# Patient Record
Sex: FEMALE | Race: WHITE | Employment: PART TIME | URBAN - NONMETROPOLITAN AREA
[De-identification: names, ages, dates, MRNs, and addresses within clinical notes are randomized per-mention and may not be internally consistent; named-entity substitution may affect disease eponyms.]

---

## 2017-11-23 ENCOUNTER — HOSPITAL ENCOUNTER (INPATIENT)
Age: 52
LOS: 3 days | Discharge: HOME | DRG: 885 | End: 2017-11-26
Attending: PSYCHIATRY & NEUROLOGY | Admitting: PSYCHIATRY & NEUROLOGY

## 2017-11-23 PROCEDURE — 6370000000 HC RX 637 (ALT 250 FOR IP): Performed by: PSYCHIATRY & NEUROLOGY

## 2017-11-23 PROCEDURE — 1240000000 HC EMOTIONAL WELLNESS R&B

## 2017-11-23 RX ORDER — FLUTICASONE PROPIONATE 50 MCG
1 SPRAY, SUSPENSION (ML) NASAL DAILY
Status: DISCONTINUED | OUTPATIENT
Start: 2017-11-24 | End: 2017-11-26 | Stop reason: HOSPADM

## 2017-11-23 RX ORDER — LEVOTHYROXINE SODIUM 0.1 MG/1
100 TABLET ORAL DAILY
Status: DISCONTINUED | OUTPATIENT
Start: 2017-11-24 | End: 2017-11-26 | Stop reason: HOSPADM

## 2017-11-23 RX ORDER — VENLAFAXINE HYDROCHLORIDE 150 MG/1
225 CAPSULE, EXTENDED RELEASE ORAL DAILY
Status: ON HOLD | COMMUNITY
End: 2017-11-26 | Stop reason: HOSPADM

## 2017-11-23 RX ORDER — HYDROXYZINE PAMOATE 25 MG/1
25 CAPSULE ORAL 3 TIMES DAILY PRN
Status: DISCONTINUED | OUTPATIENT
Start: 2017-11-23 | End: 2017-11-26 | Stop reason: HOSPADM

## 2017-11-23 RX ORDER — M-VIT,TX,IRON,MINS/CALC/FOLIC 27MG-0.4MG
1 TABLET ORAL DAILY
COMMUNITY
End: 2019-06-07

## 2017-11-23 RX ORDER — TIZANIDINE 4 MG/1
4 TABLET ORAL NIGHTLY PRN
Status: DISCONTINUED | OUTPATIENT
Start: 2017-11-24 | End: 2017-11-26 | Stop reason: HOSPADM

## 2017-11-23 RX ORDER — LISINOPRIL 40 MG/1
40 TABLET ORAL DAILY
Status: DISCONTINUED | OUTPATIENT
Start: 2017-11-24 | End: 2017-11-26 | Stop reason: HOSPADM

## 2017-11-23 RX ORDER — ACETAMINOPHEN 325 MG/1
650 TABLET ORAL EVERY 4 HOURS PRN
Status: DISCONTINUED | OUTPATIENT
Start: 2017-11-23 | End: 2017-11-26 | Stop reason: HOSPADM

## 2017-11-23 RX ORDER — M-VIT,TX,IRON,MINS/CALC/FOLIC 27MG-0.4MG
1 TABLET ORAL DAILY
Status: DISCONTINUED | OUTPATIENT
Start: 2017-11-24 | End: 2017-11-26 | Stop reason: HOSPADM

## 2017-11-23 RX ORDER — AMLODIPINE BESYLATE 10 MG/1
10 TABLET ORAL DAILY
Status: DISCONTINUED | OUTPATIENT
Start: 2017-11-24 | End: 2017-11-26 | Stop reason: HOSPADM

## 2017-11-23 RX ORDER — TRAZODONE HYDROCHLORIDE 50 MG/1
50 TABLET ORAL NIGHTLY PRN
Status: DISCONTINUED | OUTPATIENT
Start: 2017-11-23 | End: 2017-11-26 | Stop reason: HOSPADM

## 2017-11-23 RX ORDER — MAGNESIUM HYDROXIDE/ALUMINUM HYDROXICE/SIMETHICONE 120; 1200; 1200 MG/30ML; MG/30ML; MG/30ML
30 SUSPENSION ORAL PRN
Status: DISCONTINUED | OUTPATIENT
Start: 2017-11-23 | End: 2017-11-26 | Stop reason: HOSPADM

## 2017-11-23 RX ORDER — ALBUTEROL SULFATE 90 UG/1
2 AEROSOL, METERED RESPIRATORY (INHALATION) EVERY 6 HOURS PRN
Status: DISCONTINUED | OUTPATIENT
Start: 2017-11-23 | End: 2017-11-26 | Stop reason: HOSPADM

## 2017-11-23 RX ORDER — FLUTICASONE PROPIONATE 50 MCG
1 SPRAY, SUSPENSION (ML) NASAL DAILY
COMMUNITY
End: 2019-11-11 | Stop reason: SDUPTHER

## 2017-11-23 RX ORDER — LEVOTHYROXINE SODIUM 0.1 MG/1
100 TABLET ORAL DAILY
COMMUNITY
End: 2020-12-17 | Stop reason: SDUPTHER

## 2017-11-23 RX ADMIN — TRAZODONE HYDROCHLORIDE 50 MG: 50 TABLET ORAL at 22:45

## 2017-11-23 ASSESSMENT — LIFESTYLE VARIABLES: HISTORY_ALCOHOL_USE: YES

## 2017-11-23 ASSESSMENT — SLEEP AND FATIGUE QUESTIONNAIRES
RESTFUL SLEEP: NO
AVERAGE NUMBER OF SLEEP HOURS: 4
DIFFICULTY STAYING ASLEEP: YES
SLEEP PATTERN: DIFFICULTY FALLING ASLEEP;DISTURBED/INTERRUPTED SLEEP
DIFFICULTY ARISING: NO
DIFFICULTY FALLING ASLEEP: YES
DO YOU USE A SLEEP AID: NO
DO YOU HAVE DIFFICULTY SLEEPING: YES

## 2017-11-23 ASSESSMENT — PAIN SCALES - GENERAL: PAINLEVEL_OUTOF10: 0

## 2017-11-23 ASSESSMENT — PATIENT HEALTH QUESTIONNAIRE - PHQ9: SUM OF ALL RESPONSES TO PHQ QUESTIONS 1-9: 23

## 2017-11-24 PROCEDURE — 6370000000 HC RX 637 (ALT 250 FOR IP): Performed by: PHYSICIAN ASSISTANT

## 2017-11-24 PROCEDURE — 90792 PSYCH DIAG EVAL W/MED SRVCS: CPT | Performed by: PHYSICIAN ASSISTANT

## 2017-11-24 PROCEDURE — 1240000000 HC EMOTIONAL WELLNESS R&B

## 2017-11-24 PROCEDURE — 6370000000 HC RX 637 (ALT 250 FOR IP): Performed by: PSYCHIATRY & NEUROLOGY

## 2017-11-24 RX ORDER — ARIPIPRAZOLE 5 MG/1
5 TABLET ORAL DAILY
Status: DISCONTINUED | OUTPATIENT
Start: 2017-11-24 | End: 2017-11-26 | Stop reason: HOSPADM

## 2017-11-24 RX ADMIN — LEVOTHYROXINE SODIUM 100 MCG: 100 TABLET ORAL at 06:08

## 2017-11-24 RX ADMIN — LISINOPRIL 40 MG: 40 TABLET ORAL at 09:55

## 2017-11-24 RX ADMIN — ACETAMINOPHEN 650 MG: 325 TABLET ORAL at 17:49

## 2017-11-24 RX ADMIN — TRAZODONE HYDROCHLORIDE 50 MG: 50 TABLET ORAL at 20:48

## 2017-11-24 RX ADMIN — FLUTICASONE PROPIONATE 1 SPRAY: 50 SPRAY, METERED NASAL at 09:56

## 2017-11-24 RX ADMIN — AMLODIPINE BESYLATE 10 MG: 10 TABLET ORAL at 09:56

## 2017-11-24 RX ADMIN — MULTIPLE VITAMINS W/ MINERALS TAB 1 TABLET: TAB at 09:55

## 2017-11-24 RX ADMIN — TIZANIDINE 4 MG: 4 TABLET ORAL at 20:48

## 2017-11-24 RX ADMIN — ARIPIPRAZOLE 5 MG: 5 TABLET ORAL at 17:47

## 2017-11-24 RX ADMIN — HYDROXYZINE PAMOATE 25 MG: 25 CAPSULE ORAL at 20:48

## 2017-11-24 RX ADMIN — VENLAFAXINE HYDROCHLORIDE 225 MG: 150 CAPSULE, EXTENDED RELEASE ORAL at 09:55

## 2017-11-24 ASSESSMENT — PAIN DESCRIPTION - PAIN TYPE: TYPE: ACUTE PAIN

## 2017-11-24 ASSESSMENT — PAIN DESCRIPTION - LOCATION: LOCATION: HEAD

## 2017-11-24 ASSESSMENT — PAIN SCALES - GENERAL
PAINLEVEL_OUTOF10: 1
PAINLEVEL_OUTOF10: 3
PAINLEVEL_OUTOF10: 0
PAINLEVEL_OUTOF10: 4

## 2017-11-24 ASSESSMENT — PAIN DESCRIPTION - PROGRESSION: CLINICAL_PROGRESSION: NOT CHANGED

## 2017-11-24 ASSESSMENT — PAIN DESCRIPTION - DESCRIPTORS: DESCRIPTORS: HEADACHE

## 2017-11-24 ASSESSMENT — PAIN DESCRIPTION - ONSET: ONSET: GRADUAL

## 2017-11-24 ASSESSMENT — PAIN DESCRIPTION - FREQUENCY: FREQUENCY: INTERMITTENT

## 2017-11-24 NOTE — PROGRESS NOTES
5 [facility]  Initial Interdisciplinary Treatment Plan NOTE    Review Date & Time: 11/24/17 2:35    Patient was in treatment team    Admission Type:   Admission Type:  Involuntary    Reason for admission:  Reason for Admission: Overdosed on Amaryl      Estimated Length of Stay Update:  3 days  Estimated Discharge Date Update: 11/28/17    PATIENT STRENGTHS:  Patient Strengths Strengths: Communication  Patient Strengths and Limitations:Limitations: Difficulty problem solving/relies on others to help solve problems  Addictive Behavior:Addictive Behavior  In the past 3 months, have you felt or has someone told you that you have a problem with:  : Other(Comments)  Do you have a history of Chemical Use?: No  Do you have a history of Alcohol Use?: Yes  Do you have a history of Street Drug Abuse?: Yes  Histroy of Prescripton Drug Abuse?: No  Medical Problems:  Past Medical History:   Diagnosis Date    Anxiety     COPD (chronic obstructive pulmonary disease) ([address]     Depression     Headache(784.0)     History of hematuria     Hypertension     Kidney stone        EDUCATION:   Learner Progress Toward Treatment Goals: Reviewed results and recommendations of this team, Reviewed group plan and strategies, Reviewed signs, symptoms and risk of self harm and violent behavior and Reviewed goals and plan of care    Method: Small group    Outcome: Verbalized understanding    PATIENT GOALS: decrease anxiety, counseling    PLAN/TREATMENT RECOMMENDATIONS UPDATE: 3 days    SHORT-TERM GOALS UPDATE:   Time frame for Short-Term Goals: daily/ongoing    LONG-TERM GOALS UPDATE:   Time frame for Long-Term Goals: 11/28/17  Members Present in Team Meeting: See Victor [address]lee [address]

## 2017-11-24 NOTE — H&P
Department of Psychiatry  Psychiatric Assessment     CHIEF COMPLAINT:  Suicide attempt    HISTORY OF PRESENT ILLNESS:      Amari Cespedes is a 47 y.o. female with a history of bipolar II disorder, alcohol/cocaine abuse, who presented to the emergency department following a suicide attempt via overdose on twenty Amaryl tablets and ten Xanax tablets. She is seen this morning along with Boni Hidalgo MD.  She endorses worsening depression last few weeks with anhedonia, feeling down and sad, hopeless, poor motivation, isolative, sleeping during the day and staying awake at night. She recently quit her job at [employer] (due to [employer] her house, then relapsed on cocaine and alcohol this weekend, resulting in her suicide attempt.      PSYCHIATRIC HISTORY:      · Outpatient psychiatric provider:  Currently follows with Dr. Olegario Burnette  · Suicide attempts: yes  · Inpatient psychiatric admissions: yes    Past psychiatric medications includes:     Xanax  Trazodone  Zyprexa, Risperdal, Seroquel  Effexor     Adverse reactions from psychotropic medications:    Risperdal, Serouqel, possibly Trazodone = sleep walking  Zyprexa = significant weight gain      Lifetime Psychiatric Review of Systems      ·    Obsessions and Compulsions: denies      ·    Geeta or Hypomania: hypomania  ·    Hallucinations: denies  ·    Panic Attacks:  Yes - rarely   ·    Delusions:  denies  ·    Phobias:  denies      Past Medical History:        Diagnosis Date    Anxiety     COPD (chronic obstructive pulmonary disease) ([address]     Depression     Headache(784.0)     History of hematuria     Hypertension     Kidney stone        Past Surgical History:        Procedure Laterality Date    APPENDECTOMY      CHOLECYSTECTOMY      DILATION AND CURETTAGE OF UTERUS      EYE SURGERY      LAPAROSCOPY      LUMBAR LAMINECTOMY      TUBAL LIGATION      TUMOR REMOVAL      FACE       Medications Prior to Admission:   Prescriptions Prior to Admission: understand and utilize treatments. Goals:    Resume home meds, add in Abilify, titrate for efficacy  Encouraged patient to engage in groups, milieu, and individual therapies offered as part of programing. Behavioral Services  Medicare Certification Upon Admission    I certify that this patient's inpatient psychiatric hospital admission is medically necessary for:   X (1) Treatment which could reasonably be expected to improve this patient's condition,      X (2) Or for diagnostic study;     AND     X (2) The inpatient psychiatric services are provided while the individual is under the care of a physician and are included in the individualized plan of care. Estimated length of stay/service 3-10 days    Plan for post-hospital care: Follow up with [facility].   Pt also to be offered [id number] [facility],2[facility],2Nd Floor Intensive Outpatient Program.     Electronically signed by Ar Bernabe PA-C on 11/24/2017 at 12:35 PM       Findings and recommendations discussed with and approved by Chen Harrison MD

## 2017-11-24 NOTE — PROGRESS NOTES
Nutrition Assessment    Type and Reason for Visit: Initial, Positive Nutrition Screen (unplanned weight loss prior to admit)    Nutrition Recommendations: Consider folic acid and thiamin given alcohol abuse history. Continue current diet, ONS, and multivitamin. Malnutrition Assessment:  · Malnutrition Status: At risk for malnutrition    Nutrition Diagnosis:   · Problem: Inadequate oral intake  · Etiology: related to Psychological cause/life stress, Insufficient energy/nutrient consumption     Signs and symptoms:  as evidenced by Diet history of poor intake, Weight loss    Nutrition Assessment:  · Subjective Assessment: Pt. seen, lying in bed, hasn't ate breakfast yet. Reports decline in appetite in the last few weeks, occasional nausea. Note polysubstance abuse, alcohol, cocaine. Dislikes liquid ONS, agreed to magic cup BID. States her usual weight~ 150#. Lost ~ 7# but she was unsure of time frame this was lost.    · Wound Type: None  · Current Nutrition Therapies:  · Oral Diet Orders: General   · Oral Diet intake: Unable to assess (just admitted)  · Oral Nutrition Supplement (ONS) Orders: Frozen Oral Supplement (magic cup BID)  · ONS intake:  (just initiated)  · Anthropometric Measures:  · Ht: 5' 1\" (154.9 cm)   · Current Body Wt: 143 lb (64.9 kg) (11/23/17 stated)  · Admission Body Wt: 143 lb (64.9 kg) (11/23/17 stated)  · Usual Body Wt:  (150# per pt, she was unsure when she last weighed this. No previous hospital wts.)  · % Weight Change: 5.4% loss ,  pt. uncertain of time frame  · Ideal Body Wt: 105 lb (47.6 kg),  · Adjusted Body Wt:115 lb (52.2 kg),   · BMI Classification: BMI 25.0 - 29.9 Overweight  · Comparative Standards (Estimated Nutrition Needs):  · Estimated Daily Total Kcal: 4030-8406 kcals  · Estimated Daily Protein (g): 42-52 grams    Estimated Intake vs Estimated Needs: Intake Less Than Needs    Nutrition Risk Level:  Moderate    Nutrition Interventions:   Continue current diet, Start ONS  Continued Inpatient Monitoring, Education Initiated (Encouraged oral intake.)    Nutrition Evaluation:   · Evaluation: Goals set   · Goals: Pt. will consume 75% or more of meals during LOS. · Monitoring: Meal Intake, Supplement Intake, Diet Tolerance, Weight, Pertinent Labs    See Adult Nutrition Doc Flowsheet for more detail.      Electronically signed by Ronna Liu RD, PAUL on 11/24/17 at 8:38 AM    Contact Number: [phone]

## 2017-11-25 PROCEDURE — 6370000000 HC RX 637 (ALT 250 FOR IP): Performed by: PHYSICIAN ASSISTANT

## 2017-11-25 PROCEDURE — 99231 SBSQ HOSP IP/OBS SF/LOW 25: CPT | Performed by: PSYCHIATRY & NEUROLOGY

## 2017-11-25 PROCEDURE — 6370000000 HC RX 637 (ALT 250 FOR IP): Performed by: PSYCHIATRY & NEUROLOGY

## 2017-11-25 PROCEDURE — 1240000000 HC EMOTIONAL WELLNESS R&B

## 2017-11-25 RX ORDER — POLYETHYLENE GLYCOL 3350 17 G
2 POWDER IN PACKET (EA) ORAL PRN
Status: DISCONTINUED | OUTPATIENT
Start: 2017-11-25 | End: 2017-11-26 | Stop reason: HOSPADM

## 2017-11-25 RX ADMIN — LEVOTHYROXINE SODIUM 100 MCG: 100 TABLET ORAL at 06:54

## 2017-11-25 RX ADMIN — MULTIPLE VITAMINS W/ MINERALS TAB 1 TABLET: TAB at 08:18

## 2017-11-25 RX ADMIN — AMLODIPINE BESYLATE 10 MG: 10 TABLET ORAL at 08:18

## 2017-11-25 RX ADMIN — FLUTICASONE PROPIONATE 1 SPRAY: 50 SPRAY, METERED NASAL at 08:19

## 2017-11-25 RX ADMIN — ARIPIPRAZOLE 5 MG: 5 TABLET ORAL at 08:18

## 2017-11-25 RX ADMIN — TRAZODONE HYDROCHLORIDE 50 MG: 50 TABLET ORAL at 20:34

## 2017-11-25 RX ADMIN — ACETAMINOPHEN 650 MG: 325 TABLET ORAL at 14:56

## 2017-11-25 RX ADMIN — HYDROXYZINE PAMOATE 25 MG: 25 CAPSULE ORAL at 12:38

## 2017-11-25 RX ADMIN — HYDROXYZINE PAMOATE 25 MG: 25 CAPSULE ORAL at 20:35

## 2017-11-25 RX ADMIN — VENLAFAXINE HYDROCHLORIDE 225 MG: 150 CAPSULE, EXTENDED RELEASE ORAL at 08:18

## 2017-11-25 RX ADMIN — LISINOPRIL 40 MG: 40 TABLET ORAL at 08:18

## 2017-11-25 RX ADMIN — TIZANIDINE 4 MG: 4 TABLET ORAL at 21:44

## 2017-11-25 ASSESSMENT — PAIN DESCRIPTION - DESCRIPTORS: DESCRIPTORS: ACHING

## 2017-11-25 ASSESSMENT — PAIN SCALES - GENERAL
PAINLEVEL_OUTOF10: 6
PAINLEVEL_OUTOF10: 0
PAINLEVEL_OUTOF10: 3

## 2017-11-25 ASSESSMENT — SLEEP AND FATIGUE QUESTIONNAIRES
DIFFICULTY STAYING ASLEEP: YES
DIFFICULTY ARISING: NO
RESTFUL SLEEP: YES
DIFFICULTY FALLING ASLEEP: YES
DO YOU USE A SLEEP AID: NO
DO YOU HAVE DIFFICULTY SLEEPING: NO
AVERAGE NUMBER OF SLEEP HOURS: 4

## 2017-11-25 ASSESSMENT — PAIN DESCRIPTION - ORIENTATION: ORIENTATION: LOWER

## 2017-11-25 ASSESSMENT — PAIN DESCRIPTION - PAIN TYPE: TYPE: CHRONIC PAIN

## 2017-11-25 ASSESSMENT — PAIN DESCRIPTION - PROGRESSION: CLINICAL_PROGRESSION: NOT CHANGED

## 2017-11-25 ASSESSMENT — PAIN DESCRIPTION - ONSET: ONSET: ON-GOING

## 2017-11-25 ASSESSMENT — PAIN DESCRIPTION - FREQUENCY: FREQUENCY: INTERMITTENT

## 2017-11-25 ASSESSMENT — PAIN DESCRIPTION - LOCATION: LOCATION: BACK

## 2017-11-25 NOTE — PLAN OF CARE
Problem: Falls - Risk of  Goal: Absence of falls  Outcome: Ongoing  No falls noted. Call light within reach. Fall precautions in place. Problem: Depressive Behavior with or without Suicide precautions  Goal: LTG-Able to verbalize acceptance of life and situations over which he or she has no control  Outcome: Ongoing  Patient does not verbalize acceptance. Goal: LTG-Able to verbalize and/or display a decrease in depressive symptoms  Outcome: Ongoing  Patient has a flat and blunted affect, denies depressive symptoms. Goal: STG-Able to verbalize suicidal ideations  Outcome: Ongoing  Patient currently denies suicidal thoughts. Goal: STG-Able to verbalize support system  Outcome: Completed Date Met: 11/24/17  Patient verbalizes support. Goal: STG-Absence of Self Harm  Outcome: Ongoing  Patient remains safe and free from harm. Safety, fall and suicide precautions in place. Goal: STG-Knowledge of positive coping patterns  Outcome: Completed Date Met: 11/24/17  Patient verbalizes positive coping skills. Goal: STG-Participation in care planning  Outcome: Ongoing  Patient participates. Goal: Patient Specific Goal  Outcome: Ongoing  Patient did not voice a goal.     Problem: Substance Abuse  Goal: STG-Knowledge of community resources  Outcome: Ongoing  Patient did not discuss. Problem: Activity:  Goal: Sleeping patterns will improve  Sleeping patterns will improve   Outcome: Ongoing  Patient is currently sleeping. Problem: Discharge Planning:  Goal: Discharged to appropriate level of care  Discharged to appropriate level of care   Outcome: Ongoing  Discharge planning is in progress. Problem: KNOWLEDGE DEFICIT,EDUCATION,DISCHARGE PLAN  Goal: Knowledge - personal safety  Outcome: Ongoing  Safety plan not completed this shift. Problem: Altered Mood, Depressive Behavior  Goal: LTG-Absence of self-harm  Outcome: Ongoing  Patient remains safe and free from harm.  Safety, fall and suicide precautions in place. Goal: Participation in care planning  Outcome: Ongoing  Patient participates. Goal: LTG-Able to verbalize acceptance of life and situations over which he or she has no control  Outcome: Ongoing  Patient does not verbalize acceptance. Goal: LTG-Able to verbalize and/or display a decrease in depressive symptoms  Outcome: Ongoing  Patient has a flat and blunted affect, denies depressive symptoms. Goal: STG-Able to verbalize suicidal ideations  Outcome: Ongoing  Patient currently denies suicidal thoughts. Goal: STG-Able to verbalize support system  Outcome: Completed Date Met: 11/24/17  Patient verbalizes support. Goal: STG-Knowledge of positive coping patterns  Outcome: Completed Date Met: 11/24/17  Patient verbalizes positive coping skills. Goal: Patient Specific Goal  Outcome: Ongoing  Patient did not voice a goal.     Problem: Suicide risk  Goal: Provide patient with safe environment  Provide patient with safe environment   Outcome: Ongoing  Patient remains safe and free from harm. Safety, fall and suicide precautions in place. Problem: Nutrition  Goal: Optimal nutrition therapy  Outcome: Ongoing  Patient ate a snack this evening. Problem: Pain:  Goal: Pain level will decrease  Pain level will decrease   Outcome: Ongoing  Patient reports relief of headache at this time. Goal: Control of acute pain  Control of acute pain   Outcome: Completed Date Met: 11/24/17    Goal: Control of chronic pain  Control of chronic pain   Outcome: Completed Date Met: 11/24/17      Comments: Care plan reviewed with patient. Patient does verbalize understanding of the plan of care and does contribute to goal setting.

## 2017-11-25 NOTE — PROGRESS NOTES
Group Therapy Note     Date: 11/24/2017  Start Time: 1630  End Time:  1700     Type of Group: Healthy Living/Wellness        Notes:  Did not attend group         Discipline Responsible: Licensed Practical Nurse        Signature:  Amos Fernandez.  Zia Hernandez LPN

## 2017-11-25 NOTE — PROGRESS NOTES
[facility] Biopsychosocial Assessment    Current Level of Psychosocial Functioning     Independent XXX  Dependent    Minimal Assist     Comments:  Patient had worsening depression which resulted in her quitting her job and becoming homeless    Psychosocial High Risk Factors (check all that apply)    Unable to obtain meds   Chronic illness/pain    Substance abuse XXX  Lack of Family Support   Financial stress  XXX  Isolation   Inadequate Community Resources  Suicide attempt(s) XXX  Not taking medications   Victim of crime   Developmental Delay  Unable to manage personal needs    Age 72 or older   Homeless  No transportation   Readmission within 30 days  Unemployment  Traumatic Event    Comments:   Sexual Orientation:  heterosexual    Patient Strengths: communication    Patient Barriers: financial    Plan of Care     medication management, group/individual therapies, family meetings, psycho -education, treatment team meetings to assist with stabilization    Initial Discharge Plan:  Medication, therapy, support, discharge      Clinical Summary:  Pt is a 47year old female who presented to ED for suicide attempt via overdose. Pt reported worsening depression which caused her to quit her job and then lack of income resulted in the loss of her house. Pt relapsed and experienced suicidal ideation. Pt has support in friend who she will live with when discharged.     [employer]

## 2017-11-25 NOTE — PLAN OF CARE
Problem: Falls - Risk of  Goal: Absence of falls  Outcome: Ongoing  Patient's gait is steady and free of falls this shift and will continue to monitor. Problem: Depressive Behavior with or without Suicide precautions  Goal: LTG-Able to verbalize acceptance of life and situations over which he or she has no control  Outcome: Ongoing  She shares stress over: no job, money, housing. She lost job at [employer] due to missing too much and having too many points. She says that when she took the overdose she wanted to \"just go to sleep\" not die. She reports walking, praying, and bubble baths are her coping skills. Goal: LTG-Able to verbalize and/or display a decrease in depressive symptoms  Outcome: Ongoing  She rates her mood today #5 on scale of 1-10 with 10 the happiest.  She is worried about her future, needing to find a job and get own housing. She shares she has hope for her future. She says her friend Dari Geller whom she lives with and sister Tacho Candelario and another friend Debbie Escalera is all her support system. Goal: STG-Able to verbalize suicidal ideations  Patient denies suicidal ideation, no plan and or intent to harm self. Patient is radha for safety to seek this RN for thoughts of self harm. Praise given and accepted for contract for safety. Patient remains on 15 minute suicidal precautions for safety and unit protocols. Goal: STG-Absence of Self Harm  Outcome: Ongoing  Patient has no self mutilation thoughts and or  behaviors and gives verbal contract to seek staff and inform if they develop any. She remains on 15 minute suicidal precautions for safety and unit protocols. Goal: STG-Participation in care planning  Outcome: Ongoing  Patient is compliant with medications, unit protocols, and setting daily goals for improved mental and emotional health.    Goal: Patient Specific Goal  Outcome: Ongoing  She has not made a goal yet for today; but she was encouraged to attend all groups today for support and

## 2017-11-26 VITALS
DIASTOLIC BLOOD PRESSURE: 57 MMHG | HEART RATE: 84 BPM | OXYGEN SATURATION: 96 % | HEIGHT: 61 IN | TEMPERATURE: 97.9 F | SYSTOLIC BLOOD PRESSURE: 112 MMHG | WEIGHT: 143 LBS | RESPIRATION RATE: 16 BRPM | BODY MASS INDEX: 27 KG/M2

## 2017-11-26 PROBLEM — F31.81 BIPOLAR 2 DISORDER (HCC): Status: ACTIVE | Noted: 2017-11-26

## 2017-11-26 PROCEDURE — 5130000000 HC BRIDGE APPOINTMENT

## 2017-11-26 PROCEDURE — 6370000000 HC RX 637 (ALT 250 FOR IP): Performed by: PSYCHIATRY & NEUROLOGY

## 2017-11-26 PROCEDURE — 99238 HOSP IP/OBS DSCHRG MGMT 30/<: CPT | Performed by: PSYCHIATRY & NEUROLOGY

## 2017-11-26 PROCEDURE — 99238 HOSP IP/OBS DSCHRG MGMT 30/<: CPT | Performed by: NURSE PRACTITIONER

## 2017-11-26 PROCEDURE — 6370000000 HC RX 637 (ALT 250 FOR IP): Performed by: PHYSICIAN ASSISTANT

## 2017-11-26 RX ORDER — AMLODIPINE BESYLATE 10 MG/1
10 TABLET ORAL DAILY
Qty: 30 TABLET | Refills: 0 | Status: SHIPPED | OUTPATIENT
Start: 2017-11-26 | End: 2020-05-18 | Stop reason: SDUPTHER

## 2017-11-26 RX ORDER — HYDROXYZINE PAMOATE 25 MG/1
25 CAPSULE ORAL 3 TIMES DAILY PRN
Qty: 90 CAPSULE | Refills: 0 | Status: SHIPPED | OUTPATIENT
Start: 2017-11-26 | End: 2017-12-10

## 2017-11-26 RX ORDER — ARIPIPRAZOLE 5 MG/1
5 TABLET ORAL DAILY
Qty: 30 TABLET | Refills: 0 | Status: SHIPPED | OUTPATIENT
Start: 2017-11-26 | End: 2019-06-07

## 2017-11-26 RX ORDER — TRAZODONE HYDROCHLORIDE 50 MG/1
50 TABLET ORAL NIGHTLY PRN
Qty: 30 TABLET | Refills: 0 | Status: SHIPPED | OUTPATIENT
Start: 2017-11-26 | End: 2020-04-20

## 2017-11-26 RX ORDER — VENLAFAXINE HYDROCHLORIDE 75 MG/1
225 CAPSULE, EXTENDED RELEASE ORAL
Qty: 90 CAPSULE | Refills: 0 | Status: SHIPPED | OUTPATIENT
Start: 2017-11-26 | End: 2019-06-07

## 2017-11-26 RX ADMIN — HYDROXYZINE PAMOATE 25 MG: 25 CAPSULE ORAL at 08:32

## 2017-11-26 RX ADMIN — MULTIPLE VITAMINS W/ MINERALS TAB 1 TABLET: TAB at 08:32

## 2017-11-26 RX ADMIN — FLUTICASONE PROPIONATE 1 SPRAY: 50 SPRAY, METERED NASAL at 08:32

## 2017-11-26 RX ADMIN — LISINOPRIL 40 MG: 40 TABLET ORAL at 08:32

## 2017-11-26 RX ADMIN — AMLODIPINE BESYLATE 10 MG: 10 TABLET ORAL at 08:32

## 2017-11-26 RX ADMIN — NICOTINE POLACRILEX 2 MG: 2 LOZENGE ORAL at 08:32

## 2017-11-26 RX ADMIN — VENLAFAXINE HYDROCHLORIDE 225 MG: 150 CAPSULE, EXTENDED RELEASE ORAL at 08:32

## 2017-11-26 RX ADMIN — LEVOTHYROXINE SODIUM 100 MCG: 100 TABLET ORAL at 07:06

## 2017-11-26 RX ADMIN — ARIPIPRAZOLE 5 MG: 5 TABLET ORAL at 08:32

## 2017-11-26 NOTE — DISCHARGE SUMMARY
Psychiatry Discharge Summary        11-    HPI:  Jean Gallardo is a 47 y.o. female with a history of bipolar II disorder, alcohol/cocaine abuse, who presented to the emergency department following a suicide attempt via overdose on twenty Amaryl tablets and ten Xanax tablets. She is seen this morning along with Sonja Fall MD.  She endorses worsening depression last few weeks with anhedonia, feeling down and sad, hopeless, poor motivation, isolative, sleeping during the day and staying awake at night. She recently quit her job at [employer] to [employer] her house, then relapsed on cocaine and alcohol this weekend, resulting in her suicide attempt. Hospital Course:  Upon admission to E4 psychiatric unit. Petrona  was provided a safe secure environment. Introduced to unit milieu, groups and individual therapies. Medication management was provided. Petrona participated in all treatment modalities and reports meds are working well without side effects. Reports depression is gone. Petrona denies feelings of harm towards self or others. Petrona ate and slept well while in hospital. States she is ready for discharge and wants to get on with her life. States her goal is obtain disability. On morning rounds it was decided that Petrona has achieved maximum benefit from hospital care and can be discharged home with agreement to F/U with [facility] in [place], [place]  for continued medication . Management and counseling.   Also agrees to continue her discharge meds.      Discharge Medications:  Effexor XR 225mg po q am  Abilify 5mg po q am  Vistaril 25mg po TID prn anxiety  Trazodone 50mg po q hs prn sleep    MSE:  Level of consciousness: Alert  Appearance: hospital attire, in chair and fair grooming   Behavior/Motor: no abnormalities noted   Attitude toward examiner: cooperative   Speech: Normal volume, goal directed, NRR  Mood: Euthymic  Affect: Reactive  Thought processes: Linear and goal directed   Suicidal Ideation: Denies suicidal ideations  Homicidal ideation: Denies homicidal ideations  Delusions: No evidence of delusions is observed  Perceptual Disturbance: Denies AH/VH;  No evidence of psychosis is observed. Cognition: Oriented to person, place, time and situation   Concentration fair   Memory intact   Insight: Fair  Judgment: Fair    Impression:  Bipolar II D/O MRE Depressed without psychosis  Aclohol and cocaine abuse    Disposition:  Fidel Cho can be discharged home with agreement to F/U with [facility] in [place], [place]  for continued medication . Management and counseling. Also agrees to continue her discharge meds.          Mor Ponce CNP   Time Spent: 25 minutes    I assessed this patient and reviewed the case and plan of care with  Mor Ponce CNP.   I have reviewed the above documentation and I agree with the findings and treatment & discharge plan as written  Electronically signed by Maria Del Rosario Morgan. on 11/26/2017 at 7:44 AM

## 2017-11-26 NOTE — BH NOTE
Group Therapy Note    Date: 11/25/2017  Start Time: 1130  End Time:  1230  Number of Participants: 8    Type of Group: Recreational    Patient's Goal:  Pt to participate in informal recreational activity on the unit during 60 minute time (but may continue after time) to increase socialization on the unit. Notes:  Pt came out on to the unit and is seen interacting with peers and engaging in the game. Pt is pleasant and cooperative. Status After Intervention:  Improved    Participation Level:  Active Listener and Interactive    Participation Quality: Appropriate, Attentive and Sharing      Speech:  normal      Thought Process/Content: Logical      Affective Functioning: Flat      Mood: euthymic      Level of consciousness:  Alert, Oriented x4 and Attentive      Response to Learning: Able to verbalize current knowledge/experience, Able to retain information and Progressing to goal      Endings: None Reported    Modes of Intervention: Support, Socialization, Exploration and Activity      Discipline Responsible: Psychoeducational Specialist      Signature:  Amelia Christianson
PLAN OF CARE:     Start Time: 0845  End Time:  0915    Group Topic:  Daily Goals    Group Type:   Goal Group    Intervention/Goal:  To increase support and identify daily goals    Attendance: Pt in attendance for entirety of group therapy session out side of being asked to step out of group to meet with practitioners. Affect: Congruent     Behavior: Appropriate- engaging and pleasant throughout session. Response: Pt responded by offering daily goal to therapist and engaging in group therapy discussion.     Daily Goal: \"To go home and start applying for jobs\"    Progress: Pt is progressing toward daily goal.
Pt declined to attend [place] and goal group. Pt did not offer a daily goal.  Pt did not attend 0915 Relapse Prevention group therapy session offered today. Pt resting in her room during time of invitation. Pt was invited to group 2x.
(Comment), Other (Comment) ()      Valuables placed in safe in security envelope, number:  [id number]. Patient oriented to surroundings and program expectations and copy of patient rights given. Received admission packet:  yes. Consents reviewed, signed yes. Patient verbalize understanding:  yes. Patient education on precautions: yes           Provided pt with [employer] handout entitled \"Quitting Smoking. \"  Reviewed handout with pt addressing dangers of smoking, developing coping skills, and providing basic information about quitting. Pt response to counselin [address]    Patient admitted from [facility].  Had overdosed on a friends amaryl. States that she was suicidal and wanted to die at the time of the overdose, but no longer feels that way. States that she relapsed on cocaine and alcohol last Saturday. Feels that this increased her depression. States that she is anxious and depressed still tonight. Oriented to unit and room. Consents signed. [address] [facility] admission status.            Priti Oneill RN

## 2017-11-26 NOTE — PLAN OF CARE
Problem: KNOWLEDGE DEFICIT,EDUCATION,DISCHARGE PLAN  Goal: Knowledge - personal safety  Outcome: Completed Date Met: 11/26/17  1. What are the warning signs when you begin thinking suicide or when you feel very distressed? Can't get out of bed, crying, thinking I can't deal.   2. What can you do by yourself to take your mind off of the problem? Start positive self talk, take a walk call hotline. 3. If you are unable to deal with your distressed mood alone, contact trusted family members or friends. List several people in case your first choices are not available. Kana Rojas  4. Contact local professionals or emergency services if you continue to have suicidal thoughts or serious distress.   ASSURED INFORMATION SECURITY PHONE NUMBERS:        [phone]        [phone]        [phone] (for deaf or hearing impaired)

## 2017-11-26 NOTE — PROGRESS NOTES
Behavioral Health   Discharge Note    Pt discharged with followings belongings:   Dentures: None  Vision - Corrective Lenses: Contacts  Hearing Aid: None  Jewelry: None  Body Piercings Removed: N/A  Clothing: Footwear, Jacket / coat, Pants, Undergarments (Comment)  Were All Patient Medications Collected?: Not Applicable  Other Valuables: Cell phone, Money (Comment)   Valuables sent home with patient. Valuables retrieved from safe, Security envelope number:  Picked up by staff and returned to patient. Patient left department with family in private vehicle via Mobility at Departure: Ambulatory, discharged to Discharged to: Private Residence. Patient education on aftercare instructions: yes  Bridge Appointment completed: Reviewed Discharge Instructions with patient. Patient verbalizes understanding and agreement with the discharge plan using the teachback method. Information faxed to [facility] by  Patient verbalize understanding of AVS:  yes. Status EXAM upon discharge:  Status and Exam  Normal: Yes  Facial Expression: Brightened  Affect: Appropriate  Level of Consciousness: Alert  Mood:Normal: No  Mood: Anxious  Motor Activity:Normal: Yes  Motor Activity: Increased  Interview Behavior: Cooperative  Preception: [place] to Person, [place] to Time, [place] to Place, [place] to Situation  Attention:Normal: Yes  Thought Processes: Circumstantial  Thought Content:Normal: Yes  Hallucinations: None  Delusions: No  Memory:Normal: Yes  Insight and Judgment: Yes  Insight and Judgment: Poor Judgment  Present Suicidal Ideation: No  Present Homicidal Ideation: No    Kim Bowens LPN

## 2017-11-26 NOTE — PLAN OF CARE
Goal: LTG-Able to verbalize acceptance of life and situations over which he or she has no control  Outcome: Ongoing  Patient verbalizes acceptance. Goal: LTG-Able to verbalize and/or display a decrease in depressive symptoms  Outcome: Ongoing  Patient has a flat affect, however brightens with interaction. Patient denies depressive thoughts. Goal: STG-Able to verbalize suicidal ideations  Outcome: Ongoing  Patient denies suicidal thoughts. Goal: Patient Specific Goal  Outcome: Ongoing  No goal voiced. Problem: Nutrition  Goal: Optimal nutrition therapy  Outcome: Ongoing  Patient is eating adequate amounts. Problem: Pain:  Goal: Pain level will decrease  Pain level will decrease   Outcome: Ongoing  Patient is resting in bed with eyes closed after zanaflex given per orders. Comments: Care plan reviewed with patient. Patient does verbalize understanding of the plan of care and does contribute to goal setting.

## 2019-01-23 ENCOUNTER — HOSPITAL ENCOUNTER (OUTPATIENT)
Age: 54
Setting detail: SPECIMEN
Discharge: HOME | End: 2019-01-23
Payer: MEDICARE

## 2019-01-23 LAB
ABSOLUTE EOS #: 0.1 K/UL (ref 0–0.44)
ABSOLUTE IMMATURE GRANULOCYTE: 0.03 K/UL (ref 0–0.3)
ABSOLUTE LYMPH #: 1.15 K/UL (ref 1.1–3.7)
ABSOLUTE MONO #: 0.47 K/UL (ref 0.1–1.2)
ALBUMIN SERPL-MCNC: 4.5 G/DL (ref 3.5–5.2)
ALBUMIN/GLOBULIN RATIO: 2.1 (ref 1–2.5)
ALP BLD-CCNC: 72 U/L (ref 35–104)
ALT SERPL-CCNC: 21 U/L (ref 5–33)
ANION GAP SERPL CALCULATED.3IONS-SCNC: 16 MMOL/L (ref 9–17)
AST SERPL-CCNC: 20 U/L
BASOPHILS # BLD: 1 % (ref 0–2)
BASOPHILS ABSOLUTE: 0.03 K/UL (ref 0–0.2)
BILIRUB SERPL-MCNC: 0.18 MG/DL (ref 0.3–1.2)
BUN BLDV-MCNC: 15 MG/DL (ref 6–20)
BUN/CREAT BLD: ABNORMAL (ref 9–20)
CALCIUM SERPL-MCNC: 9.4 MG/DL (ref 8.6–10.4)
CHLORIDE BLD-SCNC: 102 MMOL/L (ref 98–107)
CHOLESTEROL/HDL RATIO: 3.1
CHOLESTEROL: 210 MG/DL
CO2: 27 MMOL/L (ref 20–31)
CREAT SERPL-MCNC: 0.82 MG/DL (ref 0.5–0.9)
DIFFERENTIAL TYPE: ABNORMAL
EOSINOPHILS RELATIVE PERCENT: 2 % (ref 1–4)
GFR AFRICAN AMERICAN: >60 ML/MIN
GFR NON-AFRICAN AMERICAN: >60 ML/MIN
GFR SERPL CREATININE-BSD FRML MDRD: ABNORMAL ML/MIN/{1.73_M2}
GFR SERPL CREATININE-BSD FRML MDRD: ABNORMAL ML/MIN/{1.73_M2}
GLUCOSE BLD-MCNC: 92 MG/DL (ref 70–99)
HCT VFR BLD CALC: 44.6 % (ref 36.3–47.1)
HDLC SERPL-MCNC: 68 MG/DL
HEMOGLOBIN: 13.7 G/DL (ref 11.9–15.1)
IMMATURE GRANULOCYTES: 1 %
LDL CHOLESTEROL: 112 MG/DL (ref 0–130)
LYMPHOCYTES # BLD: 22 % (ref 24–43)
MAGNESIUM: 2.1 MG/DL (ref 1.6–2.6)
MCH RBC QN AUTO: 30 PG (ref 25.2–33.5)
MCHC RBC AUTO-ENTMCNC: 30.7 G/DL (ref 28.4–34.8)
MCV RBC AUTO: 97.6 FL (ref 82.6–102.9)
MONOCYTES # BLD: 9 % (ref 3–12)
NRBC AUTOMATED: 0 PER 100 WBC
PDW BLD-RTO: 13.2 % (ref 11.8–14.4)
PLATELET # BLD: 240 K/UL (ref 138–453)
PLATELET ESTIMATE: ABNORMAL
PMV BLD AUTO: 9.6 FL (ref 8.1–13.5)
POTASSIUM SERPL-SCNC: 4.6 MMOL/L (ref 3.7–5.3)
RBC # BLD: 4.57 M/UL (ref 3.95–5.11)
RBC # BLD: ABNORMAL 10*6/UL
SEG NEUTROPHILS: 65 % (ref 36–65)
SEGMENTED NEUTROPHILS ABSOLUTE COUNT: 3.53 K/UL (ref 1.5–8.1)
SODIUM BLD-SCNC: 145 MMOL/L (ref 135–144)
TOTAL PROTEIN: 6.6 G/DL (ref 6.4–8.3)
TRIGL SERPL-MCNC: 151 MG/DL
TSH SERPL DL<=0.05 MIU/L-ACNC: 2.08 MIU/L (ref 0.3–5)
VLDLC SERPL CALC-MCNC: ABNORMAL MG/DL (ref 1–30)
WBC # BLD: 5.3 K/UL (ref 3.5–11.3)
WBC # BLD: ABNORMAL 10*3/UL

## 2019-04-10 ENCOUNTER — HOSPITAL ENCOUNTER (OUTPATIENT)
Age: 54
Setting detail: SPECIMEN
Discharge: HOME | End: 2019-04-10
Payer: MEDICARE

## 2019-04-10 LAB
ALBUMIN SERPL-MCNC: 4.8 G/DL (ref 3.5–5.2)
ALBUMIN/GLOBULIN RATIO: 2.8 (ref 1–2.5)
ALP BLD-CCNC: 67 U/L (ref 35–104)
ALT SERPL-CCNC: 19 U/L (ref 5–33)
ANION GAP SERPL CALCULATED.3IONS-SCNC: 9 MMOL/L (ref 9–17)
AST SERPL-CCNC: 20 U/L
BILIRUB SERPL-MCNC: 0.24 MG/DL (ref 0.3–1.2)
BUN BLDV-MCNC: 11 MG/DL (ref 6–20)
BUN/CREAT BLD: ABNORMAL (ref 9–20)
CALCIUM SERPL-MCNC: 9.8 MG/DL (ref 8.6–10.4)
CHLORIDE BLD-SCNC: 102 MMOL/L (ref 98–107)
CO2: 30 MMOL/L (ref 20–31)
CREAT SERPL-MCNC: 0.76 MG/DL (ref 0.5–0.9)
GFR AFRICAN AMERICAN: >60 ML/MIN
GFR NON-AFRICAN AMERICAN: >60 ML/MIN
GFR SERPL CREATININE-BSD FRML MDRD: ABNORMAL ML/MIN/{1.73_M2}
GFR SERPL CREATININE-BSD FRML MDRD: ABNORMAL ML/MIN/{1.73_M2}
GLUCOSE BLD-MCNC: 75 MG/DL (ref 70–99)
HEPATITIS C ANTIBODY: NONREACTIVE
HIV AG/AB: NONREACTIVE
MAGNESIUM: 2 MG/DL (ref 1.6–2.6)
POTASSIUM SERPL-SCNC: 4.6 MMOL/L (ref 3.7–5.3)
SODIUM BLD-SCNC: 141 MMOL/L (ref 135–144)
TOTAL PROTEIN: 6.5 G/DL (ref 6.4–8.3)

## 2019-06-07 ENCOUNTER — HOSPITAL ENCOUNTER (EMERGENCY)
Age: 54
Discharge: HOME | End: 2019-06-07
Attending: EMERGENCY MEDICINE
Payer: MEDICARE

## 2019-06-07 ENCOUNTER — APPOINTMENT (OUTPATIENT)
Dept: CT IMAGING | Age: 54
End: 2019-06-07
Payer: MEDICARE

## 2019-06-07 ENCOUNTER — OFFICE VISIT (OUTPATIENT)
Dept: PRIMARY CARE CLINIC | Age: 54
End: 2019-06-07
Payer: MEDICARE

## 2019-06-07 ENCOUNTER — HOSPITAL ENCOUNTER (OUTPATIENT)
Age: 54
Discharge: HOME | End: 2019-06-09
Payer: MEDICARE

## 2019-06-07 ENCOUNTER — HOSPITAL ENCOUNTER (OUTPATIENT)
Dept: GENERAL RADIOLOGY | Age: 54
Discharge: HOME | End: 2019-06-09
Payer: MEDICARE

## 2019-06-07 VITALS
TEMPERATURE: 98.2 F | DIASTOLIC BLOOD PRESSURE: 69 MMHG | SYSTOLIC BLOOD PRESSURE: 141 MMHG | HEIGHT: 61 IN | BODY MASS INDEX: 27.94 KG/M2 | RESPIRATION RATE: 14 BRPM | WEIGHT: 148 LBS | OXYGEN SATURATION: 96 % | HEART RATE: 90 BPM

## 2019-06-07 VITALS
HEIGHT: 61 IN | HEART RATE: 94 BPM | BODY MASS INDEX: 27.94 KG/M2 | DIASTOLIC BLOOD PRESSURE: 82 MMHG | OXYGEN SATURATION: 98 % | SYSTOLIC BLOOD PRESSURE: 142 MMHG | TEMPERATURE: 97.1 F | WEIGHT: 148 LBS

## 2019-06-07 DIAGNOSIS — R10.33 PERIUMBILICAL ABDOMINAL PAIN: ICD-10-CM

## 2019-06-07 DIAGNOSIS — R10.84 GENERALIZED ABDOMINAL PAIN: Primary | ICD-10-CM

## 2019-06-07 DIAGNOSIS — R10.33 PERIUMBILICAL ABDOMINAL PAIN: Primary | ICD-10-CM

## 2019-06-07 LAB
-: ABNORMAL
ABSOLUTE EOS #: 0.4 K/UL (ref 0–0.4)
ABSOLUTE IMMATURE GRANULOCYTE: NORMAL K/UL (ref 0–0.3)
ABSOLUTE LYMPH #: 2.2 K/UL (ref 1–4.8)
ABSOLUTE MONO #: 0.6 K/UL (ref 0.1–1.2)
ALBUMIN SERPL-MCNC: 4.6 G/DL (ref 3.5–5.2)
ALBUMIN/GLOBULIN RATIO: 2.2 (ref 1–2.5)
ALP BLD-CCNC: 63 U/L (ref 35–104)
ALT SERPL-CCNC: 22 U/L (ref 5–33)
AMORPHOUS: ABNORMAL
AMYLASE: 58 U/L (ref 28–100)
ANION GAP SERPL CALCULATED.3IONS-SCNC: 10 MMOL/L (ref 9–17)
AST SERPL-CCNC: 22 U/L
BACTERIA: ABNORMAL
BASOPHILS # BLD: 1 % (ref 0–1)
BASOPHILS ABSOLUTE: 0.1 K/UL (ref 0–0.2)
BILIRUB SERPL-MCNC: 0.24 MG/DL (ref 0.3–1.2)
BILIRUBIN URINE: NEGATIVE
BNP INTERPRETATION: NORMAL
BUN BLDV-MCNC: 15 MG/DL (ref 6–20)
BUN/CREAT BLD: 16 (ref 9–20)
CALCIUM SERPL-MCNC: 10 MG/DL (ref 8.6–10.4)
CASTS UA: ABNORMAL /LPF (ref 0–2)
CHLORIDE BLD-SCNC: 102 MMOL/L (ref 98–107)
CO2: 26 MMOL/L (ref 20–31)
COLOR: ABNORMAL
COMMENT UA: ABNORMAL
CREAT SERPL-MCNC: 0.91 MG/DL (ref 0.5–0.9)
CRYSTALS, UA: ABNORMAL /HPF
DIFFERENTIAL TYPE: NORMAL
EOSINOPHILS RELATIVE PERCENT: 5 % (ref 1–7)
EPITHELIAL CELLS UA: ABNORMAL /HPF (ref 0–5)
GFR AFRICAN AMERICAN: >60 ML/MIN
GFR NON-AFRICAN AMERICAN: >60 ML/MIN
GFR SERPL CREATININE-BSD FRML MDRD: ABNORMAL ML/MIN/{1.73_M2}
GFR SERPL CREATININE-BSD FRML MDRD: ABNORMAL ML/MIN/{1.73_M2}
GLUCOSE BLD-MCNC: 96 MG/DL (ref 70–99)
GLUCOSE URINE: NEGATIVE
HCT VFR BLD CALC: 37.3 % (ref 36–46)
HEMOGLOBIN: 12.5 G/DL (ref 12–16)
IMMATURE GRANULOCYTES: NORMAL %
KETONES, URINE: NEGATIVE
LEUKOCYTE ESTERASE, URINE: ABNORMAL
LIPASE: 60 U/L (ref 13–60)
LYMPHOCYTES # BLD: 29 % (ref 16–46)
MCH RBC QN AUTO: 31.2 PG (ref 26–34)
MCHC RBC AUTO-ENTMCNC: 33.7 G/DL (ref 31–37)
MCV RBC AUTO: 92.8 FL (ref 80–100)
MONOCYTES # BLD: 8 % (ref 4–11)
MUCUS: ABNORMAL
NITRITE, URINE: NEGATIVE
NRBC AUTOMATED: NORMAL PER 100 WBC
OTHER OBSERVATIONS UA: ABNORMAL
PDW BLD-RTO: 13.2 % (ref 11–14.5)
PH UA: 6.5 (ref 5–6)
PLATELET # BLD: 227 K/UL (ref 140–450)
PLATELET ESTIMATE: NORMAL
PMV BLD AUTO: 6.4 FL (ref 6–12)
POTASSIUM SERPL-SCNC: 3.7 MMOL/L (ref 3.7–5.3)
PRO-BNP: <20 PG/ML
PROTEIN UA: NEGATIVE
RBC # BLD: 4.02 M/UL (ref 4–5.2)
RBC # BLD: NORMAL 10*6/UL
RBC UA: ABNORMAL /HPF (ref 0–4)
RENAL EPITHELIAL, UA: ABNORMAL /HPF
SEG NEUTROPHILS: 57 % (ref 43–77)
SEGMENTED NEUTROPHILS ABSOLUTE COUNT: 4.3 K/UL (ref 1.8–7.7)
SODIUM BLD-SCNC: 138 MMOL/L (ref 135–144)
SPECIFIC GRAVITY UA: 1.01 (ref 1.01–1.02)
TOTAL PROTEIN: 6.7 G/DL (ref 6.4–8.3)
TRICHOMONAS: ABNORMAL
TROPONIN INTERP: NORMAL
TROPONIN T: NORMAL NG/ML
TROPONIN, HIGH SENSITIVITY: <6 NG/L (ref 0–14)
TURBIDITY: ABNORMAL
URINE HGB: NEGATIVE
UROBILINOGEN, URINE: NORMAL
WBC # BLD: 7.6 K/UL (ref 3.5–11)
WBC # BLD: NORMAL 10*3/UL
WBC UA: ABNORMAL /HPF (ref 0–4)
YEAST: ABNORMAL

## 2019-06-07 PROCEDURE — 99284 EMERGENCY DEPT VISIT MOD MDM: CPT

## 2019-06-07 PROCEDURE — 96375 TX/PRO/DX INJ NEW DRUG ADDON: CPT

## 2019-06-07 PROCEDURE — 83880 ASSAY OF NATRIURETIC PEPTIDE: CPT

## 2019-06-07 PROCEDURE — 99213 OFFICE O/P EST LOW 20 MIN: CPT | Performed by: NURSE PRACTITIONER

## 2019-06-07 PROCEDURE — 82150 ASSAY OF AMYLASE: CPT

## 2019-06-07 PROCEDURE — G8427 DOCREV CUR MEDS BY ELIG CLIN: HCPCS | Performed by: NURSE PRACTITIONER

## 2019-06-07 PROCEDURE — 6360000004 HC RX CONTRAST MEDICATION: Performed by: EMERGENCY MEDICINE

## 2019-06-07 PROCEDURE — 83690 ASSAY OF LIPASE: CPT

## 2019-06-07 PROCEDURE — 80053 COMPREHEN METABOLIC PANEL: CPT

## 2019-06-07 PROCEDURE — 81001 URINALYSIS AUTO W/SCOPE: CPT

## 2019-06-07 PROCEDURE — 2709999900 CT ABDOMEN PELVIS W IV CONTRAST

## 2019-06-07 PROCEDURE — 6360000002 HC RX W HCPCS: Performed by: EMERGENCY MEDICINE

## 2019-06-07 PROCEDURE — 4004F PT TOBACCO SCREEN RCVD TLK: CPT | Performed by: NURSE PRACTITIONER

## 2019-06-07 PROCEDURE — 85025 COMPLETE CBC W/AUTO DIFF WBC: CPT

## 2019-06-07 PROCEDURE — 93005 ELECTROCARDIOGRAM TRACING: CPT | Performed by: EMERGENCY MEDICINE

## 2019-06-07 PROCEDURE — 3017F COLORECTAL CA SCREEN DOC REV: CPT | Performed by: NURSE PRACTITIONER

## 2019-06-07 PROCEDURE — 96374 THER/PROPH/DIAG INJ IV PUSH: CPT

## 2019-06-07 PROCEDURE — G8419 CALC BMI OUT NRM PARAM NOF/U: HCPCS | Performed by: NURSE PRACTITIONER

## 2019-06-07 PROCEDURE — 84484 ASSAY OF TROPONIN QUANT: CPT

## 2019-06-07 PROCEDURE — 74018 RADEX ABDOMEN 1 VIEW: CPT

## 2019-06-07 RX ORDER — ONDANSETRON 2 MG/ML
4 INJECTION INTRAMUSCULAR; INTRAVENOUS ONCE
Status: COMPLETED | OUTPATIENT
Start: 2019-06-07 | End: 2019-06-07

## 2019-06-07 RX ORDER — LAMOTRIGINE 200 MG/1
150 TABLET ORAL DAILY
COMMUNITY

## 2019-06-07 RX ORDER — DULOXETIN HYDROCHLORIDE 30 MG/1
30 CAPSULE, DELAYED RELEASE ORAL DAILY
COMMUNITY

## 2019-06-07 RX ORDER — ACETAMINOPHEN 500 MG
1000 TABLET ORAL ONCE
Status: DISCONTINUED | OUTPATIENT
Start: 2019-06-07 | End: 2019-06-07

## 2019-06-07 RX ORDER — DULOXETIN HYDROCHLORIDE 60 MG/1
60 CAPSULE, DELAYED RELEASE ORAL DAILY
COMMUNITY
End: 2022-10-10

## 2019-06-07 RX ORDER — KETOROLAC TROMETHAMINE 30 MG/ML
30 INJECTION, SOLUTION INTRAMUSCULAR; INTRAVENOUS ONCE
Status: COMPLETED | OUTPATIENT
Start: 2019-06-07 | End: 2019-06-07

## 2019-06-07 RX ADMIN — IOPAMIDOL 100 ML: 755 INJECTION, SOLUTION INTRAVENOUS at 22:35

## 2019-06-07 RX ADMIN — KETOROLAC TROMETHAMINE 30 MG: 30 INJECTION, SOLUTION INTRAMUSCULAR at 22:02

## 2019-06-07 RX ADMIN — ONDANSETRON 4 MG: 2 INJECTION INTRAMUSCULAR; INTRAVENOUS at 22:02

## 2019-06-07 ASSESSMENT — ENCOUNTER SYMPTOMS
NAUSEA: 1
SHORTNESS OF BREATH: 0
DIARRHEA: 1
BLOOD IN STOOL: 0
VOMITING: 0
WHEEZING: 0
ABDOMINAL PAIN: 1
COUGH: 0

## 2019-06-07 ASSESSMENT — PAIN SCALES - GENERAL
PAINLEVEL_OUTOF10: 4
PAINLEVEL_OUTOF10: 0

## 2019-06-07 ASSESSMENT — PAIN DESCRIPTION - LOCATION: LOCATION: ABDOMEN

## 2019-06-07 ASSESSMENT — PAIN DESCRIPTION - ORIENTATION: ORIENTATION: MID

## 2019-06-08 NOTE — PROGRESS NOTES
or organizations: None     Relationship status: None    Intimate partner violence:     Fear of current or ex partner: None     Emotionally abused: None     Physically abused: None     Forced sexual activity: None   Other Topics Concern    None   Social History Narrative    None       Family History   Problem Relation Age of Onset    High Blood Pressure Mother     Heart Disease Mother     High Blood Pressure Father        Allergies   Allergen Reactions    Bactrim [Sulfamethoxazole-Trimethoprim] Diarrhea and Nausea Only    Flomax [Tamsulosin Hcl] Swelling    Morphine Itching    Prednisone Other (See Comments)    Zyprexa [Olanzapine]        Current Outpatient Medications   Medication Sig Dispense Refill    DULoxetine (CYMBALTA) 30 MG extended release capsule Take 30 mg by mouth daily      DULoxetine (CYMBALTA) 60 MG extended release capsule Take 60 mg by mouth daily      lamoTRIgine (LAMICTAL) 150 MG tablet Take 150 mg by mouth daily      Naproxen Sodium (ALEVE PO) Take 1 tablet by mouth as needed      traZODone (DESYREL) 50 MG tablet Take 1 tablet by mouth nightly as needed for Sleep 30 tablet 0    amLODIPine (NORVASC) 10 MG tablet Take 1 tablet by mouth daily 30 tablet 0    levothyroxine (SYNTHROID) 100 MCG tablet Take 100 mcg by mouth Daily      fluticasone (FLONASE) 50 MCG/ACT nasal spray 1 spray by Nasal route daily      lisinopril (PRINIVIL;ZESTRIL) 40 MG tablet Take 40 mg by mouth daily.  albuterol (PROVENTIL HFA;VENTOLIN HFA) 108 (90 BASE) MCG/ACT inhaler Inhale 2 puffs into the lungs every 6 hours as needed. No current facility-administered medications for this visit. Review of Systems   Constitutional: Positive for activity change and appetite change. Negative for fever. Respiratory: Negative for cough, shortness of breath and wheezing. Cardiovascular: Negative for chest pain and palpitations. Gastrointestinal: Positive for abdominal pain, diarrhea and nausea. Negative for blood in stool and vomiting. Objective:   Physical Exam   Constitutional: She is oriented to person, place, and time. She appears well-developed and well-nourished. No distress. HENT:   Head: Normocephalic and atraumatic. Cardiovascular: Normal rate, regular rhythm and normal heart sounds. Pulmonary/Chest: Effort normal and breath sounds normal.   Abdominal: Bowel sounds are decreased. There is tenderness in the periumbilical area. There is guarding. There is no rebound. No hernia. Moderate tenderness to the periumbilical area    Neurological: She is alert and oriented to person, place, and time. Nursing note and vitals reviewed. Assessment:       Diagnosis Orders   1. Periumbilical abdominal pain  XR ABDOMEN (KUB) (SINGLE AP VIEW)           Plan:      Discussed with patient that I feel a KUB would show a potential bowel obstruction. I do not palpate a hernia. Pt states that she feels that there is something seriously wrong with her stomach. She states that her grandmother had stomach cancer and she wants to be checked out. I advised her due to the amount of pain she had on palpation she may be better suited being examed in the ER. Pt verbalizes agreement. Verbal order for patient to be transferred to the ER given to [facility]. Nurse misunderstood order and took patient for KUB. Pt stated that she did still feel that she needed to be evaluated at the ER. Pt was transported via wheelchair.       Simran Salazar, APRN - CNP

## 2019-06-08 NOTE — ED PROVIDER NOTES
ATTENDING  ADDENDUM     Care of this patient was assumed from preceding physician. The patient was seen for Abdominal Pain (and swelling, x2 weeks) and Leg Swelling (x 2 days)  . The patient's initial evaluation and plan have been discussed with the prior provider who initially evaluated the patient. Nursing Notes, Past Medical Hx, Past Surgical Hx, Social Hx, Allergies, and Family Hx were all reviewed. When the patient was signed out to me, we were waiting for the results of the patient's laboratories and her CAT scan. Her laboratories are unremarkable her CT scans come back showing no gross abnormalities and no sign of any metastatic or primary cancer. To my independent reevaluation - the patient's abdomen is distended, bowel sounds are hypoactive, but she is not tender upon palpation. DIFFERENTIAL DIAGNOSIS/ MDM:           Follow Exit Care instructions closely. I have reviewed the disposition diagnosis with the patient and or their family/guardian. I have answered their questions and given discharge instructions. They voiced understanding of these instructions and did not have any further questions or complaints. DIAGNOSTIC RESULTS     RADIOLOGY:   Non-plain film images such as CT, Ultrasound and MRI are read by the radiologist. Plain radiographic images are visualized and preliminarily interpreted by the emergency physician with the below findings:  CT ABDOMEN PELVIS W IV CONTRAST Additional Contrast? None   Final Result   Tiny low-density foci in the liver are likely cysts. Nonobstructing punctate renal calculi. No acute abnormality is otherwise noted in the abdomen or the pelvis.              LABS:  Results for orders placed or performed during the hospital encounter of 06/07/19   CBC Auto Differential   Result Value Ref Range    WBC 7.6 3.5 - 11.0 k/uL    RBC 4.02 4.0 - 5.2 m/uL    Hemoglobin 12.5 12.0 - 16.0 g/dL    Hematocrit 37.3 36 - 46 %    MCV 92.8 80 - 100 fL    MCH 31.2 26 - 34 pg    MCHC 33.7 31 - 37 g/dL    RDW 13.2 11.0 - 14.5 %    Platelets 645 166 - 763 k/uL    MPV 6.4 6.0 - 12.0 fL    NRBC Automated NOT REPORTED per 100 WBC    Differential Type NOT REPORTED     Immature Granulocytes NOT REPORTED 0 %    Absolute Immature Granulocyte NOT REPORTED 0.00 - 0.30 k/uL    WBC Morphology NOT REPORTED     RBC Morphology NOT REPORTED     Platelet Estimate NOT REPORTED     Seg Neutrophils 57 43 - 77 %    Lymphocytes 29 16 - 46 %    Monocytes 8 4 - 11 %    Eosinophils % 5 1 - 7 %    Basophils 1 0 - 1 %    Segs Absolute 4.30 1.8 - 7.7 k/uL    Absolute Lymph # 2.20 1.0 - 4.8 k/uL    Absolute Mono # 0.60 0.1 - 1.2 k/uL    Absolute Eos # 0.40 0.0 - 0.4 k/uL    Basophils # 0.10 0.0 - 0.2 k/uL   Comprehensive Metabolic Panel w/ Reflex to MG   Result Value Ref Range    Glucose 96 70 - 99 mg/dL    BUN 15 6 - 20 mg/dL    CREATININE 0.91 (H) 0.50 - 0.90 mg/dL    Bun/Cre Ratio 16 9 - 20    Calcium 10.0 8.6 - 10.4 mg/dL    Sodium 138 135 - 144 mmol/L    Potassium 3.7 3.7 - 5.3 mmol/L    Chloride 102 98 - 107 mmol/L    CO2 26 20 - 31 mmol/L    Anion Gap 10 9 - 17 mmol/L    Alkaline Phosphatase 63 35 - 104 U/L    ALT 22 5 - 33 U/L    AST 22 <32 U/L    Total Bilirubin 0.24 (L) 0.3 - 1.2 mg/dL    Total Protein 6.7 6.4 - 8.3 g/dL    Alb 4.6 3.5 - 5.2 g/dL    Albumin/Globulin Ratio 2.2 1.0 - 2.5    GFR Non-African American >60 >60 mL/min    GFR African American >60 >60 mL/min    GFR Comment          GFR Staging NOT REPORTED    Lipase   Result Value Ref Range    Lipase 60 13 - 60 U/L   Amylase   Result Value Ref Range    Amylase 58 28 - 100 U/L   Urinalysis Reflex to Culture   Result Value Ref Range    Color, UA NOT REPORTED YELLOW    Turbidity UA NOT REPORTED CLEAR    Glucose, Ur NEGATIVE NEGATIVE    Bilirubin Urine NEGATIVE NEGATIVE    Ketones, Urine NEGATIVE NEGATIVE    Specific [place], UA 1.010 1.010 - 1.025    Urine Hgb NEGATIVE NEGATIVE    pH, UA 6.5 (H) 5.0 - 6.0    Protein, UA NEGATIVE NEGATIVE Urobilinogen, Urine Normal Normal    Nitrite, Urine NEGATIVE NEGATIVE    Leukocyte Esterase, Urine TRACE (A) NEGATIVE    Urinalysis Comments NOT REPORTED    Brain Natriuretic Peptide   Result Value Ref Range    Pro-BNP <20 <300 pg/mL    BNP Interpretation Pro-BNP Reference Range:    Troponin   Result Value Ref Range    Troponin, High Sensitivity <6 0 - 14 ng/L    Troponin T NOT REPORTED <0.03 ng/mL    Troponin Interp NOT REPORTED    Microscopic Urinalysis   Result Value Ref Range    -          WBC, UA 0 TO 4 0 - 4 /HPF    RBC, UA None 0 - 4 /HPF    Casts UA NOT REPORTED 0 - 2 /LPF    Crystals UA NOT REPORTED None /HPF    Epithelial Cells UA 0 TO 4 0 - 5 /HPF    Renal Epithelial, Urine NOT REPORTED 0 /HPF    Bacteria, UA TRACE (A) None    Mucus, UA NOT REPORTED None    Trichomonas, UA NOT REPORTED None    Amorphous, UA NOT REPORTED None    Other Observations UA NOT REPORTED NOT REQ.     Yeast, UA NOT REPORTED None   EKG 12 Lead   Result Value Ref Range    Ventricular Rate 87 BPM    Atrial Rate 87 BPM    P-R Interval 158 ms    QRS Duration 92 ms    Q-T Interval 424 ms    QTc Calculation (Bazett) 510 ms    P Axis 78 degrees    R Axis 73 degrees    T Axis 70 degrees       ABNORMAL LABS:  Labs Reviewed   COMPREHENSIVE METABOLIC PANEL W/ REFLEX TO MG FOR LOW K - Abnormal; Notable for the following components:       Result Value    CREATININE 0.91 (*)     Total Bilirubin 0.24 (*)     All other components within normal limits   URINE RT REFLEX TO CULTURE - Abnormal; Notable for the following components:    pH, UA 6.5 (*)     Leukocyte Esterase, Urine TRACE (*)     All other components within normal limits   MICROSCOPIC URINALYSIS - Abnormal; Notable for the following components:    Bacteria, UA TRACE (*)     All other components within normal limits   CBC WITH AUTO DIFFERENTIAL   LIPASE   AMYLASE   BRAIN NATRIURETIC PEPTIDE   TROPONIN        EKG:      EMERGENCY DEPARTMENT COURSE:   Vitals:    Vitals:    06/07/19 2041 06/07/19 2047 06/07/19 2302   BP: (!) 150/73  (!) 141/69   Pulse: 98  90   Resp:  16 14   Temp: 98.2 °F (36.8 °C)     TempSrc: Tympanic     SpO2: 100%  96%   Weight: 148 lb (67.1 kg)     Height: 5' 1\" (1.549 m)       -------------------------  BP: (!) 141/69, Temp: 98.2 °F (36.8 °C), Pulse: 90, Resp: 14          FINAL IMPRESSION      1. Generalized abdominal pain          DISPOSITION/PLAN   DISPOSITION Decision To Discharge 06/07/2019 11:15:35 PM    I have reviewed the disposition diagnosis with the patient and or their family/guardian. I have answered their questions and given discharge instructions. They voiced understanding of these instructions and did not have any further questions or complaints. Reevaluation: The patient to my independent examination has a bit of a distended belly with quiet bowel sounds however she is nontender to palpation. Her x-rays have come back unremarkable laboratories are negative. We do believe this point in time patient can be discharged to home to follow up with her own doctor and she will need an endoscopy done hopefully within the next week will give her the general surgeon on-call.     Condition on Disposition    Improved    PATIENT REFERRED TO:  Leo Villanueva APRN - CNP  [address] [place] [id number]  [address] [facility],   [address] [facility]  [phone]            DISCHARGE MEDICATIONS:  New Prescriptions    No medications on file       (Please note that portions of this note were completed with a voice recognition program.  Efforts were made to edit the dictations but occasionally words are mis-transcribed.)    Johnson MD  Attending Emergency Physician       Mirian Clark MD  06/07/19 1954

## 2019-06-08 NOTE — ED PROVIDER NOTES
[address] [facility] ED  [address] [place]  Phone: [phone]      Pt Name: Renetta Parnell  UVZ:[id number]  Arash 1965  Date of evaluation: 6/7/2019      CHIEF COMPLAINT       Chief Complaint   Patient presents with    Abdominal Pain     and swelling, x2 weeks    Leg Swelling     x 2 days       HISTORY OF PRESENT ILLNESS    69-year-old female presents to the emergency department today complaining of a two-week history of upper abdominal discomfort. She reports it was acutely worse today and yesterday. She had diarrhea yesterday. She's not had a bowel movement today. No fevers or chills. Standing up makes the pain worse. Laying down makes the pain better. She went to urgent care and urgent care referred her here. Pain on a scale of 0-10 is a 4. She also feels like her legs are swollen area and no chest pain. She does report nausea without vomiting. No shortness of breath or diaphoresis. No recent long car rides or plane trips anywhere. She tells me that she is nearly 5 months clean from her drug of choice which was alcohol and cocaine. REVIEW OF SYSTEMS     Review of Systems   All other systems reviewed and are negative. PAST MEDICAL HISTORY    has a past medical history of Anxiety, COPD (chronic obstructive pulmonary disease) ([address] Depression, Headache(784.0), History of hematuria, Hypertension, and Kidney stone. SURGICAL HISTORY      has a past surgical history that includes Eye surgery; tumor removal; Appendectomy; Cholecystectomy; Tubal ligation; lumbar laminectomy; Dilation and curettage of uterus; and laparoscopy. CURRENT MEDICATIONS       Previous Medications    ALBUTEROL (PROVENTIL HFA;VENTOLIN HFA) 108 (90 BASE) MCG/ACT INHALER    Inhale 2 puffs into the lungs every 6 hours as needed.     AMLODIPINE (NORVASC) 10 MG TABLET    Take 1 tablet by mouth daily    DULOXETINE (CYMBALTA) 30 MG EXTENDED RELEASE CAPSULE    Take 30 mg by mouth daily DULOXETINE (CYMBALTA) 60 MG EXTENDED RELEASE CAPSULE    Take 60 mg by mouth daily    FLUTICASONE (FLONASE) 50 MCG/ACT NASAL SPRAY    1 spray by Nasal route daily    LAMOTRIGINE (LAMICTAL) 150 MG TABLET    Take 150 mg by mouth daily    LEVOTHYROXINE (SYNTHROID) 100 MCG TABLET    Take 100 mcg by mouth Daily    LISINOPRIL (PRINIVIL;ZESTRIL) 40 MG TABLET    Take 40 mg by mouth daily. NAPROXEN SODIUM (ALEVE PO)    Take 1 tablet by mouth as needed    TRAZODONE (DESYREL) 50 MG TABLET    Take 1 tablet by mouth nightly as needed for Sleep       ALLERGIES     is allergic to bactrim [sulfamethoxazole-trimethoprim]; flomax [tamsulosin hcl]; morphine; prednisone; and zyprexa [olanzapine]. FAMILY HISTORY     indicated that the status of her mother is unknown. She indicated that the status of her father is unknown.     family history includes Heart Disease in her mother; High Blood Pressure in her father and mother. SOCIAL HISTORY      reports that she has been smoking cigarettes. She has a 37.00 pack-year smoking history. She has never used smokeless tobacco. She reports that she has current or past drug history. She reports that she does not drink alcohol. PHYSICAL EXAM     INITIAL VITALS:  height is 5' 1\" (1.549 m) and weight is 148 lb (67.1 kg). Her tympanic temperature is 98.2 °F (36.8 °C). Her blood pressure is 150/73 (abnormal) and her pulse is 98. Her respiration is 16 and oxygen saturation is 100%. Physical Exam   Constitutional: She is oriented to person, place, and time. She appears well-developed and well-nourished. No distress. HENT:   Head: Normocephalic and atraumatic. Mouth/Throat: Oropharynx is clear and moist.   Eyes: Pupils are equal, round, and reactive to light. Conjunctivae and EOM are normal.   Neck: Normal range of motion. Neck supple. No tracheal deviation present. Cardiovascular: Normal rate, regular rhythm and intact distal pulses.    Pulmonary/Chest: Effort normal and breath sounds normal. No respiratory distress. Abdominal: Soft. Bowel sounds are normal. She exhibits no distension. There is tenderness. There is no rebound and no guarding. No palpable hernias. Patient is tender in the epigastric and left upper quadrant without any surgical findings. Musculoskeletal: Normal range of motion. She exhibits no edema or tenderness. Neurological: She is alert and oriented to person, place, and time. Skin: Skin is warm and dry. Psychiatric: She has a normal mood and affect. Her behavior is normal. Judgment and thought content normal.   Vitals reviewed. DIFFERENTIAL DIAGNOSIS/ MDM:   Patient has a concern of cancer she tells me that her mother  of cancer at an early age. She has never had an upper or lower endoscopy. I will treated with medications outlined below and I will do serial evaluations. Because her pain is upper abdominal and quite baggage complaint swelling of problem the workup to include cardiac. DIAGNOSTIC RESULTS     EKG:  EKG as interpreted by myself as showing flipped T waves laterally as well as some abnormal T waves in the inferior leads as well. Rate axis and intervals otherwise normal with exception QT interval is prolonged. I do not have any old EKGs to compare this to. RADIOLOGY:   No results found. LABS:  No results found for this visit on 19. EMERGENCY DEPARTMENT COURSE:     Thepatient was given the following medications:  Orders Placed This Encounter   Medications    ondansetron (ZOFRAN) injection 4 mg    ketorolac (TORADOL) injection 30 mg        Vitals:    Vitals:    19 2041 19   BP: (!) 150/73    Pulse: 98    Resp:  16   Temp: 98.2 °F (36.8 °C)    TempSrc: Tympanic    SpO2: 100%    Weight: 148 lb (67.1 kg)    Height: 5' 1\" (1.549 m)      -------------------------  BP: (!) 150/73, Temp: 98.2 °F (36.8 °C), Pulse: 98, Resp: 16      Re-evaluation Notes  At 2230 hrs.  and I have endorsed this patient to my relieving physician. Please see their addendum.     CONSULTS:  None    CRITICAL CARE:   None    PROCEDURES:  None    (Please note that portions of this note were completed with a voice recognitionprogram.  Efforts were made to edit the dictations but occasionally words are mis-transcribed.)    Missy Mancera MD, F.A.C.E.P, F.A.A.E.M  Emergency Physician Attending          Missy Mancera MD  06/07/19 [id number]

## 2019-06-09 LAB
EKG ATRIAL RATE: 87 BPM
EKG P AXIS: 78 DEGREES
EKG P-R INTERVAL: 158 MS
EKG Q-T INTERVAL: 424 MS
EKG QRS DURATION: 92 MS
EKG QTC CALCULATION (BAZETT): 510 MS
EKG R AXIS: 73 DEGREES
EKG T AXIS: 70 DEGREES
EKG VENTRICULAR RATE: 87 BPM

## 2019-11-11 ENCOUNTER — OFFICE VISIT (OUTPATIENT)
Dept: PRIMARY CARE CLINIC | Age: 54
End: 2019-11-11
Payer: MEDICARE

## 2019-11-11 VITALS
OXYGEN SATURATION: 94 % | DIASTOLIC BLOOD PRESSURE: 82 MMHG | WEIGHT: 156.4 LBS | HEART RATE: 120 BPM | BODY MASS INDEX: 29.53 KG/M2 | TEMPERATURE: 98.1 F | RESPIRATION RATE: 20 BRPM | HEIGHT: 61 IN | SYSTOLIC BLOOD PRESSURE: 134 MMHG

## 2019-11-11 DIAGNOSIS — J40 BRONCHITIS: Primary | ICD-10-CM

## 2019-11-11 DIAGNOSIS — J30.9 ALLERGIC RHINITIS, UNSPECIFIED SEASONALITY, UNSPECIFIED TRIGGER: ICD-10-CM

## 2019-11-11 PROCEDURE — G8427 DOCREV CUR MEDS BY ELIG CLIN: HCPCS | Performed by: NURSE PRACTITIONER

## 2019-11-11 PROCEDURE — 4004F PT TOBACCO SCREEN RCVD TLK: CPT | Performed by: NURSE PRACTITIONER

## 2019-11-11 PROCEDURE — G8419 CALC BMI OUT NRM PARAM NOF/U: HCPCS | Performed by: NURSE PRACTITIONER

## 2019-11-11 PROCEDURE — 99213 OFFICE O/P EST LOW 20 MIN: CPT | Performed by: NURSE PRACTITIONER

## 2019-11-11 PROCEDURE — 3017F COLORECTAL CA SCREEN DOC REV: CPT | Performed by: NURSE PRACTITIONER

## 2019-11-11 PROCEDURE — G8484 FLU IMMUNIZE NO ADMIN: HCPCS | Performed by: NURSE PRACTITIONER

## 2019-11-11 RX ORDER — FLUTICASONE PROPIONATE 50 MCG
1 SPRAY, SUSPENSION (ML) NASAL DAILY
Qty: 1 BOTTLE | Refills: 0 | Status: SHIPPED | OUTPATIENT
Start: 2019-11-11 | End: 2020-04-20

## 2019-11-11 RX ORDER — AZITHROMYCIN 250 MG/1
TABLET, FILM COATED ORAL
Qty: 1 PACKET | Refills: 0 | Status: SHIPPED | OUTPATIENT
Start: 2019-11-11 | End: 2020-04-20

## 2019-11-11 ASSESSMENT — ENCOUNTER SYMPTOMS
SINUS PRESSURE: 1
COUGH: 1
SORE THROAT: 1
ALLERGIC/IMMUNOLOGIC NEGATIVE: 1
SINUS PAIN: 1
SINUS COMPLAINT: 1
SHORTNESS OF BREATH: 0
RHINORRHEA: 1

## 2019-11-11 ASSESSMENT — PATIENT HEALTH QUESTIONNAIRE - PHQ9
SUM OF ALL RESPONSES TO PHQ QUESTIONS 1-9: 1
2. FEELING DOWN, DEPRESSED OR HOPELESS: 0
SUM OF ALL RESPONSES TO PHQ9 QUESTIONS 1 & 2: 1
SUM OF ALL RESPONSES TO PHQ QUESTIONS 1-9: 1
1. LITTLE INTEREST OR PLEASURE IN DOING THINGS: 1

## 2019-11-13 ENCOUNTER — APPOINTMENT (OUTPATIENT)
Dept: GENERAL RADIOLOGY | Age: 54
End: 2019-11-13
Payer: MEDICARE

## 2019-11-13 ENCOUNTER — HOSPITAL ENCOUNTER (EMERGENCY)
Age: 54
Discharge: HOME | End: 2019-11-13
Attending: EMERGENCY MEDICINE
Payer: MEDICARE

## 2019-11-13 VITALS
DIASTOLIC BLOOD PRESSURE: 76 MMHG | OXYGEN SATURATION: 93 % | SYSTOLIC BLOOD PRESSURE: 115 MMHG | HEIGHT: 61 IN | RESPIRATION RATE: 18 BRPM | HEART RATE: 74 BPM | TEMPERATURE: 98.5 F | BODY MASS INDEX: 29.27 KG/M2 | WEIGHT: 155 LBS

## 2019-11-13 DIAGNOSIS — J44.1 COPD EXACERBATION (HCC): Primary | ICD-10-CM

## 2019-11-13 LAB
EKG ATRIAL RATE: 93 BPM
EKG P AXIS: 81 DEGREES
EKG P-R INTERVAL: 146 MS
EKG Q-T INTERVAL: 380 MS
EKG QRS DURATION: 80 MS
EKG QTC CALCULATION (BAZETT): 472 MS
EKG R AXIS: 72 DEGREES
EKG T AXIS: 148 DEGREES
EKG VENTRICULAR RATE: 93 BPM

## 2019-11-13 PROCEDURE — 94640 AIRWAY INHALATION TREATMENT: CPT

## 2019-11-13 PROCEDURE — 99283 EMERGENCY DEPT VISIT LOW MDM: CPT

## 2019-11-13 PROCEDURE — 6370000000 HC RX 637 (ALT 250 FOR IP): Performed by: EMERGENCY MEDICINE

## 2019-11-13 PROCEDURE — 94664 DEMO&/EVAL PT USE INHALER: CPT

## 2019-11-13 PROCEDURE — 71046 X-RAY EXAM CHEST 2 VIEWS: CPT

## 2019-11-13 PROCEDURE — 93005 ELECTROCARDIOGRAM TRACING: CPT | Performed by: EMERGENCY MEDICINE

## 2019-11-13 RX ORDER — ALBUTEROL SULFATE 90 UG/1
2 AEROSOL, METERED RESPIRATORY (INHALATION) EVERY 6 HOURS PRN
Status: DISCONTINUED | OUTPATIENT
Start: 2019-11-13 | End: 2019-11-13 | Stop reason: HOSPADM

## 2019-11-13 RX ORDER — IPRATROPIUM BROMIDE AND ALBUTEROL SULFATE 2.5; .5 MG/3ML; MG/3ML
1 SOLUTION RESPIRATORY (INHALATION) ONCE
Status: COMPLETED | OUTPATIENT
Start: 2019-11-13 | End: 2019-11-13

## 2019-11-13 RX ORDER — PREDNISONE 10 MG/1
TABLET ORAL
Qty: 20 TABLET | Refills: 0 | Status: SHIPPED | OUTPATIENT
Start: 2019-11-13 | End: 2019-11-23

## 2019-11-13 RX ADMIN — ALBUTEROL SULFATE 2 PUFF: 90 AEROSOL, METERED RESPIRATORY (INHALATION) at 11:56

## 2019-11-13 RX ADMIN — IPRATROPIUM BROMIDE AND ALBUTEROL SULFATE 1 AMPULE: .5; 3 SOLUTION RESPIRATORY (INHALATION) at 10:44

## 2019-11-13 ASSESSMENT — ENCOUNTER SYMPTOMS
CHEST TIGHTNESS: 1
DIARRHEA: 0
VOMITING: 0
SHORTNESS OF BREATH: 1
EYE PAIN: 0
ABDOMINAL PAIN: 0
NAUSEA: 0
COUGH: 1
BACK PAIN: 0
WHEEZING: 1

## 2019-11-13 ASSESSMENT — PULMONARY FUNCTION TESTS: PEFR_L/MIN: 16

## 2019-11-29 ENCOUNTER — HOSPITAL ENCOUNTER (EMERGENCY)
Age: 54
Discharge: HOME | End: 2019-11-29
Attending: SPECIALIST
Payer: MEDICARE

## 2019-11-29 VITALS
TEMPERATURE: 98.7 F | SYSTOLIC BLOOD PRESSURE: 136 MMHG | OXYGEN SATURATION: 95 % | WEIGHT: 155 LBS | HEART RATE: 101 BPM | BODY MASS INDEX: 29.29 KG/M2 | DIASTOLIC BLOOD PRESSURE: 89 MMHG | RESPIRATION RATE: 12 BRPM

## 2019-11-29 DIAGNOSIS — B37.0 ORAL THRUSH: Primary | ICD-10-CM

## 2019-11-29 PROCEDURE — 87075 CULTR BACTERIA EXCEPT BLOOD: CPT

## 2019-11-29 PROCEDURE — 87205 SMEAR GRAM STAIN: CPT

## 2019-11-29 PROCEDURE — 87070 CULTURE OTHR SPECIMN AEROBIC: CPT

## 2019-11-29 PROCEDURE — 86403 PARTICLE AGGLUT ANTBDY SCRN: CPT

## 2019-11-29 PROCEDURE — 87186 SC STD MICRODIL/AGAR DIL: CPT

## 2019-11-29 PROCEDURE — 87077 CULTURE AEROBIC IDENTIFY: CPT

## 2019-11-29 PROCEDURE — 99283 EMERGENCY DEPT VISIT LOW MDM: CPT

## 2019-11-29 ASSESSMENT — PAIN - FUNCTIONAL ASSESSMENT
PAIN_FUNCTIONAL_ASSESSMENT: 0-10
PAIN_FUNCTIONAL_ASSESSMENT: PREVENTS OR INTERFERES SOME ACTIVE ACTIVITIES AND ADLS

## 2019-11-29 ASSESSMENT — PAIN SCALES - GENERAL
PAINLEVEL_OUTOF10: 5
PAINLEVEL_OUTOF10: 5

## 2019-11-29 ASSESSMENT — PAIN DESCRIPTION - ORIENTATION: ORIENTATION: ANTERIOR

## 2019-11-29 ASSESSMENT — PAIN DESCRIPTION - DESCRIPTORS: DESCRIPTORS: ACHING

## 2019-11-29 ASSESSMENT — PAIN DESCRIPTION - PAIN TYPE: TYPE: ACUTE PAIN

## 2019-11-29 ASSESSMENT — PAIN DESCRIPTION - FREQUENCY: FREQUENCY: INTERMITTENT

## 2019-11-29 ASSESSMENT — PAIN DESCRIPTION - ONSET: ONSET: ON-GOING

## 2019-11-29 ASSESSMENT — PAIN DESCRIPTION - LOCATION: LOCATION: HEAD

## 2019-11-29 ASSESSMENT — PAIN DESCRIPTION - PROGRESSION: CLINICAL_PROGRESSION: NOT CHANGED

## 2019-11-30 ASSESSMENT — ENCOUNTER SYMPTOMS
SINUS PAIN: 1
COUGH: 0
SHORTNESS OF BREATH: 0
SORE THROAT: 0
SINUS PRESSURE: 1
NAUSEA: 0
ABDOMINAL PAIN: 0

## 2019-12-05 LAB
CULTURE: ABNORMAL
DIRECT EXAM: ABNORMAL
DIRECT EXAM: ABNORMAL
Lab: ABNORMAL
SPECIMEN DESCRIPTION: ABNORMAL

## 2020-04-20 ENCOUNTER — OFFICE VISIT (OUTPATIENT)
Dept: PRIMARY CARE CLINIC | Age: 55
End: 2020-04-20
Payer: MEDICARE

## 2020-04-20 VITALS
SYSTOLIC BLOOD PRESSURE: 102 MMHG | BODY MASS INDEX: 29.81 KG/M2 | HEIGHT: 62 IN | RESPIRATION RATE: 14 BRPM | HEART RATE: 95 BPM | DIASTOLIC BLOOD PRESSURE: 60 MMHG | WEIGHT: 162 LBS | OXYGEN SATURATION: 95 % | TEMPERATURE: 98.2 F

## 2020-04-20 PROCEDURE — 3017F COLORECTAL CA SCREEN DOC REV: CPT | Performed by: FAMILY MEDICINE

## 2020-04-20 PROCEDURE — 99214 OFFICE O/P EST MOD 30 MIN: CPT | Performed by: FAMILY MEDICINE

## 2020-04-20 PROCEDURE — 4004F PT TOBACCO SCREEN RCVD TLK: CPT | Performed by: FAMILY MEDICINE

## 2020-04-20 PROCEDURE — G8427 DOCREV CUR MEDS BY ELIG CLIN: HCPCS | Performed by: FAMILY MEDICINE

## 2020-04-20 PROCEDURE — G8417 CALC BMI ABV UP PARAM F/U: HCPCS | Performed by: FAMILY MEDICINE

## 2020-04-20 RX ORDER — AMOXICILLIN AND CLAVULANATE POTASSIUM 500; 125 MG/1; MG/1
1 TABLET, FILM COATED ORAL 2 TIMES DAILY
Qty: 20 TABLET | Refills: 0 | Status: SHIPPED | OUTPATIENT
Start: 2020-04-20 | End: 2020-04-30

## 2020-04-20 RX ORDER — FLUTICASONE PROPIONATE 50 MCG
2 SPRAY, SUSPENSION (ML) NASAL DAILY
Qty: 1 BOTTLE | Refills: 5 | Status: SHIPPED | OUTPATIENT
Start: 2020-04-20 | End: 2021-07-21

## 2020-04-20 RX ORDER — ACETAMINOPHEN 500 MG
500 TABLET ORAL EVERY 8 HOURS PRN
COMMUNITY
End: 2020-05-18 | Stop reason: ALTCHOICE

## 2020-04-20 ASSESSMENT — ENCOUNTER SYMPTOMS
NAUSEA: 0
EYE REDNESS: 0
SORE THROAT: 1
COUGH: 1
WHEEZING: 0
RHINORRHEA: 1
SHORTNESS OF BREATH: 0
CONSTIPATION: 0
EYE DISCHARGE: 0
SINUS PRESSURE: 1
SINUS COMPLAINT: 1
TROUBLE SWALLOWING: 0
SINUS PAIN: 1
ABDOMINAL PAIN: 0
VOMITING: 0
DIARRHEA: 0

## 2020-04-20 NOTE — PROGRESS NOTES
capsule Take 30 mg by mouth daily Yes Historical Provider, MD   DULoxetine (CYMBALTA) 60 MG extended release capsule Take 60 mg by mouth daily Yes Historical Provider, MD   lamoTRIgine (LAMICTAL) 150 MG tablet Take 150 mg by mouth daily Yes Historical Provider, MD   amLODIPine (NORVASC) 10 MG tablet Take 1 tablet by mouth daily Yes Bonny Clement, APRN - CNP   levothyroxine (SYNTHROID) 100 MCG tablet Take 100 mcg by mouth Daily Yes Historical Provider, MD   lisinopril (PRINIVIL;ZESTRIL) 40 MG tablet Take 40 mg by mouth daily. Yes Historical Provider, MD   albuterol (PROVENTIL HFA;VENTOLIN HFA) 108 (90 BASE) MCG/ACT inhaler Inhale 2 puffs into the lungs every 6 hours as needed. Yes Historical Provider, MD        Social History     Tobacco Use    Smoking status: Current Every Day Smoker     Packs/day: 1.00     Years: 37.00     Pack years: 37.00     Types: Cigarettes    Smokeless tobacco: Never Used   Substance Use Topics    Alcohol use: No        Vitals:    04/20/20 1032   BP: 102/60   Site: Left Upper Arm   Position: Sitting   Cuff Size: Large Adult   Pulse: 95   Resp: 14   Temp: 98.2 °F (36.8 °C)   TempSrc: Tympanic   SpO2: 95%  Comment: RA   Weight: 162 lb (73.5 kg)   Height: 5' 1.5\" (1.562 m)     Estimated body mass index is 30.11 kg/m² as calculated from the following:    Height as of this encounter: 5' 1.5\" (1.562 m). Weight as of this encounter: 162 lb (73.5 kg). Physical Exam  Vitals signs and nursing note reviewed. Constitutional:       General: She is not in acute distress. Appearance: Normal appearance. She is well-developed. She is not diaphoretic. HENT:      Head: Normocephalic and atraumatic. Right Ear: External ear normal.      Left Ear: External ear normal.      Ears:      Comments: TMs dull with fluid behind the TM     Nose: Congestion and rhinorrhea present. Mouth/Throat:      Pharynx: No posterior oropharyngeal erythema.       Comments: Post nasal drainage noted  Eyes: General: No scleral icterus. Right eye: No discharge. Left eye: No discharge. Conjunctiva/sclera: Conjunctivae normal.      Pupils: Pupils are equal, round, and reactive to light. Neck:      Musculoskeletal: Normal range of motion and neck supple. Thyroid: No thyromegaly. Cardiovascular:      Rate and Rhythm: Normal rate and regular rhythm. Heart sounds: Normal heart sounds. Pulmonary:      Effort: Pulmonary effort is normal. No respiratory distress. Breath sounds: Normal breath sounds. No wheezing. Lymphadenopathy:      Cervical: Cervical adenopathy present. Skin:     General: Skin is warm and dry. Findings: No rash. Neurological:      Mental Status: She is alert and oriented to person, place, and time. Psychiatric:         Behavior: Behavior normal.         Thought Content: Thought content normal.         Judgment: Judgment normal.         ASSESSMENT/PLAN:  Encounter Diagnosis   Name Primary?  Acute non-recurrent pansinusitis Yes     Orders Placed This Encounter   Medications    amoxicillin-clavulanate (AUGMENTIN) 500-125 MG per tablet     Sig: Take 1 tablet by mouth 2 times daily for 10 days     Dispense:  20 tablet     Refill:  0    fluticasone (FLONASE) 50 MCG/ACT nasal spray     Si sprays by Nasal route daily     Dispense:  1 Bottle     Refill:  5     Tylenol  prn    Symptoms not definitively consistent with COVID. Not having significant chest symptoms and seems more sinus in nature. Will treat with RX as above and did advise patient to monitor symptoms. Should drink plenty of fluids and get plenty of rest.      Return  if no improvement in symptoms or if any further symptoms arise. An electronic signature was used to authenticate this note.     --Lauretta Holter,  on 2020 at 10:36 AM

## 2020-04-26 ENCOUNTER — OFFICE VISIT (OUTPATIENT)
Dept: PRIMARY CARE CLINIC | Age: 55
End: 2020-04-26
Payer: MEDICARE

## 2020-04-26 ENCOUNTER — HOSPITAL ENCOUNTER (OUTPATIENT)
Age: 55
Setting detail: SPECIMEN
Discharge: HOME | End: 2020-04-26
Payer: MEDICARE

## 2020-04-26 VITALS
BODY MASS INDEX: 31.08 KG/M2 | DIASTOLIC BLOOD PRESSURE: 78 MMHG | RESPIRATION RATE: 18 BRPM | HEIGHT: 61 IN | WEIGHT: 164.6 LBS | SYSTOLIC BLOOD PRESSURE: 128 MMHG | TEMPERATURE: 99 F | HEART RATE: 98 BPM | OXYGEN SATURATION: 98 %

## 2020-04-26 PROCEDURE — G8417 CALC BMI ABV UP PARAM F/U: HCPCS | Performed by: FAMILY MEDICINE

## 2020-04-26 PROCEDURE — G8427 DOCREV CUR MEDS BY ELIG CLIN: HCPCS | Performed by: FAMILY MEDICINE

## 2020-04-26 PROCEDURE — U0002 COVID-19 LAB TEST NON-CDC: HCPCS

## 2020-04-26 PROCEDURE — 4004F PT TOBACCO SCREEN RCVD TLK: CPT | Performed by: FAMILY MEDICINE

## 2020-04-26 PROCEDURE — 99213 OFFICE O/P EST LOW 20 MIN: CPT | Performed by: FAMILY MEDICINE

## 2020-04-26 PROCEDURE — 99212 OFFICE O/P EST SF 10 MIN: CPT

## 2020-04-26 PROCEDURE — 3017F COLORECTAL CA SCREEN DOC REV: CPT | Performed by: FAMILY MEDICINE

## 2020-04-26 ASSESSMENT — ENCOUNTER SYMPTOMS
COUGH: 1
GASTROINTESTINAL NEGATIVE: 1
CHEST TIGHTNESS: 0
EYES NEGATIVE: 1
ABDOMINAL DISTENTION: 0
SORE THROAT: 0
ALLERGIC/IMMUNOLOGIC NEGATIVE: 1
RHINORRHEA: 0
SHORTNESS OF BREATH: 1
WHEEZING: 0

## 2020-04-26 NOTE — PROGRESS NOTES
2020     Kyara Damon (:  1965) is a 48 y.o. female, here for evaluation of the following medical concerns:    HPI   Acute urgent care visit for sob. Seen here last Monday and diagnosed with sinusitis . Treated off work over the last week. Went back to work last night and felt ok. Overnight she reports some cough overnight with body aches and some mild sob/chest congestion centrally. Feels more sob laying on the right side. Inhalation seemed more painful. No fever to report. She called into work and they are wanting covid clearance prior to returning to work. Active smoker. Working at [employer] and home. No ill contacts. She lives in a group home scenario for drug and alcohol dependence. She has her own apartment but has other people living at the same facility she occasionally meets. Past Medical History:   Diagnosis Date    Anxiety     COPD (chronic obstructive pulmonary disease) ([address]     Depression     Headache(784.0)     History of hematuria     Hypertension     Kidney stone      Past Surgical History:   Procedure Laterality Date    APPENDECTOMY      CHOLECYSTECTOMY      DILATION AND CURETTAGE OF UTERUS      EYE SURGERY      LAPAROSCOPY      LUMBAR LAMINECTOMY      TUBAL LIGATION      TUMOR REMOVAL      FACE         Review of Systems   Constitutional: Positive for chills and diaphoresis. Negative for fever. HENT: Negative. Negative for congestion, rhinorrhea and sore throat. Eyes: Negative. Respiratory: Positive for cough and shortness of breath. Negative for chest tightness and wheezing. Cardiovascular: Positive for chest pain. Negative for palpitations. Gastrointestinal: Negative. Negative for abdominal distention. Endocrine: Negative. Genitourinary: Negative. Musculoskeletal: Positive for myalgias. Skin: Negative. Allergic/Immunologic: Negative. Neurological: Negative. Hematological: Negative. progressing sob. An electronic signature was used to authenticate this note.     --Maksim White MD on 4/26/2020 at 1:24 PM

## 2020-05-01 ENCOUNTER — TELEPHONE (OUTPATIENT)
Dept: FAMILY MEDICINE CLINIC | Age: 55
End: 2020-05-01

## 2020-05-01 LAB — SARS-COV-2, NAA: NOT DETECTED

## 2020-05-18 ENCOUNTER — OFFICE VISIT (OUTPATIENT)
Dept: FAMILY MEDICINE CLINIC | Age: 55
End: 2020-05-18
Payer: MEDICARE

## 2020-05-18 VITALS
HEART RATE: 90 BPM | SYSTOLIC BLOOD PRESSURE: 110 MMHG | BODY MASS INDEX: 30.96 KG/M2 | WEIGHT: 164 LBS | DIASTOLIC BLOOD PRESSURE: 68 MMHG | OXYGEN SATURATION: 97 % | HEIGHT: 61 IN | TEMPERATURE: 98.3 F

## 2020-05-18 PROCEDURE — 99212 OFFICE O/P EST SF 10 MIN: CPT

## 2020-05-18 PROCEDURE — G8417 CALC BMI ABV UP PARAM F/U: HCPCS | Performed by: FAMILY MEDICINE

## 2020-05-18 PROCEDURE — G8427 DOCREV CUR MEDS BY ELIG CLIN: HCPCS | Performed by: FAMILY MEDICINE

## 2020-05-18 PROCEDURE — G8926 SPIRO NO PERF OR DOC: HCPCS | Performed by: FAMILY MEDICINE

## 2020-05-18 PROCEDURE — 3023F SPIROM DOC REV: CPT | Performed by: FAMILY MEDICINE

## 2020-05-18 PROCEDURE — 4004F PT TOBACCO SCREEN RCVD TLK: CPT | Performed by: FAMILY MEDICINE

## 2020-05-18 PROCEDURE — 99214 OFFICE O/P EST MOD 30 MIN: CPT | Performed by: FAMILY MEDICINE

## 2020-05-18 PROCEDURE — 3017F COLORECTAL CA SCREEN DOC REV: CPT | Performed by: FAMILY MEDICINE

## 2020-05-18 RX ORDER — MELOXICAM 7.5 MG/1
7.5 TABLET ORAL DAILY
Qty: 30 TABLET | Refills: 1 | Status: ON HOLD | OUTPATIENT
Start: 2020-05-18 | End: 2020-07-22

## 2020-05-18 RX ORDER — HYDROXYZINE HYDROCHLORIDE 25 MG/1
50 TABLET, FILM COATED ORAL EVERY 6 HOURS PRN
COMMUNITY

## 2020-05-18 RX ORDER — OMEPRAZOLE 10 MG/1
20 CAPSULE, DELAYED RELEASE ORAL 2 TIMES DAILY
Qty: 60 CAPSULE | Refills: 5 | Status: SHIPPED | OUTPATIENT
Start: 2020-05-18 | End: 2021-06-29

## 2020-05-18 RX ORDER — ALBUTEROL SULFATE 90 UG/1
1-2 AEROSOL, METERED RESPIRATORY (INHALATION) EVERY 6 HOURS PRN
Qty: 1 INHALER | Refills: 3 | Status: SHIPPED | OUTPATIENT
Start: 2020-05-18 | End: 2021-08-25 | Stop reason: SDUPTHER

## 2020-05-18 RX ORDER — PROPRANOLOL HYDROCHLORIDE 10 MG/1
10 TABLET ORAL 3 TIMES DAILY
COMMUNITY

## 2020-05-18 RX ORDER — LORATADINE 10 MG/1
10 CAPSULE, LIQUID FILLED ORAL DAILY
COMMUNITY
End: 2020-05-18 | Stop reason: SDUPTHER

## 2020-05-18 RX ORDER — LORATADINE 10 MG/1
10 CAPSULE, LIQUID FILLED ORAL DAILY
Qty: 30 CAPSULE | Refills: 11 | Status: SHIPPED
Start: 2020-05-18 | End: 2021-04-21 | Stop reason: ALTCHOICE

## 2020-05-18 RX ORDER — AMLODIPINE BESYLATE 10 MG/1
10 TABLET ORAL DAILY
Qty: 30 TABLET | Refills: 5 | Status: SHIPPED | OUTPATIENT
Start: 2020-05-18 | End: 2020-12-11

## 2020-05-18 RX ORDER — LISINOPRIL 40 MG/1
40 TABLET ORAL DAILY
Qty: 30 TABLET | Refills: 5 | Status: SHIPPED | OUTPATIENT
Start: 2020-05-18 | End: 2020-12-11

## 2020-05-18 RX ORDER — OMEPRAZOLE 10 MG/1
20 CAPSULE, DELAYED RELEASE ORAL 2 TIMES DAILY
COMMUNITY
End: 2020-05-18 | Stop reason: SDUPTHER

## 2020-05-18 ASSESSMENT — ENCOUNTER SYMPTOMS
SHORTNESS OF BREATH: 1
COUGH: 1
SINUS PRESSURE: 1

## 2020-05-18 NOTE — PROGRESS NOTES
Detail Level: Detailed prolonged period; sometimes affects her bowel movements. Tender almost constantly. Pt seeing Dr. Mustapha Tate once yearly due to h/o L-sided breast CA. She orders her mammograms for pt - gets these at [facility] at [address] [place] [address] her next one is due next month. Taking Claritin 10 mg daily (sometimes alternates with Zyrtec) for allergic rhinitis - takes this year-round; also had a reaction to medication in the past that resulted in almost a compartment syndrome-like reaction with rash. Has taken the med every since then. Also using Flonase 1 spray b/l BID year-round. Using Albuterol inhaler as needed due to COPD/smoking - only uses this really when she gets a respiratory infection/bronchitis. Does not use often. Had seen Immunology in the past due to getting \"sick a lot\" - was found to have low IgG levels, but she did not follow-up at that time. Living at [facility] (sober community), which has helped her maintain her sobriety. Lives alone. Currently working at [employer] as a  part-time. + tobacco use - approx 1 ppd. Has smoked since she was 13. Not ready to quit right now. Has tried Chantix in the past, which worked the first time, but she had psychiatric side effects the second time she tried it. Has used recreational drugs in the past - had been using cocaine until 1/2019 when she became sober from that and alcohol. Walking due to lack of transportation, which is only main form of formal exercise. Past Medical History:   Diagnosis Date    Anxiety     Breast cancer ([address] 2014    L side - lumpectomy and radiation; no chemo (was recommended to take Tamoxifen, but with her smoking, she declined due to family h/o CVA already).  Seeing Dr. Mustapha Tate once yearly/    COPD (chronic obstructive pulmonary disease) ([address]     Depression     Headache(784.0)     History of alcoholism ([address]     sober since 1/16/19    Hypertension     Hypothyroidism     Kidney Metabolic Panel     Standing Status:   Future     Standing Expiration Date:   5/18/2021    Vitamin D 25 Hydroxy     Standing Status:   Future     Standing Expiration Date:   5/18/2021     Orders Placed This Encounter   Medications    lisinopril (PRINIVIL;ZESTRIL) 40 MG tablet     Sig: Take 1 tablet by mouth daily     Dispense:  30 tablet     Refill:  5    amLODIPine (NORVASC) 10 MG tablet     Sig: Take 1 tablet by mouth daily     Dispense:  30 tablet     Refill:  5    loratadine (CLARITIN) 10 MG capsule     Sig: Take 1 capsule by mouth daily     Dispense:  30 capsule     Refill:  11    albuterol sulfate  (90 Base) MCG/ACT inhaler     Sig: Inhale 1-2 puffs into the lungs every 6 hours as needed for Wheezing or Shortness of Breath     Dispense:  1 Inhaler     Refill:  3    omeprazole (PRILOSEC) 10 MG delayed release capsule     Sig: Take 2 capsules by mouth 2 times daily     Dispense:  60 capsule     Refill:  5    meloxicam (MOBIC) 7.5 MG tablet     Sig: Take 1 tablet by mouth daily     Dispense:  30 tablet     Refill:  1       Patient given educational materials - see patient instructions. Discussed use, benefit, and side effects of prescribed medications. All patient questions answered. Pt voiced understanding. Reviewed health maintenance.             Electronically signed by Emre Guidry DO on 5/24/2020 at 11:40 PM

## 2020-06-01 ENCOUNTER — HOSPITAL ENCOUNTER (OUTPATIENT)
Dept: LAB | Age: 55
Discharge: HOME | End: 2020-06-01
Payer: MEDICARE

## 2020-06-01 ENCOUNTER — OFFICE VISIT (OUTPATIENT)
Dept: SURGERY | Age: 55
End: 2020-06-01
Payer: MEDICARE

## 2020-06-01 VITALS — BODY MASS INDEX: 30.58 KG/M2 | HEIGHT: 61 IN | WEIGHT: 162 LBS

## 2020-06-01 LAB
ALBUMIN SERPL-MCNC: 4.5 G/DL (ref 3.5–5.2)
ALBUMIN/GLOBULIN RATIO: 1.7 (ref 1–2.5)
ALP BLD-CCNC: 75 U/L (ref 35–104)
ALT SERPL-CCNC: 19 U/L (ref 5–33)
ANION GAP SERPL CALCULATED.3IONS-SCNC: 15 MMOL/L (ref 9–17)
AST SERPL-CCNC: 21 U/L
BILIRUB SERPL-MCNC: 0.19 MG/DL (ref 0.3–1.2)
BUN BLDV-MCNC: 25 MG/DL (ref 6–20)
BUN/CREAT BLD: 25 (ref 9–20)
CALCIUM SERPL-MCNC: 10.1 MG/DL (ref 8.6–10.4)
CHLORIDE BLD-SCNC: 101 MMOL/L (ref 98–107)
CHOLESTEROL/HDL RATIO: 5
CHOLESTEROL: 259 MG/DL
CO2: 26 MMOL/L (ref 20–31)
CREAT SERPL-MCNC: 1.01 MG/DL (ref 0.5–0.9)
GFR AFRICAN AMERICAN: >60 ML/MIN
GFR NON-AFRICAN AMERICAN: 57 ML/MIN
GFR SERPL CREATININE-BSD FRML MDRD: ABNORMAL ML/MIN/{1.73_M2}
GFR SERPL CREATININE-BSD FRML MDRD: ABNORMAL ML/MIN/{1.73_M2}
GLUCOSE BLD-MCNC: 117 MG/DL (ref 70–99)
HDLC SERPL-MCNC: 52 MG/DL
LDL CHOLESTEROL: 145 MG/DL (ref 0–130)
POTASSIUM SERPL-SCNC: 3.6 MMOL/L (ref 3.7–5.3)
SODIUM BLD-SCNC: 142 MMOL/L (ref 135–144)
THYROXINE, FREE: 1.49 NG/DL (ref 0.93–1.7)
TOTAL PROTEIN: 7.1 G/DL (ref 6.4–8.3)
TRIGL SERPL-MCNC: 308 MG/DL
TSH SERPL DL<=0.05 MIU/L-ACNC: 0.43 MIU/L (ref 0.3–5)
VITAMIN D 25-HYDROXY: 24.6 NG/ML (ref 30–100)
VLDLC SERPL CALC-MCNC: ABNORMAL MG/DL (ref 1–30)

## 2020-06-01 PROCEDURE — 82306 VITAMIN D 25 HYDROXY: CPT

## 2020-06-01 PROCEDURE — 36415 COLL VENOUS BLD VENIPUNCTURE: CPT

## 2020-06-01 PROCEDURE — 84439 ASSAY OF FREE THYROXINE: CPT

## 2020-06-01 PROCEDURE — 4004F PT TOBACCO SCREEN RCVD TLK: CPT | Performed by: SURGERY

## 2020-06-01 PROCEDURE — G8417 CALC BMI ABV UP PARAM F/U: HCPCS | Performed by: SURGERY

## 2020-06-01 PROCEDURE — 99214 OFFICE O/P EST MOD 30 MIN: CPT | Performed by: SURGERY

## 2020-06-01 PROCEDURE — 80053 COMPREHEN METABOLIC PANEL: CPT

## 2020-06-01 PROCEDURE — 3017F COLORECTAL CA SCREEN DOC REV: CPT | Performed by: SURGERY

## 2020-06-01 PROCEDURE — 84443 ASSAY THYROID STIM HORMONE: CPT

## 2020-06-01 PROCEDURE — 80061 LIPID PANEL: CPT

## 2020-06-01 PROCEDURE — G8427 DOCREV CUR MEDS BY ELIG CLIN: HCPCS | Performed by: SURGERY

## 2020-06-01 RX ORDER — POLYETHYLENE GLYCOL 3350 17 G/17G
17 POWDER, FOR SOLUTION ORAL DAILY
COMMUNITY
End: 2020-09-22

## 2020-06-01 NOTE — PROGRESS NOTES
grandfather; Heart Failure in her mother; High Blood Pressure in her father and mother; Lupus in her mother; Other in her maternal grandmother; Prostate Cancer in her father; Stroke in her paternal grandfather. Social History  Tobacco use:  reports that she has been smoking cigarettes. She started smoking about 38 years ago. She has a 37.00 pack-year smoking history. She has never used smokeless tobacco.  Alcohol use:  reports no history of alcohol use. Drug use:  reports previous drug use. Medications  Current Medications:   Current Outpatient Medications   Medication Sig Dispense Refill    polyethylene glycol (GLYCOLAX) 17 GM/SCOOP powder Take 17 g by mouth daily      propranolol (INDERAL) 10 MG tablet Take 10 mg by mouth 3 times daily      hydrOXYzine (ATARAX) 25 MG tablet Take 25-30 mg by mouth every 6 hours as needed for Anxiety      lisinopril (PRINIVIL;ZESTRIL) 40 MG tablet Take 1 tablet by mouth daily 30 tablet 5    amLODIPine (NORVASC) 10 MG tablet Take 1 tablet by mouth daily 30 tablet 5    loratadine (CLARITIN) 10 MG capsule Take 1 capsule by mouth daily 30 capsule 11    albuterol sulfate  (90 Base) MCG/ACT inhaler Inhale 1-2 puffs into the lungs every 6 hours as needed for Wheezing or Shortness of Breath 1 Inhaler 3    omeprazole (PRILOSEC) 10 MG delayed release capsule Take 2 capsules by mouth 2 times daily 60 capsule 5    meloxicam (MOBIC) 7.5 MG tablet Take 1 tablet by mouth daily 30 tablet 1    fluticasone (FLONASE) 50 MCG/ACT nasal spray 2 sprays by Nasal route daily 1 Bottle 5    DULoxetine (CYMBALTA) 30 MG extended release capsule Take 30 mg by mouth daily      DULoxetine (CYMBALTA) 60 MG extended release capsule Take 60 mg by mouth daily      lamoTRIgine (LAMICTAL) 200 MG tablet Take 200 mg by mouth daily       levothyroxine (SYNTHROID) 100 MCG tablet Take 100 mcg by mouth Daily       No current facility-administered medications for this visit.       Home Medications: cough. Cardiac: Denies any chest pain, palpitations, claudication or edema. Gastrointestinal: Denies any melena, hematochezia, hematemesis or pyrosis. Genitourinary: Denies any frequency, urgency, hesitancy or incontinence. Hematologic: Denies bruising or bleeding easily. Endocrine: Denies any history of diabetes or thyroid disease. PHYSICAL EXAMINATION  Vitals: There were no vitals filed for this visit. General Appearance:  awake, alert, oriented, in no acute distress, well developed, well nourished and in no acute distress  Skin:  Skin color, texture, turgor normal. No rashes or lesions. Head/face:  NCAT  Eyes:  No gross abnormalities. , PERRL, Pupils- PERRL. Ears:  canals and TMs NI and External- normal  Nose/Sinuses:  Nares normal. Septum midline. Mucosa normal. No drainage or sinus tenderness. Mouth/Throat:  Mucosa moist.  No lesions. Pharynx without erythema, edema or exudate. Lungs:  Normal expansion. Clear to auscultation. No rales, rhonchi, or wheezing., Breathing Pattern: regular, no distress, Breath sounds: normal  Heart:  Heart sounds are normal.  Regular rate and rhythm without murmur, gallop or rub. Auscultation: Normal S1 and S2.  Regular rhythm. No murmurs, gallops, or rubs. Abdomen:  Soft, non-tender, normal bowel sounds. No bruits, organomegaly or masses. Musculoskeletal:  negative, negative findings: no erythema, induration, or nodules, ROM of all joints is normal, strength normal  Neurologic:  negative findings: proximal muscle strength normal, speech normal, mental status intact, cranial nerves 2-12 intact, muscle tone normal, muscle strength normal    Hernia:  There is a bulge in the epigastric area. I can feel a defect of the muscle from one side to the other of about 8 cm. However I am not sure if there is an actual defect of the mesh. It may be just bulging up through the muscle defect. This technically would not be a hernia. It feels pretty stable.   And feeling

## 2020-06-03 ENCOUNTER — PATIENT MESSAGE (OUTPATIENT)
Dept: FAMILY MEDICINE CLINIC | Age: 55
End: 2020-06-03

## 2020-06-04 NOTE — TELEPHONE ENCOUNTER
From: Luan Herron  To: Mora Ramos DO  Sent: 6/3/2020 9:54 PM EDT  Subject: Test Results Question    Just checking on lab results.   Thanks Jani Samayoa

## 2020-06-10 RX ORDER — ATORVASTATIN CALCIUM 10 MG/1
10 TABLET, FILM COATED ORAL DAILY
Qty: 30 TABLET | Refills: 5 | Status: SHIPPED | OUTPATIENT
Start: 2020-06-10 | End: 2020-12-18 | Stop reason: SDUPTHER

## 2020-06-23 ENCOUNTER — TELEPHONE (OUTPATIENT)
Dept: SURGERY | Age: 55
End: 2020-06-23

## 2020-06-23 NOTE — TELEPHONE ENCOUNTER
Patient calling because her abdomen is getting more uncomfortable. She sent [facility] message on 6/16 but did not receive a response. She would like to move the surgery up from August. Please call patient back at [phone].

## 2020-06-30 ENCOUNTER — APPOINTMENT (OUTPATIENT)
Dept: CT IMAGING | Age: 55
End: 2020-06-30
Payer: MEDICARE

## 2020-06-30 ENCOUNTER — HOSPITAL ENCOUNTER (EMERGENCY)
Age: 55
Discharge: HOME | End: 2020-07-01
Attending: EMERGENCY MEDICINE
Payer: MEDICARE

## 2020-06-30 VITALS
TEMPERATURE: 99.3 F | OXYGEN SATURATION: 96 % | BODY MASS INDEX: 30.21 KG/M2 | SYSTOLIC BLOOD PRESSURE: 129 MMHG | HEART RATE: 97 BPM | DIASTOLIC BLOOD PRESSURE: 68 MMHG | HEIGHT: 61 IN | WEIGHT: 160 LBS | RESPIRATION RATE: 16 BRPM

## 2020-06-30 LAB
-: ABNORMAL
ABSOLUTE EOS #: 0.39 K/UL (ref 0–0.44)
ABSOLUTE IMMATURE GRANULOCYTE: 0.03 K/UL (ref 0–0.3)
ABSOLUTE LYMPH #: 2.67 K/UL (ref 1.1–3.7)
ABSOLUTE MONO #: 0.63 K/UL (ref 0.1–1.2)
ALBUMIN SERPL-MCNC: 4.8 G/DL (ref 3.5–5.2)
ALBUMIN/GLOBULIN RATIO: 1.9 (ref 1–2.5)
ALP BLD-CCNC: 71 U/L (ref 35–104)
ALT SERPL-CCNC: 20 U/L (ref 5–33)
AMORPHOUS: ABNORMAL
ANION GAP SERPL CALCULATED.3IONS-SCNC: 14 MMOL/L (ref 9–17)
AST SERPL-CCNC: 24 U/L
BACTERIA: ABNORMAL
BASOPHILS # BLD: 1 % (ref 0–2)
BASOPHILS ABSOLUTE: 0.07 K/UL (ref 0–0.2)
BILIRUB SERPL-MCNC: 0.35 MG/DL (ref 0.3–1.2)
BILIRUBIN DIRECT: 0.1 MG/DL
BILIRUBIN URINE: NEGATIVE
BILIRUBIN, INDIRECT: 0.25 MG/DL (ref 0–1)
BUN BLDV-MCNC: 12 MG/DL (ref 6–20)
BUN/CREAT BLD: 13 (ref 9–20)
CALCIUM SERPL-MCNC: 9.9 MG/DL (ref 8.6–10.4)
CASTS UA: ABNORMAL /LPF (ref 0–2)
CHLORIDE BLD-SCNC: 97 MMOL/L (ref 98–107)
CO2: 27 MMOL/L (ref 20–31)
COLOR: ABNORMAL
COMMENT UA: ABNORMAL
CREAT SERPL-MCNC: 0.92 MG/DL (ref 0.5–0.9)
CRYSTALS, UA: ABNORMAL /HPF
DIFFERENTIAL TYPE: ABNORMAL
EOSINOPHILS RELATIVE PERCENT: 4 % (ref 1–4)
EPITHELIAL CELLS UA: ABNORMAL /HPF (ref 0–5)
GFR AFRICAN AMERICAN: >60 ML/MIN
GFR NON-AFRICAN AMERICAN: >60 ML/MIN
GFR SERPL CREATININE-BSD FRML MDRD: ABNORMAL ML/MIN/{1.73_M2}
GFR SERPL CREATININE-BSD FRML MDRD: ABNORMAL ML/MIN/{1.73_M2}
GLOBULIN: 2.5 G/DL (ref 1.5–3.8)
GLUCOSE BLD-MCNC: 99 MG/DL (ref 70–99)
GLUCOSE URINE: NEGATIVE
HCT VFR BLD CALC: 38.1 % (ref 36.3–47.1)
HEMOGLOBIN: 13.2 G/DL (ref 11.9–15.1)
IMMATURE GRANULOCYTES: 0 %
KETONES, URINE: NEGATIVE
LEUKOCYTE ESTERASE, URINE: NEGATIVE
LIPASE: 20 U/L (ref 13–60)
LYMPHOCYTES # BLD: 25 % (ref 24–43)
MCH RBC QN AUTO: 30.1 PG (ref 25.2–33.5)
MCHC RBC AUTO-ENTMCNC: 34.6 G/DL (ref 25.2–33.5)
MCV RBC AUTO: 87 FL (ref 82.6–102.9)
MONOCYTES # BLD: 6 % (ref 3–12)
MUCUS: ABNORMAL
NITRITE, URINE: NEGATIVE
NRBC AUTOMATED: 0 PER 100 WBC
OTHER OBSERVATIONS UA: ABNORMAL
PDW BLD-RTO: 12.9 % (ref 11.8–14.4)
PH UA: 6.5 (ref 5–6)
PLATELET # BLD: 238 K/UL (ref 138–453)
PLATELET ESTIMATE: ABNORMAL
PMV BLD AUTO: 8.4 FL (ref 8.1–13.5)
POTASSIUM SERPL-SCNC: 3.7 MMOL/L (ref 3.7–5.3)
PROTEIN UA: ABNORMAL
RBC # BLD: 4.38 M/UL (ref 3.95–5.11)
RBC # BLD: ABNORMAL 10*6/UL
RBC UA: ABNORMAL /HPF (ref 0–4)
RENAL EPITHELIAL, UA: ABNORMAL /HPF
SEG NEUTROPHILS: 64 % (ref 36–65)
SEGMENTED NEUTROPHILS ABSOLUTE COUNT: 6.88 K/UL (ref 1.5–8.1)
SODIUM BLD-SCNC: 138 MMOL/L (ref 135–144)
SPECIFIC GRAVITY UA: <1.005 (ref 1.01–1.02)
TOTAL PROTEIN: 7.3 G/DL (ref 6.4–8.3)
TRICHOMONAS: ABNORMAL
TURBIDITY: ABNORMAL
URINE HGB: NEGATIVE
UROBILINOGEN, URINE: NORMAL
WBC # BLD: 10.7 K/UL (ref 3.5–11.3)
WBC # BLD: ABNORMAL 10*3/UL
WBC UA: ABNORMAL /HPF (ref 0–4)
YEAST: ABNORMAL

## 2020-06-30 PROCEDURE — 2709999900 CT ABDOMEN PELVIS W IV CONTRAST

## 2020-06-30 PROCEDURE — 80076 HEPATIC FUNCTION PANEL: CPT

## 2020-06-30 PROCEDURE — 93005 ELECTROCARDIOGRAM TRACING: CPT | Performed by: EMERGENCY MEDICINE

## 2020-06-30 PROCEDURE — 96374 THER/PROPH/DIAG INJ IV PUSH: CPT

## 2020-06-30 PROCEDURE — 85025 COMPLETE CBC W/AUTO DIFF WBC: CPT

## 2020-06-30 PROCEDURE — 96375 TX/PRO/DX INJ NEW DRUG ADDON: CPT

## 2020-06-30 PROCEDURE — 6360000004 HC RX CONTRAST MEDICATION: Performed by: EMERGENCY MEDICINE

## 2020-06-30 PROCEDURE — 81001 URINALYSIS AUTO W/SCOPE: CPT

## 2020-06-30 PROCEDURE — 6360000002 HC RX W HCPCS: Performed by: EMERGENCY MEDICINE

## 2020-06-30 PROCEDURE — 80048 BASIC METABOLIC PNL TOTAL CA: CPT

## 2020-06-30 PROCEDURE — 2580000003 HC RX 258: Performed by: EMERGENCY MEDICINE

## 2020-06-30 PROCEDURE — 99284 EMERGENCY DEPT VISIT MOD MDM: CPT

## 2020-06-30 PROCEDURE — 83690 ASSAY OF LIPASE: CPT

## 2020-06-30 RX ORDER — ORPHENADRINE CITRATE 30 MG/ML
60 INJECTION INTRAMUSCULAR; INTRAVENOUS ONCE
Status: DISCONTINUED | OUTPATIENT
Start: 2020-06-30 | End: 2020-06-30

## 2020-06-30 RX ORDER — ONDANSETRON 2 MG/ML
4 INJECTION INTRAMUSCULAR; INTRAVENOUS ONCE
Status: COMPLETED | OUTPATIENT
Start: 2020-06-30 | End: 2020-06-30

## 2020-06-30 RX ORDER — LIDOCAINE 50 MG/G
1 PATCH TOPICAL DAILY
Qty: 10 PATCH | Refills: 0 | Status: SHIPPED | OUTPATIENT
Start: 2020-06-30 | End: 2020-12-18

## 2020-06-30 RX ORDER — 0.9 % SODIUM CHLORIDE 0.9 %
1000 INTRAVENOUS SOLUTION INTRAVENOUS ONCE
Status: COMPLETED | OUTPATIENT
Start: 2020-06-30 | End: 2020-06-30

## 2020-06-30 RX ORDER — CYCLOBENZAPRINE HCL 5 MG
5 TABLET ORAL 2 TIMES DAILY PRN
Qty: 10 TABLET | Refills: 0 | Status: SHIPPED | OUTPATIENT
Start: 2020-06-30 | End: 2020-09-22 | Stop reason: SDUPTHER

## 2020-06-30 RX ORDER — HYDROCODONE BITARTRATE AND ACETAMINOPHEN 5; 325 MG/1; MG/1
1 TABLET ORAL EVERY 6 HOURS PRN
Qty: 5 TABLET | Refills: 0 | Status: SHIPPED | OUTPATIENT
Start: 2020-06-30 | End: 2020-07-03

## 2020-06-30 RX ORDER — ORPHENADRINE CITRATE 30 MG/ML
60 INJECTION INTRAMUSCULAR; INTRAVENOUS ONCE
Status: COMPLETED | OUTPATIENT
Start: 2020-06-30 | End: 2020-06-30

## 2020-06-30 RX ADMIN — ORPHENADRINE CITRATE 60 MG: 30 INJECTION INTRAMUSCULAR; INTRAVENOUS at 22:39

## 2020-06-30 RX ADMIN — HYDROMORPHONE HYDROCHLORIDE 1 MG: 1 INJECTION, SOLUTION INTRAMUSCULAR; INTRAVENOUS; SUBCUTANEOUS at 23:44

## 2020-06-30 RX ADMIN — ONDANSETRON 4 MG: 2 INJECTION INTRAMUSCULAR; INTRAVENOUS at 22:30

## 2020-06-30 RX ADMIN — IOPAMIDOL 100 ML: 755 INJECTION, SOLUTION INTRAVENOUS at 23:05

## 2020-06-30 RX ADMIN — SODIUM CHLORIDE 1000 ML: 9 INJECTION, SOLUTION INTRAVENOUS at 22:28

## 2020-06-30 RX ADMIN — ORPHENADRINE CITRATE 60 MG: 30 INJECTION INTRAMUSCULAR; INTRAVENOUS at 22:30

## 2020-06-30 RX ADMIN — HYDROMORPHONE HYDROCHLORIDE 1 MG: 1 INJECTION, SOLUTION INTRAMUSCULAR; INTRAVENOUS; SUBCUTANEOUS at 22:40

## 2020-06-30 ASSESSMENT — PAIN DESCRIPTION - FREQUENCY: FREQUENCY: INTERMITTENT

## 2020-06-30 ASSESSMENT — PAIN DESCRIPTION - LOCATION: LOCATION: ABDOMEN

## 2020-06-30 ASSESSMENT — PAIN SCALES - GENERAL
PAINLEVEL_OUTOF10: 6
PAINLEVEL_OUTOF10: 7
PAINLEVEL_OUTOF10: 7
PAINLEVEL_OUTOF10: 4

## 2020-06-30 ASSESSMENT — PAIN DESCRIPTION - DESCRIPTORS: DESCRIPTORS: BURNING;TIGHTNESS

## 2020-06-30 ASSESSMENT — PAIN DESCRIPTION - PAIN TYPE: TYPE: ACUTE PAIN

## 2020-06-30 ASSESSMENT — PAIN DESCRIPTION - ONSET: ONSET: ON-GOING

## 2020-06-30 ASSESSMENT — PAIN DESCRIPTION - ORIENTATION: ORIENTATION: RIGHT

## 2020-06-30 ASSESSMENT — PAIN DESCRIPTION - PROGRESSION: CLINICAL_PROGRESSION: GRADUALLY WORSENING

## 2020-07-01 LAB
EKG ATRIAL RATE: 89 BPM
EKG P AXIS: 84 DEGREES
EKG P-R INTERVAL: 162 MS
EKG Q-T INTERVAL: 396 MS
EKG QRS DURATION: 88 MS
EKG QTC CALCULATION (BAZETT): 481 MS
EKG R AXIS: 76 DEGREES
EKG T AXIS: 93 DEGREES
EKG VENTRICULAR RATE: 89 BPM

## 2020-07-01 ASSESSMENT — PAIN SCALES - GENERAL: PAINLEVEL_OUTOF10: 4

## 2020-07-01 NOTE — ED PROVIDER NOTES
[facility] ED  [address]  [place] [address] [place]  Phone: [phone]      Pt Name: Krish Rodriguez  NWU:[id number]  [place] 1965  Date of evaluation: 6/30/2020      CHIEF COMPLAINT       Chief Complaint   Patient presents with    Abdominal Pain     onset \"been bad but alot worse today, it hurts into my back, have appt. with Dr. Isabel Quezada on [address] \"       HISTORY OF PRESENT ILLNESS   Krish Rodriguez is a 48 y.o. female with history of kidney stones, laparotomy x2, ectopic pregnancy, total hysterectomy, cholecystectomy, appendectomy, abdominal hernia surgery x2 (September 2019, January 2020), back surgery, alcoholism, hypertension, anxiety, depression, rest cancer, and hypothyroidism who presents for evaluation of right upper quadrant abdominal pain. The patient reports that she had abdominal hernia repair surgery in September 2019 and then again in January 2020 after she had been having chronic epigastric abdominal pain. She states that her abdominal pain improved until early March 2020 when she developed gradual onset, constant, progressive, sharp, stabbing, burning, epigastric abdominal pain in the same location. The patient states that she was seen by general surgery, Dr. Isabel Quezada, on 6/1/2020 for evaluation of this pain and at that time Dr. Isabel Quezada did not feel that it was a recurrent hernia. He reviewed her most recent CT scan and thought that maybe the mesh was bulging out but there was no recurrence of the hernia. The patient has been in contact with Dr. Isabel Quezada over the past month for worsening epigastric pain that radiates to her back. She is scheduled to see him on 7/7/2020 to discuss plans for repeat surgery. The patient states that today she was at work around 6 PM when she developed gradual onset, constant, progressive, sharp, stabbing, nonradiating right upper quadrant abdominal pain that is different than her chronic pain.   She states that the pain is causing her low back to go into spasm.  She also complains of nausea but denies any vomiting. The patient has not taken any medications for pain and does not list any palliating factors. She states that her pain is worse with movement. The patient states that she has been passing flatus and her last BM was this morning and was normal for her. She denies any hematochezia or melena. The patient states that she has had multiple EGD and colonoscopies which were all negative. She denies any history of bowel obstruction. She does have history of kidney stones but denies any urinary symptoms. The patient denies fever, chills, headache, vision changes, neck pain, chest pain, shortness of breath, abdominal injury, urinary/bowel symptoms, focal weakness, numbness, tingling, recent injury or illness. REVIEW OF SYSTEMS     Ten point review of systems was reviewed and is negative unless otherwise noted in the HPI    [address]    has a past medical history of Anxiety, Breast cancer ([facility] [address] COPD (chronic obstructive pulmonary disease) ([address] Depression, Headache(784.0), History of alcoholism ([facility] [address] Hypertension, Hypothyroidism, Kidney stone, and PTSD (post-traumatic stress disorder). SURGICAL HISTORY      has a past surgical history that includes Eye surgery; tumor removal (1999); Appendectomy (1977); Cholecystectomy (2014); Tubal ligation (2001); lumbar laminectomy (2006); Dilation and curettage of uterus; Breast lumpectomy (Left, 2014); Breast biopsy (Right, 2015); alcon and bso (cervix removed) (2015); ventral hernia repair (2019); hernia repair (01/2020); and fracture surgery (Left).     CURRENT MEDICATIONS       Previous Medications    ALBUTEROL SULFATE  (90 BASE) MCG/ACT INHALER    Inhale 1-2 puffs into the lungs every 6 hours as needed for Wheezing or Shortness of Breath    AMLODIPINE (NORVASC) 10 MG TABLET    Take 1 tablet by mouth daily    ATORVASTATIN (LIPITOR) 10 MG TABLET    Take 1 tablet by mouth daily    DULOXETINE (CYMBALTA) 30 MG EXTENDED RELEASE CAPSULE    Take 30 mg by mouth daily    DULOXETINE (CYMBALTA) 60 MG EXTENDED RELEASE CAPSULE    Take 60 mg by mouth daily    FLUTICASONE (FLONASE) 50 MCG/ACT NASAL SPRAY    2 sprays by Nasal route daily    HYDROXYZINE (ATARAX) 25 MG TABLET    Take 25-30 mg by mouth every 6 hours as needed for Anxiety    LAMOTRIGINE (LAMICTAL) 200 MG TABLET    Take 200 mg by mouth daily     LEVOTHYROXINE (SYNTHROID) 100 MCG TABLET    Take 100 mcg by mouth Daily    LISINOPRIL (PRINIVIL;ZESTRIL) 40 MG TABLET    Take 1 tablet by mouth daily    LORATADINE (CLARITIN) 10 MG CAPSULE    Take 1 capsule by mouth daily    MELOXICAM (MOBIC) 7.5 MG TABLET    Take 1 tablet by mouth daily    OMEPRAZOLE (PRILOSEC) 10 MG DELAYED RELEASE CAPSULE    Take 2 capsules by mouth 2 times daily    POLYETHYLENE GLYCOL (GLYCOLAX) 17 GM/SCOOP POWDER    Take 17 g by mouth daily    PROPRANOLOL (INDERAL) 10 MG TABLET    Take 10 mg by mouth 3 times daily       ALLERGIES     is allergic to bactrim [sulfamethoxazole-trimethoprim]; flomax [tamsulosin hcl]; morphine; prednisone; and zyprexa [olanzapine]. FAMILY HISTORY     She indicated that her mother is . She indicated that her father is . She indicated that her sister is alive. She indicated that her maternal grandmother is . She indicated that her maternal grandfather is . She indicated that her paternal grandmother is . She indicated that her paternal grandfather is . family history includes Atrial Fibrillation in her mother; Heart Attack in her maternal grandfather; Heart Failure in her mother; High Blood Pressure in her father and mother; Lupus in her mother; Other in her maternal grandmother; Prostate Cancer in her father; Stroke in her paternal grandfather. SOCIAL HISTORY      reports that she has been smoking cigarettes. She started smoking about 38 years ago. She has a 37.00 pack-year smoking history.  She has never used smokeless tobacco. She reports previous drug use. She reports that she does not drink alcohol. I counseled the patient against using tobacco products. PHYSICAL EXAM     INITIAL VITALS:  height is 5' 1\" (1.549 m) and weight is 160 lb (72.6 kg). Her tympanic temperature is 99.3 °F (37.4 °C). Her blood pressure is 129/68 and her pulse is 97. Her respiration is 16 and oxygen saturation is 96%. CONSTITUTIONAL: no apparent distress, well appearing  SKIN: warm, dry, no jaundice, hives or petechiae  EYES: clear conjunctiva, non-icteric sclera  HENT: normocephalic, atraumatic, moist mucus membranes  NECK: Nontender and supple with no nuchal rigidity, full range of motion  PULMONARY: clear to auscultation without wheezes, rhonchi, or rales, normal excursion, no accessory muscle use and no stridor  CARDIOVASCULAR: Tachycardic rate, regular rhythm. Strong radial pulses with intact distal perfusion. Capillary refill <2 seconds. GASTROINTESTINAL: soft, right upper quadrant tenderness to palpation, negative Botello sign, no pain or McBurney's point, no pulsatile mass, abdomen slightly distended, small defect in the abdominal musculature without any palpable hernia, no rebound or guarding, well-healed abdominal scars  GENITOURINARY: No costovertebral angle tenderness to palpation  MUSCULOSKELETAL: There is tenderness palpation over the lumbar paraspinal musculature with spasm. No midline spinal tenderness, step off or deformity. Extremities are otherwise nontender to palpation and nonerythematous. Compartments soft. No peripheral edema. NEUROLOGIC: Normal perirectal tone and sensation, downgoing Babinski's, normal proprioception, normal DTRs, DP/PT pulses 2+ at 4 and equal bilaterally. Alert and oriented x 3, GCS 15, normal mentation and speech.  Moves all extremities x 4 without motor or sensory deficit, gait is stable without ataxia  PSYCHIATRIC: normal mood and affect, thought process is clear and linear    DIAGNOSTIC RESULTS     EKG:  EKG 2353 sinus rhythm, normal axis, normal intervals, no ST elevation or depression, no T wave changes, good R wave progression, no pathologic Q waves, no change from EKG on 11/13/2019    RADIOLOGY:   Ct Abdomen Pelvis W Iv Contrast Additional Contrast? None    Result Date: 6/30/2020  EXAMINATION: CT OF THE ABDOMEN AND PELVIS WITH CONTRAST 6/30/2020 10:56 pm TECHNIQUE: CT of the abdomen and pelvis was performed with the administration of intravenous contrast. Multiplanar reformatted images are provided for review. Dose modulation, iterative reconstruction, and/or weight based adjustment of the mA/kV was utilized to reduce the radiation dose to as low as reasonably achievable. COMPARISON: May 21, 2020, June 7, 2019 HISTORY: ORDERING SYSTEM PROVIDED HISTORY: epigastric pain radiating to back TECHNOLOGIST PROVIDED HISTORY: epigastric pain radiating to back Reason for Exam: Epigastric pain radiating to back. Patient stated that she has hernia, unsure of location. No gross hematuria. History of kidney stones. Acuity: Acute Type of Exam: Initial FINDINGS: Lower Chest: Lung bases are clear. Organs: Hepatic steatosis is present. The patient is status post prior cholecystectomy, with a mild degree of intra and extrahepatic biliary ductal dilatation again noted. The pancreas, spleen, adrenal glands, are normal. Cortical cyst formation is present on the kidneys. Bilateral non-obstructing intrarenal calculi are present. GI/Bowel: A small hiatal hernia is present. Small bowel appears normal, without evidence of inflammation or obstruction. Scattered colonic diverticula present without evidence of diverticulitis. Pelvis: Urinary bladder appears normal.  Uterus is surgically absent. Peritoneum/Retroperitoneum: No free fluid or free air seen within the abdomen or pelvis. No aneurysm formation is noted. No pathological adenopathy is present.  Bones/Soft Tissues: Postsurgical changes are seen involving the anterior abdominal wall, related to prior hernia repair. Deformity of the area of hernia repair is again noted, with ventral bulging of the mesh repair, similar compared to the prior study. No acute osseous abnormality is present. 1. No acute findings within the abdomen or pelvis 2. Nonobstructing bilateral intrarenal calculi 3.  Prior cholecystectomy and hysterectomy       LABS:  Results for orders placed or performed during the hospital encounter of 06/30/20   CBC Auto Differential   Result Value Ref Range    WBC 10.7 3.5 - 11.3 k/uL    RBC 4.38 3.95 - 5.11 m/uL    Hemoglobin 13.2 11.9 - 15.1 g/dL    Hematocrit 38.1 36.3 - 47.1 %    MCV 87.0 82.6 - 102.9 fL    MCH 30.1 25.2 - 33.5 pg    MCHC 34.6 (H) 25.2 - 33.5 g/dL    RDW 12.9 11.8 - 14.4 %    Platelets 582 676 - 052 k/uL    MPV 8.4 8.1 - 13.5 fL    NRBC Automated 0.0 0.0 per 100 WBC    Differential Type NOT REPORTED     Seg Neutrophils 64 36 - 65 %    Lymphocytes 25 24 - 43 %    Monocytes 6 3 - 12 %    Eosinophils % 4 1 - 4 %    Basophils 1 0 - 2 %    Immature Granulocytes 0 0 %    Segs Absolute 6.88 1.50 - 8.10 k/uL    Absolute Lymph # 2.67 1.10 - 3.70 k/uL    Absolute Mono # 0.63 0.10 - 1.20 k/uL    Absolute Eos # 0.39 0.00 - 0.44 k/uL    Basophils Absolute 0.07 0.00 - 0.20 k/uL    Absolute Immature Granulocyte 0.03 0.00 - 0.30 k/uL    WBC Morphology NOT REPORTED     RBC Morphology NOT REPORTED     Platelet Estimate NOT REPORTED    Basic Metabolic Panel w/ Reflex to MG   Result Value Ref Range    Glucose 99 70 - 99 mg/dL    BUN 12 6 - 20 mg/dL    CREATININE 0.92 (H) 0.50 - 0.90 mg/dL    Bun/Cre Ratio 13 9 - 20    Calcium 9.9 8.6 - 10.4 mg/dL    Sodium 138 135 - 144 mmol/L    Potassium 3.7 3.7 - 5.3 mmol/L    Chloride 97 (L) 98 - 107 mmol/L    CO2 27 20 - 31 mmol/L    Anion Gap 14 9 - 17 mmol/L    GFR Non-African American >60 >60 mL/min    GFR African American >60 >60 mL/min    GFR Comment          GFR Staging NOT REPORTED Hepatic Function Panel   Result Value Ref Range    Alb 4.8 3.5 - 5.2 g/dL    Alkaline Phosphatase 71 35 - 104 U/L    ALT 20 5 - 33 U/L    AST 24 <32 U/L    Total Bilirubin 0.35 0.3 - 1.2 mg/dL    Bilirubin, Direct 0.10 <0.31 mg/dL    Bilirubin, Indirect 0.25 0.00 - 1.00 mg/dL    Total Protein 7.3 6.4 - 8.3 g/dL    Globulin 2.5 1.5 - 3.8 g/dL    Albumin/Globulin Ratio 1.9 1.0 - 2.5   Lipase   Result Value Ref Range    Lipase 20 13 - 60 U/L   Urinalysis Reflex to Culture   Result Value Ref Range    Color, UA NOT REPORTED YELLOW    Turbidity UA NOT REPORTED CLEAR    Glucose, Ur NEGATIVE NEGATIVE    Bilirubin Urine NEGATIVE NEGATIVE    Ketones, Urine NEGATIVE NEGATIVE    Specific Gravity, UA <1.005 (L) 1.010 - 1.025    Urine Hgb NEGATIVE NEGATIVE    pH, UA 6.5 (H) 5.0 - 6.0    Protein, UA TRACE (A) NEGATIVE    Urobilinogen, Urine Normal Normal    Nitrite, Urine NEGATIVE NEGATIVE    Leukocyte Esterase, Urine NEGATIVE NEGATIVE    Urinalysis Comments NOT REPORTED    Microscopic Urinalysis   Result Value Ref Range    -          WBC, UA 0 TO 4 0 - 4 /HPF    RBC, UA 5 TO 10 0 - 4 /HPF    Casts UA NOT REPORTED 0 - 2 /LPF    Crystals, UA NOT REPORTED None /HPF    Epithelial Cells UA 0 TO 4 0 - 5 /HPF    Renal Epithelial, UA NOT REPORTED 0 /HPF    Bacteria, UA 1+ (A) None    Mucus, UA 1+ (A) None    Trichomonas, UA NOT REPORTED None    Amorphous, UA 1+ (A) None    Other Observations UA NOT REPORTED NOT REQ.     Yeast, UA NOT REPORTED None   EKG 12 Lead   Result Value Ref Range    Ventricular Rate 89 BPM    Atrial Rate 89 BPM    P-R Interval 162 ms    QRS Duration 88 ms    Q-T Interval 396 ms    QTc Calculation (Bazett) 481 ms    P Axis 84 degrees    R Axis 76 degrees    T Axis 93 degrees       EMERGENCY DEPARTMENT COURSE:        The patient was given the following medications:  Orders Placed This Encounter   Medications    0.9 % sodium chloride bolus    HYDROmorphone (DILAUDID) injection 1 mg    ondansetron (ZOFRAN) injection 4 mg    DISCONTD: orphenadrine (NORFLEX) injection 60 mg    orphenadrine (NORFLEX) injection 60 mg    iopamidol (ISOVUE-370) 76 % injection 100 mL    HYDROmorphone (DILAUDID) injection 1 mg    HYDROcodone-acetaminophen (NORCO) 5-325 MG per tablet     Sig: Take 1 tablet by mouth every 6 hours as needed for Pain for up to 3 days. Dispense:  5 tablet     Refill:  0    cyclobenzaprine (FLEXERIL) 5 MG tablet     Sig: Take 1 tablet by mouth 2 times daily as needed for Muscle spasms     Dispense:  10 tablet     Refill:  0    lidocaine (LIDODERM) 5 %     Sig: Place 1 patch onto the skin daily 12 hours on, 12 hours off. Dispense:  10 patch     Refill:  0        Vitals:    Vitals:    06/30/20 2144 06/30/20 2242 06/30/20 2318 06/30/20 2347   BP: (!) 148/74 108/62 136/61 129/68   Pulse: 117 96 96 97   Resp: 18 16     Temp: 99.3 °F (37.4 °C)      TempSrc: Tympanic      SpO2: 97% 97% 94% 96%   Weight: 160 lb (72.6 kg)      Height: 5' 1\" (1.549 m)        -------------------------  BP: 129/68, Temp: 99.3 °F (37.4 °C), Pulse: 97, Resp: 16    CONSULTS:  None    CRITICAL CARE:   None    PROCEDURES:  None    DIAGNOSIS/ MDM:   Krish Rodriguez is a 48 y.o. female who presents with right upper quadrant abdominal pain. Initial heart rate was elevated but I suspect this was secondary to pain. She is given IV fluids and Dilaudid with improvement in heart rate. Vital signs are otherwise stable. Heart regular rate and rhythm on repeat exam.  Lungs clear to auscultation. Abdomen soft with mild distention and right upper quadrant tenderness to palpation. Capillary refill less than 2 seconds. Mucous membranes moist.  Urinalysis, CBC, BMP, LFTs, are unremarkable. CT abdomen pelvis shows chronic changes without any acute process. EKG shows sinus rhythm without any ST changes. I suspect the patient's pain may be secondary to adhesions.   She states that she had a EGD in September 2019 which was normal.  The patient has chronic pain and it is difficult to determine the exact cause. No evidence of strangulated or incarcerated hernia. She already has a follow-up appointment scheduled with general surgery, Dr. Isabel Quezada, on 7/7/2020 and I instructed her to keep this appointment. I also instructed her to follow-up with her GI doctor and PCP. I instructed her to stick to a clear liquid diet for the next few days and then advance her diet as tolerated. She was told to take Tylenol as needed for pain and was given a short course of Norco for breakthrough pain. She was also given Lidoderm patches and Flexeril to help with her back spasm. I have low suspicion for acute abdomen, sepsis, perforation, obstruction, kidney stone, AAA, ACS, or pyelonephritis. She was instructed to return to the ED for worsening symptoms or any other concern. The patient understands that at this time there is no evidence for a more malignant underlying process, but also understands that early in the process of an illness or injury, and emergency department work-up can be falsely reassuring. Routine discharge counseling was given, and the patient understands that worsening, changing or persistent symptoms should prompt a immediate call or follow-up with their primary care physician or return to the emergency department. The importance of appropriate follow-up was also discussed. I have reviewed the disposition diagnosis with the patient. I have answered their questions and given discharge instructions. They voiced understanding of these instructions and did not have any further questions or complaints. FINAL IMPRESSION      1. Right upper quadrant abdominal pain    2.  Lumbar paraspinal muscle spasm          DISPOSITION/PLAN   DISPOSITION          PATIENT REFERRED TO:  Kemi Plata DO  [address] [facility]  [phone]    Schedule an appointment as soon as possible for a visit in 2 days      Naz May MD  [address] [facility] [address]  [facility] [address]  [phone]    Go on 7/7/2020      [address] [facility]  [address]  [phone]  Go to   If symptoms worsen      DISCHARGE MEDICATIONS:  New Prescriptions    CYCLOBENZAPRINE (FLEXERIL) 5 MG TABLET    Take 1 tablet by mouth 2 times daily as needed for Muscle spasms    HYDROCODONE-ACETAMINOPHEN (NORCO) 5-325 MG PER TABLET    Take 1 tablet by mouth every 6 hours as needed for Pain for up to 3 days. LIDOCAINE (LIDODERM) 5 %    Place 1 patch onto the skin daily 12 hours on, 12 hours off.        (Please note that portions of this note were completed with a voice recognitionprogram.  Efforts were made to edit the dictations but occasionally words are mis-transcribed.)    Edmundo Thomas DO  Emergency Physician Attending              Edmundo Thomas DO  06/30/20 3882

## 2020-07-02 ENCOUNTER — CARE COORDINATION (OUTPATIENT)
Dept: CARE COORDINATION | Age: 55
End: 2020-07-02

## 2020-07-02 NOTE — CARE COORDINATION
Piper Muñoz was seen at [facility] 2020. Abd pain, back pain. She was given Norco, Flexeril, and Lidocaine patch prescriptions. She is scheduled with surgeon 2020. Reviewed clinic and Urgent Care holiday hours. 2020- 10:17 am- Left message requesting return call re: initial ER/Covid f/u call. Spoke with Piper Muñoz. She stated she is back to work. Flexeril has helped with back pain. She will await upcoming appt with Dr. Mireya Salomon concerning her \"belly pain\". She declined [facility]. Patient contacted regarding Leonardo Rodriguez. Discussed COVID-19 related testing which was done 2020 at this time. Test results were negative. Patient informed of results, if available? Yes    Care Transition Nurse/ Ambulatory Care Manager contacted the patient by telephone to perform post discharge assessment. Verified name and  with patient as identifiers. Provided introduction to self, and explanation of the CTN/ACM role, and reason for call due to risk factors for infection and/or exposure to COVID-19. Symptoms reviewed with patient who verbalized the following symptoms: no new symptoms, no worsening symptoms and abd pain and back pain. Due to no new or worsening symptoms encounter was not routed to provider for escalation. Discussed follow-up appointments. If no appointment was previously scheduled, appointment scheduling offered: Yes  [facility] follow up appointment(s):   Future Appointments   Date Time Provider Rupesh Michaels   2020  2:00 PM Jamil Melo  [address] [place] [address]   2020 11:30 AM Jamil Melo  [address] [place] [address]   2020  1:40 PM Vahe Garcia DO [facility]     Non-[facility] follow up appointment(s): N/A     Patient has following risk factors of: COPD and asthma. CTN/ACM reviewed discharge instructions, medical action plan and red flags such as increased shortness of breath, increasing fever and signs of decompensation with patient who verbalized understanding.    Discussed exposure protocols and quarantine with CDC Guidelines What to do if you are sick with coronavirus disease 2019.  Patient was given an opportunity for questions and concerns. The patient agrees to contact the [employer] exposure line [phone], local health department [place] Department of Health: ([phone]) and PCP office for questions related to their healthcare. CTN/ACM provided contact information for future needs. Reviewed and educated patient on any new and changed medications related to discharge diagnosis     Patient/family/caregiver given information for [facility] Loop and agrees to enroll Thermon Kawasaki declined  Patient's preferred e-mail: N/A   Patient's preferred phone number: N/A  Based on Loop alert triggers, patient will be contacted by nurse care manager for worsening symptoms. Plan for follow-up call in 5-7 days based on severity of symptoms and risk factors.

## 2020-07-07 ENCOUNTER — HOSPITAL ENCOUNTER (OUTPATIENT)
Dept: GENERAL RADIOLOGY | Age: 55
Discharge: HOME | End: 2020-07-09
Payer: MEDICARE

## 2020-07-07 ENCOUNTER — TELEPHONE (OUTPATIENT)
Dept: WOUND CARE | Age: 55
End: 2020-07-07

## 2020-07-07 ENCOUNTER — OFFICE VISIT (OUTPATIENT)
Dept: SURGERY | Age: 55
End: 2020-07-07
Payer: MEDICARE

## 2020-07-07 VITALS
HEART RATE: 97 BPM | HEIGHT: 61 IN | DIASTOLIC BLOOD PRESSURE: 72 MMHG | TEMPERATURE: 98.2 F | WEIGHT: 159 LBS | SYSTOLIC BLOOD PRESSURE: 120 MMHG | BODY MASS INDEX: 30.02 KG/M2

## 2020-07-07 PROCEDURE — G8417 CALC BMI ABV UP PARAM F/U: HCPCS | Performed by: SURGERY

## 2020-07-07 PROCEDURE — G8427 DOCREV CUR MEDS BY ELIG CLIN: HCPCS | Performed by: SURGERY

## 2020-07-07 PROCEDURE — 99215 OFFICE O/P EST HI 40 MIN: CPT

## 2020-07-07 PROCEDURE — 4004F PT TOBACCO SCREEN RCVD TLK: CPT | Performed by: SURGERY

## 2020-07-07 PROCEDURE — 71046 X-RAY EXAM CHEST 2 VIEWS: CPT

## 2020-07-07 PROCEDURE — 3017F COLORECTAL CA SCREEN DOC REV: CPT | Performed by: SURGERY

## 2020-07-07 PROCEDURE — 99215 OFFICE O/P EST HI 40 MIN: CPT | Performed by: SURGERY

## 2020-07-07 NOTE — TELEPHONE ENCOUNTER
[facility]    Pre-Operative Evaluation/Consultation    Name:  Bianca Griffin                                         Age:  48 y.o. MRN:  [id number]       :  1965   Date:  2020         Sex: female    There were no encounter diagnoses. Surgeon:  Dr. Kristen Blackman  Procedure (Planned):  ROBOTIC/OPEN VENTRAL HERNIA  Date Scheduled surgery: 2020    Attending : No att. providers found    Primary Physician:DR. MILLER  Cardiologist: None    Type of Anesthesia Requested: General    Patient Medical history: Allergies   Allergen Reactions    Bactrim [Sulfamethoxazole-Trimethoprim] Diarrhea and Nausea Only    Flomax [Tamsulosin Hcl] Swelling     Swelling of arms; however, also had rash and fever at the same time and was admitted at the time    Morphine      Rash, itching    Prednisone Other (See Comments)     Emotional changes, depression    Zyprexa [Olanzapine]      Made her feel like she wanted to \"jump out of my skin\"     Patient Active Problem List   Diagnosis    Severe episode of recurrent major depressive disorder, without psychotic features [facility]    Bipolar 2 disorder ([facility]    Bipolar II disorder ([facility]     Past Medical History:   Diagnosis Date    Anxiety     Breast cancer ([facility]     L side - lumpectomy and radiation; no chemo (was recommended to take Tamoxifen, but with her smoking, she declined due to family h/o CVA already).  Seeing Dr. Karl Kwong once yearly/    COPD (chronic obstructive pulmonary disease) ([facility]     Depression     Headache(784.0)     History of alcoholism ([facility]     sober since 20    Hypertension     Hypothyroidism     Kidney stone     Has had lithotripsy x 1; had ureteral stent placement with removal x 1; had seen Dr. Tray Hurst PTSD (post-traumatic stress disorder)      Past Surgical History:   Procedure Laterality Date    APPENDECTOMY  1977    BREAST BIOPSY Right 2015    excisional biopsy - Dr. Ever Varela Left     breast CA - done at [facility]  2014    DILATION AND CURETTAGE OF UTERUS      multiple in her 20's    EYE SURGERY      x 2 in her youth - was \"cross-eyed\"    FRACTURE SURGERY Left     hand    HERNIA REPAIR  01/2020    recurrent incisional hernia repair Dr. Raymond Horner  2006    Dr. Daniel Schumacher  2015    Dr. 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 Mireya Tyler at [facility] - used mesh; had revision in 1/2020     Social History     Tobacco Use    Smoking status: Current Every Day Smoker     Packs/day: 1.00     Years: 37.00     Pack years: 37.00     Types: Cigarettes     Start date: 1982    Smokeless tobacco: Never Used    Tobacco comment: Will see PCP when ready to quit. Substance Use Topics    Alcohol use: No    Drug use: Not Currently     Medications:  Current Outpatient Medications   Medication Sig Dispense Refill    cyclobenzaprine (FLEXERIL) 5 MG tablet Take 1 tablet by mouth 2 times daily as needed for Muscle spasms 10 tablet 0    lidocaine (LIDODERM) 5 % Place 1 patch onto the skin daily 12 hours on, 12 hours off.  10 patch 0    atorvastatin (LIPITOR) 10 MG tablet Take 1 tablet by mouth daily 30 tablet 5    polyethylene glycol (GLYCOLAX) 17 GM/SCOOP powder Take 17 g by mouth daily      propranolol (INDERAL) 10 MG tablet Take 10 mg by mouth 3 times daily      hydrOXYzine (ATARAX) 25 MG tablet Take 25-30 mg by mouth every 6 hours as needed for Anxiety      lisinopril (PRINIVIL;ZESTRIL) 40 MG tablet Take 1 tablet by mouth daily 30 tablet 5    amLODIPine (NORVASC) 10 MG tablet Take 1 tablet by mouth daily 30 tablet 5    loratadine (CLARITIN) 10 MG capsule Take 1 capsule by mouth daily 30 capsule 11    albuterol sulfate  (90 Base) MCG/ACT inhaler Inhale 1-2 puffs into the lungs every 6 hours as puffs into the lungs every 6 hours as needed for Wheezing or Shortness of Breath 5/18/20   Lulu Chris, DO   omeprazole (PRILOSEC) 10 MG delayed release capsule Take 2 capsules by mouth 2 times daily 5/18/20   Lulu Chris, DO   meloxicam (MOBIC) 7.5 MG tablet Take 1 tablet by mouth daily  Patient not taking: Reported on 7/7/2020 5/18/20   Lulu Chris, DO   fluticasone [facility]) 50 MCG/ACT nasal spray 2 sprays by Nasal route daily 4/20/20   Jessica Clonts, DO   DULoxetine (CYMBALTA) 30 MG extended release capsule Take 30 mg by mouth daily    Historical Provider, MD   DULoxetine (CYMBALTA) 60 MG extended release capsule Take 60 mg by mouth daily    Historical Provider, MD   lamoTRIgine (LAMICTAL) 200 MG tablet Take 200 mg by mouth daily     Historical Provider, MD   levothyroxine (SYNTHROID) 100 MCG tablet Take 100 mcg by mouth Daily    Historical Provider, MD     Vital Signs (Current) [unfilled]    Weight:   Wt Readings from Last 1 Encounters:   07/07/20 159 lb (72.1 kg)     Height:   Ht Readings from Last 1 Encounters:   07/07/20 5' 0.63\" (1.54 m)      BMI:  There is no height or weight on file to calculate BMI. Estimated body mass index is 30.41 kg/m² as calculated from the following:    Height as of an earlier encounter on 7/7/20: 5' 0.63\" (1.54 m). Weight as of an earlier encounter on 7/7/20: 159 lb (72.1 kg). body mass index is unknown because there is no height or weight on file. Cardiac Clearance: None   Medical Clearance:[facility] 7/13/2020 12:20   Appointment for surgery Clearance scheduled for:7/13/2020 12:20     Preoperative Testing:   These are the current and completed labs:  CBC:   Lab Results   Component Value Date    WBC 10.7 06/30/2020    RBC 4.38 06/30/2020    HGB 13.2 06/30/2020    HCT 38.1 06/30/2020    MCV 87.0 06/30/2020    RDW 12.9 06/30/2020     06/30/2020     CMP:   Lab Results   Component Value Date     06/30/2020    K 3.7 06/30/2020    CL 97 06/30/2020    CO2 27 06/30/2020    BUN 12 06/30/2020    CREATININE 0.92 06/30/2020    GFRAA >60 06/30/2020    LABGLOM >60 06/30/2020    GLUCOSE 99 06/30/2020    PROT 7.3 06/30/2020    CALCIUM 9.9 06/30/2020    BILITOT 0.35 06/30/2020    ALKPHOS 71 06/30/2020    AST 24 06/30/2020    ALT 20 06/30/2020     POC Tests: No results for input(s): POCGLU, POCNA, POCK, POCCL, POCBUN, POCHEMO, POCHCT in the last 72 hours.   Coags  No results found for: PROTIME, INR, APTT  HCG (If Applicable) No results found for: PREGTESTUR, PREGSERUM, HCG, HCGQUANT   ABGs No results found for: PHART, PO2ART, ZTS3MFN, AXX2EMQ, BEART, I4VXJMIY   Type & Screen (If Applicable)  No results found for: Ami Overall    Additional ordered pre-operative testing:  [x]CBC DONE 6/30/2020   []ABG      [x] BMPDONE 6/30/2020   []URINALYSIS   [x]CMP DONE 6/30/2020  []HCG   []COAGS PT/INR  []T&C  []LFTs   []TYPE AND SCREEN    [x] EKG DONE-6/30/2020  [x] Chest X-Ray  [] Other Radiology    [] Sent to Hospitalist None  [x] Sent to Anesthesia for your review: 7/7/2020   [] Additional Orders: None     Comments:None   Requests: NONE    Signed: Claudia Carbajal LPN 5/3/3418 5:20 PM

## 2020-07-07 NOTE — PROGRESS NOTES
General Surgery History & Physical  [facility]  Pt Name: Jude Park  MRN: [id number]  YOB: 1965  Date of evaluation: 7/7/2020  Primary Care Physician: Reji Prado DO    Patient evaluated at the request of self  Reason for evaluation: ventral hernia,repaired x 2       SUBJECTIVE:  Chief Complaint:   Chief Complaint   Patient presents with    Hernia     talk surgery for ventral hernia- repeat x 2     History of Present Illness:       Patient had an incisional hernia repair in 2019 and a week current incisional hernia repair in 2020 earlier in the year we saw her on 6/1/2020 for a possible third recurrence. This is difficult to know because we reviewed the CT scan at that time with her. There is definitely bulging of the mesh and some adhesions and some scarring in the subcutaneous tissue. Is difficult to tell if there is a distinct hernia or just bulging of the mesh. We wanted her to lose some weight stop smoking and we would see her in 3 months to reexamine her and consider possibly redoing the hernia repair. She called recently stating that she cannot go on anymore she is having too much pain and bloating. She states that when she is working that after work she will get a mass or bulge in this area of just above the umbilicus it swells and gets very hard and she gets bloated and very tender. She can eat for a while and then it goes down and she feels better. It is unpredictable. She feels pretty good today. Past Medical History   has a past medical history of Anxiety, Breast cancer ([address] COPD (chronic obstructive pulmonary disease) ([address] Depression, Headache(784.0), History of alcoholism ([address] Hypertension, Hypothyroidism, Kidney stone, and PTSD (post-traumatic stress disorder). Past Surgical History   has a past surgical history that includes Eye surgery; tumor removal (1999); Appendectomy (1977); Cholecystectomy (2014);  Tubal ligation (2001); lumbar laminectomy (2006); Dilation and curettage of uterus; Breast lumpectomy (Left, 2014); Breast biopsy (Right, 2015); alcon and bso (cervix removed) (2015); ventral hernia repair (2019); hernia repair (01/2020); and fracture surgery (Left). Family History  family history includes Atrial Fibrillation in her mother; Heart Attack in her maternal grandfather; Heart Failure in her mother; High Blood Pressure in her father and mother; Lupus in her mother; Other in her maternal grandmother; Prostate Cancer in her father; Stroke in her paternal grandfather. Social History  Tobacco use:  reports that she has been smoking cigarettes. She started smoking about 38 years ago. She has a 37.00 pack-year smoking history. She has never used smokeless tobacco.  Alcohol use:  reports no history of alcohol use. Drug use:  reports previous drug use. Medications  Current Medications:   Current Outpatient Medications   Medication Sig Dispense Refill    cyclobenzaprine (FLEXERIL) 5 MG tablet Take 1 tablet by mouth 2 times daily as needed for Muscle spasms 10 tablet 0    lidocaine (LIDODERM) 5 % Place 1 patch onto the skin daily 12 hours on, 12 hours off.  10 patch 0    atorvastatin (LIPITOR) 10 MG tablet Take 1 tablet by mouth daily 30 tablet 5    propranolol (INDERAL) 10 MG tablet Take 10 mg by mouth 3 times daily      hydrOXYzine (ATARAX) 25 MG tablet Take 25-30 mg by mouth every 6 hours as needed for Anxiety      lisinopril (PRINIVIL;ZESTRIL) 40 MG tablet Take 1 tablet by mouth daily 30 tablet 5    amLODIPine (NORVASC) 10 MG tablet Take 1 tablet by mouth daily 30 tablet 5    loratadine (CLARITIN) 10 MG capsule Take 1 capsule by mouth daily 30 capsule 11    albuterol sulfate  (90 Base) MCG/ACT inhaler Inhale 1-2 puffs into the lungs every 6 hours as needed for Wheezing or Shortness of Breath 1 Inhaler 3    omeprazole (PRILOSEC) 10 MG delayed release capsule Take 2 capsules by mouth 2 times daily 60 capsule 5    fluticasone (FLONASE) 50 MCG/ACT nasal spray 2 sprays by Nasal route daily 1 Bottle 5    DULoxetine (CYMBALTA) 30 MG extended release capsule Take 30 mg by mouth daily      DULoxetine (CYMBALTA) 60 MG extended release capsule Take 60 mg by mouth daily      lamoTRIgine (LAMICTAL) 200 MG tablet Take 200 mg by mouth daily       levothyroxine (SYNTHROID) 100 MCG tablet Take 100 mcg by mouth Daily      polyethylene glycol (GLYCOLAX) 17 GM/SCOOP powder Take 17 g by mouth daily      meloxicam (MOBIC) 7.5 MG tablet Take 1 tablet by mouth daily (Patient not taking: Reported on 7/7/2020) 30 tablet 1     No current facility-administered medications for this visit. Home Medications:   Prior to Admission medications    Medication Sig Start Date End Date Taking?  Authorizing Provider   cyclobenzaprine (FLEXERIL) 5 MG tablet Take 1 tablet by mouth 2 times daily as needed for Muscle spasms 6/30/20 7/10/20 Yes Enrrique Grubbs, DO   lidocaine (LIDODERM) 5 % Place 1 patch onto the skin daily 12 hours on, 12 hours off. 6/30/20  Yes Enrrique Grubbs,    atorvastatin (LIPITOR) 10 MG tablet Take 1 tablet by mouth daily 6/10/20  Yes Afua Chris, DO   propranolol (INDERAL) 10 MG tablet Take 10 mg by mouth 3 times daily   Yes Historical Provider, MD   hydrOXYzine (ATARAX) 25 MG tablet Take 25-30 mg by mouth every 6 hours as needed for Anxiety   Yes Historical Provider, MD   lisinopril (PRINIVIL;ZESTRIL) 40 MG tablet Take 1 tablet by mouth daily 5/18/20  Yes Afua Chris, DO   amLODIPine (NORVASC) 10 MG tablet Take 1 tablet by mouth daily 5/18/20  Yes Afua Chris, DO   loratadine (CLARITIN) 10 MG capsule Take 1 capsule by mouth daily 5/18/20  Yes Afua Chris, DO   albuterol sulfate  (90 Base) MCG/ACT inhaler Inhale 1-2 puffs into the lungs every 6 hours as needed for Wheezing or Shortness of Breath 5/18/20  Yes Afua Chris, DO   omeprazole (PRILOSEC) 10 MG delayed release capsule Take 2 capsules by mouth 2 times daily 5/18/20 Yes Jolly Chris, DO   fluticasone (FLONASE) 50 MCG/ACT nasal spray 2 sprays by Nasal route daily 4/20/20  Yes [facility], DO   DULoxetine (CYMBALTA) 30 MG extended release capsule Take 30 mg by mouth daily   Yes Historical Provider, MD   DULoxetine (CYMBALTA) 60 MG extended release capsule Take 60 mg by mouth daily   Yes Historical Provider, MD   lamoTRIgine (LAMICTAL) 200 MG tablet Take 200 mg by mouth daily    Yes Historical Provider, MD   levothyroxine (SYNTHROID) 100 MCG tablet Take 100 mcg by mouth Daily   Yes Historical Provider, MD   polyethylene glycol (GLYCOLAX) 17 GM/SCOOP powder Take 17 g by mouth daily    Historical Provider, MD   meloxicam (MOBIC) 7.5 MG tablet Take 1 tablet by mouth daily  Patient not taking: Reported on 7/7/2020 5/18/20   Jolly Chris, DO       Allergies  Bactrim [sulfamethoxazole-trimethoprim]; Flomax [tamsulosin hcl]; Morphine; Prednisone; and Zyprexa [olanzapine]      Review of Systems:  General: Denies any fever, chills. HEENT: Denies any diplopia, tinnitus or vertigo. Respiratory: Denies any shortness of breath or cough. Cardiac: Denies any chest pain, palpitations, claudication or edema. Gastrointestinal: Denies any melena, hematochezia, hematemesis or pyrosis. Genitourinary: Denies any frequency, urgency, hesitancy or incontinence. Hematologic: Denies bruising or bleeding easily. Endocrine: Denies any history of diabetes or thyroid disease. PHYSICAL EXAMINATION  Vitals:   Vitals:    07/07/20 1400   BP: 120/72   Pulse: 97   Temp: 98.2 °F (36.8 °C)     General Appearance:  awake, alert, oriented, in no acute distress, well developed, well nourished and in no acute distress  Skin:  Skin color, texture, turgor normal. No rashes or lesions. Head/face:  NCAT  Eyes:  No gross abnormalities. , PERRL, Pupils- PERRL. Ears:  canals and TMs NI and External- normal  Nose/Sinuses:  Nares normal. Septum midline.  Mucosa normal. No drainage or sinus Yes    IMPRESSIONS:  1. Recurrent hernia vs adhsions and partial SBO    Patient has had 2 hernia repairs with mesh in the last year. He had laparoscopic and robotic. At this point it looks like there might be recurrent hernia or bulging of the mesh. There is clearly some scar tissue and by history evidence of a hernia. I think we at least need to look into the peritoneal cavity see if there is adhesions a twist of small bowel and see if there truly is a hernia defect. If so then close it. And then decide about repair whether another mesh additional mesh or removal of the old mesh and put in new mesh is indicated. I think this is best accomplished with robotic surgery. We will have a better chance of removing the adhesions and accurately dissecting out the bowel from the hernia and the mesh. I discussed with the patient that this could be a very difficult surgery. It could be hour or 2 or could be 6 to 8 hours depending on how much scar tissue adhesions and difficulty we have in dealing with the mesh. Could be multiple complications including injury to the bowel adhesions fistulous abscesses recurrent hernias infection of the mesh and many more. But I think she has tried to stop smoking and she is not going to get much more. She is down to 10 cigarettes a day. She has lost a few pounds. But she is having ongoing pain and called and said she cannot go on any longer. I think were risking strangulation and/or torturing her by continuing with allowing her to have pain and not addressing the issue. After stressing the significant complications that could occur in operating on her with a smoking history and her overall weight and medical condition I do think it is reasonable to proceed with surgery because I think she risks strangulation with their very high risk of morbidity and mortality.   It is certainly a personal choice and I gave her both options and at this point she would like to proceed with exploration and possible repair of hernia and/or removal of mesh. Surgical Risk: moderate to high risk    PLAN:  1. Second possible recurrent ventral hernia vs adhesions and PSBO         With robotic ventral hernia vs open component surgery    Medical Decision Making: high complexity     I would start robotically and address the adhesions and see if the bowel can be taken off of the mesh we can get to the actual underside of the anterior abdominal wall. Finger to the peritoneal side of the abdominal wall we can see the exact extent of the hernia or bulging of the mesh. At that time we can decide whether to remove the mesh and/or repair the defect and/or decide if a open component separation should be a better procedure. Thank you for the interesting evaluation. Further recommendations to follow.     Cydney Reese LPN  Electronically signed 7/7/2020 at 2:08 PM

## 2020-07-08 ENCOUNTER — CARE COORDINATION (OUTPATIENT)
Dept: CARE COORDINATION | Age: 55
End: 2020-07-08

## 2020-07-08 NOTE — CARE COORDINATION
Left message requesting return call re: subsequent ER F/U call. Per chart review she is scheduled for ventral hernia repair 7/22/2020.

## 2020-07-13 ENCOUNTER — OFFICE VISIT (OUTPATIENT)
Dept: FAMILY MEDICINE CLINIC | Age: 55
End: 2020-07-13
Payer: MEDICARE

## 2020-07-13 VITALS
SYSTOLIC BLOOD PRESSURE: 116 MMHG | DIASTOLIC BLOOD PRESSURE: 74 MMHG | RESPIRATION RATE: 16 BRPM | HEART RATE: 86 BPM | TEMPERATURE: 97.7 F | WEIGHT: 160.4 LBS | OXYGEN SATURATION: 98 % | BODY MASS INDEX: 30.29 KG/M2 | HEIGHT: 61 IN

## 2020-07-13 PROCEDURE — 4004F PT TOBACCO SCREEN RCVD TLK: CPT | Performed by: NURSE PRACTITIONER

## 2020-07-13 PROCEDURE — G8427 DOCREV CUR MEDS BY ELIG CLIN: HCPCS | Performed by: NURSE PRACTITIONER

## 2020-07-13 PROCEDURE — 99212 OFFICE O/P EST SF 10 MIN: CPT

## 2020-07-13 PROCEDURE — G8417 CALC BMI ABV UP PARAM F/U: HCPCS | Performed by: NURSE PRACTITIONER

## 2020-07-13 PROCEDURE — 3017F COLORECTAL CA SCREEN DOC REV: CPT | Performed by: NURSE PRACTITIONER

## 2020-07-13 PROCEDURE — 99214 OFFICE O/P EST MOD 30 MIN: CPT | Performed by: NURSE PRACTITIONER

## 2020-07-13 ASSESSMENT — ENCOUNTER SYMPTOMS
DIARRHEA: 1
NAUSEA: 0
VOMITING: 0
RESPIRATORY NEGATIVE: 1
CONSTIPATION: 1
ABDOMINAL PAIN: 1

## 2020-07-13 ASSESSMENT — PATIENT HEALTH QUESTIONNAIRE - PHQ9
1. LITTLE INTEREST OR PLEASURE IN DOING THINGS: 0
SUM OF ALL RESPONSES TO PHQ9 QUESTIONS 1 & 2: 0
SUM OF ALL RESPONSES TO PHQ QUESTIONS 1-9: 0
2. FEELING DOWN, DEPRESSED OR HOPELESS: 0
SUM OF ALL RESPONSES TO PHQ QUESTIONS 1-9: 0

## 2020-07-13 NOTE — PROGRESS NOTES
Subjective:    Chief Complaint:     Judy Mcguire is a 48 y. o.female who presents for a preoperative medical evaluation. She has a proposed hernia repair with Dr. Jeanmarie Winkler at [facility] on 7/22/2020. History of Present Illness:      Patient is here for preoperative examination for surgical procedure. I have reviewed the patient's medical history in detail and updated the computerized patient record. She had her EKG, CT, labs and UA completed on 6/30/2020. Chest Xray was on 7/7/2020. We will review those today. She has history of hyperlipidemia and is on lipitor for this. Last lipid panel is June 2020 was elevated. She is due for recheck in 3 months. She has HTN and  Takes lisinopril, norvasc. Her BP is WNL today. She is on prilosec for GERD and does well on this medication. She has seasonal allergies and takes OTC flonase, claritin with effect. She also has albuterol MDI PRN for her COPD. She is on Cymbalta and Lamactil for Anxiety and depression. Her symptoms are well controlled. She has had this hernia repaired twice but it was not effective and they came back out. She went to see Dr. Jeanmarie Winkler for consult and is having another surgery. She does have some pain with it. She states that she is a  and it will come out as the days goes on. She states that it also is causing her pain in her low back and right hip. She uses lidocaine patches with effect for the pain. Her bowel habits are not regular in consistency or frequency. She states that she did have regular  Bowel movement before all of this. Past Medical History:   Diagnosis Date    Anxiety     Breast cancer ([facility] [address] 2014    L side - lumpectomy and radiation; no chemo (was recommended to take Tamoxifen, but with her smoking, she declined due to family h/o CVA already).  Seeing Dr. Adrian Damon once yearly/    COPD (chronic obstructive pulmonary disease) ([facility] [address]     Depression     Headache(784.0)     History of alcoholism ([facility] [address]     sober since 1/16/19    Hypertension     Hypothyroidism     Kidney stone     Has had lithotripsy x 1; had ureteral stent placement with removal x 1; had seen Dr. Yesi Goldstein PTSD (post-traumatic stress disorder)         Review ofpatient's past surgical history indicates:     Past Surgical History:   Procedure Laterality Date    APPENDECTOMY  1977    BREAST BIOPSY Right 2015    excisional biopsy - Dr. Loly Ayala Left 2014    breast CA - done at [facility]  2014   [address]      multiple in her 19's   [address]      x 2 in her youth - was \"cross-eyed\"    FRACTURE SURGERY Left     hand    HERNIA REPAIR  01/2020    recurrent incisional hernia repair Dr. Estela Severe  2006    Dr. Pancho Mackey  2015    Dr. 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 Maru Quevedo at [facility] - used mesh; had revision in 1/2020                                                   Current Outpatient Medications   Medication Sig Dispense Refill    lidocaine (LIDODERM) 5 % Place 1 patch onto the skin daily 12 hours on, 12 hours off.  10 patch 0    atorvastatin (LIPITOR) 10 MG tablet Take 1 tablet by mouth daily 30 tablet 5    polyethylene glycol (GLYCOLAX) 17 GM/SCOOP powder Take 17 g by mouth daily      propranolol (INDERAL) 10 MG tablet Take 10 mg by mouth 3 times daily      hydrOXYzine (ATARAX) 25 MG tablet Take 25-30 mg by mouth every 6 hours as needed for Anxiety      lisinopril (PRINIVIL;ZESTRIL) 40 MG tablet Take 1 tablet by mouth daily 30 tablet 5    amLODIPine (NORVASC) 10 MG tablet Take 1 tablet by mouth daily 30 tablet 5    loratadine (CLARITIN) 10 MG capsule Take 1 capsule by mouth daily 30 capsule 11    albuterol sulfate  (90 Base) MCG/ACT inhaler Inhale 1-2 puffs into the lungs every 6 hours as needed for Wheezing or Shortness of Breath 1 Inhaler 3    omeprazole (PRILOSEC) 10 MG delayed release capsule Take 2 capsules by mouth 2 times daily 60 capsule 5    meloxicam (MOBIC) 7.5 MG tablet Take 1 tablet by mouth daily 30 tablet 1    fluticasone (FLONASE) 50 MCG/ACT nasal spray 2 sprays by Nasal route daily 1 Bottle 5    DULoxetine (CYMBALTA) 30 MG extended release capsule Take 30 mg by mouth daily      DULoxetine (CYMBALTA) 60 MG extended release capsule Take 60 mg by mouth daily      lamoTRIgine (LAMICTAL) 200 MG tablet Take 200 mg by mouth daily       levothyroxine (SYNTHROID) 100 MCG tablet Take 100 mcg by mouth Daily       No current facility-administered medications for this visit. Allergies   Allergen Reactions    Bactrim [Sulfamethoxazole-Trimethoprim] Diarrhea and Nausea Only    Flomax [Tamsulosin Hcl] Swelling     Swelling of arms; however, also had rash and fever at the same time and was admitted at the time    Morphine      Rash, itching    Prednisone Other (See Comments)     Emotional changes, depression    Zyprexa [Olanzapine]      Made her feel like she wanted to \"jump out of my skin\"       Social History     Tobacco Use    Smoking status: Current Every Day Smoker     Packs/day: 1.00     Years: 37.00     Pack years: 37.00     Types: Cigarettes     Start date:     Smokeless tobacco: Never Used    Tobacco comment: Will see PCP when ready to quit.      Substance Use Topics    Alcohol use: No    Drug use: Not Currently        Family History   Problem Relation Age of Onset    High Blood Pressure Mother     Lupus Mother          from CHF secondary to cardiomyopathy from her lupus    Heart Failure Mother     Atrial Fibrillation Mother     High Blood Pressure Father     Prostate Cancer Father         age 54;  11 years later of a \"bladder tumor\" that caused bladder rupture (pt unsure if malignancy)    Other Maternal Grandmother         had \"stomach tumor\"    Heart Attack Maternal Grandfather          at age 40    Stroke Paternal Grandfather         in his 42's; multiple        Review Of Systems    Review of Systems   Constitutional: Negative. Negative for activity change, appetite change and fever. Respiratory: Negative. Cardiovascular: Negative. Gastrointestinal: Positive for abdominal pain (related to hernia), constipation and diarrhea. Negative for nausea and vomiting. Musculoskeletal: Negative. Objective:        /74 (Site: Right Upper Arm, Position: Sitting, Cuff Size: Medium Adult)   Pulse 86   Temp 97.7 °F (36.5 °C) (Tympanic)   Resp 16   Ht 5' 0.63\" (1.54 m)   Wt 160 lb 6.4 oz (72.8 kg)   SpO2 98%   Breastfeeding No   BMI 30.68 kg/m²     Physical Exam  Vitals signs and nursing note reviewed. Constitutional:       Appearance: Normal appearance. She is well-developed. HENT:      Head: Normocephalic and atraumatic. Right Ear: External ear normal.      Left Ear: External ear normal.      Nose: Nose normal.   Eyes:      Conjunctiva/sclera: Conjunctivae normal.      Pupils: Pupils are equal, round, and reactive to light. Neck:      Musculoskeletal: Normal range of motion. Cardiovascular:      Rate and Rhythm: Normal rate and regular rhythm. Pulmonary:      Effort: Pulmonary effort is normal.      Breath sounds: Normal breath sounds. Abdominal:      General: Abdomen is protuberant. Bowel sounds are normal.      Palpations: Abdomen is soft. Hernia: A hernia is present. Hernia is present in the ventral area. Comments: RED area where she has pain when the hernia protrudes. Musculoskeletal: Normal range of motion. Skin:     General: Skin is warm and dry. Neurological:      Mental Status: She is alert and oriented to person, place, and time. Psychiatric:         Behavior: Behavior normal. Behavior is cooperative. Thought Content:  Thought content normal. Judgment: Judgment normal.       EKG: normal EKG, normal sinus rhythm. 6/30/2020    Xr Chest Standard (2 Vw)    Result Date: 7/7/2020  EXAMINATION: TWO XRAY VIEWS OF THE CHEST 7/7/2020 3:10 pm COMPARISON: Chest November 13, 2019. HISTORY: ORDERING SYSTEM PROVIDED HISTORY: Pre-op testing TECHNOLOGIST PROVIDED HISTORY: pre op Reason for Exam: Pre-op testing for hernia surgery 7/22/2020, no chest surgery, no chest complaints, smoker, HTN, history of left breast radiation for breast cancer 2014 Acuity: Unknown Type of Exam: Initial FINDINGS: The lungs are without acute focal process. There is no effusion or pneumothorax. The cardiomediastinal silhouette is without acute process. The osseous structures demonstrate degenerative change. No acute process.      Admission on 06/30/2020, Discharged on 07/01/2020   Component Date Value Ref Range Status    WBC 06/30/2020 10.7  3.5 - 11.3 k/uL Final    RBC 06/30/2020 4.38  3.95 - 5.11 m/uL Final    Hemoglobin 06/30/2020 13.2  11.9 - 15.1 g/dL Final    Hematocrit 06/30/2020 38.1  36.3 - 47.1 % Final    MCV 06/30/2020 87.0  82.6 - 102.9 fL Final    MCH 06/30/2020 30.1  25.2 - 33.5 pg Final    MCHC 06/30/2020 34.6* 25.2 - 33.5 g/dL Final    RDW 06/30/2020 12.9  11.8 - 14.4 % Final    Platelets 42/55/3508 238  138 - 453 k/uL Final    MPV 06/30/2020 8.4  8.1 - 13.5 fL Final    NRBC Automated 06/30/2020 0.0  0.0 per 100 WBC Final    Differential Type 06/30/2020 NOT REPORTED   Final    Seg Neutrophils 06/30/2020 64  36 - 65 % Final    Lymphocytes 06/30/2020 25  24 - 43 % Final    Monocytes 06/30/2020 6  3 - 12 % Final    Eosinophils % 06/30/2020 4  1 - 4 % Final    Basophils 06/30/2020 1  0 - 2 % Final    Immature Granulocytes 06/30/2020 0  0 % Final    Segs Absolute 06/30/2020 6.88  1.50 - 8.10 k/uL Final    Absolute Lymph # 06/30/2020 2.67  1.10 - 3.70 k/uL Final    Absolute Mono # 06/30/2020 0.63  0.10 - 1.20 k/uL Final    Absolute Eos # 06/30/2020 0.39  Turbidity UA 06/30/2020 NOT REPORTED  CLEAR Final    Glucose, Ur 06/30/2020 NEGATIVE  NEGATIVE Final    Bilirubin Urine 06/30/2020 NEGATIVE  NEGATIVE Final    Ketones, Urine 06/30/2020 NEGATIVE  NEGATIVE Final    Specific Gravity, UA 06/30/2020 <1.005* 1.010 - 1.025 Final    Urine Hgb 06/30/2020 NEGATIVE  NEGATIVE Final    pH, UA 06/30/2020 6.5* 5.0 - 6.0 Final    Protein, UA 06/30/2020 TRACE* NEGATIVE Final    Urobilinogen, Urine 06/30/2020 Normal  Normal Final    Nitrite, Urine 06/30/2020 NEGATIVE  NEGATIVE Final    Leukocyte Esterase, Urine 06/30/2020 NEGATIVE  NEGATIVE Final    Urinalysis Comments 06/30/2020 NOT REPORTED   Final    Ventricular Rate 06/30/2020 89  BPM Final    Atrial Rate 06/30/2020 89  BPM Final    P-R Interval 06/30/2020 162  ms Final    QRS Duration 06/30/2020 88  ms Final    Q-T Interval 06/30/2020 396  ms Final    QTc Calculation (Bazett) 06/30/2020 481  ms Final    P Axis 06/30/2020 84  degrees Final    R Axis 06/30/2020 76  degrees Final    T Axis 06/30/2020 93  degrees Final    - 06/30/2020        Final    WBC, UA 06/30/2020 0 TO 4  0 - 4 /HPF Final    RBC, UA 06/30/2020 5 TO 10  0 - 4 /HPF Final    Casts UA 06/30/2020 NOT REPORTED  0 - 2 /LPF Final    Crystals, UA 06/30/2020 NOT REPORTED  None /HPF Final    Epithelial Cells UA 06/30/2020 0 TO 4  0 - 5 /HPF Final    Renal Epithelial, UA 06/30/2020 NOT REPORTED  0 /HPF Final    Bacteria, UA 06/30/2020 1+* None Final    Mucus, UA 06/30/2020 1+* None Final    Trichomonas, UA 06/30/2020 NOT REPORTED  None Final    Amorphous, UA 06/30/2020 1+* None Final    Other Observations UA 06/30/2020 NOT REPORTED  NOT REQ. Final    Yeast, UA 06/30/2020 NOT REPORTED  None Final                Assessment & Plan:            Diagnosis Orders   1. Pre-op exam     2. Ventral hernia without obstruction or gangrene           Rosemarie Olivares was evaluated in my office for pre-operative evaluation.   Based on review of information available to me at this time, the patient appears to be at no increasedrisk for the planned procedure. Will clear for proposed procedure. The Patient  is at low cardiac risk. Patient is advised to avoid aspirin, antiplatelet, and NSAID therapy one week prior to surgical intervention.    Patient is to continue the recommendations given totomar from their surgeon regarding their medical regimen. No additional changes are made at this time. Patient can follow-up with PCP in office postoperatively for continued medicalmanagement or sooner if any further problems or symptoms arise. A letter is sent to the requesting provider.          Cali Dodge, APRN - CNP

## 2020-07-14 ENCOUNTER — CARE COORDINATION (OUTPATIENT)
Dept: CARE COORDINATION | Age: 55
End: 2020-07-14

## 2020-07-14 ENCOUNTER — PRE-PROCEDURE TELEPHONE (OUTPATIENT)
Dept: PREADMISSION TESTING | Age: 55
End: 2020-07-14

## 2020-07-14 NOTE — CARE COORDINATION
Final attempt for ER/Covid F/U call- left message requesting return call. Episode closed.      Future Appointments   Date Time Provider Rupesh Halli   7/17/2020  9:00 AM SCHEDULE, [address] [place] [address]   8/6/2020 11:30 AM Rufino Salgado MD [facility]   9/22/2020  1:40 PM [facility] Wise [facility]

## 2020-07-17 ENCOUNTER — HOSPITAL ENCOUNTER (OUTPATIENT)
Dept: PREADMISSION TESTING | Age: 55
Setting detail: SPECIMEN
Discharge: HOME | End: 2020-07-21
Payer: MEDICARE

## 2020-07-17 PROCEDURE — U0004 COV-19 TEST NON-CDC HGH THRU: HCPCS

## 2020-07-18 LAB
SARS-COV-2, PCR: NOT DETECTED
SARS-COV-2, RAPID: NORMAL
SARS-COV-2: NORMAL
SOURCE: NORMAL

## 2020-07-22 ENCOUNTER — HOSPITAL ENCOUNTER (OUTPATIENT)
Age: 55
Setting detail: OUTPATIENT SURGERY
Discharge: HOME | End: 2020-07-22
Attending: SURGERY | Admitting: SURGERY
Payer: MEDICARE

## 2020-07-22 ENCOUNTER — ANESTHESIA (OUTPATIENT)
Dept: OPERATING ROOM | Age: 55
End: 2020-07-22
Payer: MEDICARE

## 2020-07-22 ENCOUNTER — ANESTHESIA EVENT (OUTPATIENT)
Dept: OPERATING ROOM | Age: 55
End: 2020-07-22
Payer: MEDICARE

## 2020-07-22 VITALS
OXYGEN SATURATION: 99 % | SYSTOLIC BLOOD PRESSURE: 121 MMHG | RESPIRATION RATE: 8 BRPM | DIASTOLIC BLOOD PRESSURE: 69 MMHG | TEMPERATURE: 97.3 F

## 2020-07-22 VITALS
HEART RATE: 84 BPM | TEMPERATURE: 97.3 F | RESPIRATION RATE: 16 BRPM | BODY MASS INDEX: 30.62 KG/M2 | OXYGEN SATURATION: 89 % | DIASTOLIC BLOOD PRESSURE: 50 MMHG | HEIGHT: 61 IN | WEIGHT: 162.2 LBS | SYSTOLIC BLOOD PRESSURE: 98 MMHG

## 2020-07-22 PROCEDURE — S2900 ROBOTIC SURGICAL SYSTEM: HCPCS | Performed by: SURGERY

## 2020-07-22 PROCEDURE — 2500000003 HC RX 250 WO HCPCS: Performed by: NURSE ANESTHETIST, CERTIFIED REGISTERED

## 2020-07-22 PROCEDURE — 2580000003 HC RX 258: Performed by: SURGERY

## 2020-07-22 PROCEDURE — 6360000002 HC RX W HCPCS: Performed by: SURGERY

## 2020-07-22 PROCEDURE — 2580000003 HC RX 258: Performed by: NURSE ANESTHETIST, CERTIFIED REGISTERED

## 2020-07-22 PROCEDURE — 7100000010 HC PHASE II RECOVERY - FIRST 15 MIN: Performed by: SURGERY

## 2020-07-22 PROCEDURE — 7100000011 HC PHASE II RECOVERY - ADDTL 15 MIN: Performed by: SURGERY

## 2020-07-22 PROCEDURE — 3700000001 HC ADD 15 MINUTES (ANESTHESIA): Performed by: SURGERY

## 2020-07-22 PROCEDURE — 7100000000 HC PACU RECOVERY - FIRST 15 MIN: Performed by: SURGERY

## 2020-07-22 PROCEDURE — 2500000003 HC RX 250 WO HCPCS: Performed by: SURGERY

## 2020-07-22 PROCEDURE — 3600000019 HC SURGERY ROBOT ADDTL 15MIN: Performed by: SURGERY

## 2020-07-22 PROCEDURE — 6370000000 HC RX 637 (ALT 250 FOR IP): Performed by: NURSE ANESTHETIST, CERTIFIED REGISTERED

## 2020-07-22 PROCEDURE — 7100000001 HC PACU RECOVERY - ADDTL 15 MIN: Performed by: SURGERY

## 2020-07-22 PROCEDURE — 49657 PR LAP, RECURRENT INCISIONAL HERNIA REPAIR,INCARCERATED: CPT | Performed by: SURGERY

## 2020-07-22 PROCEDURE — 2709999900 HC NON-CHARGEABLE SUPPLY: Performed by: SURGERY

## 2020-07-22 PROCEDURE — 3700000000 HC ANESTHESIA ATTENDED CARE: Performed by: SURGERY

## 2020-07-22 PROCEDURE — 6360000002 HC RX W HCPCS: Performed by: NURSE ANESTHETIST, CERTIFIED REGISTERED

## 2020-07-22 PROCEDURE — 3600000009 HC SURGERY ROBOT BASE: Performed by: SURGERY

## 2020-07-22 RX ORDER — ONDANSETRON 2 MG/ML
INJECTION INTRAMUSCULAR; INTRAVENOUS PRN
Status: DISCONTINUED | OUTPATIENT
Start: 2020-07-22 | End: 2020-07-22 | Stop reason: SDUPTHER

## 2020-07-22 RX ORDER — MORPHINE SULFATE 2 MG/ML
2 INJECTION, SOLUTION INTRAMUSCULAR; INTRAVENOUS EVERY 5 MIN PRN
Status: DISCONTINUED | OUTPATIENT
Start: 2020-07-22 | End: 2020-07-22 | Stop reason: HOSPADM

## 2020-07-22 RX ORDER — DIPHENHYDRAMINE HYDROCHLORIDE 50 MG/ML
12.5 INJECTION INTRAMUSCULAR; INTRAVENOUS
Status: DISCONTINUED | OUTPATIENT
Start: 2020-07-22 | End: 2020-07-22 | Stop reason: HOSPADM

## 2020-07-22 RX ORDER — BUPIVACAINE HYDROCHLORIDE 2.5 MG/ML
INJECTION, SOLUTION EPIDURAL; INFILTRATION; INTRACAUDAL PRN
Status: DISCONTINUED | OUTPATIENT
Start: 2020-07-22 | End: 2020-07-22 | Stop reason: ALTCHOICE

## 2020-07-22 RX ORDER — FENTANYL CITRATE 50 UG/ML
INJECTION, SOLUTION INTRAMUSCULAR; INTRAVENOUS PRN
Status: DISCONTINUED | OUTPATIENT
Start: 2020-07-22 | End: 2020-07-22 | Stop reason: SDUPTHER

## 2020-07-22 RX ORDER — MEPERIDINE HYDROCHLORIDE 50 MG/ML
12.5 INJECTION INTRAMUSCULAR; INTRAVENOUS; SUBCUTANEOUS EVERY 5 MIN PRN
Status: DISCONTINUED | OUTPATIENT
Start: 2020-07-22 | End: 2020-07-22 | Stop reason: HOSPADM

## 2020-07-22 RX ORDER — VECURONIUM BROMIDE 1 MG/ML
INJECTION, POWDER, LYOPHILIZED, FOR SOLUTION INTRAVENOUS PRN
Status: DISCONTINUED | OUTPATIENT
Start: 2020-07-22 | End: 2020-07-22 | Stop reason: SDUPTHER

## 2020-07-22 RX ORDER — NEOSTIGMINE METHYLSULFATE 1 MG/ML
INJECTION, SOLUTION INTRAVENOUS PRN
Status: DISCONTINUED | OUTPATIENT
Start: 2020-07-22 | End: 2020-07-22 | Stop reason: SDUPTHER

## 2020-07-22 RX ORDER — PROPOFOL 10 MG/ML
INJECTION, EMULSION INTRAVENOUS PRN
Status: DISCONTINUED | OUTPATIENT
Start: 2020-07-22 | End: 2020-07-22 | Stop reason: SDUPTHER

## 2020-07-22 RX ORDER — HYDROCODONE BITARTRATE AND ACETAMINOPHEN 5; 325 MG/1; MG/1
TABLET ORAL PRN
Status: DISCONTINUED | OUTPATIENT
Start: 2020-07-22 | End: 2020-07-22 | Stop reason: SDUPTHER

## 2020-07-22 RX ORDER — DEXAMETHASONE SODIUM PHOSPHATE 10 MG/ML
INJECTION INTRAMUSCULAR; INTRAVENOUS PRN
Status: DISCONTINUED | OUTPATIENT
Start: 2020-07-22 | End: 2020-07-22 | Stop reason: SDUPTHER

## 2020-07-22 RX ORDER — LIDOCAINE HYDROCHLORIDE 20 MG/ML
INJECTION, SOLUTION EPIDURAL; INFILTRATION; INTRACAUDAL; PERINEURAL PRN
Status: DISCONTINUED | OUTPATIENT
Start: 2020-07-22 | End: 2020-07-22 | Stop reason: SDUPTHER

## 2020-07-22 RX ORDER — HYDROCODONE BITARTRATE AND ACETAMINOPHEN 5; 325 MG/1; MG/1
1 TABLET ORAL EVERY 6 HOURS PRN
Qty: 28 TABLET | Refills: 0 | Status: SHIPPED | OUTPATIENT
Start: 2020-07-22 | End: 2020-10-14 | Stop reason: SDUPTHER

## 2020-07-22 RX ORDER — SODIUM CHLORIDE, SODIUM LACTATE, POTASSIUM CHLORIDE, CALCIUM CHLORIDE 600; 310; 30; 20 MG/100ML; MG/100ML; MG/100ML; MG/100ML
INJECTION, SOLUTION INTRAVENOUS CONTINUOUS PRN
Status: DISCONTINUED | OUTPATIENT
Start: 2020-07-22 | End: 2020-07-22 | Stop reason: SDUPTHER

## 2020-07-22 RX ORDER — SODIUM CHLORIDE 0.9 % (FLUSH) 0.9 %
10 SYRINGE (ML) INJECTION PRN
Status: DISCONTINUED | OUTPATIENT
Start: 2020-07-22 | End: 2020-07-22 | Stop reason: HOSPADM

## 2020-07-22 RX ORDER — SODIUM CHLORIDE, SODIUM LACTATE, POTASSIUM CHLORIDE, CALCIUM CHLORIDE 600; 310; 30; 20 MG/100ML; MG/100ML; MG/100ML; MG/100ML
INJECTION, SOLUTION INTRAVENOUS CONTINUOUS
Status: DISCONTINUED | OUTPATIENT
Start: 2020-07-22 | End: 2020-07-22 | Stop reason: HOSPADM

## 2020-07-22 RX ORDER — MORPHINE SULFATE 2 MG/ML
1 INJECTION, SOLUTION INTRAMUSCULAR; INTRAVENOUS EVERY 5 MIN PRN
Status: DISCONTINUED | OUTPATIENT
Start: 2020-07-22 | End: 2020-07-22 | Stop reason: HOSPADM

## 2020-07-22 RX ORDER — SODIUM CHLORIDE 0.9 % (FLUSH) 0.9 %
10 SYRINGE (ML) INJECTION EVERY 12 HOURS SCHEDULED
Status: DISCONTINUED | OUTPATIENT
Start: 2020-07-22 | End: 2020-07-22 | Stop reason: HOSPADM

## 2020-07-22 RX ORDER — ONDANSETRON 2 MG/ML
4 INJECTION INTRAMUSCULAR; INTRAVENOUS
Status: DISCONTINUED | OUTPATIENT
Start: 2020-07-22 | End: 2020-07-22 | Stop reason: HOSPADM

## 2020-07-22 RX ORDER — FENTANYL CITRATE 50 UG/ML
50 INJECTION, SOLUTION INTRAMUSCULAR; INTRAVENOUS EVERY 5 MIN PRN
Status: DISCONTINUED | OUTPATIENT
Start: 2020-07-22 | End: 2020-07-22 | Stop reason: HOSPADM

## 2020-07-22 RX ORDER — FENTANYL CITRATE 50 UG/ML
25 INJECTION, SOLUTION INTRAMUSCULAR; INTRAVENOUS EVERY 5 MIN PRN
Status: DISCONTINUED | OUTPATIENT
Start: 2020-07-22 | End: 2020-07-22 | Stop reason: HOSPADM

## 2020-07-22 RX ORDER — KETOROLAC TROMETHAMINE 30 MG/ML
INJECTION, SOLUTION INTRAMUSCULAR; INTRAVENOUS PRN
Status: DISCONTINUED | OUTPATIENT
Start: 2020-07-22 | End: 2020-07-22 | Stop reason: SDUPTHER

## 2020-07-22 RX ORDER — DIPHENHYDRAMINE HYDROCHLORIDE 50 MG/ML
INJECTION INTRAMUSCULAR; INTRAVENOUS PRN
Status: DISCONTINUED | OUTPATIENT
Start: 2020-07-22 | End: 2020-07-22 | Stop reason: SDUPTHER

## 2020-07-22 RX ORDER — HYDROCODONE BITARTRATE AND ACETAMINOPHEN 5; 325 MG/1; MG/1
2 TABLET ORAL PRN
Status: DISCONTINUED | OUTPATIENT
Start: 2020-07-22 | End: 2020-07-22 | Stop reason: HOSPADM

## 2020-07-22 RX ORDER — MIDAZOLAM HYDROCHLORIDE 1 MG/ML
INJECTION INTRAMUSCULAR; INTRAVENOUS PRN
Status: DISCONTINUED | OUTPATIENT
Start: 2020-07-22 | End: 2020-07-22 | Stop reason: SDUPTHER

## 2020-07-22 RX ORDER — ROCURONIUM BROMIDE 10 MG/ML
INJECTION, SOLUTION INTRAVENOUS PRN
Status: DISCONTINUED | OUTPATIENT
Start: 2020-07-22 | End: 2020-07-22 | Stop reason: SDUPTHER

## 2020-07-22 RX ORDER — GLYCOPYRROLATE 1 MG/5 ML
SYRINGE (ML) INTRAVENOUS PRN
Status: DISCONTINUED | OUTPATIENT
Start: 2020-07-22 | End: 2020-07-22 | Stop reason: SDUPTHER

## 2020-07-22 RX ORDER — HYDROCODONE BITARTRATE AND ACETAMINOPHEN 5; 325 MG/1; MG/1
1 TABLET ORAL PRN
Status: DISCONTINUED | OUTPATIENT
Start: 2020-07-22 | End: 2020-07-22 | Stop reason: HOSPADM

## 2020-07-22 RX ORDER — PHENYLEPHRINE HYDROCHLORIDE 10 MG/ML
INJECTION INTRAVENOUS PRN
Status: DISCONTINUED | OUTPATIENT
Start: 2020-07-22 | End: 2020-07-22 | Stop reason: SDUPTHER

## 2020-07-22 RX ADMIN — PROPOFOL 140 MG: 10 INJECTION, EMULSION INTRAVENOUS at 07:42

## 2020-07-22 RX ADMIN — PHENYLEPHRINE HYDROCHLORIDE 100 MCG: 10 INJECTION INTRAVENOUS at 08:04

## 2020-07-22 RX ADMIN — PHENYLEPHRINE HYDROCHLORIDE 100 MCG: 10 INJECTION INTRAVENOUS at 07:54

## 2020-07-22 RX ADMIN — Medication 0.6 MG: at 09:31

## 2020-07-22 RX ADMIN — LIDOCAINE HYDROCHLORIDE 100 MG: 20 INJECTION, SOLUTION EPIDURAL; INFILTRATION; INTRACAUDAL; PERINEURAL at 07:42

## 2020-07-22 RX ADMIN — FENTANYL CITRATE 50 MCG: 50 INJECTION, SOLUTION INTRAMUSCULAR; INTRAVENOUS at 07:35

## 2020-07-22 RX ADMIN — MIDAZOLAM HYDROCHLORIDE 1 MG: 1 INJECTION, SOLUTION INTRAMUSCULAR; INTRAVENOUS at 07:42

## 2020-07-22 RX ADMIN — DEXAMETHASONE SODIUM PHOSPHATE 10 MG: 10 INJECTION INTRAMUSCULAR; INTRAVENOUS at 07:55

## 2020-07-22 RX ADMIN — VECURONIUM BROMIDE FOR INJECTION 2 MG: 1 INJECTION, POWDER, LYOPHILIZED, FOR SOLUTION INTRAVENOUS at 08:24

## 2020-07-22 RX ADMIN — SODIUM CHLORIDE, POTASSIUM CHLORIDE, SODIUM LACTATE AND CALCIUM CHLORIDE: 600; 310; 30; 20 INJECTION, SOLUTION INTRAVENOUS at 07:09

## 2020-07-22 RX ADMIN — Medication 2 G: at 07:57

## 2020-07-22 RX ADMIN — PHENYLEPHRINE HYDROCHLORIDE 100 MCG: 10 INJECTION INTRAVENOUS at 08:01

## 2020-07-22 RX ADMIN — PHENYLEPHRINE HYDROCHLORIDE 100 MCG: 10 INJECTION INTRAVENOUS at 07:58

## 2020-07-22 RX ADMIN — MIDAZOLAM HYDROCHLORIDE 1 MG: 1 INJECTION, SOLUTION INTRAMUSCULAR; INTRAVENOUS at 07:34

## 2020-07-22 RX ADMIN — SODIUM CHLORIDE, POTASSIUM CHLORIDE, SODIUM LACTATE AND CALCIUM CHLORIDE: 600; 310; 30; 20 INJECTION, SOLUTION INTRAVENOUS at 08:15

## 2020-07-22 RX ADMIN — KETOROLAC TROMETHAMINE 30 MG: 30 INJECTION, SOLUTION INTRAMUSCULAR; INTRAVENOUS at 08:30

## 2020-07-22 RX ADMIN — FENTANYL CITRATE 50 MCG: 50 INJECTION, SOLUTION INTRAMUSCULAR; INTRAVENOUS at 07:42

## 2020-07-22 RX ADMIN — SODIUM CHLORIDE, POTASSIUM CHLORIDE, SODIUM LACTATE AND CALCIUM CHLORIDE: 600; 310; 30; 20 INJECTION, SOLUTION INTRAVENOUS at 08:43

## 2020-07-22 RX ADMIN — ONDANSETRON 4 MG: 2 INJECTION INTRAMUSCULAR; INTRAVENOUS at 07:55

## 2020-07-22 RX ADMIN — DIPHENHYDRAMINE HYDROCHLORIDE 25 MG: 50 INJECTION, SOLUTION INTRAMUSCULAR; INTRAVENOUS at 07:55

## 2020-07-22 RX ADMIN — Medication 3 MG: at 09:31

## 2020-07-22 RX ADMIN — SODIUM CHLORIDE, POTASSIUM CHLORIDE, SODIUM LACTATE AND CALCIUM CHLORIDE: 600; 310; 30; 20 INJECTION, SOLUTION INTRAVENOUS at 07:35

## 2020-07-22 RX ADMIN — HYDROCODONE BITARTRATE AND ACETAMINOPHEN 3 TABLET: 5; 325 TABLET ORAL at 07:04

## 2020-07-22 RX ADMIN — ROCURONIUM BROMIDE 50 MG: 10 INJECTION, SOLUTION INTRAVENOUS at 07:42

## 2020-07-22 ASSESSMENT — PAIN DESCRIPTION - ORIENTATION: ORIENTATION: LEFT

## 2020-07-22 ASSESSMENT — PULMONARY FUNCTION TESTS
PIF_VALUE: 13
PIF_VALUE: 45
PIF_VALUE: 41
PIF_VALUE: 35
PIF_VALUE: 46
PIF_VALUE: 45
PIF_VALUE: 41
PIF_VALUE: 46
PIF_VALUE: 46
PIF_VALUE: 42
PIF_VALUE: 44
PIF_VALUE: 23
PIF_VALUE: 49
PIF_VALUE: 34
PIF_VALUE: 34
PIF_VALUE: 36
PIF_VALUE: 32
PIF_VALUE: 42
PIF_VALUE: 3
PIF_VALUE: 19
PIF_VALUE: 44
PIF_VALUE: 36
PIF_VALUE: 47
PIF_VALUE: 44
PIF_VALUE: 41
PIF_VALUE: 36
PIF_VALUE: 36
PIF_VALUE: 41
PIF_VALUE: 32
PIF_VALUE: 44
PIF_VALUE: 45
PIF_VALUE: 45
PIF_VALUE: 36
PIF_VALUE: 42
PIF_VALUE: 41
PIF_VALUE: 43
PIF_VALUE: 36
PIF_VALUE: 44
PIF_VALUE: 45
PIF_VALUE: 45
PIF_VALUE: 43
PIF_VALUE: 42
PIF_VALUE: 34
PIF_VALUE: 33
PIF_VALUE: 2
PIF_VALUE: 3
PIF_VALUE: 43
PIF_VALUE: 45
PIF_VALUE: 39
PIF_VALUE: 44
PIF_VALUE: 47
PIF_VALUE: 47
PIF_VALUE: 45
PIF_VALUE: 36
PIF_VALUE: 33
PIF_VALUE: 33
PIF_VALUE: 31
PIF_VALUE: 40
PIF_VALUE: 39
PIF_VALUE: 35
PIF_VALUE: 32
PIF_VALUE: 46
PIF_VALUE: 45
PIF_VALUE: 48
PIF_VALUE: 3
PIF_VALUE: 44
PIF_VALUE: 45
PIF_VALUE: 41
PIF_VALUE: 36
PIF_VALUE: 35
PIF_VALUE: 46
PIF_VALUE: 42
PIF_VALUE: 46
PIF_VALUE: 35
PIF_VALUE: 44
PIF_VALUE: 2
PIF_VALUE: 44
PIF_VALUE: 42
PIF_VALUE: 42
PIF_VALUE: 46
PIF_VALUE: 2
PIF_VALUE: 3
PIF_VALUE: 48
PIF_VALUE: 1
PIF_VALUE: 35
PIF_VALUE: 48
PIF_VALUE: 30
PIF_VALUE: 45
PIF_VALUE: 46
PIF_VALUE: 38
PIF_VALUE: 45
PIF_VALUE: 4
PIF_VALUE: 45
PIF_VALUE: 2
PIF_VALUE: 35
PIF_VALUE: 44
PIF_VALUE: 2
PIF_VALUE: 42
PIF_VALUE: 40
PIF_VALUE: 45
PIF_VALUE: 44
PIF_VALUE: 42
PIF_VALUE: 46
PIF_VALUE: 40
PIF_VALUE: 49
PIF_VALUE: 42
PIF_VALUE: 32
PIF_VALUE: 4
PIF_VALUE: 39
PIF_VALUE: 54
PIF_VALUE: 2
PIF_VALUE: 41
PIF_VALUE: 46
PIF_VALUE: 46
PIF_VALUE: 41
PIF_VALUE: 47
PIF_VALUE: 39
PIF_VALUE: 2
PIF_VALUE: 49
PIF_VALUE: 47
PIF_VALUE: 49
PIF_VALUE: 1
PIF_VALUE: 44
PIF_VALUE: 47
PIF_VALUE: 36
PIF_VALUE: 44
PIF_VALUE: 21
PIF_VALUE: 36
PIF_VALUE: 29

## 2020-07-22 ASSESSMENT — PAIN - FUNCTIONAL ASSESSMENT: PAIN_FUNCTIONAL_ASSESSMENT: 0-10

## 2020-07-22 ASSESSMENT — PAIN SCALES - GENERAL
PAINLEVEL_OUTOF10: 0
PAINLEVEL_OUTOF10: 2
PAINLEVEL_OUTOF10: 0

## 2020-07-22 ASSESSMENT — PAIN DESCRIPTION - DESCRIPTORS: DESCRIPTORS: SORE

## 2020-07-22 ASSESSMENT — PAIN DESCRIPTION - PAIN TYPE: TYPE: SURGICAL PAIN

## 2020-07-22 ASSESSMENT — COPD QUESTIONNAIRES: CAT_SEVERITY: MODERATE

## 2020-07-22 ASSESSMENT — LIFESTYLE VARIABLES: SMOKING_STATUS: 1

## 2020-07-22 ASSESSMENT — PAIN DESCRIPTION - LOCATION: LOCATION: ABDOMEN

## 2020-07-22 NOTE — OP NOTE
[address], [address] [place] [address]                                OPERATIVE REPORT    PATIENT NAME: Meghan Gonzalez                    :        1965  MED REC NO:   [id number]                             ROOM:  ACCOUNT NO:   [de-identified]                           ADMIT DATE: 2020  PROVIDER:     Zane Reza    DATE OF PROCEDURE:  2020    PREOPERATIVE DIAGNOSIS:  Second recurrent ventral hernia. POSTOPERATIVE DIAGNOSES:  1. Second recurrent ventral hernia. 2.  Severe adhesions. OPERATION:  1. Laparoscopic robotic-assisted repair of recurrent ventral hernia. 2.  Lysis of adhesions (1.25 hours of the case). SURGEON:  Dr. Zane Reza. ASSISTANT:  Ness Cota. ANESTHESIA:  General anesthesia with endotracheal tube and 1% lidocaine  with epinephrine and 0.25% Marcaine for local.    ESTIMATED BLOOD LOSS:  Less than 50 mL. COMPLICATIONS:  None. SPECIMENS:  None. IMPLANTS:  None. DRAINS:  There was a urinary catheter during the procedure that was  removed. FINDINGS:  There was a mesh that bridged a 6 to 8 cm defect that  appeared intact and it was held in with spiral metal tacks. There were  a lot of adhesions of the omentum and small bowel to the mesh. There  was a small break or hernia in the middle of the mesh inferior to the  overall big bulge or defect. This actually was the hernia since the  mesh was compromised and it measured 1 x 2 cm. This was repaired  primarily with an 0 V-Loc suture. PROCEDURE:  The patient was taken to the OR and placed in a supine  position. The abdomen was prepped and draped in the usual sterile  fashion. The robot trocars were placed under direct vision into the  left abdomen very similar to the previous robotic scars. The abdomen  was then insufflated to 13 mmHg of CO2. The robot was docked. We used  grasper and scissors.   We definitely saw a lot of adhesions when go  forward and fix that bulge after knowing all of this information. Again, it would be a quite complicated procedure compared to where we  are now. Therefore, we decided to stop the operation. We injected  0.25% Marcaine into the abdominal cavity and then we removed the  trocars, undocked the robot, and closed the skin with 4-0 Vicryl  subcuticular suture and Steri-Strips. The patient was taken back to the  recovery room in stable condition.         Jonatan Vela    D: 07/22/2020 10:18:46       T: 07/22/2020 10:26:02     [email or web address]  Job#: [id number]     Doc#: [id number]    CC:  Stanley Jin

## 2020-07-22 NOTE — BRIEF OP NOTE
Brief Postoperative Note      Patient: Adela Ferreira  YOB: 1965  MRN: [id number]    Date of Procedure: 7/22/2020    Pre-Op Diagnosis: 2 nd recurrent ventral hernia    Post-Op Diagnosis: 1.  2nd recurrent  ventral hernia             2.  Severe adhesions  Procedure(s):  Laparoscopic Robotic Assisted repair of recurrent Ventral Hernia   FATEMEH (1.25 hours of the case)    Surgeon(s):  Rufino Salgado MD    Assistant:  * No surgical staff found *    Anesthesia: General    Estimated Blood Loss (mL): less than 50     Complications: None    Specimens:   * No specimens in log *    Implants:  * No implants in log *      Drains:   [REMOVED] Urethral Catheter Double-lumen 16 fr (Removed)       Findings: there was a mesh that bridged a 6 to 8 cm defect. There were severe adhesions of the omentum and SB to the mesh. The mesh appeared intact with metal spiral tacks. There was  A break in the middle of the mesh with a 1 by 2 cm small ventral hernia. This was inferior to the epigastric bulge. It was repaired primarily with 0 v lock suture.     Electronically signed by Gerg Reynolds MD on 7/22/2020 at 9:55 AM

## 2020-07-22 NOTE — ANESTHESIA POSTPROCEDURE EVALUATION
Department of Anesthesiology  Postprocedure Note    Patient: Jude Park  MRN: [id number]  YOB: 1965  Date of evaluation: 7/22/2020  Time:  9:53 AM     Procedure Summary     Date:  07/22/20 Room / Location:  [address] [place] [address]    Anesthesia Start:  1253 Anesthesia Stop:      Procedure:  Laparoscopic Robotic Assisted Ventral Hernia Repair (N/A ) Diagnosis:  (ventral hernia)    Surgeon:  Korey Tafoya MD Responsible Provider:  STAR Quinones CRNA    Anesthesia Type:  general, regional ASA Status:  3          Anesthesia Type: No value filed. Joanna Phase I: Joanna Score: 8    Joanna Phase II:      Last vitals: Reviewed and per EMR flowsheets.        Anesthesia Post Evaluation    Patient location during evaluation: PACU  Patient participation: complete - patient participated  Level of consciousness: awake and alert  Pain score: 0  Airway patency: patent  Nausea & Vomiting: no nausea and no vomiting  Complications: no  Cardiovascular status: blood pressure returned to baseline and hemodynamically stable  Respiratory status: acceptable  Hydration status: euvolemic

## 2020-07-22 NOTE — ANESTHESIA PRE PROCEDURE
Department of Anesthesiology  Preprocedure Note       Name:  eZna Briseno   Age:  48 y.o.  :  1965                                          MRN:  [id number]         Date:  2020      Surgeon: Shashank Amezquita):  Ministerio Cramer MD    Procedure: Procedure(s):  Laparoscopic Robotic Assisted Ventral Hernia Repair (poss open), poss open componenet separation    Medications prior to admission:   Prior to Admission medications    Medication Sig Start Date End Date Taking?  Authorizing Provider   Multiple Vitamins-Minerals (MULTIVITAMIN ADULT PO) Take 1 tablet by mouth daily   Yes Historical Provider, MD   atorvastatin (LIPITOR) 10 MG tablet Take 1 tablet by mouth daily 6/10/20  Yes Nathalia Chris, DO   propranolol (INDERAL) 10 MG tablet Take 10 mg by mouth 3 times daily   Yes Historical Provider, MD   hydrOXYzine (ATARAX) 25 MG tablet Take 25-30 mg by mouth every 6 hours as needed for Anxiety   Yes Historical Provider, MD   lisinopril (PRINIVIL;ZESTRIL) 40 MG tablet Take 1 tablet by mouth daily 20  Yes Nathalia Chris DO   amLODIPine (NORVASC) 10 MG tablet Take 1 tablet by mouth daily 20  Yes Nathalia Chris DO   loratadine (CLARITIN) 10 MG capsule Take 1 capsule by mouth daily 20  Yes Nathalia Chris DO   albuterol sulfate  (90 Base) MCG/ACT inhaler Inhale 1-2 puffs into the lungs every 6 hours as needed for Wheezing or Shortness of Breath 20  Yes Nathalia Chris DO   omeprazole (PRILOSEC) 10 MG delayed release capsule Take 2 capsules by mouth 2 times daily 20  Yes Nathalia Chris DO   fluticasone (FLONASE) 50 MCG/ACT nasal spray 2 sprays by Nasal route daily 20  Yes Dheeraj Baugh, DO   DULoxetine (CYMBALTA) 30 MG extended release capsule Take 30 mg by mouth daily   Yes Historical Provider, MD   DULoxetine (CYMBALTA) 60 MG extended release capsule Take 60 mg by mouth daily   Yes Historical Provider, MD   lamoTRIgine (LAMICTAL) 200 MG tablet Take 200 mg by mouth daily    Yes Historical Provider, MD   levothyroxine (SYNTHROID) 100 MCG tablet Take 100 mcg by mouth Daily   Yes Historical Provider, MD   lidocaine (LIDODERM) 5 % Place 1 patch onto the skin daily 12 hours on, 12 hours off. 6/30/20   Nelsy Villalobos DO   polyethylene glycol (GLYCOLAX) 17 GM/SCOOP powder Take 17 g by mouth daily    Historical Provider, MD       Current medications:    Current Facility-Administered Medications   Medication Dose Route Frequency Provider Last Rate Last Dose    lactated ringers infusion   Intravenous Continuous Jamil Melo  mL/hr at 07/22/20 0709      sodium chloride flush 0.9 % injection 10 mL  10 mL Intravenous 2 times per day Jamil Melo MD        sodium chloride flush 0.9 % injection 10 mL  10 mL Intravenous PRN Jamil Melo MD         Facility-Administered Medications Ordered in Other Encounters   Medication Dose Route Frequency Provider Last Rate Last Dose    HYDROcodone-acetaminophen (NORCO) 5-325 MG per tablet    PRN Rosezena Loosen, APRN - CRNA   3 tablet at 07/22/20 0704    fentaNYL (SUBLIMAZE) injection    PRN Rosezena Loosen, APRN - CRNA   50 mcg at 07/22/20 8503    midazolam (VERSED) injection    PRN Rosezena Loosen, APRN - CRNA   1 mg at 07/22/20 0742    lidocaine PF 2 % injection    PRN Rosezena Loosen, APRN - CRNA   100 mg at 07/22/20 8604    propofol injection    PRN Rosezena Loosen, APRN - CRNA   140 mg at 07/22/20 0742    rocuronium (ZEMURON) injection    PRN Rosezena Loosen, APRN - CRNA   50 mg at 07/22/20 8861    lactated ringers infusion    Continuous PRN Rosezena Loosen, APRN - CRNA        [facility]ron [facility]) injection    PRN Rosezena Loosen, APRN - CRNA   4 mg at 07/22/20 0755    dexamethasone (DECADRON) injection    PRN Rosezena Loosen, APRN - CRNA   10 mg at 07/22/20 0755    diphenhydrAMINE (BENADRYL) injection    PRN Rosezena Loosen, APRN - CRNA   25 mg at 07/22/20 0755    phenylephrine (VAZCULEP) injection    PRN Rosezena Loosen, APRN - CRNA   100 mcg at 07/22/20 6611 Allergies: Allergies   Allergen Reactions    Flomax [Tamsulosin Hcl] Swelling     Swelling of arms; however, also had rash and fever at the same time and was admitted at the time    Morphine Itching and Rash     Rash, itching    Bactrim [Sulfamethoxazole-Trimethoprim] Diarrhea and Nausea Only    Prednisone Other (See Comments)     Emotional changes, depression    Zyprexa [Olanzapine] Other (See Comments)     Made her feel like she wanted to \"jump out of my skin\"       Problem List:    Patient Active Problem List   Diagnosis Code    Severe episode of recurrent major depressive disorder, without psychotic features ([facility] F33.2    Bipolar 2 disorder ([facility] F31.81    [facility] F31.81       Past Medical History:        Diagnosis Date    Anxiety     Breast cancer ([facility] 2014    L side - lumpectomy and radiation; no chemo (was recommended to take Tamoxifen, but with her smoking, she declined due to family h/o CVA already).  Seeing Dr. Marycruz Huber once yearly/    COPD (chronic obstructive pulmonary disease) ([facility]     Depression     Headache(784.0)     History of [facility]ism ([facility]     sober since 1/16/19    Hypertension     Hypothyroidism     Kidney stone     Has had lithotripsy x 1; had ureteral stent placement with removal x 1; had seen Dr. Apolonia Coreas PTSD (post-traumatic stress disorder)        Past Surgical History:        Procedure Laterality Date    APPENDECTOMY  1977    BREAST BIOPSY Right 2015    excisional biopsy - Dr. Yasmine Dejesus Left 2014    breast CA - done at [address]      multiple in her 19's    EYE SURGERY      x 2 in her youth - was \"cross-eyed\"    FRACTURE SURGERY Left     hand    HERNIA REPAIR  01/2020    recurrent incisional hernia repair Dr. Cory Hollis  2006    Dr. Cherelle Dejesus  2015    Dr. 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 Mary Zavala at [facility] - used mesh; had revision in 1/2020       Social History:    Social History     Tobacco Use    Smoking status: Current Every Day Smoker     Packs/day: 1.00     Years: 37.00     Pack years: 37.00     Types: Cigarettes     Start date: 1982    Smokeless tobacco: Never Used    Tobacco comment: Will see PCP when ready to quit. Substance Use Topics    Alcohol use: No                                Ready to quit: Not Answered  Counseling given: Not Answered  Comment: Will see PCP when ready to quit. Vital Signs (Current):   Vitals:    07/22/20 0655   BP: 137/67   Pulse: 110   Resp: 16   Temp: 36.8 °C (98.2 °F)   TempSrc: Temporal   SpO2: 97%   Weight: 162 lb 3.2 oz (73.6 kg)   Height: 5' 1\" (1.549 m)                                              BP Readings from Last 3 Encounters:   07/22/20 137/67   07/22/20 (!) 110/59   07/13/20 116/74       NPO Status: Time of last liquid consumption: 2200                        Time of last solid consumption: 2200                        Date of last liquid consumption: 07/21/20                        Date of last solid food consumption: 07/21/20    BMI:   Wt Readings from Last 3 Encounters:   07/22/20 162 lb 3.2 oz (73.6 kg)   07/13/20 160 lb 6.4 oz (72.8 kg)   07/07/20 159 lb (72.1 kg)     Body mass index is 30.65 kg/m².     CBC:   Lab Results   Component Value Date    WBC 10.7 06/30/2020    RBC 4.38 06/30/2020    HGB 13.2 06/30/2020    HCT 38.1 06/30/2020    MCV 87.0 06/30/2020    RDW 12.9 06/30/2020     06/30/2020       CMP:   Lab Results   Component Value Date     06/30/2020    K 3.7 06/30/2020    CL 97 06/30/2020    CO2 27 06/30/2020    BUN 12 06/30/2020    CREATININE 0.92 06/30/2020    GFRAA >60 06/30/2020    LABGLOM >60 06/30/2020    GLUCOSE 99 06/30/2020    PROT 7.3 06/30/2020    CALCIUM 9.9 06/30/2020    BILITOT 0.35 06/30/2020    ALKPHOS 71 06/30/2020    AST 24 06/30/2020    ALT 20 06/30/2020       POC Tests: No results for input(s): POCGLU, POCNA, POCK, POCCL, POCBUN, POCHEMO, POCHCT in the last 72 hours. Coags: No results found for: PROTIME, INR, APTT    HCG (If Applicable): No results found for: PREGTESTUR, PREGSERUM, HCG, HCGQUANT     ABGs: No results found for: PHART, PO2ART, BRE5ECS, SNX6FCQ, BEART, K9BGOYDB     Type & Screen (If Applicable):  No results found for: LABABO, LABRH    Drug/Infectious Status (If Applicable):  Lab Results   Component Value Date    HEPCAB NONREACTIVE 04/10/2019       COVID-19 Screening (If Applicable):   Lab Results   Component Value Date    COVID19 Not Detected 07/17/2020         Anesthesia Evaluation  Patient summary reviewed and Nursing notes reviewed no history of anesthetic complications:   Airway: Mallampati: II  TM distance: >3 FB   Neck ROM: full  Mouth opening: > = 3 FB Dental: normal exam         Pulmonary:normal exam    (+) COPD: moderate,  current smoker          Patient did not smoke on day of surgery. Cardiovascular:    (+) hypertension:,       ECG reviewed                        Neuro/Psych:   (+) headaches:, psychiatric history: stable with treatmentdepression/anxiety             GI/Hepatic/Renal:             Endo/Other:    (+) hyperthyroidism::., malignancy/cancer. Abdominal:           Vascular:                                        Anesthesia Plan      general and regional     ASA 3     (Discussed possibility of Prolonged ventilation, and TAP blocks. Pt agrees to Proceed.)  Induction: intravenous. MIPS: Postoperative opioids intended and Prophylactic antiemetics administered. Anesthetic plan and risks discussed with patient. Use of blood products discussed with patient whom.    Plan discussed with surgical team.                  STAR Weaver - CRNA   7/22/2020

## 2020-07-22 NOTE — H&P
General Surgery History & Physical  [facility], LPN  Pt Name: Manuel Burgos  MRN: [id number]  YOB: 1965  Date of evaluation: 7/7/2020  Primary Care Physician: Jamee Gongora DO     Patient evaluated at the request of self  Reason for evaluation: ventral hernia,repaired x 2                   SUBJECTIVE:  Chief Complaint:        Chief Complaint   Patient presents with    Hernia       talk surgery for ventral hernia- repeat x 2     History of Present Illness:       Patient had an incisional hernia repair in 2019 and a week current incisional hernia repair in 2020 earlier in the year we saw her on 6/1/2020 for a possible third recurrence. This is difficult to know because we reviewed the CT scan at that time with her. There is definitely bulging of the mesh and some adhesions and some scarring in the subcutaneous tissue. Is difficult to tell if there is a distinct hernia or just bulging of the mesh. We wanted her to lose some weight stop smoking and we would see her in 3 months to reexamine her and consider possibly redoing the hernia repair. She called recently stating that she cannot go on anymore she is having too much pain and bloating. She states that when she is working that after work she will get a mass or bulge in this area of just above the umbilicus it swells and gets very hard and she gets bloated and very tender. She can eat for a while and then it goes down and she feels better. It is unpredictable. She feels pretty good today. Past Medical History   has a past medical history of Anxiety, Breast cancer ([address] COPD (chronic obstructive pulmonary disease) ([address] Depression, Headache(784.0), History of alcoholism ([address] Hypertension, Hypothyroidism, Kidney stone, and PTSD (post-traumatic stress disorder). Past Surgical History   has a past surgical history that includes Eye surgery; tumor removal (1999); Appendectomy (1977); Cholecystectomy (2014);  Tubal ligation (2001); lumbar laminectomy (2006); Dilation and curettage of uterus; Breast lumpectomy (Left, 2014); Breast biopsy (Right, 2015); alcon and bso (cervix removed) (2015); ventral hernia repair (2019); hernia repair (01/2020); and fracture surgery (Left). Family History  family history includes Atrial Fibrillation in her mother; Heart Attack in her maternal grandfather; Heart Failure in her mother; High Blood Pressure in her father and mother; Lupus in her mother; Other in her maternal grandmother; Prostate Cancer in her father; Stroke in her paternal grandfather. Social History  Tobacco use:  reports that she has been smoking cigarettes. She started smoking about 38 years ago. She has a 37.00 pack-year smoking history. She has never used smokeless tobacco.  Alcohol use:  reports no history of alcohol use. Drug use:  reports previous drug use. Medications  Current Medications:   Current Facility-Administered Medications          Current Outpatient Medications   Medication Sig Dispense Refill    cyclobenzaprine (FLEXERIL) 5 MG tablet Take 1 tablet by mouth 2 times daily as needed for Muscle spasms 10 tablet 0    lidocaine (LIDODERM) 5 % Place 1 patch onto the skin daily 12 hours on, 12 hours off.  10 patch 0    atorvastatin (LIPITOR) 10 MG tablet Take 1 tablet by mouth daily 30 tablet 5    propranolol (INDERAL) 10 MG tablet Take 10 mg by mouth 3 times daily        hydrOXYzine (ATARAX) 25 MG tablet Take 25-30 mg by mouth every 6 hours as needed for Anxiety        lisinopril (PRINIVIL;ZESTRIL) 40 MG tablet Take 1 tablet by mouth daily 30 tablet 5    amLODIPine (NORVASC) 10 MG tablet Take 1 tablet by mouth daily 30 tablet 5    loratadine (CLARITIN) 10 MG capsule Take 1 capsule by mouth daily 30 capsule 11    albuterol sulfate  (90 Base) MCG/ACT inhaler Inhale 1-2 puffs into the lungs every 6 hours as needed for Wheezing or Shortness of Breath 1 Inhaler 3    omeprazole (PRILOSEC) 10 MG delayed release capsule Take 2 capsules by mouth 2 times daily 60 capsule 5    fluticasone (FLONASE) 50 MCG/ACT nasal spray 2 sprays by Nasal route daily 1 Bottle 5    DULoxetine (CYMBALTA) 30 MG extended release capsule Take 30 mg by mouth daily        DULoxetine (CYMBALTA) 60 MG extended release capsule Take 60 mg by mouth daily        lamoTRIgine (LAMICTAL) 200 MG tablet Take 200 mg by mouth daily         levothyroxine (SYNTHROID) 100 MCG tablet Take 100 mcg by mouth Daily        polyethylene glycol (GLYCOLAX) 17 GM/SCOOP powder Take 17 g by mouth daily        meloxicam (MOBIC) 7.5 MG tablet Take 1 tablet by mouth daily (Patient not taking: Reported on 7/7/2020) 30 tablet 1      No current facility-administered medications for this visit. Home Medications:   Home Medications           Prior to Admission medications    Medication Sig Start Date End Date Taking?  Authorizing Provider   cyclobenzaprine (FLEXERIL) 5 MG tablet Take 1 tablet by mouth 2 times daily as needed for Muscle spasms 6/30/20 7/10/20 Yes Bentley Valle DO   lidocaine (LIDODERM) 5 % Place 1 patch onto the skin daily 12 hours on, 12 hours off. 6/30/20   Yes Bentley Valle DO   atorvastatin (LIPITOR) 10 MG tablet Take 1 tablet by mouth daily 6/10/20   Yes Luis Angel Chris DO   propranolol (INDERAL) 10 MG tablet Take 10 mg by mouth 3 times daily     Yes Historical Provider, MD   hydrOXYzine (ATARAX) 25 MG tablet Take 25-30 mg by mouth every 6 hours as needed for Anxiety     Yes Historical Provider, MD   lisinopril (PRINIVIL;ZESTRIL) 40 MG tablet Take 1 tablet by mouth daily 5/18/20   Yes Luis Angel Chris DO   amLODIPine (NORVASC) 10 MG tablet Take 1 tablet by mouth daily 5/18/20   Yes Luis Angel Chris DO   loratadine (CLARITIN) 10 MG capsule Take 1 capsule by mouth daily 5/18/20   Yes Luis Angel Chris DO   albuterol sulfate  (90 Base) MCG/ACT inhaler Inhale 1-2 puffs into the lungs every 6 hours as needed for Wheezing or Shortness of Breath 5/18/20   Yes Irma Chris DO   omeprazole (PRILOSEC) 10 MG delayed release capsule Take 2 capsules by mouth 2 times daily 5/18/20   Yes Irma Chris DO   fluticasone [facility]) 50 MCG/ACT nasal spray 2 sprays by Nasal route daily 4/20/20   Yes Jaspreet Watt DO   DULoxetine (CYMBALTA) 30 MG extended release capsule Take 30 mg by mouth daily     Yes Historical Provider, MD   DULoxetine (CYMBALTA) 60 MG extended release capsule Take 60 mg by mouth daily     Yes Historical Provider, MD   lamoTRIgine (LAMICTAL) 200 MG tablet Take 200 mg by mouth daily      Yes Historical Provider, MD   levothyroxine (SYNTHROID) 100 MCG tablet Take 100 mcg by mouth Daily     Yes Historical Provider, MD   polyethylene glycol (GLYCOLAX) 17 GM/SCOOP powder Take 17 g by mouth daily       Historical Provider, MD   meloxicam (MOBIC) 7.5 MG tablet Take 1 tablet by mouth daily  Patient not taking: Reported on 7/7/2020 5/18/20     Irma Chris DO           Allergies  Bactrim [sulfamethoxazole-trimethoprim]; Flomax [tamsulosin hcl]; Morphine; Prednisone; and Zyprexa [olanzapine]        Review of Systems:  General: Denies any fever, chills. HEENT: Denies any diplopia, tinnitus or vertigo. Respiratory: Denies any shortness of breath or cough. Cardiac: Denies any chest pain, palpitations, claudication or edema. Gastrointestinal: Denies any melena, hematochezia, hematemesis or pyrosis. Genitourinary: Denies any frequency, urgency, hesitancy or incontinence. Hematologic: Denies bruising or bleeding easily. Endocrine: Denies any history of diabetes or thyroid disease. PHYSICAL EXAMINATION  Vitals:       Vitals:     07/07/20 1400   BP: 120/72   Pulse: 97   Temp: 98.2 °F (36.8 °C)     General Appearance:  awake, alert, oriented, in no acute distress, well developed, well nourished and in no acute distress  Skin:  Skin color, texture, turgor normal. No rashes or lesions. Head/face:  NCAT  Eyes:  No gross abnormalities. , PERRL, Pupils- PERRL. Ears:  canals and TMs NI and External- normal  Nose/Sinuses:  Nares normal. Septum midline. Mucosa normal. No drainage or sinus tenderness. Mouth/Throat:  Mucosa moist.  No lesions. Pharynx without erythema, edema or exudate. Lungs:  Normal expansion. Clear to auscultation. No rales, rhonchi, or wheezing., Breathing Pattern: regular, no distress, Breath sounds: normal  Heart:  Heart sounds are normal.  Regular rate and rhythm without murmur, gallop or rub. Auscultation: Normal S1 and S2.  Regular rhythm. No murmurs, gallops, or rubs. Abdomen:  Soft, non-tender, normal bowel sounds. No bruits, organomegaly or masses. Musculoskeletal:  negative, negative findings: no erythema, induration, or nodules, ROM of all joints is normal, strength normal  Neurologic:  negative findings: proximal muscle strength normal, speech normal, mental status intact, cranial nerves 2-12 intact, muscle tone normal, muscle strength normal     Hernia:  On examination there is a bulge above the umbilicus. It is about 8 cm x 8 cm. The true defect is difficult to discern. It may be more bulging of the mesh or could be a defect or swelling an old scar tissue or seroma.        LABS:  CBC         Lab Results   Component Value Date     WBC 10.7 06/30/2020     HGB 13.2 06/30/2020     HCT 38.1 06/30/2020      06/30/2020     BMP         Lab Results   Component Value Date      06/30/2020     K 3.7 06/30/2020     CL 97 06/30/2020     CO2 27 06/30/2020     BUN 12 06/30/2020     CREATININE 0.92 06/30/2020     MG 2.0 04/10/2019     LFT's:         Lab Results   Component Value Date     AST 24 06/30/2020     ALT 20 06/30/2020     ALKPHOS 71 06/30/2020     BILITOT 0.35 06/30/2020     BILIDIR 0.10 06/30/2020     AMYLASE 58 06/07/2019     LIPASE 20 06/30/2020     COAGS: No results found for: INR, PTT  Lipids:         Lab Results   Component Value Date     CHOL 259 06/01/2020     HDL 52 06/01/2020     LDLCHOLESTEROL 145 06/01/2020     CHOLHDLRATIO 5.0 06/01/2020     TRIG 308 06/01/2020     VLDL NOT REPORTED 06/01/2020        RADIOLOGY:  All images reviewed and within normal limits unless otherwise specified: Yes     IMPRESSIONS:  1. Recurrent hernia vs adhsions and partial SBO     Patient has had 2 hernia repairs with mesh in the last year. He had laparoscopic and robotic. At this point it looks like there might be recurrent hernia or bulging of the mesh. There is clearly some scar tissue and by history evidence of a hernia. I think we at least need to look into the peritoneal cavity see if there is adhesions a twist of small bowel and see if there truly is a hernia defect. If so then close it. And then decide about repair whether another mesh additional mesh or removal of the old mesh and put in new mesh is indicated. I think this is best accomplished with robotic surgery. We will have a better chance of removing the adhesions and accurately dissecting out the bowel from the hernia and the mesh. I discussed with the patient that this could be a very difficult surgery. It could be hour or 2 or could be 6 to 8 hours depending on how much scar tissue adhesions and difficulty we have in dealing with the mesh. Could be multiple complications including injury to the bowel adhesions fistulous abscesses recurrent hernias infection of the mesh and many more. But I think she has tried to stop smoking and she is not going to get much more. She is down to 10 cigarettes a day. She has lost a few pounds. But she is having ongoing pain and called and said she cannot go on any longer. I think were risking strangulation and/or torturing her by continuing with allowing her to have pain and not addressing the issue.   After stressing the significant complications that could occur in operating on her with a smoking history and her overall weight and medical condition I do think it is reasonable to proceed with surgery because I think she risks strangulation with their very high risk of morbidity and mortality. It is certainly a personal choice and I gave her both options and at this point she would like to proceed with exploration and possible repair of hernia and/or removal of mesh. Surgical Risk: moderate to high risk     PLAN:  1. Second possible recurrent ventral hernia vs adhesions and PSBO         With robotic ventral hernia vs open component surgery                   Medical Decision Making: high complexity      I would start robotically and address the adhesions and see if the bowel can be taken off of the mesh we can get to the actual underside of the anterior abdominal wall. Finger to the peritoneal side of the abdominal wall we can see the exact extent of the hernia or bulging of the mesh. At that time we can decide whether to remove the mesh and/or repair the defect and/or decide if a open component separation should be a better procedure. Thank you for the interesting evaluation. Further recommendations to follow.

## 2020-07-22 NOTE — LETTER
[facility]  OR  [address]  [place] [address] [place]  Phone: [phone]    Dr Pebbles Gordon      July 22, 2020     Patient: Jude Park   YOB: 1965   Date of Visit: 7/22/2020       To Whom It May Concern:    Jude Park had surgery on 7/22/2020 at [facility].  She is to be off work for one week. She may return to work on 7/30/2020. If you have any questions or concerns, please don't hesitate to call.     Sincerely,        Dr New Genera

## 2020-08-06 ENCOUNTER — OFFICE VISIT (OUTPATIENT)
Dept: SURGERY | Age: 55
End: 2020-08-06
Payer: MEDICARE

## 2020-08-06 VITALS
SYSTOLIC BLOOD PRESSURE: 120 MMHG | HEIGHT: 61 IN | DIASTOLIC BLOOD PRESSURE: 80 MMHG | HEART RATE: 84 BPM | TEMPERATURE: 96.4 F | WEIGHT: 161.8 LBS | BODY MASS INDEX: 30.55 KG/M2

## 2020-08-06 PROCEDURE — 99214 OFFICE O/P EST MOD 30 MIN: CPT

## 2020-08-06 PROCEDURE — 99024 POSTOP FOLLOW-UP VISIT: CPT | Performed by: SURGERY

## 2020-08-06 NOTE — PROGRESS NOTES
Patient is here for postop of her robotic hernia repair. She had 2 repairs done at [facility] and she was concerned she had a recurrence. She has a lot of abdominal bloating and pressure and pain in the epigastric area. She was seen by her surgeon at [facility] who did not think she had a recurrent hernia. A CT scan showed a questionable small hernia. We did an exploration and noted that the mesh was in place there was a large defect in the fascia but the mesh was covering it. It was probably 5 x 5 cm. Just inferior to the defect was a hole in the mesh about 2 cm by half a centimeter. Almost like a tear. We close that and then decided not to remove the mesh that there was not a true hernia. We knew we could not close the fascial defect and the mesh was in position just bulging through the defect. I did not want to remove the mesh at this point. We had a long discussion with her she would like to have this repaired and the abdominal wall fixed. However she has trouble to stop smoking and to lose weight. We had advised this on her last visit. At this point we will continue to recommend weight loss and stop smoking I will see her in 4 to 6 weeks.

## 2020-09-17 ENCOUNTER — TELEPHONE (OUTPATIENT)
Dept: SURGERY | Age: 55
End: 2020-09-17

## 2020-09-17 ENCOUNTER — OFFICE VISIT (OUTPATIENT)
Dept: SURGERY | Age: 55
End: 2020-09-17
Payer: MEDICARE

## 2020-09-17 VITALS
WEIGHT: 162 LBS | OXYGEN SATURATION: 97 % | HEART RATE: 94 BPM | SYSTOLIC BLOOD PRESSURE: 139 MMHG | BODY MASS INDEX: 30.58 KG/M2 | DIASTOLIC BLOOD PRESSURE: 76 MMHG | HEIGHT: 61 IN

## 2020-09-17 PROCEDURE — 99215 OFFICE O/P EST HI 40 MIN: CPT | Performed by: SURGERY

## 2020-09-17 PROCEDURE — 99024 POSTOP FOLLOW-UP VISIT: CPT | Performed by: SURGERY

## 2020-09-17 RX ORDER — MULTIVIT-MIN/IRON/FOLIC ACID/K 18-600-40
2000 CAPSULE ORAL DAILY
COMMUNITY
End: 2022-08-30

## 2020-09-17 NOTE — TELEPHONE ENCOUNTER
[facility]         Patient:Marcia L Caroleen Kehr           :1965           Surgical/Procedure Planned: Open Ventral Hernia Repair with Component Separation Technique with implantation with Mesh    Date & Location: 20 @ [facility]       Inpatient  Planned Length of OR: 3-4 hours    Sedation: general        Estimated Cardiac Risk for Non-Cardiac Surgery/Procedure     Low______ Moderate______ High______    Medication Instructions - Clarification needed by this date:       Provider: Dr. Floyd Jenkins of Provider Giving Orders for Medication holds:    _____________________________________________

## 2020-09-17 NOTE — TELEPHONE ENCOUNTER
[facility]    Pre-Operative Evaluation/Consultation    Name:  Osiris Cueva                                         Age:  48 y.o. MRN:  [id number]       :  1965   Date:  2020         Sex: female    There were no encounter diagnoses. Surgeon:  Dr. Bonnie Schumacher  Procedure (Planned):  Open Ventral Hernia Repair with Component Separation Technique with implantation of mesh  Date Scheduled surgery: 10-21-20    Attending : No att. providers found    Primary Physician: Naomi Quiñonez  Cardiologist: None    Type of Anesthesia Requested: General    Patient Medical history: Allergies   Allergen Reactions    Flomax [Tamsulosin Hcl] Swelling     Swelling of arms; however, also had rash and fever at the same time and was admitted at the time    Morphine Itching and Rash     Rash, itching    Bactrim [Sulfamethoxazole-Trimethoprim] Diarrhea and Nausea Only    Prednisone Other (See Comments)     Emotional changes, depression    Zyprexa [Olanzapine] Other (See Comments)     Made her feel like she wanted to \"jump out of my skin\"     Patient Active Problem List   Diagnosis    Severe episode of recurrent major depressive disorder, without psychotic features ([address]    [facility]    Bipolar II disorder ([facility] [facility]    Postoperative pain     Past Medical History:   Diagnosis Date    Anxiety     Breast cancer ([facility] [address]     L side - lumpectomy and radiation; no chemo (was recommended to take Tamoxifen, but with her smoking, she declined due to family h/o CVA already).  Seeing Dr. Anya Fountain once yearly/    COPD (chronic obstructive pulmonary disease) ([facility] [address] [facility]     Depression     Headache(784.0)     History of alcoholism ([address]     sober since 20    Hypertension     Hypothyroidism     Kidney stone     Has had lithotripsy x 1; had ureteral stent placement with removal x 1; had seen Dr. Karine Vo PTSD (post-traumatic stress disorder)      Past Surgical History:   Procedure Laterality Date    APPENDECTOMY  1977    BREAST BIOPSY Right 2015    excisional biopsy - Dr. Diane Hodgkins Left 2014    breast CA - done at [facility]  2014    [address] [facility]      multiple in her 20's    EYE SURGERY      x 2 in her youth - was \"cross-eyed\"    FRACTURE SURGERY Left     hand    HERNIA REPAIR  01/2020    recurrent incisional hernia repair Dr. Lazara Brown N/A 7/22/2020    Laparoscopic Robotic Assisted Ventral Hernia Repair performed by Deidre Whitfield MD at [address]  2006    Dr. Patrina Runner  2015    Dr. Juarez Quesada - done for excessive bleeding after failed ablation    TUBAL LIGATION  2001   [address] 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  2019    Dr. Anibal Urbina at [facility] - used mesh; had revision in 1/2020     Social History     Tobacco Use    Smoking status: Current Every Day Smoker     Packs/day: 0.50     Years: 37.00     Pack years: 18.50     Types: Cigarettes     Start date: 1982    Smokeless tobacco: Never Used    Tobacco comment: Will see PCP when ready to quit. Substance Use Topics    Alcohol use: No    Drug use: Not Currently     Medications:  Current Outpatient Medications   Medication Sig Dispense Refill    Cholecalciferol (VITAMIN D) 50 MCG (2000 UT) CAPS capsule Take 2,000 Units by mouth daily      Multiple Vitamins-Minerals (MULTIVITAMIN ADULT PO) Take 1 tablet by mouth daily      lidocaine (LIDODERM) 5 % Place 1 patch onto the skin daily 12 hours on, 12 hours off.  10 patch 0    atorvastatin (LIPITOR) 10 MG tablet Take 1 tablet by mouth daily 30 tablet 5    polyethylene glycol (GLYCOLAX) 17 GM/SCOOP powder Take 17 g by mouth daily      propranolol (INDERAL) 10 MG tablet Take 10 mg by mouth 3 times daily      hydrOXYzine (ATARAX) 25 MG tablet Take 25-30 mg by mouth every 6 hours as needed for Anxiety      lisinopril (PRINIVIL;ZESTRIL) 40 MG tablet Take 1 tablet by mouth daily 30 tablet 5    amLODIPine (NORVASC) 10 MG tablet Take 1 tablet by mouth daily 30 tablet 5    loratadine (CLARITIN) 10 MG capsule Take 1 capsule by mouth daily 30 capsule 11    albuterol sulfate  (90 Base) MCG/ACT inhaler Inhale 1-2 puffs into the lungs every 6 hours as needed for Wheezing or Shortness of Breath (Patient not taking: Reported on 9/17/2020) 1 Inhaler 3    omeprazole (PRILOSEC) 10 MG delayed release capsule Take 2 capsules by mouth 2 times daily 60 capsule 5    fluticasone (FLONASE) 50 MCG/ACT nasal spray 2 sprays by Nasal route daily 1 Bottle 5    DULoxetine (CYMBALTA) 30 MG extended release capsule Take 30 mg by mouth daily      DULoxetine (CYMBALTA) 60 MG extended release capsule Take 60 mg by mouth daily      lamoTRIgine (LAMICTAL) 200 MG tablet Take 200 mg by mouth daily       levothyroxine (SYNTHROID) 100 MCG tablet Take 100 mcg by mouth Daily       No current facility-administered medications for this visit. Scheduled Meds:  Continuous Infusions:  PRN Meds:. Prior to Admission medications    Medication Sig Start Date End Date Taking?  Authorizing Provider   Cholecalciferol (VITAMIN D) 50 MCG (2000 UT) CAPS capsule Take 2,000 Units by mouth daily    Historical Provider, MD   Multiple Vitamins-Minerals (MULTIVITAMIN ADULT PO) Take 1 tablet by mouth daily    Historical Provider, MD   lidocaine (LIDODERM) 5 % Place 1 patch onto the skin daily 12 hours on, 12 hours off. 6/30/20   Nafisa Cottrell DO   atorvastatin (LIPITOR) 10 MG tablet Take 1 tablet by mouth daily 6/10/20   Dane Chris DO   polyethylene glycol (GLYCOLAX) 17 GM/SCOOP powder Take 17 g by mouth daily    Historical Provider, MD   propranolol (INDERAL) 10 MG tablet Take 10 mg by mouth 3 times daily    Historical Provider, MD   hydrOXYzine (ATARAX) 25 MG tablet Take 25-30 mg by mouth every 6 hours as needed for Anxiety    Historical Provider, MD   lisinopril (PRINIVIL;ZESTRIL) 40 MG tablet Take 1 tablet by mouth daily 5/18/20   Irma Chris, DO   amLODIPine (NORVASC) 10 MG tablet Take 1 tablet by mouth daily 5/18/20   Irma Chris, DO   loratadine (CLARITIN) 10 MG capsule Take 1 capsule by mouth daily 5/18/20   Irma Chris, DO   albuterol sulfate  (90 Base) MCG/ACT inhaler Inhale 1-2 puffs into the lungs every 6 hours as needed for Wheezing or Shortness of Breath  Patient not taking: Reported on 9/17/2020 5/18/20   Irma Chris, DO   omeprazole (PRILOSEC) 10 MG delayed release capsule Take 2 capsules by mouth 2 times daily 5/18/20   Irma Chris, DO   fluticasone [place]) 50 MCG/ACT nasal spray 2 sprays by Nasal route daily 4/20/20   Jaspreet Watt, DO   DULoxetine (CYMBALTA) 30 MG extended release capsule Take 30 mg by mouth daily    Historical Provider, MD   DULoxetine (CYMBALTA) 60 MG extended release capsule Take 60 mg by mouth daily    Historical Provider, MD   lamoTRIgine (LAMICTAL) 200 MG tablet Take 200 mg by mouth daily     Historical Provider, MD   levothyroxine (SYNTHROID) 100 MCG tablet Take 100 mcg by mouth Daily    Historical Provider, MD     Vital Signs (Current) [unfilled]    Weight:   Wt Readings from Last 1 Encounters:   09/17/20 162 lb (73.5 kg)     Height:   Ht Readings from Last 1 Encounters:   09/17/20 5' 1\" (1.549 m)      BMI:  There is no height or weight on file to calculate BMI. Estimated body mass index is 30.61 kg/m² as calculated from the following:    Height as of an earlier encounter on 9/17/20: 5' 1\" (1.549 m). Weight as of an earlier encounter on 9/17/20: 162 lb (73.5 kg). body mass index is unknown because there is no height or weight on file. Cardiac Clearance: None   Medical Clearance:has appt with Dr. Jaki Downs on 9-22-20  Appointment for surgery Clearance scheduled for:9-22-20     Preoperative Testing:   These are the current and completed labs:  CBC:   Lab Results   Component Value Date    WBC 10.7 06/30/2020    RBC 4.38 06/30/2020    HGB 13.2 06/30/2020    HCT 38.1 06/30/2020    MCV 87.0 06/30/2020    RDW 12.9 06/30/2020     06/30/2020     CMP:   Lab Results   Component Value Date     06/30/2020    K 3.7 06/30/2020    CL 97 06/30/2020    CO2 27 06/30/2020    BUN 12 06/30/2020    CREATININE 0.92 06/30/2020    GFRAA >60 06/30/2020    LABGLOM >60 06/30/2020    GLUCOSE 99 06/30/2020    PROT 7.3 06/30/2020    CALCIUM 9.9 06/30/2020    BILITOT 0.35 06/30/2020    ALKPHOS 71 06/30/2020    AST 24 06/30/2020    ALT 20 06/30/2020     POC Tests: No results for input(s): POCGLU, POCNA, POCK, POCCL, POCBUN, POCHEMO, POCHCT in the last 72 hours.   Coags  No results found for: PROTIME, INR, APTT  HCG (If Applicable) No results found for: PREGTESTUR, PREGSERUM, HCG, HCGQUANT   ABGs No results found for: PHART, PO2ART, DDH3CYK, AYT6QGE, BEART, R1GFUDHU   Type & Screen (If Applicable)  No results found for: Arpita Mckeon    Additional ordered pre-operative testing:  []CBC    []ABG      [] BMP   []URINALYSIS   []CMP    []HCG   []COAGS PT/INR  []T&C  []LFTs   []TYPE AND SCREEN    [] EKG  [] Chest X-Ray  [] Other Radiology    [x] Sent to Hospitalist   [x] Sent to Anesthesia for your review: yes   [x] Additional Orders: ordered CMP, CBC, Lipase, and Amylase     Comments:None   Requests: None    Signed: Victorino Holliday /[id number] 2:83 PM

## 2020-09-20 NOTE — PROGRESS NOTES
Negative    Allergy/Immunology:  Hives: Negative  Latex allergy: Negative    Hematology/Lymphatic:  Bleeding Problems: Negative  Blood Clots: Negative  Swollen Lymph Nodes: Negative    Lungs:  Cough: Negative  SOB: Negative  Wheezing:Negative    Cardiovascular:  Chest Pain: Negative  Palpitations:Negative    GI:   Decreased Appetite: Negative  Heartburn: Negative  Dysphagia: Negative  Nausea/Vomiting: Negative  Abdominal Pain: Negative  Change in Bowels:Negative  Constipation: Negative  Diarrhea: Negative  Rectal Bleeding: Negative    :   Dysuria: Negative  Increase Urinary Frequency/Urgency: Negative    Neuro:  Seizures: Negative  Confusion: Negative        PAST MEDICAL HISTORY:        Family History   Problem Relation Age of Onset    High Blood Pressure Mother     Lupus Mother          from CHF secondary to cardiomyopathy from her lupus    Heart Failure Mother     Atrial Fibrillation Mother     High Blood Pressure Father     Prostate Cancer Father         age 54;  11 years later of a \"bladder tumor\" that caused bladder rupture (pt unsure if malignancy)    Other Maternal Grandmother         had \"stomach tumor\"    Heart Attack Maternal Grandfather          at age 40    Stroke Paternal Grandfather         in his 42's; multiple     Social History     Socioeconomic History    Marital status:      Spouse name: Not on file    Number of children: Not on file    Years of education: Not on file    Highest education level: Not on file   Occupational History    Not on file   Social Needs    Financial resource strain: Not on file    Food insecurity     Worry: Not on file     Inability: Not on file    Transportation needs     Medical: Not on file     Non-medical: Not on file   Tobacco Use    Smoking status: Current Every Day Smoker     Packs/day: 0.50     Years: 37.00     Pack years: 18.50     Types: Cigarettes     Start date:     Smokeless tobacco: Never Used    Tobacco comment: Will see PCP when ready to quit.      Substance and Sexual Activity    Alcohol use: No    Drug use: Not Currently    Sexual activity: Not on file   Lifestyle    Physical activity     Days per week: Not on file     Minutes per session: Not on file    Stress: Not on file   Relationships    Social connections     Talks on phone: Not on file     Gets together: Not on file     Attends Bahai service: Not on file     Active member of club or organization: Not on file     Attends meetings of clubs or organizations: Not on file     Relationship status: Not on file    Intimate partner violence     Fear of current or ex partner: Not on file     Emotionally abused: Not on file     Physically abused: Not on file     Forced sexual activity: Not on file   Other Topics Concern    Not on file   Social History Narrative    Not on file     Past Surgical History:   Procedure Laterality Date    [facility] Right 2015    excisional biopsy - Dr. Gwen Arreola Left 2014    breast CA - done at [facility]  2014   [address] [facility]      multiple in her 19's   [address]      x 2 in her youth - was \"cross-eyed\"    FRACTURE SURGERY Left     hand   [address] [facility]  01/2020    recurrent incisional hernia repair Dr. Griselda Velez N/A 7/22/2020    Laparoscopic Robotic Assisted Ventral Hernia Repair performed by Rachel Bach MD at [address]  2006    Dr. Juliana Nguyen  2015    Dr. 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; had revision in 1/2020     Past Medical History:   Diagnosis Date    Anxiety     [facility] 2014    L side - lumpectomy and radiation; no chemo (was recommended to take Tamoxifen, but with her smoking, she declined due to family h/o CVA already). Seeing Dr. Karl Kwong once yearly/    COPD (chronic obstructive [facility] disease) [facility]     Depression     Headache(784.0)     History of alcoholism ([facility] [address] [facility]     sober since 1/16/19    Hypertension     Hypothyroidism     Kidney stone     Has had lithotripsy x 1; had ureteral stent placement with removal x 1; had seen Dr. Tray Hurst PTSD (post-traumatic stress disorder)      Current Outpatient Medications on File Prior to Visit   Medication Sig Dispense Refill    Cholecalciferol (VITAMIN D) 50 MCG (2000 UT) CAPS capsule Take 2,000 Units by mouth daily      Multiple Vitamins-Minerals (MULTIVITAMIN ADULT PO) Take 1 tablet by mouth daily      lidocaine (LIDODERM) 5 % Place 1 patch onto the skin daily 12 hours on, 12 hours off.  10 patch 0    atorvastatin (LIPITOR) 10 MG tablet Take 1 tablet by mouth daily 30 tablet 5    propranolol (INDERAL) 10 MG tablet Take 10 mg by mouth 3 times daily      hydrOXYzine (ATARAX) 25 MG tablet Take 25-30 mg by mouth every 6 hours as needed for Anxiety      lisinopril (PRINIVIL;ZESTRIL) 40 MG tablet Take 1 tablet by mouth daily 30 tablet 5    amLODIPine (NORVASC) 10 MG tablet Take 1 tablet by mouth daily 30 tablet 5    loratadine (CLARITIN) 10 MG capsule Take 1 capsule by mouth daily 30 capsule 11    omeprazole (PRILOSEC) 10 MG delayed release capsule Take 2 capsules by mouth 2 times daily 60 capsule 5    fluticasone (FLONASE) 50 MCG/ACT nasal spray 2 sprays by Nasal route daily 1 Bottle 5    DULoxetine (CYMBALTA) 30 MG extended release capsule Take 30 mg by mouth daily      DULoxetine (CYMBALTA) 60 MG extended release capsule Take 60 mg by mouth daily      lamoTRIgine (LAMICTAL) 200 MG tablet Take 200 mg by mouth daily       levothyroxine (SYNTHROID) 100 MCG tablet Take 100 mcg by mouth Daily      polyethylene glycol (GLYCOLAX) 17 GM/SCOOP powder Take 17 g by mouth daily      albuterol sulfate  (90 Base) MCG/ACT inhaler Inhale 1-2 puffs into the lungs every 6 hours as needed for Wheezing or Shortness of Breath (Patient not taking: Reported on 9/17/2020) 1 Inhaler 3     No current facility-administered medications on file prior to visit. Allergies as of 09/17/2020 - Review Complete 09/17/2020   Allergen Reaction Noted    Flomax [tamsulosin hcl] Swelling 11/23/2017    Morphine Itching and Rash 08/26/2013    Bactrim [sulfamethoxazole-trimethoprim] Diarrhea and Nausea Only 08/26/2013    Prednisone Other (See Comments) 11/23/2017    Zyprexa [olanzapine] Other (See Comments) 11/23/2017           PHYSICAL EXAM:    Blood pressure 139/76, pulse 94, height 5' 1\" (1.549 m), weight 162 lb (73.5 kg), SpO2 97 %, not currently breastfeeding. Gen:  A and O x 3, NAD, well nourished  Eyes:  Sclera non icterus, PERRL  Head:  Normocephalic, non-tender  Neck:  Supple, no adenopathy, thyroid non tender and no masses,no carotid bruits  Lungs:  CTA, symmetrical  Chest:  RRR, no murmurs  Abd:  Soft, NT, ND,   Ext:  No edema, no cyanosis  Psych: reveals appropriate mood, memory and judgment,  Neuro:  Reveals no gross motor or sensory deficits,   Msk:  5/5 strength all 4 extremities, no joint tenderness    Hernia:  There is a large bulge in supra periumbilical / epigastric area. I can feel the edge of the fascia, corresponding to the edge on CT scan. She is tender in this area.;    ASSESS MENT:    1.  I think she is having pain from the large fascial defect and minimally bridging mesh. I think there is a lot of migration and stress on mesh. I think she is or will be at increased risk of complications as the mesh migrates and she has a true hernia defect. I think it is reasonable to go now and try and fix the defect open with possible posterior component separation technique and intramuscular implantation of mesh. Clearly the smoking is a risk factor, but she has cut back significantly and has been very motivated.   I was clear that this could increase dramatically her chance of infections or mesh complication. She understands and would like to proceed. PLAN:    1. Medical clearance. 2.  Then set up for open repair of hernia defect in mid epigastric abd wall with possible posterior component separation technique. 3.  We discussed she would be in the hospital up to a week with several drains, and then would take several months for recovery.   4.  Will try and decrease smoking even more

## 2020-09-22 ENCOUNTER — OFFICE VISIT (OUTPATIENT)
Dept: FAMILY MEDICINE CLINIC | Age: 55
End: 2020-09-22
Payer: MEDICARE

## 2020-09-22 ENCOUNTER — HOSPITAL ENCOUNTER (OUTPATIENT)
Dept: LAB | Age: 55
Discharge: HOME | End: 2020-09-22
Payer: MEDICARE

## 2020-09-22 VITALS
BODY MASS INDEX: 31.15 KG/M2 | SYSTOLIC BLOOD PRESSURE: 102 MMHG | HEIGHT: 61 IN | HEART RATE: 88 BPM | TEMPERATURE: 98.1 F | DIASTOLIC BLOOD PRESSURE: 70 MMHG | WEIGHT: 165 LBS | OXYGEN SATURATION: 97 %

## 2020-09-22 LAB
ABSOLUTE EOS #: 0.58 K/UL (ref 0–0.44)
ABSOLUTE IMMATURE GRANULOCYTE: 0.03 K/UL (ref 0–0.3)
ABSOLUTE LYMPH #: 2.17 K/UL (ref 1.1–3.7)
ABSOLUTE MONO #: 0.54 K/UL (ref 0.1–1.2)
ALBUMIN SERPL-MCNC: 4.7 G/DL (ref 3.5–5.2)
ALBUMIN/GLOBULIN RATIO: 1.8 (ref 1–2.5)
ALP BLD-CCNC: 72 U/L (ref 35–104)
ALT SERPL-CCNC: 17 U/L (ref 5–33)
AMYLASE: 38 U/L (ref 28–100)
ANION GAP SERPL CALCULATED.3IONS-SCNC: 9 MMOL/L (ref 9–17)
AST SERPL-CCNC: 21 U/L
BASOPHILS # BLD: 1 % (ref 0–2)
BASOPHILS ABSOLUTE: 0.09 K/UL (ref 0–0.2)
BILIRUB SERPL-MCNC: 0.28 MG/DL (ref 0.3–1.2)
BUN BLDV-MCNC: 11 MG/DL (ref 6–20)
BUN/CREAT BLD: 12 (ref 9–20)
CALCIUM SERPL-MCNC: 10.1 MG/DL (ref 8.6–10.4)
CHLORIDE BLD-SCNC: 101 MMOL/L (ref 98–107)
CO2: 29 MMOL/L (ref 20–31)
CREAT SERPL-MCNC: 0.89 MG/DL (ref 0.5–0.9)
DIFFERENTIAL TYPE: ABNORMAL
EOSINOPHILS RELATIVE PERCENT: 7 % (ref 1–4)
GFR AFRICAN AMERICAN: >60 ML/MIN
GFR NON-AFRICAN AMERICAN: >60 ML/MIN
GFR SERPL CREATININE-BSD FRML MDRD: ABNORMAL ML/MIN/{1.73_M2}
GFR SERPL CREATININE-BSD FRML MDRD: ABNORMAL ML/MIN/{1.73_M2}
GLUCOSE BLD-MCNC: 104 MG/DL (ref 70–99)
HCT VFR BLD CALC: 39.2 % (ref 36.3–47.1)
HEMOGLOBIN: 13.3 G/DL (ref 11.9–15.1)
IMMATURE GRANULOCYTES: 0 %
LIPASE: 21 U/L (ref 13–60)
LYMPHOCYTES # BLD: 24 % (ref 24–43)
MCH RBC QN AUTO: 30 PG (ref 25.2–33.5)
MCHC RBC AUTO-ENTMCNC: 33.9 G/DL (ref 25.2–33.5)
MCV RBC AUTO: 88.3 FL (ref 82.6–102.9)
MONOCYTES # BLD: 6 % (ref 3–12)
NRBC AUTOMATED: 0 PER 100 WBC
PDW BLD-RTO: 12.9 % (ref 11.8–14.4)
PLATELET # BLD: 248 K/UL (ref 138–453)
PLATELET ESTIMATE: ABNORMAL
PMV BLD AUTO: 8.3 FL (ref 8.1–13.5)
POTASSIUM SERPL-SCNC: 3.8 MMOL/L (ref 3.7–5.3)
RBC # BLD: 4.44 M/UL (ref 3.95–5.11)
RBC # BLD: ABNORMAL 10*6/UL
SEG NEUTROPHILS: 62 % (ref 36–65)
SEGMENTED NEUTROPHILS ABSOLUTE COUNT: 5.5 K/UL (ref 1.5–8.1)
SODIUM BLD-SCNC: 139 MMOL/L (ref 135–144)
TOTAL PROTEIN: 7.3 G/DL (ref 6.4–8.3)
WBC # BLD: 8.9 K/UL (ref 3.5–11.3)
WBC # BLD: ABNORMAL 10*3/UL

## 2020-09-22 PROCEDURE — G8427 DOCREV CUR MEDS BY ELIG CLIN: HCPCS | Performed by: FAMILY MEDICINE

## 2020-09-22 PROCEDURE — 99242 OFF/OP CONSLTJ NEW/EST SF 20: CPT | Performed by: FAMILY MEDICINE

## 2020-09-22 PROCEDURE — 85025 COMPLETE CBC W/AUTO DIFF WBC: CPT

## 2020-09-22 PROCEDURE — 82150 ASSAY OF AMYLASE: CPT

## 2020-09-22 PROCEDURE — 36415 COLL VENOUS BLD VENIPUNCTURE: CPT

## 2020-09-22 PROCEDURE — 83690 ASSAY OF LIPASE: CPT

## 2020-09-22 PROCEDURE — G8417 CALC BMI ABV UP PARAM F/U: HCPCS | Performed by: FAMILY MEDICINE

## 2020-09-22 PROCEDURE — 80053 COMPREHEN METABOLIC PANEL: CPT

## 2020-09-22 PROCEDURE — 99212 OFFICE O/P EST SF 10 MIN: CPT

## 2020-09-22 RX ORDER — CYCLOBENZAPRINE HCL 5 MG
5-10 TABLET ORAL 3 TIMES DAILY PRN
Qty: 20 TABLET | Refills: 0 | Status: SHIPPED | OUTPATIENT
Start: 2020-09-22 | End: 2020-11-13

## 2020-09-22 ASSESSMENT — ENCOUNTER SYMPTOMS
SHORTNESS OF BREATH: 0
BLOOD IN STOOL: 0
ABDOMINAL PAIN: 1
NAUSEA: 1
VOMITING: 1
COUGH: 0

## 2020-09-22 NOTE — PROGRESS NOTES
[facility] [place] [address]  [address], [address] [place]  [phone]      Beto Cochran is a 48 y.o. female who presents today for her medical conditions/complaints as noted below. Beto Cochran is c/o of Pre-op Exam (last ekg 6-20-20-  Surgery is 10-21-20)      HPI:     Pt here today for pre-op clearance. Pt will be having open ventral hernia repair with component separation technique with implantation with mesh with Dr. Miller Pedraza on 10/21/20. Pt aware that she will likely be admitted afterward for 5-7 days. No h/o anesthesia reaction. No family h/o malignant hyperthermia. No h/o blood transfusion. No h/o CVA, MI, CHF, cardiac arrhythmia, renal failure, DVT/PE, or DM. Pt was already instructed to stop all vitamins and any ASA/NSAID's 7 days prior to surgery. Has already had normal EKG from ER visit on 6/30/20; had normal CXR on 7/7/20. Has lab orders already from Dr. Miller Pedraza that she plans to do later this afternoon. Pt is having more low back pain into both hips - sometimes gets a \"spasm\" that makes it hard to even sit. Uses Ibuprofen, heat, and Lidocaine patches. Knows that it is from bending/stooping and standing so much at work; also knows that it is from her increased abdominal distention. Has used Flexeril and Zanaflex in the past for neck pain - wondering if she can get a refill of one of these to try for her back. Taking Synthroid 100 mcg daily, first thing in the morning, on an empty stomach. Last thyroid levels were normal on 6/1/20.     Taking Lisinopril 40 mg nightly and Norvasc 10 mg daily for HTN - stable. BP well-controlled today - 102/70. Past Medical History:   Diagnosis Date    Anxiety     Breast cancer ([address] 2014    L side - lumpectomy and radiation; no chemo (was recommended to take Tamoxifen, but with her smoking, she declined due to family h/o CVA already).  Seeing Dr. Justice Castro once yearly/    COPD (chronic obstructive Types: Cigarettes     Start date: 19    Smokeless tobacco: Never Used    Tobacco comment: Will see PCP when ready to quit. Substance Use Topics    Alcohol use: No      Current Outpatient Medications   Medication Sig Dispense Refill    cyclobenzaprine (FLEXERIL) 5 MG tablet Take 1-2 tablets by mouth 3 times daily as needed for Muscle spasms 20 tablet 0    Cholecalciferol (VITAMIN D) 50 MCG (2000 UT) CAPS capsule Take 2,000 Units by mouth daily      Multiple Vitamins-Minerals (MULTIVITAMIN ADULT PO) Take 1 tablet by mouth daily      lidocaine (LIDODERM) 5 % Place 1 patch onto the skin daily 12 hours on, 12 hours off.  10 patch 0    atorvastatin (LIPITOR) 10 MG tablet Take 1 tablet by mouth daily 30 tablet 5    propranolol (INDERAL) 10 MG tablet Take 10 mg by mouth 3 times daily      hydrOXYzine (ATARAX) 25 MG tablet Take 25-30 mg by mouth every 6 hours as needed for Anxiety      lisinopril (PRINIVIL;ZESTRIL) 40 MG tablet Take 1 tablet by mouth daily 30 tablet 5    amLODIPine (NORVASC) 10 MG tablet Take 1 tablet by mouth daily 30 tablet 5    loratadine (CLARITIN) 10 MG capsule Take 1 capsule by mouth daily 30 capsule 11    albuterol sulfate  (90 Base) MCG/ACT inhaler Inhale 1-2 puffs into the lungs every 6 hours as needed for Wheezing or Shortness of Breath 1 Inhaler 3    omeprazole (PRILOSEC) 10 MG delayed release capsule Take 2 capsules by mouth 2 times daily 60 capsule 5    fluticasone (FLONASE) 50 MCG/ACT nasal spray 2 sprays by Nasal route daily 1 Bottle 5    DULoxetine (CYMBALTA) 30 MG extended release capsule Take 30 mg by mouth daily      DULoxetine (CYMBALTA) 60 MG extended release capsule Take 60 mg by mouth daily      lamoTRIgine (LAMICTAL) 200 MG tablet Take 200 mg by mouth daily       levothyroxine (SYNTHROID) 100 MCG tablet Take 100 mcg by mouth Daily      HYDROcodone-acetaminophen (NORCO) 5-325 MG per tablet Take 1 tablet by mouth every 8 hours as needed for Pain for up to 7 days. Take lowest dose possible to manage pain 15 tablet 0    ondansetron (ZOFRAN) 4 MG tablet Take 1 tablet by mouth every 8 hours as needed for Nausea or Vomiting 20 tablet 0     No current facility-administered medications for this visit. Allergies   Allergen Reactions    Flomax [Tamsulosin Hcl] Swelling     Swelling of arms; however, also had rash and fever at the same time and was admitted at the time    Morphine Itching and Rash     Rash, itching    Bactrim [Sulfamethoxazole-Trimethoprim] Diarrhea and Nausea Only    Prednisone Other (See Comments)     Emotional changes, depression    Zyprexa [Olanzapine] Other (See Comments)     Made her feel like she wanted to \"jump out of my skin\"       Health Maintenance   Topic Date Due    Breast cancer screen  03/26/2006    Diabetes screen  03/26/2006    Colon cancer screen colonoscopy  03/26/2016    DTaP/Tdap/Td vaccine (1 - Tdap) 05/18/2021 (Originally 3/26/1985)    Shingles Vaccine (1 of 2) 05/18/2021 (Originally 3/26/2016)    Pneumococcal 0-64 years Vaccine (1 of 1 - PPSV23) 09/13/2021 (Originally 3/26/1972)    Flu vaccine (1) 09/22/2021 (Originally 9/1/2020)    Lipid screen  06/01/2021    Potassium monitoring  10/16/2021    Creatinine monitoring  10/16/2021    HIV screen  Completed    Hepatitis A vaccine  Aged Out    Hepatitis B vaccine  Aged Out    Hib vaccine  Aged Out    Meningococcal (ACWY) vaccine  Aged Out       Subjective:      Review of Systems   Constitutional: Positive for fatigue (improving since starting on Vit D daily). Negative for fever. Respiratory: Negative for cough and shortness of breath. Cardiovascular: Negative for chest pain, palpitations and leg swelling. Gastrointestinal: Positive for abdominal pain (due to hernia/separation), nausea and vomiting (a couple episodes overnight). Negative for blood in stool. Genitourinary: Negative for dysuria and hematuria. Skin: Negative for rash and wound. Neurological: Negative for dizziness and light-headedness. Psychiatric/Behavioral: Positive for dysphoric mood (stable). Negative for suicidal ideas. The patient is nervous/anxious (stable). Objective:     Vitals:    09/22/20 1404   BP: 102/70   Pulse: 88   Temp: 98.1 °F (36.7 °C)   TempSrc: Tympanic   SpO2: 97%   Weight: 165 lb (74.8 kg)   Height: 5' 1\" (1.549 m)     Physical Exam  Vitals signs and nursing note reviewed. Constitutional:       General: She is not in acute distress. Appearance: She is well-developed. HENT:      Head: Normocephalic and atraumatic. Right Ear: Tympanic membrane, ear canal and external ear normal.      Left Ear: Tympanic membrane, ear canal and external ear normal.      Nose: Nose normal.      Mouth/Throat:      Mouth: Mucous membranes are moist.      Pharynx: Oropharynx is clear. No posterior oropharyngeal erythema. Eyes:      Conjunctiva/sclera: Conjunctivae normal.   Neck:      Musculoskeletal: Neck supple. Cardiovascular:      Rate and Rhythm: Normal rate and regular rhythm. Heart sounds: Normal heart sounds. Pulmonary:      Effort: Pulmonary effort is normal. No respiratory distress. Breath sounds: Normal breath sounds. Abdominal:      General: Bowel sounds are normal. There is no distension. Palpations: Abdomen is soft. Tenderness: There is abdominal tenderness (mild). Hernia: A hernia is present. Hernia is present in the ventral area. Skin:     General: Skin is warm and dry. Neurological:      Mental Status: She is alert and oriented to person, place, and time. Assessment:       Diagnosis Orders   1. Ventral hernia without obstruction or gangrene     2. Pre-op exam     3. Bilateral low back pain without sciatica, unspecified chronicity  cyclobenzaprine (FLEXERIL) 5 MG tablet   4. Hypothyroidism, unspecified type     5. Essential hypertension           Plan:      No concerns on exam today.   Pt is considered low-risk for planned procedure. Will complete the rest of her pre-operative testing with labwork later today. Pt will take Norvasc and Synthroid the morning of surgery with a tiny sip of water. Declined flu vaccine today; also declined Tdap booster, Pneumovax, and Shingrix vaccines. Return in about 6 months (around 3/22/2021) for follow-up HTN, thyroid. Orders Placed This Encounter   Medications    cyclobenzaprine (FLEXERIL) 5 MG tablet     Sig: Take 1-2 tablets by mouth 3 times daily as needed for Muscle spasms     Dispense:  20 tablet     Refill:  0       Patient given educational materials - see patient instructions. Discussed use, benefit, and side effects of prescribed medications. All patient questions answered. Pt voiced understanding. Reviewed health maintenance.             Electronically signed by Jerie Osgood, DO on 10/18/2020 at 11:01 PM

## 2020-09-29 NOTE — TELEPHONE ENCOUNTER
Debi Gonzalez saw you on 9/22/2020 for a visit. Has the patient been medically cleared for her procedure with Dr. Alexa Ortiz on 10/21/2020?

## 2020-10-05 NOTE — TELEPHONE ENCOUNTER
Received call from patient to go over lab results. Informed her Dr. Clyde Pop said her labs were okay for surgery. Marleen Bell states Dr. Maria Esther Bruno cleared her for surgery as well. She denies any questions or concerns at this time.

## 2020-10-07 ENCOUNTER — TELEPHONE (OUTPATIENT)
Dept: SURGERY | Age: 55
End: 2020-10-07

## 2020-10-07 ENCOUNTER — TELEPHONE (OUTPATIENT)
Dept: PREADMISSION TESTING | Age: 55
End: 2020-10-07

## 2020-10-07 NOTE — LETTER
[address]  Gen Surg A department of [facility]  Phone: [phone]  Fax: [phone]    Marianna Amador MD        October 13, 2020     Patient: Vannesa Perea   YOB: 1965   Date of Visit: 10/7/2020       To Whom It May Concern: It is my medical opinion that Vannesa Perea should be excused from work starting 10/13/20 due to painful hernia. She is scheduled for hernia repair on 10/21/20. She will remain off work for 4-8 weeks post op depending on her recovery. If you have any questions or concerns, please don't hesitate to call.     Sincerely,        Marianna Amador MD

## 2020-10-07 NOTE — TELEPHONE ENCOUNTER
Patient is scheduled for ventral hernia repair on 10/21/20. She states that she is miserable on the days she works. She is asking for an off work slip starting now. She is a  at [employer]. She works 20 hours a week.

## 2020-10-08 ENCOUNTER — TELEPHONE (OUTPATIENT)
Dept: PREADMISSION TESTING | Age: 55
End: 2020-10-08

## 2020-10-09 ENCOUNTER — PRE-PROCEDURE TELEPHONE (OUTPATIENT)
Dept: PREADMISSION TESTING | Age: 55
End: 2020-10-09

## 2020-10-12 ENCOUNTER — HOSPITAL ENCOUNTER (EMERGENCY)
Age: 55
Discharge: HOME | End: 2020-10-12
Attending: EMERGENCY MEDICINE
Payer: MEDICARE

## 2020-10-12 ENCOUNTER — TELEPHONE (OUTPATIENT)
Dept: SURGERY | Age: 55
End: 2020-10-12

## 2020-10-12 ENCOUNTER — APPOINTMENT (OUTPATIENT)
Dept: CT IMAGING | Age: 55
End: 2020-10-12
Payer: MEDICARE

## 2020-10-12 VITALS
OXYGEN SATURATION: 95 % | BODY MASS INDEX: 30.21 KG/M2 | DIASTOLIC BLOOD PRESSURE: 71 MMHG | HEIGHT: 61 IN | TEMPERATURE: 97.9 F | WEIGHT: 160 LBS | HEART RATE: 95 BPM | RESPIRATION RATE: 20 BRPM | SYSTOLIC BLOOD PRESSURE: 139 MMHG

## 2020-10-12 LAB
-: NORMAL
ABSOLUTE EOS #: 0.38 K/UL (ref 0–0.44)
ABSOLUTE IMMATURE GRANULOCYTE: 0.03 K/UL (ref 0–0.3)
ABSOLUTE LYMPH #: 2.82 K/UL (ref 1.1–3.7)
ABSOLUTE MONO #: 0.57 K/UL (ref 0.1–1.2)
ALBUMIN SERPL-MCNC: 4.5 G/DL (ref 3.5–5.2)
ALBUMIN/GLOBULIN RATIO: 2 (ref 1–2.5)
ALP BLD-CCNC: 76 U/L (ref 35–104)
ALT SERPL-CCNC: 23 U/L (ref 5–33)
AMORPHOUS: NORMAL
ANION GAP SERPL CALCULATED.3IONS-SCNC: 10 MMOL/L (ref 9–17)
AST SERPL-CCNC: 20 U/L
BACTERIA: NORMAL
BASOPHILS # BLD: 1 % (ref 0–2)
BASOPHILS ABSOLUTE: 0.07 K/UL (ref 0–0.2)
BILIRUB SERPL-MCNC: 0.27 MG/DL (ref 0.3–1.2)
BILIRUBIN URINE: NEGATIVE
BUN BLDV-MCNC: 13 MG/DL (ref 6–20)
BUN/CREAT BLD: 10 (ref 9–20)
CALCIUM SERPL-MCNC: 10 MG/DL (ref 8.6–10.4)
CASTS UA: NORMAL /LPF (ref 0–2)
CHLORIDE BLD-SCNC: 104 MMOL/L (ref 98–107)
CO2: 25 MMOL/L (ref 20–31)
COLOR: NORMAL
COMMENT UA: NORMAL
CREAT SERPL-MCNC: 1.24 MG/DL (ref 0.5–0.9)
CRYSTALS, UA: NORMAL /HPF
DIFFERENTIAL TYPE: ABNORMAL
EOSINOPHILS RELATIVE PERCENT: 5 % (ref 1–4)
EPITHELIAL CELLS UA: NORMAL /HPF (ref 0–5)
GFR AFRICAN AMERICAN: 55 ML/MIN
GFR NON-AFRICAN AMERICAN: 45 ML/MIN
GFR SERPL CREATININE-BSD FRML MDRD: ABNORMAL ML/MIN/{1.73_M2}
GFR SERPL CREATININE-BSD FRML MDRD: ABNORMAL ML/MIN/{1.73_M2}
GLUCOSE BLD-MCNC: 101 MG/DL (ref 70–99)
GLUCOSE URINE: NEGATIVE
HCT VFR BLD CALC: 36.3 % (ref 36.3–47.1)
HEMOGLOBIN: 12.4 G/DL (ref 11.9–15.1)
IMMATURE GRANULOCYTES: 0 %
KETONES, URINE: NEGATIVE
LEUKOCYTE ESTERASE, URINE: NEGATIVE
LIPASE: 23 U/L (ref 13–60)
LYMPHOCYTES # BLD: 33 % (ref 24–43)
MCH RBC QN AUTO: 30.1 PG (ref 25.2–33.5)
MCHC RBC AUTO-ENTMCNC: 34.2 G/DL (ref 25.2–33.5)
MCV RBC AUTO: 88.1 FL (ref 82.6–102.9)
MONOCYTES # BLD: 7 % (ref 3–12)
MUCUS: NORMAL
NITRITE, URINE: NEGATIVE
NRBC AUTOMATED: 0 PER 100 WBC
OTHER OBSERVATIONS UA: NORMAL
PDW BLD-RTO: 12.7 % (ref 11.8–14.4)
PH UA: 6 (ref 5–6)
PLATELET # BLD: 238 K/UL (ref 138–453)
PLATELET ESTIMATE: ABNORMAL
PMV BLD AUTO: 8.3 FL (ref 8.1–13.5)
POTASSIUM SERPL-SCNC: 3.6 MMOL/L (ref 3.7–5.3)
PROTEIN UA: NEGATIVE
RBC # BLD: 4.12 M/UL (ref 3.95–5.11)
RBC # BLD: ABNORMAL 10*6/UL
RBC UA: NORMAL /HPF (ref 0–4)
RENAL EPITHELIAL, UA: NORMAL /HPF
SEG NEUTROPHILS: 54 % (ref 36–65)
SEGMENTED NEUTROPHILS ABSOLUTE COUNT: 4.65 K/UL (ref 1.5–8.1)
SODIUM BLD-SCNC: 139 MMOL/L (ref 135–144)
SPECIFIC GRAVITY UA: 1.02 (ref 1.01–1.02)
TOTAL PROTEIN: 6.7 G/DL (ref 6.4–8.3)
TRICHOMONAS: NORMAL
TURBIDITY: NORMAL
URINE HGB: NEGATIVE
UROBILINOGEN, URINE: NORMAL
WBC # BLD: 8.5 K/UL (ref 3.5–11.3)
WBC # BLD: ABNORMAL 10*3/UL
WBC UA: NORMAL /HPF (ref 0–4)
YEAST: NORMAL

## 2020-10-12 PROCEDURE — 96374 THER/PROPH/DIAG INJ IV PUSH: CPT

## 2020-10-12 PROCEDURE — 74176 CT ABD & PELVIS W/O CONTRAST: CPT

## 2020-10-12 PROCEDURE — 80053 COMPREHEN METABOLIC PANEL: CPT

## 2020-10-12 PROCEDURE — 99285 EMERGENCY DEPT VISIT HI MDM: CPT

## 2020-10-12 PROCEDURE — 81001 URINALYSIS AUTO W/SCOPE: CPT

## 2020-10-12 PROCEDURE — 85025 COMPLETE CBC W/AUTO DIFF WBC: CPT

## 2020-10-12 PROCEDURE — 6360000002 HC RX W HCPCS: Performed by: EMERGENCY MEDICINE

## 2020-10-12 PROCEDURE — 99284 EMERGENCY DEPT VISIT MOD MDM: CPT

## 2020-10-12 PROCEDURE — 83690 ASSAY OF LIPASE: CPT

## 2020-10-12 PROCEDURE — 36415 COLL VENOUS BLD VENIPUNCTURE: CPT

## 2020-10-12 PROCEDURE — 96375 TX/PRO/DX INJ NEW DRUG ADDON: CPT

## 2020-10-12 RX ORDER — KETOROLAC TROMETHAMINE 30 MG/ML
30 INJECTION, SOLUTION INTRAMUSCULAR; INTRAVENOUS ONCE
Status: COMPLETED | OUTPATIENT
Start: 2020-10-12 | End: 2020-10-12

## 2020-10-12 RX ORDER — ONDANSETRON 2 MG/ML
4 INJECTION INTRAMUSCULAR; INTRAVENOUS ONCE
Status: COMPLETED | OUTPATIENT
Start: 2020-10-12 | End: 2020-10-12

## 2020-10-12 RX ORDER — FENTANYL CITRATE 50 UG/ML
25 INJECTION, SOLUTION INTRAMUSCULAR; INTRAVENOUS ONCE
Status: COMPLETED | OUTPATIENT
Start: 2020-10-12 | End: 2020-10-12

## 2020-10-12 RX ADMIN — KETOROLAC TROMETHAMINE 30 MG: 30 INJECTION, SOLUTION INTRAMUSCULAR at 22:20

## 2020-10-12 RX ADMIN — FENTANYL CITRATE 25 MCG: 50 INJECTION, SOLUTION INTRAMUSCULAR; INTRAVENOUS at 21:38

## 2020-10-12 RX ADMIN — ONDANSETRON 4 MG: 2 INJECTION INTRAMUSCULAR; INTRAVENOUS at 21:39

## 2020-10-12 ASSESSMENT — PAIN DESCRIPTION - PAIN TYPE: TYPE: ACUTE PAIN

## 2020-10-12 ASSESSMENT — PAIN SCALES - GENERAL
PAINLEVEL_OUTOF10: 3
PAINLEVEL_OUTOF10: 7

## 2020-10-12 ASSESSMENT — PAIN - FUNCTIONAL ASSESSMENT: PAIN_FUNCTIONAL_ASSESSMENT: 0-10

## 2020-10-12 ASSESSMENT — PAIN DESCRIPTION - LOCATION: LOCATION: ABDOMEN;BACK

## 2020-10-12 ASSESSMENT — PAIN DESCRIPTION - DESCRIPTORS: DESCRIPTORS: BURNING;THROBBING

## 2020-10-12 ASSESSMENT — PAIN DESCRIPTION - FREQUENCY: FREQUENCY: CONTINUOUS

## 2020-10-12 ASSESSMENT — PAIN DESCRIPTION - ORIENTATION: ORIENTATION: MID

## 2020-10-12 NOTE — TELEPHONE ENCOUNTER
10/12/2020 Patient called today inquiring if Dr Miller Pedraza will let her be off work prior to her procedure on 10/21/20. Writer did see where Dr Miller Pedraza did address this telephone encounter and sent it to his nurse but closed the encounter. Patient states she would like to be off starting 10/13/2020. Please call her [phone] Tuesday so she knows when she can  this OFF WORK slip.

## 2020-10-13 ENCOUNTER — CARE COORDINATION (OUTPATIENT)
Dept: CARE COORDINATION | Age: 55
End: 2020-10-13

## 2020-10-13 ENCOUNTER — TELEPHONE (OUTPATIENT)
Dept: FAMILY MEDICINE CLINIC | Age: 55
End: 2020-10-13

## 2020-10-13 NOTE — ED PROVIDER NOTES
eMERGENCY dEPARTMENT eNCOUnter      Pt Name: Darryl Corrigan  MRN: [id number]  [place] 1965  Date of evaluation: 10/12/2020      CHIEF COMPLAINT       Chief Complaint   Patient presents with    Abdominal Pain         HISTORY OF PRESENT ILLNESS    Darryl Corrigan is a 48 y.o. female who presents with abdominal pain. Patient states she has a known problem with either possible hernia. There for something that she is following up with Dr. Misti Santamaria for. States today was a lot worse. She complains of sharp pain. Umbilical no radiation of pain. No nausea no vomiting. No fevers no chills. No exacerbating or relieving factors        REVIEW OF SYSTEMS       Review of systems are all reviewed and negative except stated above in HPI     [address]    has a past medical history of Anxiety, Breast cancer ([facility] [address] COPD (chronic obstructive pulmonary disease) ([facility] [address] Depression, Headache(784.0), History of alcoholism ([facility] [address] Hypertension, Hypothyroidism, Kidney stone, and PTSD (post-traumatic stress disorder). SURGICAL HISTORY      has a past surgical history that includes Eye surgery; tumor removal (1999); Appendectomy (1977); Cholecystectomy (2014); Tubal ligation (2001); lumbar laminectomy (2006); Dilation and curettage of uterus; Breast lumpectomy (Left, 2014); Breast biopsy (Right, 2015); alcon and bso (cervix removed) (2015); ventral hernia repair (2019); hernia repair (01/2020); fracture surgery (Left); and hernia repair (N/A, 7/22/2020).     CURRENT MEDICATIONS       Discharge Medication List as of 10/12/2020 10:42 PM      CONTINUE these medications which have NOT CHANGED    Details   cyclobenzaprine (FLEXERIL) 5 MG tablet Take 1-2 tablets by mouth 3 times daily as needed for Muscle spasms, Disp-20 tablet,R-0Normal      Cholecalciferol (VITAMIN D) 50 MCG (2000 UT) CAPS capsule Take 2,000 Units by mouth dailyHistorical Med      Multiple Vitamins-Minerals (MULTIVITAMIN ADULT PO) Take 1 tablet by mouth dailyHistorical Med      lidocaine (LIDODERM) 5 % Place 1 patch onto the skin daily 12 hours on, 12 hours off., Disp-10 patch, R-0Print      atorvastatin (LIPITOR) 10 MG tablet Take 1 tablet by mouth daily, Disp-30 tablet, R-5Normal      propranolol (INDERAL) 10 MG tablet Take 10 mg by mouth 3 times dailyHistorical Med      hydrOXYzine (ATARAX) 25 MG tablet Take 25-30 mg by mouth every 6 hours as needed for AnxietyHistorical Med      lisinopril (PRINIVIL;ZESTRIL) 40 MG tablet Take 1 tablet by mouth daily, Disp-30 tablet, R-5Normal      amLODIPine (NORVASC) 10 MG tablet Take 1 tablet by mouth daily, Disp-30 tablet, R-5Normal      loratadine (CLARITIN) 10 MG capsule Take 1 capsule by mouth daily, Disp-30 capsule, R-11Normal      albuterol sulfate  (90 Base) MCG/ACT inhaler Inhale 1-2 puffs into the lungs every 6 hours as needed for Wheezing or Shortness of Breath, Disp-1 Inhaler, R-3Normal      omeprazole (PRILOSEC) 10 MG delayed release capsule Take 2 capsules by mouth 2 times daily, Disp-60 capsule, R-5Normal      fluticasone (FLONASE) 50 MCG/ACT nasal spray 2 sprays by Nasal route daily, Disp-1 Bottle, R-5Normal      !! DULoxetine (CYMBALTA) 30 MG extended release capsule Take 30 mg by mouth dailyHistorical Med      !! DULoxetine (CYMBALTA) 60 MG extended release capsule Take 60 mg by mouth dailyHistorical Med      lamoTRIgine (LAMICTAL) 200 MG tablet Take 200 mg by mouth daily Historical Med      levothyroxine (SYNTHROID) 100 MCG tablet Take 100 mcg by mouth DailyHistorical Med       !! - Potential duplicate medications found. Please discuss with provider. ALLERGIES     is allergic to flomax [tamsulosin hcl]; morphine; bactrim [sulfamethoxazole-trimethoprim]; prednisone; and zyprexa [olanzapine]. FAMILY HISTORY     She indicated that her mother is . She indicated that her father is . She indicated that her sister is alive. She indicated that her maternal grandmother is . She indicated that her maternal grandfather is . She indicated that her paternal grandmother is . She indicated that her paternal grandfather is . family history includes Atrial Fibrillation in her mother; Heart Attack in her maternal grandfather; Heart Failure in her mother; High Blood Pressure in her father and mother; Lupus in her mother; Other in her maternal grandmother; Prostate Cancer in her father; Stroke in her paternal grandfather. SOCIAL HISTORY      reports that she has been smoking cigarettes. She started smoking about 38 years ago. She has a 18.50 pack-year smoking history. She has never used smokeless tobacco. She reports previous drug use. She reports that she does not drink alcohol. PHYSICAL EXAM     INITIAL VITALS:  height is 5' 1\" (1.549 m) and weight is 160 lb (72.6 kg). Her temporal temperature is 97.9 °F (36.6 °C). Her blood pressure is 139/71 and her pulse is 95. Her respiration is 20 and oxygen saturation is 95%. Gen.: Patient is well-hydrated, nontoxic-appearing female in no apparent distress. HEENT: Head is atraumatic. Mouth shows moist mucous membranes. Neck: Supple. Respiratory: Lung sounds are clear bilateral.  Cardiac: Heart is regular rate and rhythm. GI: Abdomen is soft, tender. Central portion of the abdomen with no rebound or guarding. No pulsatile mass or bruits. Bowel sounds are normal.  Nonsurgical exam.  Neuro: Patient is no gross focal neurological deficits at bedside exam    DIFFERENTIAL DIAGNOSIS/ MDM:     Incarcerated hernia, bowel obstruction    DIAGNOSTIC RESULTS       RADIOLOGY:   I directly visualized the following  images and reviewed the radiologist interpretations:  [address]   Final Result   1. No acute findings within the abdomen or pelvis   2. Deformity again seen involving the anterior abdominal wall.    3. Nonobstructing bilateral intrarenal calculi               LABS:  Labs Reviewed   CBC WITH AUTO DIFFERENTIAL - Abnormal; Notable for the following components:       Result Value    MCHC 34.2 (*)     Eosinophils % 5 (*)     All other components within normal limits   COMPREHENSIVE METABOLIC PANEL - Abnormal; Notable for the following components:    Glucose 101 (*)     CREATININE 1.24 (*)     Potassium 3.6 (*)     Total Bilirubin 0.27 (*)     GFR Non- 45 (*)     GFR  55 (*)     All other components within normal limits   LIPASE   URINALYSIS   MICROSCOPIC URINALYSIS         EMERGENCY DEPARTMENT COURSE:   Vitals:    Vitals:    10/12/20 2046 10/12/20 2058 10/12/20 2211   BP: (!) 166/83  139/71   Pulse: 104  95   Resp: 20     Temp:  97.9 °F (36.6 °C)    TempSrc:  Temporal    SpO2: 97%  95%   Weight: 160 lb (72.6 kg)     Height: 5' 1\" (1.549 m)       -------------------------  BP: 139/71, Temp: 97.9 °F (36.6 °C), Pulse: 95, Resp: 20    Orders Placed This Encounter   Medications    ondansetron (ZOFRAN) injection 4 mg    fentaNYL (SUBLIMAZE) injection 25 mcg    ketorolac (TORADOL) injection 30 mg           Re-evaluation Notes    Labs are unremarkable. CT shows nothing acute. No indication of obstruction, incarceration patient will be discharged with instructions follow-up with surgery in the morning. Return if worse        FINAL IMPRESSION      1. Periumbilical abdominal pain          DISPOSITION/PLAN   DISPOSITION Decision To Discharge 10/12/2020 10:42:00 PM      Condition on Disposition    Stable    PATIENT REFERRED TO:  Macy Lopez DO  [address] [address]  [phone]    In 1 day        DISCHARGE MEDICATIONS:  Discharge Medication List as of 10/12/2020 10:42 PM          (Please note that portions of this note were completed with a voice recognition program.  Efforts were made to edit the dictations but occasionally words are mis-transcribed.)    Jansen MD, F.A.C.E.P.   Attending Emergency Physician        Nico Jeffrey MD  10/13/20 [id number]

## 2020-10-13 NOTE — TELEPHONE ENCOUNTER
Surgery is scheduled for 10-21-20- open ventral hernia repair. At this time there is no blockage, no incarceration. Pain in ER was 7-8/10, now 5/10. Requesting [facility]. [place] has already advised Ariela Ayers to start taking MOM, Miralax & Senokot. Would also like a Rx for Zofran.  norco last filled July 22 for #28 per [facility]. Would like a call back when Rx's are done.

## 2020-10-13 NOTE — TELEPHONE ENCOUNTER
Patient was in the ER last night. She is requesting pain med and nausea med. Dr. Mitchell Chou is out of the office this week. I told her to contact her PCP.

## 2020-10-13 NOTE — CARE COORDINATION
Daniel Reed was seen at [facility] 35/72/1165- Periumbilical abd pain. She is scheduled for Ventral Hernia repai 10/21/2020. She is eligible for ACC. 10/13/2020- 11:14 am Left message requesting return call re: Initial ER/Covid F/U call. 10/15/2020 Left message requesting return call.

## 2020-10-13 NOTE — TELEPHONE ENCOUNTER
Pt calling stating she is scheduled for surgery next week with Dr Harini Huynh but ended up in ER last night due to pain, contact Dr Carlos Valladares office and he is on vacation and told to contact PCP to get something for the pain until her surgery, please advise at above number, pt uses loaded pharmacy.

## 2020-10-14 ENCOUNTER — PATIENT MESSAGE (OUTPATIENT)
Dept: FAMILY MEDICINE CLINIC | Age: 55
End: 2020-10-14

## 2020-10-14 RX ORDER — HYDROCODONE BITARTRATE AND ACETAMINOPHEN 5; 325 MG/1; MG/1
1 TABLET ORAL EVERY 8 HOURS PRN
Qty: 15 TABLET | Refills: 0 | Status: ON HOLD | OUTPATIENT
Start: 2020-10-14 | End: 2020-10-27

## 2020-10-14 RX ORDER — ONDANSETRON 4 MG/1
4 TABLET, FILM COATED ORAL EVERY 8 HOURS PRN
Qty: 20 TABLET | Refills: 0 | Status: ON HOLD | OUTPATIENT
Start: 2020-10-14 | End: 2021-05-11

## 2020-10-15 NOTE — TELEPHONE ENCOUNTER
From: Tahira Muñoz  To: Laquita Sawyer DO  Sent: 10/14/2020 6:12 PM EDT  Subject: Prescription Question    Dr. Melia Chacko  I spoke with your nurse about something for pain and nausea. Dr. Evita Abraham is out of office till tuesday. My surgery is the 21st. I am miserable.   Thank you  Kirsten Delong

## 2020-10-16 ENCOUNTER — HOSPITAL ENCOUNTER (OUTPATIENT)
Dept: PREADMISSION TESTING | Age: 55
Setting detail: SPECIMEN
Discharge: HOME | End: 2020-10-20
Payer: MEDICARE

## 2020-10-16 ENCOUNTER — APPOINTMENT (OUTPATIENT)
Dept: GENERAL RADIOLOGY | Age: 55
End: 2020-10-16
Payer: MEDICARE

## 2020-10-16 ENCOUNTER — HOSPITAL ENCOUNTER (EMERGENCY)
Age: 55
Discharge: HOME | End: 2020-10-16
Attending: SPECIALIST
Payer: MEDICARE

## 2020-10-16 VITALS
HEART RATE: 87 BPM | WEIGHT: 160 LBS | TEMPERATURE: 98.2 F | SYSTOLIC BLOOD PRESSURE: 124 MMHG | DIASTOLIC BLOOD PRESSURE: 73 MMHG | BODY MASS INDEX: 30.23 KG/M2 | OXYGEN SATURATION: 96 % | RESPIRATION RATE: 16 BRPM

## 2020-10-16 LAB
ABSOLUTE EOS #: 0.33 K/UL (ref 0–0.44)
ABSOLUTE IMMATURE GRANULOCYTE: 0.03 K/UL (ref 0–0.3)
ABSOLUTE LYMPH #: 2.08 K/UL (ref 1.1–3.7)
ABSOLUTE MONO #: 0.59 K/UL (ref 0.1–1.2)
ALBUMIN SERPL-MCNC: 4.2 G/DL (ref 3.5–5.2)
ALBUMIN/GLOBULIN RATIO: 1.9 (ref 1–2.5)
ALP BLD-CCNC: 143 U/L (ref 35–104)
ALT SERPL-CCNC: 58 U/L (ref 5–33)
ANION GAP SERPL CALCULATED.3IONS-SCNC: 9 MMOL/L (ref 9–17)
AST SERPL-CCNC: 77 U/L
BASOPHILS # BLD: 1 % (ref 0–2)
BASOPHILS ABSOLUTE: 0.05 K/UL (ref 0–0.2)
BILIRUB SERPL-MCNC: 0.17 MG/DL (ref 0.3–1.2)
BUN BLDV-MCNC: 16 MG/DL (ref 6–20)
BUN/CREAT BLD: 15 (ref 9–20)
CALCIUM SERPL-MCNC: 9.4 MG/DL (ref 8.6–10.4)
CHLORIDE BLD-SCNC: 100 MMOL/L (ref 98–107)
CO2: 28 MMOL/L (ref 20–31)
CREAT SERPL-MCNC: 1.09 MG/DL (ref 0.5–0.9)
DIFFERENTIAL TYPE: ABNORMAL
EOSINOPHILS RELATIVE PERCENT: 4 % (ref 1–4)
GFR AFRICAN AMERICAN: >60 ML/MIN
GFR NON-AFRICAN AMERICAN: 52 ML/MIN
GFR SERPL CREATININE-BSD FRML MDRD: ABNORMAL ML/MIN/{1.73_M2}
GFR SERPL CREATININE-BSD FRML MDRD: ABNORMAL ML/MIN/{1.73_M2}
GLUCOSE BLD-MCNC: 140 MG/DL (ref 70–99)
HCT VFR BLD CALC: 35.2 % (ref 36.3–47.1)
HEMOGLOBIN: 11.6 G/DL (ref 11.9–15.1)
IMMATURE GRANULOCYTES: 0 %
LIPASE: 31 U/L (ref 13–60)
LYMPHOCYTES # BLD: 26 % (ref 24–43)
MCH RBC QN AUTO: 29.7 PG (ref 25.2–33.5)
MCHC RBC AUTO-ENTMCNC: 33 G/DL (ref 25.2–33.5)
MCV RBC AUTO: 90.3 FL (ref 82.6–102.9)
MONOCYTES # BLD: 8 % (ref 3–12)
NRBC AUTOMATED: 0 PER 100 WBC
PDW BLD-RTO: 12.8 % (ref 11.8–14.4)
PLATELET # BLD: 190 K/UL (ref 138–453)
PLATELET ESTIMATE: ABNORMAL
PMV BLD AUTO: 8.2 FL (ref 8.1–13.5)
POTASSIUM SERPL-SCNC: 3.7 MMOL/L (ref 3.7–5.3)
RBC # BLD: 3.9 M/UL (ref 3.95–5.11)
RBC # BLD: ABNORMAL 10*6/UL
SEG NEUTROPHILS: 61 % (ref 36–65)
SEGMENTED NEUTROPHILS ABSOLUTE COUNT: 4.8 K/UL (ref 1.5–8.1)
SODIUM BLD-SCNC: 137 MMOL/L (ref 135–144)
TOTAL PROTEIN: 6.4 G/DL (ref 6.4–8.3)
WBC # BLD: 7.9 K/UL (ref 3.5–11.3)
WBC # BLD: ABNORMAL 10*3/UL

## 2020-10-16 PROCEDURE — 74022 RADEX COMPL AQT ABD SERIES: CPT

## 2020-10-16 PROCEDURE — 99285 EMERGENCY DEPT VISIT HI MDM: CPT

## 2020-10-16 PROCEDURE — 83690 ASSAY OF LIPASE: CPT

## 2020-10-16 PROCEDURE — 2580000003 HC RX 258: Performed by: SPECIALIST

## 2020-10-16 PROCEDURE — 6360000002 HC RX W HCPCS: Performed by: SPECIALIST

## 2020-10-16 PROCEDURE — U0003 INFECTIOUS AGENT DETECTION BY NUCLEIC ACID (DNA OR RNA); SEVERE ACUTE RESPIRATORY SYNDROME CORONAVIRUS 2 (SARS-COV-2) (CORONAVIRUS DISEASE [COVID-19]), AMPLIFIED PROBE TECHNIQUE, MAKING USE OF HIGH THROUGHPUT TECHNOLOGIES AS DESCRIBED BY CMS-2020-01-R: HCPCS

## 2020-10-16 PROCEDURE — 80053 COMPREHEN METABOLIC PANEL: CPT

## 2020-10-16 PROCEDURE — 96374 THER/PROPH/DIAG INJ IV PUSH: CPT

## 2020-10-16 PROCEDURE — 96375 TX/PRO/DX INJ NEW DRUG ADDON: CPT

## 2020-10-16 PROCEDURE — 6370000000 HC RX 637 (ALT 250 FOR IP): Performed by: SPECIALIST

## 2020-10-16 PROCEDURE — 85025 COMPLETE CBC W/AUTO DIFF WBC: CPT

## 2020-10-16 RX ORDER — 0.9 % SODIUM CHLORIDE 0.9 %
500 INTRAVENOUS SOLUTION INTRAVENOUS ONCE
Status: COMPLETED | OUTPATIENT
Start: 2020-10-16 | End: 2020-10-16

## 2020-10-16 RX ORDER — ONDANSETRON 2 MG/ML
4 INJECTION INTRAMUSCULAR; INTRAVENOUS ONCE
Status: COMPLETED | OUTPATIENT
Start: 2020-10-16 | End: 2020-10-16

## 2020-10-16 RX ADMIN — SODIUM CHLORIDE 500 ML: 9 INJECTION, SOLUTION INTRAVENOUS at 21:35

## 2020-10-16 RX ADMIN — HYDROMORPHONE HYDROCHLORIDE 1 MG: 1 INJECTION, SOLUTION INTRAMUSCULAR; INTRAVENOUS; SUBCUTANEOUS at 21:37

## 2020-10-16 RX ADMIN — ONDANSETRON 4 MG: 2 INJECTION INTRAMUSCULAR; INTRAVENOUS at 21:36

## 2020-10-16 RX ADMIN — MAGNESIUM CITRATE 296 ML: 1.75 LIQUID ORAL at 22:30

## 2020-10-16 ASSESSMENT — PAIN DESCRIPTION - LOCATION
LOCATION: ABDOMEN
LOCATION: ABDOMEN

## 2020-10-16 ASSESSMENT — PAIN DESCRIPTION - DESCRIPTORS
DESCRIPTORS: SHARP
DESCRIPTORS: SHARP

## 2020-10-16 ASSESSMENT — PAIN DESCRIPTION - ORIENTATION
ORIENTATION: MID
ORIENTATION: MID

## 2020-10-16 ASSESSMENT — ENCOUNTER SYMPTOMS
COUGH: 0
ABDOMINAL PAIN: 1
NAUSEA: 1
SORE THROAT: 0
CONSTIPATION: 1
BACK PAIN: 0
ABDOMINAL DISTENTION: 1
BLOOD IN STOOL: 0
SHORTNESS OF BREATH: 0

## 2020-10-16 ASSESSMENT — PAIN DESCRIPTION - FREQUENCY
FREQUENCY: CONTINUOUS
FREQUENCY: CONTINUOUS

## 2020-10-16 ASSESSMENT — PAIN SCALES - GENERAL
PAINLEVEL_OUTOF10: 4
PAINLEVEL_OUTOF10: 7

## 2020-10-16 ASSESSMENT — PAIN DESCRIPTION - PAIN TYPE
TYPE: ACUTE PAIN
TYPE: ACUTE PAIN

## 2020-10-17 ENCOUNTER — CARE COORDINATION (OUTPATIENT)
Dept: CARE COORDINATION | Age: 55
End: 2020-10-17

## 2020-10-17 LAB — SARS-COV-2, NAA: NOT DETECTED

## 2020-10-17 NOTE — CARE COORDINATION
Attempt to contact patient today regarding ED follow up, COVID -19 Monitoring. Unable to reach patient. Discussed with Camila Shone will continue to reach out on patient        Recent encounters and notes reviewed.     Future Appointments   Date Time Provider Rupesh Michaels   3/26/2021  1:20 PM [facility]         [address] RN Care Manager  [phone]

## 2020-10-17 NOTE — ED TRIAGE NOTES
Patient presents to the ED with complaints of mid abdominal pain. Patient has a hernia in which is scheduled to be repaired this Wednesday per Dr. Cecilio Terrell. Patient was seen in the ER on Monday for similar pain in which she had a full workup with blood work and CT. No new finding were found at that time. No other questions or concerns at this time. Call light within reach.

## 2020-10-17 NOTE — CARE COORDINATION
Sabrina Guadalupe returned to [facility] ER 10/17/2020. [facility] ER 10/12/2020. She is scheduled for hernia repair 10/21/2020.    10/13/2020- 11:14 am Left message requesting return call re: Initial ER/Covid F/U call.     10/15/2020 Left message requesting return call    10/17/2020 Left message requesting return call re: Initial ER/Covid F/U call and to discuss enrollment in [facility].     10/19/2020- 9:59 am Left second message requesting return call.

## 2020-10-17 NOTE — ED PROVIDER NOTES
[address]      Pt Name: Porfirio Bain  MRN: [id number]  [place] 1965  Date of evaluation: 10/16/2020      CHIEF COMPLAINT       Chief Complaint   Patient presents with    Abdominal Pain     Hernia, scheduled for repair on Wed. per Dr. Rebecca Maya is a 48 y.o. female who presents to the emergency department complaining of upper abdominal pain associate with nausea and constipation. Patient has ventral hernia and is awaiting surgery in 5 days. Patient describes pain is constant sharp and burning in nature 8 out of 10 in intensity associated with nausea and constipation with her last bowel movement small amount earlier today. She has been using Senokot S2 days ago and yesterday and used fleets enema today without any relief of the constipation. She denies any fever, chills, urinary frequency, urgency, dysuria or hematuria. She has taken Norco and Zofran without any relief. Patient was seen in our emergency department 4 days ago when she underwent extensive work-up including CBC, CMP, urinalysis and CAT scan of the abdomen and pelvis which were unremarkable. She denies any radiation of pain to the chest, cough, shortness of breath, lightheadedness, dizziness, palpitations or diaphoresis. REVIEW OF SYSTEMS       Review of Systems   Constitutional: Negative for chills and fever. HENT: Negative for ear pain and sore throat. Respiratory: Negative for cough and shortness of breath. Cardiovascular: Negative for chest pain and palpitations. Gastrointestinal: Positive for abdominal distention, abdominal pain, constipation and nausea. Negative for blood in stool. Genitourinary: Negative for dysuria and frequency. Musculoskeletal: Negative for back pain and neck pain. Neurological: Negative for dizziness and light-headedness. All other systems reviewed and are negative.        PAST MEDICAL HISTORY    has a past medical history of Anxiety, Breast cancer ([facility] COPD (chronic obstructive pulmonary disease) ([facility] [address] Depression, Headache(784.0), History of alcoholism ([facility] [address] Hypertension, Hypothyroidism, Kidney stone, and PTSD (post-traumatic stress disorder). SURGICAL HISTORY      has a past surgical history that includes Eye surgery; tumor removal (1999); Appendectomy (1977); Cholecystectomy (2014); Tubal ligation (2001); lumbar laminectomy (2006); Dilation and curettage of uterus; Breast lumpectomy (Left, 2014); Breast biopsy (Right, 2015); alcon and bso (cervix removed) (2015); ventral hernia repair (2019); hernia repair (01/2020); fracture surgery (Left); and hernia repair (N/A, 7/22/2020). CURRENT MEDICATIONS       Previous Medications    ALBUTEROL SULFATE  (90 BASE) MCG/ACT INHALER    Inhale 1-2 puffs into the lungs every 6 hours as needed for Wheezing or Shortness of Breath    AMLODIPINE (NORVASC) 10 MG TABLET    Take 1 tablet by mouth daily    ATORVASTATIN (LIPITOR) 10 MG TABLET    Take 1 tablet by mouth daily    CHOLECALCIFEROL (VITAMIN D) 50 MCG (2000 UT) CAPS CAPSULE    Take 2,000 Units by mouth daily    CYCLOBENZAPRINE (FLEXERIL) 5 MG TABLET    Take 1-2 tablets by mouth 3 times daily as needed for Muscle spasms    DULOXETINE (CYMBALTA) 30 MG EXTENDED RELEASE CAPSULE    Take 30 mg by mouth daily    DULOXETINE (CYMBALTA) 60 MG EXTENDED RELEASE CAPSULE    Take 60 mg by mouth daily    FLUTICASONE (FLONASE) 50 MCG/ACT NASAL SPRAY    2 sprays by Nasal route daily    HYDROCODONE-ACETAMINOPHEN (NORCO) 5-325 MG PER TABLET    Take 1 tablet by mouth every 8 hours as needed for Pain for up to 7 days.  Take lowest dose possible to manage pain    HYDROXYZINE (ATARAX) 25 MG TABLET    Take 25-30 mg by mouth every 6 hours as needed for Anxiety    LAMOTRIGINE (LAMICTAL) 200 MG TABLET    Take 200 mg by mouth daily     LEVOTHYROXINE (SYNTHROID) 100 MCG TABLET    Take 100 mcg by mouth Daily    LIDOCAINE (LIDODERM) 5 %    Place 1 patch onto the skin daily 12 hours on, 12 hours off. LISINOPRIL (PRINIVIL;ZESTRIL) 40 MG TABLET    Take 1 tablet by mouth daily    LORATADINE (CLARITIN) 10 MG CAPSULE    Take 1 capsule by mouth daily    MULTIPLE VITAMINS-MINERALS (MULTIVITAMIN ADULT PO)    Take 1 tablet by mouth daily    OMEPRAZOLE (PRILOSEC) 10 MG DELAYED RELEASE CAPSULE    Take 2 capsules by mouth 2 times daily    ONDANSETRON (ZOFRAN) 4 MG TABLET    Take 1 tablet by mouth every 8 hours as needed for Nausea or Vomiting    PROPRANOLOL (INDERAL) 10 MG TABLET    Take 10 mg by mouth 3 times daily       ALLERGIES     is allergic to flomax [tamsulosin hcl]; morphine; bactrim [sulfamethoxazole-trimethoprim]; prednisone; and zyprexa [olanzapine]. FAMILY HISTORY     She indicated that her mother is . She indicated that her father is . She indicated that her sister is alive. She indicated that her maternal grandmother is . She indicated that her maternal grandfather is . She indicated that her paternal grandmother is . She indicated that her paternal grandfather is . family history includes Atrial Fibrillation in her mother; Heart Attack in her maternal grandfather; Heart Failure in her mother; High Blood Pressure in her father and mother; Lupus in her mother; Other in her maternal grandmother; Prostate Cancer in her father; Stroke in her paternal grandfather. SOCIAL HISTORY      reports that she has been smoking cigarettes. She started smoking about 38 years ago. She has a 18.50 pack-year smoking history. She has never used smokeless tobacco. She reports previous drug use. She reports that she does not drink alcohol. PHYSICAL EXAM     INITIAL VITALS:  weight is 160 lb (72.6 kg). Her tympanic temperature is 98.2 °F (36.8 °C). Her blood pressure is 124/73 and her pulse is 87. Her respiration is 16 and oxygen saturation is 96%.       Physical Exam  Vitals signs and nursing note reviewed. Constitutional:       Appearance: She is well-developed. HENT:      Head: Normocephalic and atraumatic. Nose: Nose normal.   Eyes:      Extraocular Movements: Extraocular movements intact. Pupils: Pupils are equal, round, and reactive to light. Neck:      Musculoskeletal: Normal range of motion and neck supple. Cardiovascular:      Rate and Rhythm: Normal rate and regular rhythm. Heart sounds: Normal heart sounds. No murmur. Pulmonary:      Effort: Pulmonary effort is normal. No respiratory distress. Breath sounds: Normal breath sounds. Abdominal:      General: Bowel sounds are normal. There is distension. There is no abdominal bruit. Palpations: Abdomen is soft. There is no pulsatile mass. Tenderness: There is abdominal tenderness in the epigastric area and periumbilical area. There is no right CVA tenderness, left CVA tenderness or rebound. Negative signs include Botello's sign and McBurney's sign. Hernia: A hernia is present. Hernia is present in the ventral area. Skin:     General: Skin is warm and dry. Neurological:      General: No focal deficit present. Mental Status: She is alert and oriented to person, place, and time. DIFFERENTIAL DIAGNOSIS/ MDM:      pancreatitis,  urinary tract infection, renal stone, small bowel obstruction,diverticulitis, gastritis, enteritis, colitis. DIAGNOSTIC RESULTS     EKG: All EKG's are interpreted by the Emergency Department Physician who either signs or Co-signs this chart in the absence of a cardiologist.    None obtained      RADIOLOGY:   I directly visualized the following  images and reviewed the radiologist interpretations:    No results found.            ED BEDSIDE ULTRASOUND:       LABS:  Labs Reviewed   CBC WITH AUTO DIFFERENTIAL - Abnormal; Notable for the following components:       Result Value    RBC 3.90 (*)     Hemoglobin 11.6 (*)     Hematocrit 35.2 (*)     All other components the diagnosis and risks, and we agree with discharging home to follow-up with their primary doctor. We also discussed returning to the Emergency Department immediately if new or worsening symptoms occur. We have discussed the symptoms which are most concerning (e.g., bloody stool, fever, changing or worsening pain, vomiting) that necessitate immediate return. I have reviewed the disposition diagnosis with the patient and or their family/guardian. I have answered their questions and given discharge instructions. They voiced understanding of these instructions and did not have any further questions or complaints. Re-evaluation Notes    Patient is feeling much better and resting comfortably. Her abdominal pain and nausea are resolved    CRITICAL CARE:   None        CONSULTS:      PROCEDURES:  None    FINAL IMPRESSION      1. Periumbilical abdominal pain    2. Ventral hernia without obstruction or gangrene          DISPOSITION/PLAN   DISPOSITION Decision To Discharge    Condition on Disposition    Stable    PATIENT REFERRED TO:  Jerie Osgood, DO  [address] [facility]  [phone]    Call in 2 days  For reevaluation of current symptoms    Wilda Barnard MD  [address] [place] [address]  [phone]    Call in 2 days  For reevaluation of current symptoms    [facility]  [phone]    If symptoms worsen      DISCHARGE MEDICATIONS:  New Prescriptions    No medications on file       (Please note that portions of this note were completed with a voice recognition program.  Efforts were made to edit the dictations but occasionally words are mis-transcribed.)    Mendoza MD, F.A.C.E.P.   Attending Emergency Physician                         Kristy Jacques MD  10/16/20 [id number]

## 2020-10-20 NOTE — H&P
Sue Vinson is a 48 y.o. female              CC:     .No chief complaint on file. HISTORY OF PRESENT ILLNESS:     Pt is a 47 yo female who had a robotic ventral hernia repair 2019 and then a laparoscopic repair of recurrent ventral hernia jan 2020, at an outside facility. We saw her on 6-1-20 for complaints of abd pain and recurrent hernia again. During our work up a CT did not show a definite heria, but she had tenting of the mesh and there was a muscular fascia defect of about 5 cm by 5 cm. She was convinced there was a bulge coming and going . We did a laparoscopic exploration and found adhesions and a small tear in the mesh inferior part of mesh below the fascia defect. Also , you could see the gap in the fascia about 5 by 5 cm that was brideged by the mesh with an overlap at this time of about 1 cm all the wa around . At that point we sutured the hole or tear in the mesh and left the mesh alone. She has continue to have pain, especially when she is on her feet and at work. The more it bulges out the more it hurts. She would like to have it fixed or redone. I have had a long discussion with her that this is not truly a hernia defect , but I do think the bridging mesh and seperated fascia does have some danger of possible increasing size of defect and possible incarceration and strangulation of bowel as the mesh migrates over the edge of the defect. However, she has a big risk of smoking. We have said no to removing the mesh and considering a component separation repair to bridge the large defect as long as she is smoking. She use to smoke about 1.5 to 2 packs per day, and is now down to 7 cigs per day. She is not diabetic and her BMI is reasonable at 30.6.               Review of Systems:     General:  Fever: Negative  Weight Change:Negative  Night Sweats: Negative     Eye:  Blurry Vision:Negative  Double Vision: Negative     Ent:  Headaches: Negative  Sore throat: Negative Allergy/Immunology:  Hives: Negative  Latex allergy: Negative     Hematology/Lymphatic:  Bleeding Problems: Negative  Blood Clots: Negative  Swollen Lymph Nodes: Negative     Lungs:  Cough: Negative  SOB: Negative  Wheezing:Negative     Cardiovascular:  Chest Pain: Negative  Palpitations:Negative     GI:   Decreased Appetite: Negative  Heartburn: Negative  Dysphagia: Negative  Nausea/Vomiting: Negative  Abdominal Pain: Negative  Change in Bowels:Negative  Constipation: Negative  Diarrhea: Negative  Rectal Bleeding: Negative     :   Dysuria: Negative  Increase Urinary Frequency/Urgency: Negative     Neuro:  Seizures: Negative  Confusion: Negative           PAST MEDICAL HISTORY:           Family History         Family History   Problem Relation Age of Onset    High Blood Pressure Mother      Lupus Mother            from CHF secondary to cardiomyopathy from her lupus    Heart Failure Mother      Atrial Fibrillation Mother      High Blood Pressure Father      Prostate Cancer Father           age 54;  11 years later of a \"bladder tumor\" that caused bladder rupture (pt unsure if malignancy)    Other Maternal Grandmother           had \"stomach tumor\"    Heart Attack Maternal Grandfather            at age 40    Stroke Paternal Grandfather           in his 42's; multiple        Social History               Socioeconomic History    Marital status:        Spouse name: Not on file    Number of children: Not on file    Years of education: Not on file    Highest education level: Not on file   Occupational History    Not on file   Social Needs    Financial resource strain: Not on file    Food insecurity       Worry: Not on file       Inability: Not on file    Transportation needs       Medical: Not on file       Non-medical: Not on file   Tobacco Use    Smoking status: Current Every Day Smoker       Packs/day: 0.50       Years: 37.00       Pack years: 18.50       Types: Cigarettes Start date: 19    Smokeless tobacco: Never Used    Tobacco comment: Will see PCP when ready to quit.      Substance and Sexual Activity    Alcohol use: No    Drug use: Not Currently    Sexual activity: Not on file   Lifestyle    Physical activity       Days per week: Not on file       Minutes per session: Not on file    Stress: Not on file   Relationships    Social connections       Talks on phone: Not on file       Gets together: Not on file       Attends Taoist service: Not on file       Active member of club or organization: Not on file       Attends meetings of clubs or organizations: Not on file       Relationship status: Not on file    Intimate partner violence       Fear of current or ex partner: Not on file       Emotionally abused: Not on file       Physically abused: Not on file       Forced sexual activity: Not on file   Other Topics Concern    Not on file   Social History Narrative    Not on file        Past Surgical History         Past Surgical History:   Procedure Laterality Date    [address] Right 2015     excisional biopsy - Dr. Hallie Stubbs Left 2014     breast CA - done at [facility]   2014   [address]         multiple in her 19's   [address]         x 2 in her youth - was \"cross-eyed\"    FRACTURE SURGERY Left       hand   [address] [facility]   01/2020     recurrent incisional hernia repair Dr. Lauren Roper N/A 7/22/2020     Laparoscopic Robotic Assisted Ventral Hernia Repair performed by Jess Preston MD at [address]   2006     Dr. Christian Dozier   2015     Dr. 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 Kaya Balderrama at [facility] - used mesh; had revision in 1/2020        Past Medical History Past Medical History:   Diagnosis Date    Anxiety      Breast cancer ([facility] 2014     L side - lumpectomy and radiation; no chemo (was recommended to take Tamoxifen, but with her smoking, she declined due to family h/o CVA already). Seeing Dr. Daryle Red once yearly/    [facility]      Depression      Headache(784.0)      History of alcoholism ([facility]       sober since 1/16/19    Hypertension      Hypothyroidism      Kidney stone       Has had lithotripsy x 1; had ureteral stent placement with removal x 1; had seen Dr. Navjot Santo PTSD (post-traumatic stress disorder)                 Current Outpatient Medications on File Prior to Visit   Medication Sig Dispense Refill    Cholecalciferol (VITAMIN D) 50 MCG (2000 UT) CAPS capsule Take 2,000 Units by mouth daily        Multiple Vitamins-Minerals (MULTIVITAMIN ADULT PO) Take 1 tablet by mouth daily        lidocaine (LIDODERM) 5 % Place 1 patch onto the skin daily 12 hours on, 12 hours off.  10 patch 0    atorvastatin (LIPITOR) 10 MG tablet Take 1 tablet by mouth daily 30 tablet 5    propranolol (INDERAL) 10 MG tablet Take 10 mg by mouth 3 times daily        hydrOXYzine (ATARAX) 25 MG tablet Take 25-30 mg by mouth every 6 hours as needed for Anxiety        lisinopril (PRINIVIL;ZESTRIL) 40 MG tablet Take 1 tablet by mouth daily 30 tablet 5    amLODIPine (NORVASC) 10 MG tablet Take 1 tablet by mouth daily 30 tablet 5    loratadine (CLARITIN) 10 MG capsule Take 1 capsule by mouth daily 30 capsule 11    omeprazole (PRILOSEC) 10 MG delayed release capsule Take 2 capsules by mouth 2 times daily 60 capsule 5    fluticasone (FLONASE) 50 MCG/ACT nasal spray 2 sprays by Nasal route daily 1 Bottle 5    DULoxetine (CYMBALTA) 30 MG extended release capsule Take 30 mg by mouth daily        DULoxetine (CYMBALTA) 60 MG extended release capsule Take 60 mg by mouth daily        lamoTRIgine (LAMICTAL) 200 MG tablet Take 200 mg by mouth daily         levothyroxine (SYNTHROID) 100 MCG tablet Take 100 mcg by mouth Daily        polyethylene glycol (GLYCOLAX) 17 GM/SCOOP powder Take 17 g by mouth daily        albuterol sulfate  (90 Base) MCG/ACT inhaler Inhale 1-2 puffs into the lungs every 6 hours as needed for Wheezing or Shortness of Breath (Patient not taking: Reported on 9/17/2020) 1 Inhaler 3      No current facility-administered medications on file prior to visit. Allergies as of 09/17/2020 - Review Complete 09/17/2020   Allergen Reaction Noted    Flomax [tamsulosin hcl] Swelling 11/23/2017    Morphine Itching and Rash 08/26/2013    Bactrim [sulfamethoxazole-trimethoprim] Diarrhea and Nausea Only 08/26/2013    Prednisone Other (See Comments) 11/23/2017    Zyprexa [olanzapine] Other (See Comments) 11/23/2017               PHYSICAL EXAM:    Blood pressure 127/61, pulse 90, temperature 98.4 °F (36.9 °C), temperature source Tympanic, resp. rate 16, height 5' 1\" (1.549 m), weight 164 lb 12.8 oz (74.8 kg), SpO2 96 %, not currently breastfeeding. Gen:  A and O x 3, NAD, well nourished  Eyes:  Sclera non icterus, PERRL  Lungs:  CTA, symmetrical  Chest:  RRR, no murmurs  Abd:  Soft, NT, ND, no HSM, no bruits     Hernia:  There is a large bulge in supra periumbilical / epigastric area. I can feel the edge of the fascia, corresponding to the edge on CT scan. She is tender in this area.;     ASSESS MENT:     1.  I think she is having pain from the large fascial defect and minimally bridging mesh. I think there is a lot of migration and stress on mesh. I think she is or will be at increased risk of complications as the mesh migrates and she has a true hernia defect. I think it is reasonable to go now and try and fix the defect open with possible posterior component separation technique and intramuscular implantation of mesh.   Clearly the smoking is a risk factor, but she has cut back significantly and has been very

## 2020-10-21 ENCOUNTER — ANESTHESIA (OUTPATIENT)
Dept: OPERATING ROOM | Age: 55
DRG: 227 | End: 2020-10-21
Payer: MEDICARE

## 2020-10-21 ENCOUNTER — ANESTHESIA EVENT (OUTPATIENT)
Dept: OPERATING ROOM | Age: 55
DRG: 227 | End: 2020-10-21
Payer: MEDICARE

## 2020-10-21 ENCOUNTER — HOSPITAL ENCOUNTER (INPATIENT)
Age: 55
LOS: 6 days | Discharge: HOME | DRG: 227 | End: 2020-10-27
Attending: SURGERY | Admitting: SURGERY
Payer: MEDICARE

## 2020-10-21 VITALS — TEMPERATURE: 97.7 F | OXYGEN SATURATION: 94 % | DIASTOLIC BLOOD PRESSURE: 57 MMHG | SYSTOLIC BLOOD PRESSURE: 117 MMHG

## 2020-10-21 PROBLEM — Z98.890 STATUS POST REPAIR OF RECURRENT VENTRAL HERNIA: Status: ACTIVE | Noted: 2020-10-21

## 2020-10-21 PROBLEM — Z87.19 STATUS POST REPAIR OF RECURRENT VENTRAL HERNIA: Status: ACTIVE | Noted: 2020-10-21

## 2020-10-21 LAB
ABSOLUTE EOS #: <0.03 K/UL (ref 0–0.44)
ABSOLUTE IMMATURE GRANULOCYTE: 0.09 K/UL (ref 0–0.3)
ABSOLUTE LYMPH #: 1.36 K/UL (ref 1.1–3.7)
ABSOLUTE MONO #: 0.89 K/UL (ref 0.1–1.2)
ALBUMIN SERPL-MCNC: 4.2 G/DL (ref 3.5–5.2)
ALBUMIN/GLOBULIN RATIO: 2.3 (ref 1–2.5)
ALP BLD-CCNC: 102 U/L (ref 35–104)
ALT SERPL-CCNC: 156 U/L (ref 5–33)
ANION GAP SERPL CALCULATED.3IONS-SCNC: 9 MMOL/L (ref 9–17)
AST SERPL-CCNC: 244 U/L
BASOPHILS # BLD: 0 % (ref 0–2)
BASOPHILS ABSOLUTE: 0.05 K/UL (ref 0–0.2)
BILIRUB SERPL-MCNC: 0.41 MG/DL (ref 0.3–1.2)
BILIRUBIN DIRECT: 0.23 MG/DL
BILIRUBIN, INDIRECT: 0.18 MG/DL (ref 0–1)
BUN BLDV-MCNC: 13 MG/DL (ref 6–20)
CALCIUM SERPL-MCNC: 8.8 MG/DL (ref 8.6–10.4)
CHLORIDE BLD-SCNC: 103 MMOL/L (ref 98–107)
CO2: 26 MMOL/L (ref 20–31)
CREAT SERPL-MCNC: 1.55 MG/DL (ref 0.5–0.9)
DIFFERENTIAL TYPE: ABNORMAL
EOSINOPHILS RELATIVE PERCENT: 0 % (ref 1–4)
GFR AFRICAN AMERICAN: 42 ML/MIN
GFR NON-AFRICAN AMERICAN: 35 ML/MIN
GFR SERPL CREATININE-BSD FRML MDRD: ABNORMAL ML/MIN/{1.73_M2}
GFR SERPL CREATININE-BSD FRML MDRD: ABNORMAL ML/MIN/{1.73_M2}
GLUCOSE BLD-MCNC: 157 MG/DL (ref 65–105)
GLUCOSE BLD-MCNC: 190 MG/DL (ref 65–105)
GLUCOSE BLD-MCNC: 195 MG/DL (ref 70–99)
HCT VFR BLD CALC: 33.1 % (ref 36.3–47.1)
HEMOGLOBIN: 11 G/DL (ref 11.9–15.1)
IMMATURE GRANULOCYTES: 1 %
LYMPHOCYTES # BLD: 7 % (ref 24–43)
MAGNESIUM: 1.4 MG/DL (ref 1.6–2.6)
MCH RBC QN AUTO: 30.7 PG (ref 25.2–33.5)
MCHC RBC AUTO-ENTMCNC: 33.2 G/DL (ref 25.2–33.5)
MCV RBC AUTO: 92.5 FL (ref 82.6–102.9)
MONOCYTES # BLD: 5 % (ref 3–12)
NRBC AUTOMATED: 0 PER 100 WBC
PDW BLD-RTO: 13.2 % (ref 11.8–14.4)
PLATELET # BLD: 247 K/UL (ref 138–453)
PLATELET ESTIMATE: ABNORMAL
PMV BLD AUTO: 8.3 FL (ref 8.1–13.5)
POTASSIUM SERPL-SCNC: 4.7 MMOL/L (ref 3.7–5.3)
RBC # BLD: 3.58 M/UL (ref 3.95–5.11)
RBC # BLD: ABNORMAL 10*6/UL
SEG NEUTROPHILS: 88 % (ref 36–65)
SEGMENTED NEUTROPHILS ABSOLUTE COUNT: 17.1 K/UL (ref 1.5–8.1)
SODIUM BLD-SCNC: 138 MMOL/L (ref 135–144)
TOTAL PROTEIN: 6 G/DL (ref 6.4–8.3)
WBC # BLD: 19.5 K/UL (ref 3.5–11.3)
WBC # BLD: ABNORMAL 10*3/UL

## 2020-10-21 PROCEDURE — 94664 DEMO&/EVAL PT USE INHALER: CPT

## 2020-10-21 PROCEDURE — 2580000003 HC RX 258: Performed by: SURGERY

## 2020-10-21 PROCEDURE — 85025 COMPLETE CBC W/AUTO DIFF WBC: CPT

## 2020-10-21 PROCEDURE — 49568 PR IMPLANT MESH HERNIA REPAIR/DEBRIDEMENT CLOSURE: CPT | Performed by: SURGERY

## 2020-10-21 PROCEDURE — 2500000003 HC RX 250 WO HCPCS: Performed by: NURSE ANESTHETIST, CERTIFIED REGISTERED

## 2020-10-21 PROCEDURE — 0KXK0Z6 TRANSFER RIGHT ABDOMEN MUSCLE, TRANSVERSE RECTUS ABDOMINIS MYOCUTANEOUS FLAP, OPEN APPROACH: ICD-10-PCS | Performed by: SURGERY

## 2020-10-21 PROCEDURE — 6370000000 HC RX 637 (ALT 250 FOR IP): Performed by: SURGERY

## 2020-10-21 PROCEDURE — 15734 MUSCLE-SKIN GRAFT TRUNK: CPT | Performed by: SURGERY

## 2020-10-21 PROCEDURE — G0378 HOSPITAL OBSERVATION PER HR: HCPCS

## 2020-10-21 PROCEDURE — 82947 ASSAY GLUCOSE BLOOD QUANT: CPT

## 2020-10-21 PROCEDURE — 0WUF0JZ SUPPLEMENT ABDOMINAL WALL WITH SYNTHETIC SUBSTITUTE, OPEN APPROACH: ICD-10-PCS | Performed by: SURGERY

## 2020-10-21 PROCEDURE — 1200000000 HC SEMI PRIVATE

## 2020-10-21 PROCEDURE — 64488 TAP BLOCK BI INJECTION: CPT | Performed by: NURSE ANESTHETIST, CERTIFIED REGISTERED

## 2020-10-21 PROCEDURE — 36415 COLL VENOUS BLD VENIPUNCTURE: CPT

## 2020-10-21 PROCEDURE — 6370000000 HC RX 637 (ALT 250 FOR IP): Performed by: INTERNAL MEDICINE

## 2020-10-21 PROCEDURE — 6360000002 HC RX W HCPCS: Performed by: SURGERY

## 2020-10-21 PROCEDURE — 2709999900 HC NON-CHARGEABLE SUPPLY: Performed by: SURGERY

## 2020-10-21 PROCEDURE — 3700000001 HC ADD 15 MINUTES (ANESTHESIA): Performed by: SURGERY

## 2020-10-21 PROCEDURE — 7100000001 HC PACU RECOVERY - ADDTL 15 MIN: Performed by: SURGERY

## 2020-10-21 PROCEDURE — 3700000000 HC ANESTHESIA ATTENDED CARE: Performed by: SURGERY

## 2020-10-21 PROCEDURE — 0WPF0JZ REMOVAL OF SYNTHETIC SUBSTITUTE FROM ABDOMINAL WALL, OPEN APPROACH: ICD-10-PCS | Performed by: SURGERY

## 2020-10-21 PROCEDURE — 94150 VITAL CAPACITY TEST: CPT

## 2020-10-21 PROCEDURE — 80053 COMPREHEN METABOLIC PANEL: CPT

## 2020-10-21 PROCEDURE — 99224 PR SBSQ OBSERVATION CARE/DAY 15 MINUTES: CPT | Performed by: INTERNAL MEDICINE

## 2020-10-21 PROCEDURE — 2500000003 HC RX 250 WO HCPCS: Performed by: SURGERY

## 2020-10-21 PROCEDURE — 3600000014 HC SURGERY LEVEL 4 ADDTL 15MIN: Performed by: SURGERY

## 2020-10-21 PROCEDURE — 7100000000 HC PACU RECOVERY - FIRST 15 MIN: Performed by: SURGERY

## 2020-10-21 PROCEDURE — 82248 BILIRUBIN DIRECT: CPT

## 2020-10-21 PROCEDURE — 2580000003 HC RX 258: Performed by: INTERNAL MEDICINE

## 2020-10-21 PROCEDURE — 49565 PR REPAIR RECURR INCIS HERNIA,REDUC: CPT | Performed by: SURGERY

## 2020-10-21 PROCEDURE — 2580000003 HC RX 258: Performed by: NURSE ANESTHETIST, CERTIFIED REGISTERED

## 2020-10-21 PROCEDURE — C1781 MESH (IMPLANTABLE): HCPCS | Performed by: SURGERY

## 2020-10-21 PROCEDURE — 0KXL0Z6 TRANSFER LEFT ABDOMEN MUSCLE, TRANSVERSE RECTUS ABDOMINIS MYOCUTANEOUS FLAP, OPEN APPROACH: ICD-10-PCS | Performed by: SURGERY

## 2020-10-21 PROCEDURE — 83735 ASSAY OF MAGNESIUM: CPT

## 2020-10-21 PROCEDURE — 3600000004 HC SURGERY LEVEL 4 BASE: Performed by: SURGERY

## 2020-10-21 PROCEDURE — 6360000002 HC RX W HCPCS: Performed by: INTERNAL MEDICINE

## 2020-10-21 PROCEDURE — 88302 TISSUE EXAM BY PATHOLOGIST: CPT

## 2020-10-21 PROCEDURE — 6360000002 HC RX W HCPCS: Performed by: NURSE ANESTHETIST, CERTIFIED REGISTERED

## 2020-10-21 DEVICE — MESH SURG W8XL10IN UNCOATED MFIL POLYPR ABSRB HYDRGEL LO: Type: IMPLANTABLE DEVICE | Site: ABDOMEN | Status: FUNCTIONAL

## 2020-10-21 DEVICE — MESH HERN W12XL12IN INGUINAL POLYPR SQ L PORE MFIL SF: Type: IMPLANTABLE DEVICE | Site: ABDOMEN | Status: FUNCTIONAL

## 2020-10-21 RX ORDER — ROPIVACAINE HYDROCHLORIDE 10 MG/ML
INJECTION EPIDURAL; INFILTRATION; PERINEURAL PRN
Status: DISCONTINUED | OUTPATIENT
Start: 2020-10-21 | End: 2020-10-21 | Stop reason: SDUPTHER

## 2020-10-21 RX ORDER — FENTANYL CITRATE 50 UG/ML
25 INJECTION, SOLUTION INTRAMUSCULAR; INTRAVENOUS EVERY 5 MIN PRN
Status: DISCONTINUED | OUTPATIENT
Start: 2020-10-21 | End: 2020-10-21 | Stop reason: HOSPADM

## 2020-10-21 RX ORDER — LEVOTHYROXINE SODIUM 0.1 MG/1
100 TABLET ORAL DAILY
Status: DISCONTINUED | OUTPATIENT
Start: 2020-10-21 | End: 2020-10-27 | Stop reason: HOSPADM

## 2020-10-21 RX ORDER — SODIUM CHLORIDE, SODIUM LACTATE, POTASSIUM CHLORIDE, CALCIUM CHLORIDE 600; 310; 30; 20 MG/100ML; MG/100ML; MG/100ML; MG/100ML
INJECTION, SOLUTION INTRAVENOUS CONTINUOUS PRN
Status: DISCONTINUED | OUTPATIENT
Start: 2020-10-21 | End: 2020-10-21 | Stop reason: SDUPTHER

## 2020-10-21 RX ORDER — NICOTINE POLACRILEX 4 MG
15 LOZENGE BUCCAL PRN
Status: DISCONTINUED | OUTPATIENT
Start: 2020-10-21 | End: 2020-10-27 | Stop reason: HOSPADM

## 2020-10-21 RX ORDER — PROPRANOLOL HYDROCHLORIDE 10 MG/1
10 TABLET ORAL 3 TIMES DAILY
Status: DISCONTINUED | OUTPATIENT
Start: 2020-10-21 | End: 2020-10-27 | Stop reason: HOSPADM

## 2020-10-21 RX ORDER — SODIUM CHLORIDE 9 MG/ML
INJECTION, SOLUTION INTRAVENOUS CONTINUOUS
Status: DISCONTINUED | OUTPATIENT
Start: 2020-10-21 | End: 2020-10-23

## 2020-10-21 RX ORDER — NEOSTIGMINE METHYLSULFATE 1 MG/ML
INJECTION, SOLUTION INTRAVENOUS PRN
Status: DISCONTINUED | OUTPATIENT
Start: 2020-10-21 | End: 2020-10-21 | Stop reason: SDUPTHER

## 2020-10-21 RX ORDER — DEXTROSE MONOHYDRATE 50 MG/ML
100 INJECTION, SOLUTION INTRAVENOUS PRN
Status: DISCONTINUED | OUTPATIENT
Start: 2020-10-21 | End: 2020-10-27 | Stop reason: HOSPADM

## 2020-10-21 RX ORDER — ALBUTEROL SULFATE 2.5 MG/3ML
2.5 SOLUTION RESPIRATORY (INHALATION)
Status: DISCONTINUED | OUTPATIENT
Start: 2020-10-21 | End: 2020-10-21

## 2020-10-21 RX ORDER — ROCURONIUM BROMIDE 10 MG/ML
INJECTION, SOLUTION INTRAVENOUS PRN
Status: DISCONTINUED | OUTPATIENT
Start: 2020-10-21 | End: 2020-10-21 | Stop reason: SDUPTHER

## 2020-10-21 RX ORDER — FLUTICASONE PROPIONATE 50 MCG
2 SPRAY, SUSPENSION (ML) NASAL DAILY
Status: DISCONTINUED | OUTPATIENT
Start: 2020-10-21 | End: 2020-10-27 | Stop reason: HOSPADM

## 2020-10-21 RX ORDER — MIDAZOLAM HYDROCHLORIDE 1 MG/ML
INJECTION INTRAMUSCULAR; INTRAVENOUS PRN
Status: DISCONTINUED | OUTPATIENT
Start: 2020-10-21 | End: 2020-10-21 | Stop reason: SDUPTHER

## 2020-10-21 RX ORDER — PROMETHAZINE HYDROCHLORIDE 25 MG/1
12.5 TABLET ORAL EVERY 6 HOURS PRN
Status: DISCONTINUED | OUTPATIENT
Start: 2020-10-21 | End: 2020-10-22

## 2020-10-21 RX ORDER — PANTOPRAZOLE SODIUM 40 MG/1
40 TABLET, DELAYED RELEASE ORAL
Status: DISCONTINUED | OUTPATIENT
Start: 2020-10-21 | End: 2020-10-26

## 2020-10-21 RX ORDER — HYDROCODONE BITARTRATE AND ACETAMINOPHEN 5; 325 MG/1; MG/1
1 TABLET ORAL PRN
Status: DISCONTINUED | OUTPATIENT
Start: 2020-10-21 | End: 2020-10-21 | Stop reason: HOSPADM

## 2020-10-21 RX ORDER — HYDROCODONE BITARTRATE AND ACETAMINOPHEN 5; 325 MG/1; MG/1
2 TABLET ORAL PRN
Status: DISCONTINUED | OUTPATIENT
Start: 2020-10-21 | End: 2020-10-21 | Stop reason: HOSPADM

## 2020-10-21 RX ORDER — DEXTROSE, SODIUM CHLORIDE, AND POTASSIUM CHLORIDE 5; .45; .15 G/100ML; G/100ML; G/100ML
INJECTION INTRAVENOUS CONTINUOUS
Status: DISCONTINUED | OUTPATIENT
Start: 2020-10-21 | End: 2020-10-21

## 2020-10-21 RX ORDER — LAMOTRIGINE 100 MG/1
200 TABLET ORAL DAILY
Status: DISCONTINUED | OUTPATIENT
Start: 2020-10-21 | End: 2020-10-27 | Stop reason: HOSPADM

## 2020-10-21 RX ORDER — SODIUM CHLORIDE 0.9 % (FLUSH) 0.9 %
10 SYRINGE (ML) INJECTION EVERY 12 HOURS SCHEDULED
Status: DISCONTINUED | OUTPATIENT
Start: 2020-10-21 | End: 2020-10-21 | Stop reason: HOSPADM

## 2020-10-21 RX ORDER — DULOXETIN HYDROCHLORIDE 30 MG/1
60 CAPSULE, DELAYED RELEASE ORAL DAILY
Status: DISCONTINUED | OUTPATIENT
Start: 2020-10-21 | End: 2020-10-27 | Stop reason: HOSPADM

## 2020-10-21 RX ORDER — LISINOPRIL 20 MG/1
40 TABLET ORAL DAILY
Status: DISCONTINUED | OUTPATIENT
Start: 2020-10-21 | End: 2020-10-27 | Stop reason: HOSPADM

## 2020-10-21 RX ORDER — KETOROLAC TROMETHAMINE 30 MG/ML
INJECTION, SOLUTION INTRAMUSCULAR; INTRAVENOUS PRN
Status: DISCONTINUED | OUTPATIENT
Start: 2020-10-21 | End: 2020-10-21 | Stop reason: SDUPTHER

## 2020-10-21 RX ORDER — SODIUM CHLORIDE 0.9 % (FLUSH) 0.9 %
10 SYRINGE (ML) INJECTION EVERY 12 HOURS SCHEDULED
Status: DISCONTINUED | OUTPATIENT
Start: 2020-10-21 | End: 2020-10-27 | Stop reason: HOSPADM

## 2020-10-21 RX ORDER — GLYCOPYRROLATE 1 MG/5 ML
SYRINGE (ML) INTRAVENOUS PRN
Status: DISCONTINUED | OUTPATIENT
Start: 2020-10-21 | End: 2020-10-21 | Stop reason: SDUPTHER

## 2020-10-21 RX ORDER — INSULIN GLARGINE 100 [IU]/ML
5 INJECTION, SOLUTION SUBCUTANEOUS
Status: DISCONTINUED | OUTPATIENT
Start: 2020-10-21 | End: 2020-10-23

## 2020-10-21 RX ORDER — MAGNESIUM SULFATE 1 G/100ML
1 INJECTION INTRAVENOUS
Status: COMPLETED | OUTPATIENT
Start: 2020-10-21 | End: 2020-10-21

## 2020-10-21 RX ORDER — DEXAMETHASONE SODIUM PHOSPHATE 10 MG/ML
INJECTION INTRAMUSCULAR; INTRAVENOUS PRN
Status: DISCONTINUED | OUTPATIENT
Start: 2020-10-21 | End: 2020-10-21 | Stop reason: SDUPTHER

## 2020-10-21 RX ORDER — LIDOCAINE HYDROCHLORIDE 20 MG/ML
INJECTION, SOLUTION EPIDURAL; INFILTRATION; INTRACAUDAL; PERINEURAL PRN
Status: DISCONTINUED | OUTPATIENT
Start: 2020-10-21 | End: 2020-10-21 | Stop reason: SDUPTHER

## 2020-10-21 RX ORDER — DIPHENHYDRAMINE HYDROCHLORIDE 50 MG/ML
INJECTION INTRAMUSCULAR; INTRAVENOUS PRN
Status: DISCONTINUED | OUTPATIENT
Start: 2020-10-21 | End: 2020-10-21 | Stop reason: SDUPTHER

## 2020-10-21 RX ORDER — SODIUM CHLORIDE, SODIUM LACTATE, POTASSIUM CHLORIDE, CALCIUM CHLORIDE 600; 310; 30; 20 MG/100ML; MG/100ML; MG/100ML; MG/100ML
INJECTION, SOLUTION INTRAVENOUS CONTINUOUS
Status: DISCONTINUED | OUTPATIENT
Start: 2020-10-21 | End: 2020-10-21

## 2020-10-21 RX ORDER — PROPOFOL 10 MG/ML
INJECTION, EMULSION INTRAVENOUS PRN
Status: DISCONTINUED | OUTPATIENT
Start: 2020-10-21 | End: 2020-10-21 | Stop reason: SDUPTHER

## 2020-10-21 RX ORDER — ONDANSETRON 2 MG/ML
4 INJECTION INTRAMUSCULAR; INTRAVENOUS EVERY 6 HOURS PRN
Status: DISCONTINUED | OUTPATIENT
Start: 2020-10-21 | End: 2020-10-27 | Stop reason: HOSPADM

## 2020-10-21 RX ORDER — DULOXETIN HYDROCHLORIDE 30 MG/1
30 CAPSULE, DELAYED RELEASE ORAL DAILY
Status: DISCONTINUED | OUTPATIENT
Start: 2020-10-21 | End: 2020-10-27 | Stop reason: HOSPADM

## 2020-10-21 RX ORDER — ALBUTEROL SULFATE 2.5 MG/3ML
2.5 SOLUTION RESPIRATORY (INHALATION) EVERY 6 HOURS PRN
Status: DISCONTINUED | OUTPATIENT
Start: 2020-10-21 | End: 2020-10-26

## 2020-10-21 RX ORDER — FENTANYL CITRATE 50 UG/ML
INJECTION, SOLUTION INTRAMUSCULAR; INTRAVENOUS PRN
Status: DISCONTINUED | OUTPATIENT
Start: 2020-10-21 | End: 2020-10-21 | Stop reason: SDUPTHER

## 2020-10-21 RX ORDER — FUROSEMIDE 10 MG/ML
INJECTION INTRAMUSCULAR; INTRAVENOUS PRN
Status: DISCONTINUED | OUTPATIENT
Start: 2020-10-21 | End: 2020-10-21 | Stop reason: SDUPTHER

## 2020-10-21 RX ORDER — ONDANSETRON 2 MG/ML
INJECTION INTRAMUSCULAR; INTRAVENOUS PRN
Status: DISCONTINUED | OUTPATIENT
Start: 2020-10-21 | End: 2020-10-21 | Stop reason: SDUPTHER

## 2020-10-21 RX ORDER — PHENYLEPHRINE HYDROCHLORIDE 10 MG/ML
INJECTION INTRAVENOUS PRN
Status: DISCONTINUED | OUTPATIENT
Start: 2020-10-21 | End: 2020-10-21 | Stop reason: SDUPTHER

## 2020-10-21 RX ORDER — MORPHINE SULFATE 2 MG/ML
2 INJECTION, SOLUTION INTRAMUSCULAR; INTRAVENOUS EVERY 5 MIN PRN
Status: DISCONTINUED | OUTPATIENT
Start: 2020-10-21 | End: 2020-10-21 | Stop reason: HOSPADM

## 2020-10-21 RX ORDER — HYDROXYZINE HYDROCHLORIDE 25 MG/1
25 TABLET, FILM COATED ORAL EVERY 6 HOURS PRN
Status: DISCONTINUED | OUTPATIENT
Start: 2020-10-21 | End: 2020-10-27 | Stop reason: HOSPADM

## 2020-10-21 RX ORDER — KETAMINE HCL IN NACL, ISO-OSM 100MG/10ML
SYRINGE (ML) INJECTION PRN
Status: DISCONTINUED | OUTPATIENT
Start: 2020-10-21 | End: 2020-10-21 | Stop reason: SDUPTHER

## 2020-10-21 RX ORDER — ATORVASTATIN CALCIUM 10 MG/1
10 TABLET, FILM COATED ORAL DAILY
Status: DISCONTINUED | OUTPATIENT
Start: 2020-10-21 | End: 2020-10-27 | Stop reason: HOSPADM

## 2020-10-21 RX ORDER — ONDANSETRON 2 MG/ML
4 INJECTION INTRAMUSCULAR; INTRAVENOUS
Status: DISCONTINUED | OUTPATIENT
Start: 2020-10-21 | End: 2020-10-21 | Stop reason: HOSPADM

## 2020-10-21 RX ORDER — DEXTROSE MONOHYDRATE 25 G/50ML
12.5 INJECTION, SOLUTION INTRAVENOUS PRN
Status: DISCONTINUED | OUTPATIENT
Start: 2020-10-21 | End: 2020-10-27 | Stop reason: HOSPADM

## 2020-10-21 RX ORDER — MORPHINE SULFATE 2 MG/ML
1 INJECTION, SOLUTION INTRAMUSCULAR; INTRAVENOUS EVERY 5 MIN PRN
Status: DISCONTINUED | OUTPATIENT
Start: 2020-10-21 | End: 2020-10-21 | Stop reason: HOSPADM

## 2020-10-21 RX ORDER — NICOTINE 21 MG/24HR
1 PATCH, TRANSDERMAL 24 HOURS TRANSDERMAL DAILY
Status: DISCONTINUED | OUTPATIENT
Start: 2020-10-21 | End: 2020-10-27 | Stop reason: HOSPADM

## 2020-10-21 RX ORDER — SODIUM CHLORIDE FOR INHALATION 0.9 %
3 VIAL, NEBULIZER (ML) INHALATION
Status: DISCONTINUED | OUTPATIENT
Start: 2020-10-21 | End: 2020-10-21 | Stop reason: ALTCHOICE

## 2020-10-21 RX ORDER — SCOLOPAMINE TRANSDERMAL SYSTEM 1 MG/1
1 PATCH, EXTENDED RELEASE TRANSDERMAL ONCE
Status: COMPLETED | OUTPATIENT
Start: 2020-10-21 | End: 2020-10-24

## 2020-10-21 RX ORDER — ALBUTEROL SULFATE 2.5 MG/3ML
2.5 SOLUTION RESPIRATORY (INHALATION)
Status: DISCONTINUED | OUTPATIENT
Start: 2020-10-21 | End: 2020-10-21 | Stop reason: SDUPTHER

## 2020-10-21 RX ORDER — DIPHENHYDRAMINE HYDROCHLORIDE 50 MG/ML
12.5 INJECTION INTRAMUSCULAR; INTRAVENOUS
Status: DISCONTINUED | OUTPATIENT
Start: 2020-10-21 | End: 2020-10-21 | Stop reason: HOSPADM

## 2020-10-21 RX ORDER — SODIUM CHLORIDE 0.9 % (FLUSH) 0.9 %
10 SYRINGE (ML) INJECTION PRN
Status: DISCONTINUED | OUTPATIENT
Start: 2020-10-21 | End: 2020-10-21 | Stop reason: HOSPADM

## 2020-10-21 RX ORDER — SODIUM CHLORIDE 0.9 % (FLUSH) 0.9 %
10 SYRINGE (ML) INJECTION PRN
Status: DISCONTINUED | OUTPATIENT
Start: 2020-10-21 | End: 2020-10-27 | Stop reason: HOSPADM

## 2020-10-21 RX ORDER — CETIRIZINE HYDROCHLORIDE 5 MG/1
10 TABLET ORAL DAILY
Status: DISCONTINUED | OUTPATIENT
Start: 2020-10-21 | End: 2020-10-27 | Stop reason: HOSPADM

## 2020-10-21 RX ORDER — AMLODIPINE BESYLATE 5 MG/1
10 TABLET ORAL DAILY
Status: DISCONTINUED | OUTPATIENT
Start: 2020-10-22 | End: 2020-10-27 | Stop reason: HOSPADM

## 2020-10-21 RX ORDER — MEPERIDINE HYDROCHLORIDE 50 MG/ML
12.5 INJECTION INTRAMUSCULAR; INTRAVENOUS; SUBCUTANEOUS EVERY 5 MIN PRN
Status: DISCONTINUED | OUTPATIENT
Start: 2020-10-21 | End: 2020-10-21 | Stop reason: HOSPADM

## 2020-10-21 RX ORDER — FENTANYL CITRATE 50 UG/ML
50 INJECTION, SOLUTION INTRAMUSCULAR; INTRAVENOUS EVERY 5 MIN PRN
Status: DISCONTINUED | OUTPATIENT
Start: 2020-10-21 | End: 2020-10-21 | Stop reason: HOSPADM

## 2020-10-21 RX ADMIN — Medication 10 MG: at 09:05

## 2020-10-21 RX ADMIN — PROPRANOLOL HYDROCHLORIDE 10 MG: 10 TABLET ORAL at 16:17

## 2020-10-21 RX ADMIN — PANTOPRAZOLE SODIUM 40 MG: 40 TABLET, DELAYED RELEASE ORAL at 21:33

## 2020-10-21 RX ADMIN — LEVOTHYROXINE SODIUM 100 MCG: 0.1 TABLET ORAL at 16:17

## 2020-10-21 RX ADMIN — ROPIVACAINE HYDROCHLORIDE 5 ML: 10 INJECTION, SOLUTION EPIDURAL at 07:57

## 2020-10-21 RX ADMIN — SODIUM CHLORIDE, SODIUM LACTATE, POTASSIUM CHLORIDE, AND CALCIUM CHLORIDE: .6; .31; .03; .02 INJECTION, SOLUTION INTRAVENOUS at 07:25

## 2020-10-21 RX ADMIN — SODIUM CHLORIDE, POTASSIUM CHLORIDE, SODIUM LACTATE AND CALCIUM CHLORIDE: 600; 310; 30; 20 INJECTION, SOLUTION INTRAVENOUS at 12:21

## 2020-10-21 RX ADMIN — INSULIN LISPRO 1 UNITS: 100 INJECTION, SOLUTION INTRAVENOUS; SUBCUTANEOUS at 21:21

## 2020-10-21 RX ADMIN — KETOROLAC TROMETHAMINE 30 MG: 30 INJECTION, SOLUTION INTRAMUSCULAR; INTRAVENOUS at 12:35

## 2020-10-21 RX ADMIN — POTASSIUM CHLORIDE, DEXTROSE MONOHYDRATE AND SODIUM CHLORIDE: 150; 5; 450 INJECTION, SOLUTION INTRAVENOUS at 15:45

## 2020-10-21 RX ADMIN — Medication 0.6 MG: at 13:21

## 2020-10-21 RX ADMIN — CEFAZOLIN SODIUM 2 G: 1 INJECTION, POWDER, FOR SOLUTION INTRAMUSCULAR; INTRAVENOUS at 21:34

## 2020-10-21 RX ADMIN — Medication 10 MG: at 11:52

## 2020-10-21 RX ADMIN — LAMOTRIGINE 200 MG: 100 TABLET ORAL at 21:33

## 2020-10-21 RX ADMIN — ATORVASTATIN CALCIUM 10 MG: 10 TABLET, FILM COATED ORAL at 21:33

## 2020-10-21 RX ADMIN — LIDOCAINE HYDROCHLORIDE 20 MG: 20 INJECTION, SOLUTION EPIDURAL; INFILTRATION; INTRACAUDAL; PERINEURAL at 09:05

## 2020-10-21 RX ADMIN — PROPOFOL 200 MG: 10 INJECTION, EMULSION INTRAVENOUS at 07:47

## 2020-10-21 RX ADMIN — SODIUM CHLORIDE, POTASSIUM CHLORIDE, SODIUM LACTATE AND CALCIUM CHLORIDE: 600; 310; 30; 20 INJECTION, SOLUTION INTRAVENOUS at 13:21

## 2020-10-21 RX ADMIN — SODIUM CHLORIDE: 9 INJECTION, SOLUTION INTRAVENOUS at 16:51

## 2020-10-21 RX ADMIN — FUROSEMIDE 10 MG: 10 INJECTION, SOLUTION INTRAMUSCULAR; INTRAVENOUS at 13:29

## 2020-10-21 RX ADMIN — LIDOCAINE HYDROCHLORIDE 20 MG: 20 INJECTION, SOLUTION EPIDURAL; INFILTRATION; INTRACAUDAL; PERINEURAL at 12:36

## 2020-10-21 RX ADMIN — Medication 0.5 MG: at 10:07

## 2020-10-21 RX ADMIN — SODIUM CHLORIDE, POTASSIUM CHLORIDE, SODIUM LACTATE AND CALCIUM CHLORIDE: 600; 310; 30; 20 INJECTION, SOLUTION INTRAVENOUS at 09:40

## 2020-10-21 RX ADMIN — ROCURONIUM BROMIDE 40 MG: 10 INJECTION, SOLUTION INTRAVENOUS at 07:47

## 2020-10-21 RX ADMIN — ENOXAPARIN SODIUM 40 MG: 40 INJECTION SUBCUTANEOUS at 21:49

## 2020-10-21 RX ADMIN — Medication 1 MG: at 11:31

## 2020-10-21 RX ADMIN — MAGNESIUM SULFATE HEPTAHYDRATE 1 G: 1 INJECTION, SOLUTION INTRAVENOUS at 16:51

## 2020-10-21 RX ADMIN — LIDOCAINE HYDROCHLORIDE 20 MG: 20 INJECTION, SOLUTION EPIDURAL; INFILTRATION; INTRACAUDAL; PERINEURAL at 11:52

## 2020-10-21 RX ADMIN — ROCURONIUM BROMIDE 20 MG: 10 INJECTION, SOLUTION INTRAVENOUS at 08:29

## 2020-10-21 RX ADMIN — ONDANSETRON 4 MG: 2 INJECTION INTRAMUSCULAR; INTRAVENOUS at 08:09

## 2020-10-21 RX ADMIN — PHENYLEPHRINE HYDROCHLORIDE 100 MCG: 10 INJECTION INTRAVENOUS at 13:30

## 2020-10-21 RX ADMIN — HYDROMORPHONE HYDROCHLORIDE 0.5 MG: 1 INJECTION, SOLUTION INTRAMUSCULAR; INTRAVENOUS; SUBCUTANEOUS at 19:18

## 2020-10-21 RX ADMIN — INSULIN LISPRO 1 UNITS: 100 INJECTION, SOLUTION INTRAVENOUS; SUBCUTANEOUS at 17:10

## 2020-10-21 RX ADMIN — ROPIVACAINE HYDROCHLORIDE 5 ML: 10 INJECTION, SOLUTION EPIDURAL at 07:53

## 2020-10-21 RX ADMIN — MIDAZOLAM HYDROCHLORIDE 1 MG: 1 INJECTION, SOLUTION INTRAMUSCULAR; INTRAVENOUS at 07:39

## 2020-10-21 RX ADMIN — FENTANYL CITRATE 50 MCG: 50 INJECTION, SOLUTION INTRAMUSCULAR; INTRAVENOUS at 07:39

## 2020-10-21 RX ADMIN — LIDOCAINE HYDROCHLORIDE 20 MG: 20 INJECTION, SOLUTION EPIDURAL; INFILTRATION; INTRACAUDAL; PERINEURAL at 10:07

## 2020-10-21 RX ADMIN — LIDOCAINE HYDROCHLORIDE 100 MG: 20 INJECTION, SOLUTION EPIDURAL; INFILTRATION; INTRACAUDAL; PERINEURAL at 07:47

## 2020-10-21 RX ADMIN — INSULIN GLARGINE 5 UNITS: 100 INJECTION, SOLUTION SUBCUTANEOUS at 17:10

## 2020-10-21 RX ADMIN — PHENYLEPHRINE HYDROCHLORIDE 100 MCG: 10 INJECTION INTRAVENOUS at 13:16

## 2020-10-21 RX ADMIN — ROCURONIUM BROMIDE 20 MG: 10 INJECTION, SOLUTION INTRAVENOUS at 12:23

## 2020-10-21 RX ADMIN — FENTANYL CITRATE 50 MCG: 50 INJECTION, SOLUTION INTRAMUSCULAR; INTRAVENOUS at 07:47

## 2020-10-21 RX ADMIN — FLUTICASONE PROPIONATE 2 SPRAY: 50 SPRAY, METERED NASAL at 21:34

## 2020-10-21 RX ADMIN — ROPIVACAINE HYDROCHLORIDE 5 ML: 10 INJECTION, SOLUTION EPIDURAL at 07:55

## 2020-10-21 RX ADMIN — Medication 0.5 MG: at 13:34

## 2020-10-21 RX ADMIN — LIDOCAINE HYDROCHLORIDE 20 MG: 20 INJECTION, SOLUTION EPIDURAL; INFILTRATION; INTRACAUDAL; PERINEURAL at 11:01

## 2020-10-21 RX ADMIN — ROCURONIUM BROMIDE 20 MG: 10 INJECTION, SOLUTION INTRAVENOUS at 11:22

## 2020-10-21 RX ADMIN — Medication 10 MG: at 10:07

## 2020-10-21 RX ADMIN — LISINOPRIL 40 MG: 20 TABLET ORAL at 21:33

## 2020-10-21 RX ADMIN — DULOXETINE HYDROCHLORIDE 60 MG: 30 CAPSULE, DELAYED RELEASE ORAL at 21:35

## 2020-10-21 RX ADMIN — SODIUM CHLORIDE, POTASSIUM CHLORIDE, SODIUM LACTATE AND CALCIUM CHLORIDE: 600; 310; 30; 20 INJECTION, SOLUTION INTRAVENOUS at 07:41

## 2020-10-21 RX ADMIN — ROPIVACAINE HYDROCHLORIDE 5 ML: 10 INJECTION, SOLUTION EPIDURAL at 08:00

## 2020-10-21 RX ADMIN — DULOXETINE HYDROCHLORIDE 30 MG: 30 CAPSULE, DELAYED RELEASE ORAL at 21:34

## 2020-10-21 RX ADMIN — FUROSEMIDE 10 MG: 10 INJECTION, SOLUTION INTRAMUSCULAR; INTRAVENOUS at 12:34

## 2020-10-21 RX ADMIN — DEXAMETHASONE SODIUM PHOSPHATE 10 MG: 10 INJECTION INTRAMUSCULAR; INTRAVENOUS at 08:09

## 2020-10-21 RX ADMIN — Medication 3 MG: at 13:21

## 2020-10-21 RX ADMIN — PROPRANOLOL HYDROCHLORIDE 10 MG: 10 TABLET ORAL at 21:33

## 2020-10-21 RX ADMIN — HYDROMORPHONE HYDROCHLORIDE 0.5 MG: 1 INJECTION, SOLUTION INTRAMUSCULAR; INTRAVENOUS; SUBCUTANEOUS at 22:23

## 2020-10-21 RX ADMIN — Medication 10 ML: at 19:38

## 2020-10-21 RX ADMIN — Medication 0.5 MG: at 12:43

## 2020-10-21 RX ADMIN — SODIUM CHLORIDE, POTASSIUM CHLORIDE, SODIUM LACTATE AND CALCIUM CHLORIDE: 600; 310; 30; 20 INJECTION, SOLUTION INTRAVENOUS at 11:36

## 2020-10-21 RX ADMIN — ROCURONIUM BROMIDE 20 MG: 10 INJECTION, SOLUTION INTRAVENOUS at 09:38

## 2020-10-21 RX ADMIN — Medication 2 G: at 12:12

## 2020-10-21 RX ADMIN — Medication 0.5 MG: at 09:06

## 2020-10-21 RX ADMIN — HYDROMORPHONE HYDROCHLORIDE 0.5 MG: 1 INJECTION, SOLUTION INTRAMUSCULAR; INTRAVENOUS; SUBCUTANEOUS at 15:52

## 2020-10-21 RX ADMIN — Medication 20 MG: at 08:09

## 2020-10-21 RX ADMIN — DIPHENHYDRAMINE HYDROCHLORIDE 25 MG: 50 INJECTION, SOLUTION INTRAMUSCULAR; INTRAVENOUS at 08:09

## 2020-10-21 RX ADMIN — MIDAZOLAM HYDROCHLORIDE 1 MG: 1 INJECTION, SOLUTION INTRAMUSCULAR; INTRAVENOUS at 07:47

## 2020-10-21 RX ADMIN — CETIRIZINE HYDROCHLORIDE 10 MG: 5 TABLET, FILM COATED ORAL at 21:33

## 2020-10-21 RX ADMIN — ONDANSETRON 4 MG: 2 INJECTION INTRAMUSCULAR; INTRAVENOUS at 19:35

## 2020-10-21 RX ADMIN — SODIUM CHLORIDE, POTASSIUM CHLORIDE, SODIUM LACTATE AND CALCIUM CHLORIDE: 600; 310; 30; 20 INJECTION, SOLUTION INTRAVENOUS at 08:04

## 2020-10-21 RX ADMIN — MAGNESIUM SULFATE HEPTAHYDRATE 1 G: 1 INJECTION, SOLUTION INTRAVENOUS at 18:43

## 2020-10-21 RX ADMIN — Medication 2 G: at 08:12

## 2020-10-21 ASSESSMENT — PULMONARY FUNCTION TESTS
PIF_VALUE: 31
PIF_VALUE: 26
PIF_VALUE: 25
PIF_VALUE: 24
PIF_VALUE: 30
PIF_VALUE: 26
PIF_VALUE: 26
PIF_VALUE: 25
PIF_VALUE: 2
PIF_VALUE: 25
PIF_VALUE: 35
PIF_VALUE: 26
PIF_VALUE: 24
PIF_VALUE: 27
PIF_VALUE: 31
PIF_VALUE: 26
PIF_VALUE: 26
PIF_VALUE: 29
PIF_VALUE: 26
PIF_VALUE: 25
PIF_VALUE: 35
PIF_VALUE: 25
PIF_VALUE: 29
PIF_VALUE: 28
PIF_VALUE: 26
PIF_VALUE: 25
PIF_VALUE: 23
PIF_VALUE: 25
PIF_VALUE: 25
PIF_VALUE: 26
PIF_VALUE: 25
PIF_VALUE: 27
PIF_VALUE: 24
PIF_VALUE: 25
PIF_VALUE: 26
PIF_VALUE: 26
PIF_VALUE: 29
PIF_VALUE: 29
PIF_VALUE: 25
PIF_VALUE: 27
PIF_VALUE: 26
PIF_VALUE: 25
PIF_VALUE: 32
PIF_VALUE: 26
PIF_VALUE: 27
PIF_VALUE: 32
PIF_VALUE: 24
PIF_VALUE: 26
PIF_VALUE: 28
PIF_VALUE: 28
PIF_VALUE: 24
PIF_VALUE: 2
PIF_VALUE: 25
PIF_VALUE: 26
PIF_VALUE: 25
PIF_VALUE: 27
PIF_VALUE: 25
PIF_VALUE: 26
PIF_VALUE: 24
PIF_VALUE: 26
PIF_VALUE: 29
PIF_VALUE: 24
PIF_VALUE: 25
PIF_VALUE: 25
PIF_VALUE: 31
PIF_VALUE: 24
PIF_VALUE: 29
PIF_VALUE: 38
PIF_VALUE: 29
PIF_VALUE: 26
PIF_VALUE: 35
PIF_VALUE: 26
PIF_VALUE: 24
PIF_VALUE: 27
PIF_VALUE: 26
PIF_VALUE: 26
PIF_VALUE: 25
PIF_VALUE: 26
PIF_VALUE: 30
PIF_VALUE: 26
PIF_VALUE: 28
PIF_VALUE: 25
PIF_VALUE: 29
PIF_VALUE: 25
PIF_VALUE: 24
PIF_VALUE: 27
PIF_VALUE: 25
PIF_VALUE: 24
PIF_VALUE: 29
PIF_VALUE: 27
PIF_VALUE: 36
PIF_VALUE: 36
PIF_VALUE: 25
PIF_VALUE: 34
PIF_VALUE: 25
PIF_VALUE: 24
PIF_VALUE: 24
PIF_VALUE: 28
PIF_VALUE: 26
PIF_VALUE: 25
PIF_VALUE: 25
PIF_VALUE: 26
PIF_VALUE: 27
PIF_VALUE: 25
PIF_VALUE: 27
PIF_VALUE: 25
PIF_VALUE: 18
PIF_VALUE: 26
PIF_VALUE: 25
PIF_VALUE: 24
PIF_VALUE: 28
PIF_VALUE: 30
PIF_VALUE: 26
PIF_VALUE: 25
PIF_VALUE: 26
PIF_VALUE: 28
PIF_VALUE: 24
PIF_VALUE: 26
PIF_VALUE: 25
PIF_VALUE: 25
PIF_VALUE: 2
PIF_VALUE: 26
PIF_VALUE: 25
PIF_VALUE: 33
PIF_VALUE: 25
PIF_VALUE: 25
PIF_VALUE: 26
PIF_VALUE: 25
PIF_VALUE: 29
PIF_VALUE: 26
PIF_VALUE: 26
PIF_VALUE: 25
PIF_VALUE: 26
PIF_VALUE: 25
PIF_VALUE: 25
PIF_VALUE: 26
PIF_VALUE: 26
PIF_VALUE: 25
PIF_VALUE: 26
PIF_VALUE: 24
PIF_VALUE: 26
PIF_VALUE: 35
PIF_VALUE: 27
PIF_VALUE: 24
PIF_VALUE: 26
PIF_VALUE: 25
PIF_VALUE: 26
PIF_VALUE: 26
PIF_VALUE: 27
PIF_VALUE: 34
PIF_VALUE: 26
PIF_VALUE: 28
PIF_VALUE: 25
PIF_VALUE: 26
PIF_VALUE: 24
PIF_VALUE: 25
PIF_VALUE: 32
PIF_VALUE: 27
PIF_VALUE: 30
PIF_VALUE: 24
PIF_VALUE: 27
PIF_VALUE: 25
PIF_VALUE: 26
PIF_VALUE: 25
PIF_VALUE: 24
PIF_VALUE: 25
PIF_VALUE: 27
PIF_VALUE: 18
PIF_VALUE: 26
PIF_VALUE: 23
PIF_VALUE: 28
PIF_VALUE: 26
PIF_VALUE: 25
PIF_VALUE: 35
PIF_VALUE: 27
PIF_VALUE: 29
PIF_VALUE: 0
PIF_VALUE: 28
PIF_VALUE: 24
PIF_VALUE: 25
PIF_VALUE: 27
PIF_VALUE: 29
PIF_VALUE: 26
PIF_VALUE: 23
PIF_VALUE: 31
PIF_VALUE: 27
PIF_VALUE: 31
PIF_VALUE: 26
PIF_VALUE: 24
PIF_VALUE: 28
PIF_VALUE: 26
PIF_VALUE: 3
PIF_VALUE: 25
PIF_VALUE: 27
PIF_VALUE: 25
PIF_VALUE: 26
PIF_VALUE: 26
PIF_VALUE: 25
PIF_VALUE: 25
PIF_VALUE: 23
PIF_VALUE: 25
PIF_VALUE: 26
PIF_VALUE: 28
PIF_VALUE: 29
PIF_VALUE: 26
PIF_VALUE: 25
PIF_VALUE: 25
PIF_VALUE: 26
PIF_VALUE: 25
PIF_VALUE: 3
PIF_VALUE: 25
PIF_VALUE: 26
PIF_VALUE: 25
PIF_VALUE: 27
PIF_VALUE: 25
PIF_VALUE: 28
PIF_VALUE: 28
PIF_VALUE: 27
PIF_VALUE: 25
PIF_VALUE: 27
PIF_VALUE: 24
PIF_VALUE: 29
PIF_VALUE: 28
PIF_VALUE: 26
PIF_VALUE: 25
PIF_VALUE: 26
PIF_VALUE: 26
PIF_VALUE: 24
PIF_VALUE: 2
PIF_VALUE: 27
PIF_VALUE: 28
PIF_VALUE: 25
PIF_VALUE: 27
PIF_VALUE: 26
PIF_VALUE: 25
PIF_VALUE: 25
PIF_VALUE: 0
PIF_VALUE: 24
PIF_VALUE: 25
PIF_VALUE: 30
PIF_VALUE: 25
PIF_VALUE: 28
PIF_VALUE: 27
PIF_VALUE: 26
PIF_VALUE: 25
PIF_VALUE: 25
PIF_VALUE: 26
PIF_VALUE: 27
PIF_VALUE: 25
PIF_VALUE: 26
PIF_VALUE: 19
PIF_VALUE: 24
PIF_VALUE: 26
PIF_VALUE: 27
PIF_VALUE: 31
PIF_VALUE: 27
PIF_VALUE: 27
PIF_VALUE: 25
PIF_VALUE: 25
PIF_VALUE: 32
PIF_VALUE: 28
PIF_VALUE: 25
PIF_VALUE: 29
PIF_VALUE: 26
PIF_VALUE: 26
PIF_VALUE: 25
PIF_VALUE: 25
PIF_VALUE: 26
PIF_VALUE: 34
PIF_VALUE: 23
PIF_VALUE: 28
PIF_VALUE: 27
PIF_VALUE: 26
PIF_VALUE: 25
PIF_VALUE: 26
PIF_VALUE: 25
PIF_VALUE: 25
PIF_VALUE: 26
PIF_VALUE: 33
PIF_VALUE: 24
PIF_VALUE: 27
PIF_VALUE: 29
PIF_VALUE: 25
PIF_VALUE: 27
PIF_VALUE: 28
PIF_VALUE: 26
PIF_VALUE: 28
PIF_VALUE: 25
PIF_VALUE: 31
PIF_VALUE: 26
PIF_VALUE: 23
PIF_VALUE: 29
PIF_VALUE: 28
PIF_VALUE: 26
PIF_VALUE: 25
PIF_VALUE: 25
PIF_VALUE: 26
PIF_VALUE: 26
PIF_VALUE: 25
PIF_VALUE: 27
PIF_VALUE: 26
PIF_VALUE: 29
PIF_VALUE: 27
PIF_VALUE: 25
PIF_VALUE: 26
PIF_VALUE: 25
PIF_VALUE: 28
PIF_VALUE: 26
PIF_VALUE: 26
PIF_VALUE: 25
PIF_VALUE: 27
PIF_VALUE: 26
PIF_VALUE: 25
PIF_VALUE: 30
PIF_VALUE: 27
PIF_VALUE: 26
PIF_VALUE: 25
PIF_VALUE: 26
PIF_VALUE: 26
PIF_VALUE: 25
PIF_VALUE: 25
PIF_VALUE: 27
PIF_VALUE: 26
PIF_VALUE: 25
PIF_VALUE: 24
PIF_VALUE: 25
PIF_VALUE: 24
PIF_VALUE: 28
PIF_VALUE: 26
PIF_VALUE: 26
PIF_VALUE: 36
PIF_VALUE: 4
PIF_VALUE: 31
PIF_VALUE: 34
PIF_VALUE: 25
PIF_VALUE: 24
PIF_VALUE: 25
PIF_VALUE: 26
PIF_VALUE: 32
PIF_VALUE: 30
PIF_VALUE: 26
PIF_VALUE: 28
PIF_VALUE: 34
PIF_VALUE: 26
PIF_VALUE: 4
PIF_VALUE: 26
PIF_VALUE: 26

## 2020-10-21 ASSESSMENT — PAIN DESCRIPTION - ORIENTATION
ORIENTATION: MID
ORIENTATION: MID

## 2020-10-21 ASSESSMENT — PAIN DESCRIPTION - FREQUENCY
FREQUENCY: INTERMITTENT

## 2020-10-21 ASSESSMENT — PAIN DESCRIPTION - LOCATION
LOCATION: ABDOMEN

## 2020-10-21 ASSESSMENT — PAIN DESCRIPTION - PAIN TYPE
TYPE: SURGICAL PAIN

## 2020-10-21 ASSESSMENT — PAIN - FUNCTIONAL ASSESSMENT: PAIN_FUNCTIONAL_ASSESSMENT: 0-10

## 2020-10-21 ASSESSMENT — COPD QUESTIONNAIRES: CAT_SEVERITY: MODERATE

## 2020-10-21 ASSESSMENT — PAIN SCALES - GENERAL
PAINLEVEL_OUTOF10: 6
PAINLEVEL_OUTOF10: 8
PAINLEVEL_OUTOF10: 4
PAINLEVEL_OUTOF10: 7
PAINLEVEL_OUTOF10: 7
PAINLEVEL_OUTOF10: 0
PAINLEVEL_OUTOF10: 7
PAINLEVEL_OUTOF10: 6
PAINLEVEL_OUTOF10: 0
PAINLEVEL_OUTOF10: 6

## 2020-10-21 ASSESSMENT — PAIN DESCRIPTION - DESCRIPTORS
DESCRIPTORS: SHARP
DESCRIPTORS: ACHING
DESCRIPTORS: SHARP

## 2020-10-21 ASSESSMENT — LIFESTYLE VARIABLES: SMOKING_STATUS: 1

## 2020-10-21 NOTE — PROGRESS NOTES
[facility]abezas, [facility] Status post repair of recurrent ventral hernia [Z98.890, Z87.19] . Vitals:    10/21/20 1530   BP: 138/81   Pulse: 99   Resp: 16   Temp: 98.4 °F (36.9 °C)   SpO2: 91%   . Patients home meds are   Prior to Admission medications    Medication Sig Start Date End Date Taking?  Authorizing Provider   ondansetron (ZOFRAN) 4 MG tablet Take 1 tablet by mouth every 8 hours as needed for Nausea or Vomiting 10/14/20  Yes Rick Barriga Black, DO   Cholecalciferol (VITAMIN D) 50 MCG (2000 UT) CAPS capsule Take 2,000 Units by mouth daily   Yes Historical Provider, MD   atorvastatin (LIPITOR) 10 MG tablet Take 1 tablet by mouth daily 6/10/20  Yes Rick Barriga Black, DO   propranolol (INDERAL) 10 MG tablet Take 10 mg by mouth 3 times daily   Yes Historical Provider, MD   hydrOXYzine (ATARAX) 25 MG tablet Take 25-30 mg by mouth every 6 hours as needed for Anxiety   Yes Historical Provider, MD   lisinopril (PRINIVIL;ZESTRIL) 40 MG tablet Take 1 tablet by mouth daily 5/18/20  Yes Lafferty Nithya Black, DO   amLODIPine (NORVASC) 10 MG tablet Take 1 tablet by mouth daily 5/18/20  Yes Rick Quitman Black, DO   loratadine (CLARITIN) 10 MG capsule Take 1 capsule by mouth daily 5/18/20  Yes Rickchinmay Barriga Black, DO   albuterol sulfate  (90 Base) MCG/ACT inhaler Inhale 1-2 puffs into the lungs every 6 hours as needed for Wheezing or Shortness of Breath 5/18/20  Yes Rick Nithya Black, DO   omeprazole (PRILOSEC) 10 MG delayed release capsule Take 2 capsules by mouth 2 times daily 5/18/20  Yes Rick Barriga Black, DO   fluticasone (FLONASE) 50 MCG/ACT nasal spray 2 sprays by Nasal route daily 4/20/20  Yes Dev Flores, DO   DULoxetine (CYMBALTA) 30 MG extended release capsule Take 30 mg by mouth daily   Yes Historical Provider, MD   DULoxetine (CYMBALTA) 60 MG extended release capsule Take 60 mg by mouth daily   Yes Historical Provider, MD   lamoTRIgine (LAMICTAL) 200 MG tablet Take 200 mg by mouth daily    Yes Historical Provider, MD   levothyroxine (SYNTHROID) 100 MCG tablet Take 100 mcg by mouth Daily   Yes Historical Provider, MD   HYDROcodone-acetaminophen (NORCO) 5-325 MG per tablet Take 1 tablet by mouth every 8 hours as needed for Pain for up to 7 days. Take lowest dose possible to manage pain 10/14/20 10/21/20  Dung Araujo Black, DO   cyclobenzaprine (FLEXERIL) 5 MG tablet Take 1-2 tablets by mouth 3 times daily as needed for Muscle spasms 9/22/20   Dung Araujo Black, DO   Multiple Vitamins-Minerals (MULTIVITAMIN ADULT PO) Take 1 tablet by mouth daily    Historical Provider, MD   lidocaine (LIDODERM) 5 % Place 1 patch onto the skin daily 12 hours on, 12 hours off. 6/30/20   Bandar Verde DO   .   Recent Surgical History: Lower Abdominal = 2     Assessment     Peak Flow (asthma only)    Predicted: 395  Personal Best: 81  PEF 81  % Predicted 20  Peak Flow : Less than 50% = 4    FEV1/FVC    FEV1 Predicted 2.67      FEV1 0.8    FEV1 % Predicted 20  FVC 1.4  IS volume 1000  IBW na    RR 14  Breath Sounds: clear      · Bronchodilator assessment at level  1  · Hyperinflation assessment at level 1  · Secretion Management assessment at level  1  ·   · [x]    Bronchodilator Assessment  BRONCHODILATOR ASSESSMENT SCORE  Score 0 1 2 3 4 5   Breath Sounds   []  Patient Baseline [x]  No Wheeze good aeration []  Faint, scattered wheezing, good aeration []  Expiratory Wheezing and or moderately diminished []  Insp/Exp wheeze and/or very diminished []  Insp/Exp and/ or marked distress   Respiratory Rate   []  Patient Baseline [x]  Less than 20 []  Less than 20 []  20-25 []  Greater than 25 []  Greater than 25   Peak flow % of Pred or PB []  NA   []  Greater than 90%  []  81-90% []  71-80% [x]  Less than or equal to 70%  or unable to perform []  Unable due to Respiratory Distress   Dyspnea re []  Patient Baseline [x]  No SOB []  No SOB []  SOB on exertion []  SOB min activity []  At rest/acute   e FEV% Predicted       []  NA []  Above 69%  []  Unable []  Above 60-69%  []  Unable []  Above 50-59%  []  Unable []  Above 35-49%  []  Unable []  Less than 35%  []  Unable                 [x]  Hyperinflation Assessment  Score 1 2 3   CXR and Breath Sounds   [x]  Clear []  No atelectasis  Basilar aeration []  Atelectasis or absent basilar breath sounds   Incentive Spirometry Volume  (Per IBW)   [x]  Greater than or equal to 15ml/Kg []  less than 15ml/Kg []  less than 15ml/Kg   Surgery within last 2 weeks [x]  None or general   []  Abdominal or thoracic surgery  []  Abdominal or thoracic   Chronic Pulmonary Historyre []  No []  Yes []  Yes     [x]  Secretion Management Assessment  Score 1 2 3   Bilateral Breath Sounds   [x]  Occasional Rhonchi []  Scattered Rhonchi []  Course Rhonchi and/or poor aeration   Sputum    [x]  Small amount of thin secretions []  Moderate amount of viscous secretions []  Copius, Viscious Yellow/ Secretions   CXR as reported by physician [x]  clear  []  Unavailable []  Infiltrates and/or consolidation  []  Unavailable []  Mucus Plugging and or lobar consolidation  []  Unavailable   Cough [x]  Strong, productive cough []  Weak productive cough []  No cough or weak non-productive cough   Yancy Cabezas  5:22 PM                            FEMALE                                  MALE                            FEV1 Predicted Normal Values                        FEV1 Predicted Normal Values          Age                                     Height in Feet and Inches       Age                                     Height in Feet and Inches       4' 11\" 5' 1\" 5' 3\" 5' 5\" 5' 7\" 5' 9\" 5' 11\" 6' 1\"  4' 11\" 5' 1\" 5' 3\" 5' 5\" 5' 7\" 5' 9\" 5' 11\" 6' 1\"   42 - 45 2.49 2.66 2.84 3.03 3.22 3.42 3.62 3.83 42 - 45 2.82 3.03 3.26 3.49 3.72 3.96 4.22 4.47   46 - 49 2.40 2.57 2.76 2.94 3.14 3.33 3.54 3.75 46 - 49 2.70 2.92 3.14 3.37 3.61 3.85 4.10 4.36   50 - 53 2.31 2.48 2.66 2.85 3.04 3.24 3.45 3.66 50 - 53 2.58 2.80 3.02 3.25 3.49 3.73 3.98 4.24 54 - 57 2.21 2.38 2.57 2.75 2.95 3.14 3.35 3.56 54 - 57 2.46 2.67 2.89 3.12 3.36 3.60 3.85 4.11   58 - 61 2.10 2.28 2.46 2.65 2.84 3.04 3.24 3.45 58 - 61 2.32 2.54 2.76 2.99 3.23 3.47 3.72 3.98   62 - 65 1.99 2.17 2.35 2.54 2.73 2.93 3.13 3.34 62 - 65 2.19 2.40 2.62 2.85 3.09 3.33 3.58 3.84   66 - 69 1.88 2.05 2.23 2.42 2.61 2.81 3.02 3.23 66 - 69 2.04 2.26 2.48 2.71 2.95 3.19 3.44 3.70   70+ 1.82 1.99 2.17 2.36 2.55 2.75 2.95 3.16 70+ 1.97 2.19 2.41 2.64 2.87 3.12 3.37 3.62             Predicted Peak Expiratory Flow Rate                                       Height (in)  Female       Height (in) Male           Age 64 63 56 61 58 73 78 74 Age            21 344 357 372 387 402 417 432 446  60 62 64 66 68 70 72 74 76   25 337 352 366 381 396 411 426 441 25 447 476 [phone] [phone]   30 329 344 359 374 389 404 419 434 30 437 466 494 523 552 [phone] 667   35 322 337 351 366 381 396 411 426 35 426 455 484 512 541 [phone]   40 314 329 344 359 374 389 404 419 40 416 445 473 [phone] 647   45 307 322 336 351 366 381 396 411 45 405 434 463 491 520 549 577 606 636   50 299 314 329 344 359 374 389 404 50 395 424 452 481 510 538 567 596 625   55 292 307 321 336 351 366 381 396 55 384 413 442 470 499 528 556 585 615   60 284 299 314 329 344 359 374 389 60 374 403 431 460 489 517 546 575 605   65 277 292 306 321 336 351 366 381 65 363 392 421 449 478 507 535 564 594   70 269 284 299 314 329 344 359 374 70 353 382 410 439 468 [phone] 583   75 261 274 289 305 319 334 348 364 75 344 372 400 429 458 487 515 544 573   80 253 266 282 296 312 327 342 356 80 335 362 390 419 448 476 [phone]

## 2020-10-21 NOTE — BRIEF OP NOTE
Brief Postoperative Note      Patient: Samy Alvarez  YOB: 1965  MRN: [id number]    Date of Procedure: 10/21/2020    Pre-Op Diagnosis: 2nd recurrent ventral hernia with pain, with defect epigastric area, defect about 5 cm    Post-Op Diagnosis: same with removal of 2 meshes       Operation:  1. Repair of recurrent ventral hernia  2. Implantation of mesh  3. B/L rectus abdominis advancement flap  4. Removal of mesh from old hernia repairs. Surgeon(s):  Ty Castillo MD    Assistant:  Yady Vazquez    Anesthesia: General    Estimated Blood Loss (mL): 060     Complications: None    Specimens:   ID Type Source Tests Collected by Time Destination   A : OLD MESH Tissue Abdomen SURGICAL PATHOLOGY Ty Castillo MD 10/21/2020 0902        Implants:  Implant Name Type Inv. Item Serial No.  Lot No. LRB No. Used Action   MESH SURG PATCH JOSE VENTRALIGHT ST ELLIPSE [id number] Mesh MESH SURG PATCH JOSE VENTRALIGHT ST ELLIPSE 8X10IN  [employer] [id number] N/A 1 Implanted   GRAFT 2020 [facility] SURG JOSE SFT [id number] Mesh GRAFT 2020 [facility] SURG JOSE SFT [id number]  [employer] [id number] N/A 1 Implanted         Drains:   Closed/Suction Drain Left;Medial LLQ Bulb (Active)   Dressing Status Clean;Dry; Intact 10/21/20 1336   Drainage Appearance Bloody 10/21/20 1336   Status To bulb suction 10/21/20 1336       Closed/Suction Drain Medial;Right RLQ Bulb (Active)   Dressing Status Clean;Dry; Intact 10/21/20 1336   Drainage Appearance Bloody 10/21/20 1336   Status To bulb suction 10/21/20 1336       Urethral Catheter Double-lumen 16 fr (Active)       Findings: defect about 5 cm by 7 cm, epigastric area    Electronically signed by Levy Camacho MD on 10/21/2020 at 2:08 PM

## 2020-10-21 NOTE — PLAN OF CARE
Problem: Infection - Surgical Site:  Goal: Will show no infection signs and symptoms  Description: Will show no infection signs and symptoms  Outcome: Ongoing     Problem: Activity:  Goal: Risk for activity intolerance will decrease  Description: Risk for activity intolerance will decrease  Outcome: Ongoing     Problem:  Bowel/Gastric:  Goal: Bowel function will improve  Description: Bowel function will improve  Outcome: Ongoing  Goal: Diagnostic test results will improve  Description: Diagnostic test results will improve  Outcome: Ongoing  Goal: Occurrences of nausea will decrease  Description: Occurrences of nausea will decrease  Outcome: Ongoing  Goal: Occurrences of vomiting will decrease  Description: Occurrences of vomiting will decrease  Outcome: Ongoing     Problem: Fluid Volume:  Goal: Maintenance of adequate hydration will improve  Description: Maintenance of adequate hydration will improve  Outcome: Ongoing     Problem: Health Behavior:  Goal: Ability to state signs and symptoms to report to health care provider will improve  Description: Ability to state signs and symptoms to report to health care provider will improve  Outcome: Ongoing     Problem: Physical Regulation:  Goal: Complications related to the disease process, condition or treatment will be avoided or minimized  Description: Complications related to the disease process, condition or treatment will be avoided or minimized  Outcome: Ongoing  Goal: Ability to maintain clinical measurements within normal limits will improve  Description: Ability to maintain clinical measurements within normal limits will improve  Outcome: Ongoing     Problem: Sensory:  Goal: Ability to identify factors that increase the pain will improve  Description: Ability to identify factors that increase the pain will improve  Outcome: Ongoing  Goal: Ability to notify healthcare provider of pain before it becomes unmanageable or unbearable will improve  Description: Ability to notify healthcare provider of pain before it becomes unmanageable or unbearable will improve  Outcome: Ongoing  Goal: Pain level will decrease  Description: Pain level will decrease  Outcome: Ongoing

## 2020-10-22 ENCOUNTER — APPOINTMENT (OUTPATIENT)
Dept: GENERAL RADIOLOGY | Age: 55
DRG: 227 | End: 2020-10-22
Attending: SURGERY
Payer: MEDICARE

## 2020-10-22 LAB
ABSOLUTE EOS #: <0.03 K/UL (ref 0–0.44)
ABSOLUTE IMMATURE GRANULOCYTE: 0.04 K/UL (ref 0–0.3)
ABSOLUTE LYMPH #: 1.21 K/UL (ref 1.1–3.7)
ABSOLUTE MONO #: 0.71 K/UL (ref 0.1–1.2)
ANION GAP SERPL CALCULATED.3IONS-SCNC: 11 MMOL/L (ref 9–17)
BASOPHILS # BLD: 0 % (ref 0–2)
BASOPHILS ABSOLUTE: <0.03 K/UL (ref 0–0.2)
BUN BLDV-MCNC: 17 MG/DL (ref 6–20)
BUN/CREAT BLD: 12 (ref 9–20)
CALCIUM SERPL-MCNC: 8.4 MG/DL (ref 8.6–10.4)
CHLORIDE BLD-SCNC: 103 MMOL/L (ref 98–107)
CO2: 23 MMOL/L (ref 20–31)
CREAT SERPL-MCNC: 1.46 MG/DL (ref 0.5–0.9)
DIFFERENTIAL TYPE: ABNORMAL
EOSINOPHILS RELATIVE PERCENT: 0 % (ref 1–4)
GFR AFRICAN AMERICAN: 45 ML/MIN
GFR NON-AFRICAN AMERICAN: 37 ML/MIN
GFR SERPL CREATININE-BSD FRML MDRD: ABNORMAL ML/MIN/{1.73_M2}
GFR SERPL CREATININE-BSD FRML MDRD: ABNORMAL ML/MIN/{1.73_M2}
GLUCOSE BLD-MCNC: 108 MG/DL (ref 65–105)
GLUCOSE BLD-MCNC: 113 MG/DL (ref 65–105)
GLUCOSE BLD-MCNC: 116 MG/DL (ref 65–105)
GLUCOSE BLD-MCNC: 139 MG/DL (ref 70–99)
HCT VFR BLD CALC: 36.5 % (ref 36.3–47.1)
HEMOGLOBIN: 12.1 G/DL (ref 11.9–15.1)
IMMATURE GRANULOCYTES: 0 %
LYMPHOCYTES # BLD: 9 % (ref 24–43)
MAGNESIUM: 1.9 MG/DL (ref 1.6–2.6)
MCH RBC QN AUTO: 30 PG (ref 25.2–33.5)
MCHC RBC AUTO-ENTMCNC: 33.2 G/DL (ref 25.2–33.5)
MCV RBC AUTO: 90.6 FL (ref 82.6–102.9)
MONOCYTES # BLD: 6 % (ref 3–12)
NRBC AUTOMATED: 0 PER 100 WBC
PDW BLD-RTO: 13.2 % (ref 11.8–14.4)
PLATELET # BLD: 169 K/UL (ref 138–453)
PLATELET ESTIMATE: ABNORMAL
PMV BLD AUTO: 8.9 FL (ref 8.1–13.5)
POTASSIUM SERPL-SCNC: 4.5 MMOL/L (ref 3.7–5.3)
RBC # BLD: 4.03 M/UL (ref 3.95–5.11)
RBC # BLD: ABNORMAL 10*6/UL
SEG NEUTROPHILS: 85 % (ref 36–65)
SEGMENTED NEUTROPHILS ABSOLUTE COUNT: 10.96 K/UL (ref 1.5–8.1)
SODIUM BLD-SCNC: 137 MMOL/L (ref 135–144)
WBC # BLD: 12.9 K/UL (ref 3.5–11.3)
WBC # BLD: ABNORMAL 10*3/UL

## 2020-10-22 PROCEDURE — 94669 MECHANICAL CHEST WALL OSCILL: CPT

## 2020-10-22 PROCEDURE — 80048 BASIC METABOLIC PNL TOTAL CA: CPT

## 2020-10-22 PROCEDURE — G0378 HOSPITAL OBSERVATION PER HR: HCPCS

## 2020-10-22 PROCEDURE — 94761 N-INVAS EAR/PLS OXIMETRY MLT: CPT

## 2020-10-22 PROCEDURE — 6370000000 HC RX 637 (ALT 250 FOR IP): Performed by: SURGERY

## 2020-10-22 PROCEDURE — 2580000003 HC RX 258: Performed by: INTERNAL MEDICINE

## 2020-10-22 PROCEDURE — 6370000000 HC RX 637 (ALT 250 FOR IP): Performed by: INTERNAL MEDICINE

## 2020-10-22 PROCEDURE — 94640 AIRWAY INHALATION TREATMENT: CPT

## 2020-10-22 PROCEDURE — 97165 OT EVAL LOW COMPLEX 30 MIN: CPT

## 2020-10-22 PROCEDURE — 83735 ASSAY OF MAGNESIUM: CPT

## 2020-10-22 PROCEDURE — 6360000002 HC RX W HCPCS: Performed by: NURSE PRACTITIONER

## 2020-10-22 PROCEDURE — 97161 PT EVAL LOW COMPLEX 20 MIN: CPT | Performed by: PHYSICAL THERAPIST

## 2020-10-22 PROCEDURE — 71045 X-RAY EXAM CHEST 1 VIEW: CPT

## 2020-10-22 PROCEDURE — 82947 ASSAY GLUCOSE BLOOD QUANT: CPT

## 2020-10-22 PROCEDURE — 1200000000 HC SEMI PRIVATE

## 2020-10-22 PROCEDURE — 6360000002 HC RX W HCPCS: Performed by: INTERNAL MEDICINE

## 2020-10-22 PROCEDURE — 6360000002 HC RX W HCPCS: Performed by: SURGERY

## 2020-10-22 PROCEDURE — 2580000003 HC RX 258: Performed by: SURGERY

## 2020-10-22 PROCEDURE — 2700000000 HC OXYGEN THERAPY PER DAY

## 2020-10-22 PROCEDURE — 99024 POSTOP FOLLOW-UP VISIT: CPT | Performed by: SURGERY

## 2020-10-22 PROCEDURE — 36415 COLL VENOUS BLD VENIPUNCTURE: CPT

## 2020-10-22 PROCEDURE — 85025 COMPLETE CBC W/AUTO DIFF WBC: CPT

## 2020-10-22 PROCEDURE — 99224 PR SBSQ OBSERVATION CARE/DAY 15 MINUTES: CPT | Performed by: INTERNAL MEDICINE

## 2020-10-22 RX ORDER — OXYCODONE HYDROCHLORIDE AND ACETAMINOPHEN 5; 325 MG/1; MG/1
2 TABLET ORAL EVERY 4 HOURS PRN
Status: DISCONTINUED | OUTPATIENT
Start: 2020-10-22 | End: 2020-10-27 | Stop reason: HOSPADM

## 2020-10-22 RX ORDER — FENTANYL CITRATE 50 UG/ML
25 INJECTION, SOLUTION INTRAMUSCULAR; INTRAVENOUS
Status: DISCONTINUED | OUTPATIENT
Start: 2020-10-22 | End: 2020-10-22

## 2020-10-22 RX ORDER — OXYCODONE HYDROCHLORIDE AND ACETAMINOPHEN 5; 325 MG/1; MG/1
1 TABLET ORAL EVERY 4 HOURS PRN
Status: DISCONTINUED | OUTPATIENT
Start: 2020-10-22 | End: 2020-10-27 | Stop reason: HOSPADM

## 2020-10-22 RX ORDER — ALBUTEROL SULFATE 2.5 MG/3ML
2.5 SOLUTION RESPIRATORY (INHALATION) EVERY 4 HOURS
Status: DISCONTINUED | OUTPATIENT
Start: 2020-10-22 | End: 2020-10-27 | Stop reason: HOSPADM

## 2020-10-22 RX ADMIN — ALBUTEROL SULFATE 2.5 MG: 2.5 SOLUTION RESPIRATORY (INHALATION) at 19:43

## 2020-10-22 RX ADMIN — PROPRANOLOL HYDROCHLORIDE 10 MG: 10 TABLET ORAL at 20:28

## 2020-10-22 RX ADMIN — SODIUM CHLORIDE: 9 INJECTION, SOLUTION INTRAVENOUS at 19:19

## 2020-10-22 RX ADMIN — ENOXAPARIN SODIUM 40 MG: 40 INJECTION SUBCUTANEOUS at 08:08

## 2020-10-22 RX ADMIN — LEVOTHYROXINE SODIUM 100 MCG: 0.1 TABLET ORAL at 08:01

## 2020-10-22 RX ADMIN — LISINOPRIL 40 MG: 20 TABLET ORAL at 20:27

## 2020-10-22 RX ADMIN — FENTANYL CITRATE 25 MCG: 50 INJECTION, SOLUTION INTRAMUSCULAR; INTRAVENOUS at 04:11

## 2020-10-22 RX ADMIN — OXYCODONE HYDROCHLORIDE AND ACETAMINOPHEN 1 TABLET: 5; 325 TABLET ORAL at 20:27

## 2020-10-22 RX ADMIN — ALBUTEROL SULFATE 2.5 MG: 2.5 SOLUTION RESPIRATORY (INHALATION) at 12:10

## 2020-10-22 RX ADMIN — ONDANSETRON 4 MG: 2 INJECTION INTRAMUSCULAR; INTRAVENOUS at 04:29

## 2020-10-22 RX ADMIN — Medication 10 ML: at 08:07

## 2020-10-22 RX ADMIN — AMLODIPINE BESYLATE 10 MG: 5 TABLET ORAL at 08:01

## 2020-10-22 RX ADMIN — HYDROXYZINE HYDROCHLORIDE 25 MG: 25 TABLET, FILM COATED ORAL at 04:19

## 2020-10-22 RX ADMIN — ONDANSETRON 4 MG: 2 INJECTION INTRAMUSCULAR; INTRAVENOUS at 09:37

## 2020-10-22 RX ADMIN — HYDROMORPHONE HYDROCHLORIDE 1 MG: 1 INJECTION, SOLUTION INTRAMUSCULAR; INTRAVENOUS; SUBCUTANEOUS at 13:52

## 2020-10-22 RX ADMIN — HYDROXYZINE HYDROCHLORIDE 25 MG: 25 TABLET, FILM COATED ORAL at 20:27

## 2020-10-22 RX ADMIN — PROPRANOLOL HYDROCHLORIDE 10 MG: 10 TABLET ORAL at 13:52

## 2020-10-22 RX ADMIN — CEFAZOLIN SODIUM 2 G: 1 INJECTION, POWDER, FOR SOLUTION INTRAMUSCULAR; INTRAVENOUS at 04:19

## 2020-10-22 RX ADMIN — FENTANYL CITRATE 25 MCG: 50 INJECTION, SOLUTION INTRAMUSCULAR; INTRAVENOUS at 06:51

## 2020-10-22 RX ADMIN — HYDROMORPHONE HYDROCHLORIDE 0.5 MG: 1 INJECTION, SOLUTION INTRAMUSCULAR; INTRAVENOUS; SUBCUTANEOUS at 01:23

## 2020-10-22 RX ADMIN — SODIUM CHLORIDE: 9 INJECTION, SOLUTION INTRAVENOUS at 09:31

## 2020-10-22 RX ADMIN — MAGNESIUM HYDROXIDE 30 ML: 400 SUSPENSION ORAL at 13:52

## 2020-10-22 RX ADMIN — ALBUTEROL SULFATE 2.5 MG: 2.5 SOLUTION RESPIRATORY (INHALATION) at 23:50

## 2020-10-22 RX ADMIN — ALBUTEROL SULFATE 2.5 MG: 2.5 SOLUTION RESPIRATORY (INHALATION) at 16:08

## 2020-10-22 RX ADMIN — DULOXETINE HYDROCHLORIDE 60 MG: 30 CAPSULE, DELAYED RELEASE ORAL at 20:28

## 2020-10-22 RX ADMIN — MAGNESIUM HYDROXIDE 30 ML: 400 SUSPENSION ORAL at 20:28

## 2020-10-22 RX ADMIN — ALBUTEROL SULFATE 2.5 MG: 2.5 SOLUTION RESPIRATORY (INHALATION) at 08:21

## 2020-10-22 RX ADMIN — FLUTICASONE PROPIONATE 2 SPRAY: 50 SPRAY, METERED NASAL at 20:29

## 2020-10-22 RX ADMIN — LAMOTRIGINE 200 MG: 100 TABLET ORAL at 20:28

## 2020-10-22 RX ADMIN — ATORVASTATIN CALCIUM 10 MG: 10 TABLET, FILM COATED ORAL at 20:27

## 2020-10-22 RX ADMIN — OXYCODONE HYDROCHLORIDE AND ACETAMINOPHEN 2 TABLET: 5; 325 TABLET ORAL at 16:04

## 2020-10-22 RX ADMIN — DULOXETINE HYDROCHLORIDE 30 MG: 30 CAPSULE, DELAYED RELEASE ORAL at 20:28

## 2020-10-22 RX ADMIN — SODIUM CHLORIDE: 9 INJECTION, SOLUTION INTRAVENOUS at 02:30

## 2020-10-22 RX ADMIN — PROPRANOLOL HYDROCHLORIDE 10 MG: 10 TABLET ORAL at 08:01

## 2020-10-22 RX ADMIN — HYDROMORPHONE HYDROCHLORIDE 1 MG: 1 INJECTION, SOLUTION INTRAMUSCULAR; INTRAVENOUS; SUBCUTANEOUS at 09:31

## 2020-10-22 RX ADMIN — OXYCODONE HYDROCHLORIDE AND ACETAMINOPHEN 1 TABLET: 5; 325 TABLET ORAL at 12:15

## 2020-10-22 ASSESSMENT — PAIN SCALES - GENERAL
PAINLEVEL_OUTOF10: 6
PAINLEVEL_OUTOF10: 9
PAINLEVEL_OUTOF10: 7
PAINLEVEL_OUTOF10: 9
PAINLEVEL_OUTOF10: 4
PAINLEVEL_OUTOF10: 6
PAINLEVEL_OUTOF10: 4
PAINLEVEL_OUTOF10: 8
PAINLEVEL_OUTOF10: 7
PAINLEVEL_OUTOF10: 5
PAINLEVEL_OUTOF10: 8
PAINLEVEL_OUTOF10: 7
PAINLEVEL_OUTOF10: 4
PAINLEVEL_OUTOF10: 8
PAINLEVEL_OUTOF10: 4

## 2020-10-22 ASSESSMENT — PAIN DESCRIPTION - ORIENTATION: ORIENTATION: UPPER

## 2020-10-22 ASSESSMENT — PAIN DESCRIPTION - PAIN TYPE: TYPE: SURGICAL PAIN

## 2020-10-22 ASSESSMENT — PAIN DESCRIPTION - LOCATION
LOCATION: ABDOMEN
LOCATION: ABDOMEN

## 2020-10-22 NOTE — PROGRESS NOTES
Hospitalist Progress Note    Patient:  Corby Cedeño     YOB: 1965    MRN: [id number]   Admit date: 10/21/2020     Acct: [de-identified]     PCP: DO JANNA Jacinto--Interval History:   POD 1 ventral hernia repair---component separation---10.21.2020---Troncoso---JUSTINO x 2 ---> 70 out each    Patient with extremely low pain threshold     COPD--respiratory regimen intensified    HTN---BP = 125/70    WBC  19.5 ---> 12.9    BG variable----goal postoperatively 140-180    See note below         All other ROS negative except noted in HPI    Diet:  Diet NPO Effective Now Exceptions are: Sips with Meds, Ice Chips    Medications:  Scheduled Meds:   magnesium hydroxide  30 mL Oral BID    albuterol  2.5 mg Nebulization Q4H    scopolamine  1 patch Transdermal Once    sodium chloride flush  10 mL Intravenous 2 times per day    enoxaparin  40 mg Subcutaneous Daily    amLODIPine  10 mg Oral Daily    atorvastatin  10 mg Oral Daily    DULoxetine  30 mg Oral Daily    DULoxetine  60 mg Oral Daily    fluticasone  2 spray Nasal Daily    lamoTRIgine  200 mg Oral Daily    levothyroxine  100 mcg Oral Daily    lisinopril  40 mg Oral Daily    cetirizine  10 mg Oral Daily    pantoprazole  40 mg Oral QAM AC    propranolol  10 mg Oral TID    nicotine  1 patch Transdermal Daily    insulin glargine  5 Units Subcutaneous Dinner    insulin lispro  0-6 Units Subcutaneous TID WC    insulin lispro  0-3 Units Subcutaneous Nightly     Continuous Infusions:   sodium chloride 125 mL/hr at 10/22/20 0931    dextrose       PRN Meds:HYDROmorphone, oxyCODONE-acetaminophen, oxyCODONE-acetaminophen, sodium chloride flush, [DISCONTINUED] promethazine **OR** ondansetron, hydrOXYzine, glucose, dextrose, glucagon (rDNA), dextrose, albuterol    Objective:  Labs:  CBC with Differential:    Lab Results   Component Value Date    WBC 12.9 10/22/2020    RBC 4.03 10/22/2020    HGB 12.1 10/22/2020    HCT 36.5 10/22/2020     10/22/2020 MCV 90.6 10/22/2020    MCH 30.0 10/22/2020    MCHC 33.2 10/22/2020    RDW 13.2 10/22/2020    LYMPHOPCT 9 10/22/2020    MONOPCT 6 10/22/2020    BASOPCT 0 10/22/2020    MONOSABS 0.71 10/22/2020    LYMPHSABS 1.21 10/22/2020    EOSABS <0.03 10/22/2020    BASOSABS <0.03 10/22/2020    DIFFTYPE NOT REPORTED 10/22/2020     BMP:    Lab Results   Component Value Date     10/22/2020    K 4.5 10/22/2020     10/22/2020    CO2 23 10/22/2020    BUN 17 10/22/2020    LABALBU 4.2 10/21/2020    CREATININE 1.46 10/22/2020    CALCIUM 8.4 10/22/2020    GFRAA 45 10/22/2020    LABGLOM 37 10/22/2020    GLUCOSE 139 10/22/2020           Physical Exam:  Vitals: /62   Pulse 100   Temp 99.8 °F (37.7 °C) (Oral)   Resp 19   Ht 5' 1\" (1.549 m)   Wt 164 lb 12.8 oz (74.8 kg)   SpO2 93%   BMI 31.14 kg/m²   24 hour intake/output:    Intake/Output Summary (Last 24 hours) at 10/22/2020 1427  Last data filed at 10/22/2020 1020  Gross per 24 hour   Intake 2602.46 ml   Output 1082 ml   Net 1520.46 ml     Last 3 weights: Wt Readings from Last 3 Encounters:   10/21/20 164 lb 12.8 oz (74.8 kg)   10/16/20 160 lb (72.6 kg)   10/12/20 160 lb (72.6 kg)     HEENT: Normocephalic and Atraumatic  Neck: Supple, No Masses, Tenderness, Nodularity and No Lymphadenopathy  Chest/Lungs: Clear to Auscultation without Rales, Rhonchi, or Wheezes  Cardiac: Regular Rate and Rhythm  GI/Abdomen: Bowel Sounds Present diffuse tenderness, without Guarding or Rebound Tenderness---dressing with some serosanguinous drainage but generally CDI  : Not examined  EXT/Skin: No Edema, No Cyanosis and No Clubbing  Neuro: Alert and Oriented and No Localizing Signs/Symptoms      Assessment:    Active Problems:    Status post repair of recurrent ventral hernia  Resolved Problems:    * No resolved hospital problems. *    EZEQUIEL GYA  55  WF   ALEX Webster;  BARBI Barnard]  FULL CODE    JUNE SALAZAR--10.21.2020       Anti-infectives:   Ancef IV x 3 doses    POD _____ component separation ventral hernia repair---JUSTINO drains--10.21.2020--Troncoso  Recurrent ventral hernias  Postoperative pain---history    Hypertension   COPD  CKD---3  GERD  Hypothyroidism  Bipolar II--hypomania   Depression---anxiety----PTSD  Alcoholism   Tobacco abuse--1/2-1 PPD x 30 years---quit---10.21.2020  Hypomagenesemia   PMH   breast carcinoma--2014--left lumpectomy--XRT, kidney, headaches  PSH:    LRA hernia repair---7.22.2020, hernia repair---1.2020, ventral hernia repair---              2019, right breast biopsy--2015, PERICO-BSO--cervix removed---2015, left breast              biopsy---2014, lumbar laminectomy--2006, BTL---tubal  ligation---2001,               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 Pain control---restarted Dilaudid IV---may have Percocet PO  2. Respiratory therapy--intensified regimen  3.   See orders    Electronically signed by Madeleine Araujo on 10/22/2020 at 2:27 PM    Hospitalist

## 2020-10-22 NOTE — PROGRESS NOTES
recurrent ventral hernia. Patient Currently in Pain: Yes  Pain Assessment  Pain Assessment: 0-10  Pain Level: 8  Pain Type: Surgical pain  Pain Location: Abdomen  Pain Orientation: Upper  Vital Signs  Patient Currently in Pain: Yes  Patient Observation  Observations: ezpap  Social/Functional History  Social/Functional History  Lives With: Alone  Type of Home: Apartment  Home Layout: One level  Home Access: Level entry  Bathroom Shower/Tub: Tub/Shower unit  Bathroom Toilet: Handicap height  Receives Help From: Friend(s)  ADL Assistance: Independent  Homemaking Assistance: Independent  Homemaking Responsibilities: Yes  Meal Prep Responsibility: Primary  Laundry Responsibility: Primary  Cleaning Responsibility: Primary  Ambulation Assistance: Independent  Transfer Assistance: Independent  IADL Comments: Pt reports that she lives in [facility], which is a place for recovery. She lives alone, in a 10 bedroom apartment building. Pt reports that she has \"sobriety sisters\" that will be willing to assist her as needed upon return home. Pt is hoping to go home with their help and home health for the next couple of weeks and then possibly go to her sisters house which is ~3 hours away for the rest of her recovery. Objective   Vision: Within Functional Limits  Hearing: Within functional limits    Orientation  Overall Orientation Status: Within Functional Limits  Observation/Palpation  Observation: Pt rec'd in bed on O2, agreeable to therapy and getting up to chair.   ADL  LE Dressing: Maximum assistance(Pt reports that she will have assistance for LB dressing upon return home and was not interested in learning about a/e.)  Bed mobility  Rolling to Right: Moderate assistance  Supine to Sit: Moderate assistance  Scooting: Minimal assistance  Transfers  Stand Step Transfers: Contact guard assistance  Sit to stand: Contact guard assistance  Stand to sit: Contact guard assistance  Cognition  Overall Cognitive Status: WFL  LUE AROM (degrees)  LUE AROM : WFL  Left Hand AROM (degrees)  Left Hand AROM: WFL  RUE AROM (degrees)  RUE AROM : WFL  Right Hand AROM (degrees)  Right Hand AROM: WFL  LUE Strength  Gross LUE Strength: WFL  RUE Strength  Gross RUE Strength: WFL     Plan   Plan  Times per week: 1-2    Goals  Short term goals  Time Frame for Short term goals: Duration of hospital stay  Short term goal 1: Pt will complete toilet transfer and toileting tasks with Mod I for increased independence for return home  Short term goal 2: Pt will complete grooming task while stand > 5 minutes to increase standing tolerance for return home       Therapy Time   Individual Concurrent Group Co-treatment   Time In 1320         Time Out 1335         Minutes 15         Timed Code Treatment Minutes: 0 Minutes       Cheryl Cain, OT

## 2020-10-22 NOTE — PROGRESS NOTES
SW completed a Psychosocial Assessment for evaluation of patient's mental health, social status, and functional capacity within the community. Patient is a 48year old female admitted due to status post repair of recurrent ventral hernia. Patient was alert, oriented, and engaging during assessment. Patient lives alone in [facility]. Patient discussed positive support from neighbors. [facility]. Patient has a psychiatrist Dr. Gregg Brown. Patient denies using DMEs. SW provided supportive listening while patient discussed past medical history. Patient has PCP Nestor Cagle DO and reports no issues affording her medication. SW assessed ADLs and means of transportation. Patient states she is independent in her ADLs and able to drive. Patient reports she does not have a vehicle however, she is able to walk to work, has friends to assist with transportation, and has utilized her insurance for transportation services to appointments. SW assessed if patient had food insecurity or needed financial assistance. Patient states she applied for food stamps  is Maciel Jackson through [employer]. Patient denies food insecurity or any financial concerns at this time. Patient is a full code status at this time. Patient denies discharge needs or further services at this time. SW provided business card. SW will continue to monitor needs and assist as appropriate.          Electronically signed by Stephens Dandy, LSW on 10/22/2020 at 10:25 AM

## 2020-10-22 NOTE — PROGRESS NOTES
Pod #1    Pt doing better. She had some trouble last night with increased pain and anxiety. I think this is probably when some of the pain medicine wore off or the local anesthetic wore off. She also feels little bloated today no nausea. /62   Pulse 100   Temp 99.8 °F (37.7 °C) (Oral)   Resp 19   Ht 5' 1\" (1.549 m)   Wt 164 lb 12.8 oz (74.8 kg)   SpO2 93%   BMI 31.14 kg/m²   Her lungs have a few rhonchi. Her abdomen is distended some incisional tenderness and minimal bowel sounds. JUSTINO drains are putting out serosanguineous fluid. Overall I think she is doing pretty well today. We will work on her respiratory status and do aggressive respiratory care as well as continue to increase her ambulation. We will just go to ice chips since she is bloated and hold off until she bowels are working.

## 2020-10-22 NOTE — PROGRESS NOTES
Patient resting in bed. Harsh non prodoctive cough noted. Encouraged patient to deep breath and cough. IS handed to patient and encouraged to use. Both JUSTINO in suction mode.

## 2020-10-22 NOTE — FLOWSHEET NOTE
Pt called out and was requesting pain medication. Pt crying, hyperventilating, coughing, gagging stating that she was having pain in abdomen. Complaining of being nauseated. SHELBIE Colunga notified for pain medication. She changed the dilaudid to fentanyl. Respiratory gave pt a breathing treatment, pt had her 02 on, coughing with a dry cough. Zofran given for nausea, atarax given for anxiety. Pt encouraged to splint abdomen while coughing. Pt encouraged to take deep slow breaths through nose. Pt was in high fowlers position to aid in breathing.

## 2020-10-22 NOTE — FLOWSHEET NOTE
visited patient while rounding on PCU. Assessment: Patient was sitting up in bed and welcomed  visit. Patient is recovering from her fourth hernia repair surgery within a year. Patient is hopeful that no more surgery will be required and she can return to having a normal life and \"feeling like herself again. \" Patient has family members that are a support. Patient does not attend [employer] but finds comfort in prayer. Intervention:   engaged in active listening while providing a caring presence and exploring patient's areas of support and comfort.  prayed with patient. Outcome: Patient thanked  for visit and prayer. Chaplains will remain available to offer spiritual and emotional support as needed.        10/22/20 5684   Encounter Summary   Services provided to: Patient   Referral/Consult From: [address] Family members   Place of Anabaptism   (none)   Continue Visiting   (10/22/20)   Complexity of Encounter Low   Length of Encounter 15 minutes   Spiritual Assessment Completed Yes   Spiritual/Sikh   Type Spiritual support   Assessment Approachable   Intervention Active listening;Prayer   Outcome Expressed gratitude;Engaged in conversation   Electronically signed by Humaira Sahu on 10/22/2020 at 6:55 PM

## 2020-10-22 NOTE — OP NOTE
Rebecca [address]                 [address] [place] [id number]                                OPERATIVE REPORT    PATIENT NAME: Reji Dia                    :        1965  MED REC NO:   [id number]                             ROOM:       0202  ACCOUNT NO:   [de-identified]                           ADMIT DATE: 10/21/2020  PROVIDER:     Vilma Duffy    DATE OF PROCEDURE:  10/21/2020    PREOPERATIVE DIAGNOSIS:  Second recurrent ventral hernia in the  epigastric area with persistent pain. Defect is about 5 to 7 cm in  diameter. POSTOPERATIVE DIAGNOSES:  1. Recurrent ventral hernia with a defect of about 5 to 7 cm in  diameter. 2.  Old mesh x2 intraperitoneally. OPERATION PERFORMED:  1. Repair of recurrent ventral hernia. 2.  Implantation of mesh using Ventralight ST and the Bard soft mesh. 3.  Bilateral rectus abdominis advancement flap. 4.  Removal of mesh from old hernia repairs. SURGEON:  Dr. Vilma Duffy. ASSISTANT:  Dary Lomeli. ANESTHESIA:  General anesthesia with endotracheal tube, local  anesthetic, and a preoperative regional block - TAP block. ESTIMATED BLOOD LOSS:  200 mL. COMPLICATIONS:  None. SPECIMENS:  Old mesh. DRAINS:  One JUSTINO drain and a Salamanca catheter. OPERATIVE PROCEDURE:  The patient was taken to the OR, placed in the  supine position. The abdomen was prepped and draped in the usual  sterile fashion. There was approximately a 5-7 cm diameter defect in  the epigastrium. It bulged up. We then made a midline incision from  the xiphoid process down to the symphysis pubis. We took around the  right side of the umbilicus. We took it down through the subcutaneous  tissue and down to the fascia. We entered the hernia sac in the  epigastric area and went through a mesh and entered into the hernia sac. There were a few adhesions of omentum and colon, of transverse colon,  and these were easily taken down.   We then opened the whole midline up  carefully from the linea alba from the xiphoid process down to the  symphysis pubis. We could see 2 meshes, kind of, on top of each other  in the epigastric area. They appeared to be placed retroperitoneally or  intraperitoneally and were sublays. We removed these with  electrocautery. They were anchored with steel or titanium spiral clips. We must have removed 20 of them. Most of the mesh came off fairly  easily, but it was adhesed and we had to take some of the posterior  rectus sheath. When these were removed, we sent them to pathology. The  posterior rectus sheath was somewhat traumatized. We had several holes  in it and the left side was difficult to remove the mesh, and we had a  hard time pulling this posterior rectus sheath together and getting to  the lateral edge to do the transversus abdominis release. However,  after the meshes were removed, we were able to get in the posterior  rectus sheath and go laterally to just where we saw the neurovascular  bundles. We started on the left side because it looked like the more  difficult side to make sure we could at least do it before we released  the right side. We went to the superior aspect of the posterior rectus  sheath where we could see muscle fibers of the transversus abdominis  muscle. We excised over this area in the posterior rectus sheath, got  into the fibers of the transversus abdominis and took them down with the  right-angled hemostats and cautery. We did this down inferiorly. In  the middle, the abdomen became very thin and we had to repair it several  times with 2-0 Vicryl sutures, but we were able to get it released all  the way down to the symphysis pubis. We did the same thing on the right  side, which was much easier. We had more intact posterior fascia. We  got excellent release on both sides anywhere from 4 to 6 cm per side.    However, we were not able to pull the posterior rectus sheath together  because of the loss of tissue on the left side. I did not want to  proceed and do an external release at this point. We went ahead and  closed the transversalis fascia from superiorly and inferiorly, and we  had a small area of about 4 cm x 7 cm that we could not close. We used  a piece of Ventralight ST mesh to patch the posterior sheath. We did  this and irrigated and had a good correct sponge count, and we were able  to get out of the peritoneal cavity safely. At this point, there were  no defects in the posterior rectus sheath or transversalis fascia. We  were able to get the midline of the rectus muscles together. There was  not a lot of tension. I felt with the release we would be able to pull  that 5 cm defect together. Therefore, we then placed our permanent mesh  using Bard soft mesh in the posterior rectus sheath and out into and  behind the transversalis muscle. We went above and stayed right below  the subcostal margin as well as the symphysis pubis. We used two  anchors _____ transabdominal full-thickness sutures with the bird beak  needle and #1 PDS in the lateral superior and lateral inferior aspect on  the right and left side to anchor the Bard soft mesh in the posterior  space. We then anchored it to the fascia just at the symphysis pubis  and at the fascia just above the xiphoid process. The mesh extended up  past the xiphoid process and subcostal margin about 2 cm and down below  the symphysis pubis about 1 to 2 cm. At this point, we then irrigated  with copious amounts of normal saline. We put two JUSTINO drains. We put a  10 JUSTINO suction drain in the right gutter and the left gutter and brought  out through separate stab wounds inferiorly. We sutured them with 3-0  nylon sutures. We then proceeded to irrigate the abdomen, the rectus  muscles, and the subcu space with normal saline.   We then closed the  fascia, which looked very viable, and we trimmed up some of the fascia  in the epigastric area but did not have to take much of it back and we  were able to get it together with interrupted figure-of-eight Vicryl  sutures from above and below. There was not a lot of tension on it. We  then irrigated the subcutaneous tissue of the wound and then closed the  dermis with 3-0 Vicryl subcuticular suture and the skin with staples. Sterile dressing was applied and the patient was taken back to recovery  room in stable condition.         Daylin Cooper    D: 10/21/2020 16:37:14       T: 10/21/2020 16:45:10     [email or web address]  Job#: [id number]     Doc#: [id number]    CC:  Patric Waddell

## 2020-10-22 NOTE — PROGRESS NOTES
Physical Therapy    Facility/Department: [facility]  PROGRESSIVE CARE  Initial Assessment    NAME: Lyle Rey  : 1965  MRN: [id number]    Date of Service: 10/22/2020    Discharge Recommendations:  Home with assist PRN        Assessment   Body structures, Functions, Activity limitations: Decreased balance;Decreased endurance  Prognosis: Good  Decision Making: Low Complexity  REQUIRES PT FOLLOW UP: Yes  Activity Tolerance  Activity Tolerance: Patient Tolerated treatment well       Patient Diagnosis(es): There were no encounter diagnoses. has a past medical history of Anxiety, Breast cancer ([facility] [address] [address] COPD (chronic obstructive pulmonary disease) ([facility] [address] Depression, Headache(784.0), [facility] Hypertension, Hypothyroidism, Kidney stone, and PTSD (post-traumatic stress disorder). has a past surgical history that includes Eye surgery; tumor removal (); Appendectomy (); Cholecystectomy (); Tubal ligation (); lumbar laminectomy (); Dilation and curettage of uterus; Breast lumpectomy (Left, ); Breast biopsy (Right, ); alcon and bso (cervix removed) (); ventral hernia repair (); hernia repair (2020); fracture surgery (Left); hernia repair (N/A, 2020); and ventral hernia repair (N/A, 10/21/2020).     Restrictions     Vision/Hearing        Subjective  General  Chart Reviewed: Yes  Patient assessed for rehabilitation services?: Yes  Response To Previous Treatment: Not applicable  Family / Caregiver Present: No  Follows Commands: Within Functional Limits  Pain Screening  Patient Currently in Pain: Yes  Pain Assessment  Pain Assessment: 0-10  Pain Level: 5  Pain Location: Abdomen  Vital Signs  Patient Currently in Pain: Yes       Orientation  Orientation  Overall Orientation Status: Within Normal Limits  Social/Functional History  Social/Functional History  Lives With: Alone  Type of Home: Apartment  Home Layout: One level  Home Access: [facility] Shower/Tub: Tub/Shower unit  Bathroom Toilet: Handicap height  Receives Help From: Friend(s)  ADL Assistance: Independent  Homemaking Assistance: Independent  Homemaking Responsibilities: Yes  Meal Prep Responsibility: Primary  Laundry Responsibility: Primary  Cleaning Responsibility: Primary  Ambulation Assistance: Independent  Transfer Assistance: Independent  IADL Comments: Pt reports that she lives in [facility], which is a place for recovery. She lives alone, in a 10 bedroom apartment building. Pt reports that she has \"sobriety sisters\" that will be willing to assist her as needed upon return home. Pt is hoping to go home with their help and home health for the next couple of weeks and then possibly go to her sisters house which is ~3 hours away for the rest of her recovery. Cognition        Objective     Observation/Palpation  Posture: Good  Observation: Up in chair on O2    AROM RLE (degrees)  RLE AROM: WFL  AROM LLE (degrees)  LLE AROM : WFL  Strength RLE  Strength RLE: WFL  Strength LLE  Strength LLE: WFL        Bed mobility  Rolling to Left: Stand by assistance  Rolling to Right: Stand by assistance  Supine to Sit: Moderate assistance  Sit to Supine:  Moderate assistance  Transfers  Sit to Stand: Stand by assistance  Stand to sit: Stand by assistance  Ambulation  Ambulation?: Yes  Ambulation 1  Surface: level tile  Device: Rolling Walker  Assistance: Contact guard assistance  Distance: 40 ft x2     Balance  Sitting - Static: Good  Sitting - Dynamic: Good  Standing - Static: Good  Standing - Dynamic: [address]  Times per day: Daily  Current Treatment Recommendations: Gait Training, Transfer Training  Safety Devices  Type of devices: Left in chair, Call light within reach    G-Code       OutComes Score                                                  AM-PAC Score             Goals  Short term goals  Time Frame for Short term goals: 1 day  Short term goal 1: Assess functional status  Long term goals  Time Frame for Long term goals : 3 days  Long term goal 1: Transfer with supervision  Long term goal 2: Gait with RW 50 ft x2  Long term goal 3: Progress to no device as done at home       Therapy Time   Individual Concurrent Group Co-treatment   Time In 1500         Time Out 1517         Minutes 17                 [facility], PT

## 2020-10-23 ENCOUNTER — CARE COORDINATION (OUTPATIENT)
Dept: CARE COORDINATION | Age: 55
End: 2020-10-23

## 2020-10-23 ENCOUNTER — APPOINTMENT (OUTPATIENT)
Dept: GENERAL RADIOLOGY | Age: 55
DRG: 227 | End: 2020-10-23
Attending: SURGERY
Payer: MEDICARE

## 2020-10-23 LAB
-: ABNORMAL
ABO/RH: NORMAL
ABSOLUTE EOS #: 0.45 K/UL (ref 0–0.44)
ABSOLUTE IMMATURE GRANULOCYTE: 0.08 K/UL (ref 0–0.3)
ABSOLUTE LYMPH #: 1.37 K/UL (ref 1.1–3.7)
ABSOLUTE MONO #: 1.04 K/UL (ref 0.1–1.2)
AMORPHOUS: ABNORMAL
ANION GAP SERPL CALCULATED.3IONS-SCNC: 10 MMOL/L (ref 9–17)
ANTIBODY SCREEN: NEGATIVE
ARM BAND NUMBER: NORMAL
BACTERIA: ABNORMAL
BASOPHILS # BLD: 1 % (ref 0–2)
BASOPHILS ABSOLUTE: 0.06 K/UL (ref 0–0.2)
BILIRUBIN URINE: ABNORMAL
BUN BLDV-MCNC: 21 MG/DL (ref 6–20)
BUN/CREAT BLD: 14 (ref 9–20)
CALCIUM SERPL-MCNC: 8.7 MG/DL (ref 8.6–10.4)
CASTS UA: ABNORMAL /LPF (ref 0–2)
CHLORIDE BLD-SCNC: 107 MMOL/L (ref 98–107)
CO2: 25 MMOL/L (ref 20–31)
COLOR: ABNORMAL
COMMENT UA: ABNORMAL
CREAT SERPL-MCNC: 1.46 MG/DL (ref 0.5–0.9)
CRYSTALS, UA: ABNORMAL /HPF
DIFFERENTIAL TYPE: ABNORMAL
EOSINOPHILS RELATIVE PERCENT: 3 % (ref 1–4)
EPITHELIAL CELLS UA: ABNORMAL /HPF (ref 0–5)
EXPIRATION DATE: NORMAL
GFR AFRICAN AMERICAN: 45 ML/MIN
GFR NON-AFRICAN AMERICAN: 37 ML/MIN
GFR SERPL CREATININE-BSD FRML MDRD: ABNORMAL ML/MIN/{1.73_M2}
GFR SERPL CREATININE-BSD FRML MDRD: ABNORMAL ML/MIN/{1.73_M2}
GLUCOSE BLD-MCNC: 94 MG/DL (ref 70–99)
GLUCOSE URINE: NEGATIVE
HCT VFR BLD CALC: 23.7 % (ref 36.3–47.1)
HCT VFR BLD CALC: 24.6 % (ref 36.3–47.1)
HCT VFR BLD CALC: 24.7 % (ref 36.3–47.1)
HCT VFR BLD CALC: 24.8 % (ref 36.3–47.1)
HEMOGLOBIN: 7.7 G/DL (ref 11.9–15.1)
HEMOGLOBIN: 7.8 G/DL (ref 11.9–15.1)
HEMOGLOBIN: 7.9 G/DL (ref 11.9–15.1)
HEMOGLOBIN: 8.3 G/DL (ref 11.9–15.1)
IMMATURE GRANULOCYTES: 1 %
KETONES, URINE: NEGATIVE
LEUKOCYTE ESTERASE, URINE: NEGATIVE
LYMPHOCYTES # BLD: 10 % (ref 24–43)
MCH RBC QN AUTO: 29.9 PG (ref 25.2–33.5)
MCHC RBC AUTO-ENTMCNC: 31.6 G/DL (ref 25.2–33.5)
MCV RBC AUTO: 94.6 FL (ref 82.6–102.9)
MONOCYTES # BLD: 8 % (ref 3–12)
MUCUS: ABNORMAL
NITRITE, URINE: NEGATIVE
NRBC AUTOMATED: 0 PER 100 WBC
OTHER OBSERVATIONS UA: ABNORMAL
PDW BLD-RTO: 13.6 % (ref 11.8–14.4)
PH UA: 5.5 (ref 5–6)
PLATELET # BLD: 179 K/UL (ref 138–453)
PLATELET ESTIMATE: ABNORMAL
PMV BLD AUTO: 8.8 FL (ref 8.1–13.5)
POTASSIUM SERPL-SCNC: 4.6 MMOL/L (ref 3.7–5.3)
PROTEIN UA: ABNORMAL
RBC # BLD: 2.61 M/UL (ref 3.95–5.11)
RBC # BLD: ABNORMAL 10*6/UL
RBC UA: ABNORMAL /HPF (ref 0–4)
RENAL EPITHELIAL, UA: ABNORMAL /HPF
SEG NEUTROPHILS: 77 % (ref 36–65)
SEGMENTED NEUTROPHILS ABSOLUTE COUNT: 10.17 K/UL (ref 1.5–8.1)
SODIUM BLD-SCNC: 142 MMOL/L (ref 135–144)
SPECIFIC GRAVITY UA: 1.03 (ref 1.01–1.02)
SURGICAL PATHOLOGY REPORT: NORMAL
TRICHOMONAS: ABNORMAL
TURBIDITY: ABNORMAL
URINE HGB: ABNORMAL
UROBILINOGEN, URINE: NORMAL
WBC # BLD: 13.2 K/UL (ref 3.5–11.3)
WBC # BLD: ABNORMAL 10*3/UL
WBC UA: ABNORMAL /HPF (ref 0–4)
YEAST: ABNORMAL

## 2020-10-23 PROCEDURE — 97535 SELF CARE MNGMENT TRAINING: CPT

## 2020-10-23 PROCEDURE — 2700000000 HC OXYGEN THERAPY PER DAY

## 2020-10-23 PROCEDURE — 6360000002 HC RX W HCPCS: Performed by: INTERNAL MEDICINE

## 2020-10-23 PROCEDURE — 2580000003 HC RX 258: Performed by: SURGERY

## 2020-10-23 PROCEDURE — 85025 COMPLETE CBC W/AUTO DIFF WBC: CPT

## 2020-10-23 PROCEDURE — 97110 THERAPEUTIC EXERCISES: CPT

## 2020-10-23 PROCEDURE — 99231 SBSQ HOSP IP/OBS SF/LOW 25: CPT | Performed by: FAMILY MEDICINE

## 2020-10-23 PROCEDURE — 80048 BASIC METABOLIC PNL TOTAL CA: CPT

## 2020-10-23 PROCEDURE — 6360000002 HC RX W HCPCS: Performed by: SURGERY

## 2020-10-23 PROCEDURE — 82947 ASSAY GLUCOSE BLOOD QUANT: CPT

## 2020-10-23 PROCEDURE — 85014 HEMATOCRIT: CPT

## 2020-10-23 PROCEDURE — 6370000000 HC RX 637 (ALT 250 FOR IP): Performed by: INTERNAL MEDICINE

## 2020-10-23 PROCEDURE — 86850 RBC ANTIBODY SCREEN: CPT

## 2020-10-23 PROCEDURE — 94761 N-INVAS EAR/PLS OXIMETRY MLT: CPT

## 2020-10-23 PROCEDURE — 97116 GAIT TRAINING THERAPY: CPT

## 2020-10-23 PROCEDURE — 1200000000 HC SEMI PRIVATE

## 2020-10-23 PROCEDURE — 2580000003 HC RX 258: Performed by: INTERNAL MEDICINE

## 2020-10-23 PROCEDURE — G0378 HOSPITAL OBSERVATION PER HR: HCPCS

## 2020-10-23 PROCEDURE — 86900 BLOOD TYPING SEROLOGIC ABO: CPT

## 2020-10-23 PROCEDURE — 85018 HEMOGLOBIN: CPT

## 2020-10-23 PROCEDURE — 81001 URINALYSIS AUTO W/SCOPE: CPT

## 2020-10-23 PROCEDURE — 2580000003 HC RX 258: Performed by: FAMILY MEDICINE

## 2020-10-23 PROCEDURE — 94669 MECHANICAL CHEST WALL OSCILL: CPT

## 2020-10-23 PROCEDURE — 6370000000 HC RX 637 (ALT 250 FOR IP): Performed by: SURGERY

## 2020-10-23 PROCEDURE — 36415 COLL VENOUS BLD VENIPUNCTURE: CPT

## 2020-10-23 PROCEDURE — 99024 POSTOP FOLLOW-UP VISIT: CPT | Performed by: SURGERY

## 2020-10-23 PROCEDURE — 94640 AIRWAY INHALATION TREATMENT: CPT

## 2020-10-23 PROCEDURE — 71045 X-RAY EXAM CHEST 1 VIEW: CPT

## 2020-10-23 PROCEDURE — 86901 BLOOD TYPING SEROLOGIC RH(D): CPT

## 2020-10-23 RX ORDER — DEXTROSE AND SODIUM CHLORIDE 5; .45 G/100ML; G/100ML
INJECTION, SOLUTION INTRAVENOUS CONTINUOUS
Status: DISCONTINUED | OUTPATIENT
Start: 2020-10-23 | End: 2020-10-25

## 2020-10-23 RX ADMIN — CETIRIZINE HYDROCHLORIDE 10 MG: 5 TABLET, FILM COATED ORAL at 13:05

## 2020-10-23 RX ADMIN — LEVOTHYROXINE SODIUM 100 MCG: 0.1 TABLET ORAL at 06:48

## 2020-10-23 RX ADMIN — MAGNESIUM HYDROXIDE 30 ML: 400 SUSPENSION ORAL at 09:14

## 2020-10-23 RX ADMIN — DULOXETINE HYDROCHLORIDE 60 MG: 30 CAPSULE, DELAYED RELEASE ORAL at 22:07

## 2020-10-23 RX ADMIN — PROPRANOLOL HYDROCHLORIDE 10 MG: 10 TABLET ORAL at 09:15

## 2020-10-23 RX ADMIN — OXYCODONE HYDROCHLORIDE AND ACETAMINOPHEN 2 TABLET: 5; 325 TABLET ORAL at 13:28

## 2020-10-23 RX ADMIN — OXYCODONE HYDROCHLORIDE AND ACETAMINOPHEN 2 TABLET: 5; 325 TABLET ORAL at 09:14

## 2020-10-23 RX ADMIN — SODIUM CHLORIDE: 9 INJECTION, SOLUTION INTRAVENOUS at 03:48

## 2020-10-23 RX ADMIN — ALBUTEROL SULFATE 2.5 MG: 2.5 SOLUTION RESPIRATORY (INHALATION) at 23:01

## 2020-10-23 RX ADMIN — HYDROXYZINE HYDROCHLORIDE 25 MG: 25 TABLET, FILM COATED ORAL at 23:24

## 2020-10-23 RX ADMIN — SODIUM CHLORIDE: 9 INJECTION, SOLUTION INTRAVENOUS at 12:15

## 2020-10-23 RX ADMIN — FLUTICASONE PROPIONATE 2 SPRAY: 50 SPRAY, METERED NASAL at 22:07

## 2020-10-23 RX ADMIN — ONDANSETRON 4 MG: 2 INJECTION INTRAMUSCULAR; INTRAVENOUS at 15:50

## 2020-10-23 RX ADMIN — MAGNESIUM HYDROXIDE 30 ML: 400 SUSPENSION ORAL at 22:02

## 2020-10-23 RX ADMIN — ALBUTEROL SULFATE 2.5 MG: 2.5 SOLUTION RESPIRATORY (INHALATION) at 03:37

## 2020-10-23 RX ADMIN — OXYCODONE HYDROCHLORIDE AND ACETAMINOPHEN 2 TABLET: 5; 325 TABLET ORAL at 21:30

## 2020-10-23 RX ADMIN — DEXTROSE AND SODIUM CHLORIDE: 5; 450 INJECTION, SOLUTION INTRAVENOUS at 17:38

## 2020-10-23 RX ADMIN — OXYCODONE HYDROCHLORIDE AND ACETAMINOPHEN 1 TABLET: 5; 325 TABLET ORAL at 02:05

## 2020-10-23 RX ADMIN — PROPRANOLOL HYDROCHLORIDE 10 MG: 10 TABLET ORAL at 22:06

## 2020-10-23 RX ADMIN — ATORVASTATIN CALCIUM 10 MG: 10 TABLET, FILM COATED ORAL at 22:06

## 2020-10-23 RX ADMIN — ALBUTEROL SULFATE 2.5 MG: 2.5 SOLUTION RESPIRATORY (INHALATION) at 19:43

## 2020-10-23 RX ADMIN — ALBUTEROL SULFATE 2.5 MG: 2.5 SOLUTION RESPIRATORY (INHALATION) at 07:47

## 2020-10-23 RX ADMIN — HYDROMORPHONE HYDROCHLORIDE 1 MG: 1 INJECTION, SOLUTION INTRAMUSCULAR; INTRAVENOUS; SUBCUTANEOUS at 04:15

## 2020-10-23 RX ADMIN — LISINOPRIL 40 MG: 20 TABLET ORAL at 22:02

## 2020-10-23 RX ADMIN — ENOXAPARIN SODIUM 40 MG: 40 INJECTION SUBCUTANEOUS at 09:14

## 2020-10-23 RX ADMIN — DULOXETINE HYDROCHLORIDE 30 MG: 30 CAPSULE, DELAYED RELEASE ORAL at 22:06

## 2020-10-23 RX ADMIN — ALBUTEROL SULFATE 2.5 MG: 2.5 SOLUTION RESPIRATORY (INHALATION) at 16:54

## 2020-10-23 RX ADMIN — ALBUTEROL SULFATE 2.5 MG: 2.5 SOLUTION RESPIRATORY (INHALATION) at 11:41

## 2020-10-23 RX ADMIN — Medication 10 ML: at 22:18

## 2020-10-23 RX ADMIN — AMLODIPINE BESYLATE 10 MG: 5 TABLET ORAL at 09:15

## 2020-10-23 RX ADMIN — LAMOTRIGINE 200 MG: 100 TABLET ORAL at 22:02

## 2020-10-23 RX ADMIN — PANTOPRAZOLE SODIUM 40 MG: 40 TABLET, DELAYED RELEASE ORAL at 06:48

## 2020-10-23 RX ADMIN — PROPRANOLOL HYDROCHLORIDE 10 MG: 10 TABLET ORAL at 13:05

## 2020-10-23 ASSESSMENT — PAIN DESCRIPTION - FREQUENCY
FREQUENCY: CONTINUOUS

## 2020-10-23 ASSESSMENT — PAIN DESCRIPTION - ORIENTATION
ORIENTATION: MID
ORIENTATION: UPPER
ORIENTATION: UPPER

## 2020-10-23 ASSESSMENT — PAIN DESCRIPTION - PAIN TYPE
TYPE: SURGICAL PAIN

## 2020-10-23 ASSESSMENT — PAIN DESCRIPTION - DESCRIPTORS
DESCRIPTORS: ACHING

## 2020-10-23 ASSESSMENT — PAIN SCALES - GENERAL
PAINLEVEL_OUTOF10: 6
PAINLEVEL_OUTOF10: 7
PAINLEVEL_OUTOF10: 7
PAINLEVEL_OUTOF10: 4
PAINLEVEL_OUTOF10: 7
PAINLEVEL_OUTOF10: 8
PAINLEVEL_OUTOF10: 9
PAINLEVEL_OUTOF10: 8

## 2020-10-23 ASSESSMENT — PAIN DESCRIPTION - ONSET
ONSET: ON-GOING

## 2020-10-23 ASSESSMENT — PAIN DESCRIPTION - LOCATION
LOCATION: ABDOMEN

## 2020-10-23 ASSESSMENT — PAIN DESCRIPTION - PROGRESSION
CLINICAL_PROGRESSION: RAPIDLY WORSENING
CLINICAL_PROGRESSION: RAPIDLY WORSENING

## 2020-10-23 ASSESSMENT — PAIN - FUNCTIONAL ASSESSMENT: PAIN_FUNCTIONAL_ASSESSMENT: PREVENTS OR INTERFERES SOME ACTIVE ACTIVITIES AND ADLS

## 2020-10-23 NOTE — PLAN OF CARE
Problem: Infection - Surgical Site:  Goal: Will show no infection signs and symptoms  Description: Will show no infection signs and symptoms  Outcome: Ongoing     Problem: Activity:  Goal: Risk for activity intolerance will decrease  Description: Risk for activity intolerance will decrease  Outcome: Ongoing     Problem:  Bowel/Gastric:  Goal: Bowel function will improve  Description: Bowel function will improve  Outcome: Ongoing  Goal: Diagnostic test results will improve  Description: Diagnostic test results will improve  Outcome: Ongoing  Goal: Occurrences of nausea will decrease  Description: Occurrences of nausea will decrease  Outcome: Ongoing  Goal: Occurrences of vomiting will decrease  Description: Occurrences of vomiting will decrease  Outcome: Ongoing     Problem: Fluid Volume:  Goal: Maintenance of adequate hydration will improve  Description: Maintenance of adequate hydration will improve  Outcome: Ongoing     Problem: Health Behavior:  Goal: Ability to state signs and symptoms to report to health care provider will improve  Description: Ability to state signs and symptoms to report to health care provider will improve  Outcome: Ongoing     Problem: Physical Regulation:  Goal: Complications related to the disease process, condition or treatment will be avoided or minimized  Description: Complications related to the disease process, condition or treatment will be avoided or minimized  Outcome: Ongoing  Goal: Ability to maintain clinical measurements within normal limits will improve  Description: Ability to maintain clinical measurements within normal limits will improve  Outcome: Ongoing     Problem: Sensory:  Goal: Ability to identify factors that increase the pain will improve  Description: Ability to identify factors that increase the pain will improve  Outcome: Ongoing  Goal: Ability to notify healthcare provider of pain before it becomes unmanageable or unbearable will improve  Description: Ability to notify healthcare provider of pain before it becomes unmanageable or unbearable will improve  Outcome: Ongoing  Goal: Pain level will decrease  Description: Pain level will decrease  Outcome: Ongoing     Problem: Pain:  Goal: Pain level will decrease  Description: Pain level will decrease  Outcome: Ongoing  Goal: Control of acute pain  Description: Control of acute pain  Outcome: Ongoing  Goal: Control of chronic pain  Description: Control of chronic pain  Outcome: Ongoing

## 2020-10-23 NOTE — PROGRESS NOTES
Physical Therapy  Facility/Department: [facility]  PROGRESSIVE CARE  Daily Treatment Note  NAME: Eddie Mendoza  : 1965  MRN: [id number]    Date of Service: 10/23/2020    Discharge Recommendations:  Home with assist PRN        Assessment   Body structures, Functions, Activity limitations: Decreased balance;Decreased endurance  Prognosis: Good  Decision Making: Low Complexity  REQUIRES PT FOLLOW UP: Yes  Activity Tolerance  Activity Tolerance: Patient limited by fatigue;Patient limited by pain; Patient limited by endurance     Patient Diagnosis(es): There were no encounter diagnoses. has a past medical history of Anxiety, Breast cancer ([facility] [address] COPD (chronic obstructive pulmonary disease) ([facility] [address] Depression, Headache(784.0), History of alcoholism ([facility] [address] Hypertension, Hypothyroidism, Kidney stone, and PTSD (post-traumatic stress disorder). has a past surgical history that includes Eye surgery; tumor removal (); Appendectomy (); Cholecystectomy (); Tubal ligation (); lumbar laminectomy (); Dilation and curettage of uterus; Breast lumpectomy (Left, ); Breast biopsy (Right, ); alcon and bso (cervix removed) (); ventral hernia repair (); hernia repair (2020); fracture surgery (Left); hernia repair (N/A, 2020); and ventral hernia repair (N/A, 10/21/2020). Restrictions     Subjective   General  Chart Reviewed: Yes  Response To Previous Treatment: Patient with no complaints from previous session. Family / Caregiver Present: Yes  Subjective  Subjective: Patient supine in bed upon arrival and willing to complete therapy.   Pain Screening  Patient Currently in Pain: Yes  Pain Assessment  Pain Assessment: 0-10  Pain Level: 7  Pain Type: Surgical pain  Pain Location: Abdomen  Pain Orientation: Upper  Pain Descriptors: Aching  Vital Signs  Patient Currently in Pain: Yes  Oxygen Therapy  SpO2: 90 %  Pulse Oximeter Device Mode: Continuous  Pulse Oximeter Device Location: Left;Finger  O2 Device: Nasal cannula  O2 Flow Rate (L/min): 3 L/min       Orientation  Orientation  Overall Orientation Status: Within Normal Limits  Cognition      Objective   Bed mobility  Bridging: Stand by assistance  Rolling to Left: Stand by assistance  Rolling to Right: Stand by assistance  Supine to Sit: Stand by assistance  Sit to Supine: Stand by assistance  Scooting: Stand by assistance  Comment: Patient's SPO2 dropped to 75% with supine to sit transfer. Patient encouraged to take nice deep breaths in through nose to improve SPO2 levels and returned to supine. Transfers  Sit to Stand: Stand by assistance  Stand to sit: Stand by assistance  Bed to Chair: Stand by assistance  Stand Pivot Transfers: Unable to assess  Squat Pivot Transfers: Unable to assess  Lateral Transfers: Unable to assess  Car Transfer: Unable to assess  Ambulation  Ambulation?: Yes  WB Status: FWB  More Ambulation?: No  Ambulation 1  Surface: level tile  Device: Rolling Walker  Other Apparatus: O2  Assistance: Contact guard assistance;Stand by assistance  Quality of Gait: Patient becca's slow jens with fair gait mechanics. Patient given verbal cues to stay within rolling walker.   Gait Deviations: Slow Jens  Distance: 40'x2  Stairs/Curb  Stairs?: No  Neuromuscular Education  NDT Treatment: Gait ;Sitting;Standing  Balance  Posture: Fair  Sitting - Static: Good  Sitting - Dynamic: Good  Standing - Static: Good  Standing - Dynamic: Fair  Exercises  Quad Sets: 20x  Heelslides: 20x  Gluteal Sets: 20x  Ankle Pumps: 20x  Other exercises  Other exercises?: No                        G-Code     OutComes Score                                                     AM-PAC Score             Goals  Short term goals  Time Frame for Short term goals: 1 day  Short term goal 1: Assess functional status  Long term goals  Time Frame for Long term goals : 3 days  Long term goal 1: Transfer with supervision  Long term goal 2: Gait with RW 50 ft x2  Long term goal 3:

## 2020-10-23 NOTE — FLOWSHEET NOTE
Pt called out and wanted the pain medication for \"break through pain\" . When nurse went into room to give pt PRN pain meds, pt was in bed and had eyes closed. Pt stated she had a 7/10 pain across of top of abdomen. Nurse noted as pain medication was being given IV that pt was staring off into space and eyes appeared glassy and pupils were dilated. Also it was noted that while pt was staring off into space her left hand fingers were moving like she was counting something, when pt was questioned what she was counting, pt replied that, \" that was just happens with my hand. \"

## 2020-10-23 NOTE — PLAN OF CARE
Problem: Infection - Surgical Site:  Goal: Will show no infection signs and symptoms  Description: Will show no infection signs and symptoms  10/23/2020 1545 by Milton Sung RN  Outcome: Ongoing  10/23/2020 0201 by Malinda Montez RN  Outcome: Ongoing     Problem: Activity:  Goal: Risk for activity intolerance will decrease  Description: Risk for activity intolerance will decrease  10/23/2020 1545 by Milton Sung RN  Outcome: Ongoing  10/23/2020 0201 by Malinda Montez RN  Outcome: Ongoing     Problem:  Bowel/Gastric:  Goal: Bowel function will improve  Description: Bowel function will improve  10/23/2020 1545 by Milton Sung RN  Outcome: Ongoing  10/23/2020 0201 by Malinda Montez RN  Outcome: Ongoing  Goal: Diagnostic test results will improve  Description: Diagnostic test results will improve  10/23/2020 1545 by Milton Sung RN  Outcome: Ongoing  10/23/2020 0201 by Malinda Montez RN  Outcome: Ongoing  Goal: Occurrences of nausea will decrease  Description: Occurrences of nausea will decrease  10/23/2020 1545 by Milton Sung RN  Outcome: Ongoing  10/23/2020 0201 by Malinda Montez RN  Outcome: Ongoing  Goal: Occurrences of vomiting will decrease  Description: Occurrences of vomiting will decrease  10/23/2020 1545 by Milton Sung RN  Outcome: Ongoing  10/23/2020 0201 by Malinda Montez RN  Outcome: Ongoing     Problem: Fluid Volume:  Goal: Maintenance of adequate hydration will improve  Description: Maintenance of adequate hydration will improve  10/23/2020 1545 by Milton Sung RN  Outcome: Ongoing  10/23/2020 0201 by Malinda Montez RN  Outcome: Ongoing     Problem: Fluid Volume:  Goal: Maintenance of adequate hydration will improve  Description: Maintenance of adequate hydration will improve  10/23/2020 1545 by Milton Sung RN  Outcome: Ongoing  10/23/2020 0201 by Malinda Montez RN  Outcome: Ongoing     Problem: Health Behavior:  Goal: Ability to state signs and symptoms to report to health care provider will improve  Description: Ability to state signs and symptoms to report to health care provider will improve  10/23/2020 1545 by Graceann Dubin, RN  Outcome: Ongoing  10/23/2020 0201 by Aleida Zavala RN  Outcome: Ongoing     Problem: Physical Regulation:  Goal: Complications related to the disease process, condition or treatment will be avoided or minimized  Description: Complications related to the disease process, condition or treatment will be avoided or minimized  10/23/2020 1545 by Graceann Dubin, RN  Outcome: Ongoing  10/23/2020 0201 by Aleida Zavala RN  Outcome: Ongoing  Goal: Ability to maintain clinical measurements within normal limits will improve  Description: Ability to maintain clinical measurements within normal limits will improve  10/23/2020 1545 by Graceann Dubin, RN  Outcome: Ongoing  10/23/2020 0201 by Aleida Zavala RN  Outcome: Ongoing     Problem: Sensory:  Goal: Ability to identify factors that increase the pain will improve  Description: Ability to identify factors that increase the pain will improve  10/23/2020 1545 by Graceann Dubin, RN  Outcome: Ongoing  10/23/2020 0201 by Aleida Zavlaa RN  Outcome: Ongoing  Goal: Ability to notify healthcare provider of pain before it becomes unmanageable or unbearable will improve  Description: Ability to notify healthcare provider of pain before it becomes unmanageable or unbearable will improve  10/23/2020 1545 by Graceann Dubin, RN  Outcome: Ongoing  10/23/2020 0201 by Aleida Zavala RN  Outcome: Ongoing  Goal: Pain level will decrease  Description: Pain level will decrease  10/23/2020 1545 by Graceann Dubin, RN  Outcome: Ongoing  10/23/2020 0201 by Aleida Zavala RN  Outcome: Ongoing     Problem: Pain:  Goal: Pain level will decrease  Description: Pain level will decrease  10/23/2020 1545 by Graceann Dubin, RN  Outcome: Ongoing  10/23/2020 0201 by Aleida Zavala RN  Outcome:

## 2020-10-23 NOTE — PROGRESS NOTES
Hospitalist Progress Note  10/23/2020 6:57 PM  Subjective:   Admit Date: 10/21/2020  PCP: Ryan Gutierrez DO     Full Code      C/c:No chief complaint on file.         Interval History: doing better, pod#2    Diet: Diet NPO Effective Now Exceptions are: Sips with Meds, Ice Chips                                ip days:0  Medications:   Scheduled Meds:   magnesium hydroxide  30 mL Oral BID    albuterol  2.5 mg Nebulization Q4H    scopolamine  1 patch Transdermal Once    sodium chloride flush  10 mL Intravenous 2 times per day    enoxaparin  40 mg Subcutaneous Daily    amLODIPine  10 mg Oral Daily    atorvastatin  10 mg Oral Daily    DULoxetine  30 mg Oral Daily    DULoxetine  60 mg Oral Daily    fluticasone  2 spray Nasal Daily    lamoTRIgine  200 mg Oral Daily    levothyroxine  100 mcg Oral Daily    lisinopril  40 mg Oral Daily    cetirizine  10 mg Oral Daily    pantoprazole  40 mg Oral QAM AC    propranolol  10 mg Oral TID    nicotine  1 patch Transdermal Daily    insulin lispro  0-6 Units Subcutaneous TID WC    insulin lispro  0-3 Units Subcutaneous Nightly     Continuous Infusions:   dextrose 5 % and 0.45 % NaCl 125 mL/hr at 10/23/20 1854    dextrose       PRN Meds:.oxyCODONE-acetaminophen, oxyCODONE-acetaminophen, sodium chloride flush, [DISCONTINUED] promethazine **OR** ondansetron, hydrOXYzine, glucose, dextrose, glucagon (rDNA), dextrose, albuterol     CBC:   Recent Labs     10/21/20  1511 10/22/20  0504 10/23/20  0502 10/23/20  0711 10/23/20  1134 10/23/20  1816   WBC 19.5* 12.9* 13.2*  --   --   --    HGB 11.0* 12.1 7.8* 7.9* 7.7* 8.3*    169 179  --   --   --      BMP:    Recent Labs     10/21/20  1511 10/22/20  0504 10/23/20  0502    137 142   K 4.7 4.5 4.6    103 107   CO2 26 23 25   BUN 13 17 21*   CREATININE 1.55* 1.46* 1.46*   GLUCOSE 195* 139* 94     Hepatic:   Recent Labs     10/21/20  1511   *   *   BILITOT 0.41   ALKPHOS 102     Troponin: No results for input(s): TROPONINI in the last 72 hours. BNP: No results for input(s): BNP in the last 72 hours. Lipids: No results for input(s): CHOL, HDL in the last 72 hours. Invalid input(s): LDLCALCU  INR: No results for input(s): INR in the last 72 hours. Objective:   Vitals: BP (!) 76/46   Pulse 92   Temp 98.4 °F (36.9 °C) (Oral)   Resp 20   Ht 5' 1\" (1.549 m)   Wt 182 lb 9.6 oz (82.8 kg)   SpO2 96%   BMI 34.50 kg/m²   General appearance: alert, appears stated age and cooperative  Skin: Skin color, texture, turgor normal. No rashes or lesions  Lungs: clear to auscultation bilaterally  Heart: regular rate and rhythm, S1, S2 normal, no murmur, click, rub or gallop  Abdomen: soft, non-tender; bowel sounds normal; no masses,  no organomegaly  Extremities: extremities normal, atraumatic, no cyanosis or edema  Neurologic: Mental status: Alert, oriented, thought content appropriate    Prophylaxis:   DVT with  [x] lovenox        [] heparin        [] Scd        [] none:     Radiology:  Xr Acute Abd Series Chest 1 Vw    Result Date: 10/16/2020  EXAMINATION: TWO XRAY VIEWS OF THE ABDOMEN AND SINGLE  XRAY VIEW OF THE CHEST 10/16/2020 9:57 pm COMPARISON: 10/12/2020 HISTORY: ORDERING SYSTEM PROVIDED HISTORY: abdominal pain, distension TECHNOLOGIST PROVIDED HISTORY: abdominal pain, distension Reason for Exam: Ventral hernia pain nausea and constipation, scheduled for surgery on Wed Acuity: Acute Type of Exam: Subsequent/Follow-up FINDINGS: Cardiomediastinal silhouette is normal in size. No pulmonary consolidation, pleural effusion, or pneumothorax. Bowel gas pattern is nonobstructive. Moderate stool within the colon. Prior cholecystectomy. Prior ventral abdominal wall hernia repair. Punctate nonobstructing left upper pole stone. 1. No acute abnormality. 2. Moderate stool within the colon, which may suggest constipation. 3. Left upper pole renal stone. Assessment :   1.  Post op day #2 stable  2. S/p ventral hernia repair     Plan:   1. Continue present plan  2. See order    Patient Active Problem List:     Severe episode of recurrent major depressive disorder, without psychotic features ([facility] [address]     Bipolar 2 disorder (HCC)     Bipolar II disorder (HCC)     Postoperative pain     Status post repair of recurrent ventral hernia      Anticipated Disposition upon discharge: [] Home                                                                         [] Home with Home Health                                                                         [] [facility]                                                                         [] [address] [facility] [address]      Patient is admitted as inpatient status because of co-morbidities listed above, severity of signs and symptoms as outlined, requirement for current medical therapies and most importantly because of direct risk to patient if care not provided in a hospital setting.           Tahmina Dang MD                       Seen earlier but late entry  [address] [facility]

## 2020-10-23 NOTE — PROGRESS NOTES
Occupational Therapy  Facility/Department: [facility]  PROGRESSIVE CARE  Daily Treatment Note  NAME: Jaime Sandoval  : 1965  MRN: [id number]    Date of Service: 10/23/2020    Discharge Recommendations:  Continue to assess pending progress, Home with Home health OT     Assessment   Performance deficits / Impairments: Decreased functional mobility ; Decreased endurance;Decreased ADL status; Decreased balance;Decreased strength;Decreased safe awareness;Decreased high-level IADLs  Treatment Diagnosis: Generalized weakness  Prognosis: Good  OT Education: Transfer Training  REQUIRES OT FOLLOW UP: Yes  Activity Tolerance  Activity Tolerance: Patient Tolerated treatment well  Safety Devices  Safety Devices in place: Yes  Type of devices: Left in chair;Call light within reach;Nurse notified         Patient Diagnosis(es): There were no encounter diagnoses. has a past medical history of Anxiety, Breast cancer ([facility] [address] COPD (chronic obstructive pulmonary disease) ([facility] [address] Depression, Headache(784.0), History of alcoholism ([facility] [address] Hypertension, Hypothyroidism, Kidney stone, and PTSD (post-traumatic stress disorder). has a past surgical history that includes Eye surgery; tumor removal (); Appendectomy (); Cholecystectomy (); Tubal ligation (); lumbar laminectomy (); Dilation and curettage of uterus; Breast lumpectomy (Left, ); Breast biopsy (Right, ); alcon and bso (cervix removed) (); ventral hernia repair (); hernia repair (2020); fracture surgery (Left); hernia repair (N/A, 2020); and ventral hernia repair (N/A, 10/21/2020). Subjective   General  Chart Reviewed: Yes  Patient assessed for rehabilitation services?: Yes  Family / Caregiver Present: No  Referring Practitioner: Julieta Dodge  Diagnosis: Status post repair of recurrent ventral hernia  Subjective  Subjective: Pt rec'd in chair, pleasant and cooperative for OT.  Pt is a 47 yo female status post repair of recurrent ventral hernia. Pain Assessment  Pain Type: Surgical pain  Pain Location: Abdomen  Vital Signs  Patient Currently in Pain: Yes   Orientation  Orientation  Overall Orientation Status: Impaired  Orientation Level: Disoriented to situation;Disoriented to time  Objective    ADL  Grooming: Contact guard assistance(Pt completed grooming task of washing her face at the sink with CGA. Patient refused to brush her teeth or her hair this date. Patient tolerated being on her feet for grooming task and walking throughout room for 5 minutes.)  LE Dressing:  Moderate assistance(Patient able to doff socks, required Mod A to don)  Transfers  Stand Step Transfers: Contact guard assistance;Minimal assistance(Patient with one near LOB during functional mobility to sink, but easily recovered with Min A from therapist.)  Sit to stand: Contact guard assistance  Stand to sit: Contact guard assistance     Plan   Plan  Times per week: 1-2    Goals  Short term goals  Time Frame for Short term goals: Duration of hospital stay  Short term goal 1: Pt will complete toilet transfer and toileting tasks with Mod I for increased independence for return home  Short term goal 2: Pt will complete grooming task while stand > 5 minutes to increase standing tolerance for return home -GOAL MET       Therapy Time   Individual Concurrent Group Co-treatment   Time In 1520         Time Out 1535         Minutes 15         Timed Code Treatment Minutes: Janina Patel., OT

## 2020-10-23 NOTE — PROGRESS NOTES
Surgery Progress Note            PATIENT NAME: Tahira Muñoz     TODAY'S DATE: 10/23/2020, 12:01 PM    CHIEF COMPLAINT:  POD#2 from ventral hernia repair with component separation    SUBJECTIVE:    Pt seen and examined. No acute events. Denies any nausea or emesis. Has been NPO. No flatus or BM. Pain controlled with medication. CXR today shows no acute process. Slight increase in leukocytosis today. OBJECTIVE:   VITALS:  /64   Pulse 103   Temp 98.4 °F (36.9 °C) (Oral)   Resp 18   Ht 5' 1\" (1.549 m)   Wt 182 lb 9.6 oz (82.8 kg)   SpO2 90%   BMI 34.50 kg/m²      INTAKE/OUTPUT:      Intake/Output Summary (Last 24 hours) at 10/23/2020 1201  Last data filed at 10/23/2020 0421  Gross per 24 hour   Intake 2929 ml   Output 1250 ml   Net 1679 ml                 CONSTITUTIONAL:  awake and alert. No acute distress  CARDIOVASCULAR:  regular rate and rhythm   LUNGS:  clear to auscultation. Upper airway congestion. ABDOMEN:   Softly distended, appropriately tender to palpation, bowel sounds hypoactive. Surgical dressing clean and intact. JUSTINO drains with serosang output. EXTREMITIES:  Normal, without deformities, edema, or skin discoloration    Data:  CBC:   Lab Results   Component Value Date    WBC 13.2 10/23/2020    RBC 2.61 10/23/2020    HGB 7.7 10/23/2020    HCT 23.7 10/23/2020    MCV 94.6 10/23/2020    MCH 29.9 10/23/2020    MCHC 31.6 10/23/2020    RDW 13.6 10/23/2020     10/23/2020    MPV 8.8 10/23/2020     BMP:    Lab Results   Component Value Date     10/23/2020    K 4.6 10/23/2020     10/23/2020    CO2 25 10/23/2020    BUN 21 10/23/2020    LABALBU 4.2 10/21/2020    CREATININE 1.46 10/23/2020    CALCIUM 8.7 10/23/2020    GFRAA 45 10/23/2020    LABGLOM 37 10/23/2020    GLUCOSE 94 10/23/2020       Radiology Review:  CXR images reviewed and read noted.       ASSESSMENT   Patient Active Problem List   Diagnosis    Severe episode of recurrent major depressive disorder, without

## 2020-10-23 NOTE — CARE COORDINATION
Todd Gee returned to [facility] ER 10/17/2020. [facility] ER 10/12/2020. She is scheduled for hernia repair 10/21/2020.     10/13/2020- 11:14 am Left message requesting return call re: Initial ER/Covid F/U call.     10/15/2020 Left message requesting return call     10/17/2020 Left message requesting return call re: Initial ER/Covid F/U call and to discuss enrollment in [facility].      10/19/2020- 9:59 am Left second message requesting return call. 10/23/2020 Todd Gee had ventral hernia repair 10/21 and is currently IP at [facility]. This writer will f/u next week to discuss [facility] enrollment.

## 2020-10-23 NOTE — PROGRESS NOTES
Physical Therapy    Attempted to see patient for physical therapy this date. Patient requests to be seen later in the day due to just receiving pain medication. Will attempt to see later this date.      Rodríguez Chi PTA

## 2020-10-24 LAB
ABSOLUTE EOS #: 0.49 K/UL (ref 0–0.44)
ABSOLUTE IMMATURE GRANULOCYTE: 0.06 K/UL (ref 0–0.3)
ABSOLUTE LYMPH #: 1.07 K/UL (ref 1.1–3.7)
ABSOLUTE MONO #: 0.63 K/UL (ref 0.1–1.2)
ANION GAP SERPL CALCULATED.3IONS-SCNC: 8 MMOL/L (ref 9–17)
BASOPHILS # BLD: 0 % (ref 0–2)
BASOPHILS ABSOLUTE: 0.03 K/UL (ref 0–0.2)
BUN BLDV-MCNC: 14 MG/DL (ref 6–20)
BUN/CREAT BLD: 14 (ref 9–20)
CALCIUM SERPL-MCNC: 8.9 MG/DL (ref 8.6–10.4)
CHLORIDE BLD-SCNC: 105 MMOL/L (ref 98–107)
CO2: 27 MMOL/L (ref 20–31)
CREAT SERPL-MCNC: 0.98 MG/DL (ref 0.5–0.9)
DIFFERENTIAL TYPE: ABNORMAL
EOSINOPHILS RELATIVE PERCENT: 5 % (ref 1–4)
GFR AFRICAN AMERICAN: >60 ML/MIN
GFR NON-AFRICAN AMERICAN: 59 ML/MIN
GFR SERPL CREATININE-BSD FRML MDRD: ABNORMAL ML/MIN/{1.73_M2}
GFR SERPL CREATININE-BSD FRML MDRD: ABNORMAL ML/MIN/{1.73_M2}
GLUCOSE BLD-MCNC: 114 MG/DL (ref 65–105)
GLUCOSE BLD-MCNC: 114 MG/DL (ref 70–99)
GLUCOSE BLD-MCNC: 120 MG/DL (ref 65–105)
GLUCOSE BLD-MCNC: 124 MG/DL (ref 65–105)
GLUCOSE BLD-MCNC: 81 MG/DL (ref 65–105)
GLUCOSE BLD-MCNC: 88 MG/DL (ref 65–105)
GLUCOSE BLD-MCNC: 91 MG/DL (ref 65–105)
HCT VFR BLD CALC: 22.4 % (ref 36.3–47.1)
HCT VFR BLD CALC: 23 % (ref 36.3–47.1)
HCT VFR BLD CALC: 23.4 % (ref 36.3–47.1)
HCT VFR BLD CALC: 23.4 % (ref 36.3–47.1)
HCT VFR BLD CALC: 23.7 % (ref 36.3–47.1)
HEMOGLOBIN: 7.2 G/DL (ref 11.9–15.1)
HEMOGLOBIN: 7.4 G/DL (ref 11.9–15.1)
HEMOGLOBIN: 7.5 G/DL (ref 11.9–15.1)
HEMOGLOBIN: 7.6 G/DL (ref 11.9–15.1)
HEMOGLOBIN: 7.6 G/DL (ref 11.9–15.1)
IMMATURE GRANULOCYTES: 1 %
LYMPHOCYTES # BLD: 10 % (ref 24–43)
MCH RBC QN AUTO: 30.3 PG (ref 25.2–33.5)
MCHC RBC AUTO-ENTMCNC: 32.1 G/DL (ref 25.2–33.5)
MCV RBC AUTO: 94.4 FL (ref 82.6–102.9)
MONOCYTES # BLD: 6 % (ref 3–12)
NRBC AUTOMATED: 0 PER 100 WBC
PDW BLD-RTO: 13.1 % (ref 11.8–14.4)
PLATELET # BLD: 179 K/UL (ref 138–453)
PLATELET ESTIMATE: ABNORMAL
PMV BLD AUTO: 8.9 FL (ref 8.1–13.5)
POTASSIUM SERPL-SCNC: 4 MMOL/L (ref 3.7–5.3)
RBC # BLD: 2.51 M/UL (ref 3.95–5.11)
RBC # BLD: ABNORMAL 10*6/UL
SEG NEUTROPHILS: 78 % (ref 36–65)
SEGMENTED NEUTROPHILS ABSOLUTE COUNT: 8.24 K/UL (ref 1.5–8.1)
SODIUM BLD-SCNC: 140 MMOL/L (ref 135–144)
WBC # BLD: 10.5 K/UL (ref 3.5–11.3)
WBC # BLD: ABNORMAL 10*3/UL

## 2020-10-24 PROCEDURE — 94669 MECHANICAL CHEST WALL OSCILL: CPT

## 2020-10-24 PROCEDURE — 6360000002 HC RX W HCPCS: Performed by: INTERNAL MEDICINE

## 2020-10-24 PROCEDURE — 2580000003 HC RX 258: Performed by: SURGERY

## 2020-10-24 PROCEDURE — 97110 THERAPEUTIC EXERCISES: CPT

## 2020-10-24 PROCEDURE — G0378 HOSPITAL OBSERVATION PER HR: HCPCS

## 2020-10-24 PROCEDURE — 1200000000 HC SEMI PRIVATE

## 2020-10-24 PROCEDURE — 94760 N-INVAS EAR/PLS OXIMETRY 1: CPT

## 2020-10-24 PROCEDURE — 6370000000 HC RX 637 (ALT 250 FOR IP): Performed by: INTERNAL MEDICINE

## 2020-10-24 PROCEDURE — 85025 COMPLETE CBC W/AUTO DIFF WBC: CPT

## 2020-10-24 PROCEDURE — 94640 AIRWAY INHALATION TREATMENT: CPT

## 2020-10-24 PROCEDURE — 6370000000 HC RX 637 (ALT 250 FOR IP): Performed by: SURGERY

## 2020-10-24 PROCEDURE — 2580000003 HC RX 258: Performed by: NURSE PRACTITIONER

## 2020-10-24 PROCEDURE — 80048 BASIC METABOLIC PNL TOTAL CA: CPT

## 2020-10-24 PROCEDURE — 97116 GAIT TRAINING THERAPY: CPT

## 2020-10-24 PROCEDURE — 6360000002 HC RX W HCPCS: Performed by: NURSE PRACTITIONER

## 2020-10-24 PROCEDURE — 6360000002 HC RX W HCPCS: Performed by: SURGERY

## 2020-10-24 PROCEDURE — 2700000000 HC OXYGEN THERAPY PER DAY

## 2020-10-24 PROCEDURE — 99231 SBSQ HOSP IP/OBS SF/LOW 25: CPT | Performed by: FAMILY MEDICINE

## 2020-10-24 RX ORDER — LORAZEPAM 2 MG/ML
1 INJECTION INTRAMUSCULAR ONCE
Status: COMPLETED | OUTPATIENT
Start: 2020-10-24 | End: 2020-10-24

## 2020-10-24 RX ADMIN — ALBUTEROL SULFATE 2.5 MG: 2.5 SOLUTION RESPIRATORY (INHALATION) at 12:25

## 2020-10-24 RX ADMIN — PROPRANOLOL HYDROCHLORIDE 10 MG: 10 TABLET ORAL at 10:51

## 2020-10-24 RX ADMIN — PROPRANOLOL HYDROCHLORIDE 10 MG: 10 TABLET ORAL at 21:40

## 2020-10-24 RX ADMIN — LEVOTHYROXINE SODIUM 100 MCG: 0.1 TABLET ORAL at 05:18

## 2020-10-24 RX ADMIN — CETIRIZINE HYDROCHLORIDE 10 MG: 5 TABLET, FILM COATED ORAL at 21:39

## 2020-10-24 RX ADMIN — ALBUTEROL SULFATE 2.5 MG: 2.5 SOLUTION RESPIRATORY (INHALATION) at 19:53

## 2020-10-24 RX ADMIN — FLUTICASONE PROPIONATE 2 SPRAY: 50 SPRAY, METERED NASAL at 21:39

## 2020-10-24 RX ADMIN — DEXTROSE AND SODIUM CHLORIDE: 5; 450 INJECTION, SOLUTION INTRAVENOUS at 02:23

## 2020-10-24 RX ADMIN — ALBUTEROL SULFATE 2.5 MG: 2.5 SOLUTION RESPIRATORY (INHALATION) at 03:13

## 2020-10-24 RX ADMIN — LORAZEPAM 1 MG: 2 INJECTION INTRAMUSCULAR; INTRAVENOUS at 01:17

## 2020-10-24 RX ADMIN — PANTOPRAZOLE SODIUM 40 MG: 40 TABLET, DELAYED RELEASE ORAL at 21:40

## 2020-10-24 RX ADMIN — PROPRANOLOL HYDROCHLORIDE 10 MG: 10 TABLET ORAL at 14:00

## 2020-10-24 RX ADMIN — OXYCODONE HYDROCHLORIDE AND ACETAMINOPHEN 1 TABLET: 5; 325 TABLET ORAL at 05:17

## 2020-10-24 RX ADMIN — ATORVASTATIN CALCIUM 10 MG: 10 TABLET, FILM COATED ORAL at 21:40

## 2020-10-24 RX ADMIN — ENOXAPARIN SODIUM 40 MG: 40 INJECTION SUBCUTANEOUS at 10:56

## 2020-10-24 RX ADMIN — DULOXETINE HYDROCHLORIDE 30 MG: 30 CAPSULE, DELAYED RELEASE ORAL at 21:39

## 2020-10-24 RX ADMIN — LISINOPRIL 40 MG: 20 TABLET ORAL at 21:48

## 2020-10-24 RX ADMIN — OXYCODONE HYDROCHLORIDE AND ACETAMINOPHEN 1 TABLET: 5; 325 TABLET ORAL at 14:47

## 2020-10-24 RX ADMIN — DULOXETINE HYDROCHLORIDE 60 MG: 30 CAPSULE, DELAYED RELEASE ORAL at 21:42

## 2020-10-24 RX ADMIN — AMLODIPINE BESYLATE 10 MG: 5 TABLET ORAL at 10:51

## 2020-10-24 RX ADMIN — OXYCODONE HYDROCHLORIDE AND ACETAMINOPHEN 1 TABLET: 5; 325 TABLET ORAL at 23:22

## 2020-10-24 RX ADMIN — MAGNESIUM HYDROXIDE 30 ML: 400 SUSPENSION ORAL at 10:52

## 2020-10-24 RX ADMIN — LAMOTRIGINE 200 MG: 100 TABLET ORAL at 21:40

## 2020-10-24 RX ADMIN — Medication 10 ML: at 21:42

## 2020-10-24 RX ADMIN — ALBUTEROL SULFATE 2.5 MG: 2.5 SOLUTION RESPIRATORY (INHALATION) at 08:39

## 2020-10-24 RX ADMIN — DEXTROSE AND SODIUM CHLORIDE: 5; 450 INJECTION, SOLUTION INTRAVENOUS at 23:22

## 2020-10-24 ASSESSMENT — PULMONARY FUNCTION TESTS
PEFR_L/MIN: 10
PEFR_L/MIN: 10

## 2020-10-24 ASSESSMENT — PAIN SCALES - GENERAL
PAINLEVEL_OUTOF10: 5
PAINLEVEL_OUTOF10: 0
PAINLEVEL_OUTOF10: 6
PAINLEVEL_OUTOF10: 0
PAINLEVEL_OUTOF10: 6

## 2020-10-24 NOTE — PROGRESS NOTES
Orientation  Orientation  Overall Orientation Status: Within Normal Limits  Cognition      Objective   Bed mobility  Bridging: Unable to assess  Rolling to Left: Modified independent  Rolling to Right: Modified independent  Supine to Sit: Modified independent  Sit to Supine: Modified independent  Scooting: Modified independent  Transfers  Sit to Stand: Supervision  Stand to sit: Supervision  Bed to Chair: Unable to assess  Stand Pivot Transfers: Unable to assess  Squat Pivot Transfers: Unable to assess  Lateral Transfers: Unable to assess  Car Transfer: Unable to assess  Ambulation  Ambulation?: Yes  WB Status: WBAT  More Ambulation?: No  Ambulation 1  Surface: level tile  Device: Rolling Walker  Assistance: Contact guard assistance  Quality of Gait: Pt exhibits slow and steady gait with path deviation.   Gait Deviations: Slow Etta;Deviated path  Distance: 50'x2  Stairs/Curb  Stairs?: No  Neuromuscular Education  NDT Treatment: Gait ;Sitting;Standing  Balance  Posture: Fair  Sitting - Static: Good  Sitting - Dynamic: Good  Standing - Static: Good  Standing - Dynamic: Fair  Exercises  Straight Leg Raise: x15  Quad Sets: x15  Heelslides: x15  Gluteal Sets: x15  Hip Abduction: x15  Knee Long Arc Quad: x15  Ankle Pumps: x20  Comments: hip add x15  Other exercises  Other exercises?: No                        G-Code     OutComes Score                                                     AM-PAC Score             Goals  Short term goals  Time Frame for Short term goals: 1 day  Short term goal 1: Assess functional status  Long term goals  Time Frame for Long term goals : 3 days  Long term goal 1: Transfer with supervision  Long term goal 2: Gait with RW 50 ft x2  Long term goal 3: Progress to no device as done at home    Plan    Plan  Times per day: Daily  Current Treatment Recommendations: Strengthening, Balance Training, Functional Mobility Training, Transfer Training, Gait Training, Endurance Training  Safety Devices  Type of devices:  All fall risk precautions in place, Call light within reach, Gait belt, Patient at risk for falls, Left in chair, Nurse notified, Chair alarm in place, Bed alarm in place     Therapy Time   Individual Concurrent Group Co-treatment   Time In 0856         Time Out 0919         Minutes 23         Timed Code Treatment Minutes: [address], PTA

## 2020-10-24 NOTE — FLOWSHEET NOTE
visited patient while rounding on PCU. Assessment: Patient was sitting in chair. Patient presented to be somewhat disoriented as she talked. Patient is still recovering from hernia surgery a few days ago and wants to go home. Patient has flowers in room from her sponsor and states she has been in a program for 2 years. Intervention:  provided caring presence    Outcome: Patient thanked  for visit    Plan: Chaplains will remain available to offer spiritual and emotional support as needed.        10/24/20 1126   Encounter Summary   Services provided to: Patient   Referral/Consult From: Rounding   Complexity of Encounter Low   Length of Encounter 15 minutes   Spiritual Assessment Completed Yes   Spiritual/Spiritism   Type Spiritual support   Assessment Approachable   Intervention Active listening;Sustaining presence/ Ministry of presence   Outcome Expressed gratitude   Electronically signed by Hedy Pringle on 10/24/2020 at 11:30 AM

## 2020-10-24 NOTE — PROGRESS NOTES
repair/stable  2. Bipolar disease     Plan:   1.  ambulate  2. Continue present plan    Patient Active Problem List:     Severe episode of recurrent major depressive disorder, without psychotic features ([facility] [address]     Bipolar 2 disorder (HCC)     Bipolar II disorder (HCC)     Postoperative pain     Status post repair of recurrent ventral hernia      Anticipated Disposition upon discharge: [] Home                                                                         [] Home with Home Health                                                                         [] [facility]                                                                         [] [address] [facility] [address]      Patient is admitted as inpatient status because of co-morbidities listed above, severity of signs and symptoms as outlined, requirement for current medical therapies and most importantly because of direct risk to patient if care not provided in a hospital setting.           Vita Carcamo MD  Rounding Hospitalist

## 2020-10-25 LAB
ABSOLUTE EOS #: 0.57 K/UL (ref 0–0.44)
ABSOLUTE IMMATURE GRANULOCYTE: 0.05 K/UL (ref 0–0.3)
ABSOLUTE LYMPH #: 1.15 K/UL (ref 1.1–3.7)
ABSOLUTE MONO #: 0.62 K/UL (ref 0.1–1.2)
ANION GAP SERPL CALCULATED.3IONS-SCNC: 7 MMOL/L (ref 9–17)
BASOPHILS # BLD: 1 % (ref 0–2)
BASOPHILS ABSOLUTE: 0.04 K/UL (ref 0–0.2)
BUN BLDV-MCNC: 5 MG/DL (ref 6–20)
BUN/CREAT BLD: 7 (ref 9–20)
CALCIUM SERPL-MCNC: 8.6 MG/DL (ref 8.6–10.4)
CHLORIDE BLD-SCNC: 100 MMOL/L (ref 98–107)
CO2: 31 MMOL/L (ref 20–31)
CREAT SERPL-MCNC: 0.68 MG/DL (ref 0.5–0.9)
DIFFERENTIAL TYPE: ABNORMAL
EOSINOPHILS RELATIVE PERCENT: 7 % (ref 1–4)
GFR AFRICAN AMERICAN: >60 ML/MIN
GFR NON-AFRICAN AMERICAN: >60 ML/MIN
GFR SERPL CREATININE-BSD FRML MDRD: ABNORMAL ML/MIN/{1.73_M2}
GFR SERPL CREATININE-BSD FRML MDRD: ABNORMAL ML/MIN/{1.73_M2}
GLUCOSE BLD-MCNC: 112 MG/DL (ref 70–99)
HCT VFR BLD CALC: 22.1 % (ref 36.3–47.1)
HCT VFR BLD CALC: 24.5 % (ref 36.3–47.1)
HEMOGLOBIN: 7.2 G/DL (ref 11.9–15.1)
HEMOGLOBIN: 7.9 G/DL (ref 11.9–15.1)
IMMATURE GRANULOCYTES: 1 %
LYMPHOCYTES # BLD: 15 % (ref 24–43)
MAGNESIUM: 1.9 MG/DL (ref 1.6–2.6)
MCH RBC QN AUTO: 30.3 PG (ref 25.2–33.5)
MCHC RBC AUTO-ENTMCNC: 32.6 G/DL (ref 25.2–33.5)
MCV RBC AUTO: 92.9 FL (ref 82.6–102.9)
MONOCYTES # BLD: 8 % (ref 3–12)
NRBC AUTOMATED: 0 PER 100 WBC
PDW BLD-RTO: 13.1 % (ref 11.8–14.4)
PLATELET # BLD: 194 K/UL (ref 138–453)
PLATELET ESTIMATE: ABNORMAL
PMV BLD AUTO: 8.7 FL (ref 8.1–13.5)
POTASSIUM SERPL-SCNC: 3.3 MMOL/L (ref 3.7–5.3)
RBC # BLD: 2.38 M/UL (ref 3.95–5.11)
RBC # BLD: ABNORMAL 10*6/UL
SEG NEUTROPHILS: 68 % (ref 36–65)
SEGMENTED NEUTROPHILS ABSOLUTE COUNT: 5.31 K/UL (ref 1.5–8.1)
SODIUM BLD-SCNC: 138 MMOL/L (ref 135–144)
WBC # BLD: 7.7 K/UL (ref 3.5–11.3)
WBC # BLD: ABNORMAL 10*3/UL

## 2020-10-25 PROCEDURE — 2580000003 HC RX 258: Performed by: SURGERY

## 2020-10-25 PROCEDURE — 83735 ASSAY OF MAGNESIUM: CPT

## 2020-10-25 PROCEDURE — 99231 SBSQ HOSP IP/OBS SF/LOW 25: CPT | Performed by: FAMILY MEDICINE

## 2020-10-25 PROCEDURE — 94761 N-INVAS EAR/PLS OXIMETRY MLT: CPT

## 2020-10-25 PROCEDURE — G0378 HOSPITAL OBSERVATION PER HR: HCPCS

## 2020-10-25 PROCEDURE — 2700000000 HC OXYGEN THERAPY PER DAY

## 2020-10-25 PROCEDURE — 6370000000 HC RX 637 (ALT 250 FOR IP): Performed by: FAMILY MEDICINE

## 2020-10-25 PROCEDURE — 6360000002 HC RX W HCPCS: Performed by: SURGERY

## 2020-10-25 PROCEDURE — 85014 HEMATOCRIT: CPT

## 2020-10-25 PROCEDURE — 85018 HEMOGLOBIN: CPT

## 2020-10-25 PROCEDURE — 6360000002 HC RX W HCPCS: Performed by: INTERNAL MEDICINE

## 2020-10-25 PROCEDURE — 99024 POSTOP FOLLOW-UP VISIT: CPT | Performed by: SURGERY

## 2020-10-25 PROCEDURE — 94760 N-INVAS EAR/PLS OXIMETRY 1: CPT

## 2020-10-25 PROCEDURE — 80048 BASIC METABOLIC PNL TOTAL CA: CPT

## 2020-10-25 PROCEDURE — 1200000000 HC SEMI PRIVATE

## 2020-10-25 PROCEDURE — 94640 AIRWAY INHALATION TREATMENT: CPT

## 2020-10-25 PROCEDURE — 85025 COMPLETE CBC W/AUTO DIFF WBC: CPT

## 2020-10-25 PROCEDURE — 94669 MECHANICAL CHEST WALL OSCILL: CPT

## 2020-10-25 PROCEDURE — 36415 COLL VENOUS BLD VENIPUNCTURE: CPT

## 2020-10-25 PROCEDURE — 6370000000 HC RX 637 (ALT 250 FOR IP): Performed by: INTERNAL MEDICINE

## 2020-10-25 RX ORDER — POTASSIUM CHLORIDE 20 MEQ/1
40 TABLET, EXTENDED RELEASE ORAL ONCE
Status: COMPLETED | OUTPATIENT
Start: 2020-10-25 | End: 2020-10-25

## 2020-10-25 RX ORDER — TRAMADOL HYDROCHLORIDE 50 MG/1
50 TABLET ORAL EVERY 6 HOURS PRN
Status: DISCONTINUED | OUTPATIENT
Start: 2020-10-25 | End: 2020-10-27 | Stop reason: HOSPADM

## 2020-10-25 RX ADMIN — ENOXAPARIN SODIUM 40 MG: 40 INJECTION SUBCUTANEOUS at 10:32

## 2020-10-25 RX ADMIN — LAMOTRIGINE 200 MG: 100 TABLET ORAL at 21:41

## 2020-10-25 RX ADMIN — CETIRIZINE HYDROCHLORIDE 10 MG: 5 TABLET, FILM COATED ORAL at 19:58

## 2020-10-25 RX ADMIN — FLUTICASONE PROPIONATE 2 SPRAY: 50 SPRAY, METERED NASAL at 19:59

## 2020-10-25 RX ADMIN — OXYCODONE HYDROCHLORIDE AND ACETAMINOPHEN 1 TABLET: 5; 325 TABLET ORAL at 12:20

## 2020-10-25 RX ADMIN — LISINOPRIL 40 MG: 20 TABLET ORAL at 19:57

## 2020-10-25 RX ADMIN — PANTOPRAZOLE SODIUM 40 MG: 40 TABLET, DELAYED RELEASE ORAL at 19:57

## 2020-10-25 RX ADMIN — ATORVASTATIN CALCIUM 10 MG: 10 TABLET, FILM COATED ORAL at 19:58

## 2020-10-25 RX ADMIN — POTASSIUM CHLORIDE 40 MEQ: 20 TABLET, EXTENDED RELEASE ORAL at 17:44

## 2020-10-25 RX ADMIN — Medication 10 ML: at 21:42

## 2020-10-25 RX ADMIN — LEVOTHYROXINE SODIUM 100 MCG: 0.1 TABLET ORAL at 05:27

## 2020-10-25 RX ADMIN — ALBUTEROL SULFATE 2.5 MG: 2.5 SOLUTION RESPIRATORY (INHALATION) at 12:31

## 2020-10-25 RX ADMIN — AMLODIPINE BESYLATE 10 MG: 5 TABLET ORAL at 10:32

## 2020-10-25 RX ADMIN — DULOXETINE HYDROCHLORIDE 30 MG: 30 CAPSULE, DELAYED RELEASE ORAL at 19:59

## 2020-10-25 RX ADMIN — PROPRANOLOL HYDROCHLORIDE 10 MG: 10 TABLET ORAL at 10:31

## 2020-10-25 RX ADMIN — ALBUTEROL SULFATE 2.5 MG: 2.5 SOLUTION RESPIRATORY (INHALATION) at 09:05

## 2020-10-25 RX ADMIN — PROPRANOLOL HYDROCHLORIDE 10 MG: 10 TABLET ORAL at 13:29

## 2020-10-25 RX ADMIN — DULOXETINE HYDROCHLORIDE 60 MG: 30 CAPSULE, DELAYED RELEASE ORAL at 19:57

## 2020-10-25 RX ADMIN — OXYCODONE HYDROCHLORIDE AND ACETAMINOPHEN 1 TABLET: 5; 325 TABLET ORAL at 17:06

## 2020-10-25 RX ADMIN — ALBUTEROL SULFATE 2.5 MG: 2.5 SOLUTION RESPIRATORY (INHALATION) at 04:11

## 2020-10-25 RX ADMIN — OXYCODONE HYDROCHLORIDE AND ACETAMINOPHEN 1 TABLET: 5; 325 TABLET ORAL at 05:27

## 2020-10-25 RX ADMIN — ALBUTEROL SULFATE 2.5 MG: 2.5 SOLUTION RESPIRATORY (INHALATION) at 16:56

## 2020-10-25 RX ADMIN — TRAMADOL HYDROCHLORIDE 50 MG: 50 TABLET, FILM COATED ORAL at 10:33

## 2020-10-25 RX ADMIN — ALBUTEROL SULFATE 2.5 MG: 2.5 SOLUTION RESPIRATORY (INHALATION) at 19:39

## 2020-10-25 RX ADMIN — ALBUTEROL SULFATE 2.5 MG: 2.5 SOLUTION RESPIRATORY (INHALATION) at 00:37

## 2020-10-25 RX ADMIN — PROPRANOLOL HYDROCHLORIDE 10 MG: 10 TABLET ORAL at 21:45

## 2020-10-25 ASSESSMENT — PAIN DESCRIPTION - PROGRESSION: CLINICAL_PROGRESSION: GRADUALLY IMPROVING

## 2020-10-25 ASSESSMENT — PAIN DESCRIPTION - ONSET: ONSET: ON-GOING

## 2020-10-25 ASSESSMENT — PAIN DESCRIPTION - LOCATION
LOCATION: ABDOMEN
LOCATION: ABDOMEN

## 2020-10-25 ASSESSMENT — PAIN DESCRIPTION - ORIENTATION: ORIENTATION: RIGHT;LEFT;LOWER;UPPER

## 2020-10-25 ASSESSMENT — PAIN SCALES - GENERAL
PAINLEVEL_OUTOF10: 6
PAINLEVEL_OUTOF10: 5
PAINLEVEL_OUTOF10: 4
PAINLEVEL_OUTOF10: 6
PAINLEVEL_OUTOF10: 0
PAINLEVEL_OUTOF10: 3

## 2020-10-25 ASSESSMENT — PAIN DESCRIPTION - PAIN TYPE
TYPE: SURGICAL PAIN
TYPE: SURGICAL PAIN

## 2020-10-25 ASSESSMENT — PULMONARY FUNCTION TESTS
PEFR_L/MIN: 5
PEFR_L/MIN: 10

## 2020-10-25 ASSESSMENT — PAIN - FUNCTIONAL ASSESSMENT: PAIN_FUNCTIONAL_ASSESSMENT: ACTIVITIES ARE NOT PREVENTED

## 2020-10-25 ASSESSMENT — PAIN DESCRIPTION - FREQUENCY: FREQUENCY: INTERMITTENT

## 2020-10-25 ASSESSMENT — PAIN DESCRIPTION - DESCRIPTORS: DESCRIPTORS: ACHING

## 2020-10-25 NOTE — PROGRESS NOTES
Hospitalist Progress Note  10/25/2020 12:40 PM  Subjective:   Admit Date: 10/21/2020  PCP: Corby Fernández DO     Full Code      C/c:No chief complaint on file. Interval History: doing better, moved bowels, pain is controled    Diet: DIET GENERAL; Carb Control: 4 carb choices (60 gms)/meal                                ip days:0  Medications:   Scheduled Meds:   magnesium hydroxide  30 mL Oral BID    albuterol  2.5 mg Nebulization Q4H    sodium chloride flush  10 mL Intravenous 2 times per day    enoxaparin  40 mg Subcutaneous Daily    amLODIPine  10 mg Oral Daily    atorvastatin  10 mg Oral Daily    DULoxetine  30 mg Oral Daily    DULoxetine  60 mg Oral Daily    fluticasone  2 spray Nasal Daily    lamoTRIgine  200 mg Oral Daily    levothyroxine  100 mcg Oral Daily    lisinopril  40 mg Oral Daily    cetirizine  10 mg Oral Daily    pantoprazole  40 mg Oral QAM AC    propranolol  10 mg Oral TID    nicotine  1 patch Transdermal Daily    insulin lispro  0-6 Units Subcutaneous TID WC    insulin lispro  0-3 Units Subcutaneous Nightly     Continuous Infusions:   dextrose 5 % and 0.45 % NaCl 75 mL/hr at 10/24/20 2322    dextrose       PRN Meds:.traMADol, oxyCODONE-acetaminophen, oxyCODONE-acetaminophen, sodium chloride flush, [DISCONTINUED] promethazine **OR** ondansetron, hydrOXYzine, glucose, dextrose, glucagon (rDNA), dextrose, albuterol     CBC:   Recent Labs     10/23/20  0502  10/24/20  0526  10/24/20  2350 10/25/20  0507 10/25/20  1154   WBC 13.2*  --  10.5  --   --  7.7  --    HGB 7.8*   < > 7.6*   < > 7.4* 7.2* 7.9*     --  179  --   --  194  --     < > = values in this interval not displayed.      BMP:    Recent Labs     10/23/20  0502 10/24/20  0526 10/25/20  0507    140 138   K 4.6 4.0 3.3*    105 100   CO2 25 27 31   BUN 21* 14 5*   CREATININE 1.46* 0.98* 0.68   GLUCOSE 94 114* 112*     Hepatic: No results for input(s): AST, ALT, ALB, BILITOT, ALKPHOS in the last 72 hours.  Troponin: No results for input(s): TROPONINI in the last 72 hours. BNP: No results for input(s): BNP in the last 72 hours. Lipids: No results for input(s): CHOL, HDL in the last 72 hours. Invalid input(s): LDLCALCU  INR: No results for input(s): INR in the last 72 hours. Objective:   Vitals: BP (!) 117/56   Pulse 94   Temp 99 °F (37.2 °C) (Tympanic)   Resp 15   Ht 5' 1\" (1.549 m)   Wt 175 lb 1.6 oz (79.4 kg)   SpO2 91%   BMI 33.08 kg/m²   General appearance: alert, appears stated age and cooperative  Skin: Skin color, texture, turgor normal. No rashes or lesions  Lungs: clear to auscultation bilaterally  Heart: regular rate and rhythm, S1, S2 normal, no murmur, click, rub or gallop  Abdomen: soft, non-tender; bowel sounds normal; no masses,  no organomegaly  Extremities: extremities normal, atraumatic, no cyanosis or edema  Neurologic: Mental status: Alert, oriented, thought content appropriate    Prophylaxis:   DVT with  [x] lovenox        [] heparin        [] Scd        [] none:     Radiology:  Xr Acute Abd Series Chest 1 Vw    Result Date: 10/16/2020  EXAMINATION: TWO XRAY VIEWS OF THE ABDOMEN AND SINGLE  XRAY VIEW OF THE CHEST 10/16/2020 9:57 pm COMPARISON: 10/12/2020 HISTORY: ORDERING SYSTEM PROVIDED HISTORY: abdominal pain, distension TECHNOLOGIST PROVIDED HISTORY: abdominal pain, distension Reason for Exam: Ventral hernia pain nausea and constipation, scheduled for surgery on Wed Acuity: Acute Type of Exam: Subsequent/Follow-up FINDINGS: Cardiomediastinal silhouette is normal in size. No pulmonary consolidation, pleural effusion, or pneumothorax. Bowel gas pattern is nonobstructive. Moderate stool within the colon. Prior cholecystectomy. Prior ventral abdominal wall hernia repair. Punctate nonobstructing left upper pole stone. 1. No acute abnormality. 2. Moderate stool within the colon, which may suggest constipation. 3. Left upper pole renal stone.        Assessment : 1. Stable/  2. Continue present care     Plan:   1. Ambulate in xavier ways  2. See order    Patient Active Problem List:     Severe episode of recurrent major depressive disorder, without psychotic features ([facility] [address]     Bipolar 2 disorder (HCC)     Bipolar II disorder (HCC)     Postoperative pain     Status post repair of recurrent ventral hernia      Anticipated Disposition upon discharge: [] Home                                                                         [] Home with Home Health                                                                         [] [facility]                                                                         [] [address] ,[address]      Patient is admitted as inpatient status because of co-morbidities listed above, severity of signs and symptoms as outlined, requirement for current medical therapies and most importantly because of direct risk to patient if care not provided in a hospital setting.           Marcellus Gomez MD  [facility] Hospitalist

## 2020-10-25 NOTE — PROGRESS NOTES
Physical Therapy    PTA attempted AM therapy but was met with refusal by pt stating \"I don't want to do anything today. I just don't feel good. I'll try it tomorrow\". Will attempt PT tx on 26OCT2020.     Signed,    Ian Laws, PTA

## 2020-10-25 NOTE — PROGRESS NOTES
Pod #4  Pt better today  + bm, better cough  Ambulating better  Off most IV pain meds, but ultram not enough,  octavio clears  BP (!) 117/56   Pulse 94   Temp 99 °F (37.2 °C) (Tympanic)   Resp 15   Ht 5' 1\" (1.549 m)   Wt 175 lb 1.6 oz (79.4 kg)   SpO2 91%   BMI 33.08 kg/m²   abd soft, + bs + inc tenderness no R or G         Inc clean  Lungs few crackles  JUSTINO serous    Wbc=7.7  Hb7.2    Imp:  Doing better  Cont aggressive pulm care  Heplock IV  Reg diet  May shower  Increase ambulation  Percocet for pain

## 2020-10-26 ENCOUNTER — CARE COORDINATION (OUTPATIENT)
Dept: CARE COORDINATION | Age: 55
End: 2020-10-26

## 2020-10-26 LAB
ABSOLUTE EOS #: 0.64 K/UL (ref 0–0.44)
ABSOLUTE IMMATURE GRANULOCYTE: 0.13 K/UL (ref 0–0.3)
ABSOLUTE LYMPH #: 1.23 K/UL (ref 1.1–3.7)
ABSOLUTE MONO #: 0.55 K/UL (ref 0.1–1.2)
ANION GAP SERPL CALCULATED.3IONS-SCNC: 9 MMOL/L (ref 9–17)
BASOPHILS # BLD: 1 % (ref 0–2)
BASOPHILS ABSOLUTE: 0.05 K/UL (ref 0–0.2)
BUN BLDV-MCNC: 5 MG/DL (ref 6–20)
BUN/CREAT BLD: 7 (ref 9–20)
CALCIUM SERPL-MCNC: 9 MG/DL (ref 8.6–10.4)
CHLORIDE BLD-SCNC: 101 MMOL/L (ref 98–107)
CO2: 29 MMOL/L (ref 20–31)
CREAT SERPL-MCNC: 0.68 MG/DL (ref 0.5–0.9)
DIFFERENTIAL TYPE: ABNORMAL
EOSINOPHILS RELATIVE PERCENT: 9 % (ref 1–4)
GFR AFRICAN AMERICAN: >60 ML/MIN
GFR NON-AFRICAN AMERICAN: >60 ML/MIN
GFR SERPL CREATININE-BSD FRML MDRD: ABNORMAL ML/MIN/{1.73_M2}
GFR SERPL CREATININE-BSD FRML MDRD: ABNORMAL ML/MIN/{1.73_M2}
GLUCOSE BLD-MCNC: 92 MG/DL (ref 70–99)
HCT VFR BLD CALC: 25.2 % (ref 36.3–47.1)
HEMOGLOBIN: 8.1 G/DL (ref 11.9–15.1)
IMMATURE GRANULOCYTES: 2 %
LYMPHOCYTES # BLD: 17 % (ref 24–43)
MCH RBC QN AUTO: 29.7 PG (ref 25.2–33.5)
MCHC RBC AUTO-ENTMCNC: 32.1 G/DL (ref 25.2–33.5)
MCV RBC AUTO: 92.3 FL (ref 82.6–102.9)
MONOCYTES # BLD: 8 % (ref 3–12)
NRBC AUTOMATED: 0 PER 100 WBC
PDW BLD-RTO: 13 % (ref 11.8–14.4)
PLATELET # BLD: 225 K/UL (ref 138–453)
PLATELET ESTIMATE: ABNORMAL
PMV BLD AUTO: 7.9 FL (ref 8.1–13.5)
POTASSIUM SERPL-SCNC: 3.9 MMOL/L (ref 3.7–5.3)
RBC # BLD: 2.73 M/UL (ref 3.95–5.11)
RBC # BLD: ABNORMAL 10*6/UL
SEG NEUTROPHILS: 63 % (ref 36–65)
SEGMENTED NEUTROPHILS ABSOLUTE COUNT: 4.74 K/UL (ref 1.5–8.1)
SODIUM BLD-SCNC: 139 MMOL/L (ref 135–144)
WBC # BLD: 7.3 K/UL (ref 3.5–11.3)
WBC # BLD: ABNORMAL 10*3/UL

## 2020-10-26 PROCEDURE — 82947 ASSAY GLUCOSE BLOOD QUANT: CPT

## 2020-10-26 PROCEDURE — G0378 HOSPITAL OBSERVATION PER HR: HCPCS

## 2020-10-26 PROCEDURE — 94761 N-INVAS EAR/PLS OXIMETRY MLT: CPT

## 2020-10-26 PROCEDURE — 2580000003 HC RX 258: Performed by: SURGERY

## 2020-10-26 PROCEDURE — 6360000002 HC RX W HCPCS: Performed by: SURGERY

## 2020-10-26 PROCEDURE — 1200000000 HC SEMI PRIVATE

## 2020-10-26 PROCEDURE — 97116 GAIT TRAINING THERAPY: CPT

## 2020-10-26 PROCEDURE — 6370000000 HC RX 637 (ALT 250 FOR IP): Performed by: INTERNAL MEDICINE

## 2020-10-26 PROCEDURE — 99218 PR INITIAL OBSERVATION CARE/DAY 30 MINUTES: CPT | Performed by: FAMILY MEDICINE

## 2020-10-26 PROCEDURE — 36415 COLL VENOUS BLD VENIPUNCTURE: CPT

## 2020-10-26 PROCEDURE — 80048 BASIC METABOLIC PNL TOTAL CA: CPT

## 2020-10-26 PROCEDURE — 6370000000 HC RX 637 (ALT 250 FOR IP): Performed by: SURGERY

## 2020-10-26 PROCEDURE — 85025 COMPLETE CBC W/AUTO DIFF WBC: CPT

## 2020-10-26 PROCEDURE — 94640 AIRWAY INHALATION TREATMENT: CPT

## 2020-10-26 PROCEDURE — 6360000002 HC RX W HCPCS: Performed by: INTERNAL MEDICINE

## 2020-10-26 PROCEDURE — 2700000000 HC OXYGEN THERAPY PER DAY

## 2020-10-26 PROCEDURE — 97535 SELF CARE MNGMENT TRAINING: CPT

## 2020-10-26 PROCEDURE — 94669 MECHANICAL CHEST WALL OSCILL: CPT

## 2020-10-26 RX ORDER — ALBUTEROL SULFATE 2.5 MG/3ML
2.5 SOLUTION RESPIRATORY (INHALATION)
Status: DISCONTINUED | OUTPATIENT
Start: 2020-10-26 | End: 2020-10-27 | Stop reason: HOSPADM

## 2020-10-26 RX ORDER — ALBUTEROL SULFATE 2.5 MG/3ML
2.5 SOLUTION RESPIRATORY (INHALATION)
Status: DISCONTINUED | OUTPATIENT
Start: 2020-10-26 | End: 2020-10-26 | Stop reason: SDUPTHER

## 2020-10-26 RX ORDER — PANTOPRAZOLE SODIUM 40 MG/1
40 TABLET, DELAYED RELEASE ORAL
Status: DISCONTINUED | OUTPATIENT
Start: 2020-10-27 | End: 2020-10-27 | Stop reason: HOSPADM

## 2020-10-26 RX ORDER — SODIUM CHLORIDE FOR INHALATION 0.9 %
3 VIAL, NEBULIZER (ML) INHALATION
Status: DISCONTINUED | OUTPATIENT
Start: 2020-10-26 | End: 2020-10-27 | Stop reason: HOSPADM

## 2020-10-26 RX ADMIN — FLUTICASONE PROPIONATE 2 SPRAY: 50 SPRAY, METERED NASAL at 20:49

## 2020-10-26 RX ADMIN — ATORVASTATIN CALCIUM 10 MG: 10 TABLET, FILM COATED ORAL at 20:48

## 2020-10-26 RX ADMIN — PROPRANOLOL HYDROCHLORIDE 10 MG: 10 TABLET ORAL at 07:42

## 2020-10-26 RX ADMIN — MAGNESIUM HYDROXIDE 30 ML: 400 SUSPENSION ORAL at 07:41

## 2020-10-26 RX ADMIN — ALBUTEROL SULFATE 2.5 MG: 2.5 SOLUTION RESPIRATORY (INHALATION) at 04:27

## 2020-10-26 RX ADMIN — ALBUTEROL SULFATE 2.5 MG: 2.5 SOLUTION RESPIRATORY (INHALATION) at 20:04

## 2020-10-26 RX ADMIN — PANTOPRAZOLE SODIUM 40 MG: 40 TABLET, DELAYED RELEASE ORAL at 20:48

## 2020-10-26 RX ADMIN — ENOXAPARIN SODIUM 40 MG: 40 INJECTION SUBCUTANEOUS at 07:41

## 2020-10-26 RX ADMIN — OXYCODONE HYDROCHLORIDE AND ACETAMINOPHEN 1 TABLET: 5; 325 TABLET ORAL at 14:17

## 2020-10-26 RX ADMIN — Medication 10 ML: at 21:19

## 2020-10-26 RX ADMIN — LAMOTRIGINE 200 MG: 100 TABLET ORAL at 20:48

## 2020-10-26 RX ADMIN — ALBUTEROL SULFATE 2.5 MG: 2.5 SOLUTION RESPIRATORY (INHALATION) at 11:14

## 2020-10-26 RX ADMIN — PROPRANOLOL HYDROCHLORIDE 10 MG: 10 TABLET ORAL at 20:48

## 2020-10-26 RX ADMIN — Medication 10 ML: at 07:43

## 2020-10-26 RX ADMIN — DULOXETINE HYDROCHLORIDE 30 MG: 30 CAPSULE, DELAYED RELEASE ORAL at 20:49

## 2020-10-26 RX ADMIN — DULOXETINE HYDROCHLORIDE 60 MG: 30 CAPSULE, DELAYED RELEASE ORAL at 20:48

## 2020-10-26 RX ADMIN — ALBUTEROL SULFATE 2.5 MG: 2.5 SOLUTION RESPIRATORY (INHALATION) at 00:56

## 2020-10-26 RX ADMIN — PROPRANOLOL HYDROCHLORIDE 10 MG: 10 TABLET ORAL at 14:17

## 2020-10-26 RX ADMIN — LEVOTHYROXINE SODIUM 100 MCG: 0.1 TABLET ORAL at 06:21

## 2020-10-26 RX ADMIN — OXYCODONE HYDROCHLORIDE AND ACETAMINOPHEN 1 TABLET: 5; 325 TABLET ORAL at 01:52

## 2020-10-26 RX ADMIN — LISINOPRIL 40 MG: 20 TABLET ORAL at 20:48

## 2020-10-26 RX ADMIN — ALBUTEROL SULFATE 2.5 MG: 2.5 SOLUTION RESPIRATORY (INHALATION) at 15:47

## 2020-10-26 RX ADMIN — AMLODIPINE BESYLATE 10 MG: 5 TABLET ORAL at 07:42

## 2020-10-26 RX ADMIN — OXYCODONE HYDROCHLORIDE AND ACETAMINOPHEN 1 TABLET: 5; 325 TABLET ORAL at 18:42

## 2020-10-26 RX ADMIN — OXYCODONE HYDROCHLORIDE AND ACETAMINOPHEN 1 TABLET: 5; 325 TABLET ORAL at 07:43

## 2020-10-26 RX ADMIN — ALBUTEROL SULFATE 2.5 MG: 2.5 SOLUTION RESPIRATORY (INHALATION) at 23:43

## 2020-10-26 RX ADMIN — CETIRIZINE HYDROCHLORIDE 10 MG: 5 TABLET, FILM COATED ORAL at 20:47

## 2020-10-26 RX ADMIN — Medication 10 ML: at 20:52

## 2020-10-26 ASSESSMENT — PAIN SCALES - GENERAL
PAINLEVEL_OUTOF10: 5
PAINLEVEL_OUTOF10: 0
PAINLEVEL_OUTOF10: 6
PAINLEVEL_OUTOF10: 4
PAINLEVEL_OUTOF10: 5
PAINLEVEL_OUTOF10: 6
PAINLEVEL_OUTOF10: 2
PAINLEVEL_OUTOF10: 6
PAINLEVEL_OUTOF10: 0
PAINLEVEL_OUTOF10: 6
PAINLEVEL_OUTOF10: 4

## 2020-10-26 ASSESSMENT — PAIN SCALES - WONG BAKER
WONGBAKER_NUMERICALRESPONSE: 0

## 2020-10-26 ASSESSMENT — PAIN DESCRIPTION - PAIN TYPE: TYPE: SURGICAL PAIN

## 2020-10-26 ASSESSMENT — PAIN DESCRIPTION - DESCRIPTORS: DESCRIPTORS: STABBING

## 2020-10-26 ASSESSMENT — PAIN DESCRIPTION - LOCATION: LOCATION: ABDOMEN

## 2020-10-26 NOTE — CARE COORDINATION
Keshia Sofia returned to [facility] ER 10/17/2020.   [facility] ER 10/12/2020.   She is scheduled for hernia repair 10/21/2020.     10/13/2020- 11:14 am Left message requesting return call re: Initial ER/Covid F/U call.     10/15/2020 Left message requesting return call     10/17/2020 Left message requesting return call re: Initial ER/Covid F/U call and to discuss enrollment in [facility].      10/19/2020- 9:59 am Left second message requesting return call.      10/23/2020 Keshia Sofia had ventral hernia repair 10/21 and is currently IP at [facility].     10/26/2020 Remains IP at [facility].      This writer will f/u to discuss [facility] enrollment.

## 2020-10-26 NOTE — PROGRESS NOTES
POD # 5    Continue to do better.   Still needs to ambulate  Still has 7 out 10 pain  + bm  Started on reg diet  BP (!) 113/59   Pulse 85   Temp 98 °F (36.7 °C)   Resp 18   Ht 5' 1\" (1.549 m)   Wt 167 lb 6.4 oz (75.9 kg)   SpO2 94%   BMI 31.63 kg/m²   abd soft + bs + inc tenderness  Wbc=7.3  Hb=8.1    IMP:  Doing better  Cont aggressive pulm care  Increase ambulation  If octavio reg diet may dc in am

## 2020-10-26 NOTE — PLAN OF CARE
Problem: Infection - Surgical Site:  Goal: Will show no infection signs and symptoms  Description: Will show no infection signs and symptoms  Outcome: Ongoing     Problem: Activity:  Goal: Risk for activity intolerance will decrease  Description: Risk for activity intolerance will decrease  Outcome: Ongoing     Problem:  Bowel/Gastric:  Goal: Bowel function will improve  Description: Bowel function will improve  Outcome: Ongoing  Goal: Diagnostic test results will improve  Description: Diagnostic test results will improve  Outcome: Ongoing  Goal: Occurrences of nausea will decrease  Description: Occurrences of nausea will decrease  Outcome: Ongoing  Goal: Occurrences of vomiting will decrease  Description: Occurrences of vomiting will decrease  Outcome: Ongoing     Problem: Fluid Volume:  Goal: Maintenance of adequate hydration will improve  Description: Maintenance of adequate hydration will improve  Outcome: Ongoing     Problem: Health Behavior:  Goal: Ability to state signs and symptoms to report to health care provider will improve  Description: Ability to state signs and symptoms to report to health care provider will improve  Outcome: Ongoing     Problem: Physical Regulation:  Goal: Complications related to the disease process, condition or treatment will be avoided or minimized  Description: Complications related to the disease process, condition or treatment will be avoided or minimized  Outcome: Ongoing  Goal: Ability to maintain clinical measurements within normal limits will improve  Description: Ability to maintain clinical measurements within normal limits will improve  Outcome: Ongoing     Problem: Sensory:  Goal: Ability to identify factors that increase the pain will improve  Description: Ability to identify factors that increase the pain will improve  Outcome: Ongoing  Goal: Ability to notify healthcare provider of pain before it becomes unmanageable or unbearable will improve  Description: Ability

## 2020-10-26 NOTE — PROGRESS NOTES
Hospitalist Progress Note  10/26/2020 9:24 AM  Subjective:   Admit Date: 10/21/2020  PCP: Hawk Vogel,     Interval History: Patient denies any SOB but her pulse ox in the 85 range off oxygen. No chest pain ,tolerating diet ,has had small BM 's    Diet: DIET GENERAL; Carb Control: 4 carb choices (60 gms)/meal  Medications:   Scheduled Meds:   magnesium hydroxide  30 mL Oral BID    albuterol  2.5 mg Nebulization Q4H    sodium chloride flush  10 mL Intravenous 2 times per day    enoxaparin  40 mg Subcutaneous Daily    amLODIPine  10 mg Oral Daily    atorvastatin  10 mg Oral Daily    DULoxetine  30 mg Oral Daily    DULoxetine  60 mg Oral Daily    fluticasone  2 spray Nasal Daily    lamoTRIgine  200 mg Oral Daily    levothyroxine  100 mcg Oral Daily    lisinopril  40 mg Oral Daily    cetirizine  10 mg Oral Daily    pantoprazole  40 mg Oral QAM AC    propranolol  10 mg Oral TID    nicotine  1 patch Transdermal Daily    insulin lispro  0-6 Units Subcutaneous TID WC    insulin lispro  0-3 Units Subcutaneous Nightly     Continuous Infusions:   dextrose         Patient's current medications documented, reviewed, and updated. CBC:   Recent Labs     10/24/20  0526  10/25/20  0507 10/25/20  1154 10/26/20  0444   WBC 10.5  --  7.7  --  7.3   HGB 7.6*   < > 7.2* 7.9* 8.1*     --  194  --  225    < > = values in this interval not displayed. BMP:    Recent Labs     10/24/20  0526 10/25/20  0507 10/26/20  0444    138 139   K 4.0 3.3* 3.9    100 101   CO2 27 31 29   BUN 14 5* 5*   CREATININE 0.98* 0.68 0.68   GLUCOSE 114* 112* 92     Hepatic: No results for input(s): AST, ALT, ALB, BILITOT, ALKPHOS in the last 72 hours. Troponin: No results for input(s): TROPONINI in the last 72 hours. BNP: No results for input(s): BNP in the last 72 hours. Lipids: No results for input(s): CHOL, HDL in the last 72 hours.     Invalid input(s): LDLCALCU  INR: No results for input(s): INR in the last

## 2020-10-26 NOTE — PROGRESS NOTES
Physical Therapy  Facility/Department: [facility]  PROGRESSIVE CARE  Daily Treatment Note  NAME: Marah Che  : 1965  MRN: [id number]    Date of Service: 10/26/2020    Discharge Recommendations:  Home independently        Assessment   Body structures, Functions, Activity limitations: Decreased functional mobility ; Decreased strength  Prognosis: Excellent  REQUIRES PT FOLLOW UP: No  Activity Tolerance  Activity Tolerance: Patient Tolerated treatment well     Patient Diagnosis(es): There were no encounter diagnoses. has a past medical history of Anxiety, Breast cancer ([facility] [address] COPD (chronic obstructive pulmonary disease) ([facility] [address] Depression, Headache(784.0), History of alcoholism ([facility] [address] Hypertension, Hypothyroidism, Kidney stone, and PTSD (post-traumatic stress disorder). has a past surgical history that includes Eye surgery; tumor removal (); Appendectomy (); Cholecystectomy (); Tubal ligation (); lumbar laminectomy (); Dilation and curettage of uterus; Breast lumpectomy (Left, ); Breast biopsy (Right, ); aclon and bso (cervix removed) (); ventral hernia repair (); hernia repair (2020); fracture surgery (Left); hernia repair (N/A, 2020); and ventral hernia repair (N/A, 10/21/2020). Restrictions     Subjective   General  Chart Reviewed: Yes  Response To Previous Treatment: Patient with no complaints from previous session.   Family / Caregiver Present: No  Subjective  Subjective: Pt found supine in bed upon arrival. Agreeable to therapy  Pain Screening  Patient Currently in Pain: No  Pain Assessment  Pain Assessment: 0-10  Pain Level: 0  Patient's Stated Pain Goal: No pain  Vital Signs  Patient Currently in Pain: No       Orientation  Orientation  Overall Orientation Status: Within Normal Limits  Cognition      Objective   Bed mobility  Bridging: Unable to assess  Rolling to Left: Independent  Rolling to Right: Independent  Supine to Sit: Independent  Sit to Supine:

## 2020-10-26 NOTE — PROGRESS NOTES
Occupational Therapy  Facility/Department: [facility]  PROGRESSIVE CARE  Daily Treatment Note  NAME: Samy Alvarez  : 1965  MRN: [id number]    Date of Service: 10/26/2020    Discharge Recommendations:  Home with assist PRN, Home with Home health OT     Assessment   Performance deficits / Impairments: Decreased functional mobility ; Decreased endurance;Decreased ADL status; Decreased balance;Decreased strength;Decreased safe awareness;Decreased high-level IADLs  Treatment Diagnosis: Generalized weakness  Prognosis: Good  OT Education: Transfer Training  REQUIRES OT FOLLOW UP: No  Safety Devices  Safety Devices in place: Yes  Type of devices: Left in bed;Call light within reach;Nurse notified       Patient Diagnosis(es): There were no encounter diagnoses. has a past medical history of Anxiety, Breast cancer ([facility] [address] COPD (chronic obstructive pulmonary disease) ([facility] [address] Depression, Headache(784.0), History of alcoholism ([facility] [address] Hypertension, Hypothyroidism, Kidney stone, and PTSD (post-traumatic stress disorder). has a past surgical history that includes Eye surgery; tumor removal (); Appendectomy (); Cholecystectomy (); Tubal ligation (); lumbar laminectomy (); Dilation and curettage of uterus; Breast lumpectomy (Left, ); Breast biopsy (Right, ); alcon and bso (cervix removed) (); ventral hernia repair (); hernia repair (2020); fracture surgery (Left); hernia repair (N/A, 2020); and ventral hernia repair (N/A, 10/21/2020). Subjective   General  Chart Reviewed: Yes  Patient assessed for rehabilitation services?: Yes  Family / Caregiver Present: No  Referring Practitioner: Jerrod May  Diagnosis: Status post repair of recurrent ventral hernia  Subjective  Subjective: Pt rec'd in chair, pleasant and cooperative for OT. Pt is a 49 yo female status post repair of recurrent ventral hernia.       Orientation  Orientation  Overall Orientation Status: Within Functional Limits  Objective    ADL  Toileting: Modified independent   Functional Mobility  Functional - Mobility Device: No device  Activity: To/from bathroom  Assist Level: Modified independent   Functional Mobility Comments: Pt demo'd good balance and safety awareness this date. Bed mobility  Rolling to Left: Independent  Rolling to Right: Independent  Supine to Sit: Independent  Sit to Supine: Independent  Scooting: Independent  Transfers  Stand Step Transfers: Modified independent  Sit to stand: Independent  Stand to sit: Independent  Transfer Comments: Pt completed toilet transfer and then functional mobility in hallway ~50 feet without use of assistive device. Upon lying down in bed following functional mobility, pt's SpO2 dropped to 74%. Pt instructed to breath in through nose and out through mouth. Pt did not feel SOB or appear to be SOB. Nursing made aware and increased O2. Nursing also changed SpO2 monitor to check for accuracy.   Cognition  Overall Cognitive Status: [facility]     Plan   Plan  Times per week: 1-2    Goals  Short term goals  Time Frame for Short term goals: Duration of hospital stay  Short term goal 1: Pt will complete toilet transfer and toileting tasks with Mod I for increased independence for return home -GOAL MET  Short term goal 2: Pt will complete grooming task while stand > 5 minutes to increase standing tolerance for return home -GOAL MET       Therapy Time   Individual Concurrent Group Co-treatment   Time In 1320         Time Out 1330         Minutes 10         Timed Code Treatment Minutes: [address], OT

## 2020-10-27 ENCOUNTER — TELEPHONE (OUTPATIENT)
Dept: FAMILY MEDICINE CLINIC | Age: 55
End: 2020-10-27

## 2020-10-27 VITALS
SYSTOLIC BLOOD PRESSURE: 122 MMHG | HEIGHT: 61 IN | WEIGHT: 165.1 LBS | TEMPERATURE: 99 F | BODY MASS INDEX: 31.17 KG/M2 | DIASTOLIC BLOOD PRESSURE: 62 MMHG | OXYGEN SATURATION: 91 % | RESPIRATION RATE: 16 BRPM | HEART RATE: 97 BPM

## 2020-10-27 LAB
ABSOLUTE EOS #: 0.68 K/UL (ref 0–0.44)
ABSOLUTE IMMATURE GRANULOCYTE: 0.21 K/UL (ref 0–0.3)
ABSOLUTE LYMPH #: 1.24 K/UL (ref 1.1–3.7)
ABSOLUTE MONO #: 0.53 K/UL (ref 0.1–1.2)
ANION GAP SERPL CALCULATED.3IONS-SCNC: 9 MMOL/L (ref 9–17)
BASOPHILS # BLD: 1 % (ref 0–2)
BASOPHILS ABSOLUTE: 0.05 K/UL (ref 0–0.2)
BUN BLDV-MCNC: 7 MG/DL (ref 6–20)
BUN/CREAT BLD: 9 (ref 9–20)
CALCIUM SERPL-MCNC: 9.1 MG/DL (ref 8.6–10.4)
CHLORIDE BLD-SCNC: 100 MMOL/L (ref 98–107)
CO2: 30 MMOL/L (ref 20–31)
CREAT SERPL-MCNC: 0.74 MG/DL (ref 0.5–0.9)
DIFFERENTIAL TYPE: ABNORMAL
EOSINOPHILS RELATIVE PERCENT: 9 % (ref 1–4)
GFR AFRICAN AMERICAN: >60 ML/MIN
GFR NON-AFRICAN AMERICAN: >60 ML/MIN
GFR SERPL CREATININE-BSD FRML MDRD: ABNORMAL ML/MIN/{1.73_M2}
GFR SERPL CREATININE-BSD FRML MDRD: ABNORMAL ML/MIN/{1.73_M2}
GLUCOSE BLD-MCNC: 103 MG/DL (ref 65–105)
GLUCOSE BLD-MCNC: 105 MG/DL (ref 65–105)
GLUCOSE BLD-MCNC: 107 MG/DL (ref 70–99)
GLUCOSE BLD-MCNC: 115 MG/DL (ref 65–105)
GLUCOSE BLD-MCNC: 116 MG/DL (ref 65–105)
GLUCOSE BLD-MCNC: 122 MG/DL (ref 65–105)
GLUCOSE BLD-MCNC: 124 MG/DL (ref 65–105)
HCT VFR BLD CALC: 27.4 % (ref 36.3–47.1)
HEMOGLOBIN: 8.9 G/DL (ref 11.9–15.1)
IMMATURE GRANULOCYTES: 3 %
LYMPHOCYTES # BLD: 17 % (ref 24–43)
MCH RBC QN AUTO: 29.9 PG (ref 25.2–33.5)
MCHC RBC AUTO-ENTMCNC: 32.5 G/DL (ref 25.2–33.5)
MCV RBC AUTO: 91.9 FL (ref 82.6–102.9)
MONOCYTES # BLD: 7 % (ref 3–12)
NRBC AUTOMATED: 0 PER 100 WBC
PDW BLD-RTO: 12.9 % (ref 11.8–14.4)
PLATELET # BLD: 271 K/UL (ref 138–453)
PLATELET ESTIMATE: ABNORMAL
PMV BLD AUTO: 8 FL (ref 8.1–13.5)
POTASSIUM SERPL-SCNC: 3.7 MMOL/L (ref 3.7–5.3)
RBC # BLD: 2.98 M/UL (ref 3.95–5.11)
RBC # BLD: ABNORMAL 10*6/UL
SEG NEUTROPHILS: 63 % (ref 36–65)
SEGMENTED NEUTROPHILS ABSOLUTE COUNT: 4.78 K/UL (ref 1.5–8.1)
SODIUM BLD-SCNC: 139 MMOL/L (ref 135–144)
WBC # BLD: 7.5 K/UL (ref 3.5–11.3)
WBC # BLD: ABNORMAL 10*3/UL

## 2020-10-27 PROCEDURE — 36415 COLL VENOUS BLD VENIPUNCTURE: CPT

## 2020-10-27 PROCEDURE — 6360000002 HC RX W HCPCS: Performed by: INTERNAL MEDICINE

## 2020-10-27 PROCEDURE — 94640 AIRWAY INHALATION TREATMENT: CPT

## 2020-10-27 PROCEDURE — 85025 COMPLETE CBC W/AUTO DIFF WBC: CPT

## 2020-10-27 PROCEDURE — 97116 GAIT TRAINING THERAPY: CPT | Performed by: PHYSICAL THERAPY ASSISTANT

## 2020-10-27 PROCEDURE — 80048 BASIC METABOLIC PNL TOTAL CA: CPT

## 2020-10-27 PROCEDURE — 94760 N-INVAS EAR/PLS OXIMETRY 1: CPT

## 2020-10-27 PROCEDURE — 6370000000 HC RX 637 (ALT 250 FOR IP): Performed by: INTERNAL MEDICINE

## 2020-10-27 PROCEDURE — G0378 HOSPITAL OBSERVATION PER HR: HCPCS

## 2020-10-27 PROCEDURE — 94669 MECHANICAL CHEST WALL OSCILL: CPT

## 2020-10-27 PROCEDURE — 6370000000 HC RX 637 (ALT 250 FOR IP): Performed by: SURGERY

## 2020-10-27 PROCEDURE — 99231 SBSQ HOSP IP/OBS SF/LOW 25: CPT | Performed by: FAMILY MEDICINE

## 2020-10-27 RX ORDER — GREEN TEA/HOODIA GORDONII 315-12.5MG
1 CAPSULE ORAL 3 TIMES DAILY
Qty: 30 TABLET | Refills: 0 | COMMUNITY
Start: 2020-10-27 | End: 2020-11-06

## 2020-10-27 RX ORDER — HYDROCODONE BITARTRATE AND ACETAMINOPHEN 5; 325 MG/1; MG/1
1 TABLET ORAL EVERY 6 HOURS PRN
Qty: 10 TABLET | Refills: 0 | Status: SHIPPED | OUTPATIENT
Start: 2020-10-27 | End: 2020-10-30

## 2020-10-27 RX ORDER — CEPHALEXIN 500 MG/1
500 CAPSULE ORAL 3 TIMES DAILY
Qty: 21 CAPSULE | Refills: 0 | Status: SHIPPED | OUTPATIENT
Start: 2020-10-27 | End: 2020-11-03 | Stop reason: ALTCHOICE

## 2020-10-27 RX ORDER — NICOTINE 21 MG/24HR
1 PATCH, TRANSDERMAL 24 HOURS TRANSDERMAL DAILY
Qty: 30 PATCH | Refills: 0 | COMMUNITY
Start: 2020-10-27 | End: 2020-12-18

## 2020-10-27 RX ADMIN — ALBUTEROL SULFATE 2.5 MG: 2.5 SOLUTION RESPIRATORY (INHALATION) at 04:07

## 2020-10-27 RX ADMIN — OXYCODONE HYDROCHLORIDE AND ACETAMINOPHEN 1 TABLET: 5; 325 TABLET ORAL at 01:21

## 2020-10-27 RX ADMIN — OXYCODONE HYDROCHLORIDE AND ACETAMINOPHEN 1 TABLET: 5; 325 TABLET ORAL at 08:56

## 2020-10-27 RX ADMIN — AMLODIPINE BESYLATE 10 MG: 5 TABLET ORAL at 08:56

## 2020-10-27 RX ADMIN — PANTOPRAZOLE SODIUM 40 MG: 40 TABLET, DELAYED RELEASE ORAL at 06:39

## 2020-10-27 RX ADMIN — OXYCODONE HYDROCHLORIDE AND ACETAMINOPHEN 1 TABLET: 5; 325 TABLET ORAL at 12:50

## 2020-10-27 RX ADMIN — PROPRANOLOL HYDROCHLORIDE 10 MG: 10 TABLET ORAL at 12:49

## 2020-10-27 RX ADMIN — PROPRANOLOL HYDROCHLORIDE 10 MG: 10 TABLET ORAL at 08:56

## 2020-10-27 RX ADMIN — ALBUTEROL SULFATE 2.5 MG: 2.5 SOLUTION RESPIRATORY (INHALATION) at 11:11

## 2020-10-27 RX ADMIN — LEVOTHYROXINE SODIUM 100 MCG: 0.1 TABLET ORAL at 06:39

## 2020-10-27 RX ADMIN — ALBUTEROL SULFATE 2.5 MG: 2.5 SOLUTION RESPIRATORY (INHALATION) at 07:34

## 2020-10-27 ASSESSMENT — PAIN SCALES - GENERAL
PAINLEVEL_OUTOF10: 6
PAINLEVEL_OUTOF10: 4
PAINLEVEL_OUTOF10: 5
PAINLEVEL_OUTOF10: 6
PAINLEVEL_OUTOF10: 6
PAINLEVEL_OUTOF10: 3

## 2020-10-27 ASSESSMENT — PAIN SCALES - WONG BAKER
WONGBAKER_NUMERICALRESPONSE: 0

## 2020-10-27 ASSESSMENT — PAIN DESCRIPTION - DESCRIPTORS: DESCRIPTORS: ACHING

## 2020-10-27 ASSESSMENT — PAIN DESCRIPTION - PAIN TYPE: TYPE: SURGICAL PAIN

## 2020-10-27 ASSESSMENT — PAIN DESCRIPTION - LOCATION: LOCATION: ABDOMEN

## 2020-10-27 NOTE — PROGRESS NOTES
Physical Therapy  Facility/Department: [facility] [place]  PROGRESSIVE CARE  Daily Treatment Note  NAME: Kenyon Rae  : 1965  MRN: [id number]    Date of Service: 10/27/2020    Discharge Recommendations:  Home independently        Assessment   Body structures, Functions, Activity limitations: Decreased strength;Decreased ROM  Prognosis: Excellent  Decision Making: Low Complexity  Activity Tolerance  Activity Tolerance: Patient Tolerated treatment well     Patient Diagnosis(es): There were no encounter diagnoses. has a past medical history of Anxiety, Breast cancer ([facility] [address] COPD (chronic obstructive pulmonary disease) ([facility] [address] Depression, Headache(784.0), History of alcoholism ([facility] [address] Hypertension, Hypothyroidism, Kidney stone, and PTSD (post-traumatic stress disorder). has a past surgical history that includes Eye surgery; tumor removal (); Appendectomy (); Cholecystectomy (); Tubal ligation (); lumbar laminectomy (); Dilation and curettage of uterus; Breast lumpectomy (Left, ); Breast biopsy (Right, ); alcon and bso (cervix removed) (); ventral hernia repair (); hernia repair (2020); fracture surgery (Left); hernia repair (N/A, 2020); and ventral hernia repair (N/A, 10/21/2020). Restrictions     Subjective   General  Chart Reviewed: Yes  Response To Previous Treatment: Patient with no complaints from previous session. Family / Caregiver Present: No  Subjective  Subjective: Pt found supine in bed upon arrival. Agreeable to therapy  Pain Screening  Patient Currently in Pain: No  Pain Assessment  Pain Assessment: Faces  Pain Level: 3  Vital Signs  Patient Currently in Pain: No       Orientation  Orientation  Overall Orientation Status: Within Normal Limits  Cognition      Objective   Bed mobility  Supine to Sit: Independent  Sit to Supine: Independent  Transfers  Sit to Stand: Independent  Stand to sit:  Independent  Ambulation  Ambulation?: Yes  WB Status: FWB  More Ambulation?: No  Ambulation 1  Device: No Device  Assistance: Independent  Quality of Gait: Normal gait no deviations. Gait Deviations: None  Distance: 50'x4  Neuromuscular Education  NDT Treatment: Gait ;Sitting;Standing  Balance  Posture: Good  Sitting - Static: Good  Sitting - Dynamic: Good  Standing - Static: Good  Standing - Dynamic: Good  Exercises  Hip Abduction: 10x with manual resistance in sitting. Knee Long Arc Quad: 10x with manual resistance in sitting. Comments: Hip adduction and hamstring curls in sitting with manual resistance. x10 ea                        G-Code     OutComes Score                                                     AM-PAC Score             Goals  Short term goals  Time Frame for Short term goals: 1 day  Short term goal 1: Assess functional status  Long term goals  Time Frame for Long term goals : 3 days  Long term goal 1: Transfer with supervision  Long term goal 2: Gait with RW 50 ft x2  Long term goal 3: Progress to no device as done at home    Plan    Plan  Times per day: Daily  Current Treatment Recommendations: Strengthening, Balance Training, Functional Mobility Training, Transfer Training, Gait Training, Endurance Training  Safety Devices  Type of devices:  All fall risk precautions in place, Call light within reach, Gait belt, Patient at risk for falls, Nurse notified, Chair alarm in place, Left in chair     Therapy Time   Individual Concurrent Group Co-treatment   Time In 1003         Time Out 1017         Minutes [place], [place]

## 2020-10-27 NOTE — SUICIDE SAFETY PLAN
SAFETY PLAN    A suicide Safety Plan is a document that supports someone when they are having thoughts of suicide. Warning Signs that indicate a suicidal crisis may be developing: What (situations, thoughts, feelings, body sensations, behaviors, etc.) do you experience that lets you know you are beginning to think about suicide? 1. ***  2. ***  3. ***    Internal Coping Strategies:  What things can I do (relaxation techniques, hobbies, physical activities, etc.) to take my mind off my problems without contacting another person? 1. ***  2. ***  3. ***    People and social settings that provide distraction: Who can I call or where can I go to distract me? 1. Name: ***  Phone: ***  2. Name: ***  Phone: ***   3. Place: ***            4. Place: ***    People whom I can ask for help: Who can I call when I need help - for example, friends, family, clergy, someone else? 1. Name: ***                Phone: ***  2. Name: ***  Phone: ***  3. Name: ***  Phone: ***    Professionals or [facility] agencies I can contact during a crisis: Who can I call for help - for example, my doctor, my psychiatrist, my psychologist, a mental health provider, a suicide hotline? 1. Clinician Name: ***   Phone: ***      Clinician Pager or Emergency Contact #: ***    2. Clinician Name: ***   Phone: ***      Clinician Pager or Emergency Contact #: ***    3. Suicide Prevention Lifeline: 8-056-668-TALK (0041)    4. [address] [place] [address] Emergency Services -  for example, [address] [facility] suicide hotline, [facility] Hotline: ***      Emergency Services Address: ***      Emergency Services Phone: ***    Making the environment safe: How can I make my environment (house/apartment/living space) safer? For example, can I remove guns, medications, and other items?   1. ***  2. ***

## 2020-10-27 NOTE — PROGRESS NOTES
Hospitalist Progress Note  10/27/2020 11:20 AM  Subjective:   Admit Date: 10/21/2020  PCP: [address], DO     Full Code      C/c:No chief complaint on file. Interval History: doing well, planning for d/c later today    Diet: DIET GENERAL; Carb Control: 4 carb choices (60 gms)/meal                                ip days:0  Medications:   Scheduled Meds:   pantoprazole  40 mg Oral QAM AC    magnesium hydroxide  30 mL Oral BID    albuterol  2.5 mg Nebulization Q4H    sodium chloride flush  10 mL Intravenous 2 times per day    enoxaparin  40 mg Subcutaneous Daily    amLODIPine  10 mg Oral Daily    atorvastatin  10 mg Oral Daily    DULoxetine  30 mg Oral Daily    DULoxetine  60 mg Oral Daily    fluticasone  2 spray Nasal Daily    lamoTRIgine  200 mg Oral Daily    levothyroxine  100 mcg Oral Daily    lisinopril  40 mg Oral Daily    cetirizine  10 mg Oral Daily    propranolol  10 mg Oral TID    nicotine  1 patch Transdermal Daily    insulin lispro  0-6 Units Subcutaneous TID WC    insulin lispro  0-3 Units Subcutaneous Nightly     Continuous Infusions:   dextrose       PRN Meds:.albuterol, sodium chloride nebulizer, traMADol, oxyCODONE-acetaminophen, oxyCODONE-acetaminophen, sodium chloride flush, [DISCONTINUED] promethazine **OR** ondansetron, hydrOXYzine, glucose, dextrose, glucagon (rDNA), dextrose     CBC:   Recent Labs     10/25/20  0507 10/25/20  1154 10/26/20  0444 10/27/20  0527   WBC 7.7  --  7.3 7.5   HGB 7.2* 7.9* 8.1* 8.9*     --  225 271     BMP:    Recent Labs     10/25/20  0507 10/26/20  0444 10/27/20  0527    139 139   K 3.3* 3.9 3.7    101 100   CO2 31 29 30   BUN 5* 5* 7   CREATININE 0.68 0.68 0.74   GLUCOSE 112* 92 107*     Hepatic: No results for input(s): AST, ALT, ALB, BILITOT, ALKPHOS in the last 72 hours. Troponin: No results for input(s): TROPONINI in the last 72 hours. BNP: No results for input(s): BNP in the last 72 hours.   Lipids: No results for input(s): CHOL, HDL in the last 72 hours. Invalid input(s): LDLCALCU  INR: No results for input(s): INR in the last 72 hours. Objective:   Vitals: /62   Pulse 97   Temp 99 °F (37.2 °C) (Tympanic)   Resp 16   Ht 5' 1\" (1.549 m)   Wt 165 lb 1.6 oz (74.9 kg)   SpO2 91%   BMI 31.20 kg/m²   General appearance: alert, appears stated age and cooperative  Skin: Skin color, texture, turgor normal. No rashes or lesions  Lungs: clear to auscultation bilaterally  Heart: regular rate and rhythm, S1, S2 normal, no murmur, click, rub or gallop  Abdomen: soft, non-tender; bowel sounds normal; no masses,  no organomegaly  Extremities: extremities normal, atraumatic, no cyanosis or edema  Neurologic: Mental status: Alert, oriented, thought content appropriate    Prophylaxis:   DVT with  [x] lovenox        [] heparin        [] Scd        [] none:     Radiology:  Xr Acute Abd Series Chest 1 Vw    Result Date: 10/16/2020  EXAMINATION: TWO XRAY VIEWS OF THE ABDOMEN AND SINGLE  XRAY VIEW OF THE CHEST 10/16/2020 9:57 pm COMPARISON: 10/12/2020 HISTORY: ORDERING SYSTEM PROVIDED HISTORY: abdominal pain, distension TECHNOLOGIST PROVIDED HISTORY: abdominal pain, distension Reason for Exam: Ventral hernia pain nausea and constipation, scheduled for surgery on Wed Acuity: Acute Type of Exam: Subsequent/Follow-up FINDINGS: Cardiomediastinal silhouette is normal in size. No pulmonary consolidation, pleural effusion, or pneumothorax. Bowel gas pattern is nonobstructive. Moderate stool within the colon. Prior cholecystectomy. Prior ventral abdominal wall hernia repair. Punctate nonobstructing left upper pole stone. 1. No acute abnormality. 2. Moderate stool within the colon, which may suggest constipation. 3. Left upper pole renal stone. Assessment :   1. S/p hernia repair/stable  2. Bipolar disease     Plan:   1. Ok to d/c  2.   See order    Patient Active Problem List:     Severe episode of recurrent major depressive disorder, without psychotic features ([facility] [address]     Bipolar 2 disorder ([facility]     Bipolar II disorder (HCC)     Postoperative pain     Status post repair of recurrent ventral hernia      Anticipated Disposition upon discharge: [] Home                                                                         [] Home with Home Health                                                                         [] [facility]                                                                         [] [address] [facility] [address]      Patient is admitted as inpatient status because of co-morbidities listed above, severity of signs and symptoms as outlined, requirement for current medical therapies and most importantly because of direct risk to patient if care not provided in a hospital setting.           Gokul Astorga MD  Rounding Hospitalist

## 2020-10-27 NOTE — DISCHARGE INSTR - DIET

## 2020-10-28 ENCOUNTER — TELEPHONE (OUTPATIENT)
Dept: FAMILY MEDICINE CLINIC | Age: 55
End: 2020-10-28

## 2020-10-28 ENCOUNTER — HOSPITAL ENCOUNTER (EMERGENCY)
Age: 55
Discharge: HOME | End: 2020-10-28
Attending: EMERGENCY MEDICINE
Payer: MEDICARE

## 2020-10-28 ENCOUNTER — TELEPHONE (OUTPATIENT)
Dept: SURGERY | Age: 55
End: 2020-10-28

## 2020-10-28 VITALS
BODY MASS INDEX: 31.15 KG/M2 | RESPIRATION RATE: 18 BRPM | DIASTOLIC BLOOD PRESSURE: 71 MMHG | SYSTOLIC BLOOD PRESSURE: 149 MMHG | HEIGHT: 61 IN | TEMPERATURE: 99.2 F | HEART RATE: 116 BPM | OXYGEN SATURATION: 95 % | WEIGHT: 165 LBS

## 2020-10-28 PROCEDURE — 87205 SMEAR GRAM STAIN: CPT

## 2020-10-28 PROCEDURE — 99284 EMERGENCY DEPT VISIT MOD MDM: CPT

## 2020-10-28 PROCEDURE — 87186 SC STD MICRODIL/AGAR DIL: CPT

## 2020-10-28 PROCEDURE — 86403 PARTICLE AGGLUT ANTBDY SCRN: CPT

## 2020-10-28 PROCEDURE — 87070 CULTURE OTHR SPECIMN AEROBIC: CPT

## 2020-10-28 PROCEDURE — 6370000000 HC RX 637 (ALT 250 FOR IP)

## 2020-10-28 RX ORDER — DOXYCYCLINE HYCLATE 100 MG
100 TABLET ORAL 2 TIMES DAILY
Qty: 20 TABLET | Refills: 0 | Status: SHIPPED | OUTPATIENT
Start: 2020-10-28 | End: 2021-06-29 | Stop reason: SDUPTHER

## 2020-10-28 RX ORDER — DOXYCYCLINE 100 MG/1
100 CAPSULE ORAL ONCE
Status: COMPLETED | OUTPATIENT
Start: 2020-10-28 | End: 2020-10-28

## 2020-10-28 RX ORDER — DOXYCYCLINE 100 MG/1
CAPSULE ORAL
Status: COMPLETED
Start: 2020-10-28 | End: 2020-10-28

## 2020-10-28 RX ADMIN — DOXYCYCLINE 100 MG: 100 CAPSULE ORAL at 20:02

## 2020-10-28 ASSESSMENT — PAIN SCALES - GENERAL: PAINLEVEL_OUTOF10: 7

## 2020-10-28 ASSESSMENT — ENCOUNTER SYMPTOMS: ABDOMINAL PAIN: 1

## 2020-10-28 ASSESSMENT — PAIN DESCRIPTION - LOCATION: LOCATION: ABDOMEN

## 2020-10-28 ASSESSMENT — PAIN DESCRIPTION - ORIENTATION: ORIENTATION: UPPER

## 2020-10-28 NOTE — ED PROVIDER NOTES
hernia repair (2019); hernia repair (01/2020); fracture surgery (Left); hernia repair (N/A, 7/22/2020); and ventral hernia repair (N/A, 10/21/2020). CURRENTMEDICATIONS       Previous Medications    ALBUTEROL SULFATE  (90 BASE) MCG/ACT INHALER    Inhale 1-2 puffs into the lungs every 6 hours as needed for Wheezing or Shortness of Breath    AMLODIPINE (NORVASC) 10 MG TABLET    Take 1 tablet by mouth daily    ATORVASTATIN (LIPITOR) 10 MG TABLET    Take 1 tablet by mouth daily    CEPHALEXIN (KEFLEX) 500 MG CAPSULE    Take 1 capsule by mouth 3 times daily for 7 days    CHOLECALCIFEROL (VITAMIN D) 50 MCG (2000 UT) CAPS CAPSULE    Take 2,000 Units by mouth daily    CYCLOBENZAPRINE (FLEXERIL) 5 MG TABLET    Take 1-2 tablets by mouth 3 times daily as needed for Muscle spasms    DULOXETINE (CYMBALTA) 30 MG EXTENDED RELEASE CAPSULE    Take 30 mg by mouth daily    DULOXETINE (CYMBALTA) 60 MG EXTENDED RELEASE CAPSULE    Take 60 mg by mouth daily    FLUTICASONE (FLONASE) 50 MCG/ACT NASAL SPRAY    2 sprays by Nasal route daily    HYDROCODONE-ACETAMINOPHEN (NORCO) 5-325 MG PER TABLET    Take 1 tablet by mouth every 6 hours as needed for Pain for up to 3 days. Take lowest dose possible to manage pain    HYDROXYZINE (ATARAX) 25 MG TABLET    Take 25-30 mg by mouth every 6 hours as needed for Anxiety    LAMOTRIGINE (LAMICTAL) 200 MG TABLET    Take 200 mg by mouth daily     LEVOTHYROXINE (SYNTHROID) 100 MCG TABLET    Take 100 mcg by mouth Daily    LIDOCAINE (LIDODERM) 5 %    Place 1 patch onto the skin daily 12 hours on, 12 hours off.     LISINOPRIL (PRINIVIL;ZESTRIL) 40 MG TABLET    Take 1 tablet by mouth daily    LORATADINE (CLARITIN) 10 MG CAPSULE    Take 1 capsule by mouth daily    MULTIPLE VITAMINS-MINERALS (MULTIVITAMIN ADULT PO)    Take 1 tablet by mouth daily    NICOTINE (NICODERM CQ) 21 MG/24HR    Place 1 patch onto the skin daily    OMEPRAZOLE (PRILOSEC) 10 MG DELAYED RELEASE CAPSULE    Take 2 capsules by mouth 2 times daily    ONDANSETRON (ZOFRAN) 4 MG TABLET    Take 1 tablet by mouth every 8 hours as needed for Nausea or Vomiting    PROBIOTIC ACIDOPHILUS (FLORANEX) TABS    Take 1 tablet by mouth 3 times daily for 10 days    PROPRANOLOL (INDERAL) 10 MG TABLET    Take 10 mg by mouth 3 times daily       ALLERGIES     is allergic to flomax [tamsulosin hcl]; morphine; bactrim [sulfamethoxazole-trimethoprim]; prednisone; and zyprexa [olanzapine]. FAMILY HISTORY     She indicated that her mother is . She indicated that her father is . She indicated that her sister is alive. She indicated that her maternal grandmother is . She indicated that her maternal grandfather is . She indicated that her paternal grandmother is . She indicated that her paternal grandfather is . family history includes Atrial Fibrillation in her mother; Heart Attack in her maternal grandfather; Heart Failure in her mother; High Blood Pressure in her father and mother; Lupus in her mother; Other in her maternal grandmother; Prostate Cancer in her father; Stroke in her paternal grandfather. SOCIAL HISTORY      reports that she has been smoking cigarettes. She started smoking about 38 years ago. She has a 18.50 pack-year smoking history. She has never used smokeless tobacco. She reports previous drug use. She reports that she does not drink alcohol. PHYSICAL EXAM    (up to 7 for level 4, 8 or more for level 5)   INITIAL VITALS:  height is 5' 1\" (1.549 m) and weight is 165 lb (74.8 kg). Her tympanic temperature is 99.2 °F (37.3 °C). Her blood pressure is 149/71 (abnormal) and her pulse is 116. Her respiration is 18 and oxygen saturation is 95%. Physical Exam  Vitals signs and nursing note reviewed. Constitutional:       Comments: Mild distress secondary to abdominal pain   HENT:      Head: Normocephalic and atraumatic.    Eyes:      Conjunctiva/sclera: Conjunctivae normal.   Neck: Musculoskeletal: Normal range of motion and neck supple. No neck rigidity or muscular tenderness. Cardiovascular:      Rate and Rhythm: Regular rhythm. Tachycardia present. Pulmonary:      Effort: Pulmonary effort is normal. No respiratory distress. Breath sounds: Normal breath sounds. No stridor. No wheezing, rhonchi or rales. Abdominal:      Palpations: Abdomen is soft. Comments: Mild tenderness to palpation throughout all of the quadrants of the abdomen the midline incision appears to be well-healing the left-sided Josep-Hernandez also appears to be well-healing there is some purulent drainage around the right sided Josep-Hernandez with some surrounding erythema suggestive of an infection   Musculoskeletal: Normal range of motion. Lymphadenopathy:      Cervical: No cervical adenopathy. Skin:     Comments: What appears to be a cellulitis with some purulent drainage around the right sided Josep-Hernandez otherwise without further rashes or lesions   Neurological:      General: No focal deficit present. Mental Status: She is alert.          DIFFERENTIAL DIAGNOSIS/ MDM:     I stressed to the patient that I would like to establish an IV check lab work give her some IV antibiotics and admission which the patient politely refuses states that she was in the hospital for several days she simply wants a different antibiotic and will follow up with her surgeon we will get a culture of the wound if she is refusing an IV refusing IV antibiotics I will give her a doxycycline tablet here she is allergic to Bactrim and she is already on Keflex I will write a prescription for doxycycline I stressed to the patient that she may return to the ER at any point time for further care should immediately return to the ER for increasing abdominal pain increasing fever lightheaded dizziness vomiting or other concerns otherwise she is to call her family doctor and her surgeon tomorrow morning for follow-up    DIAGNOSTIC

## 2020-10-28 NOTE — TELEPHONE ENCOUNTER
Family calling to cancel pt's [facility] visit for today, states pt is in too much pain to come in, informed family that they should be seen in case they need something from Farhat Cantrell and family states there is a note to Dr Jermaine Machado so they do not want to see KB for transcare.

## 2020-10-28 NOTE — TELEPHONE ENCOUNTER
Patient notified and verbalizes understanding. I asked patient to call me tomorrow morning with an update of her condition and to let me know if she has enough pain meds.

## 2020-10-28 NOTE — TELEPHONE ENCOUNTER
Patient called stating she had hernia surgery with Dr Destiny Ramos on 10/21/2020 and was just discharged yesterday. Patient states she is having pain at the top of stomach area through her back. Pain has been 8-9 out of 10 since she got up this morning. Patient states she did take a Norco and Ibruprofen this morning at 8:00am but she is still having constant pain with no relief. She states the instructions for the Norco say every 8 hours. Patient states she is having the same kind of pain that she had in the hospital but she thinks she was given Percocet at a more frequent time period. Patient instructed to go to the emergency if she is unable to handle the pain. Patient informed Dr Destiny Ramos is currently in surgery but will be notified by this telephone encounter. Call patient back at [phone] after  sees this encounter.

## 2020-10-28 NOTE — TELEPHONE ENCOUNTER
Noted, thank you. She can follow-up with Dr. Chase Lopez and does not need to see me for transitional care visit.

## 2020-10-29 ENCOUNTER — TELEPHONE (OUTPATIENT)
Dept: SURGERY | Age: 55
End: 2020-10-29

## 2020-10-29 ENCOUNTER — CARE COORDINATION (OUTPATIENT)
Dept: CARE COORDINATION | Age: 55
End: 2020-10-29

## 2020-10-29 RX ORDER — HYDROCODONE BITARTRATE AND ACETAMINOPHEN 5; 325 MG/1; MG/1
1 TABLET ORAL EVERY 6 HOURS PRN
Qty: 28 TABLET | Refills: 0 | Status: SHIPPED | OUTPATIENT
Start: 2020-10-29 | End: 2020-11-05

## 2020-10-29 NOTE — TELEPHONE ENCOUNTER
Patient called today stating she was to call Dr Pranay Perea nurse back to give an update of how she was feeling from yesterday. Patient states her pain level is a little better now that Dr Misti Santamaria adjusted medicine yesterday. Her pain level now is a 6 or 7 level now. She did take her last Norco.  She wanted to also state she did go to the Emergency Room ([facility])last night for her abdominal pain and a fever. She states there was pus around the drain and the ER Physician cultured it and took her off of the Keflex and started her on doxycycline. She states it still feels a little tender by the drain. She is aware of her Nurse visit tomorrow with Dr Pranay Perea nurse.   Call her back at [phone]

## 2020-10-29 NOTE — CARE COORDINATION
Aundrea Kc was seen at [facility] 10/29/2020- wound check- abd wall cellulitis. She is S/P ventral hernia repair 10/21/2020. She is eligible for ACC.    10/29/2020- 2:13 pm Spoke with Aundrea Kc. She has a message in for surgeon for refill on her pain medication. She stated she is taking all medications as directed. She denied constipation. She has assistance in the home. She does not have [address] [place] [address] . Reviewed Healthcare decision makers. Discussed ACC and she is in agreement to enrollment. She would like to start enrollment next week d/t post op pain. She did not want to be on the phone at this time. Plan of Care : Enrolled in [facility]    This writer will f/u next week to start assessments, education, and support. Patient contacted regarding recent discharge and COVID-19 risk. Discussed COVID-19 related testing which was available at this time. Test results were negative. Patient informed of results, if available? Yes- from 10/16/2020     Care Transition Nurse/ Ambulatory Care Manager contacted the patient by telephone to perform post discharge assessment. Verified name and  with patient as identifiers. Patient has following risk factors of: S/P open ventral hernia repair 10/21/2020. CTN/ACM reviewed discharge instructions, medical action plan and red flags related to discharge diagnosis. Reviewed and educated them on any new and changed medications related to discharge diagnosis. Advised obtaining a 90-day supply of all daily and as-needed medications. Education provided regarding infection prevention, and signs and symptoms of COVID-19 and when to seek medical attention with patient who verbalized understanding. Discussed exposure protocols and quarantine from [address] you at higher risk for severe illness  and given an opportunity for questions and concerns. The patient agrees to contact the COVID-19 hotline [phone] or PCP office for questions related to their healthcare.  CTN/ACM

## 2020-10-30 ENCOUNTER — NURSE ONLY (OUTPATIENT)
Dept: SURGERY | Age: 55
End: 2020-10-30
Payer: MEDICARE

## 2020-10-30 VITALS
HEART RATE: 88 BPM | WEIGHT: 159 LBS | HEIGHT: 61 IN | SYSTOLIC BLOOD PRESSURE: 118 MMHG | BODY MASS INDEX: 30.02 KG/M2 | TEMPERATURE: 98 F | DIASTOLIC BLOOD PRESSURE: 60 MMHG

## 2020-10-30 PROCEDURE — 99212 OFFICE O/P EST SF 10 MIN: CPT

## 2020-10-30 NOTE — PATIENT INSTRUCTIONS
Follow up with Dr Julia Marinelli on Tuesday. SIGNS OF INFECTION  - Redness, swelling, skin hot  - Wound bed turns black or stringy yellow  - Foul odor  - Increased drainage or pus  - Increased pain  - Fever greater than 100F    CALL YOUR DOCTOR OR SEEK MEDICAL ATTENTION IF SIGNS OF INFECTION.   DO NOT WAIT UNTIL YOUR NEXT APPOINTMENT    Call the  Nurse with any other questions or concerns- [phone]

## 2020-10-30 NOTE — PROGRESS NOTES
Patient presents for staple removal status post ventral hernia repair from 10/21/20. Incision is clean and dry, well approximated. Left drain is patent, had approximately 30cc of drainage the past 24 hours, per patient. Right abdomen drain has approximately 5cc of brown drainage the past 24 hours, per patient. Phone call placed to Dr. Destiny Ramos. He ordered to have every 3rd staple removed and to remove right abdomen drain. Staples and sutures removed. Right drain removed. Patient tolerated well. Right drain site has small amount of purulent drainage. Area around drain site is hard and pink. Is sore to touch. Appointment scheduled with Dr. Destiny Ramos for next Tuesday. Instructed patient to go back to the ER if symptoms worsen. Patient verbalizes understanding.

## 2020-11-03 ENCOUNTER — OFFICE VISIT (OUTPATIENT)
Dept: SURGERY | Age: 55
End: 2020-11-03
Payer: MEDICARE

## 2020-11-03 VITALS
SYSTOLIC BLOOD PRESSURE: 110 MMHG | WEIGHT: 160 LBS | HEIGHT: 61 IN | HEART RATE: 84 BPM | DIASTOLIC BLOOD PRESSURE: 60 MMHG | TEMPERATURE: 98.4 F | BODY MASS INDEX: 30.21 KG/M2

## 2020-11-03 PROCEDURE — 99214 OFFICE O/P EST MOD 30 MIN: CPT

## 2020-11-03 PROCEDURE — 99024 POSTOP FOLLOW-UP VISIT: CPT | Performed by: SURGERY

## 2020-11-03 RX ORDER — HYDROCODONE BITARTRATE AND ACETAMINOPHEN 5; 325 MG/1; MG/1
1 TABLET ORAL EVERY 6 HOURS PRN
Qty: 28 TABLET | Refills: 0 | Status: SHIPPED | OUTPATIENT
Start: 2020-11-03 | End: 2020-11-10

## 2020-11-03 NOTE — TELEPHONE ENCOUNTER
Contacted patient and informed her Dr. Agus Crain refilled her Norco and that this will be the last prescription from him. Explained if she would need more Norco, we will have to send her to pain management for her pain medications.

## 2020-11-03 NOTE — PROGRESS NOTES
Patient is here for postop component separation hernia repair and removal of 2 hernia meshes. She is doing quite well. She is still having some pain about a 6 or 7 out of 10 at times but it is getting slightly less. She still has some of the pain medicine from her second prescription. She had one drain out. This did drain some seropurulent drainage around the edge of the catheter and was cultured in the ER did come back with MRSA. But patient has been on doxycycline since then. She denies any fever or chills. /60   Pulse 84   Temp 98.4 °F (36.9 °C) (Tympanic)   Ht 5' 1\" (1.549 m)   Wt 160 lb (72.6 kg)   BMI 30.23 kg/m²     She is afebrile her abdomen is benign. The incisions have healed nicely. We will take out the rest of the staples. The area where the drain was removed on the right has no induration there is just some serous fluid. There is no purulent drainage. There is no sign of erythema or tenderness. At this point we will take out both drains or the other second drain. We will remove the rest of the staples. We will see the patient back in about 3 weeks. We will continue the doxycycline until finished.

## 2020-11-04 ENCOUNTER — CARE COORDINATION (OUTPATIENT)
Dept: CARE COORDINATION | Age: 55
End: 2020-11-04

## 2020-11-04 NOTE — CARE COORDINATION
11/4/2020- 1:28 pm Left message requesting return call to start enrollment in [facility].    3:59 pm Left message requesting return call. 11/6/2020- 12:51 pm- Left message requesting return call.

## 2020-11-10 ENCOUNTER — CARE COORDINATION (OUTPATIENT)
Dept: CARE COORDINATION | Age: 55
End: 2020-11-10

## 2020-11-10 NOTE — CARE COORDINATION
11/4/2020- 1:28 pm Left message requesting return call to start enrollment in [facility].               3:59 pm Left message requesting return call.      11/6/2020- 12:51 pm- Left message requesting return call. 11/10/2020- 4:18 pm Left message requesting return call.

## 2020-11-12 ENCOUNTER — TELEPHONE (OUTPATIENT)
Dept: SURGERY | Age: 55
End: 2020-11-12

## 2020-11-13 ENCOUNTER — CARE COORDINATION (OUTPATIENT)
Dept: CARE COORDINATION | Age: 55
End: 2020-11-13

## 2020-11-13 ENCOUNTER — APPOINTMENT (OUTPATIENT)
Dept: CT IMAGING | Age: 55
End: 2020-11-13
Payer: MEDICARE

## 2020-11-13 ENCOUNTER — HOSPITAL ENCOUNTER (EMERGENCY)
Age: 55
Discharge: HOME | End: 2020-11-13
Attending: EMERGENCY MEDICINE
Payer: MEDICARE

## 2020-11-13 VITALS
SYSTOLIC BLOOD PRESSURE: 130 MMHG | DIASTOLIC BLOOD PRESSURE: 74 MMHG | RESPIRATION RATE: 20 BRPM | HEIGHT: 61 IN | WEIGHT: 160 LBS | TEMPERATURE: 99.4 F | HEART RATE: 120 BPM | BODY MASS INDEX: 30.21 KG/M2 | OXYGEN SATURATION: 95 %

## 2020-11-13 LAB
ABSOLUTE EOS #: 1 K/UL (ref 0–0.44)
ABSOLUTE IMMATURE GRANULOCYTE: 0.04 K/UL (ref 0–0.3)
ABSOLUTE LYMPH #: 2.48 K/UL (ref 1.1–3.7)
ABSOLUTE MONO #: 0.61 K/UL (ref 0.1–1.2)
BASOPHILS # BLD: 1 % (ref 0–2)
BASOPHILS ABSOLUTE: 0.08 K/UL (ref 0–0.2)
DIFFERENTIAL TYPE: ABNORMAL
EOSINOPHILS RELATIVE PERCENT: 11 % (ref 1–4)
HCT VFR BLD CALC: 30.8 % (ref 36.3–47.1)
HEMOGLOBIN: 9.9 G/DL (ref 11.9–15.1)
IMMATURE GRANULOCYTES: 0 %
LYMPHOCYTES # BLD: 27 % (ref 24–43)
MCH RBC QN AUTO: 28.6 PG (ref 25.2–33.5)
MCHC RBC AUTO-ENTMCNC: 32.1 G/DL (ref 25.2–33.5)
MCV RBC AUTO: 89 FL (ref 82.6–102.9)
MONOCYTES # BLD: 7 % (ref 3–12)
NRBC AUTOMATED: 0 PER 100 WBC
PDW BLD-RTO: 12.9 % (ref 11.8–14.4)
PLATELET # BLD: 328 K/UL (ref 138–453)
PLATELET ESTIMATE: ABNORMAL
PMV BLD AUTO: 8.1 FL (ref 8.1–13.5)
RBC # BLD: 3.46 M/UL (ref 3.95–5.11)
RBC # BLD: ABNORMAL 10*6/UL
SEG NEUTROPHILS: 54 % (ref 36–65)
SEGMENTED NEUTROPHILS ABSOLUTE COUNT: 4.94 K/UL (ref 1.5–8.1)
WBC # BLD: 9.2 K/UL (ref 3.5–11.3)
WBC # BLD: ABNORMAL 10*3/UL

## 2020-11-13 PROCEDURE — 6360000004 HC RX CONTRAST MEDICATION: Performed by: EMERGENCY MEDICINE

## 2020-11-13 PROCEDURE — 96374 THER/PROPH/DIAG INJ IV PUSH: CPT

## 2020-11-13 PROCEDURE — 96375 TX/PRO/DX INJ NEW DRUG ADDON: CPT

## 2020-11-13 PROCEDURE — 85025 COMPLETE CBC W/AUTO DIFF WBC: CPT

## 2020-11-13 PROCEDURE — 2709999900 CT ABDOMEN PELVIS W IV CONTRAST

## 2020-11-13 PROCEDURE — 99283 EMERGENCY DEPT VISIT LOW MDM: CPT

## 2020-11-13 PROCEDURE — 6360000002 HC RX W HCPCS: Performed by: EMERGENCY MEDICINE

## 2020-11-13 RX ORDER — FENTANYL CITRATE 50 UG/ML
50 INJECTION, SOLUTION INTRAMUSCULAR; INTRAVENOUS ONCE
Status: COMPLETED | OUTPATIENT
Start: 2020-11-13 | End: 2020-11-13

## 2020-11-13 RX ORDER — ONDANSETRON 2 MG/ML
4 INJECTION INTRAMUSCULAR; INTRAVENOUS ONCE
Status: COMPLETED | OUTPATIENT
Start: 2020-11-13 | End: 2020-11-13

## 2020-11-13 RX ORDER — KETOROLAC TROMETHAMINE 30 MG/ML
30 INJECTION, SOLUTION INTRAMUSCULAR; INTRAVENOUS ONCE
Status: DISCONTINUED | OUTPATIENT
Start: 2020-11-13 | End: 2020-11-13

## 2020-11-13 RX ORDER — KETOROLAC TROMETHAMINE 30 MG/ML
30 INJECTION, SOLUTION INTRAMUSCULAR; INTRAVENOUS ONCE
Status: COMPLETED | OUTPATIENT
Start: 2020-11-13 | End: 2020-11-13

## 2020-11-13 RX ADMIN — FENTANYL CITRATE 50 MCG: 50 INJECTION, SOLUTION INTRAMUSCULAR; INTRAVENOUS at 21:17

## 2020-11-13 RX ADMIN — KETOROLAC TROMETHAMINE 30 MG: 30 INJECTION, SOLUTION INTRAMUSCULAR at 20:23

## 2020-11-13 RX ADMIN — IOPAMIDOL 100 ML: 755 INJECTION, SOLUTION INTRAVENOUS at 20:44

## 2020-11-13 RX ADMIN — ONDANSETRON 4 MG: 2 INJECTION INTRAMUSCULAR; INTRAVENOUS at 20:26

## 2020-11-13 SDOH — ECONOMIC STABILITY: HOUSING INSECURITY: IN THE LAST 12 MONTHS, HOW MANY PLACES HAVE YOU LIVED?: 1

## 2020-11-13 SDOH — ECONOMIC STABILITY: INCOME INSECURITY: IN THE LAST 12 MONTHS, WAS THERE A TIME WHEN YOU WERE NOT ABLE TO PAY THE MORTGAGE OR RENT ON TIME?: NO

## 2020-11-13 SDOH — ECONOMIC STABILITY: FOOD INSECURITY: WITHIN THE PAST 12 MONTHS, YOU WORRIED THAT YOUR FOOD WOULD RUN OUT BEFORE YOU GOT MONEY TO BUY MORE.: NEVER TRUE

## 2020-11-13 SDOH — SOCIAL STABILITY: SOCIAL NETWORK
IN A TYPICAL WEEK, HOW MANY TIMES DO YOU TALK ON THE PHONE WITH FAMILY, FRIENDS, OR NEIGHBORS?: MORE THAN THREE TIMES A WEEK

## 2020-11-13 SDOH — ECONOMIC STABILITY: TRANSPORTATION INSECURITY
IN THE PAST 12 MONTHS, HAS THE LACK OF TRANSPORTATION KEPT YOU FROM MEDICAL APPOINTMENTS OR FROM GETTING MEDICATIONS?: NO

## 2020-11-13 SDOH — SOCIAL STABILITY: SOCIAL NETWORK: HOW OFTEN DO YOU ATTEND CHURCH OR RELIGIOUS SERVICES?: MORE THAN 4 TIMES PER YEAR

## 2020-11-13 SDOH — ECONOMIC STABILITY: HOUSING INSECURITY
IN THE LAST 12 MONTHS, WAS THERE A TIME WHEN YOU DID NOT HAVE A STEADY PLACE TO SLEEP OR SLEPT IN A SHELTER (INCLUDING NOW)?: NO

## 2020-11-13 SDOH — SOCIAL STABILITY: SOCIAL NETWORK: HOW OFTEN DO YOU ATTENT MEETINGS OF THE CLUB OR ORGANIZATION YOU BELONG TO?: MORE THAN 4 TIMES PER YEAR

## 2020-11-13 SDOH — HEALTH STABILITY: PHYSICAL HEALTH: ON AVERAGE, HOW MANY DAYS PER WEEK DO YOU ENGAGE IN MODERATE TO STRENUOUS EXERCISE (LIKE A BRISK WALK)?: 0 DAYS

## 2020-11-13 SDOH — HEALTH STABILITY: MENTAL HEALTH: HOW OFTEN DO YOU HAVE A DRINK CONTAINING ALCOHOL?: NEVER

## 2020-11-13 SDOH — HEALTH STABILITY: MENTAL HEALTH: HOW MANY STANDARD DRINKS CONTAINING ALCOHOL DO YOU HAVE ON A TYPICAL DAY?: 1 OR 2

## 2020-11-13 SDOH — SOCIAL STABILITY: SOCIAL NETWORK: HOW OFTEN DO YOU GET TOGETHER WITH FRIENDS OR RELATIVES?: MORE THAN THREE TIMES A WEEK

## 2020-11-13 SDOH — HEALTH STABILITY: PHYSICAL HEALTH: ON AVERAGE, HOW MANY MINUTES DO YOU ENGAGE IN EXERCISE AT THIS LEVEL?: 0 MIN

## 2020-11-13 SDOH — HEALTH STABILITY: MENTAL HEALTH
STRESS IS WHEN SOMEONE FEELS TENSE, NERVOUS, ANXIOUS, OR CAN'T SLEEP AT NIGHT BECAUSE THEIR MIND IS TROUBLED. HOW STRESSED ARE YOU?: TO SOME EXTENT

## 2020-11-13 SDOH — ECONOMIC STABILITY: INCOME INSECURITY: HOW HARD IS IT FOR YOU TO PAY FOR THE VERY BASICS LIKE FOOD, HOUSING, MEDICAL CARE, AND HEATING?: NOT HARD AT ALL

## 2020-11-13 SDOH — ECONOMIC STABILITY: TRANSPORTATION INSECURITY
IN THE PAST 12 MONTHS, HAS LACK OF TRANSPORTATION KEPT YOU FROM MEETINGS, WORK, OR FROM GETTING THINGS NEEDED FOR DAILY LIVING?: NO

## 2020-11-13 SDOH — SOCIAL STABILITY: SOCIAL NETWORK
DO YOU BELONG TO ANY CLUBS OR ORGANIZATIONS SUCH AS CHURCH GROUPS UNIONS, FRATERNAL OR ATHLETIC GROUPS, OR SCHOOL GROUPS?: YES

## 2020-11-13 SDOH — SOCIAL STABILITY: SOCIAL NETWORK: ARE YOU MARRIED, WIDOWED, DIVORCED, SEPARATED, NEVER MARRIED, OR LIVING WITH A PARTNER?: DIVORCED

## 2020-11-13 SDOH — ECONOMIC STABILITY: FOOD INSECURITY: WITHIN THE PAST 12 MONTHS, THE FOOD YOU BOUGHT JUST DIDN'T LAST AND YOU DIDN'T HAVE MONEY TO GET MORE.: NEVER TRUE

## 2020-11-13 ASSESSMENT — ENCOUNTER SYMPTOMS
NAUSEA: 1
EYES NEGATIVE: 1
ALLERGIC/IMMUNOLOGIC NEGATIVE: 1
RESPIRATORY NEGATIVE: 1
ABDOMINAL PAIN: 1

## 2020-11-13 ASSESSMENT — PAIN DESCRIPTION - ORIENTATION: ORIENTATION: MID

## 2020-11-13 ASSESSMENT — PAIN DESCRIPTION - LOCATION: LOCATION: ABDOMEN

## 2020-11-13 ASSESSMENT — PAIN SCALES - GENERAL
PAINLEVEL_OUTOF10: 7

## 2020-11-13 ASSESSMENT — PAIN DESCRIPTION - DESCRIPTORS: DESCRIPTORS: BURNING;DULL

## 2020-11-13 ASSESSMENT — PAIN DESCRIPTION - PAIN TYPE: TYPE: ACUTE PAIN

## 2020-11-13 NOTE — ACP (ADVANCE CARE PLANNING)
Updated Health care decision maker. Discussed ACP and she is aware that assistance is available for completion if needed.

## 2020-11-13 NOTE — CARE COORDINATION
Ambulatory Care Coordination Note  11/13/2020  CM Risk Score: 5  Charlson 10 Year Mortality Risk Score: 4%     ACC: Ema Tse RN    Summary Note: Wade Lino was referred for [facility] from report. She follows with [facility], [facility], resides at [facility] 21 months      She is S/P ventral hernia repair    Plan of Care :   Continue assessments, education and support     Continue support, assessments and education     COPD Zone tool     SDOH completed     Medications reviewed- she has requested refill from surgeon for pain medication since S/P surgery, she no longer uses Flexeril- removed, she also is taking Tylenol or Ibuprofen prn for pain     Updated Healthcare decision maker. Discussed ACP- she is aware packets for completion at clinic, assistance available if needed, and copy needs placed in EMR. Review clinic, urgent care and My Chart     Reviewed Covid education- she is limiting exposure, wearing mask etc.      Review Care Gaps     Care Team Updated     Reviewed nutrition     11/13/2020- received message from Patient 11/12/2020- 6:18 pm. She left her number. 11/13/2020- 9:36 am Left message requesting return call. Spoke with Wade Lino. She has a call into surgeon and f/u scheduled next week. She denied fever. She is nauseated today but has taken Zofran. Ambulatory Care Coordination Assessment    Care Coordination Protocol  Program Enrollment:  Rising Risk  Referral from Primary Care Provider:  No  Week 1 - Initial Assessment     Do you have all of your prescriptions and are they filled?:  Yes  Barriers to medication adherence:  None  Are you able to afford your medications?:  Yes  How often do you have trouble taking your medications the way you have been told to take them?:  I always take them as prescribed. Do you have Home O2 Therapy?:  No      Ability to seek help/take action for Emergent Urgent situations i.e. fire, crime, inclement weather or health crisis. Barbie Larios Independent  Ability to ambulate to restroom:  Independent  Ability handle personal hygeine needs (bathing/dressing/grooming): Independent  Ability to manage Medications: Independent  Ability to prepare Food Preparation:  Independent  Ability to maintain home (clean home, laundry): Independent  Ability to drive and/or has transportation:  Dependent  Ability to do shopping:  Independent  Ability to manage finances: Independent  Is patient able to live independently?:  Yes     Current Housing:  Apartment        Per the Fall Risk Screening, did the patient have 2 or more falls or 1 fall with injury in the past year?:  No     Frequent urination at night?:  No  Do you use rails/bars?:  No  Do you have a non-slip tub mat?:  Yes     Are you experiencing loss of meaning?:  No  Are you experiencing loss of hope and peace?:  No     Thinking about your patient's physical health needs, are there any symptoms or problems (risk indicators) you are unsure about that require further investigation?:  No identified areas of uncertainly or problems already being investigated   Are the patients physical health problems impacting on their mental well-being?:  No identified areas of concern   Are there any problems with your patients lifestyle behaviors (alcohol, drugs, diet, exercise) that are impacting on physical or mental well-being?:  No identified areas of concern   Do you have any other concerns about your patients mental well-being?  How would you rate their severity and impact on the patient?:  No identified areas of concern   How would you rate their home environment in terms of safety and stability (including domestic violence, insecure housing, neighbor harassment)?:  Consistently safe, supportive, stable, no identified problems   How do daily activities impact on the patient's well-being? (include current or anticipated unemployment, work, caregiving, access to transportation or other):  No identified problems or perceived positive benefits   How would you rate their social network (family, work, friends)?:  Good participation with social networks   How would you rate their financial resources (including ability to afford all required medical care)?:  Financially secure, some resource challenges   How wells does the patient now understand their health and well-being (symptoms, signs or risk factors) and what they need to do to manage their health?:  Reasonable to good understanding and already engages in managing health or is willing to undertake better management   How well do you think your patient can engage in healthcare discussions? (Barriers include language, deafness, aphasia, alcohol or drug problems, learning difficulties, concentration):  Clear and open communication, no identified barriers   Do other services need to be involved to help this patient?:  Other care/services not required at this time   Are current services involved with this patient well-coordinated? (Include coordination with other services you are now recommendation): All required care/services in place and well-coordinated   Suggested Interventions and [address]:  Completed (Comment: [facility], 12 step [facility])     Other Services or Interventions:  Resides at [facility]   Smoking Cessation:  Declined   Zone Management Tools: In Process         Set up/Review Goals, Set up/Review an Education Plan              Prior to Admission medications    Medication Sig Start Date End Date Taking?  Authorizing Provider   ondansetron (ZOFRAN) 4 MG tablet Take 1 tablet by mouth every 8 hours as needed for Nausea or Vomiting 10/14/20  Yes Sean Rubinstein Black, DO   Cholecalciferol (VITAMIN D) 50 MCG (2000 UT) CAPS capsule Take 2,000 Units by mouth daily   Yes Historical Provider, MD   Multiple Vitamins-Minerals (MULTIVITAMIN ADULT PO) Take 1 tablet by mouth daily   Yes Historical Provider, MD   lidocaine (LIDODERM) 5 % Place 1 patch onto the skin daily 12 hours on, 12 hours off. 6/30/20  Yes Des Bazan, DO   atorvastatin (LIPITOR) 10 MG tablet Take 1 tablet by mouth daily 6/10/20  Yes Karoline Chris, DO   propranolol (INDERAL) 10 MG tablet Take 10 mg by mouth 3 times daily Indications: patient takes twice a day    Yes Historical Provider, MD   hydrOXYzine (ATARAX) 25 MG tablet Take 25-30 mg by mouth every 6 hours as needed for Anxiety   Yes Historical Provider, MD   lisinopril (PRINIVIL;ZESTRIL) 40 MG tablet Take 1 tablet by mouth daily 5/18/20  Yes Karoline Chris, DO   amLODIPine (NORVASC) 10 MG tablet Take 1 tablet by mouth daily 5/18/20  Yes Karoline Chris, DO   loratadine (CLARITIN) 10 MG capsule Take 1 capsule by mouth daily 5/18/20  Yes Karoline Chris, DO   albuterol sulfate  (90 Base) MCG/ACT inhaler Inhale 1-2 puffs into the lungs every 6 hours as needed for Wheezing or Shortness of Breath 5/18/20  Yes Karoline Chris, DO   omeprazole (PRILOSEC) 10 MG delayed release capsule Take 2 capsules by mouth 2 times daily 5/18/20  Yes Karoline Chris, DO   fluticasone (FLONASE) 50 MCG/ACT nasal spray 2 sprays by Nasal route daily 4/20/20  Yes Sandra Coy,    DULoxetine (CYMBALTA) 30 MG extended release capsule Take 30 mg by mouth daily   Yes Historical Provider, MD   DULoxetine (CYMBALTA) 60 MG extended release capsule Take 60 mg by mouth daily   Yes Historical Provider, MD   lamoTRIgine (LAMICTAL) 200 MG tablet Take 200 mg by mouth daily    Yes Historical Provider, MD   levothyroxine (SYNTHROID) 100 MCG tablet Take 100 mcg by mouth Daily   Yes Historical Provider, MD   nicotine (NICODERM CQ) 21 MG/24HR Place 1 patch onto the skin daily  Patient not taking: Reported on 11/3/2020 10/27/20   Frandy Gutierrez MD       Future Appointments   Date Time Provider Rupesh Michaels   11/19/2020  1:30 PM MD SANJAY [facility]umen [facility]   3/26/2021  1:20 PM [facility]

## 2020-11-14 ENCOUNTER — CARE COORDINATION (OUTPATIENT)
Dept: CARE COORDINATION | Age: 55
End: 2020-11-14

## 2020-11-14 NOTE — ED PROVIDER NOTES
eMERGENCY dEPARTMENT eNCOUnter      Pt Name: Eddie Mendoza  MRN: [id number]  [place] 1965  Date of evaluation: 11/13/2020      CHIEF COMPLAINT       Chief Complaint   Patient presents with    Post-op Problem     x3 wks ago hernia surgery. HISTORY OF PRESENT ILLNESS    Eddie Mendoza is a 48 y.o. female who presents urgency department for evaluation of abdominal pain and bloating that she says has been getting worse over the last several days. Patient has some ventral hernia surgery 3 weeks ago and today she states she has been having nausea and she feels like she has a \"lump in her abdomen. She had a normal BM this morning is rating her pain at about a 7-8 out of 10 without palliative nor provocative. REVIEW OF SYSTEMS       Review of Systems   Constitutional: Negative. HENT: Negative. Eyes: Negative. Respiratory: Negative. Cardiovascular: Negative. Gastrointestinal: Positive for abdominal pain and nausea. Endocrine: Negative. Genitourinary: Negative. Musculoskeletal: Negative. Skin: Negative. Allergic/Immunologic: Negative. Neurological: Negative. Hematological: Negative. Psychiatric/Behavioral: Negative. PAST MEDICAL HISTORY    has a past medical history of Anxiety, Breast cancer ([address] COPD (chronic obstructive pulmonary disease) ([address] Depression, Headache(784.0), History of alcoholism ([facility] [address] Hypertension, Hypothyroidism, Kidney stone, and PTSD (post-traumatic stress disorder). SURGICAL HISTORY      has a past surgical history that includes Eye surgery; tumor removal (1999); Appendectomy (1977); Cholecystectomy (2014); Tubal ligation (2001); lumbar laminectomy (2006); Dilation and curettage of uterus; Breast lumpectomy (Left, 2014);  Breast biopsy (Right, 2015); alcon and bso (cervix removed) (2015); ventral hernia repair (2019); hernia repair (01/2020); fracture surgery (Left); hernia repair (N/A, 7/22/2020); and ventral hernia repair (N/A, 10/21/2020). CURRENT MEDICATIONS       Previous Medications    ALBUTEROL SULFATE  (90 BASE) MCG/ACT INHALER    Inhale 1-2 puffs into the lungs every 6 hours as needed for Wheezing or Shortness of Breath    AMLODIPINE (NORVASC) 10 MG TABLET    Take 1 tablet by mouth daily    ATORVASTATIN (LIPITOR) 10 MG TABLET    Take 1 tablet by mouth daily    CHOLECALCIFEROL (VITAMIN D) 50 MCG (2000 UT) CAPS CAPSULE    Take 2,000 Units by mouth daily    DULOXETINE (CYMBALTA) 30 MG EXTENDED RELEASE CAPSULE    Take 30 mg by mouth daily    DULOXETINE (CYMBALTA) 60 MG EXTENDED RELEASE CAPSULE    Take 60 mg by mouth daily    FLUTICASONE (FLONASE) 50 MCG/ACT NASAL SPRAY    2 sprays by Nasal route daily    HYDROXYZINE (ATARAX) 25 MG TABLET    Take 25-30 mg by mouth every 6 hours as needed for Anxiety    LAMOTRIGINE (LAMICTAL) 200 MG TABLET    Take 200 mg by mouth daily     LEVOTHYROXINE (SYNTHROID) 100 MCG TABLET    Take 100 mcg by mouth Daily    LIDOCAINE (LIDODERM) 5 %    Place 1 patch onto the skin daily 12 hours on, 12 hours off. LISINOPRIL (PRINIVIL;ZESTRIL) 40 MG TABLET    Take 1 tablet by mouth daily    LORATADINE (CLARITIN) 10 MG CAPSULE    Take 1 capsule by mouth daily    MULTIPLE VITAMINS-MINERALS (MULTIVITAMIN ADULT PO)    Take 1 tablet by mouth daily    NICOTINE (NICODERM CQ) 21 MG/24HR    Place 1 patch onto the skin daily    OMEPRAZOLE (PRILOSEC) 10 MG DELAYED RELEASE CAPSULE    Take 2 capsules by mouth 2 times daily    ONDANSETRON (ZOFRAN) 4 MG TABLET    Take 1 tablet by mouth every 8 hours as needed for Nausea or Vomiting    PROPRANOLOL (INDERAL) 10 MG TABLET    Take 10 mg by mouth 3 times daily Indications: patient takes twice a day        ALLERGIES     is allergic to flomax [tamsulosin hcl]; morphine; bactrim [sulfamethoxazole-trimethoprim]; prednisone; and zyprexa [olanzapine]. FAMILY HISTORY     She indicated that her mother is . She indicated that her father is .  She indicated that her sister is alive. She indicated that her maternal grandmother is . She indicated that her maternal grandfather is . She indicated that her paternal grandmother is . She indicated that her paternal grandfather is . family history includes Atrial Fibrillation in her mother; Heart Attack in her maternal grandfather; Heart Failure in her mother; High Blood Pressure in her father and mother; Lupus in her mother; Other in her maternal grandmother; Prostate Cancer in her father; Stroke in her paternal grandfather. SOCIAL HISTORY      reports that she has been smoking cigarettes. She started smoking about 38 years ago. She has a 18.50 pack-year smoking history. She has never used smokeless tobacco. She reports previous drug use. She reports that she does not drink alcohol. PHYSICAL EXAM     INITIAL VITALS:  height is 5' 1\" (1.549 m) and weight is 160 lb (72.6 kg). Her temperature is 99.4 °F (37.4 °C). Her blood pressure is 122/85 and her pulse is 120. Her respiration is 20 and oxygen saturation is 97%. Constitutional: Alert, oriented x3, nontoxic, afebrile, answering questions appropriately, acting properly for age, in no acute distress  HEENT: Extraocular muscles intact, mucus membranes moist, TMs clear bilaterally, no posterior pharyngeal erythema or exudates, Pupils equal, round, reactive to light,   Neck: Trachea midline, Supple without lymphadenopathy, no posterior midline neck tenderness to palpation  Cardiovascular: Regular rhythm and rate no S3, S4, or murmurs  Respiratory: Clear to auscultation bilaterally no wheezes, rhonchi, rales, no respiratory distress  Gastrointestinal: Soft, nontender, nondistended, positive bowel sounds. No rebound, rigidity, or guarding.   Musculoskeletal: No extremity pain or swelling  Neurologic: Moving all 4 extremities without difficulty there are no gross focal neurologic deficits  Skin: Warm and dry      DIFFERENTIAL DIAGNOSIS/ MDM: comfortable at this point in time and is comfortable the decision to be discharged.     PATIENT REFERRED TO:  Aubrie Mcpherson MD  [address] [place] [address]  [phone]    In 2 days        DISCHARGE MEDICATIONS:  New Prescriptions    No medications on file       (Please note that portions of this note were completed with a voice recognition program.  Efforts were made to edit the dictations but occasionally words are mis-transcribed.)    Garcias MD  Attending Emergency Physician           Hugo Matute MD  11/13/20 9374

## 2020-11-14 NOTE — CARE COORDINATION
Stacie Maza was seen [facility] 11/13/2020- Post op pain and lump near incision. S/P hernia repair 10/21/2020.     11/14/2020- 1:29 pm Left message requesting return call re: Initial ER/Covid F/U call and ACC.

## 2020-11-14 NOTE — ED TRIAGE NOTES
Pt had open ventral hernia surgery x3 wks ago by dr. Radha Martinez. Today she states she has been having nausea and a large lump in her abd that has continued to protrude thru the day. Yesterday had normal large BM, soft. Rates pain 7/10. Tried tylenol and motrin today. Motrin last taken at 1630.

## 2020-11-14 NOTE — ED NOTES
Patient on call light. She states \"that Toradol they gave me isn't doing anything\". Writer let her know writer will pass this along to the doctor.  [facility] is advised of patient's reports of no change in pain level with Toradol.      Sheldon Burch RN  11/13/20 2039

## 2020-11-15 NOTE — TELEPHONE ENCOUNTER
Pt is still having pain,  I need to see her and set up how to get more pain control. May need pain management or PCP. But also came to ER fri night. CT showed seroma in SQ and mesh looked good. I would like to see her to check it out. I could see Monday in [facility] at 12:30 in office over there.   Or I could see Tues after the robot case about 3:30 pm

## 2020-11-16 ENCOUNTER — TELEPHONE (OUTPATIENT)
Dept: SURGERY | Age: 55
End: 2020-11-16

## 2020-11-16 NOTE — TELEPHONE ENCOUNTER
Patient returned phone call to nurse while she was unavailable. Patient is unable to be seen at the [facility] office today at 12:30 because she does not have transportation. Patient reports she is feeling better and would like to know if its ok to wait until 11-19 to be evaluated. Please call her back at [phone].

## 2020-11-16 NOTE — TELEPHONE ENCOUNTER
Contacted patient and patient states her abdomen isn't as swollen as it was on Friday and she is feeling better. I explained if she is feeling better and doesn't think she needs to be seen sooner we will see her on 11/19/2020. Explained if she has any issues before than or worsening symptoms to give us a call. Patient verbalized understanding.

## 2020-11-16 NOTE — TELEPHONE ENCOUNTER
LM on patient's voicemail stating that Dr. Red Neil would like to see her in clinic to check her out and to set up how to get more pain control. Explained he can see her today in [facility] at 12:30 PM in the office or at [place] on Tuesday at 3:30PM. Clinic number provided on voicemail requesting a call back to let us know what date and time worked best for her.

## 2020-11-17 NOTE — TELEPHONE ENCOUNTER
Marcella Soto spoke with patient. Patient is feeling better and would like to wait for original appointment on Thursday.

## 2020-11-18 ENCOUNTER — CARE COORDINATION (OUTPATIENT)
Dept: CARE COORDINATION | Age: 55
End: 2020-11-18

## 2020-11-18 NOTE — CARE COORDINATION
ondansetron (ZOFRAN) 4 MG tablet Take 1 tablet by mouth every 8 hours as needed for Nausea or Vomiting 10/14/20   Hedy Phan, DO   Cholecalciferol (VITAMIN D) 50 MCG (2000 UT) CAPS capsule Take 2,000 Units by mouth daily    Historical Provider, MD   Multiple Vitamins-Minerals (MULTIVITAMIN ADULT PO) Take 1 tablet by mouth daily    Historical Provider, MD   lidocaine (LIDODERM) 5 % Place 1 patch onto the skin daily 12 hours on, 12 hours off. 6/30/20   Jomalka Mast, DO   atorvastatin (LIPITOR) 10 MG tablet Take 1 tablet by mouth daily 6/10/20   Jean Chris, DO   propranolol (INDERAL) 10 MG tablet Take 10 mg by mouth 3 times daily Indications: patient takes twice a day     Historical Provider, MD   hydrOXYzine (ATARAX) 25 MG tablet Take 25-30 mg by mouth every 6 hours as needed for Anxiety    Historical Provider, MD   lisinopril (PRINIVIL;ZESTRIL) 40 MG tablet Take 1 tablet by mouth daily 5/18/20   Jean Chris, DO   amLODIPine (NORVASC) 10 MG tablet Take 1 tablet by mouth daily 5/18/20   Jean Chris, DO   loratadine (CLARITIN) 10 MG capsule Take 1 capsule by mouth daily 5/18/20   Jean Chris, DO   albuterol sulfate  (90 Base) MCG/ACT inhaler Inhale 1-2 puffs into the lungs every 6 hours as needed for Wheezing or Shortness of Breath 5/18/20   Jean Chris, DO   omeprazole (PRILOSEC) 10 MG delayed release capsule Take 2 capsules by mouth 2 times daily 5/18/20   Jean Chris, DO   fluticasone Obadiah Long) 50 MCG/ACT nasal spray 2 sprays by Nasal route daily 4/20/20   Kathryn , DO   DULoxetine (CYMBALTA) 30 MG extended release capsule Take 30 mg by mouth daily    Historical Provider, MD   DULoxetine (CYMBALTA) 60 MG extended release capsule Take 60 mg by mouth daily    Historical Provider, MD   lamoTRIgine (LAMICTAL) 200 MG tablet Take 200 mg by mouth daily     Historical Provider, MD   levothyroxine (SYNTHROID) 100 MCG tablet Take 100 mcg by mouth Daily    Historical Provider, MD Future Appointments   Date Time Provider Rupesh Xenia   11/19/2020  1:30 PM Kenyetta Rg MD [facility]   3/26/2021  1:20 PM [facility]

## 2020-11-19 ENCOUNTER — OFFICE VISIT (OUTPATIENT)
Dept: SURGERY | Age: 55
End: 2020-11-19
Payer: MEDICARE

## 2020-11-19 ENCOUNTER — HOSPITAL ENCOUNTER (OUTPATIENT)
Dept: LAB | Age: 55
Discharge: HOME | End: 2020-11-19
Payer: MEDICARE

## 2020-11-19 VITALS
RESPIRATION RATE: 20 BRPM | DIASTOLIC BLOOD PRESSURE: 84 MMHG | HEART RATE: 100 BPM | WEIGHT: 157 LBS | SYSTOLIC BLOOD PRESSURE: 132 MMHG | TEMPERATURE: 96.4 F | HEIGHT: 61 IN | BODY MASS INDEX: 29.64 KG/M2

## 2020-11-19 LAB
ABSOLUTE EOS #: 0.92 K/UL (ref 0–0.44)
ABSOLUTE IMMATURE GRANULOCYTE: 0.03 K/UL (ref 0–0.3)
ABSOLUTE LYMPH #: 2.45 K/UL (ref 1.1–3.7)
ABSOLUTE MONO #: 0.53 K/UL (ref 0.1–1.2)
ALBUMIN SERPL-MCNC: 4.6 G/DL (ref 3.5–5.2)
ALBUMIN/GLOBULIN RATIO: 1.7 (ref 1–2.5)
ALP BLD-CCNC: 73 U/L (ref 35–104)
ALT SERPL-CCNC: 8 U/L (ref 5–33)
ANION GAP SERPL CALCULATED.3IONS-SCNC: 12 MMOL/L (ref 9–17)
AST SERPL-CCNC: 15 U/L
BASOPHILS # BLD: 1 % (ref 0–2)
BASOPHILS ABSOLUTE: 0.1 K/UL (ref 0–0.2)
BILIRUB SERPL-MCNC: 0.14 MG/DL (ref 0.3–1.2)
BUN BLDV-MCNC: 9 MG/DL (ref 6–20)
BUN/CREAT BLD: 9 (ref 9–20)
CALCIUM SERPL-MCNC: 10 MG/DL (ref 8.6–10.4)
CHLORIDE BLD-SCNC: 101 MMOL/L (ref 98–107)
CO2: 27 MMOL/L (ref 20–31)
CREAT SERPL-MCNC: 0.95 MG/DL (ref 0.5–0.9)
DIFFERENTIAL TYPE: ABNORMAL
EOSINOPHILS RELATIVE PERCENT: 12 % (ref 1–4)
GFR AFRICAN AMERICAN: >60 ML/MIN
GFR NON-AFRICAN AMERICAN: >60 ML/MIN
GFR SERPL CREATININE-BSD FRML MDRD: ABNORMAL ML/MIN/{1.73_M2}
GFR SERPL CREATININE-BSD FRML MDRD: ABNORMAL ML/MIN/{1.73_M2}
GLUCOSE BLD-MCNC: 99 MG/DL (ref 70–99)
HCT VFR BLD CALC: 35.5 % (ref 36.3–47.1)
HEMOGLOBIN: 11.1 G/DL (ref 11.9–15.1)
IMMATURE GRANULOCYTES: 0 %
LYMPHOCYTES # BLD: 31 % (ref 24–43)
MCH RBC QN AUTO: 28.2 PG (ref 25.2–33.5)
MCHC RBC AUTO-ENTMCNC: 31.3 G/DL (ref 25.2–33.5)
MCV RBC AUTO: 90.3 FL (ref 82.6–102.9)
MONOCYTES # BLD: 7 % (ref 3–12)
NRBC AUTOMATED: 0 PER 100 WBC
PDW BLD-RTO: 13 % (ref 11.8–14.4)
PLATELET # BLD: 313 K/UL (ref 138–453)
PLATELET ESTIMATE: ABNORMAL
PMV BLD AUTO: 8.3 FL (ref 8.1–13.5)
POTASSIUM SERPL-SCNC: 4.2 MMOL/L (ref 3.7–5.3)
RBC # BLD: 3.93 M/UL (ref 3.95–5.11)
RBC # BLD: ABNORMAL 10*6/UL
SEG NEUTROPHILS: 49 % (ref 36–65)
SEGMENTED NEUTROPHILS ABSOLUTE COUNT: 3.89 K/UL (ref 1.5–8.1)
SODIUM BLD-SCNC: 140 MMOL/L (ref 135–144)
TOTAL PROTEIN: 7.3 G/DL (ref 6.4–8.3)
WBC # BLD: 7.9 K/UL (ref 3.5–11.3)
WBC # BLD: ABNORMAL 10*3/UL

## 2020-11-19 PROCEDURE — 36415 COLL VENOUS BLD VENIPUNCTURE: CPT

## 2020-11-19 PROCEDURE — 99214 OFFICE O/P EST MOD 30 MIN: CPT

## 2020-11-19 PROCEDURE — 99024 POSTOP FOLLOW-UP VISIT: CPT | Performed by: SURGERY

## 2020-11-19 PROCEDURE — 80053 COMPREHEN METABOLIC PANEL: CPT

## 2020-11-19 PROCEDURE — 85025 COMPLETE CBC W/AUTO DIFF WBC: CPT

## 2020-11-19 RX ORDER — HYDROCODONE BITARTRATE AND ACETAMINOPHEN 5; 325 MG/1; MG/1
1 TABLET ORAL EVERY 6 HOURS PRN
Qty: 28 TABLET | Refills: 0 | Status: SHIPPED | OUTPATIENT
Start: 2020-11-19 | End: 2020-11-26

## 2020-11-19 NOTE — PROGRESS NOTES
Patient is here for postop visit from a component separation repair. She had several pieces of mesh removed she was high risk for seroma and and complications. She was a smoker. She is doing quite well. But she is still having some pain and tightness. The CT that she had on Friday when she came to the ER shows a very large seroma but it does not appear infected and is not leaking. On physical exam her vitals are stable she is afebrile. The abdomen is soft good bowel sounds and there is clearly a seroma under the incision but is not real tight there certainly no redness to it no evidence of any leakage. At this point I would continue with conservative treatment. We will just continue to watch her postop. She clearly has a tight abdomen and I can understand some of the pain from the surgery with the mesh and the seroma. I will give her 1 more prescription of pain meds. She only got 10 leaving the hospital.  I think it is a reasonable request.  We will also try to have her alternate them with Motrin. We have discussed that we can only write a limited amount and if we need to continue I really encourage her to see pain management or we will require it.   I will see her back in 2 weeks

## 2020-11-19 NOTE — PATIENT INSTRUCTIONS
Follow up with Dr. Ronnell Arteaga on 12/3/2020 at 3:30 PM.    Monitor for any fevers, increase in pain, or redness. Contact our office if you have any questions or concerns.

## 2020-11-25 ENCOUNTER — CARE COORDINATION (OUTPATIENT)
Dept: CARE COORDINATION | Age: 55
End: 2020-11-25

## 2020-11-25 NOTE — CARE COORDINATION
Ambulatory Care Coordination Note  11/25/2020  CM Risk Score: 5  Charlson 10 Year Mortality Risk Score: 4%     ACC: Joe Telles, RN    Summary Note:  Mirian Juarez was referred for [facility] from report.      She follows with [facility]       She is S/P ventral hernia repair     Plan of Care :       Continue assessments, education and support                                               Continue support, assessments and education                                               COPD Zone tool                                               SDOH completed                                               Medications reviewed                                               Updated Healthcare decision maker.  Discussed ACP- she is aware packets for completion at clinic, assistance available if needed, and copy needs placed in EMR.                                              [facility] clinic, urgent care and My Chart                                               EVLXMADA Covid education- she is limiting exposure, wearing mask etc.                                                Review Care Gaps                                               JZOO Team Updated                                               YICAJFKC nutrition     11/25/2020- 2:32 pm Left message requesting return call. Care Coordination Interventions    Program Enrollment:  Rising Risk  Referral from Primary Care Provider:  No  Suggested Interventions and Community Resources  Behavorial Health:  Completed (Comment: [facility], 12 step AA)  Smoking Cessation:  Declined  Zone Management Tools: In Process (Comment: COPD)  Other Services or Interventions:  Resides at [address] [place] [address]    None         Prior to Admission medications    Medication Sig Start Date End Date Taking? Authorizing Provider   HYDROcodone-acetaminophen (NORCO) 5-325 MG per tablet Take 1 tablet by mouth every 6 hours as needed for Pain for up to 7 days.  Intended supply: 7 days.  Take lowest dose possible to manage pain 11/19/20 11/26/20  Ivania Black MD   nicotine (NICODERM CQ) 21 MG/24HR Place 1 patch onto the skin daily  Patient not taking: Reported on 11/3/2020 10/27/20   Katherine Douglas MD   ondansetron [facility]) 4 MG tablet Take 1 tablet by mouth every 8 hours as needed for Nausea or Vomiting 10/14/20   Lexa Jang,    Cholecalciferol (VITAMIN D) 50 MCG (2000 UT) CAPS capsule Take 2,000 Units by mouth daily    Historical Provider, MD   Multiple Vitamins-Minerals (MULTIVITAMIN ADULT PO) Take 1 tablet by mouth daily    Historical Provider, MD   lidocaine (LIDODERM) 5 % Place 1 patch onto the skin daily 12 hours on, 12 hours off. 6/30/20   Vel Heaton,    atorvastatin (LIPITOR) 10 MG tablet Take 1 tablet by mouth daily 6/10/20   Rick Paradise Black, DO   propranolol (INDERAL) 10 MG tablet Take 10 mg by mouth 3 times daily Indications: patient takes twice a day     Historical Provider, MD   hydrOXYzine (ATARAX) 25 MG tablet Take 25-30 mg by mouth every 6 hours as needed for Anxiety    Historical Provider, MD   lisinopril (PRINIVIL;ZESTRIL) 40 MG tablet Take 1 tablet by mouth daily 5/18/20   Rick Paradise Black, DO   amLODIPine (NORVASC) 10 MG tablet Take 1 tablet by mouth daily 5/18/20   [place] Black, DO   loratadine (CLARITIN) 10 MG capsule Take 1 capsule by mouth daily 5/18/20   [place] Black, DO   albuterol sulfate  (90 Base) MCG/ACT inhaler Inhale 1-2 puffs into the lungs every 6 hours as needed for Wheezing or Shortness of Breath 5/18/20   Irck Nithya Black, DO   omeprazole (PRILOSEC) 10 MG delayed release capsule Take 2 capsules by mouth 2 times daily 5/18/20   Rick Nithya Black, DO   fluticasone [facility]) 50 MCG/ACT nasal spray 2 sprays by Nasal route daily 4/20/20   Dev Flores,    DULoxetine (CYMBALTA) 30 MG extended release capsule Take 30 mg by mouth daily    Historical Provider, MD   DULoxetine (CYMBALTA) 60 MG extended release capsule Take 60 mg by mouth daily    Historical Provider, MD   lamoTRIgine (LAMICTAL) 200 MG tablet Take 200 mg by mouth daily     Historical Provider, MD   levothyroxine (SYNTHROID) 100 MCG tablet Take 100 mcg by mouth Daily    Historical Provider, MD       Future Appointments   Date Time Provider Rupesh Xenia   12/3/2020  3:30 PM Damaris Khan MD [address] [place] [address]   3/26/2021  1:20 PM Jericho Lugo DO [facility]

## 2020-11-29 NOTE — DISCHARGE SUMMARY
Hospitalist Discharge Summary    Loc Bates  :  1965  MRN:  [id number]    Admit date:  10/21/2020  Discharge date:  10/27/2020    Admitting Physician:  Aubrie Mcpherson MD    Discharge Diagnoses:   Patient Active Problem List   Diagnosis    Severe episode of recurrent major depressive disorder, without psychotic features ([facility] [address]    Bipolar 2 disorder ([facility] [address]    Bipolar II disorder ([facility] [address]    Postoperative pain    Status post repair of recurrent ventral hernia        Admission Condition:  fair      Discharged Condition:  good    Hospital Course/Treatments   Admitted with h/o of repair of ventral hernia repair, post op was uneventful. Pt d/c with following meds    Discharge Medications:        Trever Szymanski   [place] Medication Instructions [id number]    Printed on:20   Medication Information                      albuterol sulfate  (90 Base) MCG/ACT inhaler  Inhale 1-2 puffs into the lungs every 6 hours as needed for Wheezing or Shortness of Breath             amLODIPine (NORVASC) 10 MG tablet  Take 1 tablet by mouth daily             atorvastatin (LIPITOR) 10 MG tablet  Take 1 tablet by mouth daily             Cholecalciferol (VITAMIN D) 50 MCG ( UT) CAPS capsule  Take 2,000 Units by mouth daily             DULoxetine (CYMBALTA) 30 MG extended release capsule  Take 30 mg by mouth daily             DULoxetine (CYMBALTA) 60 MG extended release capsule  Take 60 mg by mouth daily             fluticasone (FLONASE) 50 MCG/ACT nasal spray  2 sprays by Nasal route daily             hydrOXYzine (ATARAX) 25 MG tablet  Take 25-30 mg by mouth every 6 hours as needed for Anxiety             lamoTRIgine (LAMICTAL) 200 MG tablet  Take 200 mg by mouth daily              levothyroxine (SYNTHROID) 100 MCG tablet  Take 100 mcg by mouth Daily             lidocaine (LIDODERM) 5 %  Place 1 patch onto the skin daily 12 hours on, 12 hours off.             lisinopril (PRINIVIL;ZESTRIL) 40 MG tablet  Take 1 tablet by mouth daily             loratadine (CLARITIN) 10 MG capsule  Take 1 capsule by mouth daily             Multiple Vitamins-Minerals (MULTIVITAMIN ADULT PO)  Take 1 tablet by mouth daily             nicotine (NICODERM CQ) 21 MG/24HR  Place 1 patch onto the skin daily             omeprazole (PRILOSEC) 10 MG delayed release capsule  Take 2 capsules by mouth 2 times daily             ondansetron (ZOFRAN) 4 MG tablet  Take 1 tablet by mouth every 8 hours as needed for Nausea or Vomiting             propranolol (INDERAL) 10 MG tablet  Take 10 mg by mouth 3 times daily Indications: patient takes twice a day                  Consults:  IP CONSULT TO HOSPITALIST    Significant Diagnostic Studies:  No results found. Disposition:   home    Discharge Instructions: Activity: activity as tolerated  Diet:  regular diet    Follow up with Jerilyn Mi DO in 1 weeks.     Signed:  Wali Bach  11/28/2020, 7:43 PM    Time spent in discharge of this pt is more than 30 minutes in examination,evaluvation,  counseling and review of medication and discharge plan

## 2020-12-08 ENCOUNTER — TELEMEDICINE (OUTPATIENT)
Dept: SURGERY | Age: 55
End: 2020-12-08
Payer: MEDICARE

## 2020-12-08 ENCOUNTER — HOSPITAL ENCOUNTER (EMERGENCY)
Age: 55
Discharge: HOME | End: 2020-12-09
Attending: SPECIALIST
Payer: MEDICARE

## 2020-12-08 PROCEDURE — 96376 TX/PRO/DX INJ SAME DRUG ADON: CPT

## 2020-12-08 PROCEDURE — 99024 POSTOP FOLLOW-UP VISIT: CPT | Performed by: SURGERY

## 2020-12-08 PROCEDURE — 96375 TX/PRO/DX INJ NEW DRUG ADDON: CPT

## 2020-12-08 PROCEDURE — 99285 EMERGENCY DEPT VISIT HI MDM: CPT

## 2020-12-08 PROCEDURE — 96374 THER/PROPH/DIAG INJ IV PUSH: CPT

## 2020-12-08 ASSESSMENT — PAIN DESCRIPTION - ORIENTATION: ORIENTATION: RIGHT

## 2020-12-08 ASSESSMENT — PAIN DESCRIPTION - LOCATION: LOCATION: ABDOMEN

## 2020-12-08 ASSESSMENT — PAIN SCALES - GENERAL: PAINLEVEL_OUTOF10: 7

## 2020-12-08 NOTE — PROGRESS NOTES
The patient visit was done over the GetThis video. We just want to check in with her. She had a large component separation hernia repair with removal of an old mesh. She was doing pretty well she did have a seroma that was bothering her a little bit on her last visit but overall was getting better. Today she states her pain is much better she is not taking pain pills anymore. She is eating well not having any nausea or vomiting bowels are working nicely. She does have a little swelling over the incision or under it which is what she had when she was in her visit visit here postoperatively. It is a seroma and it should get smaller every week and probably go away by the second end of the second month. We will see her in first week of January and decide if back to work is reasonable at that time.

## 2020-12-09 ENCOUNTER — TELEPHONE (OUTPATIENT)
Dept: SURGERY | Age: 55
End: 2020-12-09

## 2020-12-09 ENCOUNTER — APPOINTMENT (OUTPATIENT)
Dept: CT IMAGING | Age: 55
End: 2020-12-09
Payer: MEDICARE

## 2020-12-09 ENCOUNTER — CARE COORDINATION (OUTPATIENT)
Dept: CARE COORDINATION | Age: 55
End: 2020-12-09

## 2020-12-09 VITALS
BODY MASS INDEX: 28.32 KG/M2 | HEART RATE: 90 BPM | OXYGEN SATURATION: 97 % | RESPIRATION RATE: 16 BRPM | HEIGHT: 61 IN | DIASTOLIC BLOOD PRESSURE: 78 MMHG | WEIGHT: 150 LBS | SYSTOLIC BLOOD PRESSURE: 122 MMHG | TEMPERATURE: 98.4 F

## 2020-12-09 LAB
-: NORMAL
ABSOLUTE EOS #: 0.53 K/UL (ref 0–0.44)
ABSOLUTE IMMATURE GRANULOCYTE: <0.03 K/UL (ref 0–0.3)
ABSOLUTE LYMPH #: 3.1 K/UL (ref 1.1–3.7)
ABSOLUTE MONO #: 0.7 K/UL (ref 0.1–1.2)
ALBUMIN SERPL-MCNC: 4.7 G/DL (ref 3.5–5.2)
ALBUMIN/GLOBULIN RATIO: 1.7 (ref 1–2.5)
ALP BLD-CCNC: 79 U/L (ref 35–104)
ALT SERPL-CCNC: 8 U/L (ref 5–33)
AMORPHOUS: NORMAL
AMYLASE: 48 U/L (ref 28–100)
ANION GAP SERPL CALCULATED.3IONS-SCNC: 13 MMOL/L (ref 9–17)
AST SERPL-CCNC: 15 U/L
BACTERIA: NORMAL
BASOPHILS # BLD: 1 % (ref 0–2)
BASOPHILS ABSOLUTE: 0.1 K/UL (ref 0–0.2)
BILIRUB SERPL-MCNC: 0.14 MG/DL (ref 0.3–1.2)
BILIRUBIN DIRECT: <0.08 MG/DL
BILIRUBIN URINE: NEGATIVE
BILIRUBIN, INDIRECT: ABNORMAL MG/DL (ref 0–1)
BUN BLDV-MCNC: 14 MG/DL (ref 6–20)
BUN/CREAT BLD: 13 (ref 9–20)
CALCIUM SERPL-MCNC: 10.1 MG/DL (ref 8.6–10.4)
CASTS UA: NORMAL /LPF (ref 0–2)
CHLORIDE BLD-SCNC: 104 MMOL/L (ref 98–107)
CO2: 26 MMOL/L (ref 20–31)
COLOR: NORMAL
COMMENT UA: NORMAL
CREAT SERPL-MCNC: 1.11 MG/DL (ref 0.5–0.9)
CRYSTALS, UA: NORMAL /HPF
DIFFERENTIAL TYPE: ABNORMAL
EOSINOPHILS RELATIVE PERCENT: 5 % (ref 1–4)
EPITHELIAL CELLS UA: NORMAL /HPF (ref 0–5)
GFR AFRICAN AMERICAN: >60 ML/MIN
GFR NON-AFRICAN AMERICAN: 51 ML/MIN
GFR SERPL CREATININE-BSD FRML MDRD: ABNORMAL ML/MIN/{1.73_M2}
GFR SERPL CREATININE-BSD FRML MDRD: ABNORMAL ML/MIN/{1.73_M2}
GLOBULIN: 2.8 G/DL (ref 1.5–3.8)
GLUCOSE BLD-MCNC: 123 MG/DL (ref 70–99)
GLUCOSE URINE: NEGATIVE
HCT VFR BLD CALC: 34.6 % (ref 36.3–47.1)
HEMOGLOBIN: 11 G/DL (ref 11.9–15.1)
IMMATURE GRANULOCYTES: 0 %
KETONES, URINE: NEGATIVE
LACTIC ACID: 1.1 MMOL/L (ref 0.5–2.2)
LEUKOCYTE ESTERASE, URINE: NEGATIVE
LIPASE: 38 U/L (ref 13–60)
LYMPHOCYTES # BLD: 31 % (ref 24–43)
MAGNESIUM: 1.7 MG/DL (ref 1.6–2.6)
MCH RBC QN AUTO: 27.4 PG (ref 25.2–33.5)
MCHC RBC AUTO-ENTMCNC: 31.8 G/DL (ref 25.2–33.5)
MCV RBC AUTO: 86.3 FL (ref 82.6–102.9)
MONOCYTES # BLD: 7 % (ref 3–12)
MUCUS: NORMAL
NITRITE, URINE: NEGATIVE
NRBC AUTOMATED: 0 PER 100 WBC
OTHER OBSERVATIONS UA: NORMAL
PDW BLD-RTO: 13 % (ref 11.8–14.4)
PH UA: 5.5 (ref 5–6)
PLATELET # BLD: 366 K/UL (ref 138–453)
PLATELET ESTIMATE: ABNORMAL
PMV BLD AUTO: 8.7 FL (ref 8.1–13.5)
POTASSIUM SERPL-SCNC: 3.2 MMOL/L (ref 3.7–5.3)
PROTEIN UA: NEGATIVE
RBC # BLD: 4.01 M/UL (ref 3.95–5.11)
RBC # BLD: ABNORMAL 10*6/UL
RBC UA: NORMAL /HPF (ref 0–4)
RENAL EPITHELIAL, UA: NORMAL /HPF
SEG NEUTROPHILS: 56 % (ref 36–65)
SEGMENTED NEUTROPHILS ABSOLUTE COUNT: 5.46 K/UL (ref 1.5–8.1)
SODIUM BLD-SCNC: 143 MMOL/L (ref 135–144)
SPECIFIC GRAVITY UA: 1.01 (ref 1.01–1.02)
TOTAL PROTEIN: 7.5 G/DL (ref 6.4–8.3)
TRICHOMONAS: NORMAL
TROPONIN INTERP: ABNORMAL
TROPONIN T: ABNORMAL NG/ML
TROPONIN, HIGH SENSITIVITY: 16 NG/L (ref 0–14)
TURBIDITY: NORMAL
URINE HGB: NEGATIVE
UROBILINOGEN, URINE: NORMAL
WBC # BLD: 9.9 K/UL (ref 3.5–11.3)
WBC # BLD: ABNORMAL 10*3/UL
WBC UA: NORMAL /HPF (ref 0–4)
YEAST: NORMAL

## 2020-12-09 PROCEDURE — 80048 BASIC METABOLIC PNL TOTAL CA: CPT

## 2020-12-09 PROCEDURE — 83735 ASSAY OF MAGNESIUM: CPT

## 2020-12-09 PROCEDURE — 2580000003 HC RX 258: Performed by: SPECIALIST

## 2020-12-09 PROCEDURE — 82150 ASSAY OF AMYLASE: CPT

## 2020-12-09 PROCEDURE — 96376 TX/PRO/DX INJ SAME DRUG ADON: CPT

## 2020-12-09 PROCEDURE — 83605 ASSAY OF LACTIC ACID: CPT

## 2020-12-09 PROCEDURE — 80076 HEPATIC FUNCTION PANEL: CPT

## 2020-12-09 PROCEDURE — 83690 ASSAY OF LIPASE: CPT

## 2020-12-09 PROCEDURE — 84484 ASSAY OF TROPONIN QUANT: CPT

## 2020-12-09 PROCEDURE — 96374 THER/PROPH/DIAG INJ IV PUSH: CPT

## 2020-12-09 PROCEDURE — 6360000004 HC RX CONTRAST MEDICATION: Performed by: SPECIALIST

## 2020-12-09 PROCEDURE — 85025 COMPLETE CBC W/AUTO DIFF WBC: CPT

## 2020-12-09 PROCEDURE — 96375 TX/PRO/DX INJ NEW DRUG ADDON: CPT

## 2020-12-09 PROCEDURE — 81001 URINALYSIS AUTO W/SCOPE: CPT

## 2020-12-09 PROCEDURE — 6370000000 HC RX 637 (ALT 250 FOR IP): Performed by: SPECIALIST

## 2020-12-09 PROCEDURE — 6360000002 HC RX W HCPCS: Performed by: SPECIALIST

## 2020-12-09 PROCEDURE — 74177 CT ABD & PELVIS W/CONTRAST: CPT

## 2020-12-09 RX ORDER — HYDROCODONE BITARTRATE AND ACETAMINOPHEN 5; 325 MG/1; MG/1
1 TABLET ORAL EVERY 6 HOURS PRN
Qty: 10 TABLET | Refills: 0 | Status: SHIPPED | OUTPATIENT
Start: 2020-12-09 | End: 2020-12-12

## 2020-12-09 RX ORDER — 0.9 % SODIUM CHLORIDE 0.9 %
500 INTRAVENOUS SOLUTION INTRAVENOUS ONCE
Status: COMPLETED | OUTPATIENT
Start: 2020-12-09 | End: 2020-12-09

## 2020-12-09 RX ORDER — ONDANSETRON 2 MG/ML
4 INJECTION INTRAMUSCULAR; INTRAVENOUS ONCE
Status: COMPLETED | OUTPATIENT
Start: 2020-12-09 | End: 2020-12-09

## 2020-12-09 RX ORDER — SODIUM CHLORIDE 9 MG/ML
1000 INJECTION, SOLUTION INTRAVENOUS CONTINUOUS
Status: DISCONTINUED | OUTPATIENT
Start: 2020-12-09 | End: 2020-12-09 | Stop reason: HOSPADM

## 2020-12-09 RX ORDER — AMOXICILLIN AND CLAVULANATE POTASSIUM 875; 125 MG/1; MG/1
1 TABLET, FILM COATED ORAL 2 TIMES DAILY
Qty: 20 TABLET | Refills: 0 | Status: SHIPPED | OUTPATIENT
Start: 2020-12-09 | End: 2020-12-18

## 2020-12-09 RX ORDER — AMOXICILLIN AND CLAVULANATE POTASSIUM 875; 125 MG/1; MG/1
1 TABLET, FILM COATED ORAL ONCE
Status: COMPLETED | OUTPATIENT
Start: 2020-12-09 | End: 2020-12-09

## 2020-12-09 RX ORDER — ONDANSETRON 4 MG/1
4 TABLET, ORALLY DISINTEGRATING ORAL EVERY 8 HOURS PRN
Qty: 12 TABLET | Refills: 0 | Status: ON HOLD | OUTPATIENT
Start: 2020-12-09 | End: 2021-05-11

## 2020-12-09 RX ORDER — POTASSIUM CHLORIDE 20 MEQ/1
20 TABLET, EXTENDED RELEASE ORAL ONCE
Status: COMPLETED | OUTPATIENT
Start: 2020-12-09 | End: 2020-12-09

## 2020-12-09 RX ADMIN — HYDROMORPHONE HYDROCHLORIDE 1 MG: 1 INJECTION, SOLUTION INTRAMUSCULAR; INTRAVENOUS; SUBCUTANEOUS at 02:54

## 2020-12-09 RX ADMIN — SODIUM CHLORIDE 500 ML: 9 INJECTION, SOLUTION INTRAVENOUS at 00:35

## 2020-12-09 RX ADMIN — HYDROMORPHONE HYDROCHLORIDE 1 MG: 1 INJECTION, SOLUTION INTRAMUSCULAR; INTRAVENOUS; SUBCUTANEOUS at 00:34

## 2020-12-09 RX ADMIN — SODIUM CHLORIDE 1000 ML: 9 INJECTION, SOLUTION INTRAVENOUS at 02:08

## 2020-12-09 RX ADMIN — AMOXICILLIN AND CLAVULANATE POTASSIUM 1 TABLET: 875; 125 TABLET, FILM COATED ORAL at 02:54

## 2020-12-09 RX ADMIN — ONDANSETRON 4 MG: 2 INJECTION INTRAMUSCULAR; INTRAVENOUS at 00:34

## 2020-12-09 RX ADMIN — IOPAMIDOL 100 ML: 755 INJECTION, SOLUTION INTRAVENOUS at 01:26

## 2020-12-09 RX ADMIN — POTASSIUM CHLORIDE 20 MEQ: 20 TABLET, EXTENDED RELEASE ORAL at 03:07

## 2020-12-09 ASSESSMENT — ENCOUNTER SYMPTOMS
SORE THROAT: 0
ABDOMINAL PAIN: 1
COUGH: 0
NAUSEA: 1
SHORTNESS OF BREATH: 0
BACK PAIN: 0

## 2020-12-09 ASSESSMENT — PAIN DESCRIPTION - PROGRESSION: CLINICAL_PROGRESSION: GRADUALLY WORSENING

## 2020-12-09 ASSESSMENT — PAIN SCALES - GENERAL
PAINLEVEL_OUTOF10: 6
PAINLEVEL_OUTOF10: 4

## 2020-12-09 NOTE — ED NOTES
Amb to ED 4 with steady gait per self. Reports hernia surgery in Oct, had f/u call with Dr. Baron Baker today and felt fine at that time. Pain started having pain this evening, took tylenol at 1900 and 600mg ibu at 2100. Slightly nauseous. Has a known seroma in abd, states that is unchanged, if anything a little smaller but also appearing slightly swollen on right side, describes a \"dip\" in abd on the right side and that is where pain is. Last BM today and WNL, denies any diarrhea/constipation, or urinary s/s.       Anabel Holly RN  12/09/20 [id number]

## 2020-12-09 NOTE — TELEPHONE ENCOUNTER
Patient called the office this morning stating she went to the ER last night due to \"unbearable abdominal pain\". The ER got a CT ABD/ PELVIS that showed a possible infected collection. ER gave the patient Augmentin, Norco, and Zofran. They asked her to follow up with Dr. Mary Grace Pérez office in the morning to see what she should do for a follow up, spoke with his nurse and advised to put encounter in as he is in the OR. Patient states she is doing well now and her pain is maybe a 4/10. Writer advised patient that if her pain comes back to go to the ER and we will give her a call back once Dr. Kym Leslie advises what he would like her to do for a follow up.

## 2020-12-09 NOTE — CARE COORDINATION
Murray Almaguer was seen at [facility] ER- 12/8/2020. Per chart review- she has started antiv=biotics and is feeling better this am. She has message into surgeon. 12/9/2020- 1:30 pm Left message requesting return call. Ambulatory Care Coordination Note  12/9/2020  CM Risk Score: 5  Charlson 10 Year Mortality Risk Score: 4%     ACC: Ramírez Golden, RN    Summary Note: Nanci Allison was referred for [facility] from report.      She follows with [facility]       She is S/P ventral hernia repair    [facility] ER 12/8/2020     Plan of Care :       Continue assessments, education and support                                               Continue support, assessments and education                                               COPD Zone tool                                               SDOH completed                                               Medications reviewed                                               Updated Healthcare decision maker.  Discussed ACP- she is aware packets for completion at clinic, assistance available if needed, and copy needs placed in EMR.                                              ETLPTV clinic, urgent care and My Chart                                               NGWELNDM Covid education- she is limiting exposure, wearing mask etc.                                                Review Care Gaps                                               EMYV Team Updated                                               QVXFCEUP nutrition      11/25/2020- 2:32 pm Left message requesting return call. 12/9/2020- 1:31 pm Left message requesting return call.            Care Coordination Interventions    Program Enrollment:  Rising Risk  Referral from Primary Care Provider:  No  Suggested Interventions and Community Resources  Behavorial Health:  Completed (Comment: [facility], 12 step AA)  Smoking Cessation:  Declined  Zone Management Tools:   In Process (Comment: COPD)  Other Services or Interventions:  Resides at [employer] [facility]         Goals Addressed    None         Prior to Admission medications    Medication Sig Start Date End Date Taking? Authorizing Provider   amoxicillin-clavulanate (AUGMENTIN) 875-125 MG per tablet Take 1 tablet by mouth 2 times daily for 10 days 12/9/20 12/19/20  Indira Watt MD   HYDROcodone-acetaminophen (NORCO) 5-325 MG per tablet Take 1 tablet by mouth every 6 hours as needed for Pain for up to 3 days.  12/9/20 12/12/20  Indira Watt MD   ondansetron (ZOFRAN ODT) 4 MG disintegrating tablet Take 1 tablet by mouth every 8 hours as needed for Nausea 12/9/20   Indira Watt MD   nicotine (NICODERM CQ) 21 MG/24HR Place 1 patch onto the skin daily  Patient not taking: Reported on 11/3/2020 10/27/20   Frandy Gutierrez MD   ondansetron [facility]) 4 MG tablet Take 1 tablet by mouth every 8 hours as needed for Nausea or Vomiting 10/14/20   John Justice,    Cholecalciferol (VITAMIN D) 50 MCG (2000 UT) CAPS capsule Take 2,000 Units by mouth daily    Historical Provider, MD   Multiple Vitamins-Minerals (MULTIVITAMIN ADULT PO) Take 1 tablet by mouth daily    Historical Provider, MD   lidocaine (LIDODERM) 5 % Place 1 patch onto the skin daily 12 hours on, 12 hours off. 6/30/20   Des Bazan,    atorvastatin (LIPITOR) 10 MG tablet Take 1 tablet by mouth daily 6/10/20   Karoline Chris, DO   propranolol (INDERAL) 10 MG tablet Take 10 mg by mouth 3 times daily Indications: patient takes twice a day     Historical Provider, MD   hydrOXYzine (ATARAX) 25 MG tablet Take 25-30 mg by mouth every 6 hours as needed for Anxiety    Historical Provider, MD   lisinopril (PRINIVIL;ZESTRIL) 40 MG tablet Take 1 tablet by mouth daily 5/18/20   Karoline Chris, DO   amLODIPine (NORVASC) 10 MG tablet Take 1 tablet by mouth daily 5/18/20   Karoline Heart Black, DO   loratadine (CLARITIN) 10 MG capsule Take 1 capsule by mouth daily 5/18/20   Karoline Chris, DO   albuterol sulfate  (90 Base) MCG/ACT inhaler Inhale 1-2 puffs into the lungs every 6 hours as needed for Wheezing or Shortness of Breath 5/18/20   Mumtaz Chris,    omeprazole (PRILOSEC) 10 MG delayed release capsule Take 2 capsules by mouth 2 times daily 5/18/20   Mumtaz Chris DO   fluticasone [facility]) 50 MCG/ACT nasal spray 2 sprays by Nasal route daily 4/20/20   Marivel Dominguez DO   DULoxetine (CYMBALTA) 30 MG extended release capsule Take 30 mg by mouth daily    Historical Provider, MD   DULoxetine (CYMBALTA) 60 MG extended release capsule Take 60 mg by mouth daily    Historical Provider, MD   lamoTRIgine (LAMICTAL) 200 MG tablet Take 200 mg by mouth daily     Historical Provider, MD   levothyroxine (SYNTHROID) 100 MCG tablet Take 100 mcg by mouth Daily    Historical Provider, MD       Future Appointments   Date Time Provider Rupesh Michaels   1/5/2021  1:30 PM MD SANJAY Elena [facility]   3/26/2021  1:20 PM DO ARIC Posada [facility]

## 2020-12-09 NOTE — TELEPHONE ENCOUNTER
OK could have pulled the mesh and gotten some severe pain from the muscle. She has had a seroma which is expected from her surgery. It looks smaller on the CT scan there is no sign of any infection. Her fever white blood cell count or any abscess sign on the CT. At this point I would not drain until more evidence shows of an infection.   But otherwise I will see her as she needs to and at this point if she is doing better we will just continue to observe

## 2020-12-09 NOTE — ED NOTES
Answered call light, states \"I need some more pain medicine. I can feel it coming back. It helped but its coming back\". Dr. Radha Wyman notified.      Oralia Salinas RN  12/09/20 [id number]

## 2020-12-09 NOTE — ED PROVIDER NOTES
[address]      Pt Name: Tahira Muñoz  MRN: [id number]  [place] 1965  Date of evaluation: 12/8/2020      CHIEF COMPLAINT       Chief Complaint   Patient presents with    Abdominal Pain         HISTORY OF PRESENT ILLNESS    Tahira Muñoz is a 48 y.o. female who presents to the emergency department for evaluation of mid and right upper quadrant abdominal pain off and on for last 2 weeks, worse since 6 PM last night. Patient describes pain as burning and stabbing in nature 7 out of 10 in intensity associated with nausea. She denies any vomiting, constipation or diarrhea and denies any fever or chills. Her appetite has been normal and she denies any urinary frequency, urgency, dysuria or hematuria. She denies any melena or hematochezia. She denies any radiation of pain to the chest or the back and she denies any lightheadedness, dizziness, palpitations or diaphoresis. Patient took Tylenol 1000 mg at 6 PM and Motrin 600 mg at 9 PM with slight relief. Her previous abdominal surgeries include appendectomy, cholecystectomy, total abdominal hysterectomy, ventral hernia repair x4, multiple laparoscopies 5 times and laparotomy for ectopic pregnancy once. Her last ventral hernia repair was on October 21, 2020 and patient has developed postoperative seroma which appears to be decreasing in size as per the patient. She saw her surgeon Dr. Evita Abraham with virtual appointment at 2:30 PM yesterday afternoon regarding postoperative follow-up appointments. Her next appointment is on January 5, 2021. REVIEW OF SYSTEMS       Review of Systems   Constitutional: Negative for chills, diaphoresis and fever. HENT: Negative for ear pain and sore throat. Respiratory: Negative for cough and shortness of breath. Gastrointestinal: Positive for abdominal pain and nausea. Genitourinary: Negative for dysuria and frequency.    Musculoskeletal: Negative for back pain, gait problem and neck pain. Neurological: Negative for dizziness and light-headedness. All other systems reviewed and are negative. PAST MEDICAL HISTORY    has a past medical history of Anxiety, Breast cancer ([facility] [address] COPD (chronic obstructive pulmonary disease) ([facility] [address] Depression, Headache(784.0), History of alcoholism ([facility] [address] Hypertension, Hypothyroidism, Kidney stone, and PTSD (post-traumatic stress disorder). SURGICAL HISTORY      has a past surgical history that includes Eye surgery; tumor removal (1999); Appendectomy (1977); Cholecystectomy (2014); Tubal ligation (2001); lumbar laminectomy (2006); Dilation and curettage of uterus; Breast lumpectomy (Left, 2014); Breast biopsy (Right, 2015); alcon and bso (cervix removed) (2015); ventral hernia repair (2019); hernia repair (01/2020); fracture surgery (Left); hernia repair (N/A, 7/22/2020); and ventral hernia repair (N/A, 10/21/2020). CURRENT MEDICATIONS       Previous Medications    ALBUTEROL SULFATE  (90 BASE) MCG/ACT INHALER    Inhale 1-2 puffs into the lungs every 6 hours as needed for Wheezing or Shortness of Breath    AMLODIPINE (NORVASC) 10 MG TABLET    Take 1 tablet by mouth daily    ATORVASTATIN (LIPITOR) 10 MG TABLET    Take 1 tablet by mouth daily    CHOLECALCIFEROL (VITAMIN D) 50 MCG (2000 UT) CAPS CAPSULE    Take 2,000 Units by mouth daily    DULOXETINE (CYMBALTA) 30 MG EXTENDED RELEASE CAPSULE    Take 30 mg by mouth daily    DULOXETINE (CYMBALTA) 60 MG EXTENDED RELEASE CAPSULE    Take 60 mg by mouth daily    FLUTICASONE (FLONASE) 50 MCG/ACT NASAL SPRAY    2 sprays by Nasal route daily    HYDROXYZINE (ATARAX) 25 MG TABLET    Take 25-30 mg by mouth every 6 hours as needed for Anxiety    LAMOTRIGINE (LAMICTAL) 200 MG TABLET    Take 200 mg by mouth daily     LEVOTHYROXINE (SYNTHROID) 100 MCG TABLET    Take 100 mcg by mouth Daily    LIDOCAINE (LIDODERM) 5 %    Place 1 patch onto the skin daily 12 hours on, 12 hours off. LISINOPRIL (PRINIVIL;ZESTRIL) 40 MG TABLET    Take 1 tablet by mouth daily    LORATADINE (CLARITIN) 10 MG CAPSULE    Take 1 capsule by mouth daily    MULTIPLE VITAMINS-MINERALS (MULTIVITAMIN ADULT PO)    Take 1 tablet by mouth daily    NICOTINE (NICODERM CQ) 21 MG/24HR    Place 1 patch onto the skin daily    OMEPRAZOLE (PRILOSEC) 10 MG DELAYED RELEASE CAPSULE    Take 2 capsules by mouth 2 times daily    ONDANSETRON (ZOFRAN) 4 MG TABLET    Take 1 tablet by mouth every 8 hours as needed for Nausea or Vomiting    PROPRANOLOL (INDERAL) 10 MG TABLET    Take 10 mg by mouth 3 times daily Indications: patient takes twice a day        ALLERGIES     is allergic to flomax [tamsulosin hcl]; morphine; bactrim [sulfamethoxazole-trimethoprim]; prednisone; and zyprexa [olanzapine]. FAMILY HISTORY     She indicated that her mother is . She indicated that her father is . She indicated that her sister is alive. She indicated that her maternal grandmother is . She indicated that her maternal grandfather is . She indicated that her paternal grandmother is . She indicated that her paternal grandfather is . family history includes Atrial Fibrillation in her mother; Heart Attack in her maternal grandfather; Heart Failure in her mother; High Blood Pressure in her father and mother; Lupus in her mother; Other in her maternal grandmother; Prostate Cancer in her father; Stroke in her paternal grandfather. SOCIAL HISTORY      reports that she has been smoking cigarettes. She started smoking about 38 years ago. She has a 18.50 pack-year smoking history. She has never used smokeless tobacco. She reports previous drug use. She reports that she does not drink alcohol. PHYSICAL EXAM     INITIAL VITALS:  height is 5' 1\" (1.549 m) and weight is 150 lb (68 kg). Her tympanic temperature is 98.4 °F (36.9 °C). Her blood pressure is 112/85 and her pulse is 102.  Her respiration is 16 and oxygen saturation is 99%. Physical Exam  Vitals signs and nursing note reviewed. Constitutional:       Appearance: She is well-developed. HENT:      Head: Normocephalic and atraumatic. Nose: Nose normal.   Eyes:      Extraocular Movements: Extraocular movements intact. Pupils: Pupils are equal, round, and reactive to light. Neck:      Musculoskeletal: Normal range of motion and neck supple. Cardiovascular:      Rate and Rhythm: Normal rate and regular rhythm. Heart sounds: Normal heart sounds. No murmur. Pulmonary:      Effort: Pulmonary effort is normal. No respiratory distress. Breath sounds: Normal breath sounds. Abdominal:      General: Bowel sounds are normal. There is distension. There is no abdominal bruit. Palpations: Abdomen is soft. There is no pulsatile mass. Tenderness: There is abdominal tenderness in the right upper quadrant and periumbilical area. There is no right CVA tenderness, left CVA tenderness, guarding or rebound. Negative signs include Botello's sign and McBurney's sign. Skin:     General: Skin is warm and dry. Neurological:      General: No focal deficit present. Mental Status: She is alert and oriented to person, place, and time. DIFFERENTIAL DIAGNOSIS/ MDM:     Cholecystitis, appendicitis, pancreatitis,  urinary tract infection, renal stone, small bowel obstruction,diverticulitis, gastritis, enteritis, colitis.     DIAGNOSTIC RESULTS     EKG: All EKG's are interpreted by the Emergency Department Physician who either signs or Co-signs this chart in the absence of a cardiologist.    None obtained      RADIOLOGY:   I directly visualized the following  images and reviewed the radiologist interpretations:    Ct Abdomen Pelvis W Iv Contrast Additional Contrast? None    Result Date: 12/9/2020  EXAMINATION: CT OF THE ABDOMEN AND PELVIS WITH CONTRAST 12/9/2020 1:17 am TECHNIQUE: CT of the abdomen and pelvis was performed with the administration of intravenous contrast. Multiplanar reformatted images are provided for review. Dose modulation, iterative reconstruction, and/or weight based adjustment of the mA/kV was utilized to reduce the radiation dose to as low as reasonably achievable. COMPARISON: CT of the abdomen pelvis from 11/13/2020 HISTORY: ORDERING SYSTEM PROVIDED HISTORY: abdominal pain, nausea TECHNOLOGIST PROVIDED HISTORY: abdominal pain, nausea Reason for Exam: right sided pain with nausea, hx of hernia repair in Oct Acuity: Acute Type of Exam: Subsequent/Follow-up FINDINGS: Lower Chest: Visualized portions of the lower chest is within normal limits. Organs: Status post cholecystectomy. The liver, spleen, pancreas, and bilateral adrenal glands are within normal limits. There is mild atrophy of the left kidney. A 2 mm nonobstructive calculus is noted in the right renal pelvis. No hydronephrosis or hydroureter. GI/Bowel: The small and large bowel demonstrate normal caliber and appearance. Pelvis: The urinary bladder is nearly decompressed and is within normal limits. Status post hysterectomy. Peritoneum/Retroperitoneum: No free fluid or free gas in the abdomen or pelvis. Bones/Soft Tissues: Redemonstrated postsurgical changes related to ventral hernia repair. There is mild interval decrease in the size of the previously described deep subcutaneous fluid collection along the midline anterior abdominal wall. There is however interval development of thick wall and mild increased surrounding reticulation. Previously the collection measured 3.9 x 6.5 x 20.0 cm. The collection now measures 3.2 x 3.5 cm in the axial plane and 15 cm in the craniocaudal dimension, and may be concerning for an infected collection. The remainder of the soft tissues and skeletal structures are unremarkable. Redemonstrated postsurgical changes related to previous ventral hernia repair.   There is mild interval reduction in the size of the deep subcutaneous fluid collection along the midline anterior abdominal wall. There is also interval development of a thick wall and mild increased surrounding reticulation. This may be concerning for infected collection. Status post cholecystectomy. 2 mm nonobstructive calculus in the right renal pelvis. No hydronephrosis or hydroureter. ED BEDSIDE ULTRASOUND:       LABS:  Labs Reviewed   CBC WITH AUTO DIFFERENTIAL - Abnormal; Notable for the following components:       Result Value    Hemoglobin 11.0 (*)     Hematocrit 34.6 (*)     Eosinophils % 5 (*)     Absolute Eos # 0.53 (*)     All other components within normal limits   BASIC METABOLIC PANEL W/ REFLEX TO MG FOR LOW K - Abnormal; Notable for the following components:    Glucose 123 (*)     CREATININE 1.11 (*)     Potassium 3.2 (*)     GFR Non- 51 (*)     All other components within normal limits   HEPATIC FUNCTION PANEL - Abnormal; Notable for the following components: Total Bilirubin 0.14 (*)     All other components within normal limits   TROPONIN - Abnormal; Notable for the following components:    Troponin, High Sensitivity 16 (*)     All other components within normal limits   LIPASE   AMYLASE   LACTIC ACID   URINE RT REFLEX TO CULTURE   MAGNESIUM   MICROSCOPIC URINALYSIS       I reviewed all the lab results, patient has mild anemia, mild renal insufficiency and hypokalemia.     EMERGENCY DEPARTMENT COURSE:   Vitals:    Vitals:    12/08/20 2358 12/09/20 0000   BP:  112/85   Pulse: 102    Resp: 16    Temp: 98.4 °F (36.9 °C)    TempSrc: Tympanic    SpO2: 99%    Weight: 150 lb (68 kg)    Height: 5' 1\" (1.549 m)      -------------------------  BP: 112/85, Temp: 98.4 °F (36.9 °C), Pulse: 102, Resp: 16    Orders Placed This Encounter   Medications    0.9 % sodium chloride bolus    0.9 % sodium chloride infusion    HYDROmorphone (DILAUDID) injection 1 mg    ondansetron (ZOFRAN) injection 4 mg    iopamidol (ISOVUE-370) 76 % injection 100 mL    HYDROmorphone (DILAUDID) injection 1 mg    amoxicillin-clavulanate (AUGMENTIN) 875-125 MG per tablet 1 tablet    potassium chloride (KLOR-CON M) extended release tablet 20 mEq    amoxicillin-clavulanate (AUGMENTIN) 875-125 MG per tablet     Sig: Take 1 tablet by mouth 2 times daily for 10 days     Dispense:  20 tablet     Refill:  0    HYDROcodone-acetaminophen (NORCO) 5-325 MG per tablet     Sig: Take 1 tablet by mouth every 6 hours as needed for Pain for up to 3 days. Dispense:  10 tablet     Refill:  0    ondansetron (ZOFRAN ODT) 4 MG disintegrating tablet     Sig: Take 1 tablet by mouth every 8 hours as needed for Nausea     Dispense:  12 tablet     Refill:  0       During the emergency department course, patient was given normal saline bolus followed by infusion as well as Dilaudid 1 mg IV push and Zofran 4 mg IV push. Her pain and nausea improved but she required another dose of Dilaudid prior to discharge. She prefers to go home. I discussed the case with Dr. Marlene Jackson covering for Dr. Jasvir Morrison and he advised to start the patient on Augmentin and follow-up with Dr. Jasvir Morrison this afternoon. Patient was given Augmentin 1 dose in the department and prescription for remaining course as well as prescribed Norco and Zofran ODT for the pain and nausea. She was also given potassium chloride 20 mEq orally prior to discharge. I have reviewed the disposition diagnosis with the patient and or their family/guardian. I have answered their questions and given discharge instructions. They voiced understanding of these instructions and did not have any further questions or complaints. Re-evaluation Notes    Upon reevaluation, patient is feeling much better and resting comfortably. CRITICAL CARE:   None        CONSULTS:      PROCEDURES:  None    FINAL IMPRESSION      1. Abdominal pain, unspecified abdominal location    2. Abdominal wall seroma, initial encounter    3.  Postoperative pain

## 2020-12-11 RX ORDER — LISINOPRIL 40 MG/1
TABLET ORAL
Qty: 30 TABLET | Refills: 5 | Status: SHIPPED | OUTPATIENT
Start: 2020-12-11 | End: 2021-06-09

## 2020-12-11 RX ORDER — AMLODIPINE BESYLATE 10 MG/1
TABLET ORAL
Qty: 30 TABLET | Refills: 5 | Status: SHIPPED | OUTPATIENT
Start: 2020-12-11 | End: 2021-06-09

## 2020-12-11 NOTE — TELEPHONE ENCOUNTER
She is over 6 weeks out. Should not be having pain needing narcotics. But if pain is that bad she may need it. But she will have  To go to ER for eval and med over the weekend. Then if she   Needs more pain control will need to go to pain management. .  I can see her the week I get back to the office. Or if she needs seen   Next week I can ask one of my partners to see her.     thanks

## 2020-12-11 NOTE — TELEPHONE ENCOUNTER
Contacted patient and reviewed Dr. Emily Ding recommendation with patient. Patient thinks she needs to be seen by a provider and thinks 2 weeks is too long to be seen. Dr. Adonay Astorga agreed to seeing patient next Tuesday. Patient is scheduled with Dr. Adonay Astorga on 12/15/2020 at 11 AM. Patient instructed if pain is that bad she will need to go to the ER and be evaluated and given possible meds for the weekend. Explained to patient she can try to rotate with tylenol and ibuprofen every 3 hrs (tylenol at 8 am, ibuprofen at 11am, tylenol at 2pm, etc). Patient verbalized understanding.

## 2020-12-17 ENCOUNTER — TELEPHONE (OUTPATIENT)
Dept: FAMILY MEDICINE CLINIC | Age: 55
End: 2020-12-17

## 2020-12-17 ENCOUNTER — OFFICE VISIT (OUTPATIENT)
Dept: SURGERY | Age: 55
End: 2020-12-17
Payer: MEDICARE

## 2020-12-17 VITALS
OXYGEN SATURATION: 97 % | WEIGHT: 164 LBS | RESPIRATION RATE: 22 BRPM | BODY MASS INDEX: 30.96 KG/M2 | DIASTOLIC BLOOD PRESSURE: 88 MMHG | TEMPERATURE: 98.5 F | SYSTOLIC BLOOD PRESSURE: 138 MMHG | HEIGHT: 61 IN | HEART RATE: 101 BPM

## 2020-12-17 PROCEDURE — 99214 OFFICE O/P EST MOD 30 MIN: CPT

## 2020-12-17 PROCEDURE — 99024 POSTOP FOLLOW-UP VISIT: CPT | Performed by: SURGERY

## 2020-12-17 NOTE — PROGRESS NOTES
[facility]  Progress Note    PATIENT NAME: Kellee Moncada     CLINIC VISIT DATE: 12/17/2020    SUBJECTIVE:  Kellee Moncada is a 48 y.o. female who presents to the clinic today for evaluation for abdominal wall pain. Pt underwent laparotomy with repair recurrent ventral hernia 10/21/20, operative note details a very extensive dissection and repair. Pt was seen in office 11/19 and having postoperative discomfort at that time, additional narcotics were prescribed and patient evaluated by televisit 12/8 with documented improvement of symptoms at that time. Pt states that since then she has had persistent R abdominal pain that is worse with movement, she was scheduled to see Dr. Agus Crain next week but requested a sooner appointment. She is tolerating a regular diet with normal bowel function. Current smoker at least 1/2ppd. OBJECTIVE:    /88   Pulse 101   Temp 98.5 °F (36.9 °C) (Tympanic)   Resp 22   Ht 5' 1\" (1.549 m)   Wt 164 lb (74.4 kg)   SpO2 97%   BMI 30.99 kg/m²     General Appearance:  awake, alert, no acute distress, well developed, well nourished   Skin:  Skin color, texture, turgor normal. No rashes or lesions. Lungs:  Normal chest expansion, unlabored breathing without accessory muscle use. No audible rales, rhonchi, or wheezing. Cardiovascular: S1S2. No evidence of JVD. No evidence of pulsatile masses in abdomen  Abdomen:  Soft. Midline laparotomy well healed, there is a fluctuance midline consistent with previously noted seroma. No evidence of bleeding/infection. No palpable defects, tender right abdominal wall without evidence of trauma or ecchymosis  Musculoskeletal: No evidence of bony/muscular deformities, trauma, atrophy of either left/right upper/lower extremity. No evidence of digital clubbing or cyanosis.       Ct Abdomen Pelvis W Iv Contrast Additional Contrast? None    Result Date: 12/9/2020  EXAMINATION: CT OF THE ABDOMEN AND PELVIS WITH CONTRAST 12/9/2020 1:17 am TECHNIQUE: CT of the abdomen and pelvis was performed with the administration of intravenous contrast. Multiplanar reformatted images are provided for review. Dose modulation, iterative reconstruction, and/or weight based adjustment of the mA/kV was utilized to reduce the radiation dose to as low as reasonably achievable. COMPARISON: CT of the abdomen pelvis from 11/13/2020 HISTORY: ORDERING SYSTEM PROVIDED HISTORY: abdominal pain, nausea TECHNOLOGIST PROVIDED HISTORY: abdominal pain, nausea Reason for Exam: right sided pain with nausea, hx of hernia repair in Oct Acuity: Acute Type of Exam: Subsequent/Follow-up FINDINGS: Lower Chest: Visualized portions of the lower chest is within normal limits. Organs: Status post cholecystectomy. The liver, spleen, pancreas, and bilateral adrenal glands are within normal limits. There is mild atrophy of the left kidney. A 2 mm nonobstructive calculus is noted in the right renal pelvis. No hydronephrosis or hydroureter. GI/Bowel: The small and large bowel demonstrate normal caliber and appearance. Pelvis: The urinary bladder is nearly decompressed and is within normal limits. Status post hysterectomy. Peritoneum/Retroperitoneum: No free fluid or free gas in the abdomen or pelvis. Bones/Soft Tissues: Redemonstrated postsurgical changes related to ventral hernia repair. There is mild interval decrease in the size of the previously described deep subcutaneous fluid collection along the midline anterior abdominal wall. There is however interval development of thick wall and mild increased surrounding reticulation. Previously the collection measured 3.9 x 6.5 x 20.0 cm. The collection now measures 3.2 x 3.5 cm in the axial plane and 15 cm in the craniocaudal dimension, and may be concerning for an infected collection. The remainder of the soft tissues and skeletal structures are unremarkable.      Redemonstrated postsurgical changes related to previous ventral hernia repair. There is mild interval reduction in the size of the deep subcutaneous fluid collection along the midline anterior abdominal wall. There is also interval development of a thick wall and mild increased surrounding reticulation. This may be concerning for infected collection. Status post cholecystectomy. 2 mm nonobstructive calculus in the right renal pelvis. No hydronephrosis or hydroureter. ASSESSMENT:  1. Abdominal wall pain    2. Smoker          PLAN:  1. Abd wall pain may be from several issues. There is a certain amount of postoperative pain that is expected during the recovery process from any ventral hernia repair, this can present itself in many different ways but should continue to resolve over time. I agree with Dr. Bogdan Badillo, given the timing of her pain onset she may have had a muscle strain at the mesh attachment points or from developing scar tissue but she does not have any demonstrable bleeding if this were the case and her hernia repair appears to be intact by CT scan. She may benefit from a TAP block although the tissue planes are likely difficult to see given the amount of lateral dissection that has been performed. I would again concur with Dr. Evita Abraham and recommend referral to Pain Management services for evaluation for medical/procedural management. 2. Certainly her smoking habit continues to promote a proinflammatory response and keep her at high risk of hernia recurrence and other complications.     Electronically signed by Rula Montiel DO on 12/17/2020 at 10:08 AM

## 2020-12-17 NOTE — TELEPHONE ENCOUNTER
Clemente Keller with Dr Donn Nevarez office calling requesting pt to be worked in to manage her pain, please call pt with appt date and time.

## 2020-12-18 ENCOUNTER — OFFICE VISIT (OUTPATIENT)
Dept: FAMILY MEDICINE CLINIC | Age: 55
End: 2020-12-18
Payer: MEDICARE

## 2020-12-18 VITALS
HEART RATE: 98 BPM | DIASTOLIC BLOOD PRESSURE: 80 MMHG | BODY MASS INDEX: 30.51 KG/M2 | SYSTOLIC BLOOD PRESSURE: 128 MMHG | HEIGHT: 61 IN | OXYGEN SATURATION: 96 % | WEIGHT: 161.6 LBS

## 2020-12-18 DIAGNOSIS — R52 BURNING PAIN: Primary | ICD-10-CM

## 2020-12-18 DIAGNOSIS — G89.18 PAIN AT SURGICAL SITE: ICD-10-CM

## 2020-12-18 PROCEDURE — G8427 DOCREV CUR MEDS BY ELIG CLIN: HCPCS | Performed by: FAMILY MEDICINE

## 2020-12-18 PROCEDURE — 99214 OFFICE O/P EST MOD 30 MIN: CPT | Performed by: FAMILY MEDICINE

## 2020-12-18 PROCEDURE — G8484 FLU IMMUNIZE NO ADMIN: HCPCS | Performed by: FAMILY MEDICINE

## 2020-12-18 PROCEDURE — 4004F PT TOBACCO SCREEN RCVD TLK: CPT | Performed by: FAMILY MEDICINE

## 2020-12-18 PROCEDURE — 3017F COLORECTAL CA SCREEN DOC REV: CPT | Performed by: FAMILY MEDICINE

## 2020-12-18 PROCEDURE — 99213 OFFICE O/P EST LOW 20 MIN: CPT

## 2020-12-18 PROCEDURE — G8417 CALC BMI ABV UP PARAM F/U: HCPCS | Performed by: FAMILY MEDICINE

## 2020-12-18 RX ORDER — HYDROCODONE BITARTRATE AND ACETAMINOPHEN 5; 325 MG/1; MG/1
1 TABLET ORAL EVERY 8 HOURS PRN
Qty: 6 TABLET | Refills: 0 | Status: SHIPPED | OUTPATIENT
Start: 2020-12-18 | End: 2020-12-21

## 2020-12-18 RX ORDER — BUPROPION HYDROCHLORIDE 150 MG/1
150 TABLET ORAL EVERY MORNING
COMMUNITY
End: 2021-04-21 | Stop reason: SINTOL

## 2020-12-18 RX ORDER — GABAPENTIN 100 MG/1
CAPSULE ORAL
Qty: 60 CAPSULE | Refills: 0 | Status: SHIPPED | OUTPATIENT
Start: 2020-12-18 | End: 2021-01-05

## 2020-12-18 RX ORDER — LORATADINE 10 MG/1
TABLET ORAL
Status: ON HOLD | COMMUNITY
Start: 2020-12-11 | End: 2021-05-11

## 2020-12-18 NOTE — TELEPHONE ENCOUNTER
Called Richard Bain - she should have an adequate supply of medication until she can get her labs drawn.

## 2020-12-18 NOTE — PROGRESS NOTES
[facility] [place] [address]  [address], [address] [place]  [phone]      Nakul Ag is a 48 y.o. female who presents today for her medical conditions/complaints as noted below. 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 Ice/heat - ice seems to help until it wears off; then seems worse  Salonpas roll-on  No relationship to food, timing of eating, bowel movements    Cannot laugh, sneeze, cough, etc    Feels better when she is lying flat; will only wake up from sleep if she rolls over    Does not do well with Tylenol with Codeine; has used Tramadol in the past without much effect. Has used Gabapentin in the past.            Past Medical History:   Diagnosis Date    Anxiety     Breast cancer ([facility] [address] 2014    L side - lumpectomy and radiation; no chemo (was recommended to take Tamoxifen, but with her smoking, she declined due to family h/o CVA already).  Seeing Dr. Carla Dodd once yearly/    COPD (chronic obstructive pulmonary disease) ([facility] [address]     Depression     Headache(784.0)     History of alcoholism ([facility] [address]     sober since 1/16/19    Hypertension     Hypothyroidism     Kidney stone     Has had lithotripsy x 1; had ureteral stent placement with removal x 1; had seen Dr. Janelle Johnson PTSD (post-traumatic stress disorder)       Past Surgical History:   Procedure Laterality Date    APPENDECTOMY  1977    BREAST BIOPSY Right 2015    excisional biopsy - Dr. Lazara Wright BREAST LUMPECTOMY Left     breast CA - done at [facility]      DILATION AND CURETTAGE OF UTERUS      multiple in her 19's    EYE SURGERY      x 2 in her youth - was \"cross-eyed\"    FRACTURE SURGERY Left     hand    HERNIA REPAIR  2020    recurrent incisional hernia repair Dr. Javi Cabrera N/A 2020    Laparoscopic Robotic Assisted Ventral Hernia Repair performed by Diogenes Rangel MD at [address]      Dr. 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 Rosina Wade at [facility] - used mesh; had revision in 2020    [facility] N/A 10/21/2020    Open VENTRAL Hernia Repair w/ mesh & Component separation technique performed by Diogenes Rangel MD at [facility] [place]     Family History   Problem Relation Age of Onset    High Blood Pressure Mother     Lupus Mother          from CHF secondary to cardiomyopathy from her lupus    Heart Failure Mother     Atrial Fibrillation Mother     High Blood Pressure Father     Prostate Cancer Father         age 54;  11 years later of a \"bladder tumor\" that caused bladder rupture (pt unsure if malignancy)    Other Maternal Grandmother         had \"stomach tumor\"    Heart Attack Maternal Grandfather          at age 40    Stroke Paternal Grandfather         in his 42's; multiple     Social History     Tobacco Use    Smoking status: Current Every Day Smoker     Packs/day: 0.50     Years: 37.00     Pack years: 18.50     Types: Cigarettes     Start date:     Smokeless tobacco: Never Used    Tobacco comment: Will see PCP when ready to quit.      Substance Use Topics    Alcohol use: No     Frequency: Never     Drinks per session: 1 or 2     Binge frequency: Never     Comment: Follows with [facility] Current Outpatient Medications   Medication Sig Dispense Refill    buPROPion (WELLBUTRIN XL) 150 MG extended release tablet Take 150 mg by mouth every morning      gabapentin (NEURONTIN) 100 MG capsule Take 1 capsule by mouth BID x 4 days, then increase to 1 cap TID for 4 days.  60 capsule 0    amLODIPine (NORVASC) 10 MG tablet TAKE ONE TABLET BY MOUTH DAILY 30 tablet 5    lisinopril (PRINIVIL;ZESTRIL) 40 MG tablet TAKE ONE TABLET BY MOUTH DAILY 30 tablet 5    ondansetron (ZOFRAN ODT) 4 MG disintegrating tablet Take 1 tablet by mouth every 8 hours as needed for Nausea 12 tablet 0    Cholecalciferol (VITAMIN D) 50 MCG (2000 UT) CAPS capsule Take 2,000 Units by mouth daily      Multiple Vitamins-Minerals (MULTIVITAMIN ADULT PO) Take 1 tablet by mouth daily      propranolol (INDERAL) 10 MG tablet Take 10 mg by mouth 3 times daily Indications: patient takes twice a day       hydrOXYzine (ATARAX) 25 MG tablet Take 25-30 mg by mouth every 6 hours as needed for Anxiety      loratadine (CLARITIN) 10 MG capsule Take 1 capsule by mouth daily 30 capsule 11    albuterol sulfate  (90 Base) MCG/ACT inhaler Inhale 1-2 puffs into the lungs every 6 hours as needed for Wheezing or Shortness of Breath 1 Inhaler 3    omeprazole (PRILOSEC) 10 MG delayed release capsule Take 2 capsules by mouth 2 times daily 60 capsule 5    fluticasone (FLONASE) 50 MCG/ACT nasal spray 2 sprays by Nasal route daily 1 Bottle 5    DULoxetine (CYMBALTA) 30 MG extended release capsule Take 30 mg by mouth daily      DULoxetine (CYMBALTA) 60 MG extended release capsule Take 60 mg by mouth daily      lamoTRIgine (LAMICTAL) 200 MG tablet Take 200 mg by mouth daily       atorvastatin (LIPITOR) 10 MG tablet Take 1 tablet by mouth daily 30 tablet 0    levothyroxine (SYNTHROID) 100 MCG tablet Take 1 tablet by mouth Daily 30 tablet 0    loratadine (CLARITIN) 10 MG tablet       ondansetron (ZOFRAN) 4 MG tablet Take 1 tablet by mouth every 8 hours as needed for Nausea or Vomiting (Patient not taking: Reported on 12/18/2020) 20 tablet 0     No current facility-administered medications for this visit. Allergies   Allergen Reactions    Flomax [Tamsulosin Hcl] Swelling     Swelling of arms; however, also had rash and fever at the same time and was admitted at the time    Morphine Itching and Rash     Rash, itching    Bactrim [Sulfamethoxazole-Trimethoprim] Diarrhea and Nausea Only    Prednisone Other (See Comments)     Emotional changes, depression    Zyprexa [Olanzapine] Other (See Comments)     Made her feel like she wanted to \"jump out of my skin\"       Health Maintenance   Topic Date Due    Diabetes screen  03/26/2006    DTaP/Tdap/Td vaccine (1 - Tdap) 05/18/2021 (Originally 3/26/1985)    Shingles Vaccine (1 of 2) 05/18/2021 (Originally 3/26/2016)    Pneumococcal 0-64 years Vaccine (1 of 1 - PPSV23) 09/13/2021 (Originally 3/26/1972)    Flu vaccine (1) 09/22/2021 (Originally 9/1/2020)    Lipid screen  06/01/2021    Breast cancer screen  08/07/2021    Potassium monitoring  12/09/2021    Creatinine monitoring  12/09/2021    Colon cancer screen colonoscopy  07/29/2029    Hepatitis C screen  Completed    HIV screen  Completed    Hepatitis A vaccine  Aged Out    Hepatitis B vaccine  Aged Out    Hib vaccine  Aged Out    Meningococcal (ACWY) vaccine  Aged Out       Subjective:      Review of Systems   Gastrointestinal: Positive for abdominal pain and nausea. Negative for vomiting. Objective:     Vitals:    12/18/20 1505   BP: 128/80   Site: Right Upper Arm   Position: Sitting   Cuff Size: Medium Adult   Pulse: 98   SpO2: 96%   Weight: 161 lb 9.6 oz (73.3 kg)   Height: 5' 1\" (1.549 m)     Physical Exam  Vitals signs and nursing note reviewed. Constitutional:       General: She is not in acute distress. Appearance: She is well-developed. HENT:      Head: Normocephalic and atraumatic.       Right Ear: Tympanic membrane, ear canal and external ear normal.      Left Ear: Tympanic membrane, ear canal and external ear normal.      Nose: Nose normal.      Mouth/Throat:      Mouth: Mucous membranes are moist.      Pharynx: Oropharynx is clear. No posterior oropharyngeal erythema. Eyes:      Conjunctiva/sclera: Conjunctivae normal.   Neck:      Musculoskeletal: Neck supple. Cardiovascular:      Rate and Rhythm: Normal rate and regular rhythm. Heart sounds: Normal heart sounds. Pulmonary:      Effort: Pulmonary effort is normal. No respiratory distress. Breath sounds: Normal breath sounds. Abdominal:      General: Bowel sounds are normal. There is no distension. Palpations: Abdomen is soft. Tenderness: There is abdominal tenderness. Skin:     General: Skin is warm and dry. Neurological:      Mental Status: She is alert and oriented to person, place, and time. Assessment:       Diagnosis Orders   1. Burning pain  gabapentin (NEURONTIN) 100 MG capsule    Ciro Vásquez MD, Pain Management, [place]    HYDROcodone-acetaminophen (NORCO) 5-325 MG per tablet   2. Pain at surgical site  Vicki Knowles MD, Pain Management, [place]    HYDROcodone-acetaminophen (Yusra Snipe) 5-325 MG per tablet         Plan:      Return if symptoms worsen or fail to improve in 4-5 days. Orders Placed This Encounter   Procedures   Vicki Knowles MD, Pain Management, [place]     Referral Priority:   Routine     Referral Type:   Eval and Treat     Referral Reason:   Specialty Services Required     Referred to Provider:   Ke Theodore MD     Requested Specialty:   Physical Medicine and Rehab     Number of Visits Requested:   1     Orders Placed This Encounter   Medications    gabapentin (NEURONTIN) 100 MG capsule     Sig: Take 1 capsule by mouth BID x 4 days, then increase to 1 cap TID for 4 days.      Dispense:  60 capsule     Refill:  0    HYDROcodone-acetaminophen (NORCO) 5-325 MG per tablet     Sig:

## 2020-12-24 ENCOUNTER — CARE COORDINATION (OUTPATIENT)
Dept: CARE COORDINATION | Age: 55
End: 2020-12-24

## 2020-12-24 NOTE — CARE COORDINATION
Ambulatory Care Coordination Note  12/24/2020  CM Risk Score: 5  Charlson 10 Year Mortality Risk Score: 4%     ACC: Ryanne Coulter, RN    Summary Note: Rachele Galan was seen at [facility] ER- 12/8/2020. Per chart review- she has started antibiotics and is feeling better this am. She has message into surgeon.      12/9/2020- 1:30 pm Left message requesting return call.      Ambulatory Care Coordination Note  12/9/2020  CM Risk Score: 5  Charlson 10 Year Mortality Risk Score: 4%      ACC: Ryanne Coulter, RN     Summary Note: Jannie Lopez was referred for [facility] from report.      She follows with [facility]       She is S/P ventral hernia repair     [facility] ER 12/8/2020     Plan of Care :       Continue assessments, education and support                                               Continue support, assessments and education                                               COPD Zone tool                                               SDOH completed                                               Medications reviewed                                               Updated Healthcare decision maker.  Discussed ACP- she is aware packets for completion at clinic, assistance available if needed, and copy needs placed in EMR.                                              Review clinic, urgent care and My Chart                                               YCHRISJWEY Covid education- she is limiting exposure, wearing mask etc.                                                Review Care Gaps                                               Care Team Updated                                               GTWISMIL nutrition                 She saw PCP and was referred to Pain Management 1/4/2021.      11/25/2020- 2:32 pm Left message requesting return call.   12/9/2020- 1:31 pm Left message requesting return call.    12/23/2020- 10:03 am Left message requesting return call and included appt with Pain Management.            Care Coordination Interventions Program Enrollment: Rising Risk  Referral from Primary Care Provider: No  Suggested Interventions and Community Resources  [facility] Com[facility] (Comment: [facility], 12 step AA)  Smoking Cessation: Declined  Zone Management Tools: In Process (Comment: COPD)  Other Services or Interventions: Resides at [address] [place] [address]         Prior to Admission medications    Medication Sig Start Date End Date Taking? Authorizing Provider   buPROPion (WELLBUTRIN XL) 150 MG extended release tablet Take 150 mg by mouth every morning    Historical Provider, MD   loratadine (CLARITIN) 10 MG tablet  12/11/20   Historical Provider, MD   gabapentin (NEURONTIN) 100 MG capsule Take 1 capsule by mouth BID x 4 days, then increase to 1 cap TID for 4 days.  12/18/20 1/17/21  Belkis Chris DO   amLODIPine (NORVASC) 10 MG tablet TAKE ONE TABLET BY MOUTH DAILY 12/11/20   Belkis Chris DO   lisinopril (PRINIVIL;ZESTRIL) 40 MG tablet TAKE ONE TABLET BY MOUTH DAILY 12/11/20   Belkis Chris DO   ondansetron (ZOFRAN ODT) 4 MG disintegrating tablet Take 1 tablet by mouth every 8 hours as needed for Nausea 12/9/20   Dung Avila MD   ondansetron (ZOFRAN) 4 MG tablet Take 1 tablet by mouth every 8 hours as needed for Nausea or Vomiting  Patient not taking: Reported on 12/18/2020 10/14/20   Belkis Chris DO   Cholecalciferol (VITAMIN D) 50 MCG (2000 UT) CAPS capsule Take 2,000 Units by mouth daily    Historical Provider, MD   Multiple Vitamins-Minerals (MULTIVITAMIN ADULT PO) Take 1 tablet by mouth daily    Historical Provider, MD   atorvastatin (LIPITOR) 10 MG tablet Take 1 tablet by mouth daily 6/10/20   Belkis Chris DO   propranolol (INDERAL) 10 MG tablet Take 10 mg by mouth 3 times daily Indications: patient takes twice a day     Historical Provider, MD   hydrOXYzine (ATARAX) 25 MG tablet Take 25-30 mg by mouth every 6 hours as needed for Anxiety    Historical Provider, MD   loratadine (CLARITIN) 10 MG capsule Take 1 capsule by mouth daily 5/18/20   Mumtaz Chris,    albuterol sulfate  (90 Base) MCG/ACT inhaler Inhale 1-2 puffs into the lungs every 6 hours as needed for Wheezing or Shortness of Breath 5/18/20   Mumtaz Chris DO   omeprazole (PRILOSEC) 10 MG delayed release capsule Take 2 capsules by mouth 2 times daily 5/18/20   Mumtaz Chris DO   fluticasone Nicol Rea) 50 MCG/ACT nasal spray 2 sprays by Nasal route daily 4/20/20   Marivel Dominguez DO   DULoxetine (CYMBALTA) 30 MG extended release capsule Take 30 mg by mouth daily    Historical Provider, MD   DULoxetine (CYMBALTA) 60 MG extended release capsule Take 60 mg by mouth daily    Historical Provider, MD   lamoTRIgine (LAMICTAL) 200 MG tablet Take 200 mg by mouth daily     Historical Provider, MD   levothyroxine (SYNTHROID) 100 MCG tablet Take 100 mcg by mouth Daily    Historical Provider, MD       Future Appointments   Date Time Provider Rupesh Halli   1/4/2021  2:15 PM MD SHANNA Barnett [facility]   1/5/2021  1:30 PM Jasmina Santana MD [address] [place] [address]   3/26/2021  1:20 PM DO ARIC Posada [facility]

## 2020-12-31 RX ORDER — ATORVASTATIN CALCIUM 10 MG/1
10 TABLET, FILM COATED ORAL DAILY
Qty: 30 TABLET | Refills: 0 | Status: SHIPPED | OUTPATIENT
Start: 2020-12-31 | End: 2021-02-11

## 2020-12-31 RX ORDER — LEVOTHYROXINE SODIUM 0.1 MG/1
100 TABLET ORAL DAILY
Qty: 30 TABLET | Refills: 0 | Status: SHIPPED | OUTPATIENT
Start: 2020-12-31 | End: 2021-02-11

## 2020-12-31 NOTE — TELEPHONE ENCOUNTER
Damian Jefferson called requesting a refill of the below medication which has been pended for you:     Requested Prescriptions     Pending Prescriptions Disp Refills    atorvastatin (LIPITOR) 10 MG tablet 30 tablet      Sig: Take 1 tablet by mouth daily    levothyroxine (SYNTHROID) 100 MCG tablet 30 tablet      Sig: Take 1 tablet by mouth Daily       Last Appointment Date: 9/22/2020  Next Appointment Date: 12/18/2020    Allergies   Allergen Reactions    Flomax [Tamsulosin Hcl] Swelling     Swelling of arms; however, also had rash and fever at the same time and was admitted at the time    Morphine Itching and Rash     Rash, itching    Bactrim [Sulfamethoxazole-Trimethoprim] Diarrhea and Nausea Only    Prednisone Other (See Comments)     Emotional changes, depression    Zyprexa [Olanzapine] Other (See Comments)     Made her feel like she wanted to \"jump out of my skin\"

## 2021-01-03 DIAGNOSIS — R52 BURNING PAIN: ICD-10-CM

## 2021-01-04 ASSESSMENT — ENCOUNTER SYMPTOMS
ABDOMINAL PAIN: 1
NAUSEA: 1
VOMITING: 0

## 2021-01-04 NOTE — TELEPHONE ENCOUNTER
Left message for Jennifer Middlteon to call back on if the Gabapentin is helping & how she is currently taking it.

## 2021-01-05 RX ORDER — GABAPENTIN 100 MG/1
100 CAPSULE ORAL 3 TIMES DAILY
Qty: 90 CAPSULE | Refills: 0 | Status: SHIPPED | OUTPATIENT
Start: 2021-01-05 | End: 2021-02-01

## 2021-01-06 NOTE — TELEPHONE ENCOUNTER
Med refilled. What is the status of her Pain Mgmt referral that was placed on 12/18? Controlled Substance Monitoring:    Acute and Chronic Pain Monitoring:   RX Monitoring 1/5/2021   Periodic Controlled Substance Monitoring No signs of potential drug abuse or diversion identified.

## 2021-01-07 ENCOUNTER — CARE COORDINATION (OUTPATIENT)
Dept: CARE COORDINATION | Age: 56
End: 2021-01-07

## 2021-01-07 NOTE — CARE COORDINATION
Ambulatory Care Coordination Note  1/7/2021  CM Risk Score: 5  Charlson 10 Year Mortality Risk Score: 4%     ACC: Surjit Bejarano, RN    Summary Note: Tg Cash was seen at [facility] ER- 12/8/2020. Per chart review- she has started antibiotics and is feeling better this am. She has message into surgeon.      12/9/2020- 1:30 pm Left message requesting return call.      Ambulatory Care Coordination Note  12/9/2020  CM Risk Score: 5  Charlson 10 Year Mortality Risk Score: 4%      Mao Guerin, ANGELA     Summary Nadia Malik was referred for ACC from report.      She follows with [facility]       She is S/P ventral hernia repair     [facility] ER 12/8/2020     Plan of Care :       Continue assessments, education and support                                               Continue support, assessments and education                                               COPD Zone tool                                               SDOH completed                                               Medications reviewed                                               Updated Healthcare decision maker.  Discussed ACP- she is aware packets for completion at clinic, assistance available if needed, and copy needs placed in EMR.                                              Review clinic, urgent care and My Chart                                               [id number] Covid education- she is limiting exposure, wearing mask etc.                                                Review Care Gaps                                               Care Team Updated                                               XJVBQHLT nutrition                                                             She saw PCP and was referred to Pain Management 1/4/2021.      11/25/2020- 2:32 pm Left message requesting return call.   12/9/2020- 1:31 pm Left message requesting return call.   12/23/2020- 10:03 am Left message requesting return call and included appt with Pain Management.  1/7/2021- Left message requesting return call.               Care Coordination Interventions    Program Enrollment: Rising Risk  Referral from Primary Care Provider: No  Suggested Interventions and Community Resources  Behavorial Health: Completed (Comment: [facility], 12 step AA)  Smoking Cessation: Declined  Zone Management Tools: In Process (Comment: COPD)  Other Services or Interventions: Resides at [address] [place] [address]    None         Prior to Admission medications    Medication Sig Start Date End Date Taking? Authorizing Provider   gabapentin (NEURONTIN) 100 MG capsule Take 1 capsule by mouth 3 times daily for 30 days.  1/5/21 2/4/21  Dung Chris, DO   atorvastatin (LIPITOR) 10 MG tablet Take 1 tablet by mouth daily 12/31/20   Dung Chris, DO   levothyroxine (SYNTHROID) 100 MCG tablet Take 1 tablet by mouth Daily 12/31/20   Dung Chris, DO   buPROPion (WELLBUTRIN XL) 150 MG extended release tablet Take 150 mg by mouth every morning    Historical Provider, MD   loratadine (CLARITIN) 10 MG tablet  12/11/20   Historical Provider, MD   amLODIPine (NORVASC) 10 MG tablet TAKE ONE TABLET BY MOUTH DAILY 12/11/20   Dung Chris, DO   lisinopril (PRINIVIL;ZESTRIL) 40 MG tablet TAKE ONE TABLET BY MOUTH DAILY 12/11/20   Dung Chris, DO   ondansetron (ZOFRAN ODT) 4 MG disintegrating tablet Take 1 tablet by mouth every 8 hours as needed for Nausea 12/9/20   Evon Mayers MD   ondansetron (ZOFRAN) 4 MG tablet Take 1 tablet by mouth every 8 hours as needed for Nausea or Vomiting  Patient not taking: Reported on 12/18/2020 10/14/20   Dung Chris DO   Cholecalciferol (VITAMIN D) 50 MCG (2000 UT) CAPS capsule Take 2,000 Units by mouth daily    Historical Provider, MD   Multiple Vitamins-Minerals (MULTIVITAMIN ADULT PO) Take 1 tablet by mouth daily    Historical Provider, MD   propranolol (INDERAL) 10 MG tablet Take 10 mg by mouth 3 times daily Indications: patient takes twice a day Historical Provider, MD   hydrOXYzine (ATARAX) 25 MG tablet Take 25-30 mg by mouth every 6 hours as needed for Anxiety    Historical Provider, MD   loratadine (CLARITIN) 10 MG capsule Take 1 capsule by mouth daily 5/18/20   Kelly Chris, DO   albuterol sulfate  (90 Base) MCG/ACT inhaler Inhale 1-2 puffs into the lungs every 6 hours as needed for Wheezing or Shortness of Breath 5/18/20   Kelly Chris, DO   omeprazole (PRILOSEC) 10 MG delayed release capsule Take 2 capsules by mouth 2 times daily 5/18/20   Kelly Chris, DO   fluticasone Tami Bela) 50 MCG/ACT nasal spray 2 sprays by Nasal route daily 4/20/20   Andreia Dia, DO   DULoxetine (CYMBALTA) 30 MG extended release capsule Take 30 mg by mouth daily    Historical Provider, MD   DULoxetine (CYMBALTA) 60 MG extended release capsule Take 60 mg by mouth daily    Historical Provider, MD   lamoTRIgine (LAMICTAL) 200 MG tablet Take 200 mg by mouth daily     Historical Provider, MD       Future Appointments   Date Time Provider Rupesh Michaels   1/12/2021  2:30 PM Denys Klinefelter, MD [address] [place] [address]   3/26/2021  1:20 PM Adriane Good DO [facility]

## 2021-01-07 NOTE — TELEPHONE ENCOUNTER
Is pt's pain currently controlled on Gabapentin and OTC analgesics? If so, we do not have to have her see Pain Mgmt; however, we will not be able to continue to prescribe her opiates.

## 2021-01-12 ENCOUNTER — OFFICE VISIT (OUTPATIENT)
Dept: SURGERY | Age: 56
End: 2021-01-12
Payer: MEDICARE

## 2021-01-12 VITALS
DIASTOLIC BLOOD PRESSURE: 78 MMHG | HEIGHT: 61 IN | SYSTOLIC BLOOD PRESSURE: 116 MMHG | WEIGHT: 158 LBS | HEART RATE: 86 BPM | BODY MASS INDEX: 29.83 KG/M2 | TEMPERATURE: 98.4 F | RESPIRATION RATE: 18 BRPM

## 2021-01-12 DIAGNOSIS — Z09 POSTOP CHECK: Primary | ICD-10-CM

## 2021-01-12 PROCEDURE — 99214 OFFICE O/P EST MOD 30 MIN: CPT

## 2021-01-12 PROCEDURE — 99024 POSTOP FOLLOW-UP VISIT: CPT | Performed by: SURGERY

## 2021-01-12 NOTE — PROGRESS NOTES
Patient is here for postop. She is doing very well. She was having some pain in the started her abdomen. He was started on gabapentin. It is helped dramatically. She is back to work on the 10th and doing quite well. She is eating well having normal bowel movements and the pain is almost 0. On examination there is no sign of any recurrent hernia. There is still a little swelling in the midline from probably a seroma but it is much less than last visit.     At this point we will see her on a as needed basis

## 2021-01-13 ENCOUNTER — CARE COORDINATION (OUTPATIENT)
Dept: CARE COORDINATION | Age: 56
End: 2021-01-13

## 2021-01-13 NOTE — CARE COORDINATION
Ambulatory Care Coordination Note  1/13/2021  CM Risk Score: 5  Charlson 10 Year Mortality Risk Score: 4%     ACC: Jasvir Bardales, RN    Summary Note: Nadia Laguerre was seen at [facility] ER- 12/8/2020. Per chart review- she has started antibiotics and is feeling better this am. She has message into surgeon.      12/9/2020- 1:30 pm Left message requesting return call.      Ambulatory Care Coordination Note  12/9/2020  CM Risk Score: 5  Charlson 10 Year Mortality Risk Score: 4%      Myriam Chapman, RN     Summary Michelle Baptiste was referred for ACC from report.      She follows with [facility]       She is S/P ventral hernia repair     [facility] ER 12/8/2020     Plan of Care :       Continue assessments, education and support                                               Continue support, assessments and education                                               COPD Zone tool                                               SDOH completed                                               Medications reviewed                                               Updated Healthcare decision maker.  Discussed ACP- she is aware packets for completion at clinic, assistance available if needed, and copy needs placed in EMR.                                                 VYERVC clinic, urgent care and My Chart                                               BEHLIHWA Covid education- she is limiting exposure, wearing mask etc.                                                Review Care Gaps                                               Care Team Updated  Guru Diamond saw PCP and was referred to Pain Management 1/4/2021.      11/25/2020- 2:32 pm Left message requesting return call.   12/9/2020- 1:31 pm Left message requesting return call.   12/23/2020- 10:03 am Left message requesting return call and included appt with Pain Management.    1/7/2021- Left message requesting return call.    1/13/2021- Left message requesting return call. This writer will send letter next week if no response from Patient. Care Coordination Interventions    Program Enrollment: Rising Risk  Referral from Primary Care Provider: No  Suggested Interventions and Community Resources  [facility] Completed (Comment: [facility], 12 step AA)  Smoking Cessation: Declined  Zone Management Tools: In Process (Comment: COPD)  Other Services or Interventions: Resides at [address] [place] [address]    None         Prior to Admission medications    Medication Sig Start Date End Date Taking? Authorizing Provider   gabapentin (NEURONTIN) 100 MG capsule Take 1 capsule by mouth 3 times daily for 30 days.  1/5/21 2/4/21  Daphney Chris DO   atorvastatin (LIPITOR) 10 MG tablet Take 1 tablet by mouth daily 12/31/20   Daphney Chris DO   levothyroxine (SYNTHROID) 100 MCG tablet Take 1 tablet by mouth Daily 12/31/20   Daphney Chris DO   buPROPion (WELLBUTRIN XL) 150 MG extended release tablet Take 150 mg by mouth every morning    Historical Provider, MD   loratadine (CLARITIN) 10 MG tablet  12/11/20   Historical Provider, MD   amLODIPine (NORVASC) 10 MG tablet TAKE ONE TABLET BY MOUTH DAILY 12/11/20   Daphney Chris DO   lisinopril (PRINIVIL;ZESTRIL) 40 MG tablet TAKE ONE TABLET BY MOUTH DAILY 12/11/20   Daphney Chris DO   ondansetron (ZOFRAN ODT) 4 MG disintegrating tablet Take 1 tablet by mouth every 8 hours as needed for Nausea 12/9/20   Michelle Ho MD   ondansetron (ZOFRAN) 4 MG tablet Take 1 tablet by mouth every 8 hours as needed for Nausea or Vomiting 10/14/20   Latonya Sung,    Cholecalciferol (VITAMIN D) 50 MCG (2000 UT) CAPS capsule Take 2,000 Units by mouth daily    Historical Provider, MD   Multiple Vitamins-Minerals (MULTIVITAMIN ADULT PO) Take 1 tablet by mouth daily    Historical Provider, MD   propranolol (INDERAL) 10 MG tablet Take 10 mg by mouth 3 times daily Indications: patient takes twice a day     Historical Provider, MD   hydrOXYzine (ATARAX) 25 MG tablet Take 25-30 mg by mouth every 6 hours as needed for Anxiety    Historical Provider, MD   loratadine (CLARITIN) 10 MG capsule Take 1 capsule by mouth daily 5/18/20   Mariangeljuany Chris, DO   albuterol sulfate  (90 Base) MCG/ACT inhaler Inhale 1-2 puffs into the lungs every 6 hours as needed for Wheezing or Shortness of Breath 5/18/20   Mariangel Chris, DO   omeprazole (PRILOSEC) 10 MG delayed release capsule Take 2 capsules by mouth 2 times daily 5/18/20   Mariangel Chris, DO   fluticasone [facility]) 50 MCG/ACT nasal spray 2 sprays by Nasal route daily 4/20/20   Concepción Pillai,    DULoxetine (CYMBALTA) 30 MG extended release capsule Take 30 mg by mouth daily    Historical Provider, MD   DULoxetine (CYMBALTA) 60 MG extended release capsule Take 60 mg by mouth daily    Historical Provider, MD   lamoTRIgine (LAMICTAL) 200 MG tablet Take 200 mg by mouth daily     Historical Provider, MD       Future Appointments   Date Time Provider Rupesh Michaels   3/26/2021  1:20 PM [facility]

## 2021-01-18 ENCOUNTER — CARE COORDINATION (OUTPATIENT)
Dept: CARE COORDINATION | Age: 56
End: 2021-01-18

## 2021-01-18 NOTE — CARE COORDINATION
Management.    1/7/2021- Left message requesting return call.    1/13/2021- Left message requesting return call. This writer will send letter next week if no response from Patient.     1/18/2021- letter to be mailed requesting return call. Care Coordination Interventions    Program Enrollment: Rising Risk  Referral from Primary Care Provider: No  Suggested Interventions and Community Resources  [facility] Completed (Comment: [facility], 12 step AA)  Smoking Cessation: Declined  Zone Management Tools: In Process (Comment: COPD)  Other Services or Interventions: Resides at [address] [place] [address]         Prior to Admission medications    Medication Sig Start Date End Date Taking? Authorizing Provider   gabapentin (NEURONTIN) 100 MG capsule Take 1 capsule by mouth 3 times daily for 30 days.  1/5/21 2/4/21  Abena Crate Black, DO   atorvastatin (LIPITOR) 10 MG tablet Take 1 tablet by mouth daily 12/31/20   Abena Crate Black, DO   levothyroxine (SYNTHROID) 100 MCG tablet Take 1 tablet by mouth Daily 12/31/20   Abena Crate Black, DO   buPROPion (WELLBUTRIN XL) 150 MG extended release tablet Take 150 mg by mouth every morning    Historical Provider, MD   loratadine (CLARITIN) 10 MG tablet  12/11/20   Historical Provider, MD   amLODIPine (NORVASC) 10 MG tablet TAKE ONE TABLET BY MOUTH DAILY 12/11/20   Abena Crate Black, DO   lisinopril (PRINIVIL;ZESTRIL) 40 MG tablet TAKE ONE TABLET BY MOUTH DAILY 12/11/20   Abnea Crate Black, DO   ondansetron (ZOFRAN ODT) 4 MG disintegrating tablet Take 1 tablet by mouth every 8 hours as needed for Nausea 12/9/20   Sharita Ambrocio MD   ondansetron (ZOFRAN) 4 MG tablet Take 1 tablet by mouth every 8 hours as needed for Nausea or Vomiting 10/14/20   Nneka Shamirene, DO   Cholecalciferol (VITAMIN D) 50 MCG (2000 UT) CAPS capsule Take 2,000 Units by mouth daily    Historical Provider, MD   Multiple Vitamins-Minerals (MULTIVITAMIN ADULT PO) Take 1 tablet by mouth daily Historical Provider, MD   propranolol (INDERAL) 10 MG tablet Take 10 mg by mouth 3 times daily Indications: patient takes twice a day     Historical Provider, MD   hydrOXYzine (ATARAX) 25 MG tablet Take 25-30 mg by mouth every 6 hours as needed for Anxiety    Historical Provider, MD   loratadine (CLARITIN) 10 MG capsule Take 1 capsule by mouth daily 5/18/20   Rafael Chris, DO   albuterol sulfate  (90 Base) MCG/ACT inhaler Inhale 1-2 puffs into the lungs every 6 hours as needed for Wheezing or Shortness of Breath 5/18/20   Rafael Chris, DO   omeprazole (PRILOSEC) 10 MG delayed release capsule Take 2 capsules by mouth 2 times daily 5/18/20   Rafael Chris, DO   fluticasone Everlena Latasha) 50 MCG/ACT nasal spray 2 sprays by Nasal route daily 4/20/20   Marie Restrepo,    DULoxetine (CYMBALTA) 30 MG extended release capsule Take 30 mg by mouth daily    Historical Provider, MD   DULoxetine (CYMBALTA) 60 MG extended release capsule Take 60 mg by mouth daily    Historical Provider, MD   lamoTRIgine (LAMICTAL) 200 MG tablet Take 200 mg by mouth daily     Historical Provider, MD       Future Appointments   Date Time Provider Rupesh Xenia   3/26/2021  1:20 PM Carlin Bird DO [facility]

## 2021-01-18 NOTE — LETTER
Teto [id number], ANGELA        January 18, 2021    [address]      Dear [facility]:    I have been unable to reach you by phone. Please call me at [phone].      Future Appointments   Date Time Provider Rupesh Michaels   3/26/2021  1:20 PM [facility]           Sincerely,        Jasvir Bardales RN

## 2021-01-19 ENCOUNTER — CARE COORDINATION (OUTPATIENT)
Dept: CARE COORDINATION | Age: 56
End: 2021-01-19

## 2021-01-25 ENCOUNTER — CARE COORDINATION (OUTPATIENT)
Dept: CARE COORDINATION | Age: 56
End: 2021-01-25

## 2021-01-25 NOTE — CARE COORDINATION
Ambulatory Care Coordination Note  1/25/2021  CM Risk Score: 5  Charlson 10 Year Mortality Risk Score: 4%     ACC: Emely Cazares RN    Summary Note: Nicole Wallace was referred for [facility] from report.      She follows with [facility]       She is S/P ventral hernia repair          Plan of Care :      ACC completion d/t unable to reach Patient. This writer can re- enroll in the future if needed.      11/25/2020- 2:32 pm Left message requesting return call.   12/9/2020- 1:31 pm Left message requesting return call.   12/23/2020- 10:03 am Left message requesting return call and included appt with Pain Management.    1/7/2021- Left message requesting return call.    1/13/2021- Left message requesting return call. This writer will send letter next week if no response from Patient.   1/18/2021- letter to be mailed requesting return call. 1/25/2021- No response from Patient to letter alejandra. 10:01 am left detailed message that this writer will no longer be calling and she can reach out if with any needs/concerns and this writer can re- enroll in [facility] at that time.      Future Appointments   Date Time Provider Rupesh Michaels   3/26/2021  1:20 PM Tello Watson DO [facility]        Care Coordination Interventions    Program Enrollment: Rising Risk  Referral from Primary Care Provider: No  Suggested Interventions and Community Resources  [facility]: Completed (Comment: [facility], 12 step AA)  Smoking Cessation: Declined  Zone Management Tools: In Process (Comment: COPD)  Other Services or Interventions: Resides at [address] [place] [address]    None         Prior to Admission medications    Medication Sig Start Date End Date Taking? Authorizing Provider   gabapentin (NEURONTIN) 100 MG capsule Take 1 capsule by mouth 3 times daily for 30 days.  1/5/21 2/4/21  Stoney Ct Black, DO   atorvastatin (LIPITOR) 10 MG tablet Take 1 tablet by mouth daily 12/31/20   Stoney Ct Black, DO   levothyroxine (SYNTHROID) 100 MCG tablet Take 1 tablet by mouth Daily 12/31/20   Kelly Chris, DO   buPROPion (WELLBUTRIN XL) 150 MG extended release tablet Take 150 mg by mouth every morning    Historical Provider, MD   loratadine (CLARITIN) 10 MG tablet  12/11/20   Historical Provider, MD   amLODIPine (NORVASC) 10 MG tablet TAKE ONE TABLET BY MOUTH DAILY 12/11/20   Kelly Chris, DO   lisinopril (PRINIVIL;ZESTRIL) 40 MG tablet TAKE ONE TABLET BY MOUTH DAILY 12/11/20   Kelly Chris, DO   ondansetron (ZOFRAN ODT) 4 MG disintegrating tablet Take 1 tablet by mouth every 8 hours as needed for Nausea 12/9/20   Gene Oneill MD   ondansetron (ZOFRAN) 4 MG tablet Take 1 tablet by mouth every 8 hours as needed for Nausea or Vomiting 10/14/20   Adriane Good,    Cholecalciferol (VITAMIN D) 50 MCG (2000 UT) CAPS capsule Take 2,000 Units by mouth daily    Historical Provider, MD   Multiple Vitamins-Minerals (MULTIVITAMIN ADULT PO) Take 1 tablet by mouth daily    Historical Provider, MD   propranolol (INDERAL) 10 MG tablet Take 10 mg by mouth 3 times daily Indications: patient takes twice a day     Historical Provider, MD   hydrOXYzine (ATARAX) 25 MG tablet Take 25-30 mg by mouth every 6 hours as needed for Anxiety    Historical Provider, MD   loratadine (CLARITIN) 10 MG capsule Take 1 capsule by mouth daily 5/18/20   Kelly Chris, DO   albuterol sulfate  (90 Base) MCG/ACT inhaler Inhale 1-2 puffs into the lungs every 6 hours as needed for Wheezing or Shortness of Breath 5/18/20   Kelly Chris, DO   omeprazole (PRILOSEC) 10 MG delayed release capsule Take 2 capsules by mouth 2 times daily 5/18/20   Kelly Chris, DO   fluticasone [facility]) 50 MCG/ACT nasal spray 2 sprays by Nasal route daily 4/20/20   Andreia Dia,    DULoxetine (CYMBALTA) 30 MG extended release capsule Take 30 mg by mouth daily    Historical Provider, MD   DULoxetine (CYMBALTA) 60 MG extended release capsule Take 60 mg by mouth daily    Historical Provider, MD

## 2021-01-31 DIAGNOSIS — E78.00 PURE HYPERCHOLESTEROLEMIA: ICD-10-CM

## 2021-01-31 DIAGNOSIS — E03.9 HYPOTHYROIDISM, UNSPECIFIED TYPE: ICD-10-CM

## 2021-02-01 DIAGNOSIS — R52 BURNING PAIN: ICD-10-CM

## 2021-02-01 NOTE — TELEPHONE ENCOUNTER
Left message on Sarah's VM she is overdue for labs- TSH, LP, etc.  Advised to call if she'll run out of meds before she can get her labs drawn.

## 2021-02-03 RX ORDER — GABAPENTIN 100 MG/1
CAPSULE ORAL
Qty: 90 CAPSULE | Refills: 1 | Status: SHIPPED | OUTPATIENT
Start: 2021-02-03 | End: 2021-04-08

## 2021-02-11 DIAGNOSIS — E03.9 HYPOTHYROIDISM, UNSPECIFIED TYPE: ICD-10-CM

## 2021-02-11 RX ORDER — LEVOTHYROXINE SODIUM 0.1 MG/1
100 TABLET ORAL DAILY
Qty: 30 TABLET | Refills: 0 | Status: CANCELLED | OUTPATIENT
Start: 2021-02-11

## 2021-02-11 RX ORDER — ATORVASTATIN CALCIUM 10 MG/1
TABLET, FILM COATED ORAL
Qty: 30 TABLET | Refills: 0 | Status: SHIPPED | OUTPATIENT
Start: 2021-02-11 | End: 2021-03-10

## 2021-02-11 RX ORDER — LEVOTHYROXINE SODIUM 0.1 MG/1
TABLET ORAL
Qty: 30 TABLET | Refills: 0 | Status: SHIPPED | OUTPATIENT
Start: 2021-02-11 | End: 2021-03-10

## 2021-02-11 NOTE — TELEPHONE ENCOUNTER
Leonidas Alfonso called requesting a refill of the below medication which has been pended for you:     Requested Prescriptions     Pending Prescriptions Disp Refills    levothyroxine (SYNTHROID) 100 MCG tablet 30 tablet 0     Sig: Take 1 tablet by mouth Daily       Last Appointment Date: 12/18/2020  Next Appointment Date: 3/26/2021    Allergies   Allergen Reactions    Flomax [Tamsulosin Hcl] Swelling     Swelling of arms; however, also had rash and fever at the same time and was admitted at the time    Morphine Itching and Rash     Rash, itching    Bactrim [Sulfamethoxazole-Trimethoprim] Diarrhea and Nausea Only    Prednisone Other (See Comments)     Emotional changes, depression    Zyprexa [Olanzapine] Other (See Comments)     Made her feel like she wanted to \"jump out of my skin\"   last lab 12/9/2020

## 2021-02-11 NOTE — TELEPHONE ENCOUNTER
Patient is completely out of this medication and has been out of this medication for 3 days.  Please send in today

## 2021-02-11 NOTE — TELEPHONE ENCOUNTER
Duplicate refill request encounter - encounter from 1/31 shows that pt was contacted to complete her labs for Synthroid and Lipitor refills. Pt called back on 2/9 stating she would be coming soon to do her labs, so I was waiting on lab results before I refilled, as I was unaware she was out. Med refilled for 30 days in encounter from 1/31.

## 2021-03-01 ENCOUNTER — HOSPITAL ENCOUNTER (OUTPATIENT)
Dept: LAB | Age: 56
Discharge: HOME | End: 2021-03-01
Payer: MEDICARE

## 2021-03-01 DIAGNOSIS — E55.9 VITAMIN D DEFICIENCY: ICD-10-CM

## 2021-03-01 DIAGNOSIS — E03.9 HYPOTHYROIDISM, UNSPECIFIED TYPE: ICD-10-CM

## 2021-03-01 LAB
THYROXINE, FREE: 1.58 NG/DL (ref 0.93–1.7)
TSH SERPL DL<=0.05 MIU/L-ACNC: 1.41 MIU/L (ref 0.3–5)
VITAMIN D 25-HYDROXY: 34.8 NG/ML (ref 30–100)

## 2021-03-01 PROCEDURE — 36415 COLL VENOUS BLD VENIPUNCTURE: CPT

## 2021-03-01 PROCEDURE — 84443 ASSAY THYROID STIM HORMONE: CPT

## 2021-03-01 PROCEDURE — 84439 ASSAY OF FREE THYROXINE: CPT

## 2021-03-01 PROCEDURE — 82306 VITAMIN D 25 HYDROXY: CPT

## 2021-03-09 ENCOUNTER — PATIENT MESSAGE (OUTPATIENT)
Dept: FAMILY MEDICINE CLINIC | Age: 56
End: 2021-03-09

## 2021-03-10 NOTE — TELEPHONE ENCOUNTER
From: Stefano Oneill  To: Clyde Hester DO  Sent: 3/9/2021 10:54 PM EST  Subject: Prescription Question    I had my thyroid test. I have 3 days of medicine left. Can you please refill synthroid.   Thanks Bettye Vergara

## 2021-03-18 ENCOUNTER — HOSPITAL ENCOUNTER (OUTPATIENT)
Dept: LAB | Age: 56
Discharge: HOME | End: 2021-03-18
Payer: MEDICARE

## 2021-03-18 ENCOUNTER — OFFICE VISIT (OUTPATIENT)
Dept: SURGERY | Age: 56
End: 2021-03-18
Payer: MEDICARE

## 2021-03-18 VITALS
WEIGHT: 165.6 LBS | SYSTOLIC BLOOD PRESSURE: 118 MMHG | HEIGHT: 61 IN | DIASTOLIC BLOOD PRESSURE: 60 MMHG | HEART RATE: 86 BPM | BODY MASS INDEX: 31.26 KG/M2 | TEMPERATURE: 97.6 F

## 2021-03-18 DIAGNOSIS — R11.0 NAUSEA: ICD-10-CM

## 2021-03-18 DIAGNOSIS — R10.84 GENERALIZED ABDOMINAL PAIN: ICD-10-CM

## 2021-03-18 DIAGNOSIS — R19.4 BOWEL HABIT CHANGES: ICD-10-CM

## 2021-03-18 DIAGNOSIS — R10.84 GENERALIZED ABDOMINAL PAIN: Primary | ICD-10-CM

## 2021-03-18 LAB
ABSOLUTE EOS #: 0.38 K/UL (ref 0–0.44)
ABSOLUTE IMMATURE GRANULOCYTE: 0.04 K/UL (ref 0–0.3)
ABSOLUTE LYMPH #: 1.89 K/UL (ref 1.1–3.7)
ABSOLUTE MONO #: 0.61 K/UL (ref 0.1–1.2)
ALBUMIN SERPL-MCNC: 4.6 G/DL (ref 3.5–5.2)
ALBUMIN/GLOBULIN RATIO: 1.8 (ref 1–2.5)
ALP BLD-CCNC: 75 U/L (ref 35–104)
ALT SERPL-CCNC: 15 U/L (ref 5–33)
ANION GAP SERPL CALCULATED.3IONS-SCNC: 10 MMOL/L (ref 9–17)
AST SERPL-CCNC: 21 U/L
BASOPHILS # BLD: 1 % (ref 0–2)
BASOPHILS ABSOLUTE: 0.09 K/UL (ref 0–0.2)
BILIRUB SERPL-MCNC: 0.16 MG/DL (ref 0.3–1.2)
BUN BLDV-MCNC: 15 MG/DL (ref 6–20)
BUN/CREAT BLD: 13 (ref 9–20)
CALCIUM SERPL-MCNC: 10.3 MG/DL (ref 8.6–10.4)
CHLORIDE BLD-SCNC: 104 MMOL/L (ref 98–107)
CO2: 27 MMOL/L (ref 20–31)
CREAT SERPL-MCNC: 1.17 MG/DL (ref 0.5–0.9)
DIFFERENTIAL TYPE: ABNORMAL
EOSINOPHILS RELATIVE PERCENT: 4 % (ref 1–4)
GFR AFRICAN AMERICAN: 58 ML/MIN
GFR NON-AFRICAN AMERICAN: 48 ML/MIN
GFR SERPL CREATININE-BSD FRML MDRD: ABNORMAL ML/MIN/{1.73_M2}
GFR SERPL CREATININE-BSD FRML MDRD: ABNORMAL ML/MIN/{1.73_M2}
GLUCOSE BLD-MCNC: 117 MG/DL (ref 70–99)
HCT VFR BLD CALC: 35.6 % (ref 36.3–47.1)
HEMOGLOBIN: 10.8 G/DL (ref 11.9–15.1)
IMMATURE GRANULOCYTES: 0 %
LYMPHOCYTES # BLD: 21 % (ref 24–43)
MCH RBC QN AUTO: 25.9 PG (ref 25.2–33.5)
MCHC RBC AUTO-ENTMCNC: 30.3 G/DL (ref 25.2–33.5)
MCV RBC AUTO: 85.4 FL (ref 82.6–102.9)
MONOCYTES # BLD: 7 % (ref 3–12)
NRBC AUTOMATED: 0 PER 100 WBC
PDW BLD-RTO: 16.5 % (ref 11.8–14.4)
PLATELET # BLD: 293 K/UL (ref 138–453)
PLATELET ESTIMATE: ABNORMAL
PMV BLD AUTO: 8.6 FL (ref 8.1–13.5)
POTASSIUM SERPL-SCNC: 4 MMOL/L (ref 3.7–5.3)
RBC # BLD: 4.17 M/UL (ref 3.95–5.11)
RBC # BLD: ABNORMAL 10*6/UL
SEG NEUTROPHILS: 67 % (ref 36–65)
SEGMENTED NEUTROPHILS ABSOLUTE COUNT: 6.12 K/UL (ref 1.5–8.1)
SODIUM BLD-SCNC: 141 MMOL/L (ref 135–144)
TOTAL PROTEIN: 7.1 G/DL (ref 6.4–8.3)
WBC # BLD: 9.1 K/UL (ref 3.5–11.3)
WBC # BLD: ABNORMAL 10*3/UL

## 2021-03-18 PROCEDURE — 80053 COMPREHEN METABOLIC PANEL: CPT

## 2021-03-18 PROCEDURE — 85025 COMPLETE CBC W/AUTO DIFF WBC: CPT

## 2021-03-18 PROCEDURE — G8417 CALC BMI ABV UP PARAM F/U: HCPCS | Performed by: SURGERY

## 2021-03-18 PROCEDURE — 3017F COLORECTAL CA SCREEN DOC REV: CPT | Performed by: SURGERY

## 2021-03-18 PROCEDURE — 4004F PT TOBACCO SCREEN RCVD TLK: CPT | Performed by: SURGERY

## 2021-03-18 PROCEDURE — 99214 OFFICE O/P EST MOD 30 MIN: CPT | Performed by: SURGERY

## 2021-03-18 PROCEDURE — 36415 COLL VENOUS BLD VENIPUNCTURE: CPT

## 2021-03-18 PROCEDURE — G8484 FLU IMMUNIZE NO ADMIN: HCPCS | Performed by: SURGERY

## 2021-03-18 PROCEDURE — G8427 DOCREV CUR MEDS BY ELIG CLIN: HCPCS | Performed by: SURGERY

## 2021-03-18 PROCEDURE — 99205 OFFICE O/P NEW HI 60 MIN: CPT | Performed by: SURGERY

## 2021-03-18 RX ORDER — DOCUSATE SODIUM 100 MG/1
100 CAPSULE, LIQUID FILLED ORAL 2 TIMES DAILY
Qty: 60 CAPSULE | Refills: 3 | Status: ON HOLD | OUTPATIENT
Start: 2021-03-18 | End: 2021-05-11

## 2021-03-18 NOTE — PROGRESS NOTES
Gray Clark is a 48 y.o. female          CC:    . Abdominal Cramping and Abdominal Pain      HISTORY OF PRESENT ILLNESS:    Patient is a 50-year-old female who had several hernia repairs at outside facilities. Patient's hernia history includes 2019 incisional hernia repair, 2020 recurrent incisional hernia repair. Then we saw her and did a repair of recurrent ventral hernia with implantation of ventral light ST mesh and Bard soft vest with bilateral rectus abdominis advancement flap or the entire procedure and removal of the mesh from the old hernia. She was doing okay with that except she had some pain which was being treated with Neurontin. However 2 weeks ago she started to develop these change in her stools. She had thin stools she did not feel constipated and then the next day she would have another thin stool and could not get any more out. And then the third day she did have severe cramping extreme urgency she have a soft stool and then a blowout with liquid stool and she get very distended with nausea and vomiting. Then it would start over again with a thin stool the following day. Her last colonoscopy was in 2019 and had polyps. Her last CT scan was in December 2020 prior to the symptoms. She states that she is eating well. She did gain 5 pounds since her last visit. She is still smoking but has cut back dramatically.   She says about a half a pack a day      Review of Systems:    General:  Fever: Negative  Weight Change:Negative  Night Sweats: Negative    Eye:  Blurry Vision:Negative  Double Vision: Negative    Ent:  Headaches: Negative  Sore throat: Negative    Allergy/Immunology:  Hives: Negative  Latex allergy: Negative    Hematology/Lymphatic:  Bleeding Problems: Negative  Blood Clots: Negative  Swollen Lymph Nodes: Negative    Lungs:  Cough: Negative  SOB: Negative  Wheezing:Negative    Cardiovascular:  Chest Pain: Negative  Palpitations:Negative    GI:   Decreased Appetite: Negative  Heartburn: Negative  Dysphagia: Negative  Nausea/Vomiting: Negative  Abdominal Pain: Negative  Change in Bowels:Negative  Constipation: Negative  Diarrhea: Negative  Rectal Bleeding: Negative    :   Dysuria: Negative  Increase Urinary Frequency/Urgency: Negative    Neuro:  Seizures: Negative  Confusion: Negative        PAST MEDICAL HISTORY:        Family History   Problem Relation Age of Onset    High Blood Pressure Mother     Lupus Mother          from CHF secondary to cardiomyopathy from her lupus    Heart Failure Mother     Atrial Fibrillation Mother     High Blood Pressure Father     Prostate Cancer Father         age 54;  11 years later of a \"bladder tumor\" that caused bladder rupture (pt unsure if malignancy)    Other Maternal Grandmother         had \"stomach tumor\"    Heart Attack Maternal Grandfather          at age 40    Stroke Paternal Grandfather         in his 42's; multiple     Social History     Socioeconomic History    Marital status:      Spouse name: Not on file    Number of children: 1    Years of education: 15    Highest education level: Bachelor's degree (e.g., BA, AB, BS)   Occupational History    Occupation:    Social Needs    Financial resource strain: Not hard at all   [employer] insecurity     Worry: Never true     Inability: Never true   [employer] needs     Medical: No     Non-medical: No   Tobacco Use    Smoking status: Current Every Day Smoker     Packs/day: 0.50     Years: 37.00     Pack years: 18.50     Types: Cigarettes     Start date:     Smokeless tobacco: Never Used    Tobacco comment: Will see PCP when ready to quit.      Substance and Sexual Activity    Alcohol use: No     Frequency: Never     Drinks per session: 1 or 2     Binge frequency: Never     Comment: Follows with [facility]    Drug use: Not Currently     Comment: Follows with [facility]    Sexual activity: Not Currently     Partners: Male Lifestyle    Physical activity     Days per week: 0 days     Minutes per session: 0 min    Stress: To some extent   Relationships    Social connections     Talks on phone: More than three times a week     Gets together: More than three times a week     Attends Orthodoxy service: More than 4 times per year     Active member of club or organization: Yes     Attends meetings of clubs or organizations: More than 4 times per year     Relationship status:     Intimate partner violence     Fear of current or ex partner: Not on file     Emotionally abused: Not on file     Physically abused: Not on file     Forced sexual activity: Not on file   Other Topics Concern    Not on file   Social History Narrative    11/13/2020- she follows with [facility], 15 step-AA member, tobacco abuse- counseled, she has used food pantries in the past, she is applying for [employer], transportation by friends but has used [employer] previously.       Past Surgical History:   Procedure Laterality Date    APPENDECTOMY  1977    BREAST BIOPSY Right 2015    excisional biopsy - Dr. Joi Wyatt Left 2014    breast CA - done at [facility]  2014    COLONOSCOPY  2019    polyps, Dr. Carmen Bailey at [address]      multiple in her 19's   [address]      x 2 in her youth - was \"cross-eyed\"    FRACTURE SURGERY Left     hand    HERNIA REPAIR  01/2020    recurrent incisional hernia repair Dr. Ley Staff N/A 7/22/2020    Laparoscopic Robotic Assisted Ventral Hernia Repair performed by Tigist Huynh MD at [address]  2006    Dr. Hussain Espinoza  2015    Dr. 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 Gopi Vigil at [facility] - used mesh; had revision in 1/2020   [facility] N/A 10/21/2020    Open VENTRAL Hernia Repair w/ mesh & Component separation technique performed by Shlomo Nyhan, MD at [facility]     Past Medical History:   Diagnosis Date    Anxiety     [facility] ([facility] 2014    L side - lumpectomy and radiation; no chemo (was recommended to take Tamoxifen, but with her smoking, she declined due to family h/o CVA already).  Seeing Dr. Kia Del Valle once yearly/    COPD (chronic obstructive pulmonary disease) ([facility]     Depression     Headache(784.0)     History of alcoholism ([facility]     sober since 1/16/19    Hypertension     Hypothyroidism     Kidney stone     Has had lithotripsy x 1; had ureteral stent placement with removal x 1; had seen Dr. Foster Colon PTSD (post-traumatic stress disorder)      Current Outpatient Medications on File Prior to Visit   Medication Sig Dispense Refill    atorvastatin (LIPITOR) 10 MG tablet TAKE ONE TABLET BY MOUTH DAILY 90 tablet 0    levothyroxine (SYNTHROID) 100 MCG tablet TAKE ONE TABLET BY MOUTH DAILY 90 tablet 3    loratadine (CLARITIN) 10 MG tablet       amLODIPine (NORVASC) 10 MG tablet TAKE ONE TABLET BY MOUTH DAILY 30 tablet 5    lisinopril (PRINIVIL;ZESTRIL) 40 MG tablet TAKE ONE TABLET BY MOUTH DAILY 30 tablet 5    ondansetron (ZOFRAN) 4 MG tablet Take 1 tablet by mouth every 8 hours as needed for Nausea or Vomiting 20 tablet 0    Cholecalciferol (VITAMIN D) 50 MCG (2000 UT) CAPS capsule Take 2,000 Units by mouth daily      propranolol (INDERAL) 10 MG tablet Take 10 mg by mouth 3 times daily Indications: patient takes twice a day       hydrOXYzine (ATARAX) 25 MG tablet Take 25-30 mg by mouth every 6 hours as needed for Anxiety      loratadine (CLARITIN) 10 MG capsule Take 1 capsule by mouth daily 30 capsule 11    albuterol sulfate  (90 Base) MCG/ACT inhaler Inhale 1-2 puffs into the lungs every 6 hours as needed for Wheezing or Shortness of Breath 1 Inhaler 3    omeprazole (PRILOSEC) 10 MG delayed release capsule Take 2 or protuberant. Ext:  No edema, no cyanosis  Psych: reveals appropriate mood, memory and judgment,  Neuro:  Reveals no gross motor or sensory deficits,   Msk:  5/5 strength all 4 extremities, no joint tenderness          ASSESS MENT:    1. Change in bowels over the last 2 weeks with abdominal cramping and increased urgency and explosive diarrhea about every third day. 2.  Also has some tender spots on the abdomen which are probably related to the hernia repair and are not her acute problem. They have been there since the repair but they are slowly getting better. 3.  She did have a seroma in the subcu on her CT we may want to see that. PLAN:    1. We will work on medications for this stool changes such as increased water fiber stool softener and MiraLAX. 2.  We will get a CT scan to rule out or of evaluate the seroma and make sure there is no other problems from the hernia repair. Or any recurrence  3. We will do a colonoscopy to evaluate the change in bowels even though there was a scope over 2 years ago.

## 2021-03-23 ENCOUNTER — HOSPITAL ENCOUNTER (OUTPATIENT)
Dept: CT IMAGING | Age: 56
Discharge: HOME | End: 2021-03-25
Payer: MEDICARE

## 2021-03-23 DIAGNOSIS — R10.84 GENERALIZED ABDOMINAL PAIN: ICD-10-CM

## 2021-03-23 DIAGNOSIS — R19.4 BOWEL HABIT CHANGES: ICD-10-CM

## 2021-03-23 DIAGNOSIS — R11.0 NAUSEA: ICD-10-CM

## 2021-03-23 PROCEDURE — 6360000004 HC RX CONTRAST MEDICATION: Performed by: SURGERY

## 2021-03-23 PROCEDURE — 74177 CT ABD & PELVIS W/CONTRAST: CPT

## 2021-03-23 RX ADMIN — IOPAMIDOL 100 ML: 755 INJECTION, SOLUTION INTRAVENOUS at 09:28

## 2021-03-23 RX ADMIN — IOHEXOL 50 ML: 240 INJECTION, SOLUTION INTRATHECAL; INTRAVASCULAR; INTRAVENOUS; ORAL at 09:17

## 2021-03-31 ENCOUNTER — PRE-PROCEDURE TELEPHONE (OUTPATIENT)
Dept: PREADMISSION TESTING | Age: 56
End: 2021-03-31

## 2021-03-31 NOTE — TELEPHONE ENCOUNTER
Voicemail message left regarding a covid swab appt for April 8th at 1015. Instructed to call [phone] and confirm this appt time.

## 2021-04-05 ENCOUNTER — TELEPHONE (OUTPATIENT)
Dept: SURGERY | Age: 56
End: 2021-04-05

## 2021-04-05 ENCOUNTER — PRE-PROCEDURE TELEPHONE (OUTPATIENT)
Dept: PREADMISSION TESTING | Age: 56
End: 2021-04-05

## 2021-04-05 NOTE — TELEPHONE ENCOUNTER
Voicemail message left regarding a covid swab appt for April 8th at 1015. Instructed to call [phone] and confirm this appt.

## 2021-04-05 NOTE — TELEPHONE ENCOUNTER
Notified Jorge Mcduffie, at [facility] about patient wanting to cancel procedure at this time.

## 2021-04-05 NOTE — TELEPHONE ENCOUNTER
Patient called office to cancel her colonoscopy on 4/13/2021. She does not want to reschedule, she wants to wait to see if increasing water and taking miraLAX will improve her symptoms.

## 2021-04-06 DIAGNOSIS — R52 BURNING PAIN: ICD-10-CM

## 2021-04-06 NOTE — TELEPHONE ENCOUNTER
Rosy Castro called requesting a refill of the below medication which has been pended for you: [place] from [place], [place] reviewed. Gabapentin 100 mg last filled 3/7/21 #90/30 days. Report available for your review.      Requested Prescriptions     Pending Prescriptions Disp Refills    gabapentin (NEURONTIN) 100 MG capsule [Pharmacy Med Name: GABAPENTIN 100 MG CAPSULE] 90 capsule 0     Sig: TAKE ONE CAPSULE BY MOUTH THREE TIMES A DAY       Last Appointment Date: 12/18/2020  Next Appointment Date: 6/29/2021    Allergies   Allergen Reactions    Flomax [Tamsulosin Hcl] Swelling     Swelling of arms; however, also had rash and fever at the same time and was admitted at the time    Morphine Itching and Rash     Rash, itching    Bactrim [Sulfamethoxazole-Trimethoprim] Diarrhea and Nausea Only    Prednisone Other (See Comments)     Emotional changes, depression    Zyprexa [Olanzapine] Other (See Comments)     Made her feel like she wanted to \"jump out of my skin\"

## 2021-04-08 RX ORDER — GABAPENTIN 100 MG/1
CAPSULE ORAL
Qty: 90 CAPSULE | Refills: 2 | Status: SHIPPED | OUTPATIENT
Start: 2021-04-08 | End: 2021-07-07

## 2021-04-13 ENCOUNTER — TELEPHONE (OUTPATIENT)
Dept: SURGERY | Age: 56
End: 2021-04-13

## 2021-04-13 NOTE — TELEPHONE ENCOUNTER
Patient called to reschedule her colonoscopy that she cancelled today. She thought she was better and didn't need colonoscopy. She states that her symptoms of abdominal pain and change in bowels are back. She also states that her legs are swollen and she is short of breath. She started a new psych med and is not sure if she is having side effects. I asked her to go to Urgent Care to be evaluated. She states that she will go now.

## 2021-04-14 ENCOUNTER — TELEPHONE (OUTPATIENT)
Dept: FAMILY MEDICINE CLINIC | Age: 56
End: 2021-04-14

## 2021-04-14 NOTE — TELEPHONE ENCOUNTER
Pt calling stating she was seen in [facility] ER and needs a follow up for chest pain, please advise at above number.

## 2021-04-15 ENCOUNTER — APPOINTMENT (OUTPATIENT)
Dept: GENERAL RADIOLOGY | Age: 56
End: 2021-04-15
Payer: MEDICARE

## 2021-04-15 ENCOUNTER — HOSPITAL ENCOUNTER (EMERGENCY)
Age: 56
Discharge: HOME | End: 2021-04-15
Attending: EMERGENCY MEDICINE
Payer: MEDICARE

## 2021-04-15 VITALS
TEMPERATURE: 97.1 F | SYSTOLIC BLOOD PRESSURE: 107 MMHG | WEIGHT: 160 LBS | RESPIRATION RATE: 17 BRPM | OXYGEN SATURATION: 97 % | HEART RATE: 85 BPM | HEIGHT: 61 IN | DIASTOLIC BLOOD PRESSURE: 64 MMHG | BODY MASS INDEX: 30.21 KG/M2

## 2021-04-15 DIAGNOSIS — R06.00 DYSPNEA, UNSPECIFIED TYPE: Primary | ICD-10-CM

## 2021-04-15 PROCEDURE — 74022 RADEX COMPL AQT ABD SERIES: CPT

## 2021-04-15 PROCEDURE — 93005 ELECTROCARDIOGRAM TRACING: CPT

## 2021-04-15 PROCEDURE — 6370000000 HC RX 637 (ALT 250 FOR IP): Performed by: EMERGENCY MEDICINE

## 2021-04-15 PROCEDURE — 99285 EMERGENCY DEPT VISIT HI MDM: CPT

## 2021-04-15 RX ORDER — LORAZEPAM 0.5 MG/1
1 TABLET ORAL ONCE
Status: COMPLETED | OUTPATIENT
Start: 2021-04-15 | End: 2021-04-15

## 2021-04-15 RX ADMIN — LORAZEPAM 1 MG: 0.5 TABLET ORAL at 21:05

## 2021-04-15 ASSESSMENT — PAIN SCALES - GENERAL: PAINLEVEL_OUTOF10: 4

## 2021-04-15 ASSESSMENT — PAIN DESCRIPTION - ONSET: ONSET: ON-GOING

## 2021-04-15 ASSESSMENT — PAIN DESCRIPTION - LOCATION: LOCATION: ABDOMEN

## 2021-04-15 ASSESSMENT — PAIN DESCRIPTION - ORIENTATION: ORIENTATION: MID

## 2021-04-16 ENCOUNTER — CARE COORDINATION (OUTPATIENT)
Dept: CARE COORDINATION | Age: 56
End: 2021-04-16

## 2021-04-16 ENCOUNTER — TELEPHONE (OUTPATIENT)
Dept: SURGERY | Age: 56
End: 2021-04-16

## 2021-04-16 LAB
AVERAGE GLUCOSE: 111
HBA1C MFR BLD: 5.5 %

## 2021-04-16 NOTE — ED PROVIDER NOTES
eMERGENCY dEPARTMENT eNCOUnter      Pt Name: Deanne Oakes  MRN: [id number]  [place] 1965  Date of evaluation: 4/15/2021      CHIEF COMPLAINT       Chief Complaint   Patient presents with    Shortness of [address] [place] is a 55 y.o. female who presents with shortness of breath. Patient states for the last few weeks she has been experiencing shortness of breath she denies associated chest pain fever chills nausea vomiting she was actually at [address] yesterday had a complete work-up including CAT scan of the chest all which was unremarkable. She is he denies any change since yesterday. She states she has known history of anxiety she states she thinks she gets short of breath when she gets her anxiety but she is not on any medicine she says  Patient does state that she feels bloated in her abdomen this is also nothing new        REVIEW OF SYSTEMS       Review of systems are all reviewed and negative except stated above in HPI    PAST MEDICAL HISTORY    has a past medical history of Anxiety, Breast cancer ([address] COPD (chronic obstructive pulmonary disease) ([address] Depression, Headache(784.0), History of [facility]ism ([facility] Hypertension, Hypothyroidism, Kidney stone, and PTSD (post-traumatic stress disorder). SURGICAL HISTORY      has a past surgical history that includes Eye surgery; tumor removal (1999); Appendectomy (1977); Cholecystectomy (2014); Tubal ligation (2001); lumbar laminectomy (2006); Dilation and curettage of uterus; Breast lumpectomy (Left, 2014); Breast biopsy (Right, 2015); alcon and bso (cervix removed) (2015); ventral hernia repair (2019); hernia repair (01/2020); fracture surgery (Left); hernia repair (N/A, 7/22/2020); ventral hernia repair (N/A, 10/21/2020); and Colonoscopy (2019).     CURRENT MEDICATIONS       Discharge Medication List as of 4/15/2021 10:08 PM      CONTINUE these medications which have NOT CHANGED    Details   gabapentin (NEURONTIN) 100 MG capsule TAKE ONE CAPSULE BY MOUTH THREE TIMES A DAY, Disp-90 capsule, R-2Normal      atorvastatin (LIPITOR) 10 MG tablet TAKE ONE TABLET BY MOUTH DAILY, Disp-90 tablet, R-0Normal      levothyroxine (SYNTHROID) 100 MCG tablet TAKE ONE TABLET BY MOUTH DAILY, Disp-90 tablet, R-3Normal      amLODIPine (NORVASC) 10 MG tablet TAKE ONE TABLET BY MOUTH DAILY, Disp-30 tablet, R-5Normal      lisinopril (PRINIVIL;ZESTRIL) 40 MG tablet TAKE ONE TABLET BY MOUTH DAILY, Disp-30 tablet, R-5Normal      hydrOXYzine (ATARAX) 25 MG tablet Take 50 mg by mouth every 6 hours as needed for Anxiety Historical Med      loratadine (CLARITIN) 10 MG capsule Take 1 capsule by mouth daily, Disp-30 capsule, R-11Normal      albuterol sulfate  (90 Base) MCG/ACT inhaler Inhale 1-2 puffs into the lungs every 6 hours as needed for Wheezing or Shortness of Breath, Disp-1 Inhaler, R-3Normal      omeprazole (PRILOSEC) 10 MG delayed release capsule Take 2 capsules by mouth 2 times daily, Disp-60 capsule, R-5Normal      fluticasone (FLONASE) 50 MCG/ACT nasal spray 2 sprays by Nasal route daily, Disp-1 Bottle, R-5Normal      !! DULoxetine (CYMBALTA) 30 MG extended release capsule Take 30 mg by mouth dailyHistorical Med      !!  DULoxetine (CYMBALTA) 60 MG extended release capsule Take 60 mg by mouth dailyHistorical Med      lamoTRIgine (LAMICTAL) 200 MG tablet Take 200 mg by mouth daily Historical Med      docusate sodium (COLACE) 100 MG capsule Take 1 capsule by mouth 2 times daily, Disp-60 capsule, R-3Normal      buPROPion (WELLBUTRIN XL) 150 MG extended release tablet Take 150 mg by mouth every morningHistorical Med      loratadine (CLARITIN) 10 MG tablet Historical Med      ondansetron (ZOFRAN ODT) 4 MG disintegrating tablet Take 1 tablet by mouth every 8 hours as needed for Nausea, Disp-12 tablet,R-0Print      ondansetron (ZOFRAN) 4 MG tablet Take 1 tablet by mouth every 8 hours as needed for Nausea or Vomiting, Disp-20 tablet,R-0Normal      Cholecalciferol (VITAMIN D) 50 MCG (2000 UT) CAPS capsule Take 2,000 Units by mouth dailyHistorical Med      Multiple Vitamins-Minerals (MULTIVITAMIN ADULT PO) Take 1 tablet by mouth dailyHistorical Med      propranolol (INDERAL) 10 MG tablet Take 10 mg by mouth 3 times daily Indications: patient takes twice a day Historical Med       !! - Potential duplicate medications found. Please discuss with provider. ALLERGIES     is allergic to flomax [tamsulosin hcl]; morphine; bactrim [sulfamethoxazole-trimethoprim]; prednisone; and zyprexa [olanzapine]. FAMILY HISTORY     She indicated that her mother is . She indicated that her father is . She indicated that her sister is alive. She indicated that her maternal grandmother is . She indicated that her maternal grandfather is . She indicated that her paternal grandmother is . She indicated that her paternal grandfather is . family history includes Atrial Fibrillation in her mother; Heart Attack in her maternal grandfather; Heart Failure in her mother; High Blood Pressure in her father and mother; Lupus in her mother; Other in her maternal grandmother; Prostate Cancer in her father; Stroke in her paternal grandfather. SOCIAL HISTORY      reports that she has been smoking cigarettes. She started smoking about 39 years ago. She has a 18.50 pack-year smoking history. She has never used smokeless tobacco. She reports previous drug use. She reports that she does not drink alcohol. PHYSICAL EXAM     INITIAL VITALS:  height is 5' 1\" (1.549 m) and weight is 160 lb (72.6 kg). Her tympanic temperature is 97.1 °F (36.2 °C). Her blood pressure is 107/64 and her pulse is 85. Her respiration is 17 and oxygen saturation is 97%.       General: Patient is alert nontoxic-appearing female in no apparent distress  HEENT: Head is atraumatic conjunctiva clear  Respiratory: Lung sounds are clear bilateral  Cardiac: Heart is regular rate and rhythm  GI: Abdomen soft nontender  Peripheral exam no cyanosis or edema  Neuro: Patient has no gross focal neurological deficits at bedside exam    DIFFERENTIAL DIAGNOSIS/ MDM:     Dyspnea anxiety    DIAGNOSTIC RESULTS     EKG: All EKG's are interpreted by the Emergency Department Physician who either signs or Co-signs this chart in the absence of a cardiologist.        RADIOLOGY:   I directly visualized the following  images and reviewed the radiologist interpretations:  XR ACUTE ABD SERIES CHEST 1 VW   Final Result   Increased stool and colonic ileus               LABS:  Labs Reviewed - No data to display      EMERGENCY DEPARTMENT COURSE:   Vitals:    Vitals:    04/15/21 2052 04/15/21 2100 04/15/21 2130 04/15/21 2145   BP: 136/79 (!) 104/53 (!) 101/55 107/64   Pulse: 100 96 89 85   Resp: 21 20 18 17   Temp:       TempSrc:       SpO2:  98% 98% 97%   Weight:       Height:         -------------------------  BP: 107/64, Temp: 97.1 °F (36.2 °C), Pulse: 85, Resp: 17    Orders Placed This Encounter   Medications    LORazepam (ATIVAN) tablet 1 mg           Re-evaluation Notes    Patient was given Ativan here she states she felt much better no complaints of chest pain abdominal series showed some increased stool and some gas no indication of obstruction at this time there is no indication by history or recent evaluation this acute cardiac pulmonary or true vascular event she will be discharged with early follow-up to her primary return if worse    CRITICAL CARE:   None      CONSULTS:      PROCEDURES:  None    FINAL IMPRESSION      1.  Dyspnea, unspecified type          DISPOSITION/PLAN   DISPOSITION Decision To Discharge 04/15/2021 10:08:30 PM      Condition on Disposition    Stable    PATIENT REFERRED TO:  Ellie Cooper DO  [address]  [phone]    In 1 day        DISCHARGE MEDICATIONS:  Discharge Medication List as of 4/15/2021 10:08 PM (Please note that portions of this note were completed with a voice recognition program.  Efforts were made to edit the dictations but occasionally words are mis-transcribed.)    [place] MD, F.A.C.E.P.   Attending Emergency Physician        Mehran Jackson MD  04/16/21 2897

## 2021-04-16 NOTE — TELEPHONE ENCOUNTER
Patient is calling and stating that since last time she has seen Dr. Ottoniel Samano she is having some new symptoms. Patient would like to discuss these symptoms with the nurse. Patient would also like to set up a colonoscopy. Please call the patient back at [phone].

## 2021-04-16 NOTE — TELEPHONE ENCOUNTER
Contacted patient and she states reschedule her colonoscopy. Patient is rescheduled to 5/11/2021. Explained I will send her new instructions in the mail. Patient verbalized understanding.

## 2021-04-16 NOTE — CARE COORDINATION
Patient contacted regarding Dejon Orr. Discussed COVID-19 related testing which was available at this time. Test results were negative. Patient informed of results, if available? Yes- done at [address] and results negative    Ambulatory Care Manager contacted the patient by telephone to perform post discharge assessment. Call within 2 business days of discharge: Yes. Verified name and  with patient as identifiers. Provided introduction to self, and explanation of the CTN/ACM role, and reason for call due to risk factors for infection and/or exposure to COVID-19. Symptoms reviewed with patient who verbalized the following symptoms: shortness of breath, no new symptoms, no worsening symptoms and laryngitis; abdominal pain. Due to no new or worsening symptoms encounter was not routed to provider for escalation. Discussed follow-up appointments. If no appointment was previously scheduled, appointment scheduling offered: patient had appt scheduled for 21  [facility] follow up appointment(s):   Future Appointments   Date Time Provider Rupesh Michaels   2021  2:00 PM DO ARIC Rob OLIMPIA   2021 11:00 AM DO ARIC Rob OLIMPIA     Non-[facility] follow up appointment(s):    Non-face-to-face services provided:  Scheduled appointment with PCP-already scheduled for 21  Obtained and reviewed discharge summary and/or continuity of care documents  Education of patient/family/caregiver/guardian to support self-management-encouraged fluids as tolerated; encouraged continuation of current medications for allergies; encouraged keeping a log of symptoms and things that aggravate or help     Advance Care Planning:   Does patient have an Advance Directive:  reviewed and current. Patient has following risk factors of: COPD.  ACM reviewed discharge instructions, medical action plan and red flags such as increased shortness of breath, increasing fever and signs of decompensation with patient who verbalized understanding. Discussed exposure protocols and quarantine with CDC Guidelines What to do if you are sick with coronavirus disease 2019.  Patient was given an opportunity for questions and concerns. The patient agrees to contact the [facility] exposure line [phone], local [facility] department [place] Department of Health: ([phone]) and PCP office for questions related to their healthcare. ACM provided contact information for future needs. Reviewed and educated patient on any new and changed medications related to discharge diagnosis     Was patient discharged with a pulse oximeter? No Discussed and confirmed pulse oximeter discharge instructions and when to notify provider or seek emergency care. Patient/family/caregiver given information for [employer] and agrees to enroll no  Patient's preferred e-mail:    Patient's preferred phone number:   Based on Loop alert triggers, patient will be contacted by nurse care manager for worsening symptoms. Plan for follow-up call in 5-7 days based on severity of symptoms and risk factors. Patient states she is having less shortness of breath. Still has some abdominal pain and some laryngitis. Asked about allergies. She said \"not documented\" but is taking Claritin and Flonase.  Has follow up appt scheduled with PCP and working on getting an appt with GI.

## 2021-04-16 NOTE — ED TRIAGE NOTES
Patient was seen at [address] [place] [address] ER last night for shortness of breath, all tests were negative per the patient. Patient continues to have issues. Patient states,\"this may be because I swallow a lot of air when I eat\".

## 2021-04-16 NOTE — ED NOTES
Reports feeling less anxious at this time. Lights dimmed per request, call light in reach. Resp easy, even, non labored, no distress noted.       Miranda Chaves RN  04/15/21 7440

## 2021-04-19 ENCOUNTER — TELEPHONE (OUTPATIENT)
Dept: FAMILY MEDICINE CLINIC | Age: 56
End: 2021-04-19

## 2021-04-19 LAB
EKG ATRIAL RATE: 92 BPM
EKG P AXIS: 85 DEGREES
EKG P-R INTERVAL: 150 MS
EKG Q-T INTERVAL: 394 MS
EKG QRS DURATION: 78 MS
EKG QTC CALCULATION (BAZETT): 487 MS
EKG R AXIS: 72 DEGREES
EKG T AXIS: 69 DEGREES
EKG VENTRICULAR RATE: 92 BPM

## 2021-04-19 NOTE — TELEPHONE ENCOUNTER
Left message to call back to complete transitional care call.     [facility] Initial Follow Up Call    Outreach made within 2 business days of discharge: Yes    Patient: Marco Gianni Patient : 1965   MRN: [id number]  Reason for Admission: suicidal  Discharge Date: 2021       Spoke with: left message to call back    Dis[facility]    TCM Interactive Patient Contact:  Was patient able to fill all prescriptions: unknown  Was patient instructed to bring all medications to the follow-up visit: yes- by VM  Is patient taking all medications as directed in the discharge summary? unknown  Does patient understand their discharge instructions: N/A  Does patient have questions or concerns that need addressed prior to 7-14 day follow up office visit: N/A    Scheduled appointment with PCP within 7-14 days - pt scheduled to be seen within 48 hrs of discharge    Follow Up  Future Appointments   Date Time Provider Rupesh iMchaels   2021  2:00 PM DO ARIC Bravo   2021 11:00 AM DO ARIC Bravo DPOLIVER Lemus RN

## 2021-04-21 ENCOUNTER — PATIENT MESSAGE (OUTPATIENT)
Dept: FAMILY MEDICINE CLINIC | Age: 56
End: 2021-04-21

## 2021-04-21 ENCOUNTER — OFFICE VISIT (OUTPATIENT)
Dept: FAMILY MEDICINE CLINIC | Age: 56
End: 2021-04-21
Payer: MEDICARE

## 2021-04-21 VITALS
TEMPERATURE: 99 F | DIASTOLIC BLOOD PRESSURE: 68 MMHG | SYSTOLIC BLOOD PRESSURE: 120 MMHG | HEART RATE: 103 BPM | WEIGHT: 170.2 LBS | HEIGHT: 61 IN | BODY MASS INDEX: 32.13 KG/M2 | OXYGEN SATURATION: 95 %

## 2021-04-21 DIAGNOSIS — R79.89 ELEVATED SERUM CREATININE: ICD-10-CM

## 2021-04-21 DIAGNOSIS — R06.02 SOB (SHORTNESS OF BREATH) ON EXERTION: ICD-10-CM

## 2021-04-21 DIAGNOSIS — D64.9 ANEMIA, UNSPECIFIED TYPE: Primary | ICD-10-CM

## 2021-04-21 DIAGNOSIS — Z84.0 FAMILY HISTORY OF LUPUS ERYTHEMATOSUS: ICD-10-CM

## 2021-04-21 DIAGNOSIS — F31.81 BIPOLAR 2 DISORDER (HCC): ICD-10-CM

## 2021-04-21 DIAGNOSIS — J44.9 CHRONIC OBSTRUCTIVE PULMONARY DISEASE, UNSPECIFIED COPD TYPE (HCC): ICD-10-CM

## 2021-04-21 DIAGNOSIS — J30.9 ALLERGIC RHINITIS, UNSPECIFIED SEASONALITY, UNSPECIFIED TRIGGER: ICD-10-CM

## 2021-04-21 PROCEDURE — G8926 SPIRO NO PERF OR DOC: HCPCS | Performed by: FAMILY MEDICINE

## 2021-04-21 PROCEDURE — 3023F SPIROM DOC REV: CPT | Performed by: FAMILY MEDICINE

## 2021-04-21 PROCEDURE — G8427 DOCREV CUR MEDS BY ELIG CLIN: HCPCS | Performed by: FAMILY MEDICINE

## 2021-04-21 PROCEDURE — 4004F PT TOBACCO SCREEN RCVD TLK: CPT | Performed by: FAMILY MEDICINE

## 2021-04-21 PROCEDURE — 99212 OFFICE O/P EST SF 10 MIN: CPT | Performed by: FAMILY MEDICINE

## 2021-04-21 PROCEDURE — 99214 OFFICE O/P EST MOD 30 MIN: CPT | Performed by: FAMILY MEDICINE

## 2021-04-21 PROCEDURE — G8417 CALC BMI ABV UP PARAM F/U: HCPCS | Performed by: FAMILY MEDICINE

## 2021-04-21 PROCEDURE — 3017F COLORECTAL CA SCREEN DOC REV: CPT | Performed by: FAMILY MEDICINE

## 2021-04-21 RX ORDER — FEXOFENADINE HCL 180 MG/1
180 TABLET ORAL DAILY
Qty: 90 TABLET | Refills: 1 | Status: SHIPPED | OUTPATIENT
Start: 2021-04-21 | End: 2021-12-21 | Stop reason: SDUPTHER

## 2021-04-21 RX ORDER — MIRTAZAPINE 7.5 MG/1
7.5 TABLET, FILM COATED ORAL NIGHTLY
COMMUNITY
End: 2021-05-24

## 2021-04-21 NOTE — PROGRESS NOTES
Never     Drinks per session: 1 or 2     Binge frequency: Never     Comment: Follows with [facility]      Current Outpatient Medications   Medication Sig Dispense Refill    fexofenadine (ALLEGRA) 180 MG tablet Take 1 tablet by mouth daily 90 tablet 1    gabapentin (NEURONTIN) 100 MG capsule TAKE ONE CAPSULE BY MOUTH THREE TIMES A DAY 90 capsule 2    docusate sodium (COLACE) 100 MG capsule Take 1 capsule by mouth 2 times daily 60 capsule 3    atorvastatin (LIPITOR) 10 MG tablet TAKE ONE TABLET BY MOUTH DAILY 90 tablet 0    levothyroxine (SYNTHROID) 100 MCG tablet TAKE ONE TABLET BY MOUTH DAILY 90 tablet 3    loratadine (CLARITIN) 10 MG tablet       amLODIPine (NORVASC) 10 MG tablet TAKE ONE TABLET BY MOUTH DAILY 30 tablet 5    lisinopril (PRINIVIL;ZESTRIL) 40 MG tablet TAKE ONE TABLET BY MOUTH DAILY 30 tablet 5    ondansetron (ZOFRAN ODT) 4 MG disintegrating tablet Take 1 tablet by mouth every 8 hours as needed for Nausea 12 tablet 0    ondansetron (ZOFRAN) 4 MG tablet Take 1 tablet by mouth every 8 hours as needed for Nausea or Vomiting 20 tablet 0    Cholecalciferol (VITAMIN D) 50 MCG (2000 UT) CAPS capsule Take 2,000 Units by mouth daily      Multiple Vitamins-Minerals (MULTIVITAMIN ADULT PO) Take 1 tablet by mouth daily      propranolol (INDERAL) 10 MG tablet Take 10 mg by mouth 3 times daily Indications: patient takes twice a day       hydrOXYzine (ATARAX) 25 MG tablet Take 50 mg by mouth every 6 hours as needed for Anxiety       albuterol sulfate  (90 Base) MCG/ACT inhaler Inhale 1-2 puffs into the lungs every 6 hours as needed for Wheezing or Shortness of Breath 1 Inhaler 3    omeprazole (PRILOSEC) 10 MG delayed release capsule Take 2 capsules by mouth 2 times daily 60 capsule 5    fluticasone (FLONASE) 50 MCG/ACT nasal spray 2 sprays by Nasal route daily 1 Bottle 5    DULoxetine (CYMBALTA) 30 MG extended release capsule Take 30 mg by mouth daily      DULoxetine (CYMBALTA) 60 MG extended release capsule Take 60 mg by mouth daily      lamoTRIgine (LAMICTAL) 200 MG tablet Take 200 mg by mouth daily       mirtazapine (REMERON) 7.5 MG tablet Take 7.5 mg by mouth nightly       No current facility-administered medications for this visit. Allergies   Allergen Reactions    Flomax [Tamsulosin Hcl] Swelling     Swelling of arms; however, also had rash and fever at the same time and was admitted at the time    Morphine Itching and Rash     Rash, itching    Saphris [Asenapine]     Bactrim [Sulfamethoxazole-Trimethoprim] Diarrhea and Nausea Only    Prednisone Other (See Comments)     Emotional changes, depression    Zyprexa [Olanzapine] Other (See Comments)     Made her feel like she wanted to \"jump out of my skin\"       Health Maintenance   Topic Date Due    COVID-19 Vaccine (1) Never done    Diabetes screen  Never done    DTaP/Tdap/Td vaccine (1 - Tdap) 05/18/2021 (Originally 3/26/1985)    Shingles Vaccine (1 of 2) 05/18/2021 (Originally 3/26/2016)    Pneumococcal 0-64 years Vaccine (1 of 1 - PPSV23) 09/13/2021 (Originally 3/26/1972)    Flu vaccine (Season Ended) 09/22/2021 (Originally 9/1/2021)    Breast cancer screen  08/07/2021    Lipid screen  04/26/2022    Potassium monitoring  04/27/2022    Creatinine monitoring  04/27/2022    Colon cancer screen colonoscopy  07/29/2029    Hepatitis C screen  Completed    HIV screen  Completed    Hepatitis A vaccine  Aged Out    Hepatitis B vaccine  Aged Out    Hib vaccine  Aged Out    Meningococcal (ACWY) vaccine  Aged Out       Subjective:      Review of Systems   Gastrointestinal: Positive for abdominal pain (stable, improved). Psychiatric/Behavioral: Positive for dysphoric mood. The patient is nervous/anxious.         Objective:     Vitals:    04/21/21 1404   BP: 120/68   Site: Right Upper Arm   Position: Sitting   Cuff Size: Medium Adult   Pulse: 103   Temp: 99 °F (37.2 °C)   SpO2: 95%   Weight: 170 lb 3.2 oz (77.2 kg) Height: 5' 1\" (1.549 m)     Physical Exam  Vitals signs and nursing note reviewed. Constitutional:       General: She is not in acute distress. Appearance: She is well-developed. HENT:      Head: Normocephalic and atraumatic. Right Ear: Tympanic membrane, ear canal and external ear normal.      Left Ear: Tympanic membrane, ear canal and external ear normal.      Nose: Nose normal.      Mouth/Throat:      Mouth: Mucous membranes are moist.      Pharynx: Oropharynx is clear. No posterior oropharyngeal erythema. Eyes:      Conjunctiva/sclera: Conjunctivae normal.   Neck:      Musculoskeletal: Neck supple. Cardiovascular:      Rate and Rhythm: Normal rate and regular rhythm. Heart sounds: Normal heart sounds. Pulmonary:      Effort: Pulmonary effort is normal. No respiratory distress. Breath sounds: Normal breath sounds. Abdominal:      General: Bowel sounds are normal. There is no distension. Palpations: Abdomen is soft. Tenderness: There is abdominal tenderness (midline, upper abdomen). Skin:     General: Skin is warm and dry. Neurological:      Mental Status: She is alert and oriented to person, place, and time. Assessment:       Diagnosis Orders   1. Anemia, unspecified type  Iron And TIBC    Ferritin    CBC Auto Differential   2. Bipolar 2 disorder ([address]     3. SOB (shortness of breath) on exertion  Echocardiogram complete   4. Chronic obstructive pulmonary disease, unspecified COPD type ([address]     5. Elevated serum creatinine     6. Family history of lupus erythematosus  RAMONA Screen With Reflex   7. Allergic rhinitis, unspecified seasonality, unspecified trigger  fexofenadine (ALLEGRA) 180 MG tablet         Plan:      Return in about 4 weeks (around 5/19/2021) for f/u Echo, mood.     Orders Placed This Encounter   Procedures    Iron And TIBC     Standing Status:   Future     Number of Occurrences:   1     Standing Expiration Date:   4/21/2022     Order Specific Question:   Is Patient Fasting? Answer:   n/a     Order Specific Question:   No of Hours? Answer:   n/a    Ferritin     Standing Status:   Future     Number of Occurrences:   1     Standing Expiration Date:   4/21/2022    CBC Auto Differential     Standing Status:   Future     Number of Occurrences:   1     Standing Expiration Date:   4/21/2022    RAMONA Screen With Reflex     Standing Status:   Future     Number of Occurrences:   1     Standing Expiration Date:   4/21/2022    Echocardiogram complete     Standing Status:   Future     Number of Occurrences:   1     Standing Expiration Date:   6/20/2021     Order Specific Question:   Reason for exam:     Answer:   having more sob on exertion     Orders Placed This Encounter   Medications    fexofenadine (ALLEGRA) 180 MG tablet     Sig: Take 1 tablet by mouth daily     Dispense:  90 tablet     Refill:  1       Patient given educational materials - see patient instructions. Discussed use, benefit, and side effects of prescribed medications. All patient questions answered. Pt voiced understanding. Reviewed health maintenance.             Electronically signed by Cliff Parra DO on 5/2/2021 at 11:38 PM

## 2021-04-22 NOTE — TELEPHONE ENCOUNTER
From: Willian Do  To: Kedar Liu DO  Sent: 4/21/2021 9:06 PM EDT  Subject: Visit Jean Carlos Cabrera were you going to schedule a sleep study for me? I know we had alot going on.   Thank you  Saumya Steen

## 2021-04-26 ENCOUNTER — PATIENT MESSAGE (OUTPATIENT)
Dept: FAMILY MEDICINE CLINIC | Age: 56
End: 2021-04-26

## 2021-04-26 ENCOUNTER — HOSPITAL ENCOUNTER (OUTPATIENT)
Dept: LAB | Age: 56
Discharge: HOME | End: 2021-04-26
Payer: MEDICARE

## 2021-04-26 ENCOUNTER — HOSPITAL ENCOUNTER (OUTPATIENT)
Dept: NON INVASIVE DIAGNOSTICS | Age: 56
Discharge: HOME | End: 2021-04-26
Payer: MEDICARE

## 2021-04-26 DIAGNOSIS — Z84.0 FAMILY HISTORY OF LUPUS ERYTHEMATOSUS: ICD-10-CM

## 2021-04-26 DIAGNOSIS — E78.00 PURE HYPERCHOLESTEROLEMIA: ICD-10-CM

## 2021-04-26 DIAGNOSIS — R06.02 SOB (SHORTNESS OF BREATH) ON EXERTION: ICD-10-CM

## 2021-04-26 DIAGNOSIS — R79.89 ELEVATED SERUM CREATININE: ICD-10-CM

## 2021-04-26 DIAGNOSIS — D64.9 ANEMIA, UNSPECIFIED TYPE: ICD-10-CM

## 2021-04-26 DIAGNOSIS — E03.9 HYPOTHYROIDISM, UNSPECIFIED TYPE: ICD-10-CM

## 2021-04-26 DIAGNOSIS — I10 ESSENTIAL HYPERTENSION: ICD-10-CM

## 2021-04-26 LAB
ABSOLUTE EOS #: 0.47 K/UL (ref 0–0.44)
ABSOLUTE IMMATURE GRANULOCYTE: 0.05 K/UL (ref 0–0.3)
ABSOLUTE LYMPH #: 2.63 K/UL (ref 1.1–3.7)
ABSOLUTE MONO #: 0.84 K/UL (ref 0.1–1.2)
ALBUMIN SERPL-MCNC: 4.5 G/DL (ref 3.5–5.2)
ALBUMIN/GLOBULIN RATIO: 1.5 (ref 1–2.5)
ALP BLD-CCNC: 86 U/L (ref 35–104)
ALT SERPL-CCNC: 17 U/L (ref 5–33)
ANION GAP SERPL CALCULATED.3IONS-SCNC: 12 MMOL/L (ref 9–17)
AST SERPL-CCNC: 22 U/L
BASOPHILS # BLD: 1 % (ref 0–2)
BASOPHILS ABSOLUTE: 0.11 K/UL (ref 0–0.2)
BILIRUB SERPL-MCNC: 0.11 MG/DL (ref 0.3–1.2)
BUN BLDV-MCNC: 30 MG/DL (ref 6–20)
BUN/CREAT BLD: 22 (ref 9–20)
CALCIUM SERPL-MCNC: 9.5 MG/DL (ref 8.6–10.4)
CHLORIDE BLD-SCNC: 101 MMOL/L (ref 98–107)
CHOLESTEROL/HDL RATIO: 4.1
CHOLESTEROL: 210 MG/DL
CO2: 27 MMOL/L (ref 20–31)
CREAT SERPL-MCNC: 1.38 MG/DL (ref 0.5–0.9)
DIFFERENTIAL TYPE: ABNORMAL
EOSINOPHILS RELATIVE PERCENT: 5 % (ref 1–4)
FERRITIN: 13 UG/L (ref 13–150)
GFR AFRICAN AMERICAN: 48 ML/MIN
GFR NON-AFRICAN AMERICAN: 40 ML/MIN
GFR SERPL CREATININE-BSD FRML MDRD: ABNORMAL ML/MIN/{1.73_M2}
GFR SERPL CREATININE-BSD FRML MDRD: ABNORMAL ML/MIN/{1.73_M2}
GLUCOSE BLD-MCNC: 139 MG/DL (ref 70–99)
HCT VFR BLD CALC: 37.2 % (ref 36.3–47.1)
HDLC SERPL-MCNC: 51 MG/DL
HEMOGLOBIN: 11.5 G/DL (ref 11.9–15.1)
IMMATURE GRANULOCYTES: 1 %
IRON SATURATION: 5 % (ref 20–55)
IRON: 27 UG/DL (ref 37–145)
LDL CHOLESTEROL: 105 MG/DL (ref 0–130)
LV EF: 55 %
LVEF MODALITY: NORMAL
LYMPHOCYTES # BLD: 25 % (ref 24–43)
MCH RBC QN AUTO: 26.3 PG (ref 25.2–33.5)
MCHC RBC AUTO-ENTMCNC: 30.9 G/DL (ref 25.2–33.5)
MCV RBC AUTO: 85.1 FL (ref 82.6–102.9)
MONOCYTES # BLD: 8 % (ref 3–12)
NRBC AUTOMATED: 0 PER 100 WBC
PDW BLD-RTO: 15 % (ref 11.8–14.4)
PLATELET # BLD: 340 K/UL (ref 138–453)
PLATELET ESTIMATE: ABNORMAL
PMV BLD AUTO: 8.4 FL (ref 8.1–13.5)
POTASSIUM SERPL-SCNC: 3.9 MMOL/L (ref 3.7–5.3)
RBC # BLD: 4.37 M/UL (ref 3.95–5.11)
RBC # BLD: ABNORMAL 10*6/UL
SEG NEUTROPHILS: 60 % (ref 36–65)
SEGMENTED NEUTROPHILS ABSOLUTE COUNT: 6.35 K/UL (ref 1.5–8.1)
SODIUM BLD-SCNC: 140 MMOL/L (ref 135–144)
TOTAL IRON BINDING CAPACITY: 498 UG/DL (ref 250–450)
TOTAL PROTEIN: 7.5 G/DL (ref 6.4–8.3)
TRIGL SERPL-MCNC: 268 MG/DL
UNSATURATED IRON BINDING CAPACITY: 471 UG/DL (ref 112–347)
VLDLC SERPL CALC-MCNC: ABNORMAL MG/DL (ref 1–30)
WBC # BLD: 10.5 K/UL (ref 3.5–11.3)
WBC # BLD: ABNORMAL 10*3/UL

## 2021-04-26 PROCEDURE — 83550 IRON BINDING TEST: CPT

## 2021-04-26 PROCEDURE — 85025 COMPLETE CBC W/AUTO DIFF WBC: CPT

## 2021-04-26 PROCEDURE — 93306 TTE W/DOPPLER COMPLETE: CPT

## 2021-04-26 PROCEDURE — 80061 LIPID PANEL: CPT

## 2021-04-26 PROCEDURE — 82728 ASSAY OF FERRITIN: CPT

## 2021-04-26 PROCEDURE — 83540 ASSAY OF IRON: CPT

## 2021-04-26 PROCEDURE — 86038 ANTINUCLEAR ANTIBODIES: CPT

## 2021-04-26 PROCEDURE — 36415 COLL VENOUS BLD VENIPUNCTURE: CPT

## 2021-04-26 PROCEDURE — 80053 COMPREHEN METABOLIC PANEL: CPT

## 2021-04-26 NOTE — TELEPHONE ENCOUNTER
From: Theador Essex  To: Elizabeth Hernandez DO  Sent: 4/26/2021 11:30 AM EDT  Subject: Test Results Question    Dr. Muniz Cornea  I saw my labs from today and am VERY concerned about some of the results. Specifically BUN Creatinine along with my symptoms. Could i be in ARF???  I realize i have an appt. End of month, which i am worried is not immediate enough? ?? I could just be making a mountain. ..   Thank you  Donna Choudhary  [phone]

## 2021-04-27 ENCOUNTER — APPOINTMENT (OUTPATIENT)
Dept: CT IMAGING | Age: 56
End: 2021-04-27
Payer: MEDICARE

## 2021-04-27 ENCOUNTER — APPOINTMENT (OUTPATIENT)
Dept: GENERAL RADIOLOGY | Age: 56
End: 2021-04-27
Payer: MEDICARE

## 2021-04-27 ENCOUNTER — TELEPHONE (OUTPATIENT)
Dept: FAMILY MEDICINE CLINIC | Age: 56
End: 2021-04-27

## 2021-04-27 ENCOUNTER — HOSPITAL ENCOUNTER (EMERGENCY)
Age: 56
Discharge: HOME | End: 2021-04-27
Attending: EMERGENCY MEDICINE
Payer: MEDICARE

## 2021-04-27 VITALS
WEIGHT: 165 LBS | TEMPERATURE: 97.3 F | SYSTOLIC BLOOD PRESSURE: 118 MMHG | OXYGEN SATURATION: 97 % | BODY MASS INDEX: 31.18 KG/M2 | HEART RATE: 93 BPM | RESPIRATION RATE: 12 BRPM | DIASTOLIC BLOOD PRESSURE: 71 MMHG

## 2021-04-27 DIAGNOSIS — R10.9 FLANK PAIN: Primary | ICD-10-CM

## 2021-04-27 LAB
-: ABNORMAL
ABSOLUTE EOS #: 0.31 K/UL (ref 0–0.44)
ABSOLUTE IMMATURE GRANULOCYTE: 0.04 K/UL (ref 0–0.3)
ABSOLUTE LYMPH #: 2.11 K/UL (ref 1.1–3.7)
ABSOLUTE MONO #: 0.55 K/UL (ref 0.1–1.2)
ALBUMIN SERPL-MCNC: 4.6 G/DL (ref 3.5–5.2)
ALBUMIN/GLOBULIN RATIO: 1.8 (ref 1–2.5)
ALP BLD-CCNC: 72 U/L (ref 35–104)
ALT SERPL-CCNC: 15 U/L (ref 5–33)
AMORPHOUS: ABNORMAL
ANION GAP SERPL CALCULATED.3IONS-SCNC: 11 MMOL/L (ref 9–17)
ANTI-NUCLEAR ANTIBODY (ANA): NEGATIVE
AST SERPL-CCNC: 17 U/L
BACTERIA: ABNORMAL
BASOPHILS # BLD: 1 % (ref 0–2)
BASOPHILS ABSOLUTE: 0.07 K/UL (ref 0–0.2)
BILIRUB SERPL-MCNC: 0.18 MG/DL (ref 0.3–1.2)
BILIRUBIN URINE: NEGATIVE
BUN BLDV-MCNC: 24 MG/DL (ref 6–20)
BUN/CREAT BLD: 21 (ref 9–20)
CALCIUM SERPL-MCNC: 9.8 MG/DL (ref 8.6–10.4)
CASTS UA: ABNORMAL /LPF (ref 0–2)
CHLORIDE BLD-SCNC: 103 MMOL/L (ref 98–107)
CO2: 28 MMOL/L (ref 20–31)
COLOR: NORMAL
COMMENT UA: NORMAL
CREAT SERPL-MCNC: 1.17 MG/DL (ref 0.5–0.9)
CRYSTALS, UA: ABNORMAL /HPF
DIFFERENTIAL TYPE: ABNORMAL
EOSINOPHILS RELATIVE PERCENT: 4 % (ref 1–4)
EPITHELIAL CELLS UA: ABNORMAL /HPF (ref 0–5)
GFR AFRICAN AMERICAN: 58 ML/MIN
GFR NON-AFRICAN AMERICAN: 48 ML/MIN
GFR SERPL CREATININE-BSD FRML MDRD: ABNORMAL ML/MIN/{1.73_M2}
GFR SERPL CREATININE-BSD FRML MDRD: ABNORMAL ML/MIN/{1.73_M2}
GLUCOSE BLD-MCNC: 102 MG/DL (ref 70–99)
GLUCOSE URINE: NEGATIVE
HCT VFR BLD CALC: 32.7 % (ref 36.3–47.1)
HEMOGLOBIN: 10.3 G/DL (ref 11.9–15.1)
IMMATURE GRANULOCYTES: 1 %
KETONES, URINE: NEGATIVE
LEUKOCYTE ESTERASE, URINE: NEGATIVE
LIPASE: 33 U/L (ref 13–60)
LYMPHOCYTES # BLD: 26 % (ref 24–43)
MCH RBC QN AUTO: 26.7 PG (ref 25.2–33.5)
MCHC RBC AUTO-ENTMCNC: 31.5 G/DL (ref 25.2–33.5)
MCV RBC AUTO: 84.7 FL (ref 82.6–102.9)
MONOCYTES # BLD: 7 % (ref 3–12)
MUCUS: ABNORMAL
NITRITE, URINE: NEGATIVE
NRBC AUTOMATED: 0 PER 100 WBC
OTHER OBSERVATIONS UA: ABNORMAL
PDW BLD-RTO: 15.1 % (ref 11.8–14.4)
PH UA: 5.5 (ref 5–6)
PLATELET # BLD: 277 K/UL (ref 138–453)
PLATELET ESTIMATE: ABNORMAL
PMV BLD AUTO: 8.2 FL (ref 8.1–13.5)
POC TROPONIN I: 0.02 NG/ML (ref 0–0.08)
POC TROPONIN INTERP: NORMAL
POTASSIUM SERPL-SCNC: 3.9 MMOL/L (ref 3.7–5.3)
PROTEIN UA: NEGATIVE
RBC # BLD: 3.86 M/UL (ref 3.95–5.11)
RBC # BLD: ABNORMAL 10*6/UL
RBC UA: ABNORMAL /HPF (ref 0–4)
RENAL EPITHELIAL, UA: ABNORMAL /HPF
SEG NEUTROPHILS: 61 % (ref 36–65)
SEGMENTED NEUTROPHILS ABSOLUTE COUNT: 5.17 K/UL (ref 1.5–8.1)
SODIUM BLD-SCNC: 142 MMOL/L (ref 135–144)
SPECIFIC GRAVITY UA: 1.02 (ref 1.01–1.02)
TOTAL PROTEIN: 7.2 G/DL (ref 6.4–8.3)
TRICHOMONAS: ABNORMAL
TURBIDITY: NORMAL
URINE HGB: NEGATIVE
UROBILINOGEN, URINE: NORMAL
WBC # BLD: 8.3 K/UL (ref 3.5–11.3)
WBC # BLD: ABNORMAL 10*3/UL
WBC UA: ABNORMAL /HPF (ref 0–4)
YEAST: ABNORMAL

## 2021-04-27 PROCEDURE — 84484 ASSAY OF TROPONIN QUANT: CPT

## 2021-04-27 PROCEDURE — 93005 ELECTROCARDIOGRAM TRACING: CPT | Performed by: EMERGENCY MEDICINE

## 2021-04-27 PROCEDURE — 96374 THER/PROPH/DIAG INJ IV PUSH: CPT

## 2021-04-27 PROCEDURE — 85025 COMPLETE CBC W/AUTO DIFF WBC: CPT

## 2021-04-27 PROCEDURE — 74176 CT ABD & PELVIS W/O CONTRAST: CPT

## 2021-04-27 PROCEDURE — 80053 COMPREHEN METABOLIC PANEL: CPT

## 2021-04-27 PROCEDURE — 71045 X-RAY EXAM CHEST 1 VIEW: CPT

## 2021-04-27 PROCEDURE — 6360000002 HC RX W HCPCS

## 2021-04-27 PROCEDURE — 83690 ASSAY OF LIPASE: CPT

## 2021-04-27 PROCEDURE — 99284 EMERGENCY DEPT VISIT MOD MDM: CPT

## 2021-04-27 PROCEDURE — 2580000003 HC RX 258: Performed by: EMERGENCY MEDICINE

## 2021-04-27 PROCEDURE — 81001 URINALYSIS AUTO W/SCOPE: CPT

## 2021-04-27 RX ORDER — 0.9 % SODIUM CHLORIDE 0.9 %
1000 INTRAVENOUS SOLUTION INTRAVENOUS ONCE
Status: COMPLETED | OUTPATIENT
Start: 2021-04-27 | End: 2021-04-27

## 2021-04-27 RX ORDER — ONDANSETRON 2 MG/ML
4 INJECTION INTRAMUSCULAR; INTRAVENOUS ONCE
Status: COMPLETED | OUTPATIENT
Start: 2021-04-27 | End: 2021-04-27

## 2021-04-27 RX ORDER — ONDANSETRON 2 MG/ML
INJECTION INTRAMUSCULAR; INTRAVENOUS
Status: COMPLETED
Start: 2021-04-27 | End: 2021-04-27

## 2021-04-27 RX ADMIN — ONDANSETRON 4 MG: 2 INJECTION INTRAMUSCULAR; INTRAVENOUS at 14:57

## 2021-04-27 RX ADMIN — SODIUM CHLORIDE 1000 ML: 9 INJECTION, SOLUTION INTRAVENOUS at 14:57

## 2021-04-27 ASSESSMENT — ENCOUNTER SYMPTOMS
COUGH: 0
SHORTNESS OF BREATH: 1
VOMITING: 0
BACK PAIN: 0
NAUSEA: 1
BLOOD IN STOOL: 0
CONSTIPATION: 0
DIARRHEA: 0
ABDOMINAL PAIN: 0
EYE PAIN: 0

## 2021-04-27 ASSESSMENT — PAIN DESCRIPTION - DESCRIPTORS: DESCRIPTORS: THROBBING;ACHING

## 2021-04-27 ASSESSMENT — PAIN DESCRIPTION - PROGRESSION: CLINICAL_PROGRESSION: NOT CHANGED

## 2021-04-27 ASSESSMENT — PAIN DESCRIPTION - ORIENTATION: ORIENTATION: LEFT

## 2021-04-27 NOTE — TELEPHONE ENCOUNTER
The remaining lab results that were still in process are now finalized but pt is currently in [facility].

## 2021-04-27 NOTE — ED PROVIDER NOTES
[address] [facility]  eMERGENCY dEPARTMENT eNCOUnter      Pt Name: Shawn Storey  MRN: [id number]  [place] 1965  Date of evaluation: 4/27/2021      CHIEF COMPLAINT       Chief Complaint   Patient presents with    Flank Pain     left    Nausea    Back Pain    Anxiety    Shortness of Breath    Abnormal Lab     elevated creat. HISTORY OF PRESENT ILLNESS    Shawn Storey is a 55 y.o. female who presents with chief complaint of not feeling well patient said she had labs done yesterday her creatinine was elevated she is been try to drink more fluid she is developed some left-sided flank pain occasional nausea she had an echo yesterday as well. Patient does have a history of kidney stones she has noticed decreased urination of the day but increased during the night she is also noticed intermittent peripheral edema there is been no fevers or chills no cough  She does have a history of anxiety also COPD    REVIEW OF SYSTEMS         Review of Systems   Constitutional: Negative for chills and fever. HENT: Negative for congestion and ear pain. Eyes: Negative for pain and visual disturbance. Respiratory: Positive for shortness of breath. Negative for cough. Cardiovascular: Negative for chest pain, palpitations and leg swelling. Gastrointestinal: Positive for nausea. Negative for abdominal pain, blood in stool, constipation, diarrhea and vomiting. Endocrine: Negative for polydipsia and polyuria. Genitourinary: Positive for decreased urine volume and flank pain. Negative for difficulty urinating, dysuria and frequency. Musculoskeletal: Negative for back pain, joint swelling, myalgias, neck pain and neck stiffness. Skin: Negative for rash. Neurological: Negative for dizziness, weakness and headaches. Hematological: Negative for adenopathy. Does not bruise/bleed easily. Psychiatric/Behavioral: Negative for confusion, self-injury and suicidal ideas. The patient is nervous/anxious. PAST MEDICAL HISTORY    has a past medical history of Anxiety, [address] cancer [address] COPD (chronic obstructive pulmonary disease) ([facility] Depression, Headache(784.0), History of alcoholism ([facility] [address] Hypertension, Hypothyroidism, Kidney stone, Panic attack, and PTSD (post-traumatic stress disorder). SURGICAL HISTORY      has a past surgical history that includes Eye surgery; tumor removal (1999); Appendectomy (1977); Cholecystectomy (2014); Tubal ligation (2001); lumbar laminectomy (2006); Dilation and curettage of uterus; Breast lumpectomy (Left, 2014); Breast biopsy (Right, 2015); alcon and bso (cervix removed) (2015); ventral hernia repair (2019); hernia repair (01/2020); fracture surgery (Left); hernia repair (N/A, 7/22/2020); ventral hernia repair (N/A, 10/21/2020); and Colonoscopy (2019).     CURRENT MEDICATIONS       Previous Medications    ALBUTEROL SULFATE  (90 BASE) MCG/ACT INHALER    Inhale 1-2 puffs into the lungs every 6 hours as needed for Wheezing or Shortness of Breath    AMLODIPINE (NORVASC) 10 MG TABLET    TAKE ONE TABLET BY MOUTH DAILY    ATORVASTATIN (LIPITOR) 10 MG TABLET    TAKE ONE TABLET BY MOUTH DAILY    CHOLECALCIFEROL (VITAMIN D) 50 MCG (2000 UT) CAPS CAPSULE    Take 2,000 Units by mouth daily    DOCUSATE SODIUM (COLACE) 100 MG CAPSULE    Take 1 capsule by mouth 2 times daily    DULOXETINE (CYMBALTA) 30 MG EXTENDED RELEASE CAPSULE    Take 30 mg by mouth daily    DULOXETINE (CYMBALTA) 60 MG EXTENDED RELEASE CAPSULE    Take 60 mg by mouth daily    FEXOFENADINE (ALLEGRA) 180 MG TABLET    Take 1 tablet by mouth daily    FLUTICASONE (FLONASE) 50 MCG/ACT NASAL SPRAY    2 sprays by Nasal route daily    GABAPENTIN (NEURONTIN) 100 MG CAPSULE    TAKE ONE CAPSULE BY MOUTH THREE TIMES A DAY    HYDROXYZINE (ATARAX) 25 MG TABLET    Take 50 mg by mouth every 6 hours as needed for Anxiety     LAMOTRIGINE (LAMICTAL) 200 MG TABLET    Take 200 mg by mouth daily     LEVOTHYROXINE (SYNTHROID) 100 MCG HISTORY: shortness of breath   TECHNOLOGIST PROVIDED HISTORY:   shortness of breath   Reason for Exam: shortness of breath   Acuity: Acute   Type of Exam: Initial       FINDINGS:   The lungs are without acute focal process.  There is no effusion or   pneumothorax. The cardiomediastinal silhouette is without acute process.           Impression   No acute process        CT OF THE ABDOMEN AND PELVIS WITHOUT CONTRAST 4/27/2021 3:13 pm       TECHNIQUE:   CT of the abdomen and pelvis was performed without the administration of   intravenous contrast. Multiplanar reformatted images are provided for review. Dose modulation, iterative reconstruction, and/or weight based adjustment of   the mA/kV was utilized to reduce the radiation dose to as low as reasonably   achievable.       COMPARISON:   03/31/2021       HISTORY:   ORDERING SYSTEM PROVIDED HISTORY: Left flank pain   TECHNOLOGIST PROVIDED HISTORY:       Left flank pain   Decision Support Exception->Emergency Medical Condition (MA)   Is the patient pregnant?->No   Reason for Exam: Left flank pain, nausea, SOB, back pain   Acuity: Acute   Type of Exam: Initial       FINDINGS:   Lower Chest: Lung bases clear       Organs: Unremarkable liver, spleen, adrenals, and pancreas on this unenhanced   study.  Status post cholecystectomy.  No biliary dilatation.  Nonobstructing   stones in the bilateral kidneys measuring up to 0.2 cm in diameter.  No   radiopaque stone in the bilateral ureters.       GI/Bowel: No CT evidence of acute appendicitis.  Small to moderate amount of   retained stool in the colon with no evidence of bowel obstruction.  A few   sigmoid diverticula.  No acute diverticulitis.       Pelvis: Status posthysterectomy.  No adnexal mass.  Grossly unremarkable   urinary bladder.       Peritoneum/Retroperitoneum: Vascular calcification with no abdominal aortic   aneurysm.  No free air or free fluid.  No adenopathy.       Bones/Soft Tissues: Stable scarring in the on Disposition    Stable    PATIENT REFERRED TO:  Romelia Osler, DO  [address] [id number]  [phone]            DISCHARGE MEDICATIONS:  New Prescriptions    No medications on file       (Please note that portions of this note were completed with a voice recognition program.  Efforts were made to edit the dictations but occasionally words are mis-transcribed.)    Phelps MD, F.A.A.E.M.   Attending Emergency Physician                          Summer Epps MD  04/27/21 [id number]

## 2021-04-28 ENCOUNTER — PRE-PROCEDURE TELEPHONE (OUTPATIENT)
Dept: PREADMISSION TESTING | Age: 56
End: 2021-04-28

## 2021-04-28 ENCOUNTER — CARE COORDINATION (OUTPATIENT)
Dept: CARE COORDINATION | Age: 56
End: 2021-04-28

## 2021-04-28 LAB
EKG ATRIAL RATE: 94 BPM
EKG P AXIS: 76 DEGREES
EKG P-R INTERVAL: 160 MS
EKG Q-T INTERVAL: 390 MS
EKG QRS DURATION: 80 MS
EKG QTC CALCULATION (BAZETT): 487 MS
EKG R AXIS: 72 DEGREES
EKG T AXIS: 41 DEGREES
EKG VENTRICULAR RATE: 94 BPM

## 2021-04-28 NOTE — TELEPHONE ENCOUNTER
Voicemail message left regarding a covid swab appt for May 6th at 5806. Instructed to call [phone] and confirm this appt time.

## 2021-04-28 NOTE — CARE COORDINATION
Rodriguez Cabezas was seen at [facility] ED 4/27/2021- flank pain, back pain, SOB, anxiety. She is eligible for [facility].     4/28/2021- 3:10 pm Left message requesting return call. 4/29/2021- 9:18 am Left message requesting return call re:subsequent ER F/U call.

## 2021-05-02 ASSESSMENT — ENCOUNTER SYMPTOMS: ABDOMINAL PAIN: 1

## 2021-05-03 ENCOUNTER — PRE-PROCEDURE TELEPHONE (OUTPATIENT)
Dept: PREADMISSION TESTING | Age: 56
End: 2021-05-03

## 2021-05-03 NOTE — TELEPHONE ENCOUNTER
Voicemail message left regarding a covid swab appt for May 6th at 1045. Instructed to call [phone] and confirm this appt time.

## 2021-05-03 NOTE — TELEPHONE ENCOUNTER
Patient is home from ER now. Did have small stones.  Please advise on labs that patient is inquiring about

## 2021-05-06 ENCOUNTER — HOSPITAL ENCOUNTER (OUTPATIENT)
Dept: PREADMISSION TESTING | Age: 56
Setting detail: SPECIMEN
Discharge: HOME | End: 2021-05-10
Payer: MEDICARE

## 2021-05-06 ENCOUNTER — CARE COORDINATION (OUTPATIENT)
Dept: CARE COORDINATION | Age: 56
End: 2021-05-06

## 2021-05-06 DIAGNOSIS — Z11.59 ENCOUNTER FOR SCREENING FOR OTHER VIRAL DISEASES: Primary | ICD-10-CM

## 2021-05-06 PROCEDURE — U0003 INFECTIOUS AGENT DETECTION BY NUCLEIC ACID (DNA OR RNA); SEVERE ACUTE RESPIRATORY SYNDROME CORONAVIRUS 2 (SARS-COV-2) (CORONAVIRUS DISEASE [COVID-19]), AMPLIFIED PROBE TECHNIQUE, MAKING USE OF HIGH THROUGHPUT TECHNOLOGIES AS DESCRIBED BY CMS-2020-01-R: HCPCS

## 2021-05-06 PROCEDURE — U0005 INFEC AGEN DETEC AMPLI PROBE: HCPCS

## 2021-05-06 NOTE — CARE COORDINATION
Linda Diallo was seen at [facility] ED 4/27/2021- flank pain, back pain, SOB, anxiety. She is eligible for [facility].      4/28/2021- 3:10 pm Left message requesting return call. 4/29/2021- 9:18 am Left message requesting return call re:subsequent ER F/U call  5/6/2021- 9:41 am Left message requesting return call.      Future Appointments   Date Time Provider Rupesh Michaels   5/6/2021 10:45 AM SCHEDULE, [address] [place] [address] COVID19 PAT SCREENING [facility] PAT [facility]   5/24/2021  3:40 PM DO ARIC [facility]cera [facility]   6/29/2021 11:00 AM DO ARIC Mancera [facility]

## 2021-05-07 LAB
SARS-COV-2: NORMAL
SARS-COV-2: NOT DETECTED
SOURCE: NORMAL

## 2021-05-11 ENCOUNTER — ANESTHESIA EVENT (OUTPATIENT)
Dept: OPERATING ROOM | Age: 56
End: 2021-05-11
Payer: MEDICARE

## 2021-05-11 ENCOUNTER — HOSPITAL ENCOUNTER (OUTPATIENT)
Age: 56
Setting detail: OUTPATIENT SURGERY
Discharge: HOME | End: 2021-05-11
Attending: SURGERY | Admitting: SURGERY
Payer: MEDICARE

## 2021-05-11 ENCOUNTER — ANESTHESIA (OUTPATIENT)
Dept: OPERATING ROOM | Age: 56
End: 2021-05-11
Payer: MEDICARE

## 2021-05-11 VITALS
SYSTOLIC BLOOD PRESSURE: 132 MMHG | OXYGEN SATURATION: 99 % | BODY MASS INDEX: 30.21 KG/M2 | WEIGHT: 160 LBS | HEIGHT: 61 IN | RESPIRATION RATE: 16 BRPM | TEMPERATURE: 97.5 F | HEART RATE: 95 BPM | DIASTOLIC BLOOD PRESSURE: 64 MMHG

## 2021-05-11 VITALS — OXYGEN SATURATION: 100 % | DIASTOLIC BLOOD PRESSURE: 61 MMHG | SYSTOLIC BLOOD PRESSURE: 128 MMHG

## 2021-05-11 DIAGNOSIS — D64.9 ANEMIA, UNSPECIFIED TYPE: Primary | ICD-10-CM

## 2021-05-11 DIAGNOSIS — I10 ESSENTIAL HYPERTENSION: ICD-10-CM

## 2021-05-11 DIAGNOSIS — Z13.1 SCREENING FOR DIABETES MELLITUS: ICD-10-CM

## 2021-05-11 DIAGNOSIS — N28.9 ABNORMAL RENAL FUNCTION: ICD-10-CM

## 2021-05-11 PROCEDURE — 2580000003 HC RX 258: Performed by: NURSE ANESTHETIST, CERTIFIED REGISTERED

## 2021-05-11 PROCEDURE — 3700000001 HC ADD 15 MINUTES (ANESTHESIA): Performed by: SURGERY

## 2021-05-11 PROCEDURE — 7100000010 HC PHASE II RECOVERY - FIRST 15 MIN: Performed by: SURGERY

## 2021-05-11 PROCEDURE — 2709999900 HC NON-CHARGEABLE SUPPLY: Performed by: SURGERY

## 2021-05-11 PROCEDURE — 2500000003 HC RX 250 WO HCPCS: Performed by: NURSE ANESTHETIST, CERTIFIED REGISTERED

## 2021-05-11 PROCEDURE — 45380 COLONOSCOPY AND BIOPSY: CPT | Performed by: SURGERY

## 2021-05-11 PROCEDURE — 6360000002 HC RX W HCPCS: Performed by: NURSE ANESTHETIST, CERTIFIED REGISTERED

## 2021-05-11 PROCEDURE — 3609010400 HC COLONOSCOPY POLYPECTOMY HOT BIOPSY: Performed by: SURGERY

## 2021-05-11 PROCEDURE — 88305 TISSUE EXAM BY PATHOLOGIST: CPT

## 2021-05-11 PROCEDURE — 3700000000 HC ANESTHESIA ATTENDED CARE: Performed by: SURGERY

## 2021-05-11 RX ORDER — LIDOCAINE HYDROCHLORIDE 40 MG/ML
INJECTION, SOLUTION RETROBULBAR; TOPICAL PRN
Status: DISCONTINUED | OUTPATIENT
Start: 2021-05-11 | End: 2021-05-11 | Stop reason: SDUPTHER

## 2021-05-11 RX ORDER — SODIUM CHLORIDE, SODIUM LACTATE, POTASSIUM CHLORIDE, CALCIUM CHLORIDE 600; 310; 30; 20 MG/100ML; MG/100ML; MG/100ML; MG/100ML
INJECTION, SOLUTION INTRAVENOUS CONTINUOUS PRN
Status: DISCONTINUED | OUTPATIENT
Start: 2021-05-11 | End: 2021-05-11 | Stop reason: SDUPTHER

## 2021-05-11 RX ORDER — PROPOFOL 10 MG/ML
INJECTION, EMULSION INTRAVENOUS PRN
Status: DISCONTINUED | OUTPATIENT
Start: 2021-05-11 | End: 2021-05-11 | Stop reason: SDUPTHER

## 2021-05-11 RX ORDER — FERROUS SULFATE 325(65) MG
325 TABLET ORAL
Qty: 30 TABLET | Refills: 5 | Status: CANCELLED | OUTPATIENT
Start: 2021-05-11

## 2021-05-11 RX ADMIN — SODIUM CHLORIDE, POTASSIUM CHLORIDE, SODIUM LACTATE AND CALCIUM CHLORIDE: 600; 310; 30; 20 INJECTION, SOLUTION INTRAVENOUS at 09:28

## 2021-05-11 RX ADMIN — LIDOCAINE HYDROCHLORIDE 80 MG: 40 INJECTION, SOLUTION RETROBULBAR; TOPICAL at 09:32

## 2021-05-11 RX ADMIN — PROPOFOL 250 MG: 10 INJECTION, EMULSION INTRAVENOUS at 09:32

## 2021-05-11 NOTE — ANESTHESIA POSTPROCEDURE EVALUATION
Department of Anesthesiology  Postprocedure Note    Patient: Tennille Castro  MRN: [id number]  YOB: 1965  Date of evaluation: 5/11/2021  Time:  9:53 AM     Procedure Summary     Date: 05/11/21 Room / Location: [address]    Anesthesia Start: 8333 Anesthesia Stop: 2945    Procedure: COLONOSCOPY POLYPECTOMY HOT BIOPSY (N/A ) Diagnosis: (abdominal pain, change in bowel habits)    Surgeons: Ezequiel Peterson MD Responsible Provider: STAR Amaya CRNA    Anesthesia Type: general, TIVA ASA Status: 2          Anesthesia Type: general, TIVA    Joanna Phase I: Joanna Score: 10    Joanna Phase II:      Last vitals: Reviewed and per EMR flowsheets.        Anesthesia Post Evaluation    Patient location during evaluation: PACU  Patient participation: complete - patient participated  Level of consciousness: awake and alert  Pain score: 0  Airway patency: patent  Nausea & Vomiting: no nausea and no vomiting  Complications: no  Cardiovascular status: blood pressure returned to baseline and hemodynamically stable  Respiratory status: acceptable  Hydration status: euvolemic

## 2021-05-11 NOTE — H&P
Manjinder Hedrick is a 48 y.o. female              CC:     .Abdominal Cramping and Abdominal Pain        HISTORY OF PRESENT ILLNESS:     Patient is a 52-year-old female who had several hernia repairs at outside facilities. Patient's hernia history includes 2019 incisional hernia repair, 2020 recurrent incisional hernia repair. Then we saw her and did a repair of recurrent ventral hernia with implantation of ventral light ST mesh and Bard soft vest with bilateral rectus abdominis advancement flap or the entire procedure and removal of the mesh from the old hernia. She was doing okay with that except she had some pain which was being treated with Neurontin. However 2 weeks ago she started to develop these change in her stools. She had thin stools she did not feel constipated and then the next day she would have another thin stool and could not get any more out. And then the third day she did have severe cramping extreme urgency she have a soft stool and then a blowout with liquid stool and she get very distended with nausea and vomiting. Then it would start over again with a thin stool the following day. Her last colonoscopy was in 2019 and had polyps. Her last CT scan was in December 2020 prior to the symptoms. She states that she is eating well. She did gain 5 pounds since her last visit. She is still smoking but has cut back dramatically.   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 Beatriz Willingham at [facility] - used mesh; had revision in 1/2020    [facility] N/A 10/21/2020     Open VENTRAL Hernia Repair w/ mesh & Component separation technique performed by Carol Mcdowell MD at [address] [place] [address]         Past Medical History        Past Medical History:   Diagnosis Date    Anxiety      Breast cancer ([facility] 2014     L side - lumpectomy and radiation; no chemo (was recommended to take Tamoxifen, but with her smoking, she declined due to family h/o CVA already).  Seeing Dr. Olinda Dash once yearly/    COPD (chronic obstructive pulmonary disease) ([facility]      Depression      Headache(784.0)      History of alcoholism ([facility] [address]       sober since 1/16/19    Hypertension      Hypothyroidism      Kidney stone       Has had lithotripsy x 1; had ureteral stent placement with removal x 1; had seen Dr. Carmine Conner PTSD (post-traumatic stress disorder)                  Current Outpatient Medications on File Prior to Visit   Medication Sig Dispense Refill    atorvastatin (LIPITOR) 10 MG tablet TAKE ONE TABLET BY MOUTH DAILY 90 tablet 0    levothyroxine (SYNTHROID) 100 MCG tablet TAKE ONE TABLET BY MOUTH DAILY 90 tablet 3    loratadine (CLARITIN) 10 MG tablet          amLODIPine (NORVASC) 10 MG tablet TAKE ONE TABLET BY MOUTH DAILY 30 tablet 5    lisinopril (PRINIVIL;ZESTRIL) 40 MG tablet TAKE ONE TABLET BY MOUTH DAILY 30 tablet 5    ondansetron (ZOFRAN) 4 MG tablet Take 1 tablet by mouth every 8 hours as needed for Nausea or Vomiting 20 tablet 0    Cholecalciferol (VITAMIN D) 50 MCG (2000 UT) CAPS capsule Take 2,000 Units by mouth daily        propranolol (INDERAL) 10 MG tablet Take 10 mg by mouth 3 times daily Indications: patient takes twice a day         hydrOXYzine (ATARAX) 25 MG tablet Take 25-30 mg by mouth every 6 hours as needed for Anxiety        loratadine (CLARITIN) 10 MG capsule murmurs  Abd:  Soft, there is some tenderness in the right upper quadrant just to the right of the umbilicus and in the right lower quadrant. These are point she said have been tender since that hernia surgery. There is no bulge or mass palpated. Abdomen is soft it is slightly distended or protuberant. Ext:  No edema, no cyanosis  Psych: reveals appropriate mood, memory and judgment,  Neuro:  Reveals no gross motor or sensory deficits,   Msk:  5/5 strength all 4 extremities, no joint tenderness              ASSESS MENT:     1. Change in bowels over the last 2 weeks with abdominal cramping and increased urgency and explosive diarrhea about every third day. 2.  Also has some tender spots on the abdomen which are probably related to the hernia repair and are not her acute problem. They have been there since the repair but they are slowly getting better. 3.  She did have a seroma in the subcu on her CT we may want to see that. PLAN:     1. We will work on medications for this stool changes such as increased water fiber stool softener and MiraLAX. 2.  We will get a CT scan to rule out or of evaluate the seroma and make sure there is no other problems from the hernia repair. Or any recurrence  3. We will do a colonoscopy to evaluate the change in bowels even though there was a scope over 2 years ago.

## 2021-05-11 NOTE — ANESTHESIA PRE PROCEDURE
Department of Anesthesiology  Preprocedure Note       Name:  Lester Tubbs   Age:  55 y.o.  :  1965                                          MRN:  [id number]         Date:  2021      Surgeon: Buster Aggarwal):  Star Sarah, MD    Procedure: Procedure(s):  COLONOSCOPY    Medications prior to admission:   Prior to Admission medications    Medication Sig Start Date End Date Taking?  Authorizing Provider   mirtazapine (REMERON) 7.5 MG tablet Take 7.5 mg by mouth nightly   Yes Historical Provider, MD   fexofenadine (ALLEGRA) 180 MG tablet Take 1 tablet by mouth daily 21  Yes Elmer Gobble Black, DO   gabapentin (NEURONTIN) 100 MG capsule TAKE ONE CAPSULE BY MOUTH THREE TIMES A DAY 21 Yes Nena Lush K Black, DO   atorvastatin (LIPITOR) 10 MG tablet TAKE ONE TABLET BY MOUTH DAILY 3/11/21  Yes Elmer Gobble Black, DO   levothyroxine (SYNTHROID) 100 MCG tablet TAKE ONE TABLET BY MOUTH DAILY 3/11/21  Yes Elmer Gobble Black, DO   amLODIPine (NORVASC) 10 MG tablet TAKE ONE TABLET BY MOUTH DAILY 20  Yes Elmer Gobble Black, DO   lisinopril (PRINIVIL;ZESTRIL) 40 MG tablet TAKE ONE TABLET BY MOUTH DAILY 20  Yes Elmer Gobble Black, DO   propranolol (INDERAL) 10 MG tablet Take 10 mg by mouth 3 times daily Indications: patient takes twice a day    Yes Historical Provider, MD   hydrOXYzine (ATARAX) 25 MG tablet Take 50 mg by mouth every 6 hours as needed for Anxiety    Yes Historical Provider, MD   albuterol sulfate  (90 Base) MCG/ACT inhaler Inhale 1-2 puffs into the lungs every 6 hours as needed for Wheezing or Shortness of Breath 20  Yes Elmer Gobble Black, DO   omeprazole (PRILOSEC) 10 MG delayed release capsule Take 2 capsules by mouth 2 times daily 20  Yes Elmer Gobble Black, DO   fluticasone (FLONASE) 50 MCG/ACT nasal spray 2 sprays by Nasal route daily 20  Yes Barabara , DO   DULoxetine (CYMBALTA) 30 MG extended release capsule Take 30 mg by mouth daily   Yes Historical Provider, MD   DULoxetine (CYMBALTA) 60 MG extended release capsule Take 60 mg by mouth daily   Yes Historical Provider, MD   lamoTRIgine (LAMICTAL) 200 MG tablet Take 200 mg by mouth daily    Yes Historical Provider, MD   Cholecalciferol (VITAMIN D) 50 MCG (2000 UT) CAPS capsule Take 2,000 Units by mouth daily    Historical Provider, MD   Multiple Vitamins-Minerals (MULTIVITAMIN ADULT PO) Take 1 tablet by mouth daily    Historical Provider, MD       Current medications:    No current facility-administered medications for this encounter. Allergies: Allergies   Allergen Reactions    Flomax [Tamsulosin Hcl] Swelling     Swelling of arms; however, also had rash and fever at the same time and was admitted at the time    Morphine Itching and Rash     Rash, itching    Saphris [Asenapine]     Bactrim [Sulfamethoxazole-Trimethoprim] Diarrhea and Nausea Only    Prednisone Other (See Comments)     Emotional changes, depression    Zyprexa [Olanzapine] Other (See Comments)     Made her feel like she wanted to \"jump out of my skin\"       Problem List:    Patient Active Problem List   Diagnosis Code    Severe episode of recurrent major depressive disorder, without psychotic features ([facility] F33.2    Bipolar 2 disorder ([facility] F31.81    Bipolar II disorder ([facility] F31.81    Postoperative pain G89.18    Status post repair of recurrent ventral hernia Z98.890, Z87.19    Chronic obstructive pulmonary disease ([facility] J44.9       Past Medical History:        Diagnosis Date    Anxiety     Breast cancer ([facility] 2014    L side - lumpectomy and radiation; no chemo (was recommended to take Tamoxifen, but with her smoking, she declined due to family h/o CVA already).  Seeing Dr. Patt Lennox once yearly/    COPD (chronic obstructive pulmonary disease) ([facility]     Depression     Headache(784.0)     History of alcoholism ([facility]     sober since 1/16/19    Hypertension     Hypothyroidism     Kidney stone     Has had lithotripsy x 1; had ureteral stent placement with removal x 1; had seen Dr. Carmen Giles Panic attack 04/15/2021    PTSD (post-traumatic stress disorder)        Past Surgical History:        Procedure Laterality Date    APPENDECTOMY  1977    BREAST BIOPSY Right 2015    excisional biopsy - Dr. Jocelyne Bauer Left 2014    breast CA - done at [facility]  2014    COLONOSCOPY  2019    polyps, Dr. Ender Kevin at [address]      multiple in her 19's   [address]      x 2 in her youth - was \"cross-eyed\"    FRACTURE SURGERY Left     hand    HERNIA REPAIR  01/2020    recurrent incisional hernia repair Dr. Sharda Tai N/A 7/22/2020    Laparoscopic Robotic Assisted Ventral Hernia Repair performed by Brendan Tay MD at [address]  2006    Dr. Diana Mason  2015    Dr. 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 Domenica Najjar at [facility] - used mesh; had revision in 1/2020    [facility] N/A 10/21/2020    Open VENTRAL Hernia Repair w/ mesh & Component separation technique performed by Brendan Tay MD at [facility] History:    Social History     Tobacco Use    Smoking status: Current Every Day Smoker     Packs/day: 0.50     Years: 37.00     Pack years: 18.50     Types: Cigarettes     Start date: 1982    Smokeless tobacco: Never Used    Tobacco comment: Will see PCP when ready to quit. Substance Use Topics    Alcohol use: No     Frequency: Never     Drinks per session: 1 or 2     Binge frequency: Never     Comment: [facility]                                Ready to quit: Not Answered  Counseling given: Not Answered  Comment: Will see PCP when ready to quit.         Vital Signs (Current):   Vitals:    05/11/21 0832   BP: 128/72   Pulse: 94   Resp: 14   Temp: 36.2 °C (97.2 °F) TempSrc: Temporal   SpO2: 98%   Weight: 160 lb (72.6 kg)   Height: 5' 1\" (1.549 m)                                              BP Readings from Last 3 Encounters:   05/11/21 128/72   04/27/21 118/71   04/21/21 120/68       NPO Status: Time of last liquid consumption: 1800                        Time of last solid consumption: 0500                        Date of last liquid consumption: 05/10/21                        Date of last solid food consumption: 05/10/21    BMI:   Wt Readings from Last 3 Encounters:   05/11/21 160 lb (72.6 kg)   04/27/21 165 lb (74.8 kg)   04/21/21 170 lb 3.2 oz (77.2 kg)     Body mass index is 30.23 kg/m². CBC:   Lab Results   Component Value Date    WBC 8.3 04/27/2021    RBC 3.86 04/27/2021    HGB 10.3 04/27/2021    HCT 32.7 04/27/2021    MCV 84.7 04/27/2021    RDW 15.1 04/27/2021     04/27/2021       CMP:   Lab Results   Component Value Date     04/27/2021    K 3.9 04/27/2021     04/27/2021    CO2 28 04/27/2021    BUN 24 04/27/2021    CREATININE 1.17 04/27/2021    GFRAA 58 04/27/2021    LABGLOM 48 04/27/2021    GLUCOSE 102 04/27/2021    PROT 7.2 04/27/2021    CALCIUM 9.8 04/27/2021    BILITOT 0.18 04/27/2021    ALKPHOS 72 04/27/2021    AST 17 04/27/2021    ALT 15 04/27/2021       POC Tests: No results for input(s): POCGLU, POCNA, POCK, POCCL, POCBUN, POCHEMO, POCHCT in the last 72 hours.     Coags: No results found for: PROTIME, INR, APTT    HCG (If Applicable): No results found for: PREGTESTUR, PREGSERUM, HCG, HCGQUANT     ABGs: No results found for: PHART, PO2ART, KHF4XME, XJL6SRC, BEART, I6OAAFQS     Type & Screen (If Applicable):  No results found for: LABABO, LABRH    Drug/Infectious Status (If Applicable):  Lab Results   Component Value Date    HEPCAB NONREACTIVE 04/10/2019       COVID-19 Screening (If Applicable):   Lab Results   Component Value Date    COVID19 Not Detected 05/06/2021    COVID19 Not Detected 10/16/2020    COVID19 Not Detected 07/17/2020 Anesthesia Evaluation  Patient summary reviewed and Nursing notes reviewed no history of anesthetic complications:   Airway: Mallampati: II  TM distance: >3 FB   Neck ROM: full  Mouth opening: > = 3 FB Dental: normal exam         Pulmonary:normal exam    (+) COPD:                             Cardiovascular:    (+) hypertension:,       ECG reviewed                        Neuro/Psych:   (+) headaches:, psychiatric history:            GI/Hepatic/Renal:             Endo/Other:    (+) hypothyroidism::., .                 Abdominal:           Vascular:                                      Anesthesia Plan      general and TIVA     ASA 2       Induction: intravenous. Anesthetic plan and risks discussed with patient.       Plan discussed with surgical team.                  Huber Cottrell, APRN - CRNA   5/11/2021

## 2021-05-11 NOTE — OP NOTE
COLONOSCOPY PROCEDURE NOTE:      Pre op diagnosis:    1. Change in bowel over last month, cramping with increased urgency and diarrhea      Operative Procedure:    1. Colonoscopy with cold bx    Surgeon:    Dr. Chris Carey     Anesthesia:    IV sedation per CRNA    EBL:  minimal    Procedure:    Patient was taken to the endoscopy suite and placed in a left lateral decubitus position. They were given IV sedation as mentioned above. A digital rectal exam was performed. There were no masses and anal tone was normal.  The colonoscope was inserted into the rectum and carefully manipulated up through the sigmoid colon, transverse colon, right colon and into the cecum. The cecum was identified by the ileal cecal valve and transabdominal illumination. Then the scope was slowly withdrawn. The scope was retroflexed in the rectum and the dentate line was examined. The scope was removed. The patient tolerated the procedure well. The following findings were noted. Final Diagnosis:    1. Mild diverticulosis  2.  5 mm polyp at 10 cm      Plan:    1. Await bx  2. Then make further recommendation. ADDENDUM:  Endo Review :  5/30/2021    Pathology:    -- Diagnosis --   COLON AT 10 CM, BIOPSY:- ADENOMATOUS POLYP (TUBULAR ADENOMA). Plan:    1.  CS normal except for adenomatous polyp.   Would repeat CS in 5 years for colon screening Stable

## 2021-05-12 ENCOUNTER — CARE COORDINATION (OUTPATIENT)
Dept: CARE COORDINATION | Age: 56
End: 2021-05-12

## 2021-05-12 ASSESSMENT — ENCOUNTER SYMPTOMS: DYSPNEA ASSOCIATED WITH: EXERTION

## 2021-05-12 NOTE — CARE COORDINATION
Ambulatory Care Coordination Note  5/12/2021  CM Risk Score: 5  Charlson 10 Year Mortality Risk Score: 10%     ACC: Joey Warren, RN    Summary Note: Augusta Manjarrez was referred for ACM from report. She has: Recent DX- Anemia- starting iron and following with PCP    COPD- on medications and follows with PCP    Depression, PTSD- she is on medications and follows [facility]    Hypothyroidism, HTN- on medications and follows with PCP    Recent change in bowels- S/P colonoscopy with polyp 5/11/2021- follows with Dr. Missy Brooks of Care : Enrolled in ACM    Continue assessments, education, and support    COPD Zone Tool    Medications reviewed 5/12/2021- she will  iron, refill Vit D and multivitamin    SDOH completed    [facility]. decision is up to date    Discuss ACP documentation    Review clinic hours, Urgent Care, and My Chart    Covid education completed and she has received vaccines    Care Gaps    Care Team     Nutrition    PCP F/U 5/24/2021    F/U on iron      5/12/2021- 9:20 am spoke with Augusta Manjarrez. She is in agreement to [place]. She has this writer's contact information. She is following with PCP- low iron, concerns with kidney function- following with lab work. She is not taking any NSAIDs. She is drinking more fluids and has cut back on her soda pop intake. She is to start iron for her low hemogoblin. She will also refill her Vit D and multivitamin when she picks up iron tablets. She did have colonoscopy yesterday. No concerns at this time. Awaiting biopsy results. She has received Moderna Covid vaccines. She applied for disability and still in process. 5/13/2021- spoke with nurse Oscar Tinsley re:iron supplement.     General Assessment    Do you have any symptoms that are causing concern?: No       COPD Assessment    Does the patient understand envrionmental exposure?: Yes  Does the patient have a nebulizer?: No  Does the patient use a space with inhaled medications?: No     No patient-reported symptoms         Symptoms:     Symptom course: stable  Breathlessness: exertion  Increase use of rapid acting/rescue inhaled medications?: No  Change in chronic cough?: No/At Baseline  Change in sputum?: No/At Baseline  Sputum characteristics: Clear  Self Monitoring - SaO2: No  Have you had a recent diagnosis of pneumonia either by PCP or at a hospital?: No       Ambulatory Care Coordination Assessment    Care Coordination Protocol  Program Enrollment: Complex Care  Referral from Primary Care Provider: No  Week 1 - Initial Assessment     Do you have all of your prescriptions and are they filled?: Yes  Barriers to medication adherence: None  Are you able to afford your medications?: Yes  How often do you have trouble taking your medications the way you have been told to take them?: I always take them as prescribed. Do you have Home O2 Therapy?: No      Ability to seek help/take action for Emergent Urgent situations i.e. fire, crime, inclement weather or health crisis. : Independent  Ability to ambulate to restroom: Independent  Ability handle personal hygeine needs (bathing/dressing/grooming): Independent  Ability to manage Medications: Independent  Ability to prepare Food Preparation: Independent  Ability to maintain home (clean home, laundry): Independent  Ability to drive and/or has transportation: Dependent  Ability to do shopping: Independent  Ability to manage finances:  Independent  Is patient able to live independently?: Yes     Current Housing: Apartment        Per the Fall Risk Screening, did the patient have 2 or more falls or 1 fall with injury in the past year?: No     Frequent urination at night?: No  Do you use rails/bars?: No  Do you have a non-slip tub mat?: Yes     Are you experiencing loss of meaning?: No  Are you experiencing loss of hope and peace?: No     Thinking about your patient's physical health needs, are there any symptoms or problems (risk indicators) you are unsure about that require further investigation?: No identified areas of uncertainly or problems already being investigated   Are the patients physical health problems impacting on their mental well-being?: No identified areas of concern   Are there any problems with your patients lifestyle behaviors (alcohol, drugs, diet, exercise) that are impacting on physical or mental well-being?: No identified areas of concern   Do you have any other concerns about your patients mental well-being? How would you rate their severity and impact on the patient?: No identified areas of concern   How would you rate their home environment in terms of safety and stability (including domestic violence, insecure housing, neighbor harassment)?: Consistently safe, supportive, stable, no identified problems   How do daily activities impact on the patient's well-being? (include current or anticipated unemployment, work, caregiving, access to transportation or other): No identified problems or perceived positive benefits   How would you rate their social network (family, work, friends)?: Good participation with social networks   How would you rate their financial resources (including ability to afford all required medical care)?: Financially secure, resources adequate, no identified problems   How wells does the patient now understand their health and well-being (symptoms, signs or risk factors) and what they need to do to manage their health?: Reasonable to good understanding and already engages in managing health or is willing to undertake better management   How well do you think your patient can engage in healthcare discussions?  (Barriers include language, deafness, aphasia, alcohol or drug problems, learning difficulties, concentration): Clear and open communication, no identified barriers   Do other services need to be involved to help this patient?: Other care/services not required at this time   Are current services involved with this patient well-coordinated? (Include coordination with other services you are now recommendation): All required care/services in place and well-coordinated   Suggested Interventions and [employer] [facility]: Completed (Comment: [facility])     Zone Management Tools: In Process         Set up/Review Goals, Set up/Review an Education Plan              Prior to Admission medications    Medication Sig Start Date End Date Taking?  Authorizing Provider   mirtazapine (REMERON) 7.5 MG tablet Take 7.5 mg by mouth nightly   Yes Historical Provider, MD   fexofenadine (ALLEGRA) 180 MG tablet Take 1 tablet by mouth daily 4/21/21  Yes Zachary Chris, DO   gabapentin (NEURONTIN) 100 MG capsule TAKE ONE CAPSULE BY MOUTH THREE TIMES A DAY 4/8/21 7/7/21 Yes Napolean Felty K Black, DO   atorvastatin (LIPITOR) 10 MG tablet TAKE ONE TABLET BY MOUTH DAILY 3/11/21  Yes Zachary Chris DO   levothyroxine (SYNTHROID) 100 MCG tablet TAKE ONE TABLET BY MOUTH DAILY 3/11/21  Yes Zachary Chris, DO   amLODIPine (NORVASC) 10 MG tablet TAKE ONE TABLET BY MOUTH DAILY 12/11/20  Yes Zachary Chris, DO   lisinopril (PRINIVIL;ZESTRIL) 40 MG tablet TAKE ONE TABLET BY MOUTH DAILY 12/11/20  Yes Zachary Chris, DO   propranolol (INDERAL) 10 MG tablet Take 10 mg by mouth 3 times daily Indications: patient takes twice a day    Yes Historical Provider, MD   hydrOXYzine (ATARAX) 25 MG tablet Take 50 mg by mouth every 6 hours as needed for Anxiety    Yes Historical Provider, MD   albuterol sulfate  (90 Base) MCG/ACT inhaler Inhale 1-2 puffs into the lungs every 6 hours as needed for Wheezing or Shortness of Breath 5/18/20  Yes Zachary Chris,    omeprazole (PRILOSEC) 10 MG delayed release capsule Take 2 capsules by mouth 2 times daily 5/18/20  Yes Zachary Chris DO   fluticasone (FLONASE) 50 MCG/ACT nasal spray 2 sprays by Nasal route daily 4/20/20  Yes Nicol George, DO   DULoxetine (CYMBALTA) 30 MG extended release capsule Take 30 mg by mouth daily   Yes Historical Provider, MD   DULoxetine (CYMBALTA) 60 MG extended release capsule Take 60 mg by mouth daily   Yes Historical Provider, MD   lamoTRIgine (LAMICTAL) 200 MG tablet Take 200 mg by mouth daily    Yes Historical Provider, MD   Cholecalciferol (VITAMIN D) 50 MCG (2000 UT) CAPS capsule Take 2,000 Units by mouth daily    Historical Provider, MD   Multiple Vitamins-Minerals (MULTIVITAMIN ADULT PO) Take 1 tablet by mouth daily    Historical Provider, MD       Future Appointments   Date Time Provider Rupesh Michaels   5/24/2021  3:40 PM [facility], DO [facility]   6/29/2021 11:00 AM Tano Willingham DO [facility]

## 2021-05-13 DIAGNOSIS — E78.2 HYPERCHOLESTEROLEMIA WITH HYPERTRIGLYCERIDEMIA: ICD-10-CM

## 2021-05-13 DIAGNOSIS — E55.9 VITAMIN D DEFICIENCY: ICD-10-CM

## 2021-05-13 DIAGNOSIS — E03.9 HYPOTHYROIDISM, UNSPECIFIED TYPE: ICD-10-CM

## 2021-05-13 DIAGNOSIS — N28.9 ABNORMAL RENAL FUNCTION: ICD-10-CM

## 2021-05-13 DIAGNOSIS — D50.9 IRON DEFICIENCY ANEMIA, UNSPECIFIED IRON DEFICIENCY ANEMIA TYPE: Primary | ICD-10-CM

## 2021-05-13 LAB — SURGICAL PATHOLOGY REPORT: NORMAL

## 2021-05-13 RX ORDER — FERROUS SULFATE 325(65) MG
325 TABLET ORAL
Qty: 30 TABLET | Refills: 2 | Status: SHIPPED | OUTPATIENT
Start: 2021-05-13 | End: 2021-08-25 | Stop reason: SDUPTHER

## 2021-05-13 RX ORDER — FERROUS SULFATE 325(65) MG
325 TABLET ORAL
COMMUNITY
Start: 2021-05-13 | End: 2021-05-13 | Stop reason: SDUPTHER

## 2021-05-13 NOTE — TELEPHONE ENCOUNTER
Patient was given lab results 2 days ago and patient is still waiting for her script to be sent in. Writer received a call from care coordinator wes doe in regards to this medication and states the patient is wondering when this would be sent in so she can start taking it.

## 2021-05-19 ENCOUNTER — CARE COORDINATION (OUTPATIENT)
Dept: CARE COORDINATION | Age: 56
End: 2021-05-19

## 2021-05-20 ENCOUNTER — CARE COORDINATION (OUTPATIENT)
Dept: CARE COORDINATION | Age: 56
End: 2021-05-20

## 2021-05-20 NOTE — CARE COORDINATION
Ambulatory Care Coordination Note  5/20/2021  CM Risk Score: 5  Charlson 10 Year Mortality Risk Score: 10%     ACC: Marito Lora, RN    Summary Note: Rachel Hernandez was referred for ACM from report.      She has:          Recent DX- Anemia- starting iron and following with PCP                          COPD- on medications and follows with PCP                          Depression, PTSD- she is on medications and [facility]                          Hypothyroidism, HTN- on medications and follows with PCP                          Recent change in bowels- S/P colonoscopy with polyp 5/11/2021- follows with Dr. Serrano Actis of Care : Continue assessments, education, and support                          COPD Zone Tool                          Medications reviewed 5/12/2021- she will  iron, refill Vit D and multivitamin                          SDOH completed                          [facility]. decision is up to date                          Discuss ACP documentation                          Review clinic hours, Urgent Care, and My Chart                          Covid education completed and she has received vaccines                          Care Gaps                          Care Team                           Nutrition                          PCP F/U 5/24/2021                          F/U on iron    5/20/2021- 9:09 am- Left message requesting return call. Care Coordination Interventions    Program Enrollment: Complex Care  Referral from Primary Care Provider: No  Suggested Interventions and Community Resources  Behavorial Health: Completed (Comment: [facility])  Zone Management Tools: In Process         Goals Addressed    None         Prior to Admission medications    Medication Sig Start Date End Date Taking?  Authorizing Provider   ferrous sulfate (IRON 325) 325 (65 Fe) MG tablet Take 1 tablet by mouth daily (with breakfast) 5/13/21   Renae Perez

## 2021-05-24 ENCOUNTER — OFFICE VISIT (OUTPATIENT)
Dept: FAMILY MEDICINE CLINIC | Age: 56
End: 2021-05-24
Payer: MEDICARE

## 2021-05-24 VITALS
DIASTOLIC BLOOD PRESSURE: 82 MMHG | SYSTOLIC BLOOD PRESSURE: 122 MMHG | WEIGHT: 168.4 LBS | TEMPERATURE: 97.5 F | HEART RATE: 85 BPM | OXYGEN SATURATION: 99 % | HEIGHT: 61 IN | BODY MASS INDEX: 31.79 KG/M2

## 2021-05-24 DIAGNOSIS — D50.9 IRON DEFICIENCY ANEMIA, UNSPECIFIED IRON DEFICIENCY ANEMIA TYPE: ICD-10-CM

## 2021-05-24 DIAGNOSIS — E78.2 HYPERCHOLESTEROLEMIA WITH HYPERTRIGLYCERIDEMIA: ICD-10-CM

## 2021-05-24 DIAGNOSIS — M54.2 NECK PAIN ON RIGHT SIDE: Primary | ICD-10-CM

## 2021-05-24 DIAGNOSIS — N28.9 ABNORMAL RENAL FUNCTION: ICD-10-CM

## 2021-05-24 PROCEDURE — 99214 OFFICE O/P EST MOD 30 MIN: CPT | Performed by: FAMILY MEDICINE

## 2021-05-24 PROCEDURE — 4004F PT TOBACCO SCREEN RCVD TLK: CPT | Performed by: FAMILY MEDICINE

## 2021-05-24 PROCEDURE — G8417 CALC BMI ABV UP PARAM F/U: HCPCS | Performed by: FAMILY MEDICINE

## 2021-05-24 PROCEDURE — 3017F COLORECTAL CA SCREEN DOC REV: CPT | Performed by: FAMILY MEDICINE

## 2021-05-24 PROCEDURE — 99212 OFFICE O/P EST SF 10 MIN: CPT | Performed by: FAMILY MEDICINE

## 2021-05-24 PROCEDURE — G8427 DOCREV CUR MEDS BY ELIG CLIN: HCPCS | Performed by: FAMILY MEDICINE

## 2021-05-24 RX ORDER — TIZANIDINE 4 MG/1
2-4 TABLET ORAL NIGHTLY PRN
Qty: 15 TABLET | Refills: 0 | Status: SHIPPED | OUTPATIENT
Start: 2021-05-24 | End: 2021-06-29 | Stop reason: ALTCHOICE

## 2021-05-24 ASSESSMENT — ENCOUNTER SYMPTOMS: NAUSEA: 1

## 2021-05-24 NOTE — PROGRESS NOTES
[facility] [place] [address]  [address], [address] [place]  [phone]      Aldair Guevara is a 55 y.o. female who presents today for her medical conditions/complaints as noted below. Aldair Guevara is c/o of 1 Month Follow-Up (mood and echo/ upset stomach and diarrhea last 4 days) and Neck Pain      HPI:     Pt here today for follow-up of mood and recent labs. Currently seeing therapist every 2 weeks virtually, but wants to start seeing her in person when she gets a car tomorrow. Seeing Psychiatrist (Dr. Kathia Wisdom) every 2 months at [facility]. Feels almost 80% improved since her last OV; has had almost no panic attacks. Taking Cymbalta 90 mg (one 30 mg and one 60 mg capsule) daily at night and Lamictal 200 mg nightly for mood. Taking Hydroxyzine 25 mg as needed for anxiety - takes this mostly when she is going to be going out. Pt is having more neck pain x past 2 weeks - more on the right side. 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 Started with waking up with headaches; now it's more in the back and radiates around to her R forehead. Movement makes it worse; ice and lying down makes it better. Has also tried Lidocaine roll-on. Pt feels that the pain is getting worse. Since her labs on 4/26, pt has been trying to take less NSAID's. Since she has cut down on Ibuprofen, she has noticed less swelling in her legs and abdomen. Pt is down to only [address]. Dew bottle per day; otherwise, drinking water and 1-2 cups of coffee. Trying to keep herself well-hydrated; color of urine has lightened to a pale yellow from a dark og. Taking Ferrous sulfate 325 mg daily each morning with food. Started taking stool softener originally, but when she started getting looser stools, she stopped that, and it has not yet resolved. Stools are black in color.     Pt will be getting her mammogram in the next 2-3 months at [address] [facility] in [facility]; ordered by Dr. Abigail Tomas. Past Medical History:   Diagnosis Date    Anxiety     Breast cancer ([facility] 2014    L side - lumpectomy and radiation; no chemo (was recommended to take Tamoxifen, but with her smoking, she declined due to family h/o CVA already).  Seeing Dr. Abigail Tomas once yearly/    COPD (chronic obstructive pulmonary disease) ([facility]     Depression     Headache(784.0)     History of alcoholism ([facility]     sober since 1/16/19    Hypertension     Hypothyroidism     Kidney stone     Has had lithotripsy x 1; had ureteral stent placement with removal x 1; had seen Dr. Kiana Macias Panic attack 04/15/2021    PTSD (post-traumatic stress disorder)       Past Surgical History:   Procedure Laterality Date    APPENDECTOMY  1977    BREAST BIOPSY Right 2015    excisional biopsy - Dr. Dong Katz Left 2014    breast CA - done at [facility]  2014    COLONOSCOPY  2019    polyps, Dr. Cheyenne Dobson at [facility] N/A 5/11/2021    COLONOSCOPY POLYPECTOMY HOT BIOPSY performed by Naz May MD at [facility]      multiple in her 19's    EYE SURGERY      x 2 in her youth - was \"cross-eyed\"    FRACTURE SURGERY Left     hand    HERNIA REPAIR  01/2020    recurrent incisional hernia repair Dr. Ulises Ayers N/A 7/22/2020    Laparoscopic Robotic Assisted Ventral Hernia Repair performed by Naz May MD at [address]  2006    Dr. Virginia Camarena  2015    Dr. 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 Alejo Shields at [facility] - used mesh; had revision in 1/2020    Nabila Walton N/A 10/21/2020    Open VENTRAL Hernia Repair w/ mesh & Component separation technique performed by Naz May MD at [facility] History   Problem Relation Age of Onset    High Blood Pressure Mother     Lupus Mother          from CHF secondary to cardiomyopathy from her lupus    Heart Failure Mother     Atrial Fibrillation Mother     High Blood Pressure Father     Prostate Cancer Father         age 54;  11 years later of a \"bladder tumor\" that caused bladder rupture (pt unsure if malignancy)    Other Maternal Grandmother         had \"stomach tumor\"    Heart Attack Maternal Grandfather          at age 40    Stroke Paternal Grandfather         in his 42's; multiple     Social History     Tobacco Use    Smoking status: Current Every Day Smoker     Packs/day: 0.50     Years: 37.00     Pack years: 18.50     Types: Cigarettes     Start date:     Smokeless tobacco: Never Used    Tobacco comment: Will see PCP when ready to quit.      Substance Use Topics    Alcohol use: No     Comment: [facility]      Current Outpatient Medications   Medication Sig Dispense Refill    tiZANidine (ZANAFLEX) 4 MG tablet Take 0.5-1 tablets by mouth nightly as needed (muscle spasm) 15 tablet 0    ferrous sulfate (IRON 325) 325 (65 Fe) MG tablet Take 1 tablet by mouth daily (with breakfast) 30 tablet 2    fexofenadine (ALLEGRA) 180 MG tablet Take 1 tablet by mouth daily 90 tablet 1    gabapentin (NEURONTIN) 100 MG capsule TAKE ONE CAPSULE BY MOUTH THREE TIMES A DAY 90 capsule 2    atorvastatin (LIPITOR) 10 MG tablet TAKE ONE TABLET BY MOUTH DAILY 90 tablet 0    levothyroxine (SYNTHROID) 100 MCG tablet TAKE ONE TABLET BY MOUTH DAILY 90 tablet 3    amLODIPine (NORVASC) 10 MG tablet TAKE ONE TABLET BY MOUTH DAILY 30 tablet 5    lisinopril (PRINIVIL;ZESTRIL) 40 MG tablet TAKE ONE TABLET BY MOUTH DAILY 30 tablet 5    Cholecalciferol (VITAMIN D) 50 MCG ( UT) CAPS capsule Take 2,000 Units by mouth daily      Multiple Vitamins-Minerals (MULTIVITAMIN ADULT PO) Take 1 tablet by mouth daily      propranolol (INDERAL) 10 MG tablet Take 10 mg by mouth 3 times daily Indications: patient takes twice a day       hydrOXYzine (ATARAX) 25 MG tablet Take 50 mg by mouth every 6 hours as needed for Anxiety       albuterol sulfate  (90 Base) MCG/ACT inhaler Inhale 1-2 puffs into the lungs every 6 hours as needed for Wheezing or Shortness of Breath 1 Inhaler 3    omeprazole (PRILOSEC) 10 MG delayed release capsule Take 2 capsules by mouth 2 times daily 60 capsule 5    fluticasone (FLONASE) 50 MCG/ACT nasal spray 2 sprays by Nasal route daily 1 Bottle 5    DULoxetine (CYMBALTA) 30 MG extended release capsule Take 30 mg by mouth daily      DULoxetine (CYMBALTA) 60 MG extended release capsule Take 60 mg by mouth daily      lamoTRIgine (LAMICTAL) 200 MG tablet Take 200 mg by mouth daily        No current facility-administered medications for this visit.      Allergies   Allergen Reactions    Flomax [Tamsulosin Hcl] Swelling     Swelling of arms; however, also had rash and fever at the same time and was admitted at the time    Morphine Itching and Rash     Rash, itching    Saphris [Asenapine]     Bactrim [Sulfamethoxazole-Trimethoprim] Diarrhea and Nausea Only    Prednisone Other (See Comments)     Emotional changes, depression    Zyprexa [Olanzapine] Other (See Comments)     Made her feel like she wanted to \"jump out of my skin\"       Health Maintenance   Topic Date Due    Shingles Vaccine (1 of 2) Never done    Pneumococcal 0-64 years Vaccine (1 of 2 - PPSV23) 09/13/2021 (Originally 3/26/1971)    Flu vaccine (Season Ended) 09/22/2021 (Originally 9/1/2021)    Breast cancer screen  08/07/2021    DTaP/Tdap/Td vaccine (2 - Td) 11/30/2021    Lipid screen  04/26/2022    Potassium monitoring  04/27/2022    Creatinine monitoring  04/27/2022    Diabetes screen  04/16/2024    Colon cancer screen colonoscopy  05/11/2031    COVID-19 Vaccine  Completed    Hepatitis C screen  Completed    HIV screen  Completed    Hepatitis A vaccine  Aged Out    Hepatitis B vaccine  Aged Out    Hib vaccine  Aged Out    Meningococcal (ACWY) vaccine  Aged Out       Subjective:      Review of Systems   Gastrointestinal: Positive for nausea (slightly worse with her neck pain recently; pain makes her nauseous). Musculoskeletal: Positive for neck pain. Neurological: Positive for numbness (only when trying to sleep with her arm/hand under her head). Objective:     Vitals:    05/24/21 1533   BP: 122/82   Pulse: 85   Temp: 97.5 °F (36.4 °C)   SpO2: 99%   Weight: 168 lb 6.4 oz (76.4 kg)   Height: 5' 1\" (1.549 m)     Physical Exam  Vitals and nursing note reviewed. Constitutional:       General: She is not in acute distress. Appearance: She is well-developed. HENT:      Head: Normocephalic and atraumatic. Right Ear: Tympanic membrane, ear canal and external ear normal.      Left Ear: Tympanic membrane, ear canal and external ear normal.      Nose: Nose normal.      Mouth/Throat:      Mouth: Mucous membranes are moist.      Pharynx: Oropharynx is clear. No posterior oropharyngeal erythema. Eyes:      Conjunctiva/sclera: Conjunctivae normal.   Cardiovascular:      Rate and Rhythm: Normal rate and regular rhythm. Heart sounds: Normal heart sounds. Pulmonary:      Effort: Pulmonary effort is normal. No respiratory distress. Breath sounds: Normal breath sounds. Abdominal:      General: Bowel sounds are normal. There is no distension. Palpations: Abdomen is soft. Tenderness: There is no abdominal tenderness. Musculoskeletal:      Cervical back: Neck supple. Skin:     General: Skin is warm and dry. Neurological:      Mental Status: She is alert and oriented to person, place, and time. Assessment:       Diagnosis Orders   1. Neck pain on right side  tiZANidine (ZANAFLEX) 4 MG tablet   2. Iron deficiency anemia, unspecified iron deficiency anemia type     3. Abnormal renal function     4.

## 2021-05-24 NOTE — PROGRESS NOTES
Patient is here today to follow up on her mood and to discuss labwork and the iron supplement that she is on. States that she has had diarrhea and nausea for the last 4 days also and wants to discuss this. Patient states that she is also having neck pain that shoots up into her head.

## 2021-05-24 NOTE — PATIENT INSTRUCTIONS
Patient Education        Neck Pain: Care Instructions  Your Care Instructions     You can have neck pain anywhere from the bottom of your head to the top of your shoulders. It can spread to the upper back or arms. Injuries, painting a ceiling, sleeping with your neck twisted, staying in one position for too long, and many other activities can cause neck pain. Most neck pain gets better with home care. Your doctor may recommend medicine to relieve pain or relax your muscles. He or she may suggest exercise and physical therapy to increase flexibility and relieve stress. You may need to wear a special (cervical) collar to support your neck for a day or two. Follow-up care is a key part of your treatment and safety. Be sure to make and go to all appointments, and call your doctor if you are having problems. It's also a good idea to know your test results and keep a list of the medicines you take. How can you care for yourself at home? · Try using a heating pad on a low or medium setting for 15 to 20 minutes every 2 or 3 hours. Try a warm shower in place of one session with the heating pad. · You can also try an ice pack for 10 to 15 minutes every 2 to 3 hours. Put a thin cloth between the ice and your skin. · Take pain medicines exactly as directed. ? If the doctor gave you a prescription medicine for pain, take it as prescribed. ? If you are not taking a prescription pain medicine, ask your doctor if you can take an over-the-counter medicine. · If your doctor recommends a cervical collar, wear it exactly as directed. When should you call for help? Call your doctor now or seek immediate medical care if:    · You have new or worsening numbness in your arms, buttocks or legs.     · You have new or worsening weakness in your arms or legs. (This could make it hard to stand up.)     · You lose control of your bladder or bowels.    Watch closely for changes in your health, and be sure to contact your doctor if:    · Your neck pain is getting worse.     · You are not getting better after 1 week.     · You do not get better as expected. Where can you learn more? Go to https://chpejona.[employer]. org and sign in to your [employer] account. Enter [id number] in the State mental health facility box to learn more about \"Neck Pain: Care Instructions. \"     If you do not have an account, please click on the \"Sign Up Now\" link. Current as of: November 16, 2020               Content Version: 12.8  © 2006-2021 [employer]. Care instructions adapted under license by [address] If you have questions about a medical condition or this instruction, always ask your healthcare professional. [address] any warranty or liability for your use of this information. Patient Education        Neck: Exercises  Introduction  Here are some examples of exercises for you to try. The exercises may be suggested for a condition or for rehabilitation. Start each exercise slowly. Ease off the exercises if you start to have pain. You will be told when to start these exercises and which ones will work best for you. How to do the exercises  Neck stretch   1. This stretch works best if you keep your shoulder down as you lean away from it. To help you remember to do this, start by relaxing your shoulders and lightly holding on to your thighs or your chair. 2. Tilt your head toward your shoulder and hold for 15 to 30 seconds. Let the weight of your head stretch your muscles. 3. If you would like a little added stretch, use your hand to gently and steadily pull your head toward your shoulder. For example, keeping your right shoulder down, lean your head to the left. 4. Repeat 2 to 4 times toward each shoulder. Diagonal neck stretch   1. Turn your head slightly toward the direction you will be stretching, and tilt your head diagonally toward your chest and hold for 15 to 30 seconds.   2. If you would like a little added stretch, use your hand to gently and steadily pull your head forward on the diagonal.  3. Repeat 2 to 4 times toward each side. Dorsal glide stretch   The dorsal glide stretches the back of the neck. If you feel pain, do not glide so far back. Some people find this exercise easier to do while lying on their backs with an ice pack on the neck. 1. Sit or stand tall and look straight ahead. 2. Slowly tuck your chin as you glide your head backward over your body  3. Hold for a count of 6, and then relax for up to 10 seconds. 4. Repeat 8 to 12 times. Chest and shoulder stretch   1. Sit or stand tall and glide your head backward as in the dorsal glide stretch. 2. Raise both arms so that your hands are next to your ears. 3. Take a deep breath, and as you breathe out, lower your elbows down and behind your back. You will feel your shoulder blades slide down and together, and at the same time you will feel a stretch across your chest and the front of your shoulders. 4. Hold for about 6 seconds, and then relax for up to 10 seconds. 5. Repeat 8 to 12 times. Strengthening: Hands on head   1. Move your head backward, forward, and side to side against gentle pressure from your hands, holding each position for about 6 seconds. 2. Repeat 8 to 12 times. Follow-up care is a key part of your treatment and safety. Be sure to make and go to all appointments, and call your doctor if you are having problems. It's also a good idea to know your test results and keep a list of the medicines you take. Where can you learn more? Go to [email or web address] org and sign in to your [employer] account. Enter P975 in the [employer]a box to learn more about \"Neck: Exercises. \"     If you do not have an account, please click on the \"Sign Up Now\" link. Current as of: November 16, 2020               Content Version: 12.8  © 7543-6876 Healthwise, Incorporated.    Care instructions adapted under license by [facility] ([facility]). If you have questions about a medical condition or this instruction, always ask your healthcare professional. [place] [address] any warranty or liability for your use of this information.

## 2021-05-26 ENCOUNTER — CARE COORDINATION (OUTPATIENT)
Dept: CARE COORDINATION | Age: 56
End: 2021-05-26

## 2021-05-26 NOTE — CARE COORDINATION
Ambulatory Care Coordination Note  5/26/2021  CM Risk Score: 5  Charlson 10 Year Mortality Risk Score: 10%     ACC: Harry Palmer, RN    Summary Note: Jus Treadwell was referred for ACM from report.      She has:          Recent DX- Anemia- starting iron and following with PCP                          COPD- on medications and follows with PCP                          Depression, PTSD- she is on medications and [facility]                          Hypothyroidism, HTN- on medications and follows with PCP                          Recent change in bowels- S/P colonoscopy with polyp 5/11/2021- follows with Dr. Gallo Hammonds                                                         Plan of Care : Continue assessments, education, and support                          COPD Zone Tool                          Medications reviewed 5/12/2021- she will  iron, refill Vit D and multivitamin                          SDOH completed  [facility] decision is up to date                          Discuss ACP documentation                          Review clinic hours, Urgent Care, and My Chart                          Covid education completed and she has received vaccines                          Care Gaps                          Care Team                           Nutrition                          DIR F/U 5/24/2021                          F/U on iron     5/20/2021- 9:09 am- Left message requesting return call. 5/26/2021- Left message requesting return call. Care Coordination Interventions    Program Enrollment: Complex Care  Referral from Primary Care Provider: No  Suggested Interventions and Community Resources  [facility] ([facility]: [facility])  Zone Management Tools: In Process         Goals Addressed    None         Prior to Admission medications    Medication Sig Start Date End Date Taking?  Authorizing Provider   tiZANidine (ZANAFLEX) 4 MG tablet Take 0.5-1 tablets by mouth nightly as needed (muscle spasm) 5/24/21   Darlene Orinda Black, DO   ferrous sulfate (IRON 325) 325 (65 Fe) MG tablet Take 1 tablet by mouth daily (with breakfast) 5/13/21   Harry Flores, DO   fexofenadine (ALLEGRA) 180 MG tablet Take 1 tablet by mouth daily 4/21/21   Darlene Orinda Black, DO   gabapentin (NEURONTIN) 100 MG capsule TAKE ONE CAPSULE BY MOUTH THREE TIMES A DAY 4/8/21 7/7/21  Darlene Orinda Black, DO   atorvastatin (LIPITOR) 10 MG tablet TAKE ONE TABLET BY MOUTH DAILY 3/11/21   Darlene Orinda Black, DO   levothyroxine (SYNTHROID) 100 MCG tablet TAKE ONE TABLET BY MOUTH DAILY 3/11/21   Darlene Orinda Black, DO   amLODIPine (NORVASC) 10 MG tablet TAKE ONE TABLET BY MOUTH DAILY 12/11/20   Darlene Orinda Black, DO   lisinopril (PRINIVIL;ZESTRIL) 40 MG tablet TAKE ONE TABLET BY MOUTH DAILY 12/11/20   Darlene Orinda Black, DO   Cholecalciferol (VITAMIN D) 50 MCG (2000 UT) CAPS capsule Take 2,000 Units by mouth daily    Historical Provider, MD   Multiple Vitamins-Minerals (MULTIVITAMIN ADULT PO) Take 1 tablet by mouth daily    Historical Provider, MD   propranolol (INDERAL) 10 MG tablet Take 10 mg by mouth 3 times daily Indications: patient takes twice a day     Historical Provider, MD   hydrOXYzine (ATARAX) 25 MG tablet Take 50 mg by mouth every 6 hours as needed for Anxiety     Historical Provider, MD   albuterol sulfate  (90 Base) MCG/ACT inhaler Inhale 1-2 puffs into the lungs every 6 hours as needed for Wheezing or Shortness of Breath 5/18/20   Darlene Orinda Black, DO   omeprazole (PRILOSEC) 10 MG delayed release capsule Take 2 capsules by mouth 2 times daily 5/18/20   Darlene Orinda Black, DO   fluticasone [facility]) 50 MCG/ACT nasal spray 2 sprays by Nasal route daily 4/20/20   Epi Treadwell, DO   DULoxetine (CYMBALTA) 30 MG extended release capsule Take 30 mg by mouth daily    Historical Provider, MD   DULoxetine (CYMBALTA) 60 MG extended release capsule Take 60 mg by mouth daily    Historical Provider, MD   lamoTRIgine (LAMICTAL) 200 MG tablet Take 200 mg by mouth daily     Historical Provider, MD       Future Appointments   Date Time Provider Rupesh Michaels   6/29/2021 11:00 AM [facility]

## 2021-06-01 ENCOUNTER — TELEPHONE (OUTPATIENT)
Dept: SURGERY | Age: 56
End: 2021-06-01

## 2021-06-01 NOTE — TELEPHONE ENCOUNTER
Letter created and mailed to patient with results from recent Colonoscopy at [facility] with Dr. Kwadwo Hensley on 5/11/2021. Updated history, health maintenance, and recall. Forwarded results to PCP.

## 2021-06-02 ENCOUNTER — CARE COORDINATION (OUTPATIENT)
Dept: CARE COORDINATION | Age: 56
End: 2021-06-02

## 2021-06-02 NOTE — CARE COORDINATION
tiZANidine (ZANAFLEX) 4 MG tablet Take 0.5-1 tablets by mouth nightly as needed (muscle spasm) 5/24/21   Linda Chris DO   ferrous sulfate (IRON 325) 325 (65 Fe) MG tablet Take 1 tablet by mouth daily (with breakfast) 5/13/21   Dash Duque DO   fexofenadine (ALLEGRA) 180 MG tablet Take 1 tablet by mouth daily 4/21/21   Linda Chris DO   gabapentin (NEURONTIN) 100 MG capsule TAKE ONE CAPSULE BY MOUTH THREE TIMES A DAY 4/8/21 7/7/21  Linda Chris, DO   atorvastatin (LIPITOR) 10 MG tablet TAKE ONE TABLET BY MOUTH DAILY 3/11/21   Linda Chris, DO   levothyroxine (SYNTHROID) 100 MCG tablet TAKE ONE TABLET BY MOUTH DAILY 3/11/21   Linda Chris DO   amLODIPine (NORVASC) 10 MG tablet TAKE ONE TABLET BY MOUTH DAILY 12/11/20   Linda Chris, DO   lisinopril (PRINIVIL;ZESTRIL) 40 MG tablet TAKE ONE TABLET BY MOUTH DAILY 12/11/20   Linda Chris,    Cholecalciferol (VITAMIN D) 50 MCG (2000 UT) CAPS capsule Take 2,000 Units by mouth daily    Historical Provider, MD   Multiple Vitamins-Minerals (MULTIVITAMIN ADULT PO) Take 1 tablet by mouth daily    Historical Provider, MD   propranolol (INDERAL) 10 MG tablet Take 10 mg by mouth 3 times daily Indications: patient takes twice a day     Historical Provider, MD   hydrOXYzine (ATARAX) 25 MG tablet Take 50 mg by mouth every 6 hours as needed for Anxiety     Historical Provider, MD   albuterol sulfate  (90 Base) MCG/ACT inhaler Inhale 1-2 puffs into the lungs every 6 hours as needed for Wheezing or Shortness of Breath 5/18/20   Linda Chris DO   omeprazole (PRILOSEC) 10 MG delayed release capsule Take 2 capsules by mouth 2 times daily 5/18/20   Linda Chris DO   fluticasone Ivin Larch) 50 MCG/ACT nasal spray 2 sprays by Nasal route daily 4/20/20   Virginia Laly, DO   DULoxetine (CYMBALTA) 30 MG extended release capsule Take 30 mg by mouth daily    Historical Provider, MD   DULoxetine (CYMBALTA) 60 MG extended release capsule Take 60 mg by mouth daily Historical Provider, MD   lamoTRIgine (LAMICTAL) 200 MG tablet Take 200 mg by mouth daily     Historical Provider, MD       Future Appointments   Date Time Provider Rupesh Michaels   6/29/2021 11:00 AM [facility]

## 2021-06-07 ENCOUNTER — CARE COORDINATION (OUTPATIENT)
Dept: CARE COORDINATION | Age: 56
End: 2021-06-07

## 2021-06-07 NOTE — CARE COORDINATION
Ambulatory Care Coordination Note  6/7/2021  CM Risk Score: 5  Charlson 10 Year Mortality Risk Score: 10%     ACC: Aline Grace, RN    Summary Note: Marybel Borja was referred for ACM from report.      She has:          Recent DX- Anemia- starting iron and following with PCP                          COPD- on medications and follows with PCP                          Depression, PTSD- she is on medications and [facility]                          Hypothyroidism, HTN- on medications and follows with PCP                          Recent change in bowels- S/P colonoscopy with polyp 5/11/2021- follows with Dr. Janae Storey                                                         Plan of Care : Continue assessments, education, and support                          COPD Zone Tool                          Medications reviewed 5/12/2021- she will  iron, refill Vit D and multivitamin                          SDOH completed  [facility] decision is up to date                          Discuss ACP documentation                          Review clinic hours, Urgent Care, and My Chart                          Covid education completed and she has received vaccines                          Care Gaps                          Care Team                           Nutrition                          LQH F/U 5/24/2021                          F/U on iron     5/20/2021- 9:09 am- Left message requesting return call.   5/26/2021- Left message requesting return call.   6/1/2021- 10:09 am Left message requesting return call. 6/7/2021- 10:55 am Left message requesting return call @ [phone]. Care Coordination Interventions    Program Enrollment: Complex Care  Referral from Primary Care Provider: No  Suggested Interventions and Community Resources  [facility]  Zone Management Tools:  In Process         Goals Addressed    None         Prior to Admission medications Medication Sig Start Date End Date Taking?  Authorizing Provider   tiZANidine (ZANAFLEX) 4 MG tablet Take 0.5-1 tablets by mouth nightly as needed (muscle spasm) 5/24/21   Marques Bread Black, DO   ferrous sulfate (IRON 325) 325 (65 Fe) MG tablet Take 1 tablet by mouth daily (with breakfast) 5/13/21   Vikabdulkadir Browne, DO   fexofenadine (ALLEGRA) 180 MG tablet Take 1 tablet by mouth daily 4/21/21   Marques Bread Black, DO   gabapentin (NEURONTIN) 100 MG capsule TAKE ONE CAPSULE BY MOUTH THREE TIMES A DAY 4/8/21 7/7/21  Marques Bread Black, DO   atorvastatin (LIPITOR) 10 MG tablet TAKE ONE TABLET BY MOUTH DAILY 3/11/21   Marques Bread Black, DO   levothyroxine (SYNTHROID) 100 MCG tablet TAKE ONE TABLET BY MOUTH DAILY 3/11/21   Marques Bread Black, DO   amLODIPine (NORVASC) 10 MG tablet TAKE ONE TABLET BY MOUTH DAILY 12/11/20   Marques Bread Black, DO   lisinopril (PRINIVIL;ZESTRIL) 40 MG tablet TAKE ONE TABLET BY MOUTH DAILY 12/11/20   Marques Bread Black, DO   Cholecalciferol (VITAMIN D) 50 MCG (2000 UT) CAPS capsule Take 2,000 Units by mouth daily    Historical Provider, MD   Multiple Vitamins-Minerals (MULTIVITAMIN ADULT PO) Take 1 tablet by mouth daily    Historical Provider, MD   propranolol (INDERAL) 10 MG tablet Take 10 mg by mouth 3 times daily Indications: patient takes twice a day     Historical Provider, MD   hydrOXYzine (ATARAX) 25 MG tablet Take 50 mg by mouth every 6 hours as needed for Anxiety     Historical Provider, MD   albuterol sulfate  (90 Base) MCG/ACT inhaler Inhale 1-2 puffs into the lungs every 6 hours as needed for Wheezing or Shortness of Breath 5/18/20   Marques Bread Black, DO   omeprazole (PRILOSEC) 10 MG delayed release capsule Take 2 capsules by mouth 2 times daily 5/18/20   Marques Bread Black, DO   fluticasone [facility]) 50 MCG/ACT nasal spray 2 sprays by Nasal route daily 4/20/20   Kurt Leiva, DO   DULoxetine (CYMBALTA) 30 MG extended release capsule Take 30 mg by mouth daily    Historical Provider, MD   DULoxetine (CYMBALTA) 60 MG extended release capsule Take 60 mg by mouth daily    Historical Provider, MD   lamoTRIgine (LAMICTAL) 200 MG tablet Take 200 mg by mouth daily     Historical Provider, MD       Future Appointments   Date Time Provider Rupesh Michaels   6/29/2021 11:00 AM Jarocho Rocha DO [facility]

## 2021-06-09 DIAGNOSIS — J30.9 ALLERGIC RHINITIS, UNSPECIFIED SEASONALITY, UNSPECIFIED TRIGGER: ICD-10-CM

## 2021-06-09 DIAGNOSIS — I10 ESSENTIAL HYPERTENSION: ICD-10-CM

## 2021-06-09 NOTE — TELEPHONE ENCOUNTER
Next appt 6-29-21. Believe she is still filling Loratadine per dispense report. Left message to call back if she is using Allegra.

## 2021-06-10 RX ORDER — AMLODIPINE BESYLATE 10 MG/1
TABLET ORAL
Qty: 90 TABLET | Refills: 3 | Status: SHIPPED | OUTPATIENT
Start: 2021-06-10 | End: 2022-05-17

## 2021-06-10 RX ORDER — LISINOPRIL 40 MG/1
TABLET ORAL
Qty: 90 TABLET | Refills: 3 | Status: SHIPPED | OUTPATIENT
Start: 2021-06-10 | End: 2022-06-17

## 2021-06-15 ENCOUNTER — CARE COORDINATION (OUTPATIENT)
Dept: CARE COORDINATION | Age: 56
End: 2021-06-15

## 2021-06-15 DIAGNOSIS — E78.00 PURE HYPERCHOLESTEROLEMIA: ICD-10-CM

## 2021-06-15 NOTE — CARE COORDINATION
Ambulatory Care Coordination Note  6/15/2021  CM Risk Score: 5  Charlson 10 Year Mortality Risk Score: 10%     ACC: Kimberly Reynaga RN    Summary Note:  Priyank Keller was referred for ACM from report.      She has:          Recent DX- Anemia- starting iron and following with PCP                          COPD- on medications and follows with PCP                          Depression, PTSD- she is on medications and follows [facility]                          Hypothyroidism, HTN- on medications and follows with PCP                          Recent change in bowels- S/P colonoscopy with polyp 5/11/2021- follows with Dr. Delon Drew                                                         Plan of Care : Continue assessments, education, and support                          COPD Zone Tool                          Medications reviewed 5/12/2021- she will  iron, refill Vit D and multivitamin                          SDOH completed  [facility] decision is up to date                          Discuss ACP documentation                          Review clinic hours, Urgent Care, and My Chart                          Covid education completed and she has received vaccines                          Care Gaps                          QGG[facility] Team                           Nutrition                          DKT F/U 6/29/2021                          F/U on iron     5/20/2021- 9:09 am- Left message requesting return call.   5/26/2021- Left message requesting return call.   6/1/2021- 10:09 am Left message requesting return call.   6/7/2021- 10:55 am Left message requesting return call @ [phone].   6/15/2021- 11:35 am Left message requesting return call @ [phone].  Letter to be mailed.               Care Coordination Interventions    Program Enrollment: Complex Care  Referral from Primary Care Provider: No  Suggested Interventions and Community Resources  [facility]  Zone Management Tools: In Process         Goals Addressed    None         Prior to Admission medications    Medication Sig Start Date End Date Taking?  Authorizing Provider   lisinopril (PRINIVIL;ZESTRIL) 40 MG tablet TAKE ONE TABLET BY MOUTH DAILY 6/10/21   Aprilzetta Michelle Chris, DO   amLODIPine (NORVASC) 10 MG tablet TAKE ONE TABLET BY MOUTH DAILY 6/10/21   Aprilzetta Michelle Black, DO   tiZANidine (ZANAFLEX) 4 MG tablet Take 0.5-1 tablets by mouth nightly as needed (muscle spasm) 5/24/21   Hilario Carreno, DO   ferrous sulfate (IRON 325) 325 (65 Fe) MG tablet Take 1 tablet by mouth daily (with breakfast) 5/13/21   Hilario Carreno, DO   fexofenadine (ALLEGRA) 180 MG tablet Take 1 tablet by mouth daily 4/21/21   Aprilzetta Michelle Black, DO   gabapentin (NEURONTIN) 100 MG capsule TAKE ONE CAPSULE BY MOUTH THREE TIMES A DAY 4/8/21 7/7/21  Aprilzetta Michelle Chris, DO   atorvastatin (LIPITOR) 10 MG tablet TAKE ONE TABLET BY MOUTH DAILY 3/11/21   Rozetta Michelle Black, DO   levothyroxine (SYNTHROID) 100 MCG tablet TAKE ONE TABLET BY MOUTH DAILY 3/11/21   Rozetta Michelle Black, DO   Cholecalciferol (VITAMIN D) 50 MCG (2000 UT) CAPS capsule Take 2,000 Units by mouth daily    Historical Provider, MD   Multiple Vitamins-Minerals (MULTIVITAMIN ADULT PO) Take 1 tablet by mouth daily    Historical Provider, MD   propranolol (INDERAL) 10 MG tablet Take 10 mg by mouth 3 times daily Indications: patient takes twice a day     Historical Provider, MD   hydrOXYzine (ATARAX) 25 MG tablet Take 50 mg by mouth every 6 hours as needed for Anxiety     Historical Provider, MD   albuterol sulfate  (90 Base) MCG/ACT inhaler Inhale 1-2 puffs into the lungs every 6 hours as needed for Wheezing or Shortness of Breath 5/18/20   Aprilzechayoa Michelle Chris, DO   omeprazole (PRILOSEC) 10 MG delayed release capsule Take 2 capsules by mouth 2 times daily 5/18/20   Sherrya Michelle Chris, DO   fluticasone [facility] [place] [place]) 50 MCG/ACT nasal spray 2 sprays by Nasal route daily 4/20/20   Chichi Mckeon, DO   DULoxetine

## 2021-06-15 NOTE — LETTER
[address] [facility]  [address], [address] [facility]    Marito Lora RN        Keerthi 15, 2021    [address] [employer] [address]      Dear Rachel LOUIS:    I hope you are doing well. I have been unable to reach you by phone. Please call me at [phone].      Future Appointments   Date Time Provider Rupesh Michaels   6/29/2021 11:00 AM Jo Ceron, DO [facility]       Sincerely,        Marito Lora RN

## 2021-06-16 ENCOUNTER — CARE COORDINATION (OUTPATIENT)
Dept: CARE COORDINATION | Age: 56
End: 2021-06-16

## 2021-06-16 DIAGNOSIS — J30.9 ALLERGIC RHINITIS, UNSPECIFIED SEASONALITY, UNSPECIFIED TRIGGER: ICD-10-CM

## 2021-06-16 DIAGNOSIS — D50.9 IRON DEFICIENCY ANEMIA, UNSPECIFIED IRON DEFICIENCY ANEMIA TYPE: ICD-10-CM

## 2021-06-16 RX ORDER — LORATADINE 10 MG/1
TABLET ORAL
Qty: 30 TABLET | Refills: 10 | OUTPATIENT
Start: 2021-06-16

## 2021-06-16 RX ORDER — ATORVASTATIN CALCIUM 10 MG/1
TABLET, FILM COATED ORAL
Qty: 90 TABLET | Refills: 1 | Status: SHIPPED | OUTPATIENT
Start: 2021-06-16 | End: 2022-02-18

## 2021-06-16 NOTE — TELEPHONE ENCOUNTER
Left another message with patient who has not called back to verify what medication she is taking so rx refill has been refused.

## 2021-06-16 NOTE — TELEPHONE ENCOUNTER
Pt calling back in regards to refill request from last week, pt states she is taking Allegra, please refill both pended meds.

## 2021-06-16 NOTE — TELEPHONE ENCOUNTER
Karen LOUIS called requesting a refill of the below medication which has been pended for you:     Requested Prescriptions     Pending Prescriptions Disp Refills    fexofenadine (ALLEGRA) 180 MG tablet 90 tablet 1     Sig: Take 1 tablet by mouth daily    ferrous sulfate (IRON 325) 325 (65 Fe) MG tablet 30 tablet 2     Sig: Take 1 tablet by mouth daily (with breakfast)       Last Appointment Date: 5/24/2021  Next Appointment Date: 6/29/2021    Allergies   Allergen Reactions    Flomax [Tamsulosin Hcl] Swelling     Swelling of arms; however, also had rash and fever at the same time and was admitted at the time    Morphine Itching and Rash     Rash, itching    Saphris [Asenapine]     Bactrim [Sulfamethoxazole-Trimethoprim] Diarrhea and Nausea Only    Prednisone Other (See Comments)     Emotional changes, depression    Zyprexa [Olanzapine] Other (See Comments)     Made her feel like she wanted to \"jump out of my skin\"

## 2021-06-16 NOTE — CARE COORDINATION
Ambulatory Care Coordination Note  6/16/2021  CM Risk Score: 5  Charlson 10 Year Mortality Risk Score: 10%     ACC: Nadja Vergara RN    Summary Note: Geraldine Cabrera was referred for ACM from report.      She has:          Recent DX- Anemia- starting iron and following with PCP                          COPD- on medications and follows with PCP                          Depression, PTSD- she is on medications and follows [facility]                          Hypothyroidism, HTN- on medications and follows with PCP                          Recent change in bowels- S/P colonoscopy with polyp 5/11/2021- follows with Dr. Daniele Chavis                                                         Plan of Care : Continue assessments, education, and support                          COPD Zone Tool                          Medications reviewed 5/12/2021- she will  iron, refill Vit D and multivitamin                          SDOH completed  [facility] decision is up to date                          Discussed  ACP documentation- 6/16/2021- referral placed- she would like forms mailed and then have them call her after she reviews it. She had completed    one years ago but it is no longer valid                           Review clinic hours, Urgent Care, and My Chart                          Covid education completed and she has received vaccines                          Care Gaps                          Care Team                           Nutrition- reviewed iron enriched foods                          PCP F/U 6/29/2021                          F/U on iron     She has applied for disability- she has apt 6/26 for her back and other issues.      [facility] called this writer. She had ran out of her iron- she has requested a refill. She is eating iron enriched foods.         General Assessment    Do you have any symptoms that are causing concern?: Yes  Progression since Onset: Intermittent - Waxing/Waning  Reported Symptoms: Historical Provider, MD   propranolol (INDERAL) 10 MG tablet Take 10 mg by mouth 3 times daily Indications: patient takes twice a day     Historical Provider, MD   hydrOXYzine (ATARAX) 25 MG tablet Take 50 mg by mouth every 6 hours as needed for Anxiety     Historical Provider, MD   albuterol sulfate  (90 Base) MCG/ACT inhaler Inhale 1-2 puffs into the lungs every 6 hours as needed for Wheezing or Shortness of Breath 5/18/20   Renae Chris,    omeprazole (PRILOSEC) 10 MG delayed release capsule Take 2 capsules by mouth 2 times daily 5/18/20   Renae Chris, DO   fluticasone [facility]) 50 MCG/ACT nasal spray 2 sprays by Nasal route daily 4/20/20   Marge Lutz DO   DULoxetine (CYMBALTA) 30 MG extended release capsule Take 30 mg by mouth daily    Historical Provider, MD   DULoxetine (CYMBALTA) 60 MG extended release capsule Take 60 mg by mouth daily    Historical Provider, MD   lamoTRIgine (LAMICTAL) 200 MG tablet Take 200 mg by mouth daily     Historical Provider, MD       Future Appointments   Date Time Provider Rupesh Michaels   6/29/2021 11:00 AM [facility]

## 2021-06-17 ENCOUNTER — CARE COORDINATION (OUTPATIENT)
Dept: CARE COORDINATION | Age: 56
End: 2021-06-17

## 2021-06-17 RX ORDER — FERROUS SULFATE 325(65) MG
325 TABLET ORAL
Qty: 30 TABLET | Refills: 2 | OUTPATIENT
Start: 2021-06-17

## 2021-06-17 RX ORDER — FEXOFENADINE HCL 180 MG/1
180 TABLET ORAL DAILY
Qty: 90 TABLET | Refills: 1 | OUTPATIENT
Start: 2021-06-17

## 2021-06-17 NOTE — TELEPHONE ENCOUNTER
Neither med should need refilled yet - Ferrous sulfate was just refilled to [facility] on 5/13 for 3-month supply and Allegra was refilled to [facility] on 4/21 for 6-month supply. Please confirm.

## 2021-06-21 ENCOUNTER — CARE COORDINATION (OUTPATIENT)
Dept: CARE COORDINATION | Age: 56
End: 2021-06-21

## 2021-06-28 ENCOUNTER — HOSPITAL ENCOUNTER (OUTPATIENT)
Dept: LAB | Age: 56
Discharge: HOME | End: 2021-06-28
Payer: MEDICARE

## 2021-06-28 DIAGNOSIS — D64.9 ANEMIA, UNSPECIFIED TYPE: ICD-10-CM

## 2021-06-28 DIAGNOSIS — D50.9 IRON DEFICIENCY ANEMIA, UNSPECIFIED IRON DEFICIENCY ANEMIA TYPE: ICD-10-CM

## 2021-06-28 DIAGNOSIS — N28.9 ABNORMAL RENAL FUNCTION: ICD-10-CM

## 2021-06-28 LAB
ABSOLUTE EOS #: 0.27 K/UL (ref 0–0.44)
ABSOLUTE IMMATURE GRANULOCYTE: 0.06 K/UL (ref 0–0.3)
ABSOLUTE LYMPH #: 1.89 K/UL (ref 1.1–3.7)
ABSOLUTE MONO #: 0.83 K/UL (ref 0.1–1.2)
ANION GAP SERPL CALCULATED.3IONS-SCNC: 9 MMOL/L (ref 9–17)
BASOPHILS # BLD: 1 % (ref 0–2)
BASOPHILS ABSOLUTE: 0.1 K/UL (ref 0–0.2)
BUN BLDV-MCNC: 13 MG/DL (ref 6–20)
BUN/CREAT BLD: 11 (ref 9–20)
CALCIUM SERPL-MCNC: 10.3 MG/DL (ref 8.6–10.4)
CHLORIDE BLD-SCNC: 104 MMOL/L (ref 98–107)
CO2: 28 MMOL/L (ref 20–31)
CREAT SERPL-MCNC: 1.23 MG/DL (ref 0.5–0.9)
DIFFERENTIAL TYPE: ABNORMAL
EOSINOPHILS RELATIVE PERCENT: 2 % (ref 1–4)
GFR AFRICAN AMERICAN: 55 ML/MIN
GFR NON-AFRICAN AMERICAN: 45 ML/MIN
GFR SERPL CREATININE-BSD FRML MDRD: ABNORMAL ML/MIN/{1.73_M2}
GFR SERPL CREATININE-BSD FRML MDRD: ABNORMAL ML/MIN/{1.73_M2}
GLUCOSE BLD-MCNC: 119 MG/DL (ref 70–99)
HCT VFR BLD CALC: 38.8 % (ref 36.3–47.1)
HEMOGLOBIN: 12.2 G/DL (ref 11.9–15.1)
IMMATURE GRANULOCYTES: 0 %
LYMPHOCYTES # BLD: 14 % (ref 24–43)
MCH RBC QN AUTO: 26.5 PG (ref 25.2–33.5)
MCHC RBC AUTO-ENTMCNC: 31.4 G/DL (ref 25.2–33.5)
MCV RBC AUTO: 84.3 FL (ref 82.6–102.9)
MONOCYTES # BLD: 6 % (ref 3–12)
NRBC AUTOMATED: 0 PER 100 WBC
PDW BLD-RTO: 16.3 % (ref 11.8–14.4)
PLATELET # BLD: 303 K/UL (ref 138–453)
PLATELET ESTIMATE: ABNORMAL
PMV BLD AUTO: 8.8 FL (ref 8.1–13.5)
POTASSIUM SERPL-SCNC: 4.1 MMOL/L (ref 3.7–5.3)
RBC # BLD: 4.6 M/UL (ref 3.95–5.11)
RBC # BLD: ABNORMAL 10*6/UL
SEG NEUTROPHILS: 77 % (ref 36–65)
SEGMENTED NEUTROPHILS ABSOLUTE COUNT: 10.19 K/UL (ref 1.5–8.1)
SODIUM BLD-SCNC: 141 MMOL/L (ref 135–144)
WBC # BLD: 13.3 K/UL (ref 3.5–11.3)
WBC # BLD: ABNORMAL 10*3/UL

## 2021-06-28 PROCEDURE — 85025 COMPLETE CBC W/AUTO DIFF WBC: CPT

## 2021-06-28 PROCEDURE — 83540 ASSAY OF IRON: CPT

## 2021-06-28 PROCEDURE — 82728 ASSAY OF FERRITIN: CPT

## 2021-06-28 PROCEDURE — 80048 BASIC METABOLIC PNL TOTAL CA: CPT

## 2021-06-28 PROCEDURE — 83550 IRON BINDING TEST: CPT

## 2021-06-28 PROCEDURE — 36415 COLL VENOUS BLD VENIPUNCTURE: CPT

## 2021-06-29 ENCOUNTER — OFFICE VISIT (OUTPATIENT)
Dept: FAMILY MEDICINE CLINIC | Age: 56
End: 2021-06-29
Payer: MEDICARE

## 2021-06-29 VITALS
DIASTOLIC BLOOD PRESSURE: 72 MMHG | WEIGHT: 163 LBS | OXYGEN SATURATION: 98 % | BODY MASS INDEX: 30.78 KG/M2 | SYSTOLIC BLOOD PRESSURE: 116 MMHG | HEART RATE: 104 BPM | HEIGHT: 61 IN | TEMPERATURE: 99 F

## 2021-06-29 DIAGNOSIS — J01.40 ACUTE NON-RECURRENT PANSINUSITIS: Primary | ICD-10-CM

## 2021-06-29 DIAGNOSIS — D50.9 IRON DEFICIENCY ANEMIA, UNSPECIFIED IRON DEFICIENCY ANEMIA TYPE: ICD-10-CM

## 2021-06-29 DIAGNOSIS — F33.2 SEVERE EPISODE OF RECURRENT MAJOR DEPRESSIVE DISORDER, WITHOUT PSYCHOTIC FEATURES (HCC): ICD-10-CM

## 2021-06-29 LAB
FERRITIN: 10 UG/L (ref 13–150)
IRON SATURATION: 19 % (ref 20–55)
IRON: 94 UG/DL (ref 37–145)
TOTAL IRON BINDING CAPACITY: 483 UG/DL (ref 250–450)
UNSATURATED IRON BINDING CAPACITY: 389 UG/DL (ref 112–347)

## 2021-06-29 PROCEDURE — 3017F COLORECTAL CA SCREEN DOC REV: CPT | Performed by: FAMILY MEDICINE

## 2021-06-29 PROCEDURE — G8427 DOCREV CUR MEDS BY ELIG CLIN: HCPCS | Performed by: FAMILY MEDICINE

## 2021-06-29 PROCEDURE — 4004F PT TOBACCO SCREEN RCVD TLK: CPT | Performed by: FAMILY MEDICINE

## 2021-06-29 PROCEDURE — 99214 OFFICE O/P EST MOD 30 MIN: CPT | Performed by: FAMILY MEDICINE

## 2021-06-29 PROCEDURE — 99213 OFFICE O/P EST LOW 20 MIN: CPT

## 2021-06-29 PROCEDURE — G8417 CALC BMI ABV UP PARAM F/U: HCPCS | Performed by: FAMILY MEDICINE

## 2021-06-29 RX ORDER — DEXTROMETHORPHAN HYDROBROMIDE AND PROMETHAZINE HYDROCHLORIDE 15; 6.25 MG/5ML; MG/5ML
5 SYRUP ORAL NIGHTLY PRN
Qty: 75 ML | Refills: 0 | Status: SHIPPED | OUTPATIENT
Start: 2021-06-29 | End: 2022-02-18

## 2021-06-29 RX ORDER — OMEPRAZOLE 20 MG/1
1 CAPSULE, DELAYED RELEASE ORAL 2 TIMES DAILY
COMMUNITY
Start: 2021-06-07 | End: 2021-12-21 | Stop reason: SDUPTHER

## 2021-06-29 RX ORDER — DOXYCYCLINE HYCLATE 100 MG
100 TABLET ORAL 2 TIMES DAILY
Qty: 20 TABLET | Refills: 0 | Status: SHIPPED | OUTPATIENT
Start: 2021-06-29 | End: 2021-07-09

## 2021-06-29 ASSESSMENT — ENCOUNTER SYMPTOMS
SHORTNESS OF BREATH: 1
COUGH: 1
RHINORRHEA: 1
SINUS PRESSURE: 1
NAUSEA: 1
DIARRHEA: 0
WHEEZING: 1
VOMITING: 0
SORE THROAT: 0

## 2021-06-29 NOTE — LETTER
[facility] department of [facility]  Phone: [phone]  Fax: [phone]    Jerie Osgood, DO        June 29, 2021     Patient: Missy Echavarria   YOB: 1965   Date of Visit: 6/29/2021       To Whom it May Concern:    Missy Echavarria was seen in my clinic on 6/29/2021. Please excuse her from work from 6/28 - 7/1/21. She may return to work on 7/2/21 or her next scheduled shift. If you have any questions or concerns, please don't hesitate to call.     Sincerely,         Jerie Osgood, DO

## 2021-06-29 NOTE — PROGRESS NOTES
family h/o CVA already).  Seeing Dr. Deana Rojo once yearly/    COPD [facility]ch[facility]) [facility]     Depression     Headache(784.0)     History of alcoholism ([facility]     sober since 20    Hypertension     Hypothyroidism     Kidney stone     Has had lithotripsy x 1; had ureteral stent placement with removal x 1; had seen Dr. Sirisha Brewer Panic attack 04/15/2021    PTSD (post-traumatic stress disorder)       Past Surgical History:   Procedure Laterality Date    APPENDECTOMY  1977    BREAST BIOPSY Right 2015    excisional biopsy - Dr. Balwinder Arceo Left 2014    bre[facility] CA [facility]      COLONOSCOPY  2019    polyps, [facility]. Carla Mike at [facility] N/A 2021    mild diverticulosis, tubular adenoma x1; Dr. Marco Douglas at [facility]      multiple in her 19's   [address]      x 2 in her youth - was \"cross-eyed\"    FRACTURE SURGERY Left     hand    HERNIA REPAIR  2020    recurrent incisional hernia repair Dr. Alton Ta N/A 2020    Laparoscopic Robotic Assisted Ventral Hernia Repair performed by Alek Rios MD at [address]      Dr. Andrei Garcia      Dr. 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 Juan Peres at [facility] - used mesh; had revision in 2020    [facility] N/A 10/21/2020    Open VENTRAL Hernia Repair w/ mesh & Component separation technique performed by Alek Rios MD at [facility] OR     Family History   Problem Relation Age of Onset    High Blood Pressure Mother     Lupus Mother          from CHF secondary to cardiomyopathy from her lupus    Heart Failure Mother     Atrial Fibrillation Mother     High Blood Pressure Father     Prostate Cancer Father         age 54;  11 years later of a \"bladder tumor\" that caused bladder rupture (pt unsure if malignancy)    Other Maternal Grandmother         had \"stomach tumor\"    Heart Attack Maternal Grandfather          at age 40    Stroke Paternal Grandfather         in his 42's; multiple     Social History     Tobacco Use    Smoking status: Current Every Day Smoker     Packs/day: 0.50     Years: 37.00     Pack years: 18.50     Types: Cigarettes     Start date:     Smokeless tobacco: Never Used    Tobacco comment: Will see PCP when ready to quit.      Substance Use Topics    Alcohol use: No     Comment: [facility]      Current Outpatient Medications   Medication Sig Dispense Refill    omeprazole (PRILOSEC) 20 MG delayed release capsule Take 1 capsule by mouth 2 times daily      promethazine-dextromethorphan (PROMETHAZINE-DM) 6.25-15 MG/5ML syrup Take 5 mLs by mouth nightly as needed for Cough 75 mL 0    atorvastatin (LIPITOR) 10 MG tablet TAKE ONE TABLET BY MOUTH DAILY 90 tablet 1    lisinopril (PRINIVIL;ZESTRIL) 40 MG tablet TAKE ONE TABLET BY MOUTH DAILY 90 tablet 3    amLODIPine (NORVASC) 10 MG tablet TAKE ONE TABLET BY MOUTH DAILY 90 tablet 3    ferrous sulfate (IRON 325) 325 (65 Fe) MG tablet Take 1 tablet by mouth daily (with breakfast) 30 tablet 2    levothyroxine (SYNTHROID) 100 MCG tablet TAKE ONE TABLET BY MOUTH DAILY 90 tablet 3    Cholecalciferol (VITAMIN D) 50 MCG (2000 UT) CAPS capsule Take 2,000 Units by mouth daily      Multiple Vitamins-Minerals (MULTIVITAMIN ADULT PO) Take 1 tablet by mouth daily      propranolol (INDERAL) 10 MG tablet Take 10 mg by mouth 3 times daily Indications: patient takes twice a day       hydrOXYzine (ATARAX) 25 MG tablet Take 50 mg by mouth every 6 hours as needed for Anxiety       albuterol sulfate  (90 Base) MCG/ACT inhaler Inhale 1-2 puffs into the lungs every 6 hours as needed for Wheezing or Shortness of Breath 1 Inhaler 3    fluticasone (FLONASE) 50 MCG/ACT nasal spray 2 sprays by Nasal route daily 1 Bottle 5    DULoxetine (CYMBALTA) 30 MG extended release capsule Take 30 mg by mouth daily      DULoxetine (CYMBALTA) 60 MG extended release capsule Take 60 mg by mouth daily      lamoTRIgine (LAMICTAL) 200 MG tablet Take 200 mg by mouth daily       gabapentin (NEURONTIN) 100 MG capsule TAKE ONE CAPSULE BY MOUTH THREE TIMES A DAY 90 capsule 2    fexofenadine (ALLEGRA) 180 MG tablet Take 1 tablet by mouth daily (Patient not taking: Reported on 6/29/2021) 90 tablet 1     No current facility-administered medications for this visit.      Allergies   Allergen Reactions    Flomax [Tamsulosin Hcl] Swelling     Swelling of arms; however, also had rash and fever at the same time and was admitted at the time    Morphine Itching and Rash     Rash, itching    Saphris [Asenapine]     Bactrim [Sulfamethoxazole-Trimethoprim] Diarrhea and Nausea Only    Prednisone Other (See Comments)     Emotional changes, depression    Zyprexa [Olanzapine] Other (See Comments)     Made her feel like she wanted to \"jump out of my skin\"       Health Maintenance   Topic Date Due    Shingles Vaccine (1 of 2) Never done    Breast cancer screen  08/07/2021    Pneumococcal 0-64 years Vaccine (1 of 2 - PPSV23) 09/13/2021 (Originally 3/26/1971)    Flu vaccine (1) 09/01/2021    DTaP/Tdap/Td vaccine (2 - Td or Tdap) 11/30/2021    Lipid screen  04/26/2022    Potassium monitoring  06/28/2022    Creatinine monitoring  06/28/2022    Diabetes screen  04/16/2024    Colon cancer screen colonoscopy  05/11/2026    COVID-19 Vaccine  Completed    Hepatitis C screen  Completed    HIV screen  Completed    Hepatitis A vaccine  Aged Out    Hepatitis B vaccine  Aged Out    Hib vaccine  Aged Out    Meningococcal (ACWY) vaccine  Aged Out       Subjective:      Review of Systems   Constitutional: Positive for chills, diaphoresis (sweats) and fever (low-grade (, most recently overnight last night)). HENT: Positive for postnasal drip, rhinorrhea and sinus pressure (under her eyes). Negative for congestion, ear pain and sore throat. Respiratory: Positive for cough, shortness of breath (on exertion) and wheezing. Gastrointestinal: Positive for nausea. Negative for diarrhea and vomiting. Musculoskeletal: Positive for myalgias. Neurological: Positive for headaches (mild). Objective:     Vitals:    06/29/21 1135   BP: 116/72   Site: Right Upper Arm   Position: Sitting   Pulse: 104   Temp: 99 °F (37.2 °C)   TempSrc: Tympanic   SpO2: 98%   Weight: 163 lb (73.9 kg)   Height: 5' 1\" (1.549 m)     Physical Exam  Vitals and nursing note reviewed. Constitutional:       General: She is not in acute distress. Appearance: She is well-developed. HENT:      Head: Normocephalic and atraumatic. Right Ear: Tympanic membrane, ear canal and external ear normal.      Left Ear: Tympanic membrane, ear canal and external ear normal.      Nose:      Right Sinus: Maxillary sinus tenderness and frontal sinus tenderness present. Left Sinus: Maxillary sinus tenderness and frontal sinus tenderness present. Mouth/Throat:      Mouth: Mucous membranes are moist.      Pharynx: Oropharynx is clear. No oropharyngeal exudate or posterior oropharyngeal erythema. Comments: Clear post-nasal drainage noted. Eyes:      Conjunctiva/sclera: Conjunctivae normal.      Pupils: Pupils are equal, round, and reactive to light. Cardiovascular:      Rate and Rhythm: Normal rate and regular rhythm. Heart sounds: Normal heart sounds. Pulmonary:      Effort: Pulmonary effort is normal. No respiratory distress. Breath sounds: Normal breath sounds. No wheezing or rales. Abdominal:      General: Bowel sounds are normal. There is no distension. Palpations: Abdomen is soft. Tenderness: There is no abdominal tenderness. Musculoskeletal:      Cervical back: Neck supple. Lymphadenopathy:      Cervical: No cervical adenopathy. Skin:     General: Skin is warm and dry. Neurological:      Mental Status: She is alert and oriented to person, place, and time. Assessment:       Diagnosis Orders   1. Acute non-recurrent pansinusitis  doxycycline hyclate (VIBRA-TABS) 100 MG tablet    promethazine-dextromethorphan (PROMETHAZINE-DM) 6.25-15 MG/5ML syrup   2. Iron deficiency anemia, unspecified iron deficiency anemia type     3. Severe episode of recurrent major depressive disorder, without psychotic features ([facility]           Plan: Will start Doxycycline BID x 10 days, along with Phenergan cough syrup as needed. Supportive care discussed - Tylenol/Motrin, warm compresses, sinus rinses, cough suppressants, warm salt water gargles, and increased fluids/rest.      Return in about 6 months (around 12/29/2021) for f/u anemia, HLD. No orders of the defined types were placed in this encounter. Orders Placed This Encounter   Medications    doxycycline hyclate (VIBRA-TABS) 100 MG tablet     Sig: Take 1 tablet by mouth 2 times daily for 10 days     Dispense:  20 tablet     Refill:  0    promethazine-dextromethorphan (PROMETHAZINE-DM) 6.25-15 MG/5ML syrup     Sig: Take 5 mLs by mouth nightly as needed for Cough     Dispense:  75 mL     Refill:  0       Patient given educational materials - see patient instructions. Discussed use, benefit, and side effects of prescribed medications. All patient questions answered. Pt voiced understanding. Reviewed health maintenance.             Electronically signed by Ebenezer Dhillon DO, DO on 7/11/2021 at 3:34 PM

## 2021-06-29 NOTE — PATIENT INSTRUCTIONS
Patient Education        Sinusitis: Care Instructions  Your Care Instructions     Sinusitis is an infection of the lining of the sinus cavities in your head. Sinusitis often follows a cold. It causes pain and pressure in your head and face. In most cases, sinusitis gets better on its own in 1 to 2 weeks. But some mild symptoms may last for several weeks. Sometimes antibiotics are needed. Follow-up care is a key part of your treatment and safety. Be sure to make and go to all appointments, and call your doctor if you are having problems. It's also a good idea to know your test results and keep a list of the medicines you take. How can you care for yourself at home? · Take an over-the-counter pain medicine, such as acetaminophen (Tylenol), ibuprofen (Advil, Motrin), or naproxen (Aleve). Read and follow all instructions on the label. · If the doctor prescribed antibiotics, take them as directed. Do not stop taking them just because you feel better. You need to take the full course of antibiotics. · Be careful when taking over-the-counter cold or flu medicines and Tylenol at the same time. Many of these medicines have acetaminophen, which is Tylenol. Read the labels to make sure that you are not taking more than the recommended dose. Too much acetaminophen (Tylenol) can be harmful. · Breathe warm, moist air from a steamy shower, a hot bath, or a sink filled with hot water. Avoid cold, dry air. Using a humidifier in your home may help. Follow the directions for cleaning the machine. · Use saline (saltwater) nasal washes. This can help keep your nasal passages open and wash out mucus and bacteria. You can buy saline nose drops at a grocery store or drugstore. Or you can make your own at home by adding 1 teaspoon of salt and 1 teaspoon of baking soda to 2 cups of distilled water. If you make your own, fill a bulb syringe with the solution, insert the tip into your nostril, and squeeze gently.  Ajay millan nose.  · Put a hot, wet towel or a warm gel pack on your face 3 or 4 times a day for 5 to 10 minutes each time. · Try a decongestant nasal spray like oxymetazoline (Afrin). Do not use it for more than 3 days in a row. Using it for more than 3 days can make your congestion worse. When should you call for help? Call your doctor now or seek immediate medical care if:    · You have new or worse swelling or redness in your face or around your eyes.     · You have a new or higher fever. Watch closely for changes in your health, and be sure to contact your doctor if:    · You have new or worse facial pain.     · The mucus from your nose becomes thicker (like pus) or has new blood in it.     · You are not getting better as expected. Where can you learn more? Go to [email or web address] and sign in to your [employer] account. Enter M110 in the Appriss box to learn more about \"Sinusitis: Care Instructions. \"     If you do not have an account, please click on the \"Sign Up Now\" link. Current as of: December 2, 2020               Content Version: 12.9  © 2006-2021 Healthwise, [facility]. Care instructions adapted under license by [facility] ([facility]). If you have questions about a medical condition or this instruction, always ask your healthcare professional. [address] any warranty or liability for your use of this information.

## 2021-06-30 ENCOUNTER — CARE COORDINATION (OUTPATIENT)
Dept: CARE COORDINATION | Age: 56
End: 2021-06-30

## 2021-06-30 NOTE — CARE COORDINATION
mg by mouth every 6 hours as needed for Anxiety     Historical Provider, MD   albuterol sulfate  (90 Base) MCG/ACT inhaler Inhale 1-2 puffs into the lungs every 6 hours as needed for Wheezing or Shortness of Breath 5/18/20   Cathy Hull DO   fluticasone Odella Miyamoto) 50 MCG/ACT nasal spray 2 sprays by Nasal route daily 4/20/20   Marcos Barragan DO   DULoxetine (CYMBALTA) 30 MG extended release capsule Take 30 mg by mouth daily    Historical Provider, MD   DULoxetine (CYMBALTA) 60 MG extended release capsule Take 60 mg by mouth daily    Historical Provider, MD   lamoTRIgine (LAMICTAL) 200 MG tablet Take 200 mg by mouth daily     Historical Provider, MD       Future Appointments   Date Time Provider Rupesh Xenia   12/29/2021 11:00 AM Cathy Hull DO [facility]

## 2021-07-06 ENCOUNTER — CARE COORDINATION (OUTPATIENT)
Dept: CARE COORDINATION | Age: 56
End: 2021-07-06

## 2021-07-06 NOTE — CARE COORDINATION
Advance Care Planning    Ambulatory ACP Specialist Patient Outreach    Date:  7/6/2021  ACP Specialist:  Rebeka Monroy    Outreach call to patient in follow-up to ACP Specialist referral from: Candi Cooper DO/AMILCAR/Patsy Multani     [] PCP  [] Provider   [x] Ambulatory Care Management [] Other for Reason:        [x] Early ACP Decision-Making   [] Advance Directive Assistance   [] Discuss Goals of Care   [] Code Status Discussion   [] Completion of Portable DNR order   [] Complete POST/MOST   [] Other (Specify)    Date Referral Received: 06/16/21    Today's Outreach:  [x] First   [] Second  [] Third                               Third outreach made by []  phone  [] email []   [employer]     Intervention:  [] Spoke with Patient  [x] Left VM requesting return call      Outcome: Left message for patient explaining the reason for the call. Will attempt outreach on 07/12/21, regarding receipt of ACP documents    Next Step:   [] ACP scheduled conversation  [] Outreach again in one week               [] Email / Mail ACP Info Sheets  [] Email / Mail Advance Directive            [] Close Referral. Routing closure to referring provider/staff and to ACP Specialist .      Thank you for this referral.

## 2021-07-07 DIAGNOSIS — R52 BURNING PAIN: ICD-10-CM

## 2021-07-07 RX ORDER — GABAPENTIN 100 MG/1
CAPSULE ORAL
Qty: 90 CAPSULE | Refills: 2 | Status: SHIPPED | OUTPATIENT
Start: 2021-07-07 | End: 2021-09-29

## 2021-07-07 NOTE — TELEPHONE ENCOUNTER
Controlled Substance Monitoring:    Acute and Chronic Pain Monitoring:   RX Monitoring 7/7/2021   Periodic Controlled Substance Monitoring No signs of potential drug abuse or diversion identified.

## 2021-07-09 ENCOUNTER — CARE COORDINATION (OUTPATIENT)
Dept: CARE COORDINATION | Age: 56
End: 2021-07-09

## 2021-07-09 NOTE — CARE COORDINATION
Ambulatory Care Coordination Note  7/9/2021  CM Risk Score: 5  Charlson 10 Year Mortality Risk Score: 10%     ACC: Sailaaj May, RN    Summary Note: Gisselle Malave was referred for ACM from report.      She has:          Recent DX- Anemia- starting iron and following with PCP                          COPD- on medications and follows with PCP                          Depression, PTSD- she is on medications and [facility]                          Hypothyroidism, HTN- on medications and follows with PCP                          Recent change in bowels- S/P colonoscopy with polyp 5/11/2021- follows with Dr. Ame Chavez                                                         Plan of Care : Continue assessments, education, and support                          COPD Zone Tool                          Medications reviewed 5/12/2021- she will  iron, refill Vit D and multivitamin                          SDOH completed  [facility] decision is up to date                          Discussed  ACP documentation- 6/16/2021- referral placed- she would like forms mailed and then have them call her after she reviews it. She had completed                               [facility] years ago but it is no longer valid                           Review clinic hours, Urgent Care, and My Chart                          Covid education completed and she has received vaccines                          Care Gaps                          [facility] Team                           Nutrition- reviewed iron enriched foods                          ADN F/U 6/29/2021 completed- F/U 12/29/2021                          F/U on iron                                      She has applied for disability- she has apt 6/26 for her back and other issues.      6/30/2021- 10 am Left message requesting return call @ [phone].   7/9/2021- 12:40 pm Left message requesting return call @ [phone].          Care Coordination Interventions    Program Enrollment: Complex Care  Referral from Primary Care Provider: No  Suggested Interventions and Community Resources  [facility] ([facility])  Smoking Cessation: Completed  Zone Management Tools: Completed  Other Services or Interventions: referral to ACP staff          Goals Addressed    None         Prior to Admission medications    Medication Sig Start Date End Date Taking?  Authorizing Provider   gabapentin (NEURONTIN) 100 MG capsule TAKE ONE CAPSULE BY MOUTH THREE TIMES A DAY 7/7/21 10/5/21  Priya Chris, DO   omeprazole (PRILOSEC) 20 MG delayed release capsule Take 1 capsule by mouth 2 times daily 6/7/21   Historical Provider, MD   doxycycline hyclate (VIBRA-TABS) 100 MG tablet Take 1 tablet by mouth 2 times daily for 10 days 6/29/21 7/9/21  Priya Crhis, DO   promethazine-dextromethorphan (PROMETHAZINE-DM) 6.25-15 MG/5ML syrup Take 5 mLs by mouth nightly as needed for Cough 6/29/21   Priya Chris, DO   atorvastatin (LIPITOR) 10 MG tablet TAKE ONE TABLET BY MOUTH DAILY 6/16/21   Priya Chris, DO   lisinopril (PRINIVIL;ZESTRIL) 40 MG tablet TAKE ONE TABLET BY MOUTH DAILY 6/10/21   Priya Chris, DO   amLODIPine (NORVASC) 10 MG tablet TAKE ONE TABLET BY MOUTH DAILY 6/10/21   Priya Chris, DO   ferrous sulfate (IRON 325) 325 (65 Fe) MG tablet Take 1 tablet by mouth daily (with breakfast) 5/13/21   Ebenezer Dihllon, DO   fexofenadine (ALLEGRA) 180 MG tablet Take 1 tablet by mouth daily  Patient not taking: Reported on 6/29/2021 4/21/21   Priya Chris DO   levothyroxine (SYNTHROID) 100 MCG tablet TAKE ONE TABLET BY MOUTH DAILY 3/11/21   Priya Chris, DO   Cholecalciferol (VITAMIN D) 50 MCG (2000 UT) CAPS capsule Take 2,000 Units by mouth daily    Historical Provider, MD   Multiple Vitamins-Minerals (MULTIVITAMIN ADULT PO) Take 1 tablet by mouth daily    Historical Provider, MD   propranolol (INDERAL) 10 MG tablet Take 10 mg by mouth 3 times daily Indications: patient takes twice a day     Historical Provider, MD   hydrOXYzine (ATARAX) 25 MG tablet Take 50 mg by mouth every 6 hours as needed for Anxiety     Historical Provider, MD   albuterol sulfate  (90 Base) MCG/ACT inhaler Inhale 1-2 puffs into the lungs every 6 hours as needed for Wheezing or Shortness of Breath 5/18/20   Adela Chris,    fluticasone [facility]) 50 MCG/ACT nasal spray 2 sprays by Nasal route daily 4/20/20   Kavon Garcia DO   DULoxetine (CYMBALTA) 30 MG extended release capsule Take 30 mg by mouth daily    Historical Provider, MD   DULoxetine (CYMBALTA) 60 MG extended release capsule Take 60 mg by mouth daily    Historical Provider, MD   lamoTRIgine (LAMICTAL) 200 MG tablet Take 200 mg by mouth daily     Historical Provider, MD       Future Appointments   Date Time Provider Rupesh Michaels   12/29/2021 11:00 AM Dot Arenas DO [facility]

## 2021-07-12 ENCOUNTER — CARE COORDINATION (OUTPATIENT)
Dept: CARE COORDINATION | Age: 56
End: 2021-07-12

## 2021-07-12 NOTE — CARE COORDINATION
Advance Care Planning    Ambulatory ACP Specialist Patient Outreach    Date:  7/12/2021  ACP Specialist:  Fred Kwong    Outreach call to patient in follow-up to ACP Specialist referral from: Evens Pastrana DO    [] PCP  [] Provider   [x] Ambulatory Care Management [] Other for Reason:        [x] Early ACP Decision-Making   [] Advance Directive Assistance   [] Discuss Goals of Care   [] Code Status Discussion   [] Completion of Portable DNR order   [] Complete POST/MOST   [] Other (Specify)    Date Referral Received: 06/16/21  Today's Outreach:  [x] First   [] Second  [] Third                               Third outreach made by []  phone  [] email []   [employer] In[employer]     Intervention:  [] Spoke with Patient  [x] Left VM requesting return call    Outcome:  Left a second message for patient and suggested that she contact Michael after 07/21/21. Next Step:   [] ACP scheduled conversation  [] Outreach again in one week               [] Email / Mail ACP Info Sheets  [] Email / Mail Advance Directive            [] Close Referral. Routing closure to referring provider/staff and to ACP Specialist .      Thank you for this referral.

## 2021-07-20 ENCOUNTER — CARE COORDINATION (OUTPATIENT)
Dept: CARE COORDINATION | Age: 56
End: 2021-07-20

## 2021-07-20 NOTE — TELEPHONE ENCOUNTER
Maciel Melara called requesting a refill of the below medication which has been pended for you:     Requested Prescriptions     Pending Prescriptions Disp Refills    fluticasone (FLONASE) 50 MCG/ACT nasal spray [Pharmacy Med Name: FLUTICASONE PROP 50 MCG SPRAY] 1 Bottle 4     Sig: SPRAY TWO SPRAYS IN EACH NOSTRIL ONCE DAILY       Last Appointment Date: 4/20/2020  Next Appointment Date: Visit date not found    Allergies   Allergen Reactions    Flomax [Tamsulosin Hcl] Swelling     Swelling of arms; however, also had rash and fever at the same time and was admitted at the time    Morphine Itching and Rash     Rash, itching    Saphris [Asenapine]     Bactrim [Sulfamethoxazole-Trimethoprim] Diarrhea and Nausea Only    Prednisone Other (See Comments)     Emotional changes, depression    Zyprexa [Olanzapine] Other (See Comments)     Made her feel like she wanted to \"jump out of my skin\"

## 2021-07-20 NOTE — CARE COORDINATION
Ambulatory Care Coordination Note  7/20/2021  CM Risk Score: 5  Charlson 10 Year Mortality Risk Score: 10%     ACC: Candelario Wolf, RN    Summary Note: Nuria Sneed was referred for ACM from report.      She has:          Recent DX- Anemia- starting iron and following with PCP                          COPD- on medications and follows with PCP                          Depression, PTSD- she is on medications and [facility]                          Hypothyroidism, HTN- on medications and follows with PCP                          Recent change in bowels- S/P colonoscopy with polyp 5/11/2021- follows with Dr. Cecilio Terrell                                                         Plan of Care : Continue assessments, education, and support                          COPD Zone Tool                          Medications reviewed 5/12/2021- she will  iron, refill Vit D and multivitamin                          SDOH completed  [facility] decision is up to date                          Discussed  ACP documentation- 6/16/2021- referral placed- she would like forms mailed and then have them call her after she reviews it.  She had completed                               [facility] years ago but it is no longer valid                           Review clinic hours, Urgent Care, and My Chart                          Covid education completed and she has received vaccines                          Care Gaps                          [facility] Team                           Nutrition- reviewed iron enriched foods                          PCP F/U 6/29/2021 completed- F/U 12/29/2021                          F/U on iron                                      She has applied for disability- she has apt 6/26 for her back and other issues.      6/30/2021- 10 am Left message requesting return call @ [phone].   7/9/2021- 12:40 pm Left message requesting return call @ [phone].   7/20/2021- 2:49 pm Left message requesting return call @ [phone]. Care Coordination Interventions    Program Enrollment: Complex Care  Referral from Primary Care Provider: No  Suggested Interventions and Community Resources  Behavorial Health: Completed (Comment: [facility])  Smoking Cessation: Completed  Zone Management Tools: Completed  Other Services or Interventions: referral to ACP staff          Goals Addressed    None         Prior to Admission medications    Medication Sig Start Date End Date Taking?  Authorizing Provider   gabapentin (NEURONTIN) 100 MG capsule TAKE ONE CAPSULE BY MOUTH THREE TIMES A DAY 7/7/21 10/5/21  Vaughn Govern Black, DO   omeprazole (PRILOSEC) 20 MG delayed release capsule Take 1 capsule by mouth 2 times daily 6/7/21   Historical Provider, MD   promethazine-dextromethorphan (PROMETHAZINE-DM) 6.25-15 MG/5ML syrup Take 5 mLs by mouth nightly as needed for Cough 6/29/21   Vaughn Govern Black, DO   atorvastatin (LIPITOR) 10 MG tablet TAKE ONE TABLET BY MOUTH DAILY 6/16/21   Vaughn Govern Black, DO   lisinopril (PRINIVIL;ZESTRIL) 40 MG tablet TAKE ONE TABLET BY MOUTH DAILY 6/10/21   Vaughn Govern Black, DO   amLODIPine (NORVASC) 10 MG tablet TAKE ONE TABLET BY MOUTH DAILY 6/10/21   Vaughn Govern Black, DO   ferrous sulfate (IRON 325) 325 (65 Fe) MG tablet Take 1 tablet by mouth daily (with breakfast) 5/13/21   [address], DO   fexofenadine (ALLEGRA) 180 MG tablet Take 1 tablet by mouth daily  Patient not taking: Reported on 6/29/2021 4/21/21   [facility] Black, DO   levothyroxine (SYNTHROID) 100 MCG tablet TAKE ONE TABLET BY MOUTH DAILY 3/11/21   Vaughn Govern Black, DO   Cholecalciferol (VITAMIN D) 50 MCG (2000 UT) CAPS capsule Take 2,000 Units by mouth daily    Historical Provider, MD   Multiple Vitamins-Minerals (MULTIVITAMIN ADULT PO) Take 1 tablet by mouth daily    Historical Provider, MD   propranolol (INDERAL) 10 MG tablet Take 10 mg by mouth 3 times daily Indications: patient takes twice a day     Historical Provider, MD   hydrOXYzine (ATARAX) 25 MG tablet Take 50 mg by mouth every 6 hours as needed for Anxiety     Historical Provider, MD   albuterol sulfate  (90 Base) MCG/ACT inhaler Inhale 1-2 puffs into the lungs every 6 hours as needed for Wheezing or Shortness of Breath 5/18/20   Dot Arenas DO   fluticasone Irish Grandchild) 50 MCG/ACT nasal spray 2 sprays by Nasal route daily 4/20/20   Orvilla Ivanomartin,    DULoxetine (CYMBALTA) 30 MG extended release capsule Take 30 mg by mouth daily    Historical Provider, MD   DULoxetine (CYMBALTA) 60 MG extended release capsule Take 60 mg by mouth daily    Historical Provider, MD   lamoTRIgine (LAMICTAL) 200 MG tablet Take 200 mg by mouth daily     Historical Provider, MD       Future Appointments   Date Time Provider Rupesh Michaels   12/29/2021 11:00 AM Dot Arenas DO [facility]

## 2021-07-21 RX ORDER — FLUTICASONE PROPIONATE 50 MCG
SPRAY, SUSPENSION (ML) NASAL
Qty: 3 BOTTLE | Refills: 3 | Status: SHIPPED | OUTPATIENT
Start: 2021-07-21 | End: 2022-08-10

## 2021-07-26 ENCOUNTER — CARE COORDINATION (OUTPATIENT)
Dept: CARE COORDINATION | Age: 56
End: 2021-07-26

## 2021-07-26 NOTE — CARE COORDINATION
Ambulatory Care Coordination Note  7/26/2021  CM Risk Score: 5  Charlson 10 Year Mortality Risk Score: 10%     ACC: Georgie Pelayo, RN    Summary Note:  Stacie Maza was referred for ACM from report.      She has:          Recent DX- Anemia- starting iron and following with PCP                          COPD- on medications and follows with PCP                          Depression, PTSD- she is on medications and [facility]                          Hypothyroidism, HTN- on medications and follows with PCP                          Recent change in bowels- S/P colonoscopy with polyp 5/11/2021- follows with Dr. Mary Car                                                         Plan of Care : Continue assessments, education, and support                          COPD Zone Tool                          Medications reviewed 5/12/2021- she will  iron, refill Vit D and multivitamin                          SDOH completed  [facility] decision is up to date                          Discussed  ACP documentation- 6/16/2021- referral placed- she would like forms mailed and then have them call her after she reviews it.  She had completed                               [facility] years ago but it is no longer valid                           Review clinic hours, Urgent Care, and My Chart                          Covid education completed and she has received vaccines                          Care Gaps                          [facility] Team                           Nutrition- reviewed iron enriched foods                          PCP F/U 6/29/2021 completed- F/U 12/29/2021                          F/U on iron                                      She has applied for disability- she has apt 6/26 for her back and other issues.      6/30/2021- 10 am Left message requesting return call @ [phone].   7/9/2021- 12:40 pm Left message requesting return call @ [phone].   7/20/2021- 2:49 pm Left message requesting return call @ [phone].   7/26/2021- 6:15 pm Left message requesting return call @ [phone]. Care Coordination Interventions    Program Enrollment: Complex Care  Referral from Primary Care Provider: No  Suggested Interventions and Community Resources  [facility] ([facility])  Smoking Cessation: Completed  Zone Management Tools: Completed  Other Services or Interventions: referral to ACP staff          Goals Addressed    None         Prior to Admission medications    Medication Sig Start Date End Date Taking?  Authorizing Provider   fluticasone (FLONASE) 50 MCG/ACT nasal spray SPRAY TWO SPRAYS IN EACH NOSTRIL ONCE DAILY 7/21/21   Isaac Chris, DO   gabapentin (NEURONTIN) 100 MG capsule TAKE ONE CAPSULE BY MOUTH THREE TIMES A DAY 7/7/21 10/5/21  Isaac Chris, DO   omeprazole (PRILOSEC) 20 MG delayed release capsule Take 1 capsule by mouth 2 times daily 6/7/21   Historical Provider, MD   promethazine-dextromethorphan (PROMETHAZINE-DM) 6.25-15 MG/5ML syrup Take 5 mLs by mouth nightly as needed for Cough 6/29/21   Isaac Chris, DO   atorvastatin (LIPITOR) 10 MG tablet TAKE ONE TABLET BY MOUTH DAILY 6/16/21   Isaac Chris, DO   lisinopril (PRINIVIL;ZESTRIL) 40 MG tablet TAKE ONE TABLET BY MOUTH DAILY 6/10/21   [facility], [facility]   amLODIPine (NORVASC) 10 MG tablet TAKE ONE TABLET BY MOUTH DAILY 6/10/21   [facility], DO   ferrous sulfate (IRON 325) 325 (65 Fe) MG tablet Take 1 tablet by mouth daily (with breakfast) 5/13/21   Corby Fernández, DO   fexofenadine (ALLEGRA) 180 MG tablet Take 1 tablet by mouth daily  Patient not taking: Reported on 6/29/2021 4/21/21   Isaac Chris, DO   levothyroxine (SYNTHROID) 100 MCG tablet TAKE ONE TABLET BY MOUTH DAILY 3/11/21   Isaac Chris, DO   Cholecalciferol (VITAMIN D) 50 MCG (2000 UT) CAPS capsule Take 2,000 Units by mouth daily    Historical Provider, MD   Multiple Vitamins-Minerals (MULTIVITAMIN ADULT PO) Take 1 tablet by mouth daily Historical Provider, MD   propranolol (INDERAL) 10 MG tablet Take 10 mg by mouth 3 times daily Indications: patient takes twice a day     Historical Provider, MD   hydrOXYzine (ATARAX) 25 MG tablet Take 50 mg by mouth every 6 hours as needed for Anxiety     Historical Provider, MD   albuterol sulfate  (90 Base) MCG/ACT inhaler Inhale 1-2 puffs into the lungs every 6 hours as needed for Wheezing or Shortness of Breath 5/18/20   Juaquin Gilford Black, DO   DULoxetine (CYMBALTA) 30 MG extended release capsule Take 30 mg by mouth daily    Historical Provider, MD   DULoxetine (CYMBALTA) 60 MG extended release capsule Take 60 mg by mouth daily    Historical Provider, MD   lamoTRIgine (LAMICTAL) 200 MG tablet Take 200 mg by mouth daily     Historical Provider, MD       Future Appointments   Date Time Provider Rupesh Michaels   12/29/2021 11:00 AM DO [facility] Myers [facility]

## 2021-07-29 ENCOUNTER — CARE COORDINATION (OUTPATIENT)
Dept: CARE COORDINATION | Age: 56
End: 2021-07-29

## 2021-07-29 NOTE — CARE COORDINATION
Advance Care Planning    Ambulatory ACP Specialist Patient Outreach    Date:  7/29/2021  ACP Specialist:  Ivan Tran    Outreach call to patient in follow-up to ACP Specialist referral from: Ger Linares DO    [] PCP  [] Provider   [x] Ambulatory Care Management [] Other for Reason:        [x] Early ACP Decision-Making   [] Advance Directive Assistance   [] Discuss Goals of Care   [] Code Status Discussion   [] Completion of Portable DNR order   [] Complete POST/MOST   [] Other (Specify)    Date Referral Received: 06/16/21    Today's Outreach:  [] First   [] Second  [x] Third                               Third outreach made by []  phone  [] email []   [employer]     Intervention:  [] Spoke with Patient  [x] Left VM requesting return call      Outcome: HC left a message for patient requesting a return call. If no response from patient within a week, HC will refer patient back to PCP and   ACM/Patsy Pete. Next Step:   [] ACP scheduled conversation  [] Outreach again in one week               [] Email / Mail ACP Info Sheets  [] Email / Mail Advance Directive            [] Close Referral. Routing closure to referring provider/staff and to ACP Specialist .      Thank you for this referral.

## 2021-08-02 ENCOUNTER — CARE COORDINATION (OUTPATIENT)
Dept: CARE COORDINATION | Age: 56
End: 2021-08-02

## 2021-08-02 NOTE — CARE COORDINATION
Ambulatory Care Coordination Note  8/2/2021  CM Risk Score: 5  Charlson 10 Year Mortality Risk Score: 10%     ACC: Erica Miller, RN    Summary Note: Eric Watt was referred for ACM from report.      She has:          Recent DX- Anemia- starting iron and following with PCP                          COPD- on medications and follows with PCP                          Depression, PTSD- she is on medications and follows at [facility]                          Hypothyroidism, HTN- on medications and follows with PCP                          Recent change in bowels- S/P colonoscopy with polyp 5/11/2021- follows with Dr. Kelsy Camacho                                                         Plan of Care : Continue assessments, education, and support                          COPD Zone Tool                          Medications reviewed 5/12/2021- she will  iron, refill Vit D and multivitamin                          SDOH completed  [facility] decision is up to date                          Discussed  ACP documentation- 6/16/2021- referral placed- she would like forms mailed and then have them call her after she reviews it. She had completed                               HEY years ago but it is no longer valid.  8/2/2021- [facility] reached out 3 times.                           Review clinic hours, Urgent Care, and My Chart                          Covid education completed and she has received vaccines                          Care Gaps                          VALL Team                           Nutrition- reviewed iron enriched foods                          EUY F/U 6/29/2021 completed- F/U 12/29/2021                          F/U on iron                                      She has applied for disability- she has apt 6/26 for her back and other issues.      6/30/2021- 10 am Left message requesting return call @ [phone].   7/9/2021- 12:40 pm Left message requesting return call @ [phone].   7/20/2021- 2:49 pm Left message requesting return call @ [phone].   7/26/2021- 6:15 pm Left message requesting return call @ [phone].   8/2/2021- 12:31 pm- Left message requesting return call @ [phone]. Care Coordination Interventions    Program Enrollment: Complex Care  Referral from Primary Care Provider: No  Suggested Interventions and Community Resources  Behavorial Health: Completed (Comment: [facility])  Smoking Cessation: Completed  Zone Management Tools: Completed  Other Services or Interventions: referral to [facility] staff          Goals Addressed    None         Prior to Admission medications    Medication Sig Start Date End Date Taking?  Authorizing Provider   fluticasone (FLONASE) 50 MCG/ACT nasal spray SPRAY TWO SPRAYS IN EACH NOSTRIL ONCE DAILY 7/21/21   Genet Chris, DO   gabapentin (NEURONTIN) 100 MG capsule TAKE ONE CAPSULE BY MOUTH THREE TIMES A DAY 7/7/21 10/5/21  Genet Chris DO   omeprazole (PRILOSEC) 20 MG delayed release capsule Take 1 capsule by mouth 2 times daily 6/7/21   Historical Provider, MD   promethazine-dextromethorphan (PROMETHAZINE-DM) 6.25-15 MG/5ML syrup Take 5 mLs by mouth nightly as needed for Cough 6/29/21   Genet Chris DO   atorvastatin (LIPITOR) 10 MG tablet TAKE ONE TABLET BY MOUTH DAILY 6/16/21   Genet Chris DO   lisinopril (PRINIVIL;ZESTRIL) 40 MG tablet TAKE ONE TABLET BY MOUTH DAILY 6/10/21   Genet Chris DO   amLODIPine (NORVASC) 10 MG tablet TAKE ONE TABLET BY MOUTH DAILY 6/10/21   Genet Chris DO   ferrous sulfate (IRON 325) 325 (65 Fe) MG tablet Take 1 tablet by mouth daily (with breakfast) 5/13/21   Mohini Prieto,    fexofenadine (ALLEGRA) 180 MG tablet Take 1 tablet by mouth daily  Patient not taking: Reported on 6/29/2021 4/21/21   Genet Chris DO   levothyroxine (SYNTHROID) 100 MCG tablet TAKE ONE TABLET BY MOUTH DAILY 3/11/21   Genet Chris, DO   Cholecalciferol (VITAMIN D) 50 MCG (2000 UT) CAPS capsule Take 2,000 Units by mouth daily Historical Provider, MD   Multiple Vitamins-Minerals (MULTIVITAMIN ADULT PO) Take 1 tablet by mouth daily    Historical Provider, MD   propranolol (INDERAL) 10 MG tablet Take 10 mg by mouth 3 times daily Indications: patient takes twice a day     Historical Provider, MD   hydrOXYzine (ATARAX) 25 MG tablet Take 50 mg by mouth every 6 hours as needed for Anxiety     Historical Provider, MD   albuterol sulfate  (90 Base) MCG/ACT inhaler Inhale 1-2 puffs into the lungs every 6 hours as needed for Wheezing or Shortness of Breath 5/18/20   Velasquez Chris DO   DULoxetine (CYMBALTA) 30 MG extended release capsule Take 30 mg by mouth daily    Historical Provider, MD   DULoxetine (CYMBALTA) 60 MG extended release capsule Take 60 mg by mouth daily    Historical Provider, MD   lamoTRIgine (LAMICTAL) 200 MG tablet Take 200 mg by mouth daily     Historical Provider, MD       Future Appointments   Date Time Provider Rupesh Michaels   12/29/2021 11:00 AM Jerilyn Mi DO [facility]

## 2021-08-09 ENCOUNTER — CARE COORDINATION (OUTPATIENT)
Dept: CARE COORDINATION | Age: 56
End: 2021-08-09

## 2021-08-11 ENCOUNTER — CARE COORDINATION (OUTPATIENT)
Dept: CARE COORDINATION | Age: 56
End: 2021-08-11

## 2021-08-12 ENCOUNTER — CARE COORDINATION (OUTPATIENT)
Dept: CARE COORDINATION | Age: 56
End: 2021-08-12

## 2021-08-12 NOTE — CARE COORDINATION
Ambulatory Care Coordination Note  8/12/2021  CM Risk Score: 5  Charlson 10 Year Mortality Risk Score: 10%     ACC: Siomara Umaña, RN    Summary Note:  Jaimie Pearl was referred for ACM from report.      She has:          Recent DX- Anemia- starting iron and following with PCP                          COPD- on medications and follows with PCP                          Depression, PTSD- she is on medications and follows at [facility]                          Hypothyroidism, HTN- on medications and follows with PCP                          Recent change in bowels- S/P colonoscopy with polyp 5/11/2021- follows with Dr. Ronnell Arteaga                                                         Plan of Care : Continue assessments, education, and support                          COPD Zone Tool                          Medications reviewed 5/12/2021- she will  iron, refill Vit D and multivitamin                          SDOH completed  [facility] decision is up to date                          Discussed  ACP documentation- 6/16/2021- referral placed- she would like forms mailed and then have them call her after she reviews it. She had completed                               ZZV years ago but it is no longer valid.  8/2/2021- ACP reached out 3 times.                           Review clinic hours, Urgent Care, and My Chart                          Covid education completed and she has received vaccines                          Care Gaps                          DVHF Team                           Nutrition- reviewed iron enriched foods                          [facility] F/U 6/29/2021 completed- F/U 12/29/2021                          F/U on iron                                      She has applied for disability- she has apt 6/26 for her back and other issues.      6/30/2021- 10 am Left message requesting return call @ [phone].   7/9/2021- 12:40 pm Left message requesting return call @ [phone].   7/20/2021- 2:49 pm Left message requesting return call @ [phone].   7/26/2021- 6:15 pm Left message requesting return call @ [phone].   8/2/2021- 12:31 pm- Left message requesting return call @ [phone].   8/12/2021- 9:25 am Left message requesting return call @ [phone]. Letter to be mailed. Care Coordination Interventions    Program Enrollment: Complex Care  Referral from Primary Care Provider: No  Suggested Interventions and Community Resources  [facility]  Smoking Cessation: Completed  Zone Management Tools: Completed  Other Services or Interventions: referral to [facility] staff          Goals Addressed    None         Prior to Admission medications    Medication Sig Start Date End Date Taking?  Authorizing Provider   fluticasone (FLONASE) 50 MCG/ACT nasal spray SPRAY TWO SPRAYS IN EACH NOSTRIL ONCE DAILY 7/21/21   Frank Chris, DO   gabapentin (NEURONTIN) 100 MG capsule TAKE ONE CAPSULE BY MOUTH THREE TIMES A DAY 7/7/21 10/5/21  Frank Chris, DO   omeprazole (PRILOSEC) 20 MG delayed release capsule Take 1 capsule by mouth 2 times daily 6/7/21   Historical Provider, MD   promethazine-dextromethorphan (PROMETHAZINE-DM) 6.25-15 MG/5ML syrup Take 5 mLs by mouth nightly as needed for Cough 6/29/21   Frank Chris, DO   atorvastatin (LIPITOR) 10 MG tablet TAKE ONE TABLET BY MOUTH DAILY 6/16/21   Frank Chris, DO   lisinopril (PRINIVIL;ZESTRIL) 40 MG tablet TAKE ONE TABLET BY MOUTH DAILY 6/10/21   Frank Chris, DO   amLODIPine (NORVASC) 10 MG tablet TAKE ONE TABLET BY MOUTH DAILY 6/10/21   Frank Chris, DO   ferrous sulfate (IRON 325) 325 (65 Fe) MG tablet Take 1 tablet by mouth daily (with breakfast) 5/13/21   Ryan Gutierrez, DO   fexofenadine (ALLEGRA) 180 MG tablet Take 1 tablet by mouth daily  Patient not taking: Reported on 6/29/2021 4/21/21   Frank Chris, DO   levothyroxine (SYNTHROID) 100 MCG tablet TAKE ONE TABLET BY MOUTH DAILY 3/11/21   Ryan Gutierrez, DO   Cholecalciferol (VITAMIN D) 50 MCG (2000 UT) CAPS capsule Take 2,000 Units by mouth daily    Historical Provider, MD   Multiple Vitamins-Minerals (MULTIVITAMIN ADULT PO) Take 1 tablet by mouth daily    Historical Provider, MD   propranolol (INDERAL) 10 MG tablet Take 10 mg by mouth 3 times daily Indications: patient takes twice a day     Historical Provider, MD   hydrOXYzine (ATARAX) 25 MG tablet Take 50 mg by mouth every 6 hours as needed for Anxiety     Historical Provider, MD   albuterol sulfate  (90 Base) MCG/ACT inhaler Inhale 1-2 puffs into the lungs every 6 hours as needed for Wheezing or Shortness of Breath 5/18/20   Priya Chris, DO   DULoxetine (CYMBALTA) 30 MG extended release capsule Take 30 mg by mouth daily    Historical Provider, MD   DULoxetine (CYMBALTA) 60 MG extended release capsule Take 60 mg by mouth daily    Historical Provider, MD   lamoTRIgine (LAMICTAL) 200 MG tablet Take 200 mg by mouth daily     Historical Provider, MD       Future Appointments   Date Time Provider Rupesh Michaels   12/29/2021 11:00 AM Ebenezer Dhillon DO [facility]

## 2021-08-12 NOTE — LETTER
[address] [facility]  [address], [address] [facility]    Ministerio King RN        August 12, 2021    [address] [facility] [address]      Dear Mel Bolden L:    I hope you are doing well. I have been unable to reach you by phone. Please call me at [phone].      Future Appointments   Date Time Provider Rupesh Michaels   12/29/2021 11:00 AM Hawk Vogel, DO [facility]       Sincerely,        Ministerio King RN

## 2021-08-18 ENCOUNTER — CARE COORDINATION (OUTPATIENT)
Dept: CARE COORDINATION | Age: 56
End: 2021-08-18

## 2021-08-22 ENCOUNTER — PATIENT MESSAGE (OUTPATIENT)
Dept: FAMILY MEDICINE CLINIC | Age: 56
End: 2021-08-22

## 2021-08-22 DIAGNOSIS — D50.9 IRON DEFICIENCY ANEMIA, UNSPECIFIED IRON DEFICIENCY ANEMIA TYPE: ICD-10-CM

## 2021-08-22 DIAGNOSIS — R05.8 RECURRENT COUGH: ICD-10-CM

## 2021-08-23 NOTE — TELEPHONE ENCOUNTER
From: Eddie Mendoza  To: Ebenezer Dhillon DO  Sent: 8/22/2021 6:42 PM EDT  Subject: Prescription Question    Can i please get my inhaler and iron refilled to [address]  Thank you

## 2021-08-23 NOTE — TELEPHONE ENCOUNTER
Obi LOU[facility] called requesting a refill of the below medication which has been pended for you:     Requested Prescriptions     Pending Prescriptions Disp Refills    albuterol sulfate  (90 Base) MCG/ACT inhaler 1 Inhaler 3     Sig: Inhale 1-2 puffs into the lungs every 6 hours as needed for Wheezing or Shortness of Breath    ferrous sulfate (IRON 325) 325 (65 Fe) MG tablet 30 tablet 2     Sig: Take 1 tablet by mouth daily (with breakfast)       Last Appointment Date: 6/29/2021  Next Appointment Date: 12/29/2021    Allergies   Allergen Reactions    Flomax [Tamsulosin Hcl] Swelling     Swelling of arms; however, also had rash and fever at the same time and was admitted at the time    Morphine Itching and Rash     Rash, itching    Saphris [Asenapine]     Bactrim [Sulfamethoxazole-Trimethoprim] Diarrhea and Nausea Only    Prednisone Other (See Comments)     Emotional changes, depression    Zyprexa [Olanzapine] Other (See Comments)     Made her feel like she wanted to \"jump out of my skin\"

## 2021-08-25 ENCOUNTER — CARE COORDINATION (OUTPATIENT)
Dept: CARE COORDINATION | Age: 56
End: 2021-08-25

## 2021-08-25 RX ORDER — ALBUTEROL SULFATE 90 UG/1
1-2 AEROSOL, METERED RESPIRATORY (INHALATION) EVERY 6 HOURS PRN
Qty: 3 INHALER | Refills: 1 | Status: SHIPPED | OUTPATIENT
Start: 2021-08-25

## 2021-08-25 RX ORDER — FERROUS SULFATE 325(65) MG
325 TABLET ORAL
Qty: 90 TABLET | Refills: 1 | Status: SHIPPED | OUTPATIENT
Start: 2021-08-25 | End: 2022-02-18

## 2021-08-25 NOTE — CARE COORDINATION
Ambulatory Care Coordination Note  8/25/2021  CM Risk Score: 5  Charlson 10 Year Mortality Risk Score: 10%     ACC: Aura Conner, RN    Summary Note: Kylah Perdue was referred for ACM from report.      She has:          Recent DX- Anemia- starting iron and following with PCP                          COPD- on medications and follows with PCP                          Depression, PTSD- she is on medications and follows at [facility]                          Hypothyroidism, HTN- on medications and follows with PCP                          Recent change in bowels- S/P colonoscopy with polyp 5/11/2021- follows with Dr. Mandy Griffin                            Plan of Care :[facility] PLANO completed. This writer can enroll in future if needed.      6/30/2021- 10 am Left message requesting return call @ [phone].   7/9/2021- 12:40 pm Left message requesting return call @ [phone].   7/20/2021- 2:49 pm Left message requesting return call @ [phone].   7/26/2021- 6:15 pm Left message requesting return call @ [phone].   8/2/2021- 12:31 pm- Left message requesting return call @ [phone].   8/12/2021- 9:25 am Left message requesting return call @ [phone]. Letter to be mailed. 8/18/2021- letter was mailed. 8/25/2021- 4:28 pm Left detailed message to reach out if with concerns. This writer will no longer be calling. Completion of ACM.        Future Appointments   Date Time Provider Rupesh Michaels   12/29/2021 11:00 AM [facility]

## 2021-09-08 ENCOUNTER — OFFICE VISIT (OUTPATIENT)
Dept: PRIMARY CARE CLINIC | Age: 56
End: 2021-09-08
Payer: MEDICARE

## 2021-09-08 ENCOUNTER — HOSPITAL ENCOUNTER (OUTPATIENT)
Age: 56
Setting detail: SPECIMEN
Discharge: HOME | End: 2021-09-08
Payer: MEDICARE

## 2021-09-08 VITALS
BODY MASS INDEX: 31.23 KG/M2 | HEART RATE: 111 BPM | SYSTOLIC BLOOD PRESSURE: 110 MMHG | TEMPERATURE: 97.1 F | WEIGHT: 165.4 LBS | DIASTOLIC BLOOD PRESSURE: 64 MMHG | HEIGHT: 61 IN | OXYGEN SATURATION: 95 % | RESPIRATION RATE: 18 BRPM

## 2021-09-08 DIAGNOSIS — J44.1 COPD EXACERBATION (HCC): ICD-10-CM

## 2021-09-08 DIAGNOSIS — Z20.822 PERSON UNDER INVESTIGATION FOR COVID-19: ICD-10-CM

## 2021-09-08 DIAGNOSIS — J06.9 UPPER RESPIRATORY TRACT INFECTION, UNSPECIFIED TYPE: ICD-10-CM

## 2021-09-08 DIAGNOSIS — J06.9 UPPER RESPIRATORY TRACT INFECTION, UNSPECIFIED TYPE: Primary | ICD-10-CM

## 2021-09-08 PROCEDURE — 99213 OFFICE O/P EST LOW 20 MIN: CPT | Performed by: NURSE PRACTITIONER

## 2021-09-08 PROCEDURE — 99214 OFFICE O/P EST MOD 30 MIN: CPT

## 2021-09-08 PROCEDURE — 3017F COLORECTAL CA SCREEN DOC REV: CPT | Performed by: NURSE PRACTITIONER

## 2021-09-08 PROCEDURE — U0005 INFEC AGEN DETEC AMPLI PROBE: HCPCS

## 2021-09-08 PROCEDURE — 3023F SPIROM DOC REV: CPT | Performed by: NURSE PRACTITIONER

## 2021-09-08 PROCEDURE — G8427 DOCREV CUR MEDS BY ELIG CLIN: HCPCS | Performed by: NURSE PRACTITIONER

## 2021-09-08 PROCEDURE — 4004F PT TOBACCO SCREEN RCVD TLK: CPT | Performed by: NURSE PRACTITIONER

## 2021-09-08 PROCEDURE — U0003 INFECTIOUS AGENT DETECTION BY NUCLEIC ACID (DNA OR RNA); SEVERE ACUTE RESPIRATORY SYNDROME CORONAVIRUS 2 (SARS-COV-2) (CORONAVIRUS DISEASE [COVID-19]), AMPLIFIED PROBE TECHNIQUE, MAKING USE OF HIGH THROUGHPUT TECHNOLOGIES AS DESCRIBED BY CMS-2020-01-R: HCPCS

## 2021-09-08 PROCEDURE — G8926 SPIRO NO PERF OR DOC: HCPCS | Performed by: NURSE PRACTITIONER

## 2021-09-08 PROCEDURE — G8417 CALC BMI ABV UP PARAM F/U: HCPCS | Performed by: NURSE PRACTITIONER

## 2021-09-08 RX ORDER — PREDNISONE 20 MG/1
40 TABLET ORAL DAILY
Qty: 10 TABLET | Refills: 0 | Status: SHIPPED | OUTPATIENT
Start: 2021-09-08 | End: 2021-09-13

## 2021-09-08 ASSESSMENT — ENCOUNTER SYMPTOMS
COUGH: 1
WHEEZING: 1
RHINORRHEA: 1
SORE THROAT: 0
SHORTNESS OF BREATH: 1
GASTROINTESTINAL NEGATIVE: 1

## 2021-09-08 ASSESSMENT — PATIENT HEALTH QUESTIONNAIRE - PHQ9
SUM OF ALL RESPONSES TO PHQ QUESTIONS 1-9: 2
1. LITTLE INTEREST OR PLEASURE IN DOING THINGS: 1
SUM OF ALL RESPONSES TO PHQ9 QUESTIONS 1 & 2: 2
2. FEELING DOWN, DEPRESSED OR HOPELESS: 1
SUM OF ALL RESPONSES TO PHQ QUESTIONS 1-9: 2
SUM OF ALL RESPONSES TO PHQ QUESTIONS 1-9: 2

## 2021-09-08 NOTE — PATIENT INSTRUCTIONS
Patient Education        Chronic Obstructive Pulmonary Disease (COPD): Care Instructions  Your Care Instructions     Chronic obstructive pulmonary disease (COPD) is a general term for a group of lung diseases, including emphysema and chronic bronchitis. People with COPD have decreased airflow in and out of the lungs, which makes it hard to breathe. The airways also can get clogged with thick mucus. Cigarette smoking is a major cause of COPD. Although there is no cure for COPD, you can slow its progress. Following your treatment plan and taking care of yourself can help you feel better and live longer. Follow-up care is a key part of your treatment and safety. Be sure to make and go to all appointments, and call your doctor if you are having problems. It's also a good idea to know your test results and keep a list of the medicines you take. How can you care for yourself at home? Staying healthy    · Do not smoke. This is the most important step you can take to prevent more damage to your lungs. If you need help quitting, talk to your doctor about stop-smoking programs and medicines. These can increase your chances of quitting for good.     · Avoid colds and flu. Get a pneumococcal vaccine shot. If you have had one before, ask your doctor whether you need a second dose. Get the flu vaccine every fall. If you must be around people with colds or the flu, wash your hands often.     · Avoid secondhand smoke, air pollution, and high altitudes. Also avoid cold, dry air and hot, humid air. Stay at home with your windows closed when air pollution is bad. Medicines and oxygen therapy    · Take your medicines exactly as prescribed. Call your doctor if you think you are having a problem with your medicine. You may be taking medicines such as:  ? Bronchodilators. These help open your airways and make breathing easier. They are either short-acting (work for 6 to 9 hours) or long-acting (work for 24 hours).  You inhale most bronchodilators, so they start to act quickly. Always carry your quick-relief inhaler with you in case you need it while you are away from home. ? Corticosteroids (prednisone, budesonide). These reduce airway inflammation. They come in pill or inhaled form. You must take these medicines every day for them to work well.     · Ask your doctor or pharmacist if a spacer is right for you. A spacer may help you get more inhaled medicine to your lungs. If you use one, ask how to use it properly.     · Do not take any vitamins, over-the-counter medicine, or herbal products without talking to your doctor first.     · If your doctor prescribed antibiotics, take them as directed. Do not stop taking them just because you feel better. You need to take the full course of antibiotics.     · If you use oxygen therapy, use the flow rate your doctor has recommended. Don't change it without talking to your doctor first. Oxygen therapy boosts the amount of oxygen in your blood and helps you breathe easier. Activity    · Get regular exercise. Walking is an easy way to get exercise. Start out slowly, and walk a little more each day.     · Pay attention to your breathing. You are exercising too hard if you can't talk while you exercise.     · Take short rest breaks when doing household chores and other activities.     · Learn breathing methods--such as breathing through pursed lips--to help you become less short of breath.     · If your doctor has not set you up with a pulmonary rehabilitation program, ask if rehab is right for you. Rehab includes exercise programs, education about your disease and how to manage it, help with diet and other changes, and emotional support. Diet    · Eat regular, healthy meals. Use bronchodilators about 1 hour before you eat to make it easier to eat. Eat several small meals instead of three large ones.  Drink beverages at the end of the meal. Avoid foods that are hard to chew.     · Eat foods that contain protein so you don't lose muscle mass.     · Talk with your doctor if you gain too much weight or if you lose weight without trying. Mental health    · Talk to your family, friends, or a therapist about your feelings. Some people feel frightened, angry, hopeless, helpless, and even guilty. Talking openly about bad feelings can help you cope. If these feelings last, talk to your doctor. When should you call for help? Call 911 anytime you think you may need emergency care. For example, call if:    · You have severe trouble breathing. Call your doctor now or seek immediate medical care if:    · You have new or worse trouble breathing.     · You cough up blood.     · You have a fever. Watch closely for changes in your health, and be sure to contact your doctor if:    · You cough more deeply or more often, especially if you notice more mucus or a change in the color of your mucus.     · You have new or worse swelling in your legs or belly.     · You are not getting better as expected. Where can you learn more? Go to [email or web address] org and sign in to your [employer] account. Enter M974 in the [employer] box to learn more about \"Chronic Obstructive Pulmonary Disease (COPD): Care Instructions. \"     If you do not have an account, please click on the \"Sign Up Now\" link. Current as of: October 26, 2020               Content Version: 12.9  © 2656-3767 [employer]. Care instructions adapted under license by [facility] ([facility]). If you have questions about a medical condition or this instruction, always ask your healthcare professional. [place] [address] any warranty or liability for your use of this information. Patient Education        Learning About Coronavirus [phone])  What is coronavirus (COVID-19)? COVID-19 is a disease caused by a new type of coronavirus. This illness was first found in December 2019.  It has since spread worldwide. Coronaviruses are a large group of viruses. They cause the common cold. They also cause more serious illnesses like [place] respiratory syndrome (MERS) and severe acute respiratory syndrome (SARS). COVID-19 is caused by a novel coronavirus. That means it's a new type that has not been seen in people before. What are the symptoms? Coronavirus (COVID-19) symptoms may include:  · Fever. · Cough. · Trouble breathing. · Chills or repeated shaking with chills. · Muscle pain. · Headache. · Sore throat. · New loss of taste or smell. · Vomiting. · Diarrhea. In severe cases, COVID-19 can cause pneumonia and make it hard to breathe without help from a machine. It can cause death. How is it diagnosed? COVID-19 is diagnosed with a viral test. This may also be called a PCR test or antigen test. It looks for evidence of the virus in your breathing passages or lungs (respiratory system). The test is most often done on a sample from the nose, throat, or lungs. It's sometimes done on a sample of saliva. One way a sample is collected is by putting a long swab into the back of your nose. How is it treated? Mild cases of COVID-19 can be treated at home. Serious cases need treatment in the hospital. Treatment may include medicines to reduce symptoms, plus breathing support such as oxygen therapy or a ventilator. Some people may be placed on their belly to help their oxygen levels. Treatments that may help people who have COVID-19 include:  Antiviral medicines. These medicines treat viral infections. Remdesivir is an example. Immune-based therapy. These medicines help the immune system fight COVID-19. One example is bamlanivimab. It's a monoclonal antibody. Blood thinners. These medicines help prevent blood clots. People with severe illness are at risk for blood clots. How can you protect yourself and others?   The best way to protect yourself from getting sick is to:  · Avoid areas where there is an outbreak. · Avoid contact with people who may be infected. · Avoid crowds and try to stay at least 6 feet away from other people. · Wash your hands often, especially after you cough or sneeze. Use soap and water, and scrub for at least 20 seconds. If soap and water aren't available, use an alcohol-based hand . · Avoid touching your mouth, nose, and eyes. To help avoid spreading the virus to others:  · Stay home if you are sick or have been exposed to the virus. Don't go to school, work, or public areas. And don't use public transportation, ride-shares, or taxis unless you have no choice. · Wear a cloth face cover if you have to go to public areas. · Cover your mouth with a tissue when you cough or sneeze. Then throw the tissue in the trash and wash your hands right away. · If you're sick:  ? Leave your home only if you need to get medical care. But call the doctor's office first so they know you're coming. And wear a face cover. ? Wear the face cover whenever you're around other people. It can help stop the spread of the virus. ? Limit contact with pets and people in your home. If possible, stay in a separate bedroom and use a separate bathroom. ? Clean and disinfect your home every day. Use household  and disinfectant wipes or sprays. Take special care to clean things that you grab with your hands. These include doorknobs, remote controls, phones, and handles on your refrigerator and microwave. And don't forget countertops, tabletops, bathrooms, and computer keyboards. When should you call for help? Call 911 anytime you think you may need emergency care. For example, call if you have life-threatening symptoms, such as:    · You have severe trouble breathing.  (You can't talk at all.)     · You have constant chest pain or pressure.     · You are severely dizzy or lightheaded.     · You are confused or can't think clearly.     · Your face and lips have a blue color.     · You pass out (lose consciousness) or are very hard to wake up. Call your doctor now or seek immediate medical care if:    · You have moderate trouble breathing. (You can't speak a full sentence.)     · You are coughing up blood (more than about 1 teaspoon).     · You have signs of low blood pressure. These include feeling lightheaded; being too weak to stand; and having cold, pale, clammy skin. Watch closely for changes in your health, and be sure to contact your doctor if:    · Your symptoms get worse.     · You are not getting better as expected. Call before you go to the doctor's office. Follow their instructions. And wear a cloth face cover. Current as of: March 26, 2021               Content Version: 12.9  © 2006-2021 [employer]. Care instructions adapted under license by [facility] ([facility]). If you have questions about a medical condition or this instruction, always ask your healthcare professional. [place] [address] any warranty or liability for your use of this information.

## 2021-09-08 NOTE — PROGRESS NOTES
[facility]             [address], [address]                        Telephone [phone]             Fax [phone]     Tahira Muñoz  1965  RDM:[id number]   Date of visit:  9/8/2021    Subjective:    Tahira Muñoz is a 55 y.o.  female who presents to [facility] today (9/8/2021) for evaluation of:    Chief Complaint   Patient presents with    Fever     chills,cough,SOB. body aches,fatigue started this morning       Cough  This is a new problem. The current episode started yesterday. The problem has been gradually worsening. The problem occurs every few minutes. The cough is non-productive. Associated symptoms include chills, a fever (low grade today), nasal congestion, postnasal drip (slight), rhinorrhea (09/04/21), shortness of breath, sweats and wheezing. Pertinent negatives include no headaches, rash or sore throat. Associated symptoms comments: Fatigue; sinus pressure. Nothing aggravates the symptoms. She has tried nothing (albuterol inhaler, flonase, allegra) for the symptoms. The treatment provided no relief. Her past medical history is significant for COPD.        She has the following problem list:  Patient Active Problem List   Diagnosis    Severe episode of recurrent major depressive disorder, without psychotic features ([facility]    Bipolar 2 disorder ([facility]    Bipolar II disorder ([facility]    Postoperative pain    Status post repair of recurrent ventral hernia    Chronic obstructive pulmonary disease (HCC)        Current medications are:  Current Outpatient Medications   Medication Sig Dispense Refill    predniSONE (DELTASONE) 20 MG tablet Take 2 tablets by mouth daily for 5 days 10 tablet 0    albuterol sulfate  (90 Base) MCG/ACT inhaler Inhale 1-2 puffs into the lungs every 6 hours as needed for Wheezing or Shortness of Breath 3 Inhaler 1    ferrous sulfate (IRON 325) 325 (65 Fe) MG tablet Take 1 tablet by mouth daily (with breakfast) 90 tablet 1    fluticasone (FLONASE) 50 MCG/ACT nasal spray SPRAY TWO SPRAYS IN EACH NOSTRIL ONCE DAILY 3 Bottle 3    gabapentin (NEURONTIN) 100 MG capsule TAKE ONE CAPSULE BY MOUTH THREE TIMES A DAY 90 capsule 2    omeprazole (PRILOSEC) 20 MG delayed release capsule Take 1 capsule by mouth 2 times daily      promethazine-dextromethorphan (PROMETHAZINE-DM) 6.25-15 MG/5ML syrup Take 5 mLs by mouth nightly as needed for Cough 75 mL 0    atorvastatin (LIPITOR) 10 MG tablet TAKE ONE TABLET BY MOUTH DAILY 90 tablet 1    lisinopril (PRINIVIL;ZESTRIL) 40 MG tablet TAKE ONE TABLET BY MOUTH DAILY 90 tablet 3    amLODIPine (NORVASC) 10 MG tablet TAKE ONE TABLET BY MOUTH DAILY 90 tablet 3    levothyroxine (SYNTHROID) 100 MCG tablet TAKE ONE TABLET BY MOUTH DAILY 90 tablet 3    Cholecalciferol (VITAMIN D) 50 MCG (2000 UT) CAPS capsule Take 2,000 Units by mouth daily      Multiple Vitamins-Minerals (MULTIVITAMIN ADULT PO) Take 1 tablet by mouth daily      propranolol (INDERAL) 10 MG tablet Take 10 mg by mouth 3 times daily Indications: patient takes twice a day       hydrOXYzine (ATARAX) 25 MG tablet Take 50 mg by mouth every 6 hours as needed for Anxiety       DULoxetine (CYMBALTA) 30 MG extended release capsule Take 30 mg by mouth daily      DULoxetine (CYMBALTA) 60 MG extended release capsule Take 60 mg by mouth daily      lamoTRIgine (LAMICTAL) 200 MG tablet Take 200 mg by mouth daily       fexofenadine (ALLEGRA) 180 MG tablet Take 1 tablet by mouth daily (Patient not taking: Reported on 6/29/2021) 90 tablet 1     No current facility-administered medications for this visit. She is allergic to flomax [tamsulosin hcl], morphine, saphris [asenapine], bactrim [sulfamethoxazole-trimethoprim], prednisone, and zyprexa [olanzapine]. .    She  reports that she has been smoking cigarettes. She started smoking about 39 years ago.  She has a 18.50 pack-year smoking history. She has never used smokeless tobacco.      Objective:    Vitals:    09/08/21 1706   BP: 110/64   Pulse: 111   Resp: 18   Temp: 97.1 °F (36.2 °C)   TempSrc: Tympanic   SpO2: 95%   Weight: 165 lb 6.4 oz (75 kg)   Height: 5' 1\" (1.549 m)     Body mass index is 31.25 kg/m². Review of Systems   Constitutional: Positive for chills, fatigue and fever (low grade today). Negative for appetite change. HENT: Positive for congestion, postnasal drip (slight) and rhinorrhea (09/04/21). Negative for sore throat. Respiratory: Positive for cough, shortness of breath and wheezing. Cardiovascular: Negative. Gastrointestinal: Negative. Skin: Negative for rash. Neurological: Negative for headaches. Physical Exam  Vitals and nursing note reviewed. Constitutional:       Appearance: She is well-developed. HENT:      Head: Normocephalic. Jaw: There is normal jaw occlusion. Right Ear: Tympanic membrane, ear canal and external ear normal.      Left Ear: Tympanic membrane, ear canal and external ear normal.      Nose: Congestion present. Right Turbinates: Swollen. Left Turbinates: Swollen. Right Sinus: No maxillary sinus tenderness or frontal sinus tenderness. Left Sinus: Maxillary sinus tenderness present. No frontal sinus tenderness. Mouth/Throat:      Lips: Pink. Mouth: Mucous membranes are moist.      Pharynx: Oropharynx is clear. Uvula midline. Eyes:      Pupils: Pupils are equal, round, and reactive to light. Cardiovascular:      Rate and Rhythm: Normal rate and regular rhythm. Heart sounds: Normal heart sounds. Pulmonary:      Effort: Pulmonary effort is normal.      Breath sounds: Normal air entry. Examination of the right-middle field reveals wheezing. Examination of the left-middle field reveals wheezing. Examination of the right-lower field reveals wheezing. Examination of the left-lower field reveals wheezing.  Wheezing present. Musculoskeletal:      Cervical back: Normal range of motion and neck supple. Lymphadenopathy:      Cervical: No cervical adenopathy. Skin:     General: Skin is warm and dry. Neurological:      General: No focal deficit present. Mental Status: She is alert and oriented to person, place, and time. Psychiatric:         Behavior: Behavior normal.         Thought Content: Thought content normal.       Assessment and Plan:    No results found for this visit on 09/08/21. Diagnosis Orders   1. Upper respiratory tract infection, unspecified type  COVID-19   2. COPD exacerbation (HCC)  predniSONE (DELTASONE) 20 MG tablet   3. Person under investigation for COVID-19  COVID-19       Self quarantine until negative Covid-19 test result received and symptoms improving. We will call with Covid-19 test results. She verbalized that she can now tolerate prednisone. Take prednisone as directed. Continue to use albuterol inhaler as directed. I recommended that she use Coricidin HBP to help with congestion and cough. I also recommended Flonase and an antihistamine for sinus symptoms. she was also encouraged to use tylenol for pain/fever. Increase water intake. Use cool mist humidifier at bedtime. Use nasal saline flush as needed. Good hand hygiene. she was instructed to return if there is no improvement or symptoms worsen. The use, risks, benefits, and side effects of prescribed or recommended medications were discussed. All questions were answered and the patient/caregiver voiced understanding. No orders of the defined types were placed in this encounter.         Electronically signed by STAR Corrales CNP on 9/8/21 at 5:51 PM EDT

## 2021-09-11 LAB
SARS-COV-2: NORMAL
SARS-COV-2: NOT DETECTED
SOURCE: NORMAL

## 2021-09-20 ENCOUNTER — OFFICE VISIT (OUTPATIENT)
Dept: FAMILY MEDICINE CLINIC | Age: 56
End: 2021-09-20
Payer: MEDICARE

## 2021-09-20 ENCOUNTER — NURSE TRIAGE (OUTPATIENT)
Dept: OTHER | Facility: CLINIC | Age: 56
End: 2021-09-20

## 2021-09-20 VITALS
BODY MASS INDEX: 31.15 KG/M2 | HEART RATE: 95 BPM | HEIGHT: 61 IN | DIASTOLIC BLOOD PRESSURE: 70 MMHG | OXYGEN SATURATION: 96 % | WEIGHT: 165 LBS | SYSTOLIC BLOOD PRESSURE: 120 MMHG | TEMPERATURE: 98.1 F

## 2021-09-20 DIAGNOSIS — D50.9 IRON DEFICIENCY ANEMIA, UNSPECIFIED IRON DEFICIENCY ANEMIA TYPE: Primary | ICD-10-CM

## 2021-09-20 DIAGNOSIS — M54.41 ACUTE RIGHT-SIDED LOW BACK PAIN WITH RIGHT-SIDED SCIATICA: Primary | ICD-10-CM

## 2021-09-20 PROCEDURE — 99212 OFFICE O/P EST SF 10 MIN: CPT | Performed by: FAMILY MEDICINE

## 2021-09-20 PROCEDURE — G8427 DOCREV CUR MEDS BY ELIG CLIN: HCPCS | Performed by: FAMILY MEDICINE

## 2021-09-20 PROCEDURE — 99213 OFFICE O/P EST LOW 20 MIN: CPT | Performed by: FAMILY MEDICINE

## 2021-09-20 PROCEDURE — 3017F COLORECTAL CA SCREEN DOC REV: CPT | Performed by: FAMILY MEDICINE

## 2021-09-20 PROCEDURE — 4004F PT TOBACCO SCREEN RCVD TLK: CPT | Performed by: FAMILY MEDICINE

## 2021-09-20 PROCEDURE — G8417 CALC BMI ABV UP PARAM F/U: HCPCS | Performed by: FAMILY MEDICINE

## 2021-09-20 RX ORDER — CYCLOBENZAPRINE HCL 5 MG
5 TABLET ORAL NIGHTLY PRN
Qty: 30 TABLET | Refills: 0 | Status: SHIPPED | OUTPATIENT
Start: 2021-09-20 | End: 2021-11-08

## 2021-09-20 ASSESSMENT — ENCOUNTER SYMPTOMS
CHEST TIGHTNESS: 0
COUGH: 0
BACK PAIN: 1
SHORTNESS OF BREATH: 0
BOWEL INCONTINENCE: 0
WHEEZING: 0

## 2021-09-20 NOTE — LETTER
[facility] department of [place] [address]  Phone: [phone]  Fax: [phone]    Sandra Stephen MD        September 20, 2021     Patient: Kenyon aRe   YOB: 1965   Date of Visit: 9/20/2021       To Whom It May Concern: It is my medical opinion that Kenyon Rae may return to work on 9/23/21. She missed work on 9/20-23 due to back pain. If you have any questions or concerns, please don't hesitate to call.     Sincerely,        Sandra Stephen MD

## 2021-09-20 NOTE — TELEPHONE ENCOUNTER
Received call from [place] at pre-service center [facility]) [place] with [employer]. Brief description of triage: Relief for back pain, now going down back of right leg. Toes on right foot numb and tinglings. Scheduled for check up in December. Hard to be on her feet for long periods of time-had to call off work. Flank pain left worse than right. Hx of Kidney stones-not like that pain. Back surgery in past. Constant pain from back down to right leg, varies in severity. Intermittent in left leg. Numbness and tingling on outer on 2 toes,been going on for the last week. Triage indicates for patient to go to office now. Back up disposition given, patient agreeable. Care advice provided, patient verbalizes understanding; denies any other questions or concerns; instructed to call back for any new or worsening symptoms. Writer provided warm transfer to Valentin Javier at [facility] for appointment scheduling. Attention Provider: Thank you for allowing me to participate in the care of your patient. The patient was connected to triage in response to information provided to the [facility]. Please do not respond through this encounter as the response is not directed to a shared pool. Reason for Disposition   SEVERE pain (e.g., excruciating, unable to do any normal activities)    Answer Assessment - Initial Assessment Questions  1. ONSET: \"When did the pain start? \"       Last week    2. LOCATION: \"Where is the pain located? \"       Constant pain from back down to right leg, varies in severity. Intermittent in left leg. Numbness and tingling on outer on 2 toes,been going on for the last week. 3. PAIN: \"How bad is the pain? \"    (Scale 1-10; or mild, moderate, severe)    -  MILD (1-3): doesn't interfere with normal activities     -  MODERATE (4-7): interferes with normal activities (e.g., work or school) or awakens from sleep, limping     -  SEVERE (8-10): excruciating pain, unable to do any normal activities, unable to walk 6/10 at rest, 10/10 when ambulating    4. WORK OR EXERCISE: \"Has there been any recent work or exercise that involved this part of the body? \"       Denies    5. CAUSE: \"What do you think is causing the leg pain? \"      Unsure-maybe bulge in spine ruptured. 6. OTHER SYMPTOMS: \"Do you have any other symptoms? \" (e.g., chest pain, back pain, breathing difficulty, swelling, rash, fever, numbness, weakness)      Back pain chronic-worsened, now constant 5/10. BLE swelling intermittent. Respiratory virus-covid negative. Been on steroids. Numbness/tingling to right outer 2 toes. Bilateral flank pain, left worse than right. 7. PREGNANCY: \"Is there any chance you are pregnant? \" \"When was your last menstrual period? \"      No    Protocols used: LEG PAIN-ADULT-[place]

## 2021-09-20 NOTE — PROGRESS NOTES
[address]  [address] [address]  Dept: [phone]  Dept Fax: [phone]  Loc: [phone]    Kenyon Rae is a 55 y.o. female who presents today for her medical conditions/complaints as noted below. Kenyon Rae is c/o of   Chief Complaint   Patient presents with    Back Pain       HPI:     Here today for back pain. Back Pain  This is a recurrent (has had issues with back pain off and on for 20+ years. No sciatic issues since 2014) problem. The current episode started in the past 7 days (5 days ago). The problem is unchanged. The pain is present in the lumbar spine. The pain radiates to the right foot. The pain is at a severity of 8/10. The pain is severe. The symptoms are aggravated by bending, sitting, twisting and standing. Associated symptoms include leg pain, numbness (in her toes), paresthesias (mild in leg and foot) and weakness (mild in the right). Pertinent negatives include no bladder incontinence, bowel incontinence, chest pain, dysuria, fever or pelvic pain. She has tried ice, analgesics and heat (recently off of prednisone, lidocaine) for the symptoms. The treatment provided mild relief. She works at [employer] as a . Past Medical History:   Diagnosis Date    Anxiety     Breast cancer ([facility] [address] 2014    L side - lumpectomy and radiation; no chemo (was recommended to take Tamoxifen, but with her smoking, she declined due to family h/o CVA already).  Seeing Dr. Jasmin Mace once yearly/    COPD (chronic obstructive pulmonary disease) ([facility] [address]     Depression     Headache(784.0)     History of alcoholism ([facility] [address]     sober since 1/16/19    Hypertension     Hypothyroidism     Kidney stone     Has had lithotripsy x 1; had ureteral stent placement with removal x 1; had seen Dr. Yara Adkins Panic attack 04/15/2021    PTSD (post-traumatic stress disorder)           Social History     Tobacco Use    Smoking status: Current Every Day Smoker     Packs/day: 0.50     Years: 37.00     Pack years: 18.50     Types: Cigarettes     Start date: 19    Smokeless tobacco: Never Used    Tobacco comment: Will see PCP when ready to quit.      Substance Use Topics    Alcohol use: No     Comment: [facility]     Current Outpatient Medications   Medication Sig Dispense Refill    cyclobenzaprine (FLEXERIL) 5 MG tablet Take 1 tablet by mouth nightly as needed for Muscle spasms 30 tablet 0    albuterol sulfate  (90 Base) MCG/ACT inhaler Inhale 1-2 puffs into the lungs every 6 hours as needed for Wheezing or Shortness of Breath 3 Inhaler 1    ferrous sulfate (IRON 325) 325 (65 Fe) MG tablet Take 1 tablet by mouth daily (with breakfast) 90 tablet 1    fluticasone (FLONASE) 50 MCG/ACT nasal spray SPRAY TWO SPRAYS IN EACH NOSTRIL ONCE DAILY 3 Bottle 3    gabapentin (NEURONTIN) 100 MG capsule TAKE ONE CAPSULE BY MOUTH THREE TIMES A DAY 90 capsule 2    omeprazole (PRILOSEC) 20 MG delayed release capsule Take 1 capsule by mouth 2 times daily      promethazine-dextromethorphan (PROMETHAZINE-DM) 6.25-15 MG/5ML syrup Take 5 mLs by mouth nightly as needed for Cough 75 mL 0    atorvastatin (LIPITOR) 10 MG tablet TAKE ONE TABLET BY MOUTH DAILY 90 tablet 1    lisinopril (PRINIVIL;ZESTRIL) 40 MG tablet TAKE ONE TABLET BY MOUTH DAILY 90 tablet 3    amLODIPine (NORVASC) 10 MG tablet TAKE ONE TABLET BY MOUTH DAILY 90 tablet 3    fexofenadine (ALLEGRA) 180 MG tablet Take 1 tablet by mouth daily 90 tablet 1    levothyroxine (SYNTHROID) 100 MCG tablet TAKE ONE TABLET BY MOUTH DAILY 90 tablet 3    Cholecalciferol (VITAMIN D) 50 MCG (2000 UT) CAPS capsule Take 2,000 Units by mouth daily      Multiple Vitamins-Minerals (MULTIVITAMIN ADULT PO) Take 1 tablet by mouth daily      propranolol (INDERAL) 10 MG tablet Take 10 mg by mouth 3 times daily Indications: patient takes twice a day rhythm. Heart sounds: Normal heart sounds. No murmur heard. Pulmonary:      Effort: Pulmonary effort is normal. No respiratory distress. Breath sounds: Normal breath sounds. No wheezing or rales. Musculoskeletal:      Cervical back: Normal range of motion and neck supple. Lumbar back: Spasms and tenderness present. No swelling, edema or bony tenderness. Decreased range of motion. Back:       Comments: Modified straight leg raise test was negative bilaterally   Lymphadenopathy:      Cervical: No cervical adenopathy. Skin:     General: Skin is warm and dry. Findings: No erythema or rash. Neurological:      Mental Status: She is alert and oriented to person, place, and time. Sensory: No sensory deficit. Motor: Weakness (in right knee extension) present. Coordination: Coordination normal.      Gait: Gait normal.      Deep Tendon Reflexes:      Reflex Scores:       Patellar reflexes are 2+ on the right side and 2+ on the left side. Psychiatric:         Behavior: Behavior normal.         Judgment: Judgment normal.       /70   Pulse 95   Temp 98.1 °F (36.7 °C)   Ht 5' 1\" (1.549 m)   Wt 165 lb (74.8 kg)   SpO2 96%   BMI 31.18 kg/m²     Assessment:       Diagnosis Orders   1. Acute right-sided low back pain with right-sided sciatica               Plan:        Low back pain: new; I explained that I do not like to treat back pain with narcotics. I recommended tyelnol, heat, ice and back exercises. she was given back exercises to take home. I also recommended an occasional muscle relaxer at bedtime if she needs it. I also talked about the importance of good posture and staying active. Return if symptoms worsen or fail to improve.       Orders Placed This Encounter   Medications    cyclobenzaprine (FLEXERIL) 5 MG tablet     Sig: Take 1 tablet by mouth nightly as needed for Muscle spasms     Dispense:  30 tablet     Refill:  0       Patientgiven educational materials - see patient instructions. Discussed use, benefit,and side effects of prescribed medications. All patient questions answered. Ptvoiced understanding. Reviewed health maintenance. Instructed to continue currentmedications, diet and exercise. Patient agreed with treatment plan. Follow up asdirected.      Electronically signed by Perla Torres MD on 9/20/2021 at 3:19 PM

## 2021-09-26 DIAGNOSIS — R52 BURNING PAIN: ICD-10-CM

## 2021-09-27 NOTE — TELEPHONE ENCOUNTER
Dianna Haile called requesting a refill of the below medication which has been pended for you:     Requested Prescriptions     Pending Prescriptions Disp Refills    gabapentin (NEURONTIN) 100 MG capsule [Pharmacy Med Name: GABAPENTIN 100 MG CAPSULE] 90 capsule 2     Sig: take 1 capsule by mouth three times a day       Last Appointment Date: 6/29/2021  Next Appointment Date: 12/29/2021    Allergies   Allergen Reactions    Flomax [Tamsulosin Hcl] Swelling     Swelling of arms; however, also had rash and fever at the same time and was admitted at the time    Morphine Itching and Rash     Rash, itching    Saphris [Asenapine]     Bactrim [Sulfamethoxazole-Trimethoprim] Diarrhea and Nausea Only    Prednisone Other (See Comments)     Emotional changes, depression    Zyprexa [Olanzapine] Other (See Comments)     Made her feel like she wanted to \"jump out of my skin\"

## 2021-09-29 RX ORDER — GABAPENTIN 100 MG/1
100 CAPSULE ORAL 3 TIMES DAILY
Qty: 90 CAPSULE | Refills: 2 | Status: SHIPPED | OUTPATIENT
Start: 2021-09-29 | End: 2021-12-21 | Stop reason: SDUPTHER

## 2021-10-06 ENCOUNTER — APPOINTMENT (OUTPATIENT)
Dept: CT IMAGING | Age: 56
End: 2021-10-06
Payer: MEDICARE

## 2021-10-06 ENCOUNTER — HOSPITAL ENCOUNTER (EMERGENCY)
Age: 56
Discharge: HOME | End: 2021-10-07
Attending: EMERGENCY MEDICINE
Payer: MEDICARE

## 2021-10-06 DIAGNOSIS — R10.9 FLANK PAIN: Primary | ICD-10-CM

## 2021-10-06 LAB
ABSOLUTE EOS #: 0.31 K/UL (ref 0–0.44)
ABSOLUTE IMMATURE GRANULOCYTE: 0.03 K/UL (ref 0–0.3)
ABSOLUTE LYMPH #: 2.84 K/UL (ref 1.1–3.7)
ABSOLUTE MONO #: 0.5 K/UL (ref 0.1–1.2)
ALBUMIN SERPL-MCNC: 4.9 G/DL (ref 3.5–5.2)
ALBUMIN/GLOBULIN RATIO: 2 (ref 1–2.5)
ALP BLD-CCNC: 78 U/L (ref 35–104)
ALT SERPL-CCNC: 23 U/L (ref 5–33)
ANION GAP SERPL CALCULATED.3IONS-SCNC: 11 MMOL/L (ref 9–17)
AST SERPL-CCNC: 25 U/L
BASOPHILS # BLD: 1 % (ref 0–2)
BASOPHILS ABSOLUTE: 0.07 K/UL (ref 0–0.2)
BILIRUB SERPL-MCNC: 0.23 MG/DL (ref 0.3–1.2)
BILIRUBIN DIRECT: 0.09 MG/DL
BILIRUBIN, INDIRECT: 0.14 MG/DL (ref 0–1)
BUN BLDV-MCNC: 10 MG/DL (ref 6–20)
BUN/CREAT BLD: 12 (ref 9–20)
CALCIUM SERPL-MCNC: 10.3 MG/DL (ref 8.6–10.4)
CHLORIDE BLD-SCNC: 103 MMOL/L (ref 98–107)
CO2: 26 MMOL/L (ref 20–31)
CREAT SERPL-MCNC: 0.86 MG/DL (ref 0.5–0.9)
DIFFERENTIAL TYPE: NORMAL
EOSINOPHILS RELATIVE PERCENT: 4 % (ref 1–4)
GFR AFRICAN AMERICAN: >60 ML/MIN
GFR NON-AFRICAN AMERICAN: >60 ML/MIN
GFR SERPL CREATININE-BSD FRML MDRD: ABNORMAL ML/MIN/{1.73_M2}
GFR SERPL CREATININE-BSD FRML MDRD: ABNORMAL ML/MIN/{1.73_M2}
GLOBULIN: 2.5 G/DL (ref 1.5–3.8)
GLUCOSE BLD-MCNC: 124 MG/DL (ref 70–99)
HCT VFR BLD CALC: 40.9 % (ref 36.3–47.1)
HEMOGLOBIN: 13.7 G/DL (ref 11.9–15.1)
IMMATURE GRANULOCYTES: 0 %
LIPASE: 97 U/L (ref 13–60)
LYMPHOCYTES # BLD: 32 % (ref 24–43)
MCH RBC QN AUTO: 29.5 PG (ref 25.2–33.5)
MCHC RBC AUTO-ENTMCNC: 33.5 G/DL (ref 25.2–33.5)
MCV RBC AUTO: 88 FL (ref 82.6–102.9)
MONOCYTES # BLD: 6 % (ref 3–12)
NRBC AUTOMATED: 0 PER 100 WBC
PDW BLD-RTO: 13.9 % (ref 11.8–14.4)
PLATELET # BLD: 273 K/UL (ref 138–453)
PLATELET ESTIMATE: NORMAL
PMV BLD AUTO: 8.3 FL (ref 8.1–13.5)
POTASSIUM SERPL-SCNC: 3.5 MMOL/L (ref 3.7–5.3)
RBC # BLD: 4.65 M/UL (ref 3.95–5.11)
RBC # BLD: NORMAL 10*6/UL
SEG NEUTROPHILS: 57 % (ref 36–65)
SEGMENTED NEUTROPHILS ABSOLUTE COUNT: 5.02 K/UL (ref 1.5–8.1)
SODIUM BLD-SCNC: 140 MMOL/L (ref 135–144)
TOTAL PROTEIN: 7.4 G/DL (ref 6.4–8.3)
WBC # BLD: 8.8 K/UL (ref 3.5–11.3)
WBC # BLD: NORMAL 10*3/UL

## 2021-10-06 PROCEDURE — 36415 COLL VENOUS BLD VENIPUNCTURE: CPT

## 2021-10-06 PROCEDURE — 6360000002 HC RX W HCPCS: Performed by: EMERGENCY MEDICINE

## 2021-10-06 PROCEDURE — 85025 COMPLETE CBC W/AUTO DIFF WBC: CPT

## 2021-10-06 PROCEDURE — 96376 TX/PRO/DX INJ SAME DRUG ADON: CPT

## 2021-10-06 PROCEDURE — 99285 EMERGENCY DEPT VISIT HI MDM: CPT

## 2021-10-06 PROCEDURE — 96374 THER/PROPH/DIAG INJ IV PUSH: CPT

## 2021-10-06 PROCEDURE — 96375 TX/PRO/DX INJ NEW DRUG ADDON: CPT

## 2021-10-06 PROCEDURE — 74176 CT ABD & PELVIS W/O CONTRAST: CPT

## 2021-10-06 PROCEDURE — 2580000003 HC RX 258: Performed by: EMERGENCY MEDICINE

## 2021-10-06 PROCEDURE — 96361 HYDRATE IV INFUSION ADD-ON: CPT

## 2021-10-06 PROCEDURE — 80076 HEPATIC FUNCTION PANEL: CPT

## 2021-10-06 PROCEDURE — 83690 ASSAY OF LIPASE: CPT

## 2021-10-06 PROCEDURE — 80048 BASIC METABOLIC PNL TOTAL CA: CPT

## 2021-10-06 RX ORDER — 0.9 % SODIUM CHLORIDE 0.9 %
1000 INTRAVENOUS SOLUTION INTRAVENOUS ONCE
Status: COMPLETED | OUTPATIENT
Start: 2021-10-06 | End: 2021-10-07

## 2021-10-06 RX ORDER — FENTANYL CITRATE 50 UG/ML
50 INJECTION, SOLUTION INTRAMUSCULAR; INTRAVENOUS ONCE
Status: COMPLETED | OUTPATIENT
Start: 2021-10-06 | End: 2021-10-06

## 2021-10-06 RX ORDER — ONDANSETRON 2 MG/ML
4 INJECTION INTRAMUSCULAR; INTRAVENOUS ONCE
Status: COMPLETED | OUTPATIENT
Start: 2021-10-06 | End: 2021-10-06

## 2021-10-06 RX ADMIN — SODIUM CHLORIDE 1000 ML: 9 INJECTION, SOLUTION INTRAVENOUS at 23:12

## 2021-10-06 RX ADMIN — ONDANSETRON 4 MG: 2 INJECTION INTRAMUSCULAR; INTRAVENOUS at 23:11

## 2021-10-06 RX ADMIN — FENTANYL CITRATE 50 MCG: 50 INJECTION, SOLUTION INTRAMUSCULAR; INTRAVENOUS at 23:44

## 2021-10-06 RX ADMIN — FENTANYL CITRATE 50 MCG: 50 INJECTION, SOLUTION INTRAMUSCULAR; INTRAVENOUS at 23:12

## 2021-10-06 ASSESSMENT — PAIN DESCRIPTION - PAIN TYPE: TYPE: ACUTE PAIN

## 2021-10-06 ASSESSMENT — PAIN DESCRIPTION - FREQUENCY: FREQUENCY: CONTINUOUS

## 2021-10-06 ASSESSMENT — PAIN SCALES - GENERAL
PAINLEVEL_OUTOF10: 8
PAINLEVEL_OUTOF10: 6
PAINLEVEL_OUTOF10: 8

## 2021-10-06 ASSESSMENT — PAIN DESCRIPTION - LOCATION: LOCATION: FLANK;ABDOMEN

## 2021-10-07 VITALS
OXYGEN SATURATION: 93 % | TEMPERATURE: 98 F | SYSTOLIC BLOOD PRESSURE: 103 MMHG | RESPIRATION RATE: 18 BRPM | WEIGHT: 160 LBS | DIASTOLIC BLOOD PRESSURE: 64 MMHG | BODY MASS INDEX: 30.21 KG/M2 | HEART RATE: 84 BPM | HEIGHT: 61 IN

## 2021-10-07 LAB
-: ABNORMAL
AMORPHOUS: ABNORMAL
BACTERIA: ABNORMAL
BILIRUBIN URINE: NEGATIVE
CASTS UA: ABNORMAL /LPF (ref 0–2)
COLOR: NORMAL
COMMENT UA: NORMAL
CRYSTALS, UA: ABNORMAL /HPF
EPITHELIAL CELLS UA: ABNORMAL /HPF (ref 0–5)
GLUCOSE URINE: NEGATIVE
KETONES, URINE: NEGATIVE
LEUKOCYTE ESTERASE, URINE: NEGATIVE
MUCUS: ABNORMAL
NITRITE, URINE: NEGATIVE
OTHER OBSERVATIONS UA: ABNORMAL
PH UA: 6 (ref 5–6)
PROTEIN UA: NEGATIVE
RBC UA: ABNORMAL /HPF (ref 0–4)
RENAL EPITHELIAL, UA: ABNORMAL /HPF
SPECIFIC GRAVITY UA: 1.02 (ref 1.01–1.02)
TRICHOMONAS: ABNORMAL
TURBIDITY: NORMAL
URINE HGB: NEGATIVE
UROBILINOGEN, URINE: NORMAL
WBC UA: ABNORMAL /HPF (ref 0–4)
YEAST: ABNORMAL

## 2021-10-07 PROCEDURE — 87086 URINE CULTURE/COLONY COUNT: CPT

## 2021-10-07 PROCEDURE — 81001 URINALYSIS AUTO W/SCOPE: CPT

## 2021-10-07 PROCEDURE — 6370000000 HC RX 637 (ALT 250 FOR IP): Performed by: EMERGENCY MEDICINE

## 2021-10-07 RX ORDER — OXYCODONE HYDROCHLORIDE AND ACETAMINOPHEN 5; 325 MG/1; MG/1
1 TABLET ORAL EVERY 6 HOURS PRN
Qty: 8 TABLET | Refills: 0 | Status: SHIPPED | OUTPATIENT
Start: 2021-10-07 | End: 2021-10-12 | Stop reason: SDUPTHER

## 2021-10-07 RX ORDER — ONDANSETRON 4 MG/1
4 TABLET, ORALLY DISINTEGRATING ORAL EVERY 8 HOURS PRN
Qty: 12 TABLET | Refills: 0 | Status: SHIPPED | OUTPATIENT
Start: 2021-10-07 | End: 2021-12-21 | Stop reason: SDUPTHER

## 2021-10-07 RX ORDER — OXYCODONE HYDROCHLORIDE AND ACETAMINOPHEN 5; 325 MG/1; MG/1
1 TABLET ORAL ONCE
Status: COMPLETED | OUTPATIENT
Start: 2021-10-07 | End: 2021-10-07

## 2021-10-07 RX ADMIN — OXYCODONE HYDROCHLORIDE AND ACETAMINOPHEN 1 TABLET: 5; 325 TABLET ORAL at 01:12

## 2021-10-07 ASSESSMENT — PAIN SCALES - GENERAL
PAINLEVEL_OUTOF10: 3
PAINLEVEL_OUTOF10: 3
PAINLEVEL_OUTOF10: 4

## 2021-10-07 ASSESSMENT — ENCOUNTER SYMPTOMS
SHORTNESS OF BREATH: 0
NAUSEA: 1
VOMITING: 0

## 2021-10-07 NOTE — ED PROVIDER NOTES
[facility] ED  [address]  [place] [address] [place]  Phone: [phone]  eMERGENCY dEPARTMENT eNCOUnter      Pt Name: Juanito Nieves  MRN: [id number]  Alexigfhanny 1965  Date of evaluation: 10/7/21      CHIEF COMPLAINT     Chief Complaint   Patient presents with    Flank Pain       HISTORY OF PRESENT ILLNESS    Juanito Nieves is a 55 y.o. female who presents today for evaluation of left-sided flank pain. Patient states that the pain came on suddenly yesterday evening. It then got better and she had recurrence of the pain today. Is associated with nausea without vomiting. Patient has a previous history of kidney stone she feels that this is similar. She states she was previously diagnosed with a staghorn calculus and reports that subsequent renal insufficiency with decreased kidney function. She states that she has been instructed not to take NSAIDs. She denies any associated chest pain or difficulty breathing or fevers. No recent cough cold or congestion. Denies UTI symptoms. No previous history of pancreatitis. Denies alcohol use. REVIEW OF SYSTEMS     Review of Systems   Constitutional: Negative for fever. HENT: Negative for congestion. Respiratory: Negative for shortness of breath. Cardiovascular: Negative for chest pain. Gastrointestinal: Positive for nausea. Negative for vomiting. Genitourinary: Positive for flank pain. Negative for dysuria. Skin: Negative for rash. Neurological: Negative for syncope. Psychiatric/Behavioral: Negative for confusion. All other systems reviewed and are negative. PAST MEDICAL HISTORY    has a past medical history of Anxiety, Breast cancer ([address] COPD (chronic obstructive pulmonary disease) ([address] Depression, Headache(784.0), History of alcoholism ([facility] [address] Hypertension, Hypothyroidism, Kidney stone, Panic attack, and PTSD (post-traumatic stress disorder).     SURGICAL HISTORY      has a past surgical history that includes Eye surgery; tumor removal (1999); Appendectomy (1977); Cholecystectomy (2014); Tubal ligation (2001); lumbar laminectomy (2006); Dilation and curettage of uterus; Breast lumpectomy (Left, 2014); Breast biopsy (Right, 2015); alcon and bso (cervix removed) (2015); ventral hernia repair (2019); hernia repair (01/2020); fracture surgery (Left); hernia repair (N/A, 7/22/2020); ventral hernia repair (N/A, 10/21/2020); Colonoscopy (2019); and Colonoscopy (N/A, 5/11/2021). CURRENT MEDICATIONS       Discharge Medication List as of 10/7/2021  1:14 AM      CONTINUE these medications which have NOT CHANGED    Details   albuterol sulfate  (90 Base) MCG/ACT inhaler Inhale 1-2 puffs into the lungs every 6 hours as needed for Wheezing or Shortness of Breath, Disp-3 Inhaler, R-1Normal      lisinopril (PRINIVIL;ZESTRIL) 40 MG tablet TAKE ONE TABLET BY MOUTH DAILY, Disp-90 tablet, R-3Normal      amLODIPine (NORVASC) 10 MG tablet TAKE ONE TABLET BY MOUTH DAILY, Disp-90 tablet, R-3Normal      propranolol (INDERAL) 10 MG tablet Take 10 mg by mouth 3 times daily Indications: patient takes twice a day Historical Med      !! DULoxetine (CYMBALTA) 30 MG extended release capsule Take 30 mg by mouth dailyHistorical Med      lamoTRIgine (LAMICTAL) 200 MG tablet Take 200 mg by mouth daily Historical Med      gabapentin (NEURONTIN) 100 MG capsule Take 1 capsule by mouth 3 times daily for 90 days. , Disp-90 capsule, R-2Normal      cyclobenzaprine (FLEXERIL) 5 MG tablet Take 1 tablet by mouth nightly as needed for Muscle spasms, Disp-30 tablet, R-0Normal      ferrous sulfate (IRON 325) 325 (65 Fe) MG tablet Take 1 tablet by mouth daily (with breakfast), Disp-90 tablet, R-1Normal      fluticasone (FLONASE) 50 MCG/ACT nasal spray SPRAY TWO SPRAYS IN EACH NOSTRIL ONCE DAILY, Disp-3 Bottle, R-3Normal      omeprazole (PRILOSEC) 20 MG delayed release capsule Take 1 capsule by mouth 2 times dailyHistorical Med      promethazine-dextromethorphan (PROMETHAZINE-DM) 6.25-15 MG/5ML syrup Take 5 mLs by mouth nightly as needed for Cough, Disp-75 mL, R-0Normal      atorvastatin (LIPITOR) 10 MG tablet TAKE ONE TABLET BY MOUTH DAILY, Disp-90 tablet, R-1Normal      fexofenadine (ALLEGRA) 180 MG tablet Take 1 tablet by mouth daily, Disp-90 tablet, R-1Normal      levothyroxine (SYNTHROID) 100 MCG tablet TAKE ONE TABLET BY MOUTH DAILY, Disp-90 tablet, R-3Normal      Cholecalciferol (VITAMIN D) 50 MCG ( UT) CAPS capsule Take 2,000 Units by mouth dailyHistorical Med      Multiple Vitamins-Minerals (MULTIVITAMIN ADULT PO) Take 1 tablet by mouth dailyHistorical Med      hydrOXYzine (ATARAX) 25 MG tablet Take 50 mg by mouth every 6 hours as needed for Anxiety Historical Med      !! DULoxetine (CYMBALTA) 60 MG extended release capsule Take 60 mg by mouth dailyHistorical Med       !! - Potential duplicate medications found. Please discuss with provider. ALLERGIES     is allergic to flomax [tamsulosin hcl], morphine, saphris [asenapine], bactrim [sulfamethoxazole-trimethoprim], prednisone, and zyprexa [olanzapine]. FAMILY HISTORY     She indicated that her mother is . She indicated that her father is . She indicated that her sister is alive. She indicated that her maternal grandmother is . She indicated that her maternal grandfather is . She indicated that her paternal grandmother is . She indicated that her paternal grandfather is . family history includes Atrial Fibrillation in her mother; Heart Attack in her maternal grandfather; Heart Failure in her mother; High Blood Pressure in her father and mother; Lupus in her mother; Other in her maternal grandmother; Prostate Cancer in her father; Stroke in her paternal grandfather. SOCIAL HISTORY      reports that she has been smoking cigarettes. She started smoking about 39 years ago. She has a 18.50 pack-year smoking history.  She has never used smokeless tobacco. She reports previous drug use. She reports that she does not drink alcohol. PHYSICAL EXAM     INITIAL VITALS:  height is 5' 1\" (1.549 m) and weight is 160 lb (72.6 kg). Her temperature is 98 °F (36.7 °C). Her blood pressure is 103/64 and her pulse is 84. Her respiration is 18 and oxygen saturation is 93%. Physical Exam  Vitals reviewed. Constitutional:       General: She is in acute distress. Appearance: Normal appearance. She is not ill-appearing or diaphoretic. HENT:      Head: Normocephalic and atraumatic. Mouth/Throat:      Mouth: Mucous membranes are dry. Pharynx: No oropharyngeal exudate or posterior oropharyngeal erythema. Cardiovascular:      Rate and Rhythm: Normal rate and regular rhythm. Pulses: Normal pulses. Heart sounds: Normal heart sounds. Comments: Equal bilateral radial and dorsalis pedis pulses 3+. Pulmonary:      Effort: Pulmonary effort is normal. No respiratory distress. Breath sounds: Normal breath sounds. Abdominal:      Palpations: There is no mass. Tenderness: There is abdominal tenderness. There is left CVA tenderness. There is no right CVA tenderness, guarding or rebound. Comments: Mild tenderness in the left upper quadrant without rebound rigidity or guarding. Musculoskeletal:         General: No swelling or tenderness. Normal range of motion. Skin:     General: Skin is warm and dry. Capillary Refill: Capillary refill takes less than 2 seconds. Findings: No rash. Neurological:      General: No focal deficit present. Mental Status: She is alert and oriented to person, place, and time. Cranial Nerves: No cranial nerve deficit. Sensory: No sensory deficit. Motor: No weakness.    Psychiatric:         Mood and Affect: Mood normal.         Behavior: Behavior normal.       DIFFERENTIAL DIAGNOSIS / MDM / EMERGENCY DEPARTMENT COURSE:     Ureterolithiasis, renal colic, pancreatitis, intra-abdominal pathology, less likely AAA or aortic dissection. Plan for abdominal labs, CT of the abdomen and pelvis without contrast and urinalysis. Patient is status post hysterectomy. Will treat the patient's pain with fentanyl, I will also give her Zofran and IV fluids. Given previous history of renal insufficiency will avoid Toradol at this point. Patient did require further pain medication to control her symptoms. Patient's vital signs are improved. We will transition her to oral medications especially being limited that she cannot take NSAIDs. Kidney function is better today but given her history I will err on the side of caution to avoid at present. Patient can follow-up with her PCP. My index of suspicion for aortic dissection is low, I do not feel that further studies are needed to evaluate for this. Patient is educated on the warning signs which return to the emergency department and the need for routine follow-up. She expresses understanding had no further questions or concerns. I have reviewed the disposition diagnosis with the patient and or their family/guardian. I have answered their questions and givendischarge instructions. They voiced understanding of these instructions and did not have any further questions or complaints. DIAGNOSTIC RESULTS     EKG: All EKG's are interpreted by the Emergency Department Physician who either signs or Co-signs this chart inthe absence of a cardiologist.        RADIOLOGY:   Radiologist interpretation of radiologic studies:     CT ABDOMEN PELVIS WO CONTRAST Additional Contrast? None    Result Date: 10/6/2021  EXAMINATION: CT OF THE ABDOMEN AND PELVIS WITHOUT CONTRAST 10/6/2021 11:28 pm TECHNIQUE: CT of the abdomen and pelvis was performed without the administration of intravenous contrast. Multiplanar reformatted images are provided for review.  Dose modulation, iterative reconstruction, and/or weight based adjustment of the mA/kV was utilized to reduce the radiation dose to as low as reasonably achievable. COMPARISON: 04/27/2021 HISTORY: ORDERING SYSTEM PROVIDED HISTORY: flank pain TECHNOLOGIST PROVIDED HISTORY: flank pain Decision Support Exception - unselect if not a suspected or confirmed emergency medical condition->Emergency Medical Condition (MA) Is the patient pregnant?->No Reason for Exam: Left flank pain that wraps around to abdomen, nausea, h/o kidney stones, appendectomy, cholecystectomy, and hysterectomy Acuity: Acute Type of Exam: Initial FINDINGS: Lower Chest:  Visualized portion of the lower chest demonstrates no acute abnormality. Organs: The liver is unremarkable. Status post cholecystectomy. The spleen, pancreas and adrenal glands are unremarkable. Right kidney is noted for a 3 mm upper pole nonobstructing intrarenal calculus. The right kidney and ureter are otherwise unremarkable. The left kidney and ureter are noted for a 2 mm lower pole nonobstructing intrarenal calculus. There are otherwise unremarkable. GI/Bowel: Stomach and duodenal sweep demonstrate no acute abnormality. There is no evidence of bowel obstruction. No evidence of abnormal bowel wall thickening or distension. No evidence of appendicitis. The appendix is not seen. A few scattered descending and sigmoid diverticuli noted. Pelvis: The bladder is unremarkable. Status post hysterectomy. Peritoneum/Retroperitoneum: No evidence of ascites or free air. No evidence of lymphadenopathy. Aorta is normal in caliber. There has been prior anterior abdominal wall mesh hernia repair. Bones/Soft Tissues: Osseous structures are unremarkable. A small anterior midline seroma is present unchanged from previous. No acute or concerning abnormality. No finding to explain the patient's symptoms. Bilateral nonobstructing intrarenal calculi. Status post cholecystectomy, hysterectomy and anterior abdominal wall mesh hernia repair. Minimal descending and sigmoid diverticulosis. LABS:  Results for orders placed or performed during the hospital encounter of 10/06/21   CBC Auto Differential   Result Value Ref Range    WBC 8.8 3.5 - 11.3 k/uL    RBC 4.65 3.95 - 5.11 m/uL    Hemoglobin 13.7 11.9 - 15.1 g/dL    Hematocrit 40.9 36.3 - 47.1 %    MCV 88.0 82.6 - 102.9 fL    MCH 29.5 25.2 - 33.5 pg    MCHC 33.5 25.2 - 33.5 g/dL    RDW 13.9 11.8 - 14.4 %    Platelets 048 172 - 120 k/uL    MPV 8.3 8.1 - 13.5 fL    NRBC Automated 0.0 0.0 per 100 WBC    Differential Type NOT REPORTED     Seg Neutrophils 57 36 - 65 %    Lymphocytes 32 24 - 43 %    Monocytes 6 3 - 12 %    Eosinophils % 4 1 - 4 %    Basophils 1 0 - 2 %    Immature Granulocytes 0 0 %    Segs Absolute 5.02 1.50 - 8.10 k/uL    Absolute Lymph # 2.84 1.10 - 3.70 k/uL    Absolute Mono # 0.50 0.10 - 1.20 k/uL    Absolute Eos # 0.31 0.00 - 0.44 k/uL    Basophils Absolute 0.07 0.00 - 0.20 k/uL    Absolute Immature Granulocyte 0.03 0.00 - 0.30 k/uL    WBC Morphology NOT REPORTED     RBC Morphology NOT REPORTED     Platelet Estimate NOT REPORTED    Basic Metabolic Panel   Result Value Ref Range    Glucose 124 (H) 70 - 99 mg/dL    BUN 10 6 - 20 mg/dL    CREATININE 0.86 0.50 - 0.90 mg/dL    Bun/Cre Ratio 12 9 - 20    Calcium 10.3 8.6 - 10.4 mg/dL    Sodium 140 135 - 144 mmol/L    Potassium 3.5 (L) 3.7 - 5.3 mmol/L    Chloride 103 98 - 107 mmol/L    CO2 26 20 - 31 mmol/L    Anion Gap 11 9 - 17 mmol/L    GFR Non-African American >60 >60 mL/min    GFR African American >60 >60 mL/min    GFR Comment          GFR Staging NOT REPORTED    Urinalysis Reflex to Culture    Specimen: Urine, clean catch   Result Value Ref Range    Color, UA NOT REPORTED Yellow    Turbidity UA NOT REPORTED Clear    Glucose, Ur NEGATIVE NEGATIVE    Bilirubin Urine NEGATIVE NEGATIVE    Ketones, Urine NEGATIVE NEGATIVE    Specific Gravity, UA 1.020 1.010 - 1.025    Urine Hgb NEGATIVE NEGATIVE    pH, UA 6.0 5.0 - 6.0    Protein, UA NEGATIVE NEGATIVE    Urobilinogen, Urine Normal Normal    Nitrite, Urine NEGATIVE NEGATIVE    Leukocyte Esterase, Urine NEGATIVE NEGATIVE    Urinalysis Comments NOT REPORTED    Lipase   Result Value Ref Range    Lipase 97 (H) 13 - 60 U/L   Hepatic Function Panel   Result Value Ref Range    Albumin 4.9 3.5 - 5.2 g/dL    Alkaline Phosphatase 78 35 - 104 U/L    ALT 23 5 - 33 U/L    AST 25 <32 U/L    Total Bilirubin 0.23 (L) 0.3 - 1.2 mg/dL    Bilirubin, Direct 0.09 <0.31 mg/dL    Bilirubin, Indirect 0.14 0.00 - 1.00 mg/dL    Total Protein 7.4 6.4 - 8.3 g/dL    Globulin 2.5 1.5 - 3.8 g/dL    Albumin/Globulin Ratio 2.0 1.0 - 2.5   Microscopic Urinalysis   Result Value Ref Range    -          WBC, UA 5 TO 10 0 - 4 /HPF    RBC, UA 5 TO 10 0 - 4 /HPF    Casts UA NOT REPORTED 0 - 2 /LPF    Crystals, UA NOT REPORTED None /HPF    Epithelial Cells UA 10 TO 25 0 - 5 /HPF    Renal Epithelial, UA NOT REPORTED 0 /HPF    Bacteria, UA 2+ (A) None    Mucus, UA NOT REPORTED None    Trichomonas, UA NOT REPORTED None    Amorphous, UA NOT REPORTED None    Other Observations UA NOT REPORTED NOT REQ. Yeast, UA NOT REPORTED None       EMERGENCY DEPARTMENT COURSE:   Vitals:    Vitals:    10/07/21 0000 10/07/21 0020 10/07/21 0040 10/07/21 0110   BP: 110/62 115/75 107/61 103/64   Pulse: 85 86 88 84   Resp:   16 18   Temp:       SpO2: 92% 95% 93% 93%   Weight:       Height:         -------------------------  BP: 103/64, Temp: 98 °F (36.7 °C), Pulse: 84, Resp: 18    : None    PROCEDURES:  None    FINAL IMPRESSION      1.  Flank pain          DISPOSITION/PLAN   DISPOSITION Decision To Discharge 10/07/2021 01:12:27 AM      PATIENT REFERRED TO:  Lizabeth Valdovinos DO  [address] [facility] [address] [phone]    In 2 days        DISCHARGE MEDICATIONS:  Discharge Medication List as of 10/7/2021  1:14 AM      START taking these medications    Details   ondansetron (ZOFRAN ODT) 4 MG disintegrating tablet Take 1 tablet by mouth every 8 hours as needed for Nausea, Disp-12 tablet, R-0Normal oxyCODONE-acetaminophen (PERCOCET) 5-325 MG per tablet Take 1 tablet by mouth every 6 hours as needed for Pain for up to 3 days. WARNING:  May cause drowsiness. May impair ability to operate vehicles or machinery. Do not use in combination with alcohol., Disp-8 tablet, R-0Normal             There are no active hospital problems to display for this patient.       (Please note that portions of this note were completed with avoice recognition program.  Efforts were made to edit the dictations but occasionally words are mis-transcribed.)    Stormy Ledesma MD, [address] [facility]  Attending Emergency Medicine Physician        Stormy Ledesma MD  10/07/21 3563

## 2021-10-08 LAB
CULTURE: NORMAL
Lab: NORMAL
SPECIMEN DESCRIPTION: NORMAL

## 2021-10-12 ENCOUNTER — OFFICE VISIT (OUTPATIENT)
Dept: FAMILY MEDICINE CLINIC | Age: 56
End: 2021-10-12
Payer: MEDICARE

## 2021-10-12 VITALS
HEIGHT: 61 IN | BODY MASS INDEX: 29.79 KG/M2 | OXYGEN SATURATION: 97 % | HEART RATE: 91 BPM | DIASTOLIC BLOOD PRESSURE: 86 MMHG | WEIGHT: 157.8 LBS | SYSTOLIC BLOOD PRESSURE: 112 MMHG | TEMPERATURE: 98.4 F

## 2021-10-12 DIAGNOSIS — R10.13 EPIGASTRIC PAIN: Primary | ICD-10-CM

## 2021-10-12 DIAGNOSIS — R74.8 ELEVATED LIPASE: ICD-10-CM

## 2021-10-12 DIAGNOSIS — R10.9 FLANK PAIN: ICD-10-CM

## 2021-10-12 PROCEDURE — 3017F COLORECTAL CA SCREEN DOC REV: CPT | Performed by: FAMILY MEDICINE

## 2021-10-12 PROCEDURE — 4004F PT TOBACCO SCREEN RCVD TLK: CPT | Performed by: FAMILY MEDICINE

## 2021-10-12 PROCEDURE — 99212 OFFICE O/P EST SF 10 MIN: CPT | Performed by: FAMILY MEDICINE

## 2021-10-12 PROCEDURE — G8427 DOCREV CUR MEDS BY ELIG CLIN: HCPCS | Performed by: FAMILY MEDICINE

## 2021-10-12 PROCEDURE — G8484 FLU IMMUNIZE NO ADMIN: HCPCS | Performed by: FAMILY MEDICINE

## 2021-10-12 PROCEDURE — G8417 CALC BMI ABV UP PARAM F/U: HCPCS | Performed by: FAMILY MEDICINE

## 2021-10-12 PROCEDURE — 99213 OFFICE O/P EST LOW 20 MIN: CPT | Performed by: FAMILY MEDICINE

## 2021-10-12 RX ORDER — OXYCODONE HYDROCHLORIDE AND ACETAMINOPHEN 5; 325 MG/1; MG/1
1 TABLET ORAL EVERY 8 HOURS PRN
Qty: 10 TABLET | Refills: 0 | Status: SHIPPED | OUTPATIENT
Start: 2021-10-12 | End: 2021-10-17

## 2021-10-12 ASSESSMENT — ENCOUNTER SYMPTOMS
DIARRHEA: 1
CONSTIPATION: 0
BLOOD IN STOOL: 0
ABDOMINAL PAIN: 1
VOMITING: 0
NAUSEA: 1
ABDOMINAL DISTENTION: 1

## 2021-10-12 NOTE — PROGRESS NOTES
SULE[place] [address]  [address], [address] [place]  [phone]      Sudha Badillo is a 55 y.o. female who presents today for her medical conditions/complaints as noted below. Sudha Badillo is c/o of Follow-Up from Hospital      HPI:     Pt here today for ER follow-up. Still having L flank and LUQ pain - states that it has gotten maybe a little better, but has not resolved. 6/10 now. Worse with lying down, even on her back. By the end of the day, she is also having increased upper abdominal bloating, just under her ribs. Had been going on for a week prior to ER visit. Sometimes the pain is so bad right at her lower chest and through to her back, that she has been afraid of her heart. Will not last long enough she has gone to ER. Bloating is worse after she eats; no relation to certain foods. Had been without the Lipitor for a few weeks, and now has been back on it for 2 weeks. Has noticed some more swelling in her legs and darker urine since re-starting it. No pain in legs, but they feel \"uncomfortable\". Lipase was high at 97    Appetite has been lower; had 3 soft, tan-colored stools yesterday and then 3 diarrhea stools that were darker. Was prescribed Percocet 5-325 mg to use as needed - had been using it BID as needed, which did help, especially with sleep. Using Zofran only infrequently as needed when she's nauseous          Past Medical History:   Diagnosis Date    Anxiety     Breast cancer ([facility] 2014    L side - lumpectomy and radiation; no chemo (was recommended to take Tamoxifen, but with her smoking, she declined due to family h/o CVA already).  Seeing Dr. Stephanie Luo once yearly/    COPD (chronic obstructive pulmonary disease) ([address]     Depression     Headache(784.0)     History of alcoholism ([address]     sober since 1/16/19    Hypertension     Hypothyroidism     Kidney stone     Has had lithotripsy x 1; had ureteral stent placement with removal x 1; had seen Dr. Jomar Mc Panic attack 04/15/2021    PTSD (post-traumatic stress disorder)       Past Surgical History:   Procedure Laterality Date    APPENDECTOMY  1977    BREAST BIOPSY Right 2015    excisional biopsy - Dr. Rigo Dodd Left 2014    breast CA - done at [facility]      COLONOSCOPY  2019    polyps, Dr. Kunz Favor at [facility] N/A 2021    mild diverticulosis, tubular adenoma x1; Dr. Hector Parekh at [facility]      multiple in her 19's   [address]      x 2 in her youth - was \"cross-eyed\"    FRACTURE SURGERY Left     hand    HERNIA REPAIR  2020    recurrent incisional hernia repair Dr. Annamarie Womack N/A 2020    Laparoscopic Robotic Assisted Ventral Hernia Repair performed by Madiha Gonzalez MD at [address]      Dr. Nessa Rooney      Dr. Markel South - done for excessive bleeding after failed ablation    TUBAL LIGATION     [facility]; osteoma in L-sided sinuses; removed at [facility]      [facility]. Nicole Cruz at [facility] - used mesh; had revision in 2020    VENTRAL HERNIA REPAIR N/A 10/21/2020    Open VENTRAL Hernia Repair w/ mesh & Component separation technique performed by Madiha Gonzalez MD at [facility]     Family History   Problem Relation Age of Onset    High Blood Pressure Mother     Lupus Mother          from CHF secondary to cardiomyopathy from her lupus    Heart Failure Mother     Atrial Fibrillation Mother     High Blood Pressure Father     Prostate Cancer Father         age 54;  11 years later of a \"bladder tumor\" that caused bladder rupture (pt unsure if malignancy)    Other Maternal Grandmother         had \"stomach tumor\"    Heart Attack Maternal Grandfather          at age 40    Stroke Paternal Grandfather         in his 42's; multiple     Social History     Tobacco Use    Smoking status: Current Every Day Smoker     Packs/day: 0.50     Years: 37.00     Pack years: 18.50     Types: Cigarettes     Start date: 1982    Smokeless tobacco: Never Used    Tobacco comment: Will see PCP when ready to quit. Substance Use Topics    Alcohol use: No     Comment: [facility]      Current Outpatient Medications   Medication Sig Dispense Refill    ondansetron (ZOFRAN ODT) 4 MG disintegrating tablet Take 1 tablet by mouth every 8 hours as needed for Nausea 12 tablet 0    gabapentin (NEURONTIN) 100 MG capsule Take 1 capsule by mouth 3 times daily for 90 days.  90 capsule 2    cyclobenzaprine (FLEXERIL) 5 MG tablet Take 1 tablet by mouth nightly as needed for Muscle spasms 30 tablet 0    albuterol sulfate  (90 Base) MCG/ACT inhaler Inhale 1-2 puffs into the lungs every 6 hours as needed for Wheezing or Shortness of Breath 3 Inhaler 1    ferrous sulfate (IRON 325) 325 (65 Fe) MG tablet Take 1 tablet by mouth daily (with breakfast) 90 tablet 1    fluticasone (FLONASE) 50 MCG/ACT nasal spray SPRAY TWO SPRAYS IN EACH NOSTRIL ONCE DAILY 3 Bottle 3    omeprazole (PRILOSEC) 20 MG delayed release capsule Take 1 capsule by mouth 2 times daily      promethazine-dextromethorphan (PROMETHAZINE-DM) 6.25-15 MG/5ML syrup Take 5 mLs by mouth nightly as needed for Cough 75 mL 0    atorvastatin (LIPITOR) 10 MG tablet TAKE ONE TABLET BY MOUTH DAILY 90 tablet 1    lisinopril (PRINIVIL;ZESTRIL) 40 MG tablet TAKE ONE TABLET BY MOUTH DAILY 90 tablet 3    amLODIPine (NORVASC) 10 MG tablet TAKE ONE TABLET BY MOUTH DAILY 90 tablet 3    fexofenadine (ALLEGRA) 180 MG tablet Take 1 tablet by mouth daily 90 tablet 1    levothyroxine (SYNTHROID) 100 MCG tablet TAKE ONE TABLET BY MOUTH DAILY 90 tablet 3    Cholecalciferol (VITAMIN D) 50 MCG (2000 UT) CAPS capsule Take 2,000 Units by mouth daily      Multiple Vitamins-Minerals (MULTIVITAMIN ADULT PO) Take 1 tablet by mouth daily      propranolol (INDERAL) 10 MG tablet Take 10 mg by mouth 3 times daily Indications: patient takes twice a day       hydrOXYzine (ATARAX) 25 MG tablet Take 50 mg by mouth every 6 hours as needed for Anxiety       DULoxetine (CYMBALTA) 30 MG extended release capsule Take 30 mg by mouth daily      DULoxetine (CYMBALTA) 60 MG extended release capsule Take 60 mg by mouth daily      lamoTRIgine (LAMICTAL) 200 MG tablet Take 200 mg by mouth daily        No current facility-administered medications for this visit. Allergies   Allergen Reactions    Flomax [Tamsulosin Hcl] Swelling     Swelling of arms; however, also had rash and fever at the same time and was admitted at the time    Morphine Itching and Rash     Rash, itching    Saphris [Asenapine]     Bactrim [Sulfamethoxazole-Trimethoprim] Diarrhea and Nausea Only    Prednisone Other (See Comments)     Emotional changes, depression    Zyprexa [Olanzapine] Other (See Comments)     Made her feel like she wanted to \"jump out of my skin\"       Health Maintenance   Topic Date Due    Pneumococcal 0-64 years Vaccine (1 of 2 - PPSV23) Never done    Shingles Vaccine (1 of 2) Never done    Flu vaccine (1) 09/01/2021    DTaP/Tdap/Td vaccine (2 - Td or Tdap) 11/30/2021    Lipid screen  04/26/2022    Breast cancer screen  08/17/2022    Potassium monitoring  10/06/2022    Creatinine monitoring  10/06/2022    Diabetes screen  04/16/2024    Colon cancer screen colonoscopy  05/11/2026    COVID-19 Vaccine  Completed    Hepatitis C screen  Completed    HIV screen  Completed    Hepatitis A vaccine  Aged Out    Hepatitis B vaccine  Aged Out    Hib vaccine  Aged Out    Meningococcal (ACWY) vaccine  Aged Out       Subjective:      Review of Systems   Constitutional: Positive for diaphoresis (one episode of sweats at night). Negative for fever. Gastrointestinal: Positive for abdominal distention, abdominal pain, diarrhea and nausea.  Negative for blood in stool, constipation and vomiting. Genitourinary: Positive for flank pain. Negative for hematuria. Objective:     Vitals:    10/12/21 1105   BP: 112/86   Site: Right Upper Arm   Position: Sitting   Cuff Size: Large Adult   Pulse: 91   Temp: 98.4 °F (36.9 °C)   TempSrc: Tympanic   SpO2: 97%   Weight: 157 lb 12.8 oz (71.6 kg)   Height: 5' 1\" (1.549 m)     Physical Exam  Vitals and nursing note reviewed. Constitutional:       General: She is not in acute distress. Appearance: She is well-developed. HENT:      Head: Normocephalic and atraumatic. Right Ear: Tympanic membrane, ear canal and external ear normal.      Left Ear: Tympanic membrane, ear canal and external ear normal.      Nose: Nose normal.      Mouth/Throat:      Mouth: Mucous membranes are moist.      Pharynx: Oropharynx is clear. No posterior oropharyngeal erythema. Eyes:      Conjunctiva/sclera: Conjunctivae normal.   Cardiovascular:      Rate and Rhythm: Normal rate and regular rhythm. Heart sounds: Normal heart sounds. Pulmonary:      Effort: Pulmonary effort is normal. No respiratory distress. Breath sounds: Normal breath sounds. Abdominal:      General: Bowel sounds are normal. There is no distension. Palpations: Abdomen is soft. Tenderness: There is abdominal tenderness. Musculoskeletal:      Cervical back: Neck supple. Skin:     General: Skin is warm and dry. Neurological:      Mental Status: She is alert and oriented to person, place, and time. Assessment:      1. Epigastric pain  -     oxyCODONE-acetaminophen (PERCOCET) 5-325 MG per tablet; Take 1 tablet by mouth every 8 hours as needed for Pain for up to 5 days. WARNING: May cause drowsiness. , Disp-10 tablet, R-0Print  2. Flank pain  -     oxyCODONE-acetaminophen (PERCOCET) 5-325 MG per tablet; Take 1 tablet by mouth every 8 hours as needed for Pain for up to 5 days. WARNING: May cause drowsiness. , Disp-10 tablet, R-0Print  3.  Elevated lipase         Plan: Return if symptoms worsen or fail to improve in 3 days. No orders of the defined types were placed in this encounter. Orders Placed This Encounter   Medications    oxyCODONE-acetaminophen (PERCOCET) 5-325 MG per tablet     Sig: Take 1 tablet by mouth every 8 hours as needed for Pain for up to 5 days. WARNING: May cause drowsiness. Dispense:  10 tablet     Refill:  0       Patient given educational materials - see patient instructions. Discussed use, benefit, and side effects of prescribed medications. All patient questions answered. Pt voiced understanding. Reviewed health maintenance.             Electronically signed by Nathen Caal DO, DO on 10/24/2021 at 11:32 PM

## 2021-10-15 ENCOUNTER — PATIENT MESSAGE (OUTPATIENT)
Dept: FAMILY MEDICINE CLINIC | Age: 56
End: 2021-10-15

## 2021-10-15 NOTE — TELEPHONE ENCOUNTER
From: Soheila Jj  To: Pan Teran DO  Sent: 10/15/2021 10:21 AM EDT  Subject: Visit Follow-Up Question    Dr. Babb Flavin  The pain and nausea has subsided. It is just tender to touch. Praying it doesn't return. Hopefully I won't see you again till December appointment.    Thanks  Katie Ricardo

## 2021-10-30 ENCOUNTER — APPOINTMENT (OUTPATIENT)
Dept: CT IMAGING | Age: 56
End: 2021-10-30
Payer: MEDICARE

## 2021-10-30 ENCOUNTER — HOSPITAL ENCOUNTER (EMERGENCY)
Age: 56
Discharge: HOME | End: 2021-10-30
Attending: EMERGENCY MEDICINE
Payer: MEDICARE

## 2021-10-30 VITALS
OXYGEN SATURATION: 97 % | SYSTOLIC BLOOD PRESSURE: 138 MMHG | TEMPERATURE: 98.7 F | HEART RATE: 98 BPM | RESPIRATION RATE: 18 BRPM | WEIGHT: 160 LBS | HEIGHT: 61 IN | BODY MASS INDEX: 30.21 KG/M2 | DIASTOLIC BLOOD PRESSURE: 67 MMHG

## 2021-10-30 DIAGNOSIS — R10.9 ACUTE RIGHT FLANK PAIN: Primary | ICD-10-CM

## 2021-10-30 DIAGNOSIS — R10.84 GENERALIZED ABDOMINAL PAIN: ICD-10-CM

## 2021-10-30 LAB
-: ABNORMAL
ABSOLUTE EOS #: 0.33 K/UL (ref 0–0.44)
ABSOLUTE IMMATURE GRANULOCYTE: 0.04 K/UL (ref 0–0.3)
ABSOLUTE LYMPH #: 2.93 K/UL (ref 1.1–3.7)
ABSOLUTE MONO #: 0.64 K/UL (ref 0.1–1.2)
ALBUMIN SERPL-MCNC: 4.2 G/DL (ref 3.5–5.2)
ALBUMIN/GLOBULIN RATIO: 1.6 (ref 1–2.5)
ALP BLD-CCNC: 77 U/L (ref 35–104)
ALT SERPL-CCNC: 19 U/L (ref 5–33)
AMORPHOUS: ABNORMAL
ANION GAP SERPL CALCULATED.3IONS-SCNC: 15 MMOL/L (ref 9–17)
AST SERPL-CCNC: 25 U/L
BACTERIA: ABNORMAL
BASOPHILS # BLD: 1 % (ref 0–2)
BASOPHILS ABSOLUTE: 0.08 K/UL (ref 0–0.2)
BILIRUB SERPL-MCNC: 0.19 MG/DL (ref 0.3–1.2)
BILIRUBIN URINE: NEGATIVE
BUN BLDV-MCNC: 12 MG/DL (ref 6–20)
BUN/CREAT BLD: 15 (ref 9–20)
CALCIUM SERPL-MCNC: 9.8 MG/DL (ref 8.6–10.4)
CASTS UA: ABNORMAL /LPF (ref 0–2)
CHLORIDE BLD-SCNC: 101 MMOL/L (ref 98–107)
CO2: 20 MMOL/L (ref 20–31)
COLOR: ABNORMAL
COMMENT UA: ABNORMAL
CREAT SERPL-MCNC: 0.82 MG/DL (ref 0.5–0.9)
CRYSTALS, UA: ABNORMAL /HPF
DIFFERENTIAL TYPE: NORMAL
EOSINOPHILS RELATIVE PERCENT: 3 % (ref 1–4)
EPITHELIAL CELLS UA: ABNORMAL /HPF (ref 0–5)
GFR AFRICAN AMERICAN: >60 ML/MIN
GFR NON-AFRICAN AMERICAN: >60 ML/MIN
GFR SERPL CREATININE-BSD FRML MDRD: ABNORMAL ML/MIN/{1.73_M2}
GFR SERPL CREATININE-BSD FRML MDRD: ABNORMAL ML/MIN/{1.73_M2}
GLUCOSE BLD-MCNC: 126 MG/DL (ref 70–99)
GLUCOSE URINE: NEGATIVE
HCT VFR BLD CALC: 38.1 % (ref 36.3–47.1)
HEMOGLOBIN: 12.7 G/DL (ref 11.9–15.1)
IMMATURE GRANULOCYTES: 0 %
KETONES, URINE: NEGATIVE
LEUKOCYTE ESTERASE, URINE: ABNORMAL
LIPASE: 27 U/L (ref 13–60)
LYMPHOCYTES # BLD: 29 % (ref 24–43)
MCH RBC QN AUTO: 30 PG (ref 25.2–33.5)
MCHC RBC AUTO-ENTMCNC: 33.3 G/DL (ref 25.2–33.5)
MCV RBC AUTO: 90.1 FL (ref 82.6–102.9)
MONOCYTES # BLD: 6 % (ref 3–12)
MUCUS: ABNORMAL
NITRITE, URINE: NEGATIVE
NRBC AUTOMATED: 0 PER 100 WBC
OTHER OBSERVATIONS UA: ABNORMAL
PDW BLD-RTO: 13.4 % (ref 11.8–14.4)
PH UA: 5.5 (ref 5–6)
PLATELET # BLD: 239 K/UL (ref 138–453)
PLATELET ESTIMATE: NORMAL
PMV BLD AUTO: 8.8 FL (ref 8.1–13.5)
POTASSIUM SERPL-SCNC: 3.4 MMOL/L (ref 3.7–5.3)
PROTEIN UA: NEGATIVE
RBC # BLD: 4.23 M/UL (ref 3.95–5.11)
RBC # BLD: NORMAL 10*6/UL
RBC UA: ABNORMAL /HPF (ref 0–4)
RENAL EPITHELIAL, UA: ABNORMAL /HPF
SEG NEUTROPHILS: 61 % (ref 36–65)
SEGMENTED NEUTROPHILS ABSOLUTE COUNT: 6.1 K/UL (ref 1.5–8.1)
SODIUM BLD-SCNC: 136 MMOL/L (ref 135–144)
SPECIFIC GRAVITY UA: 1.01 (ref 1.01–1.02)
TOTAL PROTEIN: 6.8 G/DL (ref 6.4–8.3)
TRICHOMONAS: ABNORMAL
TURBIDITY: ABNORMAL
URINE HGB: NEGATIVE
UROBILINOGEN, URINE: NORMAL
WBC # BLD: 10.1 K/UL (ref 3.5–11.3)
WBC # BLD: NORMAL 10*3/UL
WBC UA: ABNORMAL /HPF (ref 0–4)
YEAST: ABNORMAL

## 2021-10-30 PROCEDURE — 36415 COLL VENOUS BLD VENIPUNCTURE: CPT

## 2021-10-30 PROCEDURE — 81001 URINALYSIS AUTO W/SCOPE: CPT

## 2021-10-30 PROCEDURE — 96375 TX/PRO/DX INJ NEW DRUG ADDON: CPT

## 2021-10-30 PROCEDURE — 99284 EMERGENCY DEPT VISIT MOD MDM: CPT

## 2021-10-30 PROCEDURE — 85025 COMPLETE CBC W/AUTO DIFF WBC: CPT

## 2021-10-30 PROCEDURE — 6360000002 HC RX W HCPCS: Performed by: EMERGENCY MEDICINE

## 2021-10-30 PROCEDURE — 83690 ASSAY OF LIPASE: CPT

## 2021-10-30 PROCEDURE — 74176 CT ABD & PELVIS W/O CONTRAST: CPT

## 2021-10-30 PROCEDURE — 80053 COMPREHEN METABOLIC PANEL: CPT

## 2021-10-30 PROCEDURE — 96374 THER/PROPH/DIAG INJ IV PUSH: CPT

## 2021-10-30 RX ORDER — ONDANSETRON 2 MG/ML
4 INJECTION INTRAMUSCULAR; INTRAVENOUS ONCE
Status: COMPLETED | OUTPATIENT
Start: 2021-10-30 | End: 2021-10-30

## 2021-10-30 RX ORDER — KETOROLAC TROMETHAMINE 30 MG/ML
30 INJECTION, SOLUTION INTRAMUSCULAR; INTRAVENOUS ONCE
Status: COMPLETED | OUTPATIENT
Start: 2021-10-30 | End: 2021-10-30

## 2021-10-30 RX ORDER — FENTANYL CITRATE 50 UG/ML
25 INJECTION, SOLUTION INTRAMUSCULAR; INTRAVENOUS ONCE
Status: COMPLETED | OUTPATIENT
Start: 2021-10-30 | End: 2021-10-30

## 2021-10-30 RX ORDER — CEPHALEXIN 500 MG/1
500 CAPSULE ORAL 4 TIMES DAILY
Qty: 28 CAPSULE | Refills: 0 | Status: SHIPPED | OUTPATIENT
Start: 2021-10-30 | End: 2021-11-06

## 2021-10-30 RX ADMIN — FENTANYL CITRATE 25 MCG: 50 INJECTION INTRAMUSCULAR; INTRAVENOUS at 22:01

## 2021-10-30 RX ADMIN — ONDANSETRON 4 MG: 2 INJECTION INTRAMUSCULAR; INTRAVENOUS at 21:59

## 2021-10-30 RX ADMIN — KETOROLAC TROMETHAMINE 30 MG: 30 INJECTION, SOLUTION INTRAMUSCULAR at 23:20

## 2021-10-30 ASSESSMENT — PAIN SCALES - GENERAL
PAINLEVEL_OUTOF10: 8
PAINLEVEL_OUTOF10: 8
PAINLEVEL_OUTOF10: 6
PAINLEVEL_OUTOF10: 6

## 2021-10-30 ASSESSMENT — PAIN DESCRIPTION - ORIENTATION: ORIENTATION: RIGHT

## 2021-10-30 ASSESSMENT — PAIN DESCRIPTION - PAIN TYPE: TYPE: ACUTE PAIN

## 2021-10-30 ASSESSMENT — PAIN DESCRIPTION - LOCATION: LOCATION: BACK

## 2021-10-31 NOTE — ED PROVIDER NOTES
eMERGENCY dEPARTMENT eNCOUnter      Pt Name: Carlos Covarrubias  MRN: [id number]  [place] 1965  Date of evaluation: 10/30/2021      CHIEF COMPLAINT       Chief Complaint   Patient presents with    Back Pain         HISTORY OF PRESENT ILLNESS    Carlos Covarrubias is a 55 y.o. female who presents with back pain. Patient states she started with some right flank pain a couple hours ago notes, diffuse throughout her abdomen complains of some nausea no vomiting no chest pain no shortness of breath no exacerbating or relieving factors        REVIEW OF SYSTEMS       Review of systems are all reviewed and negative except stated above in HPI    [address]    has a past medical history of Anxiety, Breast cancer ([facility] [address] COPD (chronic obstructive pulmonary disease) ([facility] [address] Depression, Headache(784.0), History of alcoholism ([facility] [facility] Hypertension, Hypothyroidism, Kidney stone, Panic attack, and PTSD (post-traumatic stress disorder). SURGICAL HISTORY      has a past surgical history that includes Eye surgery; tumor removal (1999); Appendectomy (1977); Cholecystectomy (2014); Tubal ligation (2001); lumbar laminectomy (2006); Dilation and curettage of uterus; Breast lumpectomy (Left, 2014); Breast biopsy (Right, 2015); alcon and bso (cervix removed) (2015); ventral hernia repair (2019); hernia repair (01/2020); fracture surgery (Left); hernia repair (N/A, 7/22/2020); ventral hernia repair (N/A, 10/21/2020); Colonoscopy (2019); and Colonoscopy (N/A, 5/11/2021).     CURRENT MEDICATIONS       Previous Medications    ALBUTEROL SULFATE  (90 BASE) MCG/ACT INHALER    Inhale 1-2 puffs into the lungs every 6 hours as needed for Wheezing or Shortness of Breath    AMLODIPINE (NORVASC) 10 MG TABLET    TAKE ONE TABLET BY MOUTH DAILY    ATORVASTATIN (LIPITOR) 10 MG TABLET    TAKE ONE TABLET BY MOUTH DAILY    CHOLECALCIFEROL (VITAMIN D) 50 MCG (2000 UT) CAPS CAPSULE    Take 2,000 Units by mouth daily    CYCLOBENZAPRINE (FLEXERIL) 5 MG TABLET    Take 1 tablet by mouth nightly as needed for Muscle spasms    DULOXETINE (CYMBALTA) 30 MG EXTENDED RELEASE CAPSULE    Take 30 mg by mouth daily    DULOXETINE (CYMBALTA) 60 MG EXTENDED RELEASE CAPSULE    Take 60 mg by mouth daily    FERROUS SULFATE (IRON 325) 325 (65 FE) MG TABLET    Take 1 tablet by mouth daily (with breakfast)    FEXOFENADINE (ALLEGRA) 180 MG TABLET    Take 1 tablet by mouth daily    FLUTICASONE (FLONASE) 50 MCG/ACT NASAL SPRAY    SPRAY TWO SPRAYS IN EACH NOSTRIL ONCE DAILY    GABAPENTIN (NEURONTIN) 100 MG CAPSULE    Take 1 capsule by mouth 3 times daily for 90 days. HYDROXYZINE (ATARAX) 25 MG TABLET    Take 50 mg by mouth every 6 hours as needed for Anxiety     LAMOTRIGINE (LAMICTAL) 200 MG TABLET    Take 200 mg by mouth daily     LEVOTHYROXINE (SYNTHROID) 100 MCG TABLET    TAKE ONE TABLET BY MOUTH DAILY    LISINOPRIL (PRINIVIL;ZESTRIL) 40 MG TABLET    TAKE ONE TABLET BY MOUTH DAILY    MULTIPLE VITAMINS-MINERALS (MULTIVITAMIN ADULT PO)    Take 1 tablet by mouth daily    OMEPRAZOLE (PRILOSEC) 20 MG DELAYED RELEASE CAPSULE    Take 1 capsule by mouth 2 times daily    ONDANSETRON (ZOFRAN ODT) 4 MG DISINTEGRATING TABLET    Take 1 tablet by mouth every 8 hours as needed for Nausea    PROMETHAZINE-DEXTROMETHORPHAN (PROMETHAZINE-DM) 6.25-15 MG/5ML SYRUP    Take 5 mLs by mouth nightly as needed for Cough    PROPRANOLOL (INDERAL) 10 MG TABLET    Take 10 mg by mouth 3 times daily Indications: patient takes twice a day        ALLERGIES     is allergic to flomax [tamsulosin hcl], morphine, saphris [asenapine], bactrim [sulfamethoxazole-trimethoprim], prednisone, and zyprexa [olanzapine]. FAMILY HISTORY     She indicated that her mother is . She indicated that her father is . She indicated that her sister is alive. She indicated that her maternal grandmother is . She indicated that her maternal grandfather is .  She indicated that her paternal grandmother is . She indicated that her paternal grandfather is . family history includes Atrial Fibrillation in her mother; Heart Attack in her maternal grandfather; Heart Failure in her mother; High Blood Pressure in her father and mother; Lupus in her mother; Other in her maternal grandmother; Prostate Cancer in her father; Stroke in her paternal grandfather. SOCIAL HISTORY      reports that she has been smoking cigarettes. She started smoking about 39 years ago. She has a 18.50 pack-year smoking history. She has never used smokeless tobacco. She reports previous drug use. She reports that she does not drink alcohol. PHYSICAL EXAM     INITIAL VITALS:  height is 5' 1\" (1.549 m) and weight is 160 lb (72.6 kg). Her tympanic temperature is 98.7 °F (37.1 °C). Her blood pressure is 149/72 (abnormal) and her pulse is 117. Her respiration is 18 and oxygen saturation is 96%. General: Patient alert nontoxic-appearing female in no apparent distress  HEENT: Head is atraumatic conjunctiva are clear  Neck: Supple  Respiratory: Lung sounds are clear bilateral  Cardiac: Heart is regular rate and rhythm  GI: Abdomen is soft mildly tender diffusely with no rebound guarding pulsatile mass or bruits nonsurgical exam    DIFFERENTIAL DIAGNOSIS/ MDM:     Obtain CAT scan and baseline labs    DIAGNOSTIC RESULTS     EKG: All EKG's are interpreted by the Emergency Department Physician who either signs or Co-signs this chart in the absence of a cardiologist.        RADIOLOGY:   I directly visualized the following  images and reviewed the radiologist interpretations:  CT ABDOMEN PELVIS WO CONTRAST Additional Contrast? None   Preliminary Result   1. Question mild left renal pelvis mucosal thickening. Findings may indicate   infection. Correlate with urinalysis. 2. No evidence of obstructing uropathy. 3. Nonobstructing bilateral nephrolithiasis. 4. Diverticulosis without evidence of acute diverticulitis. LABS:  Labs Reviewed   COMPREHENSIVE METABOLIC PANEL - Abnormal; Notable for the following components:       Result Value    Glucose 126 (*)     Potassium 3.4 (*)     Total Bilirubin 0.19 (*)     All other components within normal limits   URINALYSIS - Abnormal; Notable for the following components:    Leukocyte Esterase, Urine TRACE (*)     All other components within normal limits   MICROSCOPIC URINALYSIS - Abnormal; Notable for the following components:    Bacteria, UA TRACE (*)     All other components within normal limits   CBC WITH AUTO DIFFERENTIAL   LIPASE         EMERGENCY DEPARTMENT COURSE:   Vitals:    Vitals:    10/30/21 2118   BP: (!) 149/72   Pulse: 117   Resp: 18   Temp: 98.7 °F (37.1 °C)   TempSrc: Tympanic   SpO2: 96%   Weight: 160 lb (72.6 kg)   Height: 5' 1\" (1.549 m)     -------------------------  BP: (!) 149/72, Temp: 98.7 °F (37.1 °C), Pulse: 117, Resp: 18    Orders Placed This Encounter   Medications    fentaNYL (SUBLIMAZE) injection 25 mcg    ondansetron (ZOFRAN) injection 4 mg    ketorolac (TORADOL) injection 30 mg    cephALEXin (KEFLEX) 500 MG capsule     Sig: Take 1 capsule by mouth 4 times daily for 7 days     Dispense:  28 capsule     Refill:  0           Re-evaluation Notes    Other than the above CT finding by radiologist shows mucosal thickening CT and labs are unremarkable patient has a nonsurgical abdomen I will start her on some antibiotics just on the outside have her follow-up with her primary in the next 24 to 48 hours return if worse    CRITICAL CARE:   None      CONSULTS:      PROCEDURES:  None    FINAL IMPRESSION      1. Acute right flank pain    2.  Generalized abdominal pain          DISPOSITION/PLAN   DISPOSITION Decision To Discharge 10/30/2021 11:28:53 PM      Condition on Disposition    Stable    PATIENT REFERRED TO:  Booker Figueroa DO  [address] [place] [address] Rd  [phone]    In 1 day        DISCHARGE MEDICATIONS:  New Prescriptions    CEPHALEXIN (KEFLEX) 500 MG CAPSULE    Take 1 capsule by mouth 4 times daily for 7 days       (Please note that portions of this note were completed with a voice recognition program.  Efforts were made to edit the dictations but occasionally words are mis-transcribed.)    Areli Hua MD,, MD, F.A.C.E.P.   Attending Emergency Physician        Areli Hua MD  10/30/21 [id number]

## 2021-10-31 NOTE — ED TRIAGE NOTES
Right sided back pain radiating to the abdomen since 1500. Ate 1.5 hours PTA sausage and biscuits with emesis shortly thereafter x2. Tylenol taken at 1900 at home. Hx of kidney stones.

## 2021-11-02 ENCOUNTER — TELEPHONE (OUTPATIENT)
Dept: FAMILY MEDICINE CLINIC | Age: 56
End: 2021-11-02

## 2021-11-02 NOTE — TELEPHONE ENCOUNTER
Scheduled for this afternoon with Dr Denisse Boggs. Transitional planning, talked with patient and . Plan is to go home with her . Not wanting any home care at this time. Has ride.

## 2021-11-08 ENCOUNTER — OFFICE VISIT (OUTPATIENT)
Dept: PRIMARY CARE CLINIC | Age: 56
End: 2021-11-08
Payer: MEDICARE

## 2021-11-08 VITALS
SYSTOLIC BLOOD PRESSURE: 130 MMHG | BODY MASS INDEX: 31 KG/M2 | WEIGHT: 164.2 LBS | OXYGEN SATURATION: 94 % | RESPIRATION RATE: 16 BRPM | TEMPERATURE: 98.7 F | HEIGHT: 61 IN | DIASTOLIC BLOOD PRESSURE: 82 MMHG | HEART RATE: 90 BPM

## 2021-11-08 DIAGNOSIS — M62.830 SPASM OF LUMBAR PARASPINOUS MUSCLE: Primary | ICD-10-CM

## 2021-11-08 PROCEDURE — G8484 FLU IMMUNIZE NO ADMIN: HCPCS | Performed by: NURSE PRACTITIONER

## 2021-11-08 PROCEDURE — 99213 OFFICE O/P EST LOW 20 MIN: CPT | Performed by: NURSE PRACTITIONER

## 2021-11-08 PROCEDURE — G8417 CALC BMI ABV UP PARAM F/U: HCPCS | Performed by: NURSE PRACTITIONER

## 2021-11-08 PROCEDURE — 4004F PT TOBACCO SCREEN RCVD TLK: CPT | Performed by: NURSE PRACTITIONER

## 2021-11-08 PROCEDURE — 3017F COLORECTAL CA SCREEN DOC REV: CPT | Performed by: NURSE PRACTITIONER

## 2021-11-08 PROCEDURE — 99213 OFFICE O/P EST LOW 20 MIN: CPT

## 2021-11-08 PROCEDURE — G8427 DOCREV CUR MEDS BY ELIG CLIN: HCPCS | Performed by: NURSE PRACTITIONER

## 2021-11-08 RX ORDER — CYCLOBENZAPRINE HCL 10 MG
10 TABLET ORAL
Qty: 10 TABLET | Refills: 0 | Status: SHIPPED | OUTPATIENT
Start: 2021-11-08 | End: 2022-02-18

## 2021-11-08 RX ORDER — PREDNISONE 20 MG/1
20 TABLET ORAL 2 TIMES DAILY
Qty: 10 TABLET | Refills: 0 | Status: SHIPPED | OUTPATIENT
Start: 2021-11-08 | End: 2021-11-13

## 2021-11-08 ASSESSMENT — PATIENT HEALTH QUESTIONNAIRE - PHQ9
SUM OF ALL RESPONSES TO PHQ9 QUESTIONS 1 & 2: 0
1. LITTLE INTEREST OR PLEASURE IN DOING THINGS: 0
SUM OF ALL RESPONSES TO PHQ QUESTIONS 1-9: 0
2. FEELING DOWN, DEPRESSED OR HOPELESS: 0

## 2021-11-08 ASSESSMENT — ENCOUNTER SYMPTOMS: BACK PAIN: 1

## 2021-11-08 NOTE — PATIENT INSTRUCTIONS
Start prednisone and flexeril for back spasm. Take steroid early in the day to avoid sleep interruptions. Take flexeril at bedtime as this will cause drowsiness. Can apply ice or heat to back as needed. Gentle activity. Avoid heavy lifting/pushing/[ulling. Practice good body mechanics. Patient Education        Back Spasm: Care Instructions  Your Care Instructions  A back spasm is sudden tightness and pain in your back muscles. It may happen from overuse or an injury. Things like sleeping in an awkward way, bending, lifting, standing, or sitting can sometimes cause a spasm. But the cause isn't always clear. Home treatment includes using heat or ice, taking over-the-counter (OTC) pain medicines, and avoiding activities that may cause back pain. For a back spasm that doesn't get better with home care, your doctor may prescribe medicine. Treatments such as massage or manipulation may also help ease a back spasm. Your doctor may also suggest exercise or physical therapy to help improve strength and flexibility in your back muscles. In most cases, getting back to your normal activities is good for your back. Just make sure to avoid doing things that make your pain worse. Follow-up care is a key part of your treatment and safety. Be sure to make and go to all appointments, and call your doctor if you are having problems. It's also a good idea to know your test results and keep a list of the medicines you take. How can you care for yourself at home? Heat, ice, and medicines    · To relieve pain, use heat or ice (whichever feels better) on the affected area. ? Put a warm water bottle, a heating pad set on low, or a warm cloth on your back. Put a thin cloth between the heating pad and your skin. Do not go to sleep with a heating pad on your skin. ? Try ice or a cold pack on the area for 10 to 20 minutes at a time.  Put a thin cloth between the ice and your skin.     · For most back pain you can take over-the-counter pain medicine. Nonsteroidal anti-inflammatory drugs (NSAIDs) such as ibuprofen or naproxen seem to work best. But if you can't take NSAIDs you can try acetaminophen. Your doctor can prescribe stronger medicines if needed. Be safe with medicines. Read and follow all instructions on the label. Body positions and posture    · Sit or lie in positions that are most comfortable for you and that reduce pain. Try one of these positions when you lie down:  ? Lie on your back with your knees bent and supported by large pillows. ? Lie on the floor with your legs on the seat of a sofa or chair. ? Lie on your side with your knees and hips bent and a pillow between your legs. ? Lie on your stomach if it does not make pain worse.     · Do not sit up in bed. Avoid soft couches and twisted positions.     · Avoid bed rest after the first day of back pain. Bed rest can help relieve pain at first, but it delays healing. Continued rest without activity is usually not good for your back.     · If you must sit for long periods of time, take breaks from sitting. Change positions every 30 minutes. Get up and walk around, or lie in a comfortable position. Activity    · Take short walks several times a day. You can start with 5 to 10 minutes, 3 or 4 times a day, and work up to longer walks. Walk on level surfaces and avoid hills and stairs until your back starts to feel better.     · After your back spasm starts to feel better, try to stretch your muscles every day, especially before and after exercise and at bedtime. Regular stretching can help relax your muscles.     · To prevent future back pain, do exercises to stretch and strengthen your back and stomach. Learn to use good posture, safe lifting techniques, and other ways to move to help you avoid back pain. When should you call for help? Call 911 anytime you think you may need emergency care.  For example, call if:    · You are unable to move an arm or a leg at all.   Call your doctor now or seek immediate medical care if:    · You have new or worse symptoms in your legs, belly, or buttocks. Symptoms may include:  ? Numbness or tingling. ? Weakness. ? Pain.     · You lose bladder or bowel control. Watch closely for changes in your health, and be sure to contact your doctor if:    · You have a fever, lose weight, or don't feel well.     · You do not get better as expected. Where can you learn more? Go to https://milabent.TerraPower. org and sign in to your Apto account. Enter E232 in the [facility] box to learn more about \"Back Spasm: Care Instructions. \"     If you do not have an account, please click on the \"Sign Up Now\" link. Current as of: July 1, 2021               Content Version: 13.0  © 7791-5179 [employer], [employer]. Care instructions adapted under license by [facility] ([facility]). If you have questions about a medical condition or this instruction, always ask your healthcare professional. [address] any warranty or liability for your use of this information.

## 2021-11-08 NOTE — PROGRESS NOTES
[address] [address]  Dept: [phone]  Dept Fax: [phone]  Loc: [phone]        [address] [place] [address]       Chief Complaint   Patient presents with    Lower Back Pain     bent over this am and felt a pop on the left side of low back today, unable to stand up right awary       Nurses Notes reviewed and I agree except as noted in the HPI. HISTORY OF PRESENT ILLNESS   León Harrison is a 55 y.o. female who presents to [facility] today (11/10/2021) for evaluation of:   Back Pain  This is a new problem. The current episode started today (appx 11am). The problem occurs constantly. The problem is unchanged. The pain is present in the lumbar spine (left side). The quality of the pain is described as aching. The symptoms are aggravated by bending, twisting and sitting. She has tried ice (tylenol) for the symptoms. The treatment provided mild relief. REVIEW OF SYSTEMS     Review of Systems   Musculoskeletal: Positive for back pain (left lower). PAST MEDICAL HISTORY         Diagnosis Date    Anxiety     Breast cancer ([facility] [address] 2014    L side - lumpectomy and radiation; no chemo (was recommended to take Tamoxifen, but with her smoking, she declined due to family h/o CVA already). Seeing Dr. Israel Kumar once yearly/    COPD (chronic obstructive pulmonary disease) ([facility] [address]     Depression     Headache(784.0)     History of alcoholism ([facility] [address]     sober since 1/16/19    Hypertension     Hypothyroidism     Kidney stone     Has had lithotripsy x 1; had ureteral stent placement with removal x 1; had seen Dr. Hernandes Cassette Panic attack 04/15/2021    PTSD (post-traumatic stress disorder)        SURGICAL HISTORY     Patient  has a past surgical history that includes Eye surgery; tumor removal (1999); Appendectomy (1977); Cholecystectomy (2014);  Tubal ligation (2001); lumbar laminectomy (2006); Dilation and curettage of uterus; Breast lumpectomy (Left, 2014); Breast biopsy (Right, 2015); alcon and bso (cervix removed) (2015); ventral hernia repair (2019); hernia repair (01/2020); fracture surgery (Left); hernia repair (N/A, 7/22/2020); ventral hernia repair (N/A, 10/21/2020); Colonoscopy (2019); and Colonoscopy (N/A, 5/11/2021). CURRENT MEDICATIONS       Outpatient Medications Prior to Visit   Medication Sig Dispense Refill    ondansetron (ZOFRAN ODT) 4 MG disintegrating tablet Take 1 tablet by mouth every 8 hours as needed for Nausea 12 tablet 0    gabapentin (NEURONTIN) 100 MG capsule Take 1 capsule by mouth 3 times daily for 90 days.  90 capsule 2    albuterol sulfate  (90 Base) MCG/ACT inhaler Inhale 1-2 puffs into the lungs every 6 hours as needed for Wheezing or Shortness of Breath 3 Inhaler 1    ferrous sulfate (IRON 325) 325 (65 Fe) MG tablet Take 1 tablet by mouth daily (with breakfast) 90 tablet 1    fluticasone (FLONASE) 50 MCG/ACT nasal spray SPRAY TWO SPRAYS IN EACH NOSTRIL ONCE DAILY 3 Bottle 3    omeprazole (PRILOSEC) 20 MG delayed release capsule Take 1 capsule by mouth 2 times daily      promethazine-dextromethorphan (PROMETHAZINE-DM) 6.25-15 MG/5ML syrup Take 5 mLs by mouth nightly as needed for Cough 75 mL 0    atorvastatin (LIPITOR) 10 MG tablet TAKE ONE TABLET BY MOUTH DAILY 90 tablet 1    lisinopril (PRINIVIL;ZESTRIL) 40 MG tablet TAKE ONE TABLET BY MOUTH DAILY 90 tablet 3    amLODIPine (NORVASC) 10 MG tablet TAKE ONE TABLET BY MOUTH DAILY 90 tablet 3    fexofenadine (ALLEGRA) 180 MG tablet Take 1 tablet by mouth daily 90 tablet 1    levothyroxine (SYNTHROID) 100 MCG tablet TAKE ONE TABLET BY MOUTH DAILY 90 tablet 3    Cholecalciferol (VITAMIN D) 50 MCG (2000 UT) CAPS capsule Take 2,000 Units by mouth daily      Multiple Vitamins-Minerals (MULTIVITAMIN ADULT PO) Take 1 tablet by mouth daily      propranolol (INDERAL) 10 MG tablet Take 10 mg by mouth 3 times daily Indications: patient takes twice a day       hydrOXYzine (ATARAX) 25 MG tablet Take 50 mg by mouth every 6 hours as needed for Anxiety       DULoxetine (CYMBALTA) 30 MG extended release capsule Take 30 mg by mouth daily      DULoxetine (CYMBALTA) 60 MG extended release capsule Take 60 mg by mouth daily      lamoTRIgine (LAMICTAL) 200 MG tablet Take 200 mg by mouth daily       cyclobenzaprine (FLEXERIL) 5 MG tablet Take 1 tablet by mouth nightly as needed for Muscle spasms 30 tablet 0     No facility-administered medications prior to visit. ALLERGIES     Patient is is allergic to flomax [tamsulosin hcl], morphine, saphris [asenapine], bactrim [sulfamethoxazole-trimethoprim], prednisone, and zyprexa [olanzapine]. FAMILY HISTORY     Patient's family history includes Atrial Fibrillation in her mother; Heart Attack in her maternal grandfather; Heart Failure in her mother; High Blood Pressure in her father and mother; Lupus in her mother; Other in her maternal grandmother; Prostate Cancer in her father; Stroke in her paternal grandfather. SOCIAL HISTORY     Patient  reports that she has been smoking cigarettes. She started smoking about 39 years ago. She has a 18.50 pack-year smoking history. She has never used smokeless tobacco. She reports previous drug use. She reports that she does not drink alcohol. PHYSICAL EXAM     VITALS  BP: 130/82, Temp: 98.7 °F (37.1 °C), Pulse: 90, Resp: 16, SpO2: 94 %  Physical Exam  Vitals reviewed. Constitutional:       General: She is not in acute distress. Appearance: She is not ill-appearing. HENT:      Right Ear: Tympanic membrane and ear canal normal.      Left Ear: Tympanic membrane and ear canal normal.      Nose: Nose normal. No congestion or rhinorrhea. Mouth/Throat:      Mouth: Mucous membranes are moist.      Pharynx: Oropharynx is clear. No oropharyngeal exudate or posterior oropharyngeal erythema.    Cardiovascular:      Rate and Rhythm: Normal rate and regular rhythm. Heart sounds: Normal heart sounds, S1 normal and S2 normal. No murmur heard. Pulmonary:      Effort: Pulmonary effort is normal. No accessory muscle usage or respiratory distress. Breath sounds: Normal breath sounds. No wheezing or rhonchi. Musculoskeletal:      Cervical back: Normal range of motion and neck supple. Lumbar back: Tenderness present. No edema or deformity. Negative right straight leg raise test and negative left straight leg raise test.        Back:    Lymphadenopathy:      Cervical: No cervical adenopathy. Skin:     General: Skin is warm and dry. Capillary Refill: Capillary refill takes less than 2 seconds. Neurological:      General: No focal deficit present. Mental Status: She is alert. DIAGNOSTIC RESULTS   Labs:No results found for this visit on 11/08/21. IMAGING:        CLINICAL COURSE:     Vitals:    11/08/21 1515   BP: 130/82   Pulse: 90   Resp: 16   Temp: 98.7 °F (37.1 °C)   SpO2: 94%   Weight: 164 lb 3.2 oz (74.5 kg)   Height: 5' 1\" (1.549 m)           PROCEDURES:  None  FINAL IMPRESSION      1. Spasm of lumbar paraspinous muscle         DISPOSITION/PLAN     Prednisone x 5 days. Flexeril at bedtime. Also recommend tylenol as needed for pain. Ice/heat to back as needed. Gentle activity, good body mechanics, and no heavy lifting/pushing/pulling. Encouraged to return to clinic should symptoms worsen or any concerns arise. The use, risks, benefits, and potential side effects of prescribed and/or recommended medications were discussed. All questions were answered and the patient/caregiver voiced understanding. Patient Instructions     Start prednisone and flexeril for back spasm. Take steroid early in the day to avoid sleep interruptions. Take flexeril at bedtime as this will cause drowsiness. Can apply ice or heat to back as needed. Gentle activity. Avoid heavy lifting/pushing/[ulling.   Practice good body mechanics. Patient Education        Back Spasm: Care Instructions  Your Care Instructions  A back spasm is sudden tightness and pain in your back muscles. It may happen from overuse or an injury. Things like sleeping in an awkward way, bending, lifting, standing, or sitting can sometimes cause a spasm. But the cause isn't always clear. Home treatment includes using heat or ice, taking over-the-counter (OTC) pain medicines, and avoiding activities that may cause back pain. For a back spasm that doesn't get better with home care, your doctor may prescribe medicine. Treatments such as massage or manipulation may also help ease a back spasm. Your doctor may also suggest exercise or physical therapy to help improve strength and flexibility in your back muscles. In most cases, getting back to your normal activities is good for your back. Just make sure to avoid doing things that make your pain worse. Follow-up care is a key part of your treatment and safety. Be sure to make and go to all appointments, and call your doctor if you are having problems. It's also a good idea to know your test results and keep a list of the medicines you take. How can you care for yourself at home? Heat, ice, and medicines    · To relieve pain, use heat or ice (whichever feels better) on the affected area. ? Put a warm water bottle, a heating pad set on low, or a warm cloth on your back. Put a thin cloth between the heating pad and your skin. Do not go to sleep with a heating pad on your skin. ? Try ice or a cold pack on the area for 10 to 20 minutes at a time. Put a thin cloth between the ice and your skin.     · For most back pain you can take over-the-counter pain medicine. Nonsteroidal anti-inflammatory drugs (NSAIDs) such as ibuprofen or naproxen seem to work best. But if you can't take NSAIDs you can try acetaminophen. Your doctor can prescribe stronger medicines if needed. Be safe with medicines.  Read and follow all instructions on the label. Body positions and posture    · Sit or lie in positions that are most comfortable for you and that reduce pain. Try one of these positions when you lie down:  ? Lie on your back with your knees bent and supported by large pillows. ? Lie on the floor with your legs on the seat of a sofa or chair. ? Lie on your side with your knees and hips bent and a pillow between your legs. ? Lie on your stomach if it does not make pain worse.     · Do not sit up in bed. Avoid soft couches and twisted positions.     · Avoid bed rest after the first day of back pain. Bed rest can help relieve pain at first, but it delays healing. Continued rest without activity is usually not good for your back.     · If you must sit for long periods of time, take breaks from sitting. Change positions every 30 minutes. Get up and walk around, or lie in a comfortable position. Activity    · Take short walks several times a day. You can start with 5 to 10 minutes, 3 or 4 times a day, and work up to longer walks. Walk on level surfaces and avoid hills and stairs until your back starts to feel better.     · After your back spasm starts to feel better, try to stretch your muscles every day, especially before and after exercise and at bedtime. Regular stretching can help relax your muscles.     · To prevent future back pain, do exercises to stretch and strengthen your back and stomach. Learn to use good posture, safe lifting techniques, and other ways to move to help you avoid back pain. When should you call for help? Call 911 anytime you think you may need emergency care. For example, call if:    · You are unable to move an arm or a leg at all. Call your doctor now or seek immediate medical care if:    · You have new or worse symptoms in your legs, belly, or buttocks. Symptoms may include:  ? Numbness or tingling. ? Weakness. ? Pain.     · You lose bladder or bowel control.    Watch closely for changes in your health, and be sure to contact your doctor if:    · You have a fever, lose weight, or don't feel well.     · You do not get better as expected. Where can you learn more? Go to https://Mirovia Networkspeedi"[employer], [employer]c"eb.[employer] Systems. org and sign in to your NoWait account. Enter E232 in the [facility] box to learn more about \"Back Spasm: Care Instructions. \"     If you do not have an account, please click on the \"Sign Up Now\" link. Current as of: July 1, 2021               Content Version: 13.0  © 1154-2950 [employer]. Care instructions adapted under license by [facility] ([facility]). If you have questions about a medical condition or this instruction, always ask your healthcare professional. [address]  any warranty or liability for your use of this information. No orders of the defined types were placed in this encounter. Outpatient Encounter Medications as of 11/8/2021   Medication Sig Dispense Refill    predniSONE (DELTASONE) 20 MG tablet Take 1 tablet by mouth 2 times daily for 5 days 10 tablet 0    cyclobenzaprine (FLEXERIL) 10 MG tablet Take 1 tablet by mouth nightly as needed for Muscle spasms 10 tablet 0    ondansetron (ZOFRAN ODT) 4 MG disintegrating tablet Take 1 tablet by mouth every 8 hours as needed for Nausea 12 tablet 0    gabapentin (NEURONTIN) 100 MG capsule Take 1 capsule by mouth 3 times daily for 90 days.  90 capsule 2    albuterol sulfate  (90 Base) MCG/ACT inhaler Inhale 1-2 puffs into the lungs every 6 hours as needed for Wheezing or Shortness of Breath 3 Inhaler 1    ferrous sulfate (IRON 325) 325 (65 Fe) MG tablet Take 1 tablet by mouth daily (with breakfast) 90 tablet 1    fluticasone (FLONASE) 50 MCG/ACT nasal spray SPRAY TWO SPRAYS IN EACH NOSTRIL ONCE DAILY 3 Bottle 3    omeprazole (PRILOSEC) 20 MG delayed release capsule Take 1 capsule by mouth 2 times daily      promethazine-dextromethorphan (PROMETHAZINE-DM) 6.25-15 MG/5ML syrup Take 5 mLs by mouth nightly as needed for Cough 75 mL 0    atorvastatin (LIPITOR) 10 MG tablet TAKE ONE TABLET BY MOUTH DAILY 90 tablet 1    lisinopril (PRINIVIL;ZESTRIL) 40 MG tablet TAKE ONE TABLET BY MOUTH DAILY 90 tablet 3    amLODIPine (NORVASC) 10 MG tablet TAKE ONE TABLET BY MOUTH DAILY 90 tablet 3    fexofenadine (ALLEGRA) 180 MG tablet Take 1 tablet by mouth daily 90 tablet 1    levothyroxine (SYNTHROID) 100 MCG tablet TAKE ONE TABLET BY MOUTH DAILY 90 tablet 3    Cholecalciferol (VITAMIN D) 50 MCG (2000 UT) CAPS capsule Take 2,000 Units by mouth daily      Multiple Vitamins-Minerals (MULTIVITAMIN ADULT PO) Take 1 tablet by mouth daily      propranolol (INDERAL) 10 MG tablet Take 10 mg by mouth 3 times daily Indications: patient takes twice a day       hydrOXYzine (ATARAX) 25 MG tablet Take 50 mg by mouth every 6 hours as needed for Anxiety       DULoxetine (CYMBALTA) 30 MG extended release capsule Take 30 mg by mouth daily      DULoxetine (CYMBALTA) 60 MG extended release capsule Take 60 mg by mouth daily      lamoTRIgine (LAMICTAL) 200 MG tablet Take 200 mg by mouth daily       [DISCONTINUED] cyclobenzaprine (FLEXERIL) 5 MG tablet Take 1 tablet by mouth nightly as needed for Muscle spasms 30 tablet 0     No facility-administered encounter medications on file as of 11/8/2021. No follow-ups on file.                 Electronically signed by STAR Sewell CNP on 11/10/2021 at 1:49 AM

## 2021-12-18 DIAGNOSIS — R52 BURNING PAIN: ICD-10-CM

## 2021-12-20 NOTE — TELEPHONE ENCOUNTER
Lucero Maloney called requesting a refill of the below medication which has been pended for you:     Requested Prescriptions     Pending Prescriptions Disp Refills    gabapentin (NEURONTIN) 100 MG capsule [Pharmacy Med Name: GABAPENTIN 100 MG CAPSULE] 90 capsule 2     Sig: take 1 capsule by mouth three times a day       Last Appointment Date: 10/12/2021  Next Appointment Date: 12/21/2021    Allergies   Allergen Reactions    Flomax [Tamsulosin Hcl] Swelling     Swelling of arms; however, also had rash and fever at the same time and was admitted at the time    Morphine Itching and Rash     Rash, itching    Saphris [Asenapine]     Bactrim [Sulfamethoxazole-Trimethoprim] Diarrhea and Nausea Only    Prednisone Other (See Comments)     Emotional changes, depression    Zyprexa [Olanzapine] Other (See Comments)     Made her feel like she wanted to \"jump out of my skin\"

## 2021-12-21 ENCOUNTER — OFFICE VISIT (OUTPATIENT)
Dept: FAMILY MEDICINE CLINIC | Age: 56
End: 2021-12-21
Payer: MEDICARE

## 2021-12-21 ENCOUNTER — HOSPITAL ENCOUNTER (OUTPATIENT)
Age: 56
Setting detail: SPECIMEN
Discharge: HOME | End: 2021-12-21
Payer: MEDICARE

## 2021-12-21 VITALS
OXYGEN SATURATION: 99 % | DIASTOLIC BLOOD PRESSURE: 88 MMHG | TEMPERATURE: 97.9 F | HEIGHT: 61 IN | BODY MASS INDEX: 31.57 KG/M2 | SYSTOLIC BLOOD PRESSURE: 132 MMHG | HEART RATE: 95 BPM | WEIGHT: 167.2 LBS

## 2021-12-21 DIAGNOSIS — G89.29 CHRONIC ABDOMINAL PAIN: ICD-10-CM

## 2021-12-21 DIAGNOSIS — J30.9 ALLERGIC RHINITIS, UNSPECIFIED SEASONALITY, UNSPECIFIED TRIGGER: ICD-10-CM

## 2021-12-21 DIAGNOSIS — R10.9 CHRONIC ABDOMINAL PAIN: ICD-10-CM

## 2021-12-21 DIAGNOSIS — R11.0 NAUSEA: ICD-10-CM

## 2021-12-21 DIAGNOSIS — R14.0 ABDOMINAL BLOATING: ICD-10-CM

## 2021-12-21 DIAGNOSIS — R35.0 URINE FREQUENCY: Primary | ICD-10-CM

## 2021-12-21 DIAGNOSIS — M48.061 NEURAL FORAMINAL STENOSIS OF LUMBAR SPINE: ICD-10-CM

## 2021-12-21 DIAGNOSIS — R52 BURNING PAIN: ICD-10-CM

## 2021-12-21 DIAGNOSIS — R35.0 URINE FREQUENCY: ICD-10-CM

## 2021-12-21 DIAGNOSIS — N20.0 RECURRENT KIDNEY STONES: ICD-10-CM

## 2021-12-21 LAB
-: NORMAL
AMORPHOUS: NORMAL
BACTERIA: NORMAL
BILIRUBIN URINE: NEGATIVE
CASTS UA: NORMAL /LPF (ref 0–2)
COLOR: ABNORMAL
COMMENT UA: ABNORMAL
CRYSTALS, UA: NORMAL /HPF
EPITHELIAL CELLS UA: NORMAL /HPF (ref 0–5)
GLUCOSE URINE: NEGATIVE
KETONES, URINE: NEGATIVE
LEUKOCYTE ESTERASE, URINE: NEGATIVE
MUCUS: NORMAL
NITRITE, URINE: NEGATIVE
OTHER OBSERVATIONS UA: NORMAL
PH UA: 6.5 (ref 5–6)
PROTEIN UA: NEGATIVE
RBC UA: NORMAL /HPF (ref 0–4)
RENAL EPITHELIAL, UA: NORMAL /HPF
SPECIFIC GRAVITY UA: 1 (ref 1.01–1.02)
TRICHOMONAS: NORMAL
TURBIDITY: ABNORMAL
URINE HGB: NEGATIVE
UROBILINOGEN, URINE: NORMAL
WBC UA: NORMAL /HPF (ref 0–4)
YEAST: NORMAL

## 2021-12-21 PROCEDURE — 4004F PT TOBACCO SCREEN RCVD TLK: CPT | Performed by: FAMILY MEDICINE

## 2021-12-21 PROCEDURE — 81001 URINALYSIS AUTO W/SCOPE: CPT

## 2021-12-21 PROCEDURE — G8427 DOCREV CUR MEDS BY ELIG CLIN: HCPCS | Performed by: FAMILY MEDICINE

## 2021-12-21 PROCEDURE — 99213 OFFICE O/P EST LOW 20 MIN: CPT | Performed by: FAMILY MEDICINE

## 2021-12-21 PROCEDURE — 3017F COLORECTAL CA SCREEN DOC REV: CPT | Performed by: FAMILY MEDICINE

## 2021-12-21 PROCEDURE — G8484 FLU IMMUNIZE NO ADMIN: HCPCS | Performed by: FAMILY MEDICINE

## 2021-12-21 PROCEDURE — 99214 OFFICE O/P EST MOD 30 MIN: CPT | Performed by: FAMILY MEDICINE

## 2021-12-21 PROCEDURE — 87086 URINE CULTURE/COLONY COUNT: CPT

## 2021-12-21 PROCEDURE — G8417 CALC BMI ABV UP PARAM F/U: HCPCS | Performed by: FAMILY MEDICINE

## 2021-12-21 RX ORDER — ONDANSETRON 4 MG/1
4 TABLET, ORALLY DISINTEGRATING ORAL EVERY 8 HOURS PRN
Qty: 12 TABLET | Refills: 0 | Status: CANCELLED | OUTPATIENT
Start: 2021-12-21

## 2021-12-21 RX ORDER — OMEPRAZOLE 20 MG/1
20 CAPSULE, DELAYED RELEASE ORAL 2 TIMES DAILY
Qty: 180 CAPSULE | Refills: 1 | Status: SHIPPED | OUTPATIENT
Start: 2021-12-21 | End: 2022-06-17

## 2021-12-21 RX ORDER — FEXOFENADINE HCL 180 MG/1
180 TABLET ORAL DAILY
Qty: 90 TABLET | Refills: 3 | Status: SHIPPED | OUTPATIENT
Start: 2021-12-21 | End: 2022-10-10

## 2021-12-21 RX ORDER — BREXPIPRAZOLE 1 MG/1
TABLET ORAL
COMMUNITY
Start: 2021-12-08

## 2021-12-21 RX ORDER — HYDROXYZINE PAMOATE 25 MG/1
CAPSULE ORAL
COMMUNITY
Start: 2021-11-22 | End: 2022-01-04

## 2021-12-21 RX ORDER — FEXOFENADINE HCL 180 MG/1
180 TABLET ORAL DAILY
Qty: 90 TABLET | Refills: 1 | Status: CANCELLED | OUTPATIENT
Start: 2021-12-21

## 2021-12-21 RX ORDER — GABAPENTIN 100 MG/1
100 CAPSULE ORAL 3 TIMES DAILY
Qty: 90 CAPSULE | Refills: 2 | Status: SHIPPED | OUTPATIENT
Start: 2021-12-27 | End: 2022-03-18 | Stop reason: SDUPTHER

## 2021-12-21 RX ORDER — ONDANSETRON 4 MG/1
4 TABLET, ORALLY DISINTEGRATING ORAL EVERY 8 HOURS PRN
Qty: 40 TABLET | Refills: 1 | Status: SHIPPED | OUTPATIENT
Start: 2021-12-21

## 2021-12-21 ASSESSMENT — ENCOUNTER SYMPTOMS: NAUSEA: 1

## 2021-12-21 NOTE — PROGRESS NOTES
[address]  [address], [address] [place]  [phone]      Erin Vitale is a 55 y.o. female who presents today for her medical conditions/complaints as noted below. Erin Vitale is c/o of Back Pain (lower/mid back) and Flank Pain (urine frequency, cloudiness, left abdominal pain)      HPI:     Pt here today for     Flank Pain  This is a new problem. The current episode started yesterday. The problem occurs intermittently (worse after she urinates). The problem has been gradually worsening since onset. Pain location: L side. The quality of the pain is described as stabbing (\"colic\"). The pain is at a severity of 8/10. Pertinent negatives include no dysuria or fever. (Urine has looked a little cloudy and stream is weaker) Treatments tried: heating pad. Has h/o kidney stones - has passed ~10 stones on her own and twice she has had surgical procedure. Most recent surgery was 2017. [place] [facility] [place] [facility] on 1/13/22. Psychiatry added Rexulti 1 mg daily to her regimen earlier this month - had discussed switching her from Cymbalta to Zoloft, but they are waiting on this, since her mood sx's are usually worse in the winter. In the spring, they may see if she can wean off her Cymbalta. Still having abdominal bloating after she eats; usually lasts a couple hours. Does not relate to BM's. Doesn't eat at work due to this. Sometimes drinks baking soda and water. Had been taking Omeprazole 20 mg BID until she ran out a couple months ago - would like refill today. Since she started Rexulti 2 weeks ago, she has also noticed more heartburn if she eats near her dose, and then lies down. Stools have still been light in color, but not as tan as previously. Taking Gabapentin 100 mg TID for burning pain near incision from abdominal surgery - stable; tolerating well.           Past Medical History:   Diagnosis Date    Anxiety     Breast cancer ([facility] [address] 2014    L side - lumpectomy and radiation; no chemo (was recommended to take Tamoxifen, but with her smoking, she declined due to family h/o CVA already).  Seeing Dr. Venita Montelongo once yearly/    COPD (chronic obstructive pulmonary disease) ([facility] [address]     Depression     Headache(784.0)     History of alcoholism ([facility] [address]     sober since 20    Hypertension     Hypothyroidism     Kidney stone     Has had lithotripsy x 1; had ureteral stent placement with removal x 1; had seen Dr. Dale Bonner Panic attack 04/15/2021    PTSD (post-traumatic stress disorder)       Past Surgical History:   Procedure Laterality Date    APPENDECTOMY  1977    BREAST BIOPSY Right 2015    excisional biopsy - Dr. Colleen Galarza Left     breast CA - done at [facility]      COLONOSCOPY  2019    polyps, Dr. Antonia Horan at [facility] N/A 2021    mild diverticulosis, tubular adenoma x1; Dr. Claudell Kemps at [facility]      multiple in her 20's    EYE SURGERY      x 2 in her youth - was \"cross-eyed\"    FRACTURE SURGERY Left     hand    HERNIA REPAIR  2020    recurrent incisional hernia repair Dr. Jesse Galarza N/A 2020    Laparoscopic Robotic Assisted Ventral Hernia Repair performed by Tali Naqvi MD at [facility] [address]      Dr. Pilar Crane      Dr. 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 Daysi Verdugo at [facility] - used mesh; had revision in 2020    [facility] N/A 10/21/2020    Open VENTRAL Hernia Repair w/ mesh & Component separation technique performed by Tali Naqvi MD at [facility] OR     Family History   Problem Relation Age of Onset    High Blood Pressure Mother     Lupus Mother          from CHF secondary to cardiomyopathy from her Cholecalciferol (VITAMIN D) 50 MCG (2000 UT) CAPS capsule Take 2,000 Units by mouth daily      Multiple Vitamins-Minerals (MULTIVITAMIN ADULT PO) Take 1 tablet by mouth daily      propranolol (INDERAL) 10 MG tablet Take 10 mg by mouth 3 times daily Indications: patient takes twice a day       hydrOXYzine (ATARAX) 25 MG tablet Take 50 mg by mouth every 6 hours as needed for Anxiety       DULoxetine (CYMBALTA) 30 MG extended release capsule Take 30 mg by mouth daily      DULoxetine (CYMBALTA) 60 MG extended release capsule Take 60 mg by mouth daily      lamoTRIgine (LAMICTAL) 200 MG tablet Take 200 mg by mouth daily       cyclobenzaprine (FLEXERIL) 10 MG tablet Take 1 tablet by mouth nightly as needed for Muscle spasms 10 tablet 0    ferrous sulfate (IRON 325) 325 (65 Fe) MG tablet Take 1 tablet by mouth daily (with breakfast) 90 tablet 1    promethazine-dextromethorphan (PROMETHAZINE-DM) 6.25-15 MG/5ML syrup Take 5 mLs by mouth nightly as needed for Cough 75 mL 0    atorvastatin (LIPITOR) 10 MG tablet TAKE ONE TABLET BY MOUTH DAILY 90 tablet 1     No current facility-administered medications for this visit.      Allergies   Allergen Reactions    Flomax [Tamsulosin Hcl] Swelling     Swelling of arms; however, also had rash and fever at the same time and was admitted at the time    Morphine Itching and Rash     Rash, itching    Saphris [Asenapine]     Bactrim [Sulfamethoxazole-Trimethoprim] Diarrhea and Nausea Only    Prednisone Other (See Comments)     Emotional changes, depression    Zyprexa [Olanzapine] Other (See Comments)     Made her feel like she wanted to \"jump out of my skin\"       Health Maintenance   Topic Date Due    Pneumococcal 0-64 years Vaccine (1 of 2 - PPSV23) Never done    Shingles Vaccine (1 of 2) Never done    Flu vaccine (1) 09/01/2021    COVID-19 Vaccine (3 - Booster for Moderna series) 10/08/2021    DTaP/Tdap/Td vaccine (2 - Td or Tdap) 11/30/2021    Lipid screen  04/26/2022  Breast cancer screen  08/17/2022    Potassium monitoring  10/30/2022    Creatinine monitoring  10/30/2022    Depression Monitoring  11/08/2022    Diabetes screen  04/16/2024    Colon cancer screen colonoscopy  05/11/2026    Hepatitis C screen  Completed    HIV screen  Completed    Hepatitis A vaccine  Aged Out    Hepatitis B vaccine  Aged Out    Hib vaccine  Aged Out    Meningococcal (ACWY) vaccine  Aged Out       Subjective:      Review of Systems   Constitutional: Negative for chills and fever. Gastrointestinal: Positive for nausea (intermittent). Genitourinary: Positive for flank pain. Negative for dysuria and hematuria. Objective:     Vitals:    12/21/21 1328   BP: 132/88   Site: Right Upper Arm   Position: Sitting   Cuff Size: Large Adult   Pulse: 95   Temp: 97.9 °F (36.6 °C)   TempSrc: Temporal   SpO2: 99%   Weight: 167 lb 3.2 oz (75.8 kg)   Height: 5' 1\" (1.549 m)     Physical Exam  Constitutional:       General: She is not in acute distress. Appearance: Normal appearance. HENT:      Head: Normocephalic and atraumatic. Eyes:      Conjunctiva/sclera: Conjunctivae normal.   Cardiovascular:      Rate and Rhythm: Normal rate and regular rhythm. Heart sounds: Normal heart sounds. Pulmonary:      Effort: Pulmonary effort is normal. No respiratory distress. Breath sounds: Normal breath sounds. Abdominal:      General: Bowel sounds are normal. There is no distension. Palpations: Abdomen is soft. Tenderness: There is abdominal tenderness (L lateral side). Musculoskeletal:      Right lower leg: No edema. Left lower leg: No edema. Skin:     General: Skin is warm and dry. Neurological:      General: No focal deficit present. Mental Status: She is alert and oriented to person, place, and time. Psychiatric:         Mood and Affect: Mood normal.         Assessment:      1. Urine frequency  -     Urinalysis Reflex to Culture;  Future  -     Culture, Urine; Future  2. Recurrent kidney stones  -     XR ABDOMEN (KUB) (SINGLE AP VIEW); Future  3. Burning pain  -     gabapentin (NEURONTIN) 100 MG capsule; Take 1 capsule by mouth 3 times daily for 90 days. , Disp-90 capsule, R-2Normal  4. Abdominal bloating  -     omeprazole (PRILOSEC) 20 MG delayed release capsule; Take 1 capsule by mouth 2 times daily, Disp-180 capsule, R-1Normal  5. Nausea  -     ondansetron (ZOFRAN ODT) 4 MG disintegrating tablet; Take 1 tablet by mouth every 8 hours as needed for Nausea, Disp-40 tablet, R-1Normal  6. Chronic abdominal pain  -     Inna Coulter MD, Pain Management, [place]  7. Neural foraminal stenosis of lumbar spine  -     Inna Coulter MD, Pain Management, [place]  8. Allergic rhinitis, unspecified seasonality, unspecified trigger  -     fexofenadine (ALLEGRA) 180 MG tablet; Take 1 tablet by mouth daily, Disp-90 tablet, R-3Normal         Plan:      Declined flu vaccine and Tdap booster today. Return in about 3 months (around 3/21/2022) for f/u Gabapentin, abd bloating. Orders Placed This Encounter   Procedures    Culture, Urine     Urine already in lab. Standing Status:   Future     Number of Occurrences:   1     Standing Expiration Date:   12/21/2022     Order Specific Question:   Specify (ex-cath, midstream, cysto, etc)? Answer:   clean catch    XR ABDOMEN (KUB) (SINGLE AP VIEW)     Standing Status:   Future     Standing Expiration Date:   12/21/2022     Order Specific Question:   Reason for exam:     Answer:   L flank pain x 2 days; h/o recurrent kidney stones    Urinalysis Reflex to Culture     Standing Status:   Future     Number of Occurrences:   1     Standing Expiration Date:   12/21/2022     Order Specific Question:   SPECIFY(EX-CATH,MIDSTREAM,CYSTO,ETC)?      Answer:   clean catch midstream   Polo Grissom MD, Pain Management, [place]     Referral Priority:   Routine     Referral Type:   Eval and Treat     Referral Reason:   Specialty Services Required     Referred to Provider:   Jeffrey Haley MD     Requested Specialty:   Physical Medicine and Rehab     Number of Visits Requested:   1     Orders Placed This Encounter   Medications    ondansetron (ZOFRAN ODT) 4 MG disintegrating tablet     Sig: Take 1 tablet by mouth every 8 hours as needed for Nausea     Dispense:  40 tablet     Refill:  1    fexofenadine (ALLEGRA) 180 MG tablet     Sig: Take 1 tablet by mouth daily     Dispense:  90 tablet     Refill:  3    omeprazole (PRILOSEC) 20 MG delayed release capsule     Sig: Take 1 capsule by mouth 2 times daily     Dispense:  180 capsule     Refill:  1    gabapentin (NEURONTIN) 100 MG capsule     Sig: Take 1 capsule by mouth 3 times daily for 90 days. Dispense:  90 capsule     Refill:  2       Patient given educational materials - see patient instructions. Discussed use, benefit, and side effects of prescribed medications. All patient questions answered. Pt voiced understanding. Reviewed health maintenance.             Electronically signed by Luis Nathan DO, DO on 1/2/2022 at 11:44 PM

## 2021-12-22 LAB
CULTURE: NO GROWTH
Lab: NORMAL
SPECIMEN DESCRIPTION: NORMAL

## 2021-12-22 RX ORDER — GABAPENTIN 100 MG/1
CAPSULE ORAL
Qty: 90 CAPSULE | Refills: 2 | OUTPATIENT
Start: 2021-12-22

## 2021-12-27 ENCOUNTER — HOSPITAL ENCOUNTER (OUTPATIENT)
Dept: LAB | Age: 56
Discharge: HOME | End: 2021-12-27
Payer: MEDICARE

## 2021-12-27 DIAGNOSIS — D50.9 IRON DEFICIENCY ANEMIA, UNSPECIFIED IRON DEFICIENCY ANEMIA TYPE: ICD-10-CM

## 2021-12-27 DIAGNOSIS — E55.9 VITAMIN D DEFICIENCY: ICD-10-CM

## 2021-12-27 LAB
ABSOLUTE EOS #: 0.24 K/UL (ref 0–0.44)
ABSOLUTE IMMATURE GRANULOCYTE: 0.05 K/UL (ref 0–0.3)
ABSOLUTE LYMPH #: 2.33 K/UL (ref 1.1–3.7)
ABSOLUTE MONO #: 0.7 K/UL (ref 0.1–1.2)
BASOPHILS # BLD: 1 % (ref 0–2)
BASOPHILS ABSOLUTE: 0.05 K/UL (ref 0–0.2)
DIFFERENTIAL TYPE: ABNORMAL
EOSINOPHILS RELATIVE PERCENT: 3 % (ref 1–4)
FERRITIN: 22 UG/L (ref 13–150)
HCT VFR BLD CALC: 40.7 % (ref 36.3–47.1)
HEMOGLOBIN: 13.7 G/DL (ref 11.9–15.1)
IMMATURE GRANULOCYTES: 1 %
IRON SATURATION: 13 % (ref 20–55)
IRON: 51 UG/DL (ref 37–145)
LYMPHOCYTES # BLD: 25 % (ref 24–43)
MCH RBC QN AUTO: 30.5 PG (ref 25.2–33.5)
MCHC RBC AUTO-ENTMCNC: 33.7 G/DL (ref 25.2–33.5)
MCV RBC AUTO: 90.6 FL (ref 82.6–102.9)
MONOCYTES # BLD: 7 % (ref 3–12)
NRBC AUTOMATED: 0 PER 100 WBC
PDW BLD-RTO: 13.1 % (ref 11.8–14.4)
PLATELET # BLD: 265 K/UL (ref 138–453)
PLATELET ESTIMATE: ABNORMAL
PMV BLD AUTO: 8.3 FL (ref 8.1–13.5)
RBC # BLD: 4.49 M/UL (ref 3.95–5.11)
RBC # BLD: ABNORMAL 10*6/UL
SEG NEUTROPHILS: 63 % (ref 36–65)
SEGMENTED NEUTROPHILS ABSOLUTE COUNT: 6.12 K/UL (ref 1.5–8.1)
TOTAL IRON BINDING CAPACITY: 405 UG/DL (ref 250–450)
UNSATURATED IRON BINDING CAPACITY: 354 UG/DL (ref 112–347)
VITAMIN D 25-HYDROXY: 26.8 NG/ML (ref 30–100)
WBC # BLD: 9.5 K/UL (ref 3.5–11.3)
WBC # BLD: ABNORMAL 10*3/UL

## 2021-12-27 PROCEDURE — 83550 IRON BINDING TEST: CPT

## 2021-12-27 PROCEDURE — 82306 VITAMIN D 25 HYDROXY: CPT

## 2021-12-27 PROCEDURE — 85025 COMPLETE CBC W/AUTO DIFF WBC: CPT

## 2021-12-27 PROCEDURE — 82728 ASSAY OF FERRITIN: CPT

## 2021-12-27 PROCEDURE — 83540 ASSAY OF IRON: CPT

## 2021-12-27 PROCEDURE — 36415 COLL VENOUS BLD VENIPUNCTURE: CPT

## 2021-12-29 ENCOUNTER — PATIENT MESSAGE (OUTPATIENT)
Dept: FAMILY MEDICINE CLINIC | Age: 56
End: 2021-12-29

## 2021-12-29 NOTE — TELEPHONE ENCOUNTER
From: Kira Messina  To: Dr. 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 ..chemistry, cholesterol, triglycerides, amylase lipase to see if anything changed. Did I misunderstand? I am out of town but will get abdomen xray on the 4th.   Thank you Tommie Callahan

## 2022-01-04 ENCOUNTER — HOSPITAL ENCOUNTER (OUTPATIENT)
Dept: GENERAL RADIOLOGY | Age: 57
Discharge: HOME | End: 2022-01-06
Payer: MEDICARE

## 2022-01-04 ENCOUNTER — OFFICE VISIT (OUTPATIENT)
Dept: PAIN MANAGEMENT | Age: 57
End: 2022-01-04
Payer: MEDICARE

## 2022-01-04 ENCOUNTER — HOSPITAL ENCOUNTER (OUTPATIENT)
Dept: LAB | Age: 57
Discharge: HOME | End: 2022-01-04
Payer: MEDICARE

## 2022-01-04 VITALS
SYSTOLIC BLOOD PRESSURE: 112 MMHG | DIASTOLIC BLOOD PRESSURE: 66 MMHG | OXYGEN SATURATION: 95 % | WEIGHT: 169 LBS | HEIGHT: 61 IN | HEART RATE: 90 BPM | BODY MASS INDEX: 31.91 KG/M2

## 2022-01-04 DIAGNOSIS — M51.36 DDD (DEGENERATIVE DISC DISEASE), LUMBAR: ICD-10-CM

## 2022-01-04 DIAGNOSIS — I10 ESSENTIAL HYPERTENSION: ICD-10-CM

## 2022-01-04 DIAGNOSIS — M96.1 LUMBAR POST-LAMINECTOMY SYNDROME: ICD-10-CM

## 2022-01-04 DIAGNOSIS — F14.10 COCAINE ABUSE (HCC): ICD-10-CM

## 2022-01-04 DIAGNOSIS — F31.81 BIPOLAR 2 DISORDER (HCC): ICD-10-CM

## 2022-01-04 DIAGNOSIS — N20.0 RECURRENT KIDNEY STONES: ICD-10-CM

## 2022-01-04 DIAGNOSIS — E78.2 HYPERCHOLESTEROLEMIA WITH HYPERTRIGLYCERIDEMIA: ICD-10-CM

## 2022-01-04 DIAGNOSIS — M54.16 LUMBAR RADICULITIS: Primary | ICD-10-CM

## 2022-01-04 DIAGNOSIS — M47.817 LUMBOSACRAL SPONDYLOSIS WITHOUT MYELOPATHY: ICD-10-CM

## 2022-01-04 LAB
ALBUMIN SERPL-MCNC: 4.3 G/DL (ref 3.5–5.2)
ALBUMIN/GLOBULIN RATIO: 2 (ref 1–2.5)
ALP BLD-CCNC: 68 U/L (ref 35–104)
ALT SERPL-CCNC: 14 U/L (ref 5–33)
ANION GAP SERPL CALCULATED.3IONS-SCNC: 10 MMOL/L (ref 9–17)
AST SERPL-CCNC: 16 U/L
BILIRUB SERPL-MCNC: 0.23 MG/DL (ref 0.3–1.2)
BUN BLDV-MCNC: 14 MG/DL (ref 6–20)
BUN/CREAT BLD: 17 (ref 9–20)
CALCIUM SERPL-MCNC: 9.4 MG/DL (ref 8.6–10.4)
CHLORIDE BLD-SCNC: 106 MMOL/L (ref 98–107)
CHOLESTEROL/HDL RATIO: 4.6
CHOLESTEROL: 260 MG/DL
CO2: 26 MMOL/L (ref 20–31)
CREAT SERPL-MCNC: 0.83 MG/DL (ref 0.5–0.9)
GFR AFRICAN AMERICAN: >60 ML/MIN
GFR NON-AFRICAN AMERICAN: >60 ML/MIN
GFR SERPL CREATININE-BSD FRML MDRD: ABNORMAL ML/MIN/{1.73_M2}
GFR SERPL CREATININE-BSD FRML MDRD: ABNORMAL ML/MIN/{1.73_M2}
GLUCOSE BLD-MCNC: 115 MG/DL (ref 70–99)
HDLC SERPL-MCNC: 56 MG/DL
LDL CHOLESTEROL: 164 MG/DL (ref 0–130)
POTASSIUM SERPL-SCNC: 4.3 MMOL/L (ref 3.7–5.3)
SODIUM BLD-SCNC: 142 MMOL/L (ref 135–144)
TOTAL PROTEIN: 6.5 G/DL (ref 6.4–8.3)
TRIGL SERPL-MCNC: 198 MG/DL
VLDLC SERPL CALC-MCNC: ABNORMAL MG/DL (ref 1–30)

## 2022-01-04 PROCEDURE — 4004F PT TOBACCO SCREEN RCVD TLK: CPT | Performed by: PHYSICAL MEDICINE & REHABILITATION

## 2022-01-04 PROCEDURE — 74018 RADEX ABDOMEN 1 VIEW: CPT

## 2022-01-04 PROCEDURE — 99215 OFFICE O/P EST HI 40 MIN: CPT | Performed by: PHYSICAL MEDICINE & REHABILITATION

## 2022-01-04 PROCEDURE — 36415 COLL VENOUS BLD VENIPUNCTURE: CPT

## 2022-01-04 PROCEDURE — 80061 LIPID PANEL: CPT

## 2022-01-04 PROCEDURE — G8484 FLU IMMUNIZE NO ADMIN: HCPCS | Performed by: PHYSICAL MEDICINE & REHABILITATION

## 2022-01-04 PROCEDURE — 99214 OFFICE O/P EST MOD 30 MIN: CPT | Performed by: PHYSICAL MEDICINE & REHABILITATION

## 2022-01-04 PROCEDURE — 99215 OFFICE O/P EST HI 40 MIN: CPT

## 2022-01-04 PROCEDURE — G8427 DOCREV CUR MEDS BY ELIG CLIN: HCPCS | Performed by: PHYSICAL MEDICINE & REHABILITATION

## 2022-01-04 PROCEDURE — 3017F COLORECTAL CA SCREEN DOC REV: CPT | Performed by: PHYSICAL MEDICINE & REHABILITATION

## 2022-01-04 PROCEDURE — 80053 COMPREHEN METABOLIC PANEL: CPT

## 2022-01-04 PROCEDURE — G8417 CALC BMI ABV UP PARAM F/U: HCPCS | Performed by: PHYSICAL MEDICINE & REHABILITATION

## 2022-01-04 NOTE — PROGRESS NOTES
PAIN MANAGEMENTNEW PATIENT CONSULTATION  1/4/22    Sagar Daniels  1965    ReferringProvider:  Sanaz Mesa DO  [facility],  [address]    Primary Care Physician:  DO Medina Veliz [address]  [facility] [address]    HPIinformation obtained from the patient as well as the Comprehensive Pain ManagementHealth Questionnaire filled out by the patient. The questionnaire will be scannedinto the electronic chart and PMH, PSH, meds, and allergies will be updates accordingly. Subjective:       CHIEF COMPLAINT:  This is a53 y.o. female patientwho presents with a complaint of New Patient (referred by dr. David Oseguera for lower back and abdominal pain.)      PAIN HPI:    Abdominal and  Lower back pain   Made better with  Cymbalta and  Ibuprofen. Had to stop  Ibuprofen   Due to  Problems with kidney flank pain Feels  Post abdominal   Ventral hernia  Repair   With mesh Not much better  And feels the abdomen muscles not as strong  Has tingling  Down L leg to knee    states may have to adjust off cymbalta and concerned will cause  More  pain     Location: Back  Location Modifier: Left  Severity of Pain: 2  Duration of Pain: Intermittent  Frequency of Pain: Intermittent  Aggravating Factors: Stretching, Bending, Straightening, Standing, Walking, Stairs, Exercise, Kneeling and Squatting  Limiting Behavior: no  Relieving Factors: Heat, Cold and Medication -  Result of Injury: no  Work Related Injury: no  [place] of Worker Compensation Case: no      Prior evaluation:    Previous lower back problems:No    Previous workup: No   History of surgery in painful area:No    Previous pain medication trials have included:       Opioids: -     NSAID's:-     Muscle relaxants: -     Neuropathicpain meds: -    Previous Pain Management Physician: No   Nerve blocks, spinal injections:No   Typeof Pain Intervention -.     Date of last Pain Intervention  January /2030   Was there pain relief from Pain Intervention: No.    How long was your pain relief from the Pain Intervention . Sleep:       Difficultyfalling asleep:  No   Takes sleepingmedication: No    Mental health:    Patient feels depression, anxiety and stress secondary to their current pain problems as described above. H/O depression and anxiety: Yes   Patient is seeing psychologist orpsychiatrist   Abuse history? Yes cocaine others     Employed? No  Alcohol use?: No  Tobacco use?: No  Marijuana use?: No  Illicit drug use?: No    Imaging: Reviewed available imagingin our system with the patient. No results found. Referring physician records reviewed. Review of Systems    Allergies   Allergen Reactions    Flomax [Tamsulosin Hcl] Swelling     Swelling of arms; however, also had rash and fever at the same time and was admitted at the time    Morphine Itching and Rash     Rash, itching    Saphris [Asenapine]     Bactrim [Sulfamethoxazole-Trimethoprim] Diarrhea and Nausea Only    Prednisone Other (See Comments)     Emotional changes, depression    Zyprexa [Olanzapine] Other (See Comments)     Made her feel like she wanted to \"jump out of my skin\"       Outpatient Medications Prior to Visit   Medication Sig Dispense Refill    REXULTI 1 MG TABS tablet take 1 tablet by mouth once daily      ondansetron (ZOFRAN ODT) 4 MG disintegrating tablet Take 1 tablet by mouth every 8 hours as needed for Nausea 40 tablet 1    fexofenadine (ALLEGRA) 180 MG tablet Take 1 tablet by mouth daily 90 tablet 3    omeprazole (PRILOSEC) 20 MG delayed release capsule Take 1 capsule by mouth 2 times daily 180 capsule 1    gabapentin (NEURONTIN) 100 MG capsule Take 1 capsule by mouth 3 times daily for 90 days.  90 capsule 2    albuterol sulfate  (90 Base) MCG/ACT inhaler Inhale 1-2 puffs into the lungs every 6 hours as needed for Wheezing or Shortness of Breath 3 Inhaler 1    fluticasone (FLONASE) 50 MCG/ACT nasal spray SPRAY TWO SPRAYS IN EACH NOSTRIL ONCE DAILY 3 Bottle 3    lisinopril (PRINIVIL;ZESTRIL) 40 MG tablet TAKE ONE TABLET BY MOUTH DAILY 90 tablet 3    amLODIPine (NORVASC) 10 MG tablet TAKE ONE TABLET BY MOUTH DAILY 90 tablet 3    levothyroxine (SYNTHROID) 100 MCG tablet TAKE ONE TABLET BY MOUTH DAILY 90 tablet 3    Cholecalciferol (VITAMIN D) 50 MCG (2000 UT) CAPS capsule Take 2,000 Units by mouth daily      Multiple Vitamins-Minerals (MULTIVITAMIN ADULT PO) Take 1 tablet by mouth daily      propranolol (INDERAL) 10 MG tablet Take 10 mg by mouth 3 times daily Indications: patient takes twice a day       DULoxetine (CYMBALTA) 30 MG extended release capsule Take 30 mg by mouth daily      DULoxetine (CYMBALTA) 60 MG extended release capsule Take 60 mg by mouth daily      lamoTRIgine (LAMICTAL) 200 MG tablet Take 200 mg by mouth daily       hydrOXYzine (VISTARIL) 25 MG capsule take 2 capsules by mouth every evening if needed      cyclobenzaprine (FLEXERIL) 10 MG tablet Take 1 tablet by mouth nightly as needed for Muscle spasms 10 tablet 0    ferrous sulfate (IRON 325) 325 (65 Fe) MG tablet Take 1 tablet by mouth daily (with breakfast) 90 tablet 1    promethazine-dextromethorphan (PROMETHAZINE-DM) 6.25-15 MG/5ML syrup Take 5 mLs by mouth nightly as needed for Cough 75 mL 0    atorvastatin (LIPITOR) 10 MG tablet TAKE ONE TABLET BY MOUTH DAILY 90 tablet 1    hydrOXYzine (ATARAX) 25 MG tablet Take 50 mg by mouth every 6 hours as needed for Anxiety        No facility-administered medications prior to visit. Past Medical History:   Diagnosis Date    Anxiety     [facility] 2014    L side - lumpectomy and radiation; no chemo (was recommended to take Tamoxifen, but with her smoking, she declined due to family h/o CVA already).  Seeing Dr. Estefania Grissom once yearly/    COPD (chronic obstructive pulmonary disease) ([facility]     Depression     Headache(784.0)     History of alcoholism ([facility]     sober since 1/16/19    Hypertension     Hypothyroidism     Kidney stone     Has had lithotripsy x 1; had ureteral stent placement with removal x 1; had seen Dr. Stephon Specner Panic attack 04/15/2021    PTSD (post-traumatic stress disorder)        Past Surgical History:   Procedure Laterality Date    APPENDECTOMY  1977    BREAST BIOPSY Right 2015    excisional biopsy - Dr. David Vo Left     breast CA - [facility]      COLONOSCOPY  2019    polyps[facility] Guerline [facility] N/A 2021    mild diverticulosis, tubular adenoma x1; Dr. Jayda Mead at [facility]      multiple in her 19's   [address]      x 2 in her youth - was \"cross-eyed\"    FRACTURE SURGERY Left     hand    HERNIA REPAIR  2020    recurrent incisional hernia repair Dr. Se Colunga N/A 2020    Laparoscopic Robotic Assisted Ventral Hernia Repair performed by Cheryle Mills MD at [address]      Dr. Kapadia Diss      Dr. 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 Trevor [facility]oudhary at [facility] - used mesh; had revision in 2020    VENTRAL HERNIA REPAIR N/A 10/21/2020    Open VENTRAL Hernia Repair w/ mesh & Component separation technique performed by Cheryle Mills MD at [facility] OR       Family History   Problem Relation Age of Onset    High Blood Pressure Mother     Lupus Mother          from CHF secondary to cardiomyopathy from her lupus    Heart Failure Mother     Atrial Fibrillation Mother     High Blood Pressure Father     Prostate Cancer Father         age 54;  11 years later of a \"bladder tumor\" that caused bladder rupture (pt unsure if malignancy)    Other Maternal Grandmother         had \"stomach tumor\"    Heart Attack Maternal Grandfather          at age 40    Stroke Paternal Grandfather         in his 42's; multiple     Social History     Socioeconomic History    Marital status:      Spouse name: None    Number of children: 1    Years of education: 15    Highest education level: Bachelor's degree (e.g., BA, AB, BS)   Occupational History    Occupation:    Tobacco Use    Smoking status: Current Every Day Smoker     Packs/day: 1.00     Years: 37.00     Pack years: 37.00     Types: Cigarettes     Start date: 1982    Smokeless tobacco: Never Used    Tobacco comment: Will see PCP when ready to quit. Vaping Use    Vaping Use: Never used   Substance and Sexual Activity    Alcohol use: No     Comment: Follows with [facility]    Drug use: Not Currently     Comment: Follows with [facility]    Sexual activity: Not Currently     Partners: Male   Other Topics Concern    None   Social History Narrative    11/13/2020- she follows with [facility], 15 step-[employer] member, tobacco abuse- counseled, she has used food pantries in the past, she is applying for [employer], transportation by friends but has used [employer] previously. 5/12/2021- unchanged from above except- she has applied for disability. Social Determinants of Health     Financial Resource Strain:     Difficulty of Paying Living Expenses: Not on file   Food Insecurity:     Worried About Running Out of Food in the Last Year: Not on file    Amanda of Food in the Last Year: Not on file   Transportation Needs:     Lack of Transportation (Medical): Not on file    Lack of Transportation (Non-Medical):  Not on file   Physical Activity:     Days of Exercise per Week: Not on file    Minutes of Exercise per Session: Not on file   Stress:     Feeling of Stress : Not on file   Social Connections:     Frequency of Communication with Friends and Family: Not on file    Frequency of Social Gatherings with Friends and Family: Not on file    Attends [employer] Services: Not on file    Active Member of Clubs or Organizations: Not on file    Attends Club or Organization Meetings: Not on file    Marital Status: Not on file   Intimate Partner Violence:     Fear of Current or Ex-Partner: Not on file    Emotionally Abused: Not on file    Physically Abused: Not on file    Sexually Abused: Not on file   Housing Stability:     Unable to Pay for Housing in the Last Year: Not on file    Number of [place] in the Last Year: Not on file    Unstable Housing in the Last Year: Not on file         Objective:     Physical Exam:  Vitals:    01/04/22 0936   BP: 112/66   Site: Right Upper Arm   Position: Sitting   Cuff Size: Medium Adult   Pulse: 90   SpO2: 95%   Weight: 169 lb (76.7 kg)   Height: 5' 1\" (1.549 m)          Physical Exam  Constitutional:       General: She is not in acute distress. Appearance: Normal appearance. She is well-developed. She is not diaphoretic. HENT:      Head: Normocephalic and atraumatic. Right Ear: External ear normal. No decreased hearing noted. Left Ear: External ear normal. No decreased hearing noted. Nose: Nose normal.   Eyes:      General: Lids are normal. No scleral icterus. Conjunctiva/sclera: Conjunctivae normal.   Neck:      Trachea: Phonation normal.   Cardiovascular:      Comments: No BLE edema present  Pulmonary:      Effort: Pulmonary effort is normal. No accessory muscle usage or respiratory distress. Abdominal:      General: Abdomen is flat. Palpations: Abdomen is soft. Genitourinary:     Comments: deferred  Musculoskeletal:      Lumbar back: Negative right straight leg raise test and negative left straight leg raise test.   Skin:     General: Skin is warm and dry. Coloration: Skin is not pale. Findings: No erythema or rash. Neurological:      General: No focal deficit present. Mental Status: She is alert and oriented to person, place, and time.    Psychiatric:         Mood and Affect: Mood normal.         Speech: Speech normal.         Behavior: Behavior normal.         Back Exam     Tenderness   The patient is experiencing tenderness in the lumbar (+ left slump  - fabers  mild kemps). Range of Motion   Extension: normal   Flexion: normal   Lateral bend right: normal   Lateral bend left: normal   Rotation right: normal   Rotation left: normal     Muscle Strength   Right Quadriceps:  5/5   Left Quadriceps:  5/5   Right Hamstrings:  5/5   Left Hamstrings:  5/5     Tests   Straight leg raise right: negative  Straight leg raise left: negative    Other   Toe walk: normal  Heel walk: normal  Sensation: normal  Gait: normal              Labs:   Lab Results   Component Value Date    WBC 9.5 12/27/2021    HGB 13.7 12/27/2021    HCT 40.7 12/27/2021     12/27/2021    CHOL 210 (H) 04/26/2021    TRIG 268 (H) 04/26/2021    HDL 51 04/26/2021    ALT 14 01/04/2022    AST 16 01/04/2022     01/04/2022    K 4.3 01/04/2022     01/04/2022    CREATININE 0.83 01/04/2022    BUN 14 01/04/2022    CO2 26 01/04/2022    TSH 1.41 03/01/2021    LABA1C 5.5 04/16/2021       Assessment: This is a 55 y.o. female with the following diagnosis:     Pain Diagnoses:  1. Lumbar radiculitis    2. Lumbar post-laminectomy syndrome    3. Lumbosacral spondylosis without myelopathy    4. Bipolar 2 disorder ([facility]    5. DDD (degenerative disc disease), lumbar    6. Cocaine abuse [facility])        Medical/ Psychological Comorbidities:  As listed in the past medical and surgical history    Functional Limitations secondary to the above problems:  Chronic painlimits function and quality of life    Plan:   Schedule LL4, L5 TFE    Hold  On treatment   Neck arm that patient described  Can discuss in future    hold on medications  Pain for  Now due to  Significant  Drug abuse and  Mood disorder  History  Said discussed  SCS in past will hold   doubt  New  Lumbar spine diagnosis that needs surgery can consider  Lumbar MRI    1.      Meds:   New Prescriptions    No medications on file      No orders of the defined types were placed in this encounter. Controlled Substances Monitoring:    [place]AR[place] report was reviewed for [place], [place]. Pt educated about the risks of taking opiates, including increasedsedation, constipation, slowed breathing, tolerance, dependence, and addiction. Orders Placed This Encounter   Procedures    XR LUMBAR SPINE (MIN 4 VIEWS)     Standing Status:   Future     Standing Expiration Date:   1/4/2023     Order Specific Question:   Reason for exam:     Answer:   lumbar radiculopathy     No follow-ups on file. The patient expressed understanding of the above assessment and plan. Totaltime spent face to face with patient was 30 minutes inwhich  50% or more of the time was spent in counseling, education about risk andbenefits of the above plan, and coordination of care.

## 2022-01-04 NOTE — PATIENT INSTRUCTIONS
[facility]  Farhana Paniagua.[address]  Telephone [phone]  Fax [phone]      PROCEDURE INSTRUCTIONS FOR  PAIN MANAGEMENT PROCEDURES WITHOUT IV SEDATION    Amrita Filler scheduled to see Dr. Jamin Fischer to undergo the following procedure:  Left L4 and L5 TFE    Procedure Date: ____1/13/21____  You will receive a phone call the day prior to your procedure to confirm a time of arrival.    Report to the [place] [id number], Registration office on the 1st floor in the hospital, after check in and signing of paper work you will then go to the second floor to the surgery center. 1. Stop the following medications prior to the procedure:    NONE  Stop blood thinners as directed before the injection, with permission from your cardiologist or primary care physician. We will send a letter to them requesting permission to hold the blood thinners. If you take Warfarin (Coumadin), you must have your blood drawn for an INR the day before the procedure. INR must be less than 1.5. if not complete prior to check in the procedure this will be drawn at the procedure center prior to having the procedure completed. 2.  Take all routine medications unless otherwise instructed. Ok to take vitamins and antiinflammatory medications    3. EATING & DRINKING:  Ok to eat or drink before the injection - no IV sedation will be used. 4. If you are allergic to contrast or iodine, you must take benadryl and prednisone prior to the injection to prevent an allergic reaction. Follow the directions on the prescription for the times to take the medication. 5. Oral valium can be prescribed if your are anxious about the injections or feel that you can not lay still during the injection. If you take valium, you must have a . Make arrangements for a family member or friend to drive you to the surgery center. Your ride must stay in the hospital while you are having the injection done.  If they

## 2022-01-20 DIAGNOSIS — M54.16 LUMBAR RADICULITIS: Primary | ICD-10-CM

## 2022-01-20 RX ORDER — DIAZEPAM 5 MG/1
TABLET ORAL
Qty: 2 TABLET | Refills: 0 | OUTPATIENT
Start: 2022-01-20 | End: 2022-02-03

## 2022-01-20 NOTE — TELEPHONE ENCOUNTER
Patient called very nervous about up coming (tomorrow) procedure. She would like valium to take. Medication pinned and FAMILIA is pharmacy. Thank you!

## 2022-01-21 ENCOUNTER — APPOINTMENT (OUTPATIENT)
Dept: GENERAL RADIOLOGY | Age: 57
End: 2022-01-21
Attending: PHYSICAL MEDICINE & REHABILITATION
Payer: MEDICARE

## 2022-01-21 ENCOUNTER — APPOINTMENT (OUTPATIENT)
Dept: GENERAL RADIOLOGY | Age: 57
End: 2022-01-21
Payer: MEDICARE

## 2022-01-21 ENCOUNTER — HOSPITAL ENCOUNTER (OUTPATIENT)
Age: 57
Setting detail: OUTPATIENT SURGERY
Discharge: HOME | End: 2022-01-21
Attending: PHYSICAL MEDICINE & REHABILITATION | Admitting: PHYSICAL MEDICINE & REHABILITATION
Payer: MEDICARE

## 2022-01-21 ENCOUNTER — HOSPITAL ENCOUNTER (EMERGENCY)
Age: 57
Discharge: HOME | End: 2022-01-22
Attending: SPECIALIST
Payer: MEDICARE

## 2022-01-21 VITALS
HEIGHT: 61 IN | SYSTOLIC BLOOD PRESSURE: 124 MMHG | OXYGEN SATURATION: 100 % | BODY MASS INDEX: 32.21 KG/M2 | HEART RATE: 83 BPM | WEIGHT: 170.6 LBS | RESPIRATION RATE: 16 BRPM | TEMPERATURE: 98 F | DIASTOLIC BLOOD PRESSURE: 68 MMHG

## 2022-01-21 DIAGNOSIS — E87.6 HYPOKALEMIA: ICD-10-CM

## 2022-01-21 DIAGNOSIS — R79.89 LOW THYROID STIMULATING HORMONE (TSH) LEVEL: ICD-10-CM

## 2022-01-21 DIAGNOSIS — E86.0 DEHYDRATION: Primary | ICD-10-CM

## 2022-01-21 DIAGNOSIS — R00.2 PALPITATIONS: ICD-10-CM

## 2022-01-21 PROBLEM — M54.16 LUMBAR RADICULOPATHY: Status: ACTIVE | Noted: 2022-01-21

## 2022-01-21 PROBLEM — M47.816 LUMBAR SPONDYLOSIS: Status: ACTIVE | Noted: 2022-01-21

## 2022-01-21 LAB
-: ABNORMAL
ABSOLUTE EOS #: <0.03 K/UL (ref 0–0.44)
ABSOLUTE IMMATURE GRANULOCYTE: 0.08 K/UL (ref 0–0.3)
ABSOLUTE LYMPH #: 1.09 K/UL (ref 1.1–3.7)
ABSOLUTE MONO #: 0.1 K/UL (ref 0.1–1.2)
AMORPHOUS: ABNORMAL
ANION GAP SERPL CALCULATED.3IONS-SCNC: 12 MMOL/L (ref 9–17)
BACTERIA: ABNORMAL
BASOPHILS # BLD: 0 % (ref 0–2)
BASOPHILS ABSOLUTE: <0.03 K/UL (ref 0–0.2)
BILIRUBIN URINE: NEGATIVE
BNP INTERPRETATION: NORMAL
BUN BLDV-MCNC: 11 MG/DL (ref 6–20)
BUN/CREAT BLD: 14 (ref 9–20)
CALCIUM SERPL-MCNC: 10.3 MG/DL (ref 8.6–10.4)
CASTS UA: ABNORMAL /LPF (ref 0–2)
CHLORIDE BLD-SCNC: 102 MMOL/L (ref 98–107)
CO2: 24 MMOL/L (ref 20–31)
COLOR: ABNORMAL
COMMENT UA: ABNORMAL
CREAT SERPL-MCNC: 0.77 MG/DL (ref 0.5–0.9)
CRYSTALS, UA: ABNORMAL /HPF
D-DIMER QUANTITATIVE: 0.34 MG/L FEU (ref 0–0.59)
DIFFERENTIAL TYPE: ABNORMAL
EOSINOPHILS RELATIVE PERCENT: 0 % (ref 1–4)
EPITHELIAL CELLS UA: ABNORMAL /HPF (ref 0–5)
GFR AFRICAN AMERICAN: >60 ML/MIN
GFR NON-AFRICAN AMERICAN: >60 ML/MIN
GFR SERPL CREATININE-BSD FRML MDRD: ABNORMAL ML/MIN/{1.73_M2}
GFR SERPL CREATININE-BSD FRML MDRD: ABNORMAL ML/MIN/{1.73_M2}
GLUCOSE BLD-MCNC: 123 MG/DL (ref 70–99)
GLUCOSE URINE: ABNORMAL
HCT VFR BLD CALC: 38.1 % (ref 36.3–47.1)
HEMOGLOBIN: 13 G/DL (ref 11.9–15.1)
IMMATURE GRANULOCYTES: 1 %
KETONES, URINE: ABNORMAL
LEUKOCYTE ESTERASE, URINE: NEGATIVE
LYMPHOCYTES # BLD: 12 % (ref 24–43)
MCH RBC QN AUTO: 30.6 PG (ref 25.2–33.5)
MCHC RBC AUTO-ENTMCNC: 34.1 G/DL (ref 25.2–33.5)
MCV RBC AUTO: 89.6 FL (ref 82.6–102.9)
MONOCYTES # BLD: 1 % (ref 3–12)
MUCUS: ABNORMAL
NITRITE, URINE: NEGATIVE
NRBC AUTOMATED: 0 PER 100 WBC
OTHER OBSERVATIONS UA: ABNORMAL
PDW BLD-RTO: 12.7 % (ref 11.8–14.4)
PH UA: 6 (ref 5–6)
PLATELET # BLD: 259 K/UL (ref 138–453)
PLATELET ESTIMATE: ABNORMAL
PMV BLD AUTO: 8.3 FL (ref 8.1–13.5)
POTASSIUM SERPL-SCNC: 3.6 MMOL/L (ref 3.7–5.3)
PRO-BNP: 71 PG/ML
PROTEIN UA: NEGATIVE
RBC # BLD: 4.25 M/UL (ref 3.95–5.11)
RBC # BLD: ABNORMAL 10*6/UL
RBC UA: ABNORMAL /HPF (ref 0–4)
RENAL EPITHELIAL, UA: ABNORMAL /HPF
SEG NEUTROPHILS: 86 % (ref 36–65)
SEGMENTED NEUTROPHILS ABSOLUTE COUNT: 7.58 K/UL (ref 1.5–8.1)
SODIUM BLD-SCNC: 138 MMOL/L (ref 135–144)
SPECIFIC GRAVITY UA: 1.02 (ref 1.01–1.02)
TRICHOMONAS: ABNORMAL
TROPONIN INTERP: NORMAL
TROPONIN T: NORMAL NG/ML
TROPONIN, HIGH SENSITIVITY: 6 NG/L (ref 0–14)
TSH SERPL DL<=0.05 MIU/L-ACNC: 0.12 MIU/L (ref 0.3–5)
TURBIDITY: ABNORMAL
URINE HGB: NEGATIVE
UROBILINOGEN, URINE: NORMAL
WBC # BLD: 8.9 K/UL (ref 3.5–11.3)
WBC # BLD: ABNORMAL 10*3/UL
WBC UA: ABNORMAL /HPF (ref 0–4)
YEAST: ABNORMAL

## 2022-01-21 PROCEDURE — 84443 ASSAY THYROID STIM HORMONE: CPT

## 2022-01-21 PROCEDURE — 64484 NJX AA&/STRD TFRM EPI L/S EA: CPT | Performed by: PHYSICAL MEDICINE & REHABILITATION

## 2022-01-21 PROCEDURE — 6360000002 HC RX W HCPCS: Performed by: PHYSICAL MEDICINE & REHABILITATION

## 2022-01-21 PROCEDURE — 2580000003 HC RX 258: Performed by: SPECIALIST

## 2022-01-21 PROCEDURE — 3209999900 FLUORO FOR SURGICAL PROCEDURES

## 2022-01-21 PROCEDURE — 3600000056 HC PAIN LEVEL 4 BASE: Performed by: PHYSICAL MEDICINE & REHABILITATION

## 2022-01-21 PROCEDURE — A4216 STERILE WATER/SALINE, 10 ML: HCPCS | Performed by: PHYSICAL MEDICINE & REHABILITATION

## 2022-01-21 PROCEDURE — 6370000000 HC RX 637 (ALT 250 FOR IP): Performed by: SPECIALIST

## 2022-01-21 PROCEDURE — 84484 ASSAY OF TROPONIN QUANT: CPT

## 2022-01-21 PROCEDURE — 80048 BASIC METABOLIC PNL TOTAL CA: CPT

## 2022-01-21 PROCEDURE — 83880 ASSAY OF NATRIURETIC PEPTIDE: CPT

## 2022-01-21 PROCEDURE — 64483 NJX AA&/STRD TFRM EPI L/S 1: CPT | Performed by: PHYSICAL MEDICINE & REHABILITATION

## 2022-01-21 PROCEDURE — 7100000010 HC PHASE II RECOVERY - FIRST 15 MIN: Performed by: PHYSICAL MEDICINE & REHABILITATION

## 2022-01-21 PROCEDURE — 71045 X-RAY EXAM CHEST 1 VIEW: CPT

## 2022-01-21 PROCEDURE — 84439 ASSAY OF FREE THYROXINE: CPT

## 2022-01-21 PROCEDURE — 81001 URINALYSIS AUTO W/SCOPE: CPT

## 2022-01-21 PROCEDURE — 6360000004 HC RX CONTRAST MEDICATION: Performed by: PHYSICAL MEDICINE & REHABILITATION

## 2022-01-21 PROCEDURE — 2500000003 HC RX 250 WO HCPCS: Performed by: PHYSICAL MEDICINE & REHABILITATION

## 2022-01-21 PROCEDURE — 36415 COLL VENOUS BLD VENIPUNCTURE: CPT

## 2022-01-21 PROCEDURE — 85025 COMPLETE CBC W/AUTO DIFF WBC: CPT

## 2022-01-21 PROCEDURE — 85379 FIBRIN DEGRADATION QUANT: CPT

## 2022-01-21 PROCEDURE — 96360 HYDRATION IV INFUSION INIT: CPT

## 2022-01-21 PROCEDURE — 2709999900 HC NON-CHARGEABLE SUPPLY: Performed by: PHYSICAL MEDICINE & REHABILITATION

## 2022-01-21 PROCEDURE — 93005 ELECTROCARDIOGRAM TRACING: CPT | Performed by: SPECIALIST

## 2022-01-21 PROCEDURE — 2580000003 HC RX 258: Performed by: PHYSICAL MEDICINE & REHABILITATION

## 2022-01-21 PROCEDURE — 99284 EMERGENCY DEPT VISIT MOD MDM: CPT

## 2022-01-21 RX ORDER — DEXAMETHASONE SODIUM PHOSPHATE 10 MG/ML
INJECTION INTRAMUSCULAR; INTRAVENOUS PRN
Status: DISCONTINUED | OUTPATIENT
Start: 2022-01-21 | End: 2022-01-21 | Stop reason: ALTCHOICE

## 2022-01-21 RX ORDER — IBUPROFEN 600 MG/1
600 TABLET ORAL ONCE
Status: COMPLETED | OUTPATIENT
Start: 2022-01-21 | End: 2022-01-21

## 2022-01-21 RX ORDER — SODIUM CHLORIDE 9 MG/ML
INJECTION INTRAVENOUS PRN
Status: DISCONTINUED | OUTPATIENT
Start: 2022-01-21 | End: 2022-01-21 | Stop reason: ALTCHOICE

## 2022-01-21 RX ORDER — ASPIRIN 81 MG/1
324 TABLET, CHEWABLE ORAL ONCE
Status: COMPLETED | OUTPATIENT
Start: 2022-01-21 | End: 2022-01-21

## 2022-01-21 RX ORDER — 0.9 % SODIUM CHLORIDE 0.9 %
500 INTRAVENOUS SOLUTION INTRAVENOUS ONCE
Status: COMPLETED | OUTPATIENT
Start: 2022-01-21 | End: 2022-01-22

## 2022-01-21 RX ADMIN — SODIUM CHLORIDE 500 ML: 0.9 INJECTION, SOLUTION INTRAVENOUS at 23:41

## 2022-01-21 RX ADMIN — ASPIRIN 81 MG 324 MG: 81 TABLET ORAL at 22:46

## 2022-01-21 RX ADMIN — IBUPROFEN 600 MG: 600 TABLET ORAL at 22:46

## 2022-01-21 ASSESSMENT — PAIN - FUNCTIONAL ASSESSMENT: PAIN_FUNCTIONAL_ASSESSMENT: 0-10

## 2022-01-21 ASSESSMENT — PAIN DESCRIPTION - ORIENTATION: ORIENTATION: LOWER

## 2022-01-21 ASSESSMENT — PAIN DESCRIPTION - DESCRIPTORS
DESCRIPTORS: CONSTANT
DESCRIPTORS: CONSTANT;BURNING;SHARP

## 2022-01-21 ASSESSMENT — PAIN DESCRIPTION - LOCATION: LOCATION: BACK

## 2022-01-21 ASSESSMENT — PAIN DESCRIPTION - PAIN TYPE: TYPE: CHRONIC PAIN;SURGICAL PAIN

## 2022-01-21 ASSESSMENT — PAIN SCALES - GENERAL
PAINLEVEL_OUTOF10: 4
PAINLEVEL_OUTOF10: 8
PAINLEVEL_OUTOF10: 7

## 2022-01-21 ASSESSMENT — PAIN DESCRIPTION - ONSET: ONSET: ON-GOING

## 2022-01-21 ASSESSMENT — PAIN DESCRIPTION - FREQUENCY: FREQUENCY: CONTINUOUS

## 2022-01-21 NOTE — INTERVAL H&P NOTE
I have interviewed and examined the patient and reviewed the recent History and Physical.  There have been no changes to the recent H&P documentation. The surgical consent form has been signed. Last anticoagulant medication use was:na    Premedication taken for contrast allergy? No    Valium taken for oral sedation? No    Outpatient Medications Marked as Taking for the 1/21/22 encounter [facility] Encounter)   Medication Sig Dispense Refill    diazePAM (VALIUM) 5 MG tablet One tablet 12 prior to the procedure and take one tablet 2 hours procedure. 2 tablet 0    REXULTI 1 MG TABS tablet take 1 tablet by mouth once daily      ondansetron (ZOFRAN ODT) 4 MG disintegrating tablet Take 1 tablet by mouth every 8 hours as needed for Nausea 40 tablet 1    fexofenadine (ALLEGRA) 180 MG tablet Take 1 tablet by mouth daily 90 tablet 3    omeprazole (PRILOSEC) 20 MG delayed release capsule Take 1 capsule by mouth 2 times daily 180 capsule 1    gabapentin (NEURONTIN) 100 MG capsule Take 1 capsule by mouth 3 times daily for 90 days.  90 capsule 2    albuterol sulfate  (90 Base) MCG/ACT inhaler Inhale 1-2 puffs into the lungs every 6 hours as needed for Wheezing or Shortness of Breath 3 Inhaler 1    fluticasone (FLONASE) 50 MCG/ACT nasal spray SPRAY TWO SPRAYS IN EACH NOSTRIL ONCE DAILY 3 Bottle 3    lisinopril (PRINIVIL;ZESTRIL) 40 MG tablet TAKE ONE TABLET BY MOUTH DAILY 90 tablet 3    amLODIPine (NORVASC) 10 MG tablet TAKE ONE TABLET BY MOUTH DAILY 90 tablet 3    levothyroxine (SYNTHROID) 100 MCG tablet TAKE ONE TABLET BY MOUTH DAILY 90 tablet 3    Cholecalciferol (VITAMIN D) 50 MCG (2000 UT) CAPS capsule Take 2,000 Units by mouth daily      Multiple Vitamins-Minerals (MULTIVITAMIN ADULT PO) Take 1 tablet by mouth daily      propranolol (INDERAL) 10 MG tablet Take 10 mg by mouth 3 times daily Indications: patient takes twice a day       hydrOXYzine (ATARAX) 25 MG tablet Take 50 mg by mouth every 6 hours as needed for Anxiety       DULoxetine (CYMBALTA) 30 MG extended release capsule Take 30 mg by mouth daily      DULoxetine (CYMBALTA) 60 MG extended release capsule Take 60 mg by mouth daily      lamoTRIgine (LAMICTAL) 200 MG tablet Take 200 mg by mouth daily          The patient understands the planned operation and its associated risks and benefits and agrees to proceed.         Electronically signed by Faith Mendieta MD on 1/21/2022 at 10:42 AM

## 2022-01-21 NOTE — H&P
PAIN MANAGEMENTNEW PATIENT CONSULTATION  1/4/22    Peguero Nett  1965    ReferringProvider:  No referring provider defined for this encounter. Primary Care Physician:  DO Medina [address]  [place] [address]    HPIinformation obtained from the patient as well as the Comprehensive Pain ManagementHealth Questionnaire filled out by the patient. The questionnaire will be scannedinto the electronic chart and PMH, PSH, meds, and allergies will be updates accordingly. Subjective:       CHIEF COMPLAINT:  This is a53 y.o. female patientwho presents with a complaint of No chief complaint on file. PAIN HPI:    Abdominal and  Lower back pain   Made better with  Cymbalta and  Ibuprofen. Had to stop  Ibuprofen   Due to  Problems with kidney flank pain Feels  Post abdominal   Ventral hernia  Repair   With mesh Not much better  And feels the abdomen muscles not as strong  Has tingling  Down L leg to knee    states may have to adjust off cymbalta and concerned will cause  More  pain     Location: Back  Location Modifier: Left  Severity of Pain: 2  Duration of Pain: Intermittent  Frequency of Pain: Intermittent  Aggravating Factors: Stretching, Bending, Straightening, Standing, Walking, Stairs, Exercise, Kneeling and Squatting  Limiting Behavior: no  Relieving Factors: Heat, Cold and Medication -  Result of Injury: no  Work Related Injury: no  [place] of Worker Compensation Case: no      Prior evaluation:    Previous lower back problems:No    Previous workup: No   History of surgery in painful area:No    Previous pain medication trials have included:       Opioids: -     NSAID's:-     Muscle relaxants: -     Neuropathicpain meds: -    Previous Pain Management Physician: No   Nerve blocks, spinal injections:No   Typeof Pain Intervention -.     Date of last Pain Intervention  January /2030   Was there pain relief from Pain Intervention: No.    How long was your pain relief from the Pain Intervention .  Sleep:       Difficultyfalling asleep:  No   Takes sleepingmedication: No    Mental health:    Patient feels depression, anxiety and stress secondary to their current pain problems as described above. H/O depression and anxiety: Yes   Patient is seeing psychologist orpsychiatrist   Abuse history? Yes cocaine others     Employed? No  Alcohol use?: No  Tobacco use?: No  Marijuana use?: No  Illicit drug use?: No    Imaging: Reviewed available imagingin our system with the patient. No results found. Referring physician records reviewed. Review of Systems    Allergies   Allergen Reactions    Flomax [Tamsulosin Hcl] Swelling     Swelling of arms; however, also had rash and fever at the same time and was admitted at the time    Morphine Itching and Rash     Rash, itching    Saphris [Asenapine]     Bactrim [Sulfamethoxazole-Trimethoprim] Diarrhea and Nausea Only    Prednisone Other (See Comments)     Emotional changes, depression    Zyprexa [Olanzapine] Other (See Comments)     Made her feel like she wanted to \"jump out of my skin\"       Outpatient Medications Prior to Visit   Medication Sig Dispense Refill    REXULTI 1 MG TABS tablet take 1 tablet by mouth once daily      ondansetron (ZOFRAN ODT) 4 MG disintegrating tablet Take 1 tablet by mouth every 8 hours as needed for Nausea 40 tablet 1    fexofenadine (ALLEGRA) 180 MG tablet Take 1 tablet by mouth daily 90 tablet 3    omeprazole (PRILOSEC) 20 MG delayed release capsule Take 1 capsule by mouth 2 times daily 180 capsule 1    gabapentin (NEURONTIN) 100 MG capsule Take 1 capsule by mouth 3 times daily for 90 days.  90 capsule 2    albuterol sulfate  (90 Base) MCG/ACT inhaler Inhale 1-2 puffs into the lungs every 6 hours as needed for Wheezing or Shortness of Breath 3 Inhaler 1    fluticasone (FLONASE) 50 MCG/ACT nasal spray SPRAY TWO SPRAYS IN EACH NOSTRIL ONCE DAILY 3 Bottle 3    lisinopril (PRINIVIL;ZESTRIL) 40 MG tablet TAKE ONE TABLET BY MOUTH DAILY 90 tablet 3    amLODIPine (NORVASC) 10 MG tablet TAKE ONE TABLET BY MOUTH DAILY 90 tablet 3    levothyroxine (SYNTHROID) 100 MCG tablet TAKE ONE TABLET BY MOUTH DAILY 90 tablet 3    Cholecalciferol (VITAMIN D) 50 MCG (2000 UT) CAPS capsule Take 2,000 Units by mouth daily      Multiple Vitamins-Minerals (MULTIVITAMIN ADULT PO) Take 1 tablet by mouth daily      propranolol (INDERAL) 10 MG tablet Take 10 mg by mouth 3 times daily Indications: patient takes twice a day       DULoxetine (CYMBALTA) 30 MG extended release capsule Take 30 mg by mouth daily      DULoxetine (CYMBALTA) 60 MG extended release capsule Take 60 mg by mouth daily      lamoTRIgine (LAMICTAL) 200 MG tablet Take 200 mg by mouth daily       hydrOXYzine (VISTARIL) 25 MG capsule take 2 capsules by mouth every evening if needed      cyclobenzaprine (FLEXERIL) 10 MG tablet Take 1 tablet by mouth nightly as needed for Muscle spasms 10 tablet 0    ferrous sulfate (IRON 325) 325 (65 Fe) MG tablet Take 1 tablet by mouth daily (with breakfast) 90 tablet 1    promethazine-dextromethorphan (PROMETHAZINE-DM) 6.25-15 MG/5ML syrup Take 5 mLs by mouth nightly as needed for Cough 75 mL 0    atorvastatin (LIPITOR) 10 MG tablet TAKE ONE TABLET BY MOUTH DAILY 90 tablet 1    hydrOXYzine (ATARAX) 25 MG tablet Take 50 mg by mouth every 6 hours as needed for Anxiety        No facility-administered medications prior to visit. Past Medical History:   Diagnosis Date    Anxiety     Breast cancer ([facility] 2014    L side - lumpectomy and radiation; no chemo (was recommended to take Tamoxifen, but with her smoking, she declined due to family h/o CVA already).  Seeing Dr. Marivel Matta once yearly/    [facility]     Depression     Headache(784.0)     History of alcoholism ([facility]     sober since 1/16/19    Hypertension     Hypothyroidism     Kidney stone     Has had lithotripsy x 1; had ureteral stent placement with removal x 1; had seen Dr. Gill Machado Panic attack 04/15/2021    PTSD (post-traumatic stress disorder)        Past Surgical History:   Procedure Laterality Date    APPENDECTOMY  1977    BREAST BIOPSY Right 2015    excisional biopsy - Dr. Jonathon Bliss Left     breast CA - done at [facility]      COLONOSCOPY  2019    polyps, [facility] Connie [facility] N/A 2021    mild diverticulosis, tubular adenoma x1; Dr. Danae Jimenez at [facility] in her 19's   [address]      x 2 in her youth - was \"cross-eyed\"    FRACTURE SURGERY Left     hand    HERNIA REPAIR  2020    recurrent incisional hernia repair Dr. Renae Freeman N/A 2020    Laparoscopic Robotic Assisted Ventral Hernia Repair performed by Landon Ott MD at [address]      Dr. Wilmer Sorto      Dr. 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 Joao Drake at [facility] - used mesh; had revision in 2020    VENTRAL HERNIA REPAIR N/A 10/21/2020    Open VENTRAL Hernia Repair w/ mesh & Component separation technique performed by Landon Ott MD at [facility] OR       Family History   Problem Relation Age of Onset    High Blood Pressure Mother     Lupus Mother          from CHF secondary to cardiomyopathy from her lupus    Heart Failure Mother     Atrial Fibrillation Mother     High Blood Pressure Father     Prostate Cancer Father         age 54;  11 years later of a \"bladder tumor\" that caused bladder rupture (pt unsure if malignancy)    Other Maternal Grandmother         had \"stomach tumor\"    Heart Attack Maternal Grandfather          at age 40    Stroke Paternal Grandfather         in his 42's; multiple     Social History     Socioeconomic History    Marital status:      Spouse name: None    Number of children: 1    Years of education: 14    Highest education level: Bachelor's degree (e.g., BA, AB, BS)   Occupational History    Occupation:    Tobacco Use    Smoking status: Current Every Day Smoker     Packs/day: 1.00     Years: 37.00     Pack years: 37.00     Types: Cigarettes     Start date: 1982    Smokeless tobacco: Never Used    Tobacco comment: Will see PCP when ready to quit. Vaping Use    Vaping Use: Never used   Substance and Sexual Activity    Alcohol use: No     Comment: Follows with Recovery [facility]    Drug use: Not Currently     Comment: Follows with [facility]    Sexual activity: Not Currently     Partners: Male   Other Topics Concern    None   Social History Narrative    11/13/2020- she follows with [facility], 15 step-AA member, tobacco abuse- counseled, she has used food pantries in the past, she is applying for [employer], transportation by friends but has used [employer] previously. 5/12/2021- unchanged from above except- she has applied for disability. Social Determinants of Health     Financial Resource Strain:     Difficulty of Paying Living Expenses: Not on file   Food Insecurity:     Worried About Running Out of Food in the Last Year: Not on file    Amanda of Food in the Last Year: Not on file   Transportation Needs:     Lack of Transportation (Medical): Not on file    Lack of Transportation (Non-Medical):  Not on file   Physical Activity:     Days of Exercise per Week: Not on file    Minutes of Exercise per Session: Not on file   Stress:     Feeling of Stress : Not on file   Social Connections:     Frequency of Communication with Friends and Family: Not on file    Frequency of Social Gatherings with Friends and Family: Not on file    Attends [employer] Services: Not on file    Active Member of Clubs or Organizations: Not on file    Attends Club or Organization Meetings: Not on file    Marital Status: Not on file   Intimate Partner Violence:     Fear of Current or Ex-Partner: Not on file    Emotionally Abused: Not on file    Physically Abused: Not on file    Sexually Abused: Not on file   Housing Stability:     Unable to Pay for Housing in the Last Year: Not on file    Number of [place] in the Last Year: Not on file    Unstable Housing in the Last Year: Not on file         Objective:     Physical Exam:  There were no vitals filed for this visit. Physical Exam  Constitutional:       General: She is not in acute distress. Appearance: Normal appearance. She is well-developed. She is not diaphoretic. HENT:      Head: Normocephalic and atraumatic. Right Ear: External ear normal. No decreased hearing noted. Left Ear: External ear normal. No decreased hearing noted. Nose: Nose normal.   Eyes:      General: Lids are normal. No scleral icterus. Conjunctiva/sclera: Conjunctivae normal.   Neck:      Trachea: Phonation normal.   Cardiovascular:      Comments: No BLE edema present  Pulmonary:      Effort: Pulmonary effort is normal. No accessory muscle usage or respiratory distress. Abdominal:      General: Abdomen is flat. Palpations: Abdomen is soft. Genitourinary:     Comments: deferred  Musculoskeletal:      Lumbar back: Negative right straight leg raise test and negative left straight leg raise test.   Skin:     General: Skin is warm and dry. Coloration: Skin is not pale. Findings: No erythema or rash. Neurological:      General: No focal deficit present. Mental Status: She is alert and oriented to person, place, and time. Psychiatric:         Mood and Affect: Mood normal.         Speech: Speech normal.         Behavior: Behavior normal.         Back Exam     Tenderness   The patient is experiencing tenderness in the lumbar (+ left slump  - fabers  mild kemps).     Range of Motion   Extension: normal   Flexion: normal   Lateral bend right: normal   Lateral bend left: normal   Rotation right: normal   Rotation left: normal     Muscle Strength   Right Quadriceps:  5/5   Left Quadriceps:  5/5   Right Hamstrings:  5/5   Left Hamstrings:  5/5     Tests   Straight leg raise right: negative  Straight leg raise left: negative    Other   Toe walk: normal  Heel walk: normal  Sensation: normal  Gait: normal              Labs:   Lab Results   Component Value Date    WBC 9.5 12/27/2021    HGB 13.7 12/27/2021    HCT 40.7 12/27/2021     12/27/2021    CHOL 260 (H) 01/04/2022    TRIG 198 (H) 01/04/2022    HDL 56 01/04/2022    ALT 14 01/04/2022    AST 16 01/04/2022     01/04/2022    K 4.3 01/04/2022     01/04/2022    CREATININE 0.83 01/04/2022    BUN 14 01/04/2022    CO2 26 01/04/2022    TSH 1.41 03/01/2021    LABA1C 5.5 04/16/2021       Assessment: This is a 55 y.o. female with the following diagnosis:     Pain Diagnoses:  No diagnosis found. Medical/ Psychological Comorbidities:  As listed in the past medical and surgical history    Functional Limitations secondary to the above problems:  Chronic painlimits function and quality of life    Plan:   Schedule LL4, L5 TFE    Hold  On treatment   Neck arm that patient described  Can discuss in future    hold on medications  Pain for  Now due to  Significant  Drug abuse and  Mood disorder  History  Said discussed  SCS in past will hold   doubt  New  Lumbar spine diagnosis that needs surgery can consider  Lumbar MRI    1. Meds:   Current Discharge Medication List         No orders of the defined types were placed in this encounter. Controlled Substances Monitoring:    [place]AR[place] report was reviewed for [place], [place]. Pt educated about the risks of taking opiates, including increasedsedation, constipation, slowed breathing, tolerance, dependence, and addiction.     Orders Placed This Encounter   Procedures    FLUORO FOR SURGICAL PROCEDURES     Standing

## 2022-01-21 NOTE — OP NOTE
TRANSFORAMINAL EPIDURAL STEROID INJECTION    1/21/22  The patient was counseled at length about the risks of radha Covid-19 during their perioperative period and any recovery window from their procedure. The patient was made aware that radha Covid-19  may worsen their prognosis for recovering from their procedure  and lend to a higher morbidity and/or mortality risk. All material risks, benefits, and reasonable alternatives including postponing the procedure were discussed. The patient does wish to proceed with the procedure at this time. Surgeon: Reid Salcido MD    Pre-operative Diagnosis:   Hospital Problems           Last Modified POA    * (Principal) Lumbar radiculopathy 1/21/2022 Yes    Lumbar spondylosis 1/21/2022 Yes          Post-operative Diagnosis: Same    Assistants: none    This is a 55 y.o. female patient with pain in the Back, left leg. Previous treatment and examination findings are noted in the H&P. LL4,5 transforaminal epidural injection has been requested for diagnostic and therapeutic reasons. Conservative treatment was ineffective i.e.: ice, NSAIDS, rest, narcotic medication, chiropractic care, physical therapy and message therapy. Patient is unable to perform the following ADL's: ambulating and grooming                         Last Plain films: 2020      EXAMINATION:  1. LL4,5 transforaminal radiculogram/epidurogram.   2. LL4,5 transforaminal epidural anesthetic injection. 3. LL4,5 transforaminal epidural steroid injection. CONSENT: Written consent was obtained from the patient on preprinted consent form after explaining the procedure, indications, potential complications and outcomes. Alternative treatments were also discussed. DISCUSSION: The patient was sterilely prepped and draped in the usual fashion in the prone position. Time out was verified for correct patient, side, level and procedure.     SEDATION:   No conscious sedation was performed during the procedure. The patient remained awake and conversed throughout the procedure. The patient underwent pulse oximetry and blood pressure monitoring independently by a trained observer, as well as by a physician. PROCEDURE:  Under image-intensifier control, a 22 gauge needle x 5 inch spinal needle was guided successfully into the epidural space employing a posterior lateral/oblique approach. Needle aspiration was negative for heme or CSF. Instillation of  .5 mL of Omnipaque 240 contrast medium opacified the spinal nerve and demonstrated contiguous flow into the  LL 4 epidural space. No vascular spread was noted. Digital subtraction was not employed to evaluate for vascular spread. The patient was monitored for any untoward reaction to contrast medium before proceeding with procedure #2. The patient did not report pain reproduction in a concordant distribution. Following needle position verification, a test dose of .5 mL of sterile lidocaine 0.5% was administered and patient monitored for any adverse effects. Then, 1 mL of   was instilled into the epidural space and the patient's response was again monitored. Finally, Dexamethasone (Decadron 10 mg/mL) 1 ml of 0.5% bupivacaine was then instilled. The patient's response was again monitored. The spinal needle was removed. Instillation of  .5 mL of Omnipaque 240 contrast medium opacified the spinal nerve and demonstrated contiguous flow into the  LL 5 epidural space. No vascular spread was noted. Digital subtraction was not employed to evaluate for vascular spread. The patient was monitored for any untoward reaction to contrast medium before proceeding with procedure #2. The patient did not report pain reproduction in a concordant distribution. Following needle position verification, a test dose of .5 mL of sterile lidocaine 0.5% was administered and patient monitored for any adverse effects.  Then, 1 mL of   was instilled into the epidural space and the patient's response was again monitored. Finally, Dexamethasone (Decadron 10 mg/mL) 1 ml of 0.5% bupivacaine was then instilled. The patient's response was again monitored. The spinal needle was removed. The patient tolerated the procedure well and without complications and was noted to be in stable condition prior to discharge from the procedure center with discharge instructions. EBL: no blood loss    SPECIMEN: none    IMPRESSIONS:  1. LL4,5 transforaminal epidurogram, epidural anesthesia and epidural steroid injection procedures accomplished without incident. RECOMMENDATIONS:  1. Complete and return Post-Procedure Pain and Activity Diary.   2. Contact the [address] for symptom exacerbation, fever or unusual symptoms. 3. Post-procedure care according to verbal and written discharge instructions    POST-PROCEDURE EPIDUROGRAPHY INTERPRETATION:    EXAMINATION: AP, lateral, and oblique views    FLUORO TIME: 21 seconds    DISCUSSION: Spot views of the spine reveal normal alignment and segmentation. Spinal needle is positioned at the LL4,5 neuroforamin. Contrast spreads and outlines the LL4,5 nerve/ neuroforamin and epidural space. The epidurogram reveals excellent contrast flow. Visualized spine reveals See radiology report. Soft tissues reveal no abnormalities. IMPRESSION: LL4,5 transforaminal epidurogram/epineurogram reveals satisfactory needle position and contrast spread.      Electronically signed by Balbina Ingram MD on 1/21/2022 at 10:43 AM

## 2022-01-22 VITALS
OXYGEN SATURATION: 97 % | SYSTOLIC BLOOD PRESSURE: 110 MMHG | HEART RATE: 101 BPM | TEMPERATURE: 98.2 F | RESPIRATION RATE: 15 BRPM | DIASTOLIC BLOOD PRESSURE: 57 MMHG

## 2022-01-22 LAB
THYROXINE, FREE: 1.45 NG/DL (ref 0.93–1.7)
TROPONIN INTERP: NORMAL
TROPONIN T: NORMAL NG/ML
TROPONIN, HIGH SENSITIVITY: 7 NG/L (ref 0–14)

## 2022-01-22 PROCEDURE — 6370000000 HC RX 637 (ALT 250 FOR IP)

## 2022-01-22 PROCEDURE — 6370000000 HC RX 637 (ALT 250 FOR IP): Performed by: SPECIALIST

## 2022-01-22 PROCEDURE — 36415 COLL VENOUS BLD VENIPUNCTURE: CPT

## 2022-01-22 PROCEDURE — 2580000003 HC RX 258: Performed by: SPECIALIST

## 2022-01-22 PROCEDURE — 84484 ASSAY OF TROPONIN QUANT: CPT

## 2022-01-22 PROCEDURE — 93005 ELECTROCARDIOGRAM TRACING: CPT | Performed by: SPECIALIST

## 2022-01-22 RX ORDER — POTASSIUM CHLORIDE 20 MEQ/1
20 TABLET, EXTENDED RELEASE ORAL 2 TIMES DAILY
Status: DISCONTINUED | OUTPATIENT
Start: 2022-01-22 | End: 2022-01-22 | Stop reason: HOSPADM

## 2022-01-22 RX ORDER — HYDROCODONE BITARTRATE AND ACETAMINOPHEN 5; 325 MG/1; MG/1
2 TABLET ORAL ONCE
Status: COMPLETED | OUTPATIENT
Start: 2022-01-22 | End: 2022-01-22

## 2022-01-22 RX ORDER — 0.9 % SODIUM CHLORIDE 0.9 %
500 INTRAVENOUS SOLUTION INTRAVENOUS ONCE
Status: COMPLETED | OUTPATIENT
Start: 2022-01-22 | End: 2022-01-22

## 2022-01-22 RX ADMIN — SODIUM CHLORIDE 500 ML: 9 INJECTION, SOLUTION INTRAVENOUS at 00:51

## 2022-01-22 RX ADMIN — HYDROCODONE BITARTRATE AND ACETAMINOPHEN 2 TABLET: 5; 325 TABLET ORAL at 01:39

## 2022-01-22 RX ADMIN — POTASSIUM CHLORIDE 20 MEQ: 20 TABLET, EXTENDED RELEASE ORAL at 00:51

## 2022-01-22 ASSESSMENT — ENCOUNTER SYMPTOMS
DIARRHEA: 0
COUGH: 0
NAUSEA: 0
ABDOMINAL PAIN: 0
SHORTNESS OF BREATH: 0
BLOOD IN STOOL: 0
SORE THROAT: 0

## 2022-01-22 ASSESSMENT — PAIN SCALES - GENERAL: PAINLEVEL_OUTOF10: 8

## 2022-01-22 NOTE — ED PROVIDER NOTES
[address]      Pt Name: Magdaleno Herndon  MRN: [id number]  [place] 1965  Date of evaluation: 1/21/2022      CHIEF COMPLAINT       Chief Complaint   Patient presents with    Tachycardia     Pt ambulates to ED c/o tachycardia and shaking since approx 1800. Pt states she received decadron shot in L4/L5 region today at pain management clinic at 1100. [address] OF PRESENT ILLNESS    Magdaleno Herndon is a 55 y.o. female who presents to the emergency department for evaluation of feeling shaky and having palpitations in the form of heart pounding sensation since 6 PM prior to arrival.  She also felt heaviness in the left arm and also brief heaviness in the chest at about 9 PM prior to arrival.  She denies any shortness of breath, lightheadedness or diaphoresis. She admits to having dizziness. Patient has history of chronic back pain and received Decadron injection in the back at about 11 AM and was told that this may cause her to become tachycardic. She denies any swelling in the legs or calf pain. She denies any history of cardiac arrhythmias. She denies any abdominal pain, vomiting or diarrhea and no history of fever or chills. She also denies any urinary frequency, urgency, dysuria or hematuria. She has history of hypothyroidism and is taking her Synthroid on a regular basis. She denies any recent long travels or prolonged immobilization and no history of DVT or PE in the past.      REVIEW OF SYSTEMS       Review of Systems   Constitutional: Negative for chills, diaphoresis and fever. HENT: Negative for congestion and sore throat. Respiratory: Negative for cough and shortness of breath. Cardiovascular: Positive for palpitations. Negative for leg swelling. Gastrointestinal: Negative for abdominal pain, blood in stool, diarrhea and nausea. Genitourinary: Negative for dysuria and frequency.    Musculoskeletal: Negative for myalgias and neck pain. Skin: Negative for rash. Neurological: Positive for dizziness. Negative for syncope and light-headedness. All other systems reviewed and are negative. PAST MEDICAL HISTORY    has a past medical history of Anxiety, Breast cancer ([facility] [address] [address] COPD (chronic obstructive pulmonary disease) ([facility] Depression, Headache(784.0), History of alcoholism ([facility] Hypertension, Hypothyroidism, Kidney stone, Panic attack, and PTSD (post-traumatic stress disorder). SURGICAL HISTORY      has a past surgical history that includes Eye surgery; tumor removal (1999); Appendectomy (1977); Cholecystectomy (2014); Tubal ligation (2001); lumbar laminectomy (2006); Dilation and curettage of uterus; Breast lumpectomy (Left, 2014); Breast biopsy (Right, 2015); alcon and bso (cervix removed) (2015); ventral hernia repair (2019); hernia repair (01/2020); fracture surgery (Left); hernia repair (N/A, 7/22/2020); ventral hernia repair (N/A, 10/21/2020); Colonoscopy (2019); and Colonoscopy (N/A, 5/11/2021). CURRENT MEDICATIONS       Discharge Medication List as of 1/22/2022  1:29 AM      CONTINUE these medications which have NOT CHANGED    Details   diazePAM (VALIUM) 5 MG tablet One tablet 12 prior to the procedure and take one tablet 2 hours procedure., Disp-2 tablet, R-0Phone In      REXULTI 1 MG TABS tablet take 1 tablet by mouth once daily, DAWHistorical Med      ondansetron (ZOFRAN ODT) 4 MG disintegrating tablet Take 1 tablet by mouth every 8 hours as needed for Nausea, Disp-40 tablet, R-1Normal      fexofenadine (ALLEGRA) 180 MG tablet Take 1 tablet by mouth daily, Disp-90 tablet, R-3Normal      omeprazole (PRILOSEC) 20 MG delayed release capsule Take 1 capsule by mouth 2 times daily, Disp-180 capsule, R-1Normal      gabapentin (NEURONTIN) 100 MG capsule Take 1 capsule by mouth 3 times daily for 90 days. , Disp-90 capsule, R-2Normal      cyclobenzaprine (FLEXERIL) 10 MG tablet Take 1 tablet by mouth nightly as [sulfamethoxazole-trimethoprim], prednisone, and zyprexa [olanzapine]. FAMILY HISTORY     She indicated that her mother is . She indicated that her father is . She indicated that her sister is alive. She indicated that her maternal grandmother is . She indicated that her maternal grandfather is . She indicated that her paternal grandmother is . She indicated that her paternal grandfather is . family history includes Atrial Fibrillation in her mother; Heart Attack in her maternal grandfather; Heart Failure in her mother; High Blood Pressure in her father and mother; Lupus in her mother; Other in her maternal grandmother; Prostate Cancer in her father; Stroke in her paternal grandfather. SOCIAL HISTORY      reports that she has been smoking cigarettes. She started smoking about 40 years ago. She has a 37.00 pack-year smoking history. She has never used smokeless tobacco. She reports previous drug use. She reports that she does not drink alcohol. PHYSICAL EXAM     INITIAL VITALS:  tympanic temperature is 98.2 °F (36.8 °C). Her blood pressure is 110/57 (abnormal) and her pulse is 101. Her respiration is 15 and oxygen saturation is 97%. Physical Exam  Vitals and nursing note reviewed. Constitutional:       Appearance: She is well-developed. HENT:      Head: Normocephalic and atraumatic. Nose: Nose normal.   Eyes:      Extraocular Movements: Extraocular movements intact. Pupils: Pupils are equal, round, and reactive to light. Cardiovascular:      Rate and Rhythm: Regular rhythm. Tachycardia present. Heart sounds: Normal heart sounds. No murmur heard. Pulmonary:      Effort: Pulmonary effort is normal. No respiratory distress. Breath sounds: Normal breath sounds. Abdominal:      General: Bowel sounds are normal. There is no distension. Palpations: Abdomen is soft. Tenderness: There is no abdominal tenderness. Musculoskeletal:      Cervical back: Normal range of motion and neck supple. Skin:     General: Skin is warm and dry. Neurological:      General: No focal deficit present. Mental Status: She is alert and oriented to person, place, and time. DIFFERENTIAL DIAGNOSIS/ MDM:     ACS, cardiac arrhythmia, anxiety, dehydration, hyperthyroidism, pulmonary embolism, electrolyte imbalance, UTI, pneumonia, bacteremia    DIAGNOSTIC RESULTS     EKG: All EKG's are interpreted by the Emergency Department Physician who either signs or Co-signs this chart in the absence of a cardiologist.    EKG Interpretation    Interpreted by emergency department physician    Rhythm: Sinus rhythm, but Tachycardic  Rate: Tachycardic  Axis: normal  Conduction: normal  ST Segments: nonspecific change  T Waves: no acute change  Q Waves: no acute change    Clinical Impression: Sinus Tachycardia. EKG Interpretation    Interpreted by emergency department physician    Rhythm: normal sinus   Rate: normal  Axis: normal  Conduction: prolonged QT  ST Segments: no acute change  T Waves: no acute change  Q Waves: no acute change    Clinical Impression: no acute change, but this is a nonspecific EKG. RADIOLOGY:   Interpretation per the Radiologist below, if available at the time of this note:    XR CHEST PORTABLE    Result Date: 1/21/2022  EXAMINATION: ONE XRAY VIEW OF THE CHEST 1/21/2022 10:34 pm COMPARISON: 04/27/2021 HISTORY: ORDERING SYSTEM PROVIDED HISTORY: Chest pain TECHNOLOGIST PROVIDED HISTORY: Chest pain Reason for Exam: Tachycardia FINDINGS: Chest radiograph: The cardiomediastinal silhouette and hilar contours are normal. The lungs are clear with no focal consolidation, pleural effusion or pneumothorax. The overlying soft tissue and osseous structures do not demonstrate acute abnormality. No acute intrathoracic pathology. FLUORO FOR SURGICAL PROCEDURES    Result Date: 1/21/2022  Radiology exam is complete.  No Radiologist dictation. Please follow up with ordering provider. ED BEDSIDE ULTRASOUND:       LABS:  Labs Reviewed   CBC WITH AUTO DIFFERENTIAL - Abnormal; Notable for the following components:       Result Value    MCHC 34.1 (*)     Seg Neutrophils 86 (*)     Lymphocytes 12 (*)     Monocytes 1 (*)     Eosinophils % 0 (*)     Immature Granulocytes 1 (*)     Absolute Lymph # 1.09 (*)     All other components within normal limits   BASIC METABOLIC PANEL W/ REFLEX TO MG FOR LOW K - Abnormal; Notable for the following components:    Glucose 123 (*)     Potassium 3.6 (*)     All other components within normal limits   TSH WITH REFLEX - Abnormal; Notable for the following components:    TSH 0.12 (*)     All other components within normal limits   URINE RT REFLEX TO CULTURE - Abnormal; Notable for the following components:    Glucose, Ur 2+ (*)     Ketones, Urine TRACE (*)     All other components within normal limits   MICROSCOPIC URINALYSIS - Abnormal; Notable for the following components:    Crystals, UA 10 TO 25 CALCIUM OXALATE (*)     All other components within normal limits   TROPONIN   BRAIN NATRIURETIC PEPTIDE   D-DIMER, QUANTITATIVE   TROPONIN   T4, FREE       Patient has mild hypokalemia, trace ketones in the urine and low TSH.     EMERGENCY DEPARTMENT COURSE:   Vitals:    Vitals:    01/22/22 0025 01/22/22 0045 01/22/22 0055 01/22/22 0115   BP: 116/60 116/67 112/62 (!) 110/57   Pulse: 99 103 101 101   Resp: 18 25 19 15   Temp:       TempSrc:       SpO2: 96% 95% 96% 97%     -------------------------  BP: (!) 110/57, Temp: 98.2 °F (36.8 °C), Pulse: 101, Resp: 15    Orders Placed This Encounter   Medications    0.9 % sodium chloride bolus    ibuprofen (ADVIL;MOTRIN) tablet 600 mg    aspirin chewable tablet 324 mg    DISCONTD: potassium chloride (KLOR-CON M) extended release tablet 20 mEq    0.9 % sodium chloride bolus    HYDROcodone-acetaminophen (NORCO) 5-325 MG per tablet 2 tablet     To go home with the patient       During the emergency department course, patient was given normal saline 1 L bolus and put on the monitor. Monitor initially revealed sinus tachycardia which gradually resolved with IV fluids. She was initially given Motrin 600 mg orally for back pain as well as aspirin 324 mg orally upon arrival.  During the stay she was also given potassium chloride 20 mEq orally prior to discharge. 2 sets of cardiac markers and EKGs are unremarkable. She prefers to go home as she is feeling much better. Patient was given 2 tablets of Norco to take home for her back pain. She is advised to continue current medications, plenty of oral fluids, follow-up with PCP for further evaluation of low TSH as patient may need lower dose of Synthroid. She is advised to call for appointment, return if symptoms recur or if she develops any new symptoms. The patient understands that at this time there is no evidence for a more malignant underlying process, but also understands that early in the process of an illness or injury, an emergency department workup can be falsely reassuring. Routine discharge counseling was given, and it is understood that worsening, changing or persistent symptoms should prompt an immediate call or follow up with their primary physician or return to the emergency department. The importance of appropriate follow up was also discussed. I have reviewed the disposition diagnosis. I have answered the questions and given discharge instructions. There was voiced understanding of these instructions and no further questions or complaints. I have reviewed the disposition diagnosis with the patient and or their family/guardian. I have answered their questions and given discharge instructions. They voiced understanding of these instructions and did not have any further questions or complaints. Re-evaluation Notes    Patient is feeling much better and resting comfortably prior to discharge. Her palpitations and feeling shaky are all resolved. She remained free of any chest pain or left arm pain. CRITICAL CARE:   None        CONSULTS:      PROCEDURES:  None    FINAL IMPRESSION      1. Dehydration    2. Hypokalemia    3. Palpitations    4. Low thyroid stimulating hormone (TSH) level          DISPOSITION/PLAN   DISPOSITION Decision To Discharge    Condition on Disposition    Improved    PATIENT REFERRED TO:  Dottie Vázquez DO  [address] [facility]  [phone]    Call in 2 days  For reevaluation of current symptoms    [facility] ED  [address]  [phone]    If symptoms worsen      DISCHARGE MEDICATIONS:  Discharge Medication List as of 1/22/2022  1:29 AM          (Please note that portions of this note were completed with a voice recognition program.  Efforts were made to edit the dictations but occasionally words are mis-transcribed.)    Sanna Turner MD,, MD, F.A.C.E.P.   Attending Emergency Physician                         Sanna Turner MD  01/22/22 2989

## 2022-01-24 ENCOUNTER — OFFICE VISIT (OUTPATIENT)
Dept: PAIN MANAGEMENT | Age: 57
End: 2022-01-24
Payer: MEDICARE

## 2022-01-24 VITALS — WEIGHT: 173 LBS | BODY MASS INDEX: 32.69 KG/M2 | DIASTOLIC BLOOD PRESSURE: 80 MMHG | SYSTOLIC BLOOD PRESSURE: 138 MMHG

## 2022-01-24 DIAGNOSIS — M54.16 LUMBAR RADICULITIS: Primary | ICD-10-CM

## 2022-01-24 DIAGNOSIS — E05.90 HYPERTHYROIDISM: Primary | ICD-10-CM

## 2022-01-24 DIAGNOSIS — D50.9 IRON DEFICIENCY ANEMIA, UNSPECIFIED IRON DEFICIENCY ANEMIA TYPE: Primary | ICD-10-CM

## 2022-01-24 DIAGNOSIS — E03.9 HYPOTHYROIDISM, UNSPECIFIED TYPE: ICD-10-CM

## 2022-01-24 DIAGNOSIS — E55.9 VITAMIN D DEFICIENCY: ICD-10-CM

## 2022-01-24 DIAGNOSIS — E78.2 HYPERCHOLESTEROLEMIA WITH HYPERTRIGLYCERIDEMIA: ICD-10-CM

## 2022-01-24 LAB
EKG ATRIAL RATE: 100 BPM
EKG ATRIAL RATE: 123 BPM
EKG P AXIS: 73 DEGREES
EKG P AXIS: 76 DEGREES
EKG P-R INTERVAL: 144 MS
EKG P-R INTERVAL: 166 MS
EKG Q-T INTERVAL: 334 MS
EKG Q-T INTERVAL: 384 MS
EKG QRS DURATION: 78 MS
EKG QRS DURATION: 84 MS
EKG QTC CALCULATION (BAZETT): 478 MS
EKG QTC CALCULATION (BAZETT): 495 MS
EKG R AXIS: 55 DEGREES
EKG R AXIS: 70 DEGREES
EKG T AXIS: 61 DEGREES
EKG T AXIS: 68 DEGREES
EKG VENTRICULAR RATE: 100 BPM
EKG VENTRICULAR RATE: 123 BPM

## 2022-01-24 PROCEDURE — G8427 DOCREV CUR MEDS BY ELIG CLIN: HCPCS | Performed by: PHYSICAL MEDICINE & REHABILITATION

## 2022-01-24 PROCEDURE — 3017F COLORECTAL CA SCREEN DOC REV: CPT | Performed by: PHYSICAL MEDICINE & REHABILITATION

## 2022-01-24 PROCEDURE — G8484 FLU IMMUNIZE NO ADMIN: HCPCS | Performed by: PHYSICAL MEDICINE & REHABILITATION

## 2022-01-24 PROCEDURE — 4004F PT TOBACCO SCREEN RCVD TLK: CPT | Performed by: PHYSICAL MEDICINE & REHABILITATION

## 2022-01-24 PROCEDURE — 99214 OFFICE O/P EST MOD 30 MIN: CPT | Performed by: PHYSICAL MEDICINE & REHABILITATION

## 2022-01-24 PROCEDURE — 99214 OFFICE O/P EST MOD 30 MIN: CPT

## 2022-01-24 PROCEDURE — G8417 CALC BMI ABV UP PARAM F/U: HCPCS | Performed by: PHYSICAL MEDICINE & REHABILITATION

## 2022-01-24 RX ORDER — HYDROCODONE BITARTRATE AND ACETAMINOPHEN 5; 325 MG/1; MG/1
1 TABLET ORAL NIGHTLY
Qty: 12 TABLET | Refills: 0 | Status: SHIPPED | OUTPATIENT
Start: 2022-01-24 | End: 2022-01-27

## 2022-01-24 NOTE — LETTER
[address] Pain Management A department of [place] [address]  Phone: [phone]  Fax: [phone]    Pinky Nicolas MD    January 24, 2022     [facility] [address]    Patient: Lex Palacio   MR Number: [id number]   YOB: 1965   Date of Visit: 1/24/2022       Dear Alma Iniguez: Thank you for referring Lex Palacio to me for evaluation/treatment. Below are the relevant portions of my assessment and plan of care. If you have questions, please do not hesitate to call me. I look forward to following Ed LOUIS along with you.     Sincerely,      Pinky Nicolas MD

## 2022-01-24 NOTE — PROGRESS NOTES
tablet Take 1 tablet by mouth every 8 hours as needed for Nausea 40 tablet 1    fexofenadine (ALLEGRA) 180 MG tablet Take 1 tablet by mouth daily 90 tablet 3    omeprazole (PRILOSEC) 20 MG delayed release capsule Take 1 capsule by mouth 2 times daily 180 capsule 1    gabapentin (NEURONTIN) 100 MG capsule Take 1 capsule by mouth 3 times daily for 90 days.  90 capsule 2    albuterol sulfate  (90 Base) MCG/ACT inhaler Inhale 1-2 puffs into the lungs every 6 hours as needed for Wheezing or Shortness of Breath 3 Inhaler 1    fluticasone (FLONASE) 50 MCG/ACT nasal spray SPRAY TWO SPRAYS IN EACH NOSTRIL ONCE DAILY 3 Bottle 3    lisinopril (PRINIVIL;ZESTRIL) 40 MG tablet TAKE ONE TABLET BY MOUTH DAILY 90 tablet 3    amLODIPine (NORVASC) 10 MG tablet TAKE ONE TABLET BY MOUTH DAILY 90 tablet 3    levothyroxine (SYNTHROID) 100 MCG tablet TAKE ONE TABLET BY MOUTH DAILY 90 tablet 3    Cholecalciferol (VITAMIN D) 50 MCG (2000 UT) CAPS capsule Take 2,000 Units by mouth daily      Multiple Vitamins-Minerals (MULTIVITAMIN ADULT PO) Take 1 tablet by mouth daily      propranolol (INDERAL) 10 MG tablet Take 10 mg by mouth 3 times daily Indications: patient takes twice a day       hydrOXYzine (ATARAX) 25 MG tablet Take 50 mg by mouth every 6 hours as needed for Anxiety       DULoxetine (CYMBALTA) 30 MG extended release capsule Take 30 mg by mouth daily      DULoxetine (CYMBALTA) 60 MG extended release capsule Take 60 mg by mouth daily      lamoTRIgine (LAMICTAL) 200 MG tablet Take 200 mg by mouth daily       cyclobenzaprine (FLEXERIL) 10 MG tablet Take 1 tablet by mouth nightly as needed for Muscle spasms 10 tablet 0    ferrous sulfate (IRON 325) 325 (65 Fe) MG tablet Take 1 tablet by mouth daily (with breakfast) 90 tablet 1    promethazine-dextromethorphan (PROMETHAZINE-DM) 6.25-15 MG/5ML syrup Take 5 mLs by mouth nightly as needed for Cough 75 mL 0    atorvastatin (LIPITOR) 10 MG tablet TAKE ONE TABLET BY MOUTH DAILY 90 tablet 1     No facility-administered medications prior to visit. Past Medical History:   Diagnosis Date    Anxiety     Breast cancer ([facility] 2014    L side - lumpectomy and radiation; no chemo (was recommended to take Tamoxifen, but with her smoking, she declined due to family h/o CVA already).  Seeing Dr. Narda Juárez once yearly/    COPD (chronic obstructive pulmonary disease) ([facility]     Depression     Headache(784.0)     History of alcoholism ([facility]     sober since 1/16/19    Hypertension     Hypothyroidism     Kidney stone     Has had lithotripsy x 1; had ureteral stent placement with removal x 1; had seen Dr. Murphy Magdaleno Panic attack 04/15/2021    PTSD (post-traumatic stress disorder)        Past Surgical History:   Procedure Laterality Date    APPENDECTOMY  1977    BREAST BIOPSY Right 2015    excisional biopsy - Dr. Christy Aguirre Left 2014    breast CA - done at [facility]  2014    COLONOSCOPY  2019    polyps, [facility]. Jaguar Schmitt [facility] N/A 5/11/2021    mild diverticulosis, tubular adenoma x1; Dr. Cosmo Rojas at [facility]      multiple in her 19's    EYE SURGERY      x 2 in her youth - was \"cross-eyed\"    FRACTURE SURGERY Left     hand    HERNIA REPAIR  01/2020    recurrent incisional hernia repair Dr. Leslie Dobbins N/A 7/22/2020    Laparoscopic Robotic Assisted Ventral Hernia Repair performed by Luisito [facility]ino [facility] at [facility] [address]  2006    Dr. Carolina Sanz Left 1/21/2022    Left L4 L5 TRANSFORAMINAL performed by Sharlene Gibson MD at [facility]  2015    Dr. 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 Arik Palacio at [facility] - used mesh; had revision in 1/2020    [facility] N/A 10/21/2020 Open VENTRAL Hernia Repair w/ mesh & Component separation technique performed by Gerry Daly MD at [facility] OR     Family History   Problem Relation Age of Onset    High Blood Pressure Mother     Lupus Mother          from CHF secondary to cardiomyopathy from her lupus    Heart Failure Mother     Atrial Fibrillation Mother     High Blood Pressure Father     Prostate Cancer Father         age 54;  11 years later of a \"bladder tumor\" that caused bladder rupture (pt unsure if malignancy)    Other Maternal Grandmother         had \"stomach tumor\"    Heart Attack Maternal Grandfather          at age 40    Stroke Paternal Grandfather         in his 42's; multiple     Social History     Socioeconomic History    Marital status:      Spouse name: None    Number of children: 1    Years of education: 15    Highest education level: Bachelor's degree (e.g., BA, AB, BS)   Occupational History    Occupation: [employer]   Tobacco Use    Smoking status: Current Every Day Smoker     Packs/day: 1.00     Years: 37.00     Pack years: 37.00     Types: Cigarettes     Start date:     Smokeless tobacco: Never Used    Tobacco comment: Will see PCP when ready to quit. Vaping Use    Vaping Use: Never used   Substance and Sexual Activity    Alcohol use: No     Comment: Follows with Recovery Services    Drug use: Not Currently     Comment: Follows with Recovery [facility]    Sexual activity: Not Currently     Partners: Male   Other Topics Concern    None   Social History Narrative    2020- she follows with [facility], 15 step-[employer] member, tobacco abuse- counseled, she has used food pantries in the past, she is applying for [employer], transportation by friends but has used [employer] previously. 2021- unchanged from above except- she has applied for disability.       Social Determinants of Health     Financial Resource Strain:     Difficulty of Paying Living Expenses: Not on file   Food Insecurity:     Worried About Running Out of Food in the Last Year: Not on file    Amanda of Food in the Last Year: Not on file   Transportation Needs:     Lack of Transportation (Medical): Not on file    Lack of Transportation (Non-Medical): Not on file   Physical Activity:     Days of Exercise per Week: Not on file    Minutes of Exercise per Session: Not on file   Stress:     Feeling of Stress : Not on file   Social Connections:     Frequency of Communication with Friends and Family: Not on file    Frequency of Social Gatherings with Friends and Family: Not on file    Attends Taoism Services: Not on file    Active Member of [address] [place] [address] or Organizations: Not on file    Attends Club or Organization Meetings: Not on file    Marital Status: Not on file   Intimate Partner Violence:     Fear of Current or Ex-Partner: Not on file    Emotionally Abused: Not on file    Physically Abused: Not on file    Sexually Abused: Not on file   Housing Stability:     Unable to Pay for Housing in the Last Year: Not on file    Number of [place] in the Last Year: Not on file    Unstable Housing in the Last Year: Not on file         Family and Social Historyreviewed in the [address] [facility] record. Imaging:Reviewed available imaging in our system with the patient. No results found. Objective:     Physical Exam:  Vitals:    01/24/22 1428   BP: 138/80   Weight: 173 lb (78.5 kg)          Physical Exam  Ortho Exam                          Research  has found that  Spine injections    reduce pain and  give  better functional  outcomes. Assessment: This is a 55 y.o. female patient with:    Diagnosis:   Diagnosis Orders   1. Lumbar radiculitis  HYDROcodone-acetaminophen (NORCO) 5-325 MG per tablet       Medical Comorbidities:  As listed in the patient's past medical and surgical history    Functional Limitations:   Pain limits function and quality of life.      Plan:     Lumbar MRI   Meds:   Controlled Substances Monitoring: Pt educated about the risks of taking opiates,including increased sedation, constipation, slowed breathing, tolerance, dependence,and addiction. New Prescriptions    HYDROCODONE-ACETAMINOPHEN (NORCO) 5-325 MG PER TABLET    Take 1 tablet by mouth nightly for 3 days. Orders Placed This Encounter   Medications    HYDROcodone-acetaminophen (NORCO) 5-325 MG per tablet     Sig: Take 1 tablet by mouth nightly for 3 days. Dispense:  12 tablet     Refill:  0     Reduce doses taken as pain becomes manageable     No orders of the defined types were placed in this encounter. Return if symptoms worsen or fail to improve. Opioid medication has  significant  risk  benefit concerns. We  instruct    our patients that  a Random Urine Drug Screen is required  along with a  an Opioid assessment questionaire such as ORT  or SOAPP  The patient expressed understanding of the above assessment and plan. Totaltime spent face to face with patient was 30 minutes inwhich  50% or more of the time was spent in counseling, education about risk andbenefits of the above plan, and coordination of care.

## 2022-01-24 NOTE — TELEPHONE ENCOUNTER
Patient states she was in ER on 1/23/22, and they found that her TSH levels were low, so the doctor recommended she discuss with her PCP adjusting the dose/stopping medication of thyroid medication. Please advise.

## 2022-01-24 NOTE — Clinical Note
[address] Pain Management A department of [place] [address]  Phone: [phone]  Fax: [phone]    Gwyndolyn Felty, MD        January 24, 2022     Patient: Nancy Ballard   YOB: 1965   Date of Visit: 1/24/2022       To Whom It May Concern: It is my medical opinion that Nancy Ballard {Work release (duty restriction):51207}. If you have any questions or concerns, please don't hesitate to call.     Sincerely,        Gwyndolyn Felty, MD

## 2022-01-25 NOTE — TELEPHONE ENCOUNTER
Please confirm that pt has been taking Synthroid as directed - only one 100 mcg tab daily. If so, is she having any symptoms of hyperthyroidism? If not, we can either keep her on the same dose, and re-check levels again in 6 weeks. Or we can decrease her Synthroid now to 100 mcg daily, except 1/2 tab (50 mcg) on Sunday's. If we change dose, we will not re-check levels for 10-12 weeks.

## 2022-01-25 NOTE — TELEPHONE ENCOUNTER
Pt sx are as follows: bloating, gained 3-5#, tired, weakness, palpitations, tachycardia, cold, generalized unwell. She has been taking 100mcg every day. Pt wanted you to review her sx before deciding to decrease dosage.

## 2022-01-26 NOTE — TELEPHONE ENCOUNTER
The only listed symptoms that would correlate with hyperthyroidism would be palpitations and tachycardia; the others would be more consistent with hypothyroidism. Since she is having some tachycardia, let's decrease her dose now (100 mcg daily, except 1/2 tab (50 mcg) on Sunday's), and re-check thyroid levels again in 10 weeks. Please update med list.    The other sx's may be unrelated to thyroid; she was recently checked for anemia, but Hgb was normal.  Unsure of bloating and weight gain could be diet-related or medication side effect. Will discuss sx's further at upcoming [facility] on 2/7.

## 2022-01-27 ENCOUNTER — NURSE TRIAGE (OUTPATIENT)
Dept: OTHER | Facility: CLINIC | Age: 57
End: 2022-01-27

## 2022-01-27 NOTE — TELEPHONE ENCOUNTER
Received call from Franklin Fischer at [facility] with [email or web address]. Subjective: Caller states \"had vertigo in the past with sinusitis\"     Current Symptoms: when I move my eyes get very dizzy, sinus pressure    Onset: 0 day ago; sudden    Associated Symptoms: NA    Pain Severity: 2/10; pressure; intermittent    Temperature: No     What has been tried: ibuprofen    LMP: No Pregnant: No    Recommended disposition: Go to ED/UCC now (or to office with PCP approval). 2nd level triage transferred to [facility] for further assistance    Care advice provided, patient verbalizes understanding; denies any other questions or concerns; instructed to call back for any new or worsening symptoms. Writer provided warm transfer to [facility] at [facility] for 2nd level triage     Attention Provider: Thank you for allowing me to participate in the care of your patient. The patient was connected to triage in response to information provided to the ECC/PSC. Please do not respond through this encounter as the response is not directed to a shared pool.           Reason for Disposition   Spinning or tilting sensation (vertigo) present now and one or more stroke risk factors (i.e., hypertension, diabetes mellitus, prior stroke/TIA, heart attack, age over 61) (Exception: prior physician evaluation for this AND no different/worse than usual)    Protocols used: DIZZINESS-ADULT-OH

## 2022-02-11 ENCOUNTER — HOSPITAL ENCOUNTER (OUTPATIENT)
Dept: MRI IMAGING | Age: 57
Discharge: HOME | End: 2022-02-13
Payer: MEDICARE

## 2022-02-11 ENCOUNTER — TELEPHONE (OUTPATIENT)
Dept: PAIN MANAGEMENT | Age: 57
End: 2022-02-11

## 2022-02-11 DIAGNOSIS — M54.16 LUMBAR RADICULITIS: ICD-10-CM

## 2022-02-11 PROCEDURE — 72148 MRI LUMBAR SPINE W/O DYE: CPT

## 2022-02-11 NOTE — TELEPHONE ENCOUNTER
Called no answer   Can  Call patient and see if wants to proceed with ordered procedure.   Thank You

## 2022-02-14 DIAGNOSIS — E03.9 HYPOTHYROIDISM, UNSPECIFIED TYPE: ICD-10-CM

## 2022-02-15 RX ORDER — LEVOTHYROXINE SODIUM 0.1 MG/1
100 TABLET ORAL DAILY
Qty: 90 TABLET | Refills: 0 | Status: SHIPPED | OUTPATIENT
Start: 2022-02-15 | End: 2022-05-13 | Stop reason: SDUPTHER

## 2022-02-18 ENCOUNTER — OFFICE VISIT (OUTPATIENT)
Dept: ORTHOPEDIC SURGERY | Age: 57
End: 2022-02-18
Payer: MEDICARE

## 2022-02-18 VITALS
BODY MASS INDEX: 32.66 KG/M2 | DIASTOLIC BLOOD PRESSURE: 59 MMHG | WEIGHT: 173 LBS | OXYGEN SATURATION: 95 % | SYSTOLIC BLOOD PRESSURE: 108 MMHG | HEIGHT: 61 IN | HEART RATE: 91 BPM

## 2022-02-18 DIAGNOSIS — M51.36 LUMBAR DEGENERATIVE DISC DISEASE: ICD-10-CM

## 2022-02-18 DIAGNOSIS — G89.29 CHRONIC MIDLINE LOW BACK PAIN WITH BILATERAL SCIATICA: ICD-10-CM

## 2022-02-18 DIAGNOSIS — M96.1 POST LAMINECTOMY SYNDROME: Primary | ICD-10-CM

## 2022-02-18 DIAGNOSIS — M54.41 CHRONIC MIDLINE LOW BACK PAIN WITH BILATERAL SCIATICA: ICD-10-CM

## 2022-02-18 DIAGNOSIS — M54.42 CHRONIC MIDLINE LOW BACK PAIN WITH BILATERAL SCIATICA: ICD-10-CM

## 2022-02-18 PROBLEM — M51.369 LUMBAR DEGENERATIVE DISC DISEASE: Status: ACTIVE | Noted: 2022-02-18

## 2022-02-18 PROCEDURE — 99213 OFFICE O/P EST LOW 20 MIN: CPT | Performed by: ORTHOPAEDIC SURGERY

## 2022-02-18 PROCEDURE — G8417 CALC BMI ABV UP PARAM F/U: HCPCS | Performed by: ORTHOPAEDIC SURGERY

## 2022-02-18 PROCEDURE — G8427 DOCREV CUR MEDS BY ELIG CLIN: HCPCS | Performed by: ORTHOPAEDIC SURGERY

## 2022-02-18 PROCEDURE — 99214 OFFICE O/P EST MOD 30 MIN: CPT | Performed by: ORTHOPAEDIC SURGERY

## 2022-02-18 PROCEDURE — 4004F PT TOBACCO SCREEN RCVD TLK: CPT | Performed by: ORTHOPAEDIC SURGERY

## 2022-02-18 PROCEDURE — G8484 FLU IMMUNIZE NO ADMIN: HCPCS | Performed by: ORTHOPAEDIC SURGERY

## 2022-02-18 PROCEDURE — 3017F COLORECTAL CA SCREEN DOC REV: CPT | Performed by: ORTHOPAEDIC SURGERY

## 2022-02-18 NOTE — PROGRESS NOTES
Patient ID: Raúl Bautista is a 55 y.o. female. Chief Complaint   Patient presents with    New Patient     sees pain managment, MRI lumbar spine 2/11/22, XR 1/4/22    Lower Back Pain     ongoing for past year,  increased last 6 months especially after ventral hernia repair 10/21        HPI     Patient with history of chronic low back pain right radicular leg pain some increased left radicular leg pain. Patient was trialed with lumbar epidural steroid injection with multitudes of complications leading to the patient to go to the emergency department without substantial relief in her pain    Past Medical History:   Diagnosis Date    Anxiety     B[facility] disorder [facility]     Breast cancer ([address] 2014    L side - lumpectomy and radiation; no chemo (was recommended to take Tamoxifen, but with her smoking, she declined due to family h/o CVA already).  Seeing Dr. Sharon Do once yearly/    Chronic back pain     Chronic kidney disease     COPD (chronic obstructive pulmonary disease) ([facility]     Depression     GERD (gastroesophageal reflux disease)     Headache(784.0)     History of alcoholism ([facility]     sober since 1/16/19    Hyperlipidemia     Hypertension     Hyperthyroidism 1 2022    Hypothyroidism     Kidney stone     Has had lithotripsy x 1; had ureteral stent placement with removal x 1; had seen Dr. Annetta Camacho Neuropathy     Obesity     Panic attack 04/15/2021    PTSD (post-traumatic stress disorder)     Sleep apnea 4 2021     Past Surgical History:   Procedure Laterality Date    APPENDECTOMY  1977    BREAST BIOPSY Right 2015    excisional biopsy - Dr. Henna Marcos Left 2014    breast CA - done at [facility]  2014    COLONOSCOPY  2019    polyps, Dr. Author Jon at [facility] N/A 5/11/2021    mild diverticulosis, tubular adenoma x1; Dr. Candace Hernandez at [facility] [address]      multiple in her 20's    EYE SURGERY      x 2 in her youth - was \"cross-eyed\"    EYE SURGERY      FRACTURE SURGERY Left     hand    HERNIA REPAIR  2020    recurrent incisional hernia repair Dr. Lukas Garcia N/A 2020    Laparoscopic Robotic Assisted Ventral Hernia Repair performed by Sammy Peres MD at [facility]      Dr. Veena Torre Left 2022    Left L4 L5 TRANSFORAMINAL performed by Bolivar Biswas MD at [facility]      Dr. Jean Claude Patel - done for excessive bleeding after failed ablation    TONSILLECTOMY      TUBAL LIGATION     [facility]; osteoma in L-sided sinuses; removed at [address]      Dr. Camilla Reynolds at [facility] - used mesh; had revision in 2020    [facility] N/A 10/21/2020    Open VENTRAL Hernia Repair w/ mesh & Component separation technique performed by Sammy Peres MD at [facility] OR     Family History   Problem Relation Age of Onset    High Blood Pressure Mother     Lupus Mother          from CHF secondary to cardiomyopathy from her lupus    Heart Failure Mother     Atrial Fibrillation Mother     High Blood Pressure Father     Prostate Cancer Father         age 54;  11 years later of a \"bladder tumor\" that caused bladder rupture (pt unsure if malignancy)    Other Maternal Grandmother         had \"stomach tumor\"    Heart Attack Maternal Grandfather          at age 40    Stroke Paternal Grandfather         in his 42's; multiple     Social History     Occupational History    Occupation: [employer]   Tobacco Use    Smoking status: Current Every Day Smoker     Packs/day: 0.50     Years: 37.00     Pack years: 18.50     Types: Cigarettes     Start date:     Smokeless tobacco: Never Used    Tobacco comment: Continue decreasing   Vaping Use    Vaping Use: Never used   Substance and Sexual Activity    Alcohol use: No     Comment: Follows with [facility]    Drug use: Not Currently     Comment: Follows with [facility]    Sexual activity: Not Currently     Partners: Male        Review of Systems         Physical Exam        MRI lumbar spine is reviewed patient with disc base collapse L4-5 L5-S1 some very smallish disc herniations but without significant nerve impingement very mild spinal stenosis  Assessment:          1. Post laminectomy syndrome    2. Chronic midline low back pain with bilateral sciatica    3. Lumbar degenerative disc disease      Patient has fairly minimal lumbar spinal stenosis predominately foraminal at 4 5 and 5 1 secondary to lumbar degenerative disc disease disc base collapse    I do not believe this patient is likely to benefit from surgical decompression and/or fusion        Plan:     Physical therapy referral for spinal cord stimulator evaluation    Orders Placed This Encounter   Procedures    External Referral To Pain Clinic     Referral Priority:   Routine     Referral Type:   Eval and Treat     Referral Reason:   Specialty Services Required     Referred to Provider:   Rosio Castorena MD     Requested Specialty:   Pain Management     Number of Visits Requested:   1        Hayder Wills MD    Please note that this chart was generated using voicerecognition Dragon dictation software. Although every effort was made to ensurethe accuracy of this automated transcription, some errors in transcription may haveoccurred.

## 2022-02-22 ENCOUNTER — TELEPHONE (OUTPATIENT)
Dept: ORTHOPEDIC SURGERY | Age: 57
End: 2022-02-22

## 2022-02-22 NOTE — TELEPHONE ENCOUNTER
PATIENT WAS REFERRED TO DR. SANTILLAN, SHE NO LONGER HAS A WORKING PHONE #, LEFT MESSAGE AT HIS OTHER CONTACT #.

## 2022-03-03 ENCOUNTER — PATIENT MESSAGE (OUTPATIENT)
Dept: FAMILY MEDICINE CLINIC | Age: 57
End: 2022-03-03

## 2022-03-04 DIAGNOSIS — R06.02 SOB (SHORTNESS OF BREATH) ON EXERTION: Primary | ICD-10-CM

## 2022-03-04 RX ORDER — ALBUTEROL SULFATE 90 UG/1
1-2 AEROSOL, METERED RESPIRATORY (INHALATION) EVERY 6 HOURS PRN
Qty: 18 G | Refills: 5 | Status: SHIPPED | OUTPATIENT
Start: 2022-03-04

## 2022-03-04 NOTE — TELEPHONE ENCOUNTER
See Sherpaa message 3/3/22. Patient uses this inhaler PRN and would like a refill.  Was prescribed to her when she was in the hospital.        Rickie Traceisaiah called requesting a refill of the below medication which has been pended for you:     Requested Prescriptions     Pending Prescriptions Disp Refills    albuterol sulfate  (90 Base) MCG/ACT inhaler 18 g      Sig: Inhale 2 puffs into the lungs every 6 hours as needed       Last Appointment Date: 12/21/2021  Next Appointment Date: 3/29/2022    Allergies   Allergen Reactions    Flomax [Tamsulosin Hcl] Swelling     Swelling of arms; however, also had rash and fever at the same time and was admitted at the time    Morphine Itching and Rash     Rash, itching    Saphris [Asenapine]     Bactrim [Sulfamethoxazole-Trimethoprim] Diarrhea and Nausea Only    Prednisone Other (See Comments)     Emotional changes, depression    Zyprexa [Olanzapine] Other (See Comments)     Made her feel like she wanted to \"jump out of my skin\"

## 2022-03-06 ENCOUNTER — PATIENT MESSAGE (OUTPATIENT)
Dept: FAMILY MEDICINE CLINIC | Age: 57
End: 2022-03-06

## 2022-03-07 NOTE — TELEPHONE ENCOUNTER
From: Tracie Soliman  To: Dr. Hawley Close: 3/6/2022 9:30 AM EST  Subject: Labs    Is the labs ordered that I am to do before my appointment the end of this month? Just making sure.  Thanks JOSEFINA Ley

## 2022-03-18 DIAGNOSIS — R52 BURNING PAIN: ICD-10-CM

## 2022-03-18 NOTE — TELEPHONE ENCOUNTER
Babetta Fothergill L called requesting a refill of the below medication which has been pended for you:     Requested Prescriptions     Pending Prescriptions Disp Refills    gabapentin (NEURONTIN) 100 MG capsule 90 capsule 2     Sig: Take 1 capsule by mouth 3 times daily for 90 days.        Last Appointment Date: 12/21/2021  Next Appointment Date: 3/29/2022    Allergies   Allergen Reactions    Flomax [Tamsulosin Hcl] Swelling     Swelling of arms; however, also had rash and fever at the same time and was admitted at the time    Morphine Itching and Rash     Rash, itching    Saphris [Asenapine]     Bactrim [Sulfamethoxazole-Trimethoprim] Diarrhea and Nausea Only    Prednisone Other (See Comments)     Emotional changes, depression    Zyprexa [Olanzapine] Other (See Comments)     Made her feel like she wanted to \"jump out of my skin\"

## 2022-03-19 RX ORDER — GABAPENTIN 100 MG/1
100 CAPSULE ORAL 3 TIMES DAILY
Qty: 90 CAPSULE | Refills: 2 | Status: SHIPPED | OUTPATIENT
Start: 2022-03-23 | End: 2022-06-17

## 2022-03-19 NOTE — TELEPHONE ENCOUNTER
Controlled Substance Monitoring:    Acute and Chronic Pain Monitoring:   RX Monitoring 3/19/2022   Periodic Controlled Substance Monitoring No signs of potential drug abuse or diversion identified. Obtained or confirmed \"Medication Contract\" on file.

## 2022-03-21 ENCOUNTER — PATIENT MESSAGE (OUTPATIENT)
Dept: FAMILY MEDICINE CLINIC | Age: 57
End: 2022-03-21

## 2022-03-21 NOTE — TELEPHONE ENCOUNTER
From: Yobani Moreno  To: Dr. Lopes Sers: 3/21/2022 1:14 PM EDT  Subject: Labs    Are my labs prior to my appointment fasting?   Thanks Payton Agudelo

## 2022-04-05 NOTE — PROGRESS NOTES
#1 Left message that previously ordered lab/imaging has not yet been completed. Asked the patient to contact us if further assistance needed.

## 2022-04-09 ENCOUNTER — OFFICE VISIT (OUTPATIENT)
Dept: PRIMARY CARE CLINIC | Age: 57
End: 2022-04-09
Payer: MEDICARE

## 2022-04-09 VITALS
OXYGEN SATURATION: 98 % | HEART RATE: 100 BPM | BODY MASS INDEX: 30.61 KG/M2 | TEMPERATURE: 98.2 F | SYSTOLIC BLOOD PRESSURE: 112 MMHG | DIASTOLIC BLOOD PRESSURE: 74 MMHG | WEIGHT: 162 LBS

## 2022-04-09 DIAGNOSIS — M54.6 ACUTE BILATERAL THORACIC BACK PAIN: Primary | ICD-10-CM

## 2022-04-09 DIAGNOSIS — M54.50 CHRONIC BILATERAL LOW BACK PAIN WITHOUT SCIATICA: ICD-10-CM

## 2022-04-09 DIAGNOSIS — G89.29 CHRONIC BILATERAL LOW BACK PAIN WITHOUT SCIATICA: ICD-10-CM

## 2022-04-09 DIAGNOSIS — M62.838 MUSCLE SPASM: ICD-10-CM

## 2022-04-09 PROCEDURE — 4004F PT TOBACCO SCREEN RCVD TLK: CPT | Performed by: NURSE PRACTITIONER

## 2022-04-09 PROCEDURE — 99213 OFFICE O/P EST LOW 20 MIN: CPT | Performed by: NURSE PRACTITIONER

## 2022-04-09 PROCEDURE — G8417 CALC BMI ABV UP PARAM F/U: HCPCS | Performed by: NURSE PRACTITIONER

## 2022-04-09 PROCEDURE — 3017F COLORECTAL CA SCREEN DOC REV: CPT | Performed by: NURSE PRACTITIONER

## 2022-04-09 PROCEDURE — G8427 DOCREV CUR MEDS BY ELIG CLIN: HCPCS | Performed by: NURSE PRACTITIONER

## 2022-04-09 PROCEDURE — 99213 OFFICE O/P EST LOW 20 MIN: CPT

## 2022-04-09 RX ORDER — CYCLOBENZAPRINE HCL 5 MG
5 TABLET ORAL 3 TIMES DAILY PRN
Qty: 30 TABLET | Refills: 0 | Status: SHIPPED | OUTPATIENT
Start: 2022-04-09 | End: 2022-05-13 | Stop reason: SDUPTHER

## 2022-04-09 ASSESSMENT — PATIENT HEALTH QUESTIONNAIRE - PHQ9
SUM OF ALL RESPONSES TO PHQ QUESTIONS 1-9: 0
2. FEELING DOWN, DEPRESSED OR HOPELESS: 0
SUM OF ALL RESPONSES TO PHQ QUESTIONS 1-9: 0
1. LITTLE INTEREST OR PLEASURE IN DOING THINGS: 0
SUM OF ALL RESPONSES TO PHQ QUESTIONS 1-9: 0
SUM OF ALL RESPONSES TO PHQ9 QUESTIONS 1 & 2: 0
SUM OF ALL RESPONSES TO PHQ QUESTIONS 1-9: 0

## 2022-04-09 ASSESSMENT — ENCOUNTER SYMPTOMS
ABDOMINAL PAIN: 0
BACK PAIN: 1
RESPIRATORY NEGATIVE: 1
BOWEL INCONTINENCE: 0

## 2022-04-09 NOTE — PROGRESS NOTES
[facility]             [address], [address]                        Telephone [phone]             Fax [phone]     Milly Quezada  1965  [facility]:[id number]   Date of visit:  4/9/2022    Subjective:    Milly Quezada is a 65 y.o.  female who presents to [facility] today (4/9/2022) for evaluation of:    Chief Complaint   Patient presents with    Back Pain       Back Pain  This is a new problem. The current episode started in the past 7 days (04/07/22). The problem occurs constantly. The problem has been gradually worsening since onset. The pain is present in the lumbar spine and thoracic spine. Pain scale: 7/10 lumbar; thoracic 8/10. The symptoms are aggravated by bending and twisting. Pertinent negatives include no abdominal pain, bladder incontinence, bowel incontinence, chest pain, dysuria, fever, headaches, leg pain, numbness, pelvic pain, perianal numbness, tingling or weakness. (Tripped over cat and fell onto left hip;lumbar pain is chronic but worse since fall; thoracic spine pain began today after she finished her  shift at work; denies hitting head or LOC) She has tried heat (tylenol, aleve) for the symptoms. The treatment provided no relief.        She has the following problem list:  Patient Active Problem List   Diagnosis    Severe episode of recurrent major depressive disorder, without psychotic features ([address]    Bipolar 2 disorder ([address]    Bipolar II disorder ([address]    Postoperative pain    Status post repair of recurrent ventral hernia    Chronic obstructive pulmonary disease (HCC)    Lumbar radiculopathy    Lumbar spondylosis    Post laminectomy syndrome    Chronic midline low back pain with bilateral sciatica    Lumbar degenerative disc disease        Current medications are:  Current Outpatient Medications   Medication Sig Dispense Refill    cyclobenzaprine (FLEXERIL) 5 MG tablet Take 1 tablet by mouth 3 times daily as needed for Muscle spasms 30 tablet 0    gabapentin (NEURONTIN) 100 MG capsule Take 1 capsule by mouth 3 times daily for 90 days.  90 capsule 2    albuterol sulfate  (90 Base) MCG/ACT inhaler Inhale 1-2 puffs into the lungs every 6 hours as needed for Wheezing or Shortness of Breath 18 g 5    levothyroxine (SYNTHROID) 100 MCG tablet Take 1 tablet by mouth Daily 90 tablet 0    REXULTI 1 MG TABS tablet take 1 tablet by mouth once daily      ondansetron (ZOFRAN ODT) 4 MG disintegrating tablet Take 1 tablet by mouth every 8 hours as needed for Nausea 40 tablet 1    fexofenadine (ALLEGRA) 180 MG tablet Take 1 tablet by mouth daily 90 tablet 3    omeprazole (PRILOSEC) 20 MG delayed release capsule Take 1 capsule by mouth 2 times daily 180 capsule 1    albuterol sulfate  (90 Base) MCG/ACT inhaler Inhale 1-2 puffs into the lungs every 6 hours as needed for Wheezing or Shortness of Breath 3 Inhaler 1    fluticasone (FLONASE) 50 MCG/ACT nasal spray SPRAY TWO SPRAYS IN EACH NOSTRIL ONCE DAILY 3 Bottle 3    lisinopril (PRINIVIL;ZESTRIL) 40 MG tablet TAKE ONE TABLET BY MOUTH DAILY 90 tablet 3    amLODIPine (NORVASC) 10 MG tablet TAKE ONE TABLET BY MOUTH DAILY 90 tablet 3    Cholecalciferol (VITAMIN D) 50 MCG (2000 UT) CAPS capsule Take 2,000 Units by mouth daily      Multiple Vitamins-Minerals (MULTIVITAMIN ADULT PO) Take 1 tablet by mouth daily      propranolol (INDERAL) 10 MG tablet Take 10 mg by mouth 3 times daily Indications: patient takes twice a day       hydrOXYzine (ATARAX) 25 MG tablet Take 50 mg by mouth every 6 hours as needed for Anxiety       DULoxetine (CYMBALTA) 30 MG extended release capsule Take 30 mg by mouth daily      DULoxetine (CYMBALTA) 60 MG extended release capsule Take 60 mg by mouth daily      lamoTRIgine (LAMICTAL) 200 MG tablet Take 150 mg by mouth daily        No current facility-administered medications for this visit. She is allergic to flomax [tamsulosin hcl], morphine, saphris [asenapine], bactrim [sulfamethoxazole-trimethoprim], prednisone, and zyprexa [olanzapine]. .    She  reports that she has been smoking cigarettes. She started smoking about 40 years ago. She has a 18.50 pack-year smoking history. She has never used smokeless tobacco.      Objective:    Vitals:    04/09/22 1428   BP: 112/74   Site: Left Upper Arm   Position: Sitting   Cuff Size: Large Adult   Pulse: 100   Temp: 98.2 °F (36.8 °C)   TempSrc: Tympanic   SpO2: 98%   Weight: 162 lb (73.5 kg)     Body mass index is 30.61 kg/m². Review of Systems   Constitutional: Negative. Negative for fever. Respiratory: Negative. Cardiovascular: Negative. Negative for chest pain. Gastrointestinal: Negative for abdominal pain and bowel incontinence. Genitourinary: Negative for bladder incontinence, dysuria and pelvic pain. Musculoskeletal: Positive for back pain. Neurological: Negative for tingling, weakness, numbness and headaches. Physical Exam  Vitals and nursing note reviewed. Constitutional:       Appearance: She is well-developed. HENT:      Head: Normocephalic. Eyes:      Pupils: Pupils are equal, round, and reactive to light. Cardiovascular:      Rate and Rhythm: Normal rate and regular rhythm. Heart sounds: Normal heart sounds. Pulmonary:      Effort: Pulmonary effort is normal.      Breath sounds: Normal breath sounds and air entry. Musculoskeletal:      Cervical back: Normal, normal range of motion and neck supple. Thoracic back: Spasms and tenderness present. No swelling or bony tenderness. Decreased range of motion. Lumbar back: Spasms and tenderness present. No swelling or bony tenderness. Decreased range of motion. Negative right straight leg raise test and negative left straight leg raise test.        Back:    Lymphadenopathy:      Cervical: No cervical adenopathy.    Skin:     General: Skin is warm and dry. Neurological:      General: No focal deficit present. Mental Status: She is alert and oriented to person, place, and time. Psychiatric:         Behavior: Behavior normal.         Thought Content: Thought content normal.       Assessment and Plan:    No results found for this visit on 04/09/22. Diagnosis Orders   1. Acute bilateral thoracic back pain  cyclobenzaprine (FLEXERIL) 5 MG tablet   2. Chronic bilateral low back pain without sciatica  cyclobenzaprine (FLEXERIL) 5 MG tablet   3. Muscle spasm  cyclobenzaprine (FLEXERIL) 5 MG tablet     Take Flexeril as directed for muscle spasms. She was instructed not to drive or use machinery while taking Flexeril, she verbalized understanding. Take Tylenol as needed for pain. Rest the affected painful area. Apply cold compresses intermittently as needed. As pain recedes, begin normal activities slowly as tolerated. Follow up with PCP if symptoms do not improve or worsen. The use, risks, benefits, and side effects of prescribed or recommended medications were discussed. All questions were answered and the patient/caregiver voiced understanding. No orders of the defined types were placed in this encounter.         Electronically signed by STAR Schneider CNP on 4/9/22 at 2:34 PM EDT

## 2022-04-09 NOTE — PATIENT INSTRUCTIONS
Patient Education        Back Pain: Care Instructions  Your Care Instructions     Back pain has many possible causes. It is often related to problems with muscles and ligaments of the back. It may also be related to problems with the nerves, discs, or bones of the back. Moving, lifting, standing, sitting, or sleeping in an awkward way can strain the back. Sometimes you don't notice theinjury until later. Arthritis is another common cause of back pain. Although it may hurt a lot, back pain usually improves on its own within several weeks. Most people recover in 12 weeks or less. Using good home treatment and being careful not to stress your back can help you feel bettersooner. Follow-up care is a key part of your treatment and safety. Be sure to make and go to all appointments, and call your doctor if you are having problems. It's also a good idea to know your test results and keep alist of the medicines you take. How can you care for yourself at home?  Sit or lie in positions that are most comfortable and reduce your pain. Try one of these positions when you lie down:  ? Lie on your back with your knees bent and supported by large pillows. ? Lie on the floor with your legs on the seat of a sofa or chair. ? Lie on your side with your knees and hips bent and a pillow between your legs. ? Lie on your stomach if it does not make pain worse.  Do not sit up in bed, and avoid soft couches and twisted positions. Bed rest can help relieve pain at first, but it delays healing. Avoid bed rest after the first day of back pain.  Change positions every 30 minutes. If you must sit for long periods of time, take breaks from sitting. Get up and walk around, or lie in a comfortable position.  Try using a heating pad on a low or medium setting for 15 to 20 minutes every 2 or 3 hours. Try a warm shower in place of one session with the heating pad.  You can also try an ice pack for 10 to 15 minutes every 2 to 3 hours. Put a thin cloth between the ice pack and your skin.  Take pain medicines exactly as directed. ? If the doctor gave you a prescription medicine for pain, take it as prescribed. ? If you are not taking a prescription pain medicine, ask your doctor if you can take an over-the-counter medicine.  Take short walks several times a day. You can start with 5 to 10 minutes, 3 or 4 times a day, and work up to longer walks. Walk on level surfaces and avoid hills and stairs until your back is better.  Return to work and other activities as soon as you can. Continued rest without activity is usually not good for your back.  To prevent future back pain, do exercises to stretch and strengthen your back and stomach. Learn how to use good posture, safe lifting techniques, and proper body mechanics. When should you call for help? Call your doctor now or seek immediate medical care if:     You have new or worsening numbness in your legs.      You have new or worsening weakness in your legs. (This could make it hard to stand up.)      You lose control of your bladder or bowels. Watch closely for changes in your health, and be sure to contact your doctor if:     You have a fever, lose weight, or don't feel well.      You do not get better as expected. Where can you learn more? Go to https://[employer]. org and sign in to your [employer] account. Enter E802 in the [employer] box to learn more about \"Back Pain: Care Instructions. \"     If you do not have an account, please click on the \"Sign Up Now\" link. Current as of: July 1, 2021               Content Version: 13.2  © 2006-2022 [employer]. Care instructions adapted under license by [facility] ([facility]). If you have questions about a medical condition or this instruction, always ask your healthcare professional. [place] [address] any warranty or liability for your use of this information.

## 2022-04-12 ENCOUNTER — HOSPITAL ENCOUNTER (OUTPATIENT)
Dept: LAB | Age: 57
Discharge: HOME | End: 2022-04-12
Payer: MEDICARE

## 2022-04-12 ENCOUNTER — OFFICE VISIT (OUTPATIENT)
Dept: FAMILY MEDICINE CLINIC | Age: 57
End: 2022-04-12
Payer: MEDICARE

## 2022-04-12 VITALS
BODY MASS INDEX: 31.83 KG/M2 | SYSTOLIC BLOOD PRESSURE: 116 MMHG | DIASTOLIC BLOOD PRESSURE: 74 MMHG | TEMPERATURE: 98.1 F | WEIGHT: 168.6 LBS | HEART RATE: 106 BPM | OXYGEN SATURATION: 96 % | HEIGHT: 61 IN

## 2022-04-12 DIAGNOSIS — M54.42 CHRONIC MIDLINE LOW BACK PAIN WITH BILATERAL SCIATICA: ICD-10-CM

## 2022-04-12 DIAGNOSIS — G89.18 POSTOPERATIVE PAIN: ICD-10-CM

## 2022-04-12 DIAGNOSIS — E03.9 HYPOTHYROIDISM, UNSPECIFIED TYPE: Primary | ICD-10-CM

## 2022-04-12 DIAGNOSIS — G89.29 CHRONIC MIDLINE LOW BACK PAIN WITH BILATERAL SCIATICA: ICD-10-CM

## 2022-04-12 DIAGNOSIS — M54.41 CHRONIC MIDLINE LOW BACK PAIN WITH BILATERAL SCIATICA: ICD-10-CM

## 2022-04-12 DIAGNOSIS — R52 BURNING PAIN: ICD-10-CM

## 2022-04-12 DIAGNOSIS — D50.9 IRON DEFICIENCY ANEMIA, UNSPECIFIED IRON DEFICIENCY ANEMIA TYPE: ICD-10-CM

## 2022-04-12 DIAGNOSIS — E05.90 HYPERTHYROIDISM: ICD-10-CM

## 2022-04-12 PROBLEM — J44.9 CHRONIC OBSTRUCTIVE PULMONARY DISEASE (HCC): Status: RESOLVED | Noted: 2021-04-21 | Resolved: 2022-04-12

## 2022-04-12 PROBLEM — F14.21 COCAINE DEPENDENCE IN REMISSION (HCC): Status: ACTIVE | Noted: 2019-01-23

## 2022-04-12 PROBLEM — F32.0 MAJOR DEPRESSIVE DISORDER, SINGLE EPISODE, MILD (HCC): Status: ACTIVE | Noted: 2019-01-23

## 2022-04-12 PROBLEM — I10 HYPERTENSION: Status: ACTIVE | Noted: 2021-04-17

## 2022-04-12 PROBLEM — M54.9 MID-BACK PAIN, ACUTE: Status: ACTIVE | Noted: 2022-03-16

## 2022-04-12 PROBLEM — F41.1 GENERALIZED ANXIETY DISORDER: Status: ACTIVE | Noted: 2019-01-29

## 2022-04-12 LAB
FERRITIN: 14 NG/ML (ref 13–150)
IRON SATURATION: 20 % (ref 20–55)
IRON: 92 UG/DL (ref 37–145)
THYROXINE, FREE: 1.75 NG/DL (ref 0.93–1.7)
TOTAL IRON BINDING CAPACITY: 454 UG/DL (ref 250–450)
TSH SERPL DL<=0.05 MIU/L-ACNC: 0.5 UIU/ML (ref 0.3–5)
UNSATURATED IRON BINDING CAPACITY: 362 UG/DL (ref 112–347)

## 2022-04-12 PROCEDURE — G8427 DOCREV CUR MEDS BY ELIG CLIN: HCPCS | Performed by: FAMILY MEDICINE

## 2022-04-12 PROCEDURE — 4004F PT TOBACCO SCREEN RCVD TLK: CPT | Performed by: FAMILY MEDICINE

## 2022-04-12 PROCEDURE — 99212 OFFICE O/P EST SF 10 MIN: CPT | Performed by: FAMILY MEDICINE

## 2022-04-12 PROCEDURE — 3017F COLORECTAL CA SCREEN DOC REV: CPT | Performed by: FAMILY MEDICINE

## 2022-04-12 PROCEDURE — 84439 ASSAY OF FREE THYROXINE: CPT

## 2022-04-12 PROCEDURE — 83540 ASSAY OF IRON: CPT

## 2022-04-12 PROCEDURE — 82728 ASSAY OF FERRITIN: CPT

## 2022-04-12 PROCEDURE — 84443 ASSAY THYROID STIM HORMONE: CPT

## 2022-04-12 PROCEDURE — 83550 IRON BINDING TEST: CPT

## 2022-04-12 PROCEDURE — 36415 COLL VENOUS BLD VENIPUNCTURE: CPT

## 2022-04-12 PROCEDURE — G8417 CALC BMI ABV UP PARAM F/U: HCPCS | Performed by: FAMILY MEDICINE

## 2022-04-12 PROCEDURE — 99214 OFFICE O/P EST MOD 30 MIN: CPT | Performed by: FAMILY MEDICINE

## 2022-04-12 RX ORDER — LIDOCAINE 50 MG/G
1 PATCH TOPICAL DAILY
Qty: 30 PATCH | Refills: 2 | Status: SHIPPED | OUTPATIENT
Start: 2022-04-12 | End: 2022-10-10

## 2022-04-12 ASSESSMENT — ENCOUNTER SYMPTOMS
BLOOD IN STOOL: 0
BACK PAIN: 1

## 2022-04-12 NOTE — PROGRESS NOTES
[place] [address]  [address], [address] [place]  [phone]      Nura Ulloa is a 65 y.o. female who presents today for her medical conditions/complaints as noted below. Nura Ulloa is c/o of Bloated (f/u) and Medication Check      HPI:     Pt here today for follow-up of hypothyroidism. Taking Synthroid 100 mcg daily for hypothyroidism - stable; taking dose every morning on an empty stomach. Has not missed any doses. TSH today is 0.5; awaiting T4 results. Pt saw Dr. Kandi Cervantes for trial foraminal block and he gave her paperwork about a spinal cord stimulator. Saw Dr. Jony Valentine at [facility] on 3/16 - will be getting L3-4 fusion and L5-S1 fusion on 6/9 (for now - if she can move it earlier, she will). Had more pain after she fell last week, so she was seen in UC on 4/9 - was prescribed Flexeril 5 mg TID, which has helped. Went to disability hearing last week - waiting to hear if she has been approved. Weaning off Lamictal per Psychiatry (Dr. Farida Wei) - down to 150 mg daily (from 200 mg daily). Still taking Rexulti 1 mg daily and Cymbalta 90 mg daily (30 mg tab + 60 mg tab). Seeing her every 2 months. Taking Gabapentin 100 mg TID for burning pain near incision from abdominal hernia surgery - stable; tolerating well. Taking Omeprazole 20 mg BID for GERD - stable. Taking Melatonin 10 mg nightly as needed for sleep - takes most nights. Past Medical History:   Diagnosis Date    Anxiety     B[facility] disorder ([facility]     Breast cancer ([facility] 2014    L side - lumpectomy and radiation; no chemo (was recommended to take Tamoxifen, but with her smoking, she declined due to family h/o CVA already).  Seeing Dr. Talat Dhillon once yearly/    Chronic back pain     Chronic kidney disease     COPD (chronic obstructive pulmonary disease) ([facility]     Depression     GERD (gastroesophageal reflux disease)     Headache(784.0)     History of alcoholism ([facility] sober since 19    Hyperlipidemia     Hypertension     Hyperthyroidism 2022    Hypothyroidism     Kidney stone     Has had lithotripsy x 1; had ureteral stent placement with removal x 1; had seen Dr. Sanjiv Nickerson Neuropathy     Obesity     Panic attack 04/15/2021    PTSD (post-traumatic stress disorder)     Sleep apnea 2021      Past Surgical History:   Procedure Laterality Date    APPENDECTOMY  1977    BREAST BIOPSY Right 2015    excisional biopsy - Dr. Janine Meneses Left     breast CA - done at [facility]      COLONOSCOPY  2019    polyps, Dr. Aida Martinez at [facility] N/A 2021    mild diverticulosis, tubular adenoma x1; Dr. Harini Huynh at [facility]      multiple in her 19's   [address]      x 2 in her youth - was \"cross-eyed\"    EYE SURGERY      FRACTURE SURGERY Left     hand    HERNIA REPAIR  2020    recurrent incisional hernia repair Dr. Jerrell Diaz N/A 2020    Laparoscopic Robotic Assisted Ventral Hernia Repair performed by Wesley Suárez MD at [facility]      Dr. Susan Perez Left 2022    Left L4 L5 TRANSFORAMINAL performed by Terry Park MD at [facility]      Dr. 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 Sara Hagen at [facility] - used mesh; had revision in 2020    [facility] N/A 10/21/2020    Open VENTRAL Hernia Repair w/ mesh & Component separation technique performed by Wesley Suárez MD at [facility] OR     Family History   Problem Relation Age of Onset    High Blood Pressure Mother     Lupus Mother          from CHF secondary to cardiomyopathy from her lupus    Heart Failure Mother     Atrial Fibrillation Mother     High Blood Pressure Father     Prostate Cancer Father         age 54;  11 years later of a \"bladder tumor\" that caused bladder rupture (pt unsure if malignancy)    Other Maternal Grandmother         had \"stomach tumor\"    Heart Attack Maternal Grandfather          at age 40    Stroke Paternal Grandfather         in his 42's; multiple     Social History     Tobacco Use    Smoking status: Current Every Day Smoker     Packs/day: 0.50     Years: 37.00     Pack years: 18.50     Types: Cigarettes     Start date:     Smokeless tobacco: Never Used    Tobacco comment: Continue decreasing   Substance Use Topics    Alcohol use: No     Comment: Follows with [facility]      Current Outpatient Medications   Medication Sig Dispense Refill    lidocaine (LIDODERM) 5 % Place 1 patch onto the skin daily 12 hours on, 12 hours off. 30 patch 2    gabapentin (NEURONTIN) 100 MG capsule Take 1 capsule by mouth 3 times daily for 90 days.  90 capsule 2    albuterol sulfate  (90 Base) MCG/ACT inhaler Inhale 1-2 puffs into the lungs every 6 hours as needed for Wheezing or Shortness of Breath 18 g 5    levothyroxine (SYNTHROID) 100 MCG tablet Take 1 tablet by mouth Daily 90 tablet 0    REXULTI 1 MG TABS tablet take 1 tablet by mouth once daily      ondansetron (ZOFRAN ODT) 4 MG disintegrating tablet Take 1 tablet by mouth every 8 hours as needed for Nausea 40 tablet 1    fexofenadine (ALLEGRA) 180 MG tablet Take 1 tablet by mouth daily 90 tablet 3    omeprazole (PRILOSEC) 20 MG delayed release capsule Take 1 capsule by mouth 2 times daily 180 capsule 1    albuterol sulfate  (90 Base) MCG/ACT inhaler Inhale 1-2 puffs into the lungs every 6 hours as needed for Wheezing or Shortness of Breath 3 Inhaler 1    fluticasone (FLONASE) 50 MCG/ACT nasal spray SPRAY TWO SPRAYS IN EACH NOSTRIL ONCE DAILY 3 Bottle 3    lisinopril (PRINIVIL;ZESTRIL) 40 MG tablet TAKE ONE TABLET BY MOUTH DAILY 90 tablet 3    amLODIPine (NORVASC) 10 MG tablet TAKE ONE TABLET BY MOUTH DAILY 90 tablet 3    Cholecalciferol (VITAMIN D) 50 MCG (2000 UT) CAPS capsule Take 2,000 Units by mouth daily      Multiple Vitamins-Minerals (MULTIVITAMIN ADULT PO) Take 1 tablet by mouth daily      propranolol (INDERAL) 10 MG tablet Take 10 mg by mouth 3 times daily Indications: patient takes twice a day       hydrOXYzine (ATARAX) 25 MG tablet Take 50 mg by mouth every 6 hours as needed for Anxiety       DULoxetine (CYMBALTA) 30 MG extended release capsule Take 30 mg by mouth daily      DULoxetine (CYMBALTA) 60 MG extended release capsule Take 60 mg by mouth daily      lamoTRIgine (LAMICTAL) 200 MG tablet Take 150 mg by mouth daily        No current facility-administered medications for this visit.      Allergies   Allergen Reactions    Flomax [Tamsulosin Hcl] Swelling     Swelling of arms; however, also had rash and fever at the same time and was admitted at the time    Morphine Itching and Rash     Rash, itching    Saphris [Asenapine]     Bactrim [Sulfamethoxazole-Trimethoprim] Diarrhea and Nausea Only    Prednisone Other (See Comments)     Emotional changes, depression    Zyprexa [Olanzapine] Other (See Comments)     Made her feel like she wanted to \"jump out of my skin\"       Health Maintenance   Topic Date Due    Pneumococcal 0-64 years Vaccine (1 - PCV) Never done    Shingles Vaccine (1 of 2) Never done    COVID-19 Vaccine (3 - Booster for Moderna series) 09/08/2021    DTaP/Tdap/Td vaccine (2 - Td or Tdap) 11/30/2021    Breast cancer screen  08/17/2022    Flu vaccine (Season Ended) 09/01/2022    Potassium  01/21/2023    Creatinine  01/21/2023    Depression Monitoring  04/09/2023    Diabetes screen  04/16/2024    Colorectal Cancer Screen  05/11/2026    Lipids  01/04/2027    Hepatitis C screen  Completed    HIV screen  Completed    Hepatitis A vaccine  Margarito Mann Hepatitis B vaccine  Aged Out    Hib vaccine  Aged Out    Meningococcal (ACWY) vaccine  Aged Out       Subjective:      Review of Systems   Constitutional: Negative for fever. Cardiovascular: Negative for chest pain and palpitations. Gastrointestinal: Negative for blood in stool. Genitourinary: Negative for dysuria and flank pain. Musculoskeletal: Positive for back pain (chronic, worsening). Neurological: Negative for light-headedness. Psychiatric/Behavioral: Negative for dysphoric mood and suicidal ideas. Objective:     Vitals:    04/12/22 1356   BP: 116/74   Site: Right Upper Arm   Position: Sitting   Cuff Size: Large Adult   Pulse: 106   Temp: 98.1 °F (36.7 °C)   TempSrc: Temporal   SpO2: 96%   Weight: 168 lb 9.6 oz (76.5 kg)   Height: 5' 1\" (1.549 m)     Physical Exam  Constitutional:       General: She is not in acute distress. Appearance: Normal appearance. HENT:      Head: Normocephalic and atraumatic. Eyes:      Conjunctiva/sclera: Conjunctivae normal.   Cardiovascular:      Rate and Rhythm: Normal rate and regular rhythm. Heart sounds: Normal heart sounds. Pulmonary:      Effort: Pulmonary effort is normal. No respiratory distress. Breath sounds: Normal breath sounds. Abdominal:      General: Bowel sounds are normal. There is no distension. Palpations: Abdomen is soft. Tenderness: There is no abdominal tenderness. Musculoskeletal:      Lumbar back: Tenderness (paraspinal musculature) present. Right lower leg: No edema. Left lower leg: No edema. Skin:     General: Skin is warm and dry. Neurological:      General: No focal deficit present. Mental Status: She is alert and oriented to person, place, and time. Psychiatric:         Mood and Affect: Mood normal.         Assessment:      1. Hypothyroidism, unspecified type  2.  Chronic midline low back pain with bilateral sciatica  -     lidocaine (LIDODERM) 5 %; Place 1 patch onto the skin daily 12 hours on, 12 hours off., Disp-30 patch, R-2Normal  3. Postoperative pain  -     lidocaine (LIDODERM) 5 %; Place 1 patch onto the skin daily 12 hours on, 12 hours off., Disp-30 patch, R-2Normal  4. Burning pain         Plan:      Declined Covid booster for now. Return in about 1 month (around 5/12/2022) for pre-op clearance for lumbar surgery. No orders of the defined types were placed in this encounter. Orders Placed This Encounter   Medications    lidocaine (LIDODERM) 5 %     Sig: Place 1 patch onto the skin daily 12 hours on, 12 hours off. Dispense:  30 patch     Refill:  2       Patient given educational materials - see patient instructions. Discussed use, benefit, and side effects of prescribed medications. All patient questions answered. Pt voiced understanding. Reviewed health maintenance.             Electronically signed by Clemente Gudino DO, DO on 4/24/2022 at 11:40 PM

## 2022-05-06 ENCOUNTER — APPOINTMENT (OUTPATIENT)
Dept: GENERAL RADIOLOGY | Age: 57
End: 2022-05-06
Payer: MEDICARE

## 2022-05-06 ENCOUNTER — HOSPITAL ENCOUNTER (EMERGENCY)
Age: 57
Discharge: HOME | End: 2022-05-06
Attending: EMERGENCY MEDICINE
Payer: MEDICARE

## 2022-05-06 VITALS
HEIGHT: 67 IN | RESPIRATION RATE: 10 BRPM | HEART RATE: 105 BPM | TEMPERATURE: 98.4 F | WEIGHT: 170 LBS | BODY MASS INDEX: 26.68 KG/M2 | DIASTOLIC BLOOD PRESSURE: 63 MMHG | OXYGEN SATURATION: 94 % | SYSTOLIC BLOOD PRESSURE: 114 MMHG

## 2022-05-06 DIAGNOSIS — R06.89 DYSPNEA AND RESPIRATORY ABNORMALITIES: Primary | ICD-10-CM

## 2022-05-06 DIAGNOSIS — R06.00 DYSPNEA AND RESPIRATORY ABNORMALITIES: Primary | ICD-10-CM

## 2022-05-06 DIAGNOSIS — R00.0 SINUS TACHYCARDIA: ICD-10-CM

## 2022-05-06 LAB
-: ABNORMAL
ABSOLUTE EOS #: 0.27 K/UL (ref 0–0.44)
ABSOLUTE IMMATURE GRANULOCYTE: 0.04 K/UL (ref 0–0.3)
ABSOLUTE LYMPH #: 2.24 K/UL (ref 1.1–3.7)
ABSOLUTE MONO #: 0.63 K/UL (ref 0.1–1.2)
AMPHETAMINE SCREEN URINE: NEGATIVE
ANION GAP SERPL CALCULATED.3IONS-SCNC: 12 MMOL/L (ref 9–17)
BACTERIA: ABNORMAL
BARBITURATE SCREEN URINE: NEGATIVE
BASOPHILS # BLD: 1 % (ref 0–2)
BASOPHILS ABSOLUTE: 0.06 K/UL (ref 0–0.2)
BENZODIAZEPINE SCREEN, URINE: NEGATIVE
BILIRUBIN URINE: NEGATIVE
BUN BLDV-MCNC: 11 MG/DL (ref 6–20)
BUN/CREAT BLD: 11 (ref 9–20)
BUPRENORPHINE URINE: NEGATIVE
CALCIUM SERPL-MCNC: 9.6 MG/DL (ref 8.6–10.4)
CANNABINOID SCREEN URINE: NEGATIVE
CHLORIDE BLD-SCNC: 101 MMOL/L (ref 98–107)
CO2: 26 MMOL/L (ref 20–31)
COCAINE METABOLITE, URINE: NEGATIVE
CREAT SERPL-MCNC: 0.98 MG/DL (ref 0.5–0.9)
D-DIMER QUANTITATIVE: 0.39 MG/L FEU (ref 0–0.59)
EKG ATRIAL RATE: 113 BPM
EKG P AXIS: 64 DEGREES
EKG P-R INTERVAL: 144 MS
EKG Q-T INTERVAL: 350 MS
EKG QRS DURATION: 84 MS
EKG QTC CALCULATION (BAZETT): 480 MS
EKG R AXIS: 67 DEGREES
EKG T AXIS: 60 DEGREES
EKG VENTRICULAR RATE: 113 BPM
EOSINOPHILS RELATIVE PERCENT: 3 % (ref 1–4)
EPITHELIAL CELLS UA: ABNORMAL /HPF (ref 0–5)
GFR AFRICAN AMERICAN: >60 ML/MIN
GFR NON-AFRICAN AMERICAN: 59 ML/MIN
GFR SERPL CREATININE-BSD FRML MDRD: ABNORMAL ML/MIN/{1.73_M2}
GLUCOSE BLD-MCNC: 134 MG/DL (ref 70–99)
GLUCOSE URINE: NEGATIVE
HCT VFR BLD CALC: 37.9 % (ref 36.3–47.1)
HEMOGLOBIN: 12.8 G/DL (ref 11.9–15.1)
IMMATURE GRANULOCYTES: 0 %
KETONES, URINE: NEGATIVE
LEUKOCYTE ESTERASE, URINE: NEGATIVE
LYMPHOCYTES # BLD: 22 % (ref 24–43)
MCH RBC QN AUTO: 29.4 PG (ref 25.2–33.5)
MCHC RBC AUTO-ENTMCNC: 33.8 G/DL (ref 25.2–33.5)
MCV RBC AUTO: 87.1 FL (ref 82.6–102.9)
METHADONE SCREEN, URINE: NEGATIVE
METHAMPHETAMINE, URINE: NEGATIVE
MONOCYTES # BLD: 6 % (ref 3–12)
NITRITE, URINE: NEGATIVE
NRBC AUTOMATED: 0 PER 100 WBC
OPIATES, URINE: NEGATIVE
OXYCODONE SCREEN URINE: NEGATIVE
PDW BLD-RTO: 13.1 % (ref 11.8–14.4)
PH UA: 7 (ref 5–6)
PHENCYCLIDINE, URINE: NEGATIVE
PLATELET # BLD: 256 K/UL (ref 138–453)
PMV BLD AUTO: 8.6 FL (ref 8.1–13.5)
POTASSIUM SERPL-SCNC: 3.6 MMOL/L (ref 3.7–5.3)
PROPOXYPHENE, URINE: NEGATIVE
PROTEIN UA: NEGATIVE
RBC # BLD: 4.35 M/UL (ref 3.95–5.11)
RBC UA: ABNORMAL /HPF (ref 0–4)
SEG NEUTROPHILS: 68 % (ref 36–65)
SEGMENTED NEUTROPHILS ABSOLUTE COUNT: 6.79 K/UL (ref 1.5–8.1)
SODIUM BLD-SCNC: 139 MMOL/L (ref 135–144)
SPECIFIC GRAVITY UA: 1.01 (ref 1.01–1.02)
TEST INFORMATION: NORMAL
TRICYCLIC ANTIDEPRESSANTS, UR: NEGATIVE
TROPONIN, HIGH SENSITIVITY: 6 NG/L (ref 0–14)
TROPONIN, HIGH SENSITIVITY: 7 NG/L (ref 0–14)
TSH SERPL DL<=0.05 MIU/L-ACNC: 0.21 UIU/ML (ref 0.3–5)
URINE HGB: ABNORMAL
UROBILINOGEN, URINE: NORMAL
WBC # BLD: 10 K/UL (ref 3.5–11.3)
WBC UA: ABNORMAL /HPF (ref 0–4)

## 2022-05-06 PROCEDURE — 81001 URINALYSIS AUTO W/SCOPE: CPT

## 2022-05-06 PROCEDURE — 6370000000 HC RX 637 (ALT 250 FOR IP): Performed by: EMERGENCY MEDICINE

## 2022-05-06 PROCEDURE — 93005 ELECTROCARDIOGRAM TRACING: CPT | Performed by: EMERGENCY MEDICINE

## 2022-05-06 PROCEDURE — 80048 BASIC METABOLIC PNL TOTAL CA: CPT

## 2022-05-06 PROCEDURE — 2580000003 HC RX 258: Performed by: EMERGENCY MEDICINE

## 2022-05-06 PROCEDURE — 84439 ASSAY OF FREE THYROXINE: CPT

## 2022-05-06 PROCEDURE — 84443 ASSAY THYROID STIM HORMONE: CPT

## 2022-05-06 PROCEDURE — 71046 X-RAY EXAM CHEST 2 VIEWS: CPT

## 2022-05-06 PROCEDURE — 84484 ASSAY OF TROPONIN QUANT: CPT

## 2022-05-06 PROCEDURE — 85025 COMPLETE CBC W/AUTO DIFF WBC: CPT

## 2022-05-06 PROCEDURE — 85379 FIBRIN DEGRADATION QUANT: CPT

## 2022-05-06 PROCEDURE — 80306 DRUG TEST PRSMV INSTRMNT: CPT

## 2022-05-06 PROCEDURE — 99285 EMERGENCY DEPT VISIT HI MDM: CPT

## 2022-05-06 PROCEDURE — 96374 THER/PROPH/DIAG INJ IV PUSH: CPT

## 2022-05-06 PROCEDURE — 36415 COLL VENOUS BLD VENIPUNCTURE: CPT

## 2022-05-06 PROCEDURE — 6360000002 HC RX W HCPCS: Performed by: EMERGENCY MEDICINE

## 2022-05-06 RX ORDER — KETOROLAC TROMETHAMINE 30 MG/ML
15 INJECTION, SOLUTION INTRAMUSCULAR; INTRAVENOUS ONCE
Status: DISCONTINUED | OUTPATIENT
Start: 2022-05-06 | End: 2022-05-06

## 2022-05-06 RX ORDER — LORAZEPAM 2 MG/ML
1 INJECTION INTRAMUSCULAR ONCE
Status: COMPLETED | OUTPATIENT
Start: 2022-05-06 | End: 2022-05-06

## 2022-05-06 RX ORDER — LORAZEPAM 0.5 MG/1
1 TABLET ORAL ONCE
Status: COMPLETED | OUTPATIENT
Start: 2022-05-06 | End: 2022-05-06

## 2022-05-06 RX ORDER — AMOXICILLIN AND CLAVULANATE POTASSIUM 875; 125 MG/1; MG/1
1 TABLET, FILM COATED ORAL 2 TIMES DAILY
Qty: 20 TABLET | Refills: 0 | Status: SHIPPED | OUTPATIENT
Start: 2022-05-06 | End: 2022-05-16

## 2022-05-06 RX ORDER — 0.9 % SODIUM CHLORIDE 0.9 %
1000 INTRAVENOUS SOLUTION INTRAVENOUS ONCE
Status: COMPLETED | OUTPATIENT
Start: 2022-05-06 | End: 2022-05-06

## 2022-05-06 RX ORDER — ACETAMINOPHEN 500 MG
1000 TABLET ORAL ONCE
Status: COMPLETED | OUTPATIENT
Start: 2022-05-06 | End: 2022-05-06

## 2022-05-06 RX ORDER — AMOXICILLIN AND CLAVULANATE POTASSIUM 875; 125 MG/1; MG/1
1 TABLET, FILM COATED ORAL ONCE
Status: COMPLETED | OUTPATIENT
Start: 2022-05-06 | End: 2022-05-06

## 2022-05-06 RX ADMIN — SODIUM CHLORIDE 1000 ML: 9 INJECTION, SOLUTION INTRAVENOUS at 20:53

## 2022-05-06 RX ADMIN — ACETAMINOPHEN 1000 MG: 500 TABLET, FILM COATED ORAL at 21:53

## 2022-05-06 RX ADMIN — LORAZEPAM 1 MG: 0.5 TABLET ORAL at 20:53

## 2022-05-06 RX ADMIN — AMOXICILLIN AND CLAVULANATE POTASSIUM 1 TABLET: 875; 125 TABLET, FILM COATED ORAL at 22:36

## 2022-05-06 RX ADMIN — LORAZEPAM 1 MG: 2 INJECTION INTRAMUSCULAR; INTRAVENOUS at 18:54

## 2022-05-06 ASSESSMENT — ENCOUNTER SYMPTOMS
CHEST TIGHTNESS: 0
PHOTOPHOBIA: 0
SORE THROAT: 0
CONSTIPATION: 0
BACK PAIN: 0
DIARRHEA: 0
SHORTNESS OF BREATH: 1
NAUSEA: 0
VOMITING: 0
COUGH: 1
ABDOMINAL PAIN: 0

## 2022-05-06 ASSESSMENT — PAIN - FUNCTIONAL ASSESSMENT: PAIN_FUNCTIONAL_ASSESSMENT: NONE - DENIES PAIN

## 2022-05-06 NOTE — ED PROVIDER NOTES
[address] [facility] ED  [address]  [place] [address] [place]  Phone: [phone]        Pt Name: Eli Ramos  MRN: [id number]  [place] 1965  Date of evaluation: 5/6/22      CHIEF COMPLAINT     Chief Complaint   Patient presents with    Shortness of Breath     SOB, Palpitations, dizziness x 1 hour prior to arrival. C/O waves of nausea. Denies pain. Pt assisted into gown and placed on full cardio-resp monitor.  Palpitations    Dizziness         HISTORY OF PRESENT ILLNESS  (Location/Symptom, Timing/Onset, Context/Setting, Quality, Duration, Modifying Factors, Severity.)    Eli Ramos is a 65 y.o. female who presents with shortness of breath, palpitations, and dizziness. No chest pain. She reports a cough, which is non-productive. She has nasal congestion. She had a sore throat and otalgia yesterday. No myalgias. She denies history of CHF. She has an inhaler that use uses intermittently. The patient has no prior history of venous thromboembolism. No recent travel. No recent surgery. No leg swelling. No hemoptysis. No estrogen containing medications. She has a history of breast cancer, in remission. She has a prior history of alcohol and cocaine abuse, and her last use was 2 months ago. She has no known history of CAD. She has hypertension. No hyperlipidemia. She is a smoker. No diabetes. Mom had cardiomyopathy secondary to lupus. She states she has been having sinus congestion. She has a prior history of sinus surgery. She states she has been irrigating her sinus and has had drainage of greenish mucus for 1 week. REVIEW OF SYSTEMS    (2-9 systems for level 4, 10 or more for level 5)     Review of Systems   Constitutional: Positive for chills. Negative for fever. HENT: Positive for congestion and ear pain. Negative for sore throat. Eyes: Negative for photophobia and visual disturbance. Respiratory: Positive for cough and shortness of breath. Negative for chest tightness. Cardiovascular: Positive for palpitations. Negative for chest pain. Gastrointestinal: Negative for abdominal pain, constipation, diarrhea, nausea and vomiting. Genitourinary: Negative for dysuria, frequency and urgency. Musculoskeletal: Negative for back pain and neck pain. Skin: Negative for rash and wound. Neurological: Positive for dizziness and light-headedness. Negative for headaches. Hematological: Negative for adenopathy. Does not bruise/bleed easily. PAST MEDICAL HISTORY    has a past medical history of Anxiety, Bipolar disorder ([address] Breast cancer ([address] Chronic back pain, Chronic kidney disease, COPD (chronic obstructive pulmonary disease) ([address] Depression, GERD (gastroesophageal reflux disease), Headache(784.0), History of alcoholism ([facility] Hyperlipidemia, Hypertension, Hyperthyroidism, Hypothyroidism, Kidney stone, Neuropathy, Obesity, Panic attack, PTSD (post-traumatic stress disorder), and Sleep apnea. SURGICAL HISTORY      has a past surgical history that includes Eye surgery; tumor removal (1999); Appendectomy (1977); Cholecystectomy (2014); Tubal ligation (2001); lumbar laminectomy (2006); Dilation and curettage of uterus; Breast lumpectomy (Left, 2014); Breast biopsy (Right, 2015); alcon and bso (cervix removed) (2015); ventral hernia repair (2019); hernia repair (01/2020); fracture surgery (Left); hernia repair (N/A, 7/22/2020); ventral hernia repair (N/A, 10/21/2020); Colonoscopy (2019); Colonoscopy (N/A, 5/11/2021); Pain management procedure (Left, 1/21/2022); eye surgery; Tonsillectomy; Hysterectomy, vaginal; and Upper gastrointestinal endoscopy.     CURRENTMEDICATIONS       Previous Medications    ALBUTEROL SULFATE  (90 BASE) MCG/ACT INHALER    Inhale 1-2 puffs into the lungs every 6 hours as needed for Wheezing or Shortness of Breath    ALBUTEROL SULFATE  (90 BASE) MCG/ACT INHALER    Inhale 1-2 puffs into the lungs every 6 hours as needed for Wheezing or Shortness of Breath    AMLODIPINE (NORVASC) 10 MG TABLET    TAKE ONE TABLET BY MOUTH DAILY    CHOLECALCIFEROL (VITAMIN D) 50 MCG (2000 UT) CAPS CAPSULE    Take 2,000 Units by mouth daily    DULOXETINE (CYMBALTA) 30 MG EXTENDED RELEASE CAPSULE    Take 30 mg by mouth daily    DULOXETINE (CYMBALTA) 60 MG EXTENDED RELEASE CAPSULE    Take 60 mg by mouth daily    FEXOFENADINE (ALLEGRA) 180 MG TABLET    Take 1 tablet by mouth daily    FLUTICASONE (FLONASE) 50 MCG/ACT NASAL SPRAY    SPRAY TWO SPRAYS IN EACH NOSTRIL ONCE DAILY    GABAPENTIN (NEURONTIN) 100 MG CAPSULE    Take 1 capsule by mouth 3 times daily for 90 days. HYDROXYZINE (ATARAX) 25 MG TABLET    Take 50 mg by mouth every 6 hours as needed for Anxiety     LAMOTRIGINE (LAMICTAL) 200 MG TABLET    Take 150 mg by mouth daily     LEVOTHYROXINE (SYNTHROID) 100 MCG TABLET    Take 1 tablet by mouth Daily    LIDOCAINE (LIDODERM) 5 %    Place 1 patch onto the skin daily 12 hours on, 12 hours off. LISINOPRIL (PRINIVIL;ZESTRIL) 40 MG TABLET    TAKE ONE TABLET BY MOUTH DAILY    MULTIPLE VITAMINS-MINERALS (MULTIVITAMIN ADULT PO)    Take 1 tablet by mouth daily    OMEPRAZOLE (PRILOSEC) 20 MG DELAYED RELEASE CAPSULE    Take 1 capsule by mouth 2 times daily    ONDANSETRON (ZOFRAN ODT) 4 MG DISINTEGRATING TABLET    Take 1 tablet by mouth every 8 hours as needed for Nausea    PROPRANOLOL (INDERAL) 10 MG TABLET    Take 10 mg by mouth 3 times daily Indications: patient takes twice a day     REXULTI 1 MG TABS TABLET    take 1 tablet by mouth once daily       ALLERGIES     is allergic to flomax [tamsulosin hcl], morphine, saphris [asenapine], bactrim [sulfamethoxazole-trimethoprim], prednisone, and zyprexa [olanzapine]. FAMILY HISTORY     She indicated that her mother is . She indicated that her father is . She indicated that her sister is alive. She indicated that her maternal grandmother is . She indicated that her maternal grandfather is .  She indicated that her paternal grandmother is . She indicated that her paternal grandfather is . family history includes Atrial Fibrillation in her mother; Heart Attack in her maternal grandfather; Heart Failure in her mother; High Blood Pressure in her father and mother; Lupus in her mother; Other in her maternal grandmother; Prostate Cancer in her father; Stroke in her paternal grandfather. SOCIAL HISTORY      reports that she has been smoking cigarettes. She started smoking about 40 years ago. She has a 18.50 pack-year smoking history. She has never used smokeless tobacco. She reports previous drug use. She reports that she does not drink alcohol. PHYSICAL EXAM    (up to 7 for level 4, 8 or more for level 5)   INITIAL VITALS:  height is 5' 7\" (1.702 m) and weight is 170 lb (77.1 kg). Her tympanic temperature is 99.2 °F (37.3 °C). Her blood pressure is 123/80 and her pulse is 122. Her respiration is 20 and oxygen saturation is 96%. Physical Exam  Vitals and nursing note reviewed. Constitutional:       Appearance: She is well-developed. HENT:      Head: Normocephalic and atraumatic. Cardiovascular:      Rate and Rhythm: Regular rhythm. Tachycardia present. Heart sounds: No murmur heard. No friction rub. No gallop. Pulmonary:      Effort: Pulmonary effort is normal.      Breath sounds: Normal breath sounds. No decreased breath sounds, wheezing, rhonchi or rales. Abdominal:      General: Bowel sounds are normal.      Palpations: Abdomen is soft. There is no mass. Tenderness: There is no abdominal tenderness. There is no guarding. Musculoskeletal:         General: Normal range of motion. Right lower leg: No tenderness. No edema. Left lower leg: No tenderness. No edema. Skin:     General: Skin is warm and dry. Capillary Refill: Capillary refill takes less than 2 seconds. Neurological:      General: No focal deficit present.       Mental Status: She is alert and oriented to person, place, and time. Psychiatric:         Mood and Affect: Mood normal.         Behavior: Behavior normal.         DIFFERENTIAL DIAGNOSIS/ MDM:     Chest pain. Repeat troponin. DIAGNOSTIC RESULTS     EKG: All EKG's are interpreted by the Emergency Department Physician who either signs or Co-signs this chart in the absence of a cardiologist.    EKG Interpretation    Interpreted by emergency department physician    Rhythm: sinus tachycardia  Rate: tachycardia  Axis: normal  Ectopy: none  Conduction: normal  ST Segments: normal  T Waves: normal  Q Waves: none    Clinical Impression: sinus tachycardia    Neena Hollingsworth MD      RADIOLOGY:        Interpretation per the Radiologist below, if available at the time of this note:    XR CHEST (2 VW)    Result Date: 5/6/2022  EXAMINATION: TWO XRAY VIEWS OF THE CHEST 5/6/2022 4:04 pm COMPARISON: 01/21/2022 HISTORY: ORDERING SYSTEM PROVIDED HISTORY: chest pain TECHNOLOGIST PROVIDED HISTORY: chest pain Reason for Exam: Chest pain, \"heart racing\" FINDINGS: The lungs are without acute focal process. There is no effusion or pneumothorax. The cardiomediastinal silhouette is stable. The osseous structures are stable. No acute process.        LABS:  Results for orders placed or performed during the hospital encounter of 94/39/56   Basic Metabolic Panel   Result Value Ref Range    Glucose 134 (H) 70 - 99 mg/dL    BUN 11 6 - 20 mg/dL    CREATININE 0.98 (H) 0.50 - 0.90 mg/dL    Bun/Cre Ratio 11 9 - 20    Calcium 9.6 8.6 - 10.4 mg/dL    Sodium 139 135 - 144 mmol/L    Potassium 3.6 (L) 3.7 - 5.3 mmol/L    Chloride 101 98 - 107 mmol/L    CO2 26 20 - 31 mmol/L    Anion Gap 12 9 - 17 mmol/L    GFR Non-African American 59 (L) >60 mL/min    GFR African American >60 >60 mL/min    GFR Comment         CBC with Auto Differential   Result Value Ref Range    WBC 10.0 3.5 - 11.3 k/uL    RBC 4.35 3.95 - 5.11 m/uL    Hemoglobin 12.8 11.9 - 15.1 g/dL    Hematocrit 37.9 36.3 - 47.1 %    MCV 87.1 82.6 - 102.9 fL    MCH 29.4 25.2 - 33.5 pg    MCHC 33.8 (H) 25.2 - 33.5 g/dL    RDW 13.1 11.8 - 14.4 %    Platelets 366 554 - 693 k/uL    MPV 8.6 8.1 - 13.5 fL    NRBC Automated 0.0 0.0 per 100 WBC    Seg Neutrophils 68 (H) 36 - 65 %    Lymphocytes 22 (L) 24 - 43 %    Monocytes 6 3 - 12 %    Eosinophils % 3 1 - 4 %    Basophils 1 0 - 2 %    Immature Granulocytes 0 0 %    Segs Absolute 6.79 1.50 - 8.10 k/uL    Absolute Lymph # 2.24 1.10 - 3.70 k/uL    Absolute Mono # 0.63 0.10 - 1.20 k/uL    Absolute Eos # 0.27 0.00 - 0.44 k/uL    Basophils Absolute 0.06 0.00 - 0.20 k/uL    Absolute Immature Granulocyte 0.04 0.00 - 0.30 k/uL   D-Dimer, Quantitative   Result Value Ref Range    D-Dimer, Quant 0.39 0.00 - 0.59 mg/L FEU   Troponin   Result Value Ref Range    Troponin, High Sensitivity 7 0 - 14 ng/L   EKG 12 Lead   Result Value Ref Range    Ventricular Rate 113 BPM    Atrial Rate 113 BPM    P-R Interval 144 ms    QRS Duration 84 ms    Q-T Interval 350 ms    QTc Calculation (Bazett) 480 ms    P Axis 64 degrees    R Axis 67 degrees    T Axis 60 degrees       Negative d-dimer, troponin    EMERGENCY DEPARTMENT COURSE:   Vitals:    Vitals:    05/06/22 1807 05/06/22 1830   BP: (!) 152/81 123/80   Pulse: 118 122   Resp: 18 20   Temp: 99.2 °F (37.3 °C)    TempSrc: Tympanic    SpO2: 97% 96%   Weight: 170 lb (77.1 kg)    Height: 5' 7\" (1.702 m)      -------------------------  BP: 123/80, Temp: 99.2 °F (37.3 °C), Pulse: 122, Resp: 20          CONSULTS:  none    PROCEDURES:  None    FINAL IMPRESSION    No diagnosis found. DISPOSITION/PLAN   DISPOSITION        CONDITION ON DISPOSITION:   Stable     PATIENT REFERRED TO:  No follow-up provider specified.     DISCHARGE MEDICATIONS:  New Prescriptions    No medications on file       (Please note that portions of this note were completed with a voicerecognition program.  Efforts were made to edit the dictations but occasionally words are mis-transcribed.)    Alex Hudson MD  Attending Emergency Medicine Physician        Alex Hudson MD  05/06/22 9810

## 2022-05-07 LAB — THYROXINE, FREE: 1.44 NG/DL (ref 0.93–1.7)

## 2022-05-07 NOTE — ED PROVIDER NOTES
ADDENDUM:      Care of this patient was assumed from Dr. Sirisha Rodriguez. The patient was seen for Shortness of Breath (SOB, Palpitations, dizziness x 1 hour prior to arrival. C/O waves of nausea. Denies pain. Pt assisted into gown and placed on full cardio-resp monitor.), Palpitations, and Dizziness  . The patient's initial evaluation and plan have been discussed with the prior provider who initially evaluated the patient. Nursing Notes, Past Medical Hx, Past Surgical Hx, Social Hx, Family Hx, Medications and Allergies, and  were all reviewed. Diagnostic Results     EKG   Reviewed, please see Dr. Joyner Mend interpretation. Repeat twelve-lead EKG timed 20: 58 sinus tachycardia 104 bpm.  Normal intervals normal axis. Without acute ST elevations depressions or T wave inversions. Interpretation is sinus tachycardia. RADIOLOGY:    XR CHEST (2 VW)    Result Date: 5/6/2022  EXAMINATION: TWO XRAY VIEWS OF THE CHEST 5/6/2022 4:04 pm COMPARISON: 01/21/2022 HISTORY: ORDERING SYSTEM PROVIDED HISTORY: chest pain TECHNOLOGIST PROVIDED HISTORY: chest pain Reason for Exam: Chest pain, \"heart racing\" FINDINGS: The lungs are without acute focal process. There is no effusion or pneumothorax. The cardiomediastinal silhouette is stable. The osseous structures are stable. No acute process.        LABS:   Results for orders placed or performed during the hospital encounter of 06/47/51   Basic Metabolic Panel   Result Value Ref Range    Glucose 134 (H) 70 - 99 mg/dL    BUN 11 6 - 20 mg/dL    CREATININE 0.98 (H) 0.50 - 0.90 mg/dL    Bun/Cre Ratio 11 9 - 20    Calcium 9.6 8.6 - 10.4 mg/dL    Sodium 139 135 - 144 mmol/L    Potassium 3.6 (L) 3.7 - 5.3 mmol/L    Chloride 101 98 - 107 mmol/L    CO2 26 20 - 31 mmol/L    Anion Gap 12 9 - 17 mmol/L    GFR Non-African American 59 (L) >60 mL/min    GFR African American >60 >60 mL/min    GFR Comment         CBC with Auto Differential   Result Value Ref Range    WBC 10.0 3.5 - 11.3 k/uL RBC 4.35 3.95 - 5.11 m/uL    Hemoglobin 12.8 11.9 - 15.1 g/dL    Hematocrit 37.9 36.3 - 47.1 %    MCV 87.1 82.6 - 102.9 fL    MCH 29.4 25.2 - 33.5 pg    MCHC 33.8 (H) 25.2 - 33.5 g/dL    RDW 13.1 11.8 - 14.4 %    Platelets 081 834 - 788 k/uL    MPV 8.6 8.1 - 13.5 fL    NRBC Automated 0.0 0.0 per 100 WBC    Seg Neutrophils 68 (H) 36 - 65 %    Lymphocytes 22 (L) 24 - 43 %    Monocytes 6 3 - 12 %    Eosinophils % 3 1 - 4 %    Basophils 1 0 - 2 %    Immature Granulocytes 0 0 %    Segs Absolute 6.79 1.50 - 8.10 k/uL    Absolute Lymph # 2.24 1.10 - 3.70 k/uL    Absolute Mono # 0.63 0.10 - 1.20 k/uL    Absolute Eos # 0.27 0.00 - 0.44 k/uL    Basophils Absolute 0.06 0.00 - 0.20 k/uL    Absolute Immature Granulocyte 0.04 0.00 - 0.30 k/uL   D-Dimer, Quantitative   Result Value Ref Range    D-Dimer, Quant 0.39 0.00 - 0.59 mg/L FEU   Troponin   Result Value Ref Range    Troponin, High Sensitivity 7 0 - 14 ng/L   Urine Drug Screen   Result Value Ref Range    Amphetamine Screen, Ur NEGATIVE NEGATIVE    Barbiturate Screen, Ur NEGATIVE NEGATIVE    Benzodiazepine Screen, Urine NEGATIVE NEGATIVE    Cocaine Metabolite, Urine NEGATIVE NEGATIVE    Methadone Screen, Urine NEGATIVE NEGATIVE    Opiates, Urine NEGATIVE NEGATIVE    Phencyclidine, Urine NEGATIVE NEGATIVE    Propoxyphene, Urine NEGATIVE NEGATIVE    Cannabinoid Scrn, Ur NEGATIVE NEGATIVE    Oxycodone Screen, Ur NEGATIVE NEGATIVE    Methamphetamine, Urine NEGATIVE NEGATIVE    Tricyclic Antidepressants, Urine NEGATIVE NEGATIVE    Buprenorphine Urine NEGATIVE NEGATIVE    Test Information       The following threshold concentrations are established for the drug assays:   Urinalysis with Microscopic   Result Value Ref Range    Glucose, Ur NEGATIVE NEGATIVE    Bilirubin Urine NEGATIVE NEGATIVE    Ketones, Urine NEGATIVE NEGATIVE    Specific [place], UA 1.010 1.010 - 1.025    Urine Hgb 1+ (A) NEGATIVE    pH, UA 7.0 (H) 5.0 - 6.0    Protein, UA NEGATIVE NEGATIVE    Urobilinogen, Urine Normal Normal    Nitrite, Urine NEGATIVE NEGATIVE    Leukocyte Esterase, Urine NEGATIVE NEGATIVE    -          WBC, UA None 0 - 4 /HPF    RBC, UA 0 TO 4 0 - 4 /HPF    Epithelial Cells UA 0 TO 4 0 - 5 /HPF    Bacteria, UA None None   Troponin   Result Value Ref Range    Troponin, High Sensitivity 6 0 - 14 ng/L   TSH with Reflex   Result Value Ref Range    TSH 0.21 (L) 0.30 - 5.00 uIU/mL   EKG 12 Lead   Result Value Ref Range    Ventricular Rate 113 BPM    Atrial Rate 113 BPM    P-R Interval 144 ms    QRS Duration 84 ms    Q-T Interval 350 ms    QTc Calculation (Bazett) 480 ms    P Axis 64 degrees    R Axis 67 degrees    T Axis 60 degrees   EKG 12 Lead   Result Value Ref Range    Ventricular Rate 112 BPM    Atrial Rate 112 BPM    P-R Interval 150 ms    QRS Duration 76 ms    Q-T Interval 342 ms    QTc Calculation (Bazett) 466 ms    P Axis 82 degrees    R Axis 75 degrees    T Axis 80 degrees       RECENT VITALS:  BP: 114/63, Temp: 98.4 °F (36.9 °C), Pulse: 105, Resp: 10     ED Course     The patient was given the following medications:  Orders Placed This Encounter   Medications    LORazepam (ATIVAN) injection 1 mg    LORazepam (ATIVAN) tablet 1 mg    0.9 % sodium chloride bolus    DISCONTD: ketorolac (TORADOL) injection 15 mg    acetaminophen (TYLENOL) tablet 1,000 mg    amoxicillin-clavulanate (AUGMENTIN) 875-125 MG per tablet 1 tablet     Order Specific Question:   Antimicrobial Indications     Answer:   Head and Neck Infection    amoxicillin-clavulanate (AUGMENTIN) 875-125 MG per tablet     Sig: Take 1 tablet by mouth 2 times daily for 10 days     Dispense:  20 tablet     Refill:  0       Medical Decision Making      Case signed out to my Dr. Parke Goldberg. I reviewed the history with the patient. She states that today she started to have some feelings of shortness of breath and palpitations that are worse with exertion. She does not have any symptoms of chest pain or pressure.   She recently had some upper respiratory symptoms and indicates that she has not been doing well with drinking fluids. Additionally I noticed that the patient has a history of hypothyroidism and takes levothyroxine. TSH was ordered and was low as was expected however when I discussed this with the patient she states that her free T4 was recently high. She states that she was not able to follow-up regarding this. Patient has remained in a sinus rhythm. She has had 2 nonacute twelve-lead EKGs. Her troponins are negative. She did not have chest pain. Her D-dimer is negative. I did give the patient some IV fluids and this was improving her tachycardia and she started to feel better. She wished to be discharged home as she had a ride. She also indicates that she has longstanding lower back pain and this is flared up and she is wondering if that is why her heart rate may also be a little bit high. Patient denies alcohol or benzodiazepine withdrawal.  She denies recent drug use. She states that she takes propranolol for anxiety but she does not take it all the time as it is difficult for her to remember to take it. She does indicate that she took it earlier this afternoon before coming to the emergency department. Case was discussed with Dr. Guanakito Telles who is on-call for the patient's PCP. No further recommendations at this time and plan to follow-up regarding the free T4 test which will not be available this evening as her tachycardia may be secondary to her levothyroxine. Patient was educated on the warning signs which return to the emergency department. She had no further questions or concerns. Disposition     FINAL IMPRESSION      1. Dyspnea and respiratory abnormalities    2.  Sinus tachycardia          DISPOSITION/PLAN   DISPOSITION Decision To Discharge 05/06/2022 10:31:20 PM      PATIENT REFERRED TO:  Jaz Graham DO  [address] [address]  [phone]    In 2 days        DISCHARGE MEDICATIONS:  Discharge Medication List as of 5/6/2022 10:31 PM      START taking these medications    Details   amoxicillin-clavulanate (AUGMENTIN) 875-125 MG per tablet Take 1 tablet by mouth 2 times daily for 10 days, Disp-20 tablet, R-0Normal                   (Please note that portions of this note were completed with a voice recognition program.  Efforts were made to edit the dictations but occasionally words are mis-transcribed.)    Bowen Loya MD, [facility]  Attending Emergency Medicine Physician     Bowen Loya MD  05/07/22 [id number]       Bowen Loya MD  05/07/22 [id number]

## 2022-05-09 LAB
EKG ATRIAL RATE: 112 BPM
EKG P AXIS: 82 DEGREES
EKG P-R INTERVAL: 150 MS
EKG Q-T INTERVAL: 342 MS
EKG QRS DURATION: 76 MS
EKG QTC CALCULATION (BAZETT): 466 MS
EKG R AXIS: 75 DEGREES
EKG T AXIS: 80 DEGREES
EKG VENTRICULAR RATE: 112 BPM

## 2022-05-13 ENCOUNTER — OFFICE VISIT (OUTPATIENT)
Dept: FAMILY MEDICINE CLINIC | Age: 57
End: 2022-05-13
Payer: MEDICARE

## 2022-05-13 VITALS
OXYGEN SATURATION: 97 % | TEMPERATURE: 98.1 F | DIASTOLIC BLOOD PRESSURE: 72 MMHG | WEIGHT: 169.4 LBS | BODY MASS INDEX: 31.98 KG/M2 | HEIGHT: 61 IN | SYSTOLIC BLOOD PRESSURE: 116 MMHG | HEART RATE: 90 BPM

## 2022-05-13 DIAGNOSIS — G89.29 CHRONIC BILATERAL LOW BACK PAIN WITHOUT SCIATICA: Primary | ICD-10-CM

## 2022-05-13 DIAGNOSIS — M62.838 MUSCLE SPASM: ICD-10-CM

## 2022-05-13 DIAGNOSIS — E78.2 HYPERCHOLESTEROLEMIA WITH HYPERTRIGLYCERIDEMIA: ICD-10-CM

## 2022-05-13 DIAGNOSIS — M54.50 CHRONIC BILATERAL LOW BACK PAIN WITHOUT SCIATICA: Primary | ICD-10-CM

## 2022-05-13 DIAGNOSIS — E03.9 HYPOTHYROIDISM, UNSPECIFIED TYPE: ICD-10-CM

## 2022-05-13 DIAGNOSIS — Z01.818 PRE-OP EXAM: ICD-10-CM

## 2022-05-13 PROBLEM — F41.9 ANXIETY: Status: ACTIVE | Noted: 2019-01-23

## 2022-05-13 PROBLEM — G47.9 DIFFICULTY SLEEPING: Status: ACTIVE | Noted: 2019-01-23

## 2022-05-13 PROBLEM — C50.919 MALIGNANT NEOPLASM OF BREAST (HCC): Status: ACTIVE | Noted: 2019-01-23

## 2022-05-13 PROBLEM — E87.6 HYPOKALEMIA: Status: ACTIVE | Noted: 2019-01-23

## 2022-05-13 PROBLEM — F31.81 BIPOLAR 2 DISORDER (HCC): Status: RESOLVED | Noted: 2017-11-26 | Resolved: 2022-05-13

## 2022-05-13 PROBLEM — Z91.51 HISTORY OF SUICIDE ATTEMPT: Status: ACTIVE | Noted: 2019-01-23

## 2022-05-13 PROBLEM — F17.200 CURRENT SMOKER: Status: ACTIVE | Noted: 2019-01-23

## 2022-05-13 PROBLEM — E83.42 HYPOMAGNESEMIA: Status: ACTIVE | Noted: 2019-01-23

## 2022-05-13 PROBLEM — F32.A DEPRESSIVE DISORDER: Status: ACTIVE | Noted: 2019-01-23

## 2022-05-13 PROCEDURE — 99212 OFFICE O/P EST SF 10 MIN: CPT | Performed by: FAMILY MEDICINE

## 2022-05-13 PROCEDURE — 3017F COLORECTAL CA SCREEN DOC REV: CPT | Performed by: FAMILY MEDICINE

## 2022-05-13 PROCEDURE — 4004F PT TOBACCO SCREEN RCVD TLK: CPT | Performed by: FAMILY MEDICINE

## 2022-05-13 PROCEDURE — G8427 DOCREV CUR MEDS BY ELIG CLIN: HCPCS | Performed by: FAMILY MEDICINE

## 2022-05-13 PROCEDURE — G8417 CALC BMI ABV UP PARAM F/U: HCPCS | Performed by: FAMILY MEDICINE

## 2022-05-13 RX ORDER — HYDROCODONE BITARTRATE AND ACETAMINOPHEN 5; 325 MG/1; MG/1
1 TABLET ORAL DAILY PRN
Qty: 20 TABLET | Refills: 0 | Status: SHIPPED | OUTPATIENT
Start: 2022-05-13 | End: 2022-06-02

## 2022-05-13 RX ORDER — LEVOTHYROXINE SODIUM 0.1 MG/1
TABLET ORAL
Qty: 90 TABLET | Refills: 0 | Status: SHIPPED | OUTPATIENT
Start: 2022-05-13 | End: 2022-08-10

## 2022-05-13 RX ORDER — MELATONIN 10 MG
10 CAPSULE ORAL NIGHTLY
COMMUNITY
End: 2022-08-30

## 2022-05-13 RX ORDER — CYCLOBENZAPRINE HCL 5 MG
5 TABLET ORAL 3 TIMES DAILY PRN
Qty: 40 TABLET | Refills: 1 | Status: SHIPPED | OUTPATIENT
Start: 2022-05-13 | End: 2022-08-30

## 2022-05-13 NOTE — PROGRESS NOTES
[address]  [address], [address] [place]  [phone]      Latosha Bo is a 65 y.o. female who presents today for her medical conditions/complaints as noted below. Latosha Bo is c/o of Pre-op Exam      HPI:     Pt here today for pre-op clearance. Pt will be having lumbar surgery on 6/7 at [facility] with Dr. Joce Quinones - will be having L3-4 fusion and L5-S1 fusion. Will be seeing their office for pre-op appt and labwork on 6/1. No h/o anesthesia reaction. No family h/o malignant hyperthermia. No h/o blood transfusion.     No h/o CVA, MI, CHF, cardiac arrhythmia, renal failure, DVT/PE, or DM.     Pt was already instructed to stop all vitamins and any ASA/NSAID's 7 days prior to surgery.       Had recent EKG from ER visit on 5/6/21 - no acute abnormalities. Also had normal CXR that day. Taking Flexeril 5 mg BID as needed, usually more at bedtime - did help. Past Medical History:   Diagnosis Date    Anxiety     Bipolar disorder ([address]     Breast cancer ([address] 2014    L side - lumpectomy and radiation; no chemo (was recommended to take Tamoxifen, but with her smoking, she declined due to family h/o CVA already).  Seeing Dr. Sukhjinder Conrad once yearly/    Chronic back pain     Chronic kidney disease     COPD (chronic obstructive pulmonary disease) ([address]     Depression     GERD (gastroesophageal reflux disease)     Headache(784.0)     History of alcoholism ([address]     sober since 1/16/19    Hyperlipidemia     Hypertension     Hyperthyroidism 1 2022    Hypothyroidism     Kidney stone     Has had lithotripsy x 1; had ureteral stent placement with removal x 1; had seen Dr. Saumya Sim Neuropathy     Obesity     Panic attack 04/15/2021    PTSD (post-traumatic stress disorder)     Sleep apnea 4 2021      Past Surgical History:   Procedure Laterality Date    APPENDECTOMY  1977    BREAST BIOPSY Right 2015    excisional biopsy - Dr. Zara Green BREAST LUMPECTOMY Left     breast CA - done at [facility]      COLONOSCOPY  2019    polyps, Dr. Quintana Members at [facility] N/A 2021    mild diverticulosis, tubular adenoma x1; Dr. Dottie Mosley at [facility]      multiple in her 19's   [address]      x 2 in her youth - was \"cross-eyed\"    EYE SURGERY      FRACTURE SURGERY Left     hand    HERNIA REPAIR  2020    recurrent incisional hernia repair Dr. Virginia Mills N/A 2020    Laparoscopic Robotic Assisted Ventral Hernia Repair performed by Oscar Zamorano MD at [address]      Dr. Davis Rodriguez Left 2022    Left L4 L5 TRANSFORAMINAL performed by Phoebe Alvarez MD at [address]      Dr. 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 Sanna Zapata at [facility] - used mesh; had revision in 2020    [facility] N/A 10/21/2020    Open VENTRAL Hernia Repair w/ mesh & Component separation technique performed by Oscar Zamorano MD at [facility] OR     Family History   Problem Relation Age of Onset    High Blood Pressure Mother     Lupus Mother          from CHF secondary to cardiomyopathy from her lupus    Heart Failure Mother     Atrial Fibrillation Mother     High Blood Pressure Father     Prostate Cancer Father         age 54;  11 years later of a \"bladder tumor\" that caused bladder rupture (pt unsure if malignancy)    Other Maternal Grandmother         had \"stomach tumor\"    Heart Attack Maternal Grandfather          at age 40    Stroke Paternal Grandfather         in his 42's; multiple     Social History     Tobacco Use    Smoking status: Current Every Day Smoker Packs/day: 0.50     Years: 37.00     Pack years: 18.50     Types: Cigarettes     Start date: 1982    Smokeless tobacco: Never Used    Tobacco comment: Continue decreasing   Substance Use Topics    Alcohol use: No     Comment: [facility] with [facility]      Current Outpatient Medications   Medication Sig Dispense Refill    Lactobacillus (PROBIOTIC ACIDOPHILUS PO) Take by mouth      levothyroxine (SYNTHROID) 100 MCG tablet Take 1 tab by mouth daily Mon-Sat; hold dose on Sunday's. 90 tablet 0    cyclobenzaprine (FLEXERIL) 5 MG tablet Take 1 tablet by mouth 3 times daily as needed for Muscle spasms 40 tablet 1    lidocaine (LIDODERM) 5 % Place 1 patch onto the skin daily 12 hours on, 12 hours off. 30 patch 2    gabapentin (NEURONTIN) 100 MG capsule Take 1 capsule by mouth 3 times daily for 90 days.  90 capsule 2    albuterol sulfate  (90 Base) MCG/ACT inhaler Inhale 1-2 puffs into the lungs every 6 hours as needed for Wheezing or Shortness of Breath 18 g 5    REXULTI 1 MG TABS tablet take 1 tablet by mouth once daily      ondansetron (ZOFRAN ODT) 4 MG disintegrating tablet Take 1 tablet by mouth every 8 hours as needed for Nausea 40 tablet 1    omeprazole (PRILOSEC) 20 MG delayed release capsule Take 1 capsule by mouth 2 times daily 180 capsule 1    albuterol sulfate  (90 Base) MCG/ACT inhaler Inhale 1-2 puffs into the lungs every 6 hours as needed for Wheezing or Shortness of Breath 3 Inhaler 1    fluticasone (FLONASE) 50 MCG/ACT nasal spray SPRAY TWO SPRAYS IN EACH NOSTRIL ONCE DAILY 3 Bottle 3    lisinopril (PRINIVIL;ZESTRIL) 40 MG tablet TAKE ONE TABLET BY MOUTH DAILY 90 tablet 3    Cholecalciferol (VITAMIN D) 50 MCG (2000 UT) CAPS capsule Take 2,000 Units by mouth daily      Multiple Vitamins-Minerals (MULTIVITAMIN ADULT PO) Take 1 tablet by mouth daily      propranolol (INDERAL) 10 MG tablet Take 10 mg by mouth 3 times daily Indications: patient takes twice a day       hydrOXYzine (ATARAX) 25 MG tablet Take 50 mg by mouth every 6 hours as needed for Anxiety       DULoxetine (CYMBALTA) 30 MG extended release capsule Take 30 mg by mouth daily      DULoxetine (CYMBALTA) 60 MG extended release capsule Take 60 mg by mouth daily      lamoTRIgine (LAMICTAL) 200 MG tablet Take 150 mg by mouth daily       amLODIPine (NORVASC) 10 MG tablet Take 1 tablet by mouth daily 90 tablet 3    melatonin 10 MG CAPS capsule Take 10 mg by mouth nightly      fexofenadine (ALLEGRA) 180 MG tablet Take 1 tablet by mouth daily (Patient not taking: Reported on 5/13/2022) 90 tablet 3     No current facility-administered medications for this visit. Allergies   Allergen Reactions    Flomax [Tamsulosin Hcl] Swelling     Swelling of arms; however, also had rash and fever at the same time and was admitted at the time    Morphine Itching and Rash     Rash, itching    Saphris [Asenapine]     Seasonal     Bactrim [Sulfamethoxazole-Trimethoprim] Diarrhea and Nausea Only    Prednisone Other (See Comments)     Emotional changes, depression    Zyprexa [Olanzapine] Other (See Comments)     Made her feel like she wanted to \"jump out of my skin\"       Health Maintenance   Topic Date Due    Pneumococcal 0-64 years Vaccine (1 - PCV) Never done    Shingles vaccine (1 of 2) Never done    COVID-19 Vaccine (3 - Booster for Moderna series) 09/08/2021    DTaP/Tdap/Td vaccine (2 - Td or Tdap) 11/30/2021    Breast cancer screen  08/17/2022    Flu vaccine (Season Ended) 09/01/2022    Depression Monitoring  04/09/2023    Diabetes screen  04/16/2024    Colorectal Cancer Screen  05/11/2026    Lipids  01/04/2027    Hepatitis C screen  Completed    HIV screen  Completed    Hepatitis A vaccine  Aged Out    Hepatitis B vaccine  Aged Out    Hib vaccine  Aged Out    Meningococcal (ACWY) vaccine  Aged Out       Subjective:      Review of Systems   Constitutional: Negative for fever.    Musculoskeletal: Positive for back pain. Skin: Negative for rash and wound. Objective:     Vitals:    05/13/22 1041   BP: 116/72   Site: Right Upper Arm   Position: Sitting   Cuff Size: Large Adult   Pulse: 90   Temp: 98.1 °F (36.7 °C)   TempSrc: Temporal   SpO2: 97%   Weight: 169 lb 6.4 oz (76.8 kg)   Height: 5' 1\" (1.549 m)     Physical Exam  Vitals and nursing note reviewed. Constitutional:       General: She is not in acute distress. Appearance: She is well-developed. HENT:      Head: Normocephalic and atraumatic. Right Ear: Tympanic membrane, ear canal and external ear normal.      Left Ear: Tympanic membrane, ear canal and external ear normal.      Nose: Nose normal.      Mouth/Throat:      Mouth: Mucous membranes are moist.      Pharynx: Oropharynx is clear. No posterior oropharyngeal erythema. Eyes:      Conjunctiva/sclera: Conjunctivae normal.   Cardiovascular:      Rate and Rhythm: Normal rate and regular rhythm. Heart sounds: Normal heart sounds. Pulmonary:      Effort: Pulmonary effort is normal. No respiratory distress. Breath sounds: Normal breath sounds. Abdominal:      General: Bowel sounds are normal. There is no distension. Palpations: Abdomen is soft. Tenderness: There is no abdominal tenderness. Musculoskeletal:      Cervical back: Neck supple. Skin:     General: Skin is warm and dry. Neurological:      Mental Status: She is alert and oriented to person, place, and time. Assessment:      1. Chronic bilateral low back pain without sciatica  -     cyclobenzaprine (FLEXERIL) 5 MG tablet; Take 1 tablet by mouth 3 times daily as needed for Muscle spasms, Disp-40 tablet, R-1Normal  -     HYDROcodone-acetaminophen (NORCO) 5-325 MG per tablet; Take 1 tablet by mouth daily as needed for Pain for up to 20 days. , Disp-20 tablet, R-0Normal  2. Pre-op exam  3. Muscle spasm  -     cyclobenzaprine (FLEXERIL) 5 MG tablet;  Take 1 tablet by mouth 3 times daily as needed for Muscle spasms, Disp-40 tablet, R-1Normal  4. Hypothyroidism, unspecified type  -     levothyroxine (SYNTHROID) 100 MCG tablet; Take 1 tab by mouth daily Mon-Sat; hold dose on Sunday's., Disp-90 tablet, R-0Normal  -     TSH; Future  -     T4, Free; Future  5. Hypercholesterolemia with hypertriglyceridemia         Plan:      Pt is considered low-risk for planned procedure. No concerns on exam today. Will complete pre-operative labwork with surgeon's office. Recent EKG and CXR negative. Pt was already instructed to stop all vitamins and any ASA/NSAID's 7 days prior to surgery. Return in about 2 months (around 7/18/2022) for f/u thyroid, labs, surgery recovery. Orders Placed This Encounter   Procedures    TSH     Standing Status:   Future     Standing Expiration Date:   5/13/2023    T4, Free     Standing Status:   Future     Standing Expiration Date:   5/13/2023     Orders Placed This Encounter   Medications    levothyroxine (SYNTHROID) 100 MCG tablet     Sig: Take 1 tab by mouth daily Mon-Sat; hold dose on Sunday's. Dispense:  90 tablet     Refill:  0    cyclobenzaprine (FLEXERIL) 5 MG tablet     Sig: Take 1 tablet by mouth 3 times daily as needed for Muscle spasms     Dispense:  40 tablet     Refill:  1    HYDROcodone-acetaminophen (NORCO) 5-325 MG per tablet     Sig: Take 1 tablet by mouth daily as needed for Pain for up to 20 days. Dispense:  20 tablet     Refill:  0     Reduce doses taken as pain becomes manageable       Patient given educational materials - see patient instructions. Discussed use, benefit, and side effects of prescribed medications. All patient questions answered. Pt voiced understanding.          Electronically signed by Jerie Osgood, DO, DO on 6/6/2022 at 12:21 AM

## 2022-05-17 DIAGNOSIS — I10 ESSENTIAL HYPERTENSION: ICD-10-CM

## 2022-05-17 RX ORDER — AMLODIPINE BESYLATE 10 MG/1
10 TABLET ORAL DAILY
Qty: 90 TABLET | Refills: 3 | Status: SHIPPED | OUTPATIENT
Start: 2022-05-17

## 2022-05-17 NOTE — TELEPHONE ENCOUNTER
Myra Arredondo called requesting a refill of the below medication which has been pended for you:     Requested Prescriptions     Pending Prescriptions Disp Refills    amLODIPine (NORVASC) 10 MG tablet [Pharmacy Med Name: AMLODIPINE BESYLATE 10 MG TAB] 300 tablet      Sig: take 1 tablet by mouth once daily       Last Appointment Date: 5/13/2022  Next Appointment Date: 7/13/2022    Allergies   Allergen Reactions    Flomax [Tamsulosin Hcl] Swelling     Swelling of arms; however, also had rash and fever at the same time and was admitted at the time    Morphine Itching and Rash     Rash, itching    Saphris [Asenapine]     Seasonal     Bactrim [Sulfamethoxazole-Trimethoprim] Diarrhea and Nausea Only    Prednisone Other (See Comments)     Emotional changes, depression    Zyprexa [Olanzapine] Other (See Comments)     Made her feel like she wanted to \"jump out of my skin\"

## 2022-05-31 ENCOUNTER — TELEPHONE (OUTPATIENT)
Dept: FAMILY MEDICINE CLINIC | Age: 57
End: 2022-05-31

## 2022-05-31 NOTE — TELEPHONE ENCOUNTER
Matthew Angelo from [facility] called requesting pre-op clearance for patient. Requested recent office note, EKG, and signed pre-op clearance (letter or form). Would need letter to explicitly state patient is low-risk for surgery. Recent office note and EKG faxed to [phone], ATTN: Matthew Angelo. They are requesting letter or form ASAP. They were not sure if form had been faxed previously, but if no form available, they are able to accept letter. Please advise.

## 2022-06-02 NOTE — TELEPHONE ENCOUNTER
Jennifer Fernandez calling from [facility]. States she needs the pre op clearance by 6/3/22, states that's when she is turning in her paperwork for patient's surgery. Patient scheduled to have surgery 6/7/22 per Jennifer Fernandez.

## 2022-06-06 ASSESSMENT — ENCOUNTER SYMPTOMS: BACK PAIN: 1

## 2022-06-06 NOTE — TELEPHONE ENCOUNTER
OV note from 5/13 is addended and reflects pt's medical optimization for surgery. Please send updated OV note to Stephanie at [facility].

## 2022-06-06 NOTE — TELEPHONE ENCOUNTER
OV notes and EKG from 5/9/2022 faxed to [facility] @ [phone] at 0930. Confirmation received.

## 2022-06-14 DIAGNOSIS — R52 BURNING PAIN: ICD-10-CM

## 2022-06-14 DIAGNOSIS — R14.0 ABDOMINAL BLOATING: ICD-10-CM

## 2022-06-14 NOTE — TELEPHONE ENCOUNTER
Per [facility], last fill 5/17, quantity 90 for 30 days.        Ya Nicole called requesting a refill of the below medication which has been pended for you:     Requested Prescriptions     Pending Prescriptions Disp Refills    omeprazole (PRILOSEC) 20 MG delayed release capsule [Pharmacy Med Name: OMEPRAZOLE DR 20 MG CAPSULE] 180 capsule 1     Sig: take 1 capsule by mouth twice a day    gabapentin (NEURONTIN) 100 MG capsule [Pharmacy Med Name: GABAPENTIN 100 MG CAPSULE] 90 capsule 2     Sig: take 1 capsule by mouth three times a day       Last Appointment Date: 5/13/2022  Next Appointment Date: 7/13/2022    Allergies   Allergen Reactions    Flomax [Tamsulosin Hcl] Swelling     Swelling of arms; however, also had rash and fever at the same time and was admitted at the time    Morphine Itching and Rash     Rash, itching    Saphris [Asenapine]     Seasonal     Bactrim [Sulfamethoxazole-Trimethoprim] Diarrhea and Nausea Only    Prednisone Other (See Comments)     Emotional changes, depression    Zyprexa [Olanzapine] Other (See Comments)     Made her feel like she wanted to \"jump out of my skin\"

## 2022-06-16 DIAGNOSIS — I10 ESSENTIAL HYPERTENSION: ICD-10-CM

## 2022-06-17 RX ORDER — GABAPENTIN 100 MG/1
100 CAPSULE ORAL 3 TIMES DAILY
Qty: 90 CAPSULE | Refills: 2 | Status: SHIPPED | OUTPATIENT
Start: 2022-06-17 | End: 2022-09-15

## 2022-06-17 RX ORDER — LISINOPRIL 40 MG/1
40 TABLET ORAL DAILY
Qty: 90 TABLET | Refills: 3 | Status: SHIPPED | OUTPATIENT
Start: 2022-06-17

## 2022-06-17 RX ORDER — OMEPRAZOLE 20 MG/1
20 CAPSULE, DELAYED RELEASE ORAL 2 TIMES DAILY
Qty: 180 CAPSULE | Refills: 3 | Status: SHIPPED | OUTPATIENT
Start: 2022-06-17

## 2022-06-17 NOTE — TELEPHONE ENCOUNTER
Controlled Substance Monitoring:    Acute and Chronic Pain Monitoring:   RX Monitoring 6/17/2022   Periodic Controlled Substance Monitoring No signs of potential drug abuse or diversion identified. Obtained or confirmed \"Medication Contract\" on file.

## 2022-07-26 ENCOUNTER — TELEPHONE (OUTPATIENT)
Dept: FAMILY MEDICINE CLINIC | Age: 57
End: 2022-07-26

## 2022-07-26 NOTE — TELEPHONE ENCOUNTER
[facility] to reschedule appt as Dr. Meagan Howell will be out of office on 8/12 PM. Can be scheduled 8/5 at 11AM if agreeable.

## 2022-07-26 NOTE — LETTER
[facility] department of [place] [address]  Phone: [phone]  Fax: [phone]    Brian Brian DO        August 1, 2022    [address] [facility] [address]      Dear Melissa Mitchell:    Our office has been trying to contact you regarding your upcoming appointment. Dr. Dahlia Burton will be out of the office on 8/12, we need to reschedule your visit. Please call our office at [phone].     Sincerely,        Brian Brian DO

## 2022-08-07 DIAGNOSIS — E03.9 HYPOTHYROIDISM, UNSPECIFIED TYPE: ICD-10-CM

## 2022-08-07 DIAGNOSIS — J30.9 ALLERGIC RHINITIS, UNSPECIFIED SEASONALITY, UNSPECIFIED TRIGGER: Primary | ICD-10-CM

## 2022-08-08 NOTE — TELEPHONE ENCOUNTER
Toña LOUIS called requesting a refill of the below medication which has been pended for you:     Requested Prescriptions     Pending Prescriptions Disp Refills    fluticasone (FLONASE) 50 MCG/ACT nasal spray [Pharmacy Med Name: FLUTICASONE PROP 50 MCG SPRAY] 16 g      Sig: instill 2 sprays into each nostril once daily    levothyroxine (SYNTHROID) 100 MCG tablet [Pharmacy Med Name: LEVOTHYROXINE 100 MCG TABLET] 30 tablet      Sig: take 1 tablet by mouth daily [address] [employer] [facility]       Last Appointment Date: 5/13/2022  Next Appointment Date: 8/30/2022    Allergies   Allergen Reactions    Flomax [Tamsulosin Hcl] Swelling     Swelling of arms; however, also had rash and fever at the same time and was admitted at the time    Morphine Itching and Rash     Rash, itching    Saphris [Asenapine]     Seasonal     Bactrim [Sulfamethoxazole-Trimethoprim] Diarrhea and Nausea Only    Prednisone Other (See Comments)     Emotional changes, depression    Zyprexa [Olanzapine] Other (See Comments)     Made her feel like she wanted to \"jump out of my skin\"

## 2022-08-10 RX ORDER — LEVOTHYROXINE SODIUM 0.1 MG/1
TABLET ORAL
Qty: 30 TABLET | Refills: 0 | Status: SHIPPED | OUTPATIENT
Start: 2022-08-10 | End: 2022-09-08

## 2022-08-10 RX ORDER — FLUTICASONE PROPIONATE 50 MCG
1-2 SPRAY, SUSPENSION (ML) NASAL DAILY
Qty: 48 G | Refills: 3 | Status: SHIPPED | OUTPATIENT
Start: 2022-08-10

## 2022-08-16 ENCOUNTER — HOSPITAL ENCOUNTER (EMERGENCY)
Age: 57
Discharge: HOME | End: 2022-08-16
Attending: EMERGENCY MEDICINE
Payer: MEDICARE

## 2022-08-16 ENCOUNTER — APPOINTMENT (OUTPATIENT)
Dept: CT IMAGING | Age: 57
End: 2022-08-16
Payer: MEDICARE

## 2022-08-16 VITALS
RESPIRATION RATE: 16 BRPM | TEMPERATURE: 98.6 F | DIASTOLIC BLOOD PRESSURE: 91 MMHG | OXYGEN SATURATION: 93 % | BODY MASS INDEX: 30.78 KG/M2 | SYSTOLIC BLOOD PRESSURE: 151 MMHG | HEIGHT: 61 IN | WEIGHT: 163 LBS | HEART RATE: 90 BPM

## 2022-08-16 DIAGNOSIS — R10.10 UPPER ABDOMINAL PAIN: Primary | ICD-10-CM

## 2022-08-16 LAB
ABSOLUTE EOS #: 0.41 K/UL (ref 0–0.44)
ABSOLUTE IMMATURE GRANULOCYTE: 0.05 K/UL (ref 0–0.3)
ABSOLUTE LYMPH #: 3.24 K/UL (ref 1.1–3.7)
ABSOLUTE MONO #: 0.76 K/UL (ref 0.1–1.2)
ALBUMIN SERPL-MCNC: 4.5 G/DL (ref 3.5–5.2)
ALBUMIN/GLOBULIN RATIO: 1.6 (ref 1–2.5)
ALP BLD-CCNC: 92 U/L (ref 35–104)
ALT SERPL-CCNC: 15 U/L (ref 5–33)
ANION GAP SERPL CALCULATED.3IONS-SCNC: 14 MMOL/L (ref 9–17)
AST SERPL-CCNC: 18 U/L
BACTERIA: NORMAL
BASOPHILS # BLD: 1 % (ref 0–2)
BASOPHILS ABSOLUTE: 0.09 K/UL (ref 0–0.2)
BILIRUB SERPL-MCNC: 0.18 MG/DL (ref 0.3–1.2)
BILIRUBIN URINE: NEGATIVE
BUN BLDV-MCNC: 10 MG/DL (ref 6–20)
BUN/CREAT BLD: 11 (ref 9–20)
CALCIUM SERPL-MCNC: 10.2 MG/DL (ref 8.6–10.4)
CHLORIDE BLD-SCNC: 101 MMOL/L (ref 98–107)
CO2: 26 MMOL/L (ref 20–31)
CREAT SERPL-MCNC: 0.9 MG/DL (ref 0.5–0.9)
EOSINOPHILS RELATIVE PERCENT: 4 % (ref 1–4)
EPITHELIAL CELLS UA: NORMAL /HPF (ref 0–5)
GFR AFRICAN AMERICAN: >60 ML/MIN
GFR NON-AFRICAN AMERICAN: >60 ML/MIN
GFR SERPL CREATININE-BSD FRML MDRD: ABNORMAL ML/MIN/{1.73_M2}
GLUCOSE BLD-MCNC: 116 MG/DL (ref 70–99)
GLUCOSE URINE: NEGATIVE
HCT VFR BLD CALC: 38.9 % (ref 36.3–47.1)
HEMOGLOBIN: 13 G/DL (ref 11.9–15.1)
IMMATURE GRANULOCYTES: 0 %
KETONES, URINE: NEGATIVE
LEUKOCYTE ESTERASE, URINE: ABNORMAL
LIPASE: 39 U/L (ref 13–60)
LYMPHOCYTES # BLD: 29 % (ref 24–43)
MCH RBC QN AUTO: 28.4 PG (ref 25.2–33.5)
MCHC RBC AUTO-ENTMCNC: 33.4 G/DL (ref 25.2–33.5)
MCV RBC AUTO: 84.9 FL (ref 82.6–102.9)
MONOCYTES # BLD: 7 % (ref 3–12)
NITRITE, URINE: NEGATIVE
NRBC AUTOMATED: 0 PER 100 WBC
PDW BLD-RTO: 13.8 % (ref 11.8–14.4)
PH UA: 5.5 (ref 5–6)
PLATELET # BLD: 318 K/UL (ref 138–453)
PMV BLD AUTO: 8.5 FL (ref 8.1–13.5)
POTASSIUM SERPL-SCNC: 3.2 MMOL/L (ref 3.7–5.3)
PROTEIN UA: NEGATIVE
RBC # BLD: 4.58 M/UL (ref 3.95–5.11)
RBC UA: NORMAL /HPF (ref 0–4)
SEG NEUTROPHILS: 59 % (ref 36–65)
SEGMENTED NEUTROPHILS ABSOLUTE COUNT: 6.79 K/UL (ref 1.5–8.1)
SODIUM BLD-SCNC: 141 MMOL/L (ref 135–144)
SPECIFIC GRAVITY UA: 1 (ref 1.01–1.02)
TOTAL PROTEIN: 7.4 G/DL (ref 6.4–8.3)
TROPONIN, HIGH SENSITIVITY: 15 NG/L (ref 0–14)
TROPONIN, HIGH SENSITIVITY: 15 NG/L (ref 0–14)
URINE HGB: ABNORMAL
UROBILINOGEN, URINE: NORMAL
WBC # BLD: 11.3 K/UL (ref 3.5–11.3)
WBC UA: NORMAL /HPF (ref 0–4)

## 2022-08-16 PROCEDURE — 74176 CT ABD & PELVIS W/O CONTRAST: CPT

## 2022-08-16 PROCEDURE — 6370000000 HC RX 637 (ALT 250 FOR IP): Performed by: EMERGENCY MEDICINE

## 2022-08-16 PROCEDURE — 6360000002 HC RX W HCPCS: Performed by: EMERGENCY MEDICINE

## 2022-08-16 PROCEDURE — 81001 URINALYSIS AUTO W/SCOPE: CPT

## 2022-08-16 PROCEDURE — 96361 HYDRATE IV INFUSION ADD-ON: CPT

## 2022-08-16 PROCEDURE — 80053 COMPREHEN METABOLIC PANEL: CPT

## 2022-08-16 PROCEDURE — 83690 ASSAY OF LIPASE: CPT

## 2022-08-16 PROCEDURE — 85025 COMPLETE CBC W/AUTO DIFF WBC: CPT

## 2022-08-16 PROCEDURE — 96374 THER/PROPH/DIAG INJ IV PUSH: CPT

## 2022-08-16 PROCEDURE — 84484 ASSAY OF TROPONIN QUANT: CPT

## 2022-08-16 PROCEDURE — 96375 TX/PRO/DX INJ NEW DRUG ADDON: CPT

## 2022-08-16 PROCEDURE — 2580000003 HC RX 258: Performed by: EMERGENCY MEDICINE

## 2022-08-16 PROCEDURE — 99284 EMERGENCY DEPT VISIT MOD MDM: CPT

## 2022-08-16 PROCEDURE — 36415 COLL VENOUS BLD VENIPUNCTURE: CPT

## 2022-08-16 PROCEDURE — 93005 ELECTROCARDIOGRAM TRACING: CPT | Performed by: EMERGENCY MEDICINE

## 2022-08-16 RX ORDER — FENTANYL CITRATE 50 UG/ML
50 INJECTION, SOLUTION INTRAMUSCULAR; INTRAVENOUS ONCE
Status: COMPLETED | OUTPATIENT
Start: 2022-08-16 | End: 2022-08-16

## 2022-08-16 RX ORDER — 0.9 % SODIUM CHLORIDE 0.9 %
1000 INTRAVENOUS SOLUTION INTRAVENOUS ONCE
Status: COMPLETED | OUTPATIENT
Start: 2022-08-16 | End: 2022-08-16

## 2022-08-16 RX ORDER — HYDROCODONE BITARTRATE AND ACETAMINOPHEN 5; 325 MG/1; MG/1
2 TABLET ORAL ONCE
Status: COMPLETED | OUTPATIENT
Start: 2022-08-16 | End: 2022-08-16

## 2022-08-16 RX ORDER — ONDANSETRON 2 MG/ML
4 INJECTION INTRAMUSCULAR; INTRAVENOUS ONCE
Status: COMPLETED | OUTPATIENT
Start: 2022-08-16 | End: 2022-08-16

## 2022-08-16 RX ORDER — KETOROLAC TROMETHAMINE 30 MG/ML
30 INJECTION, SOLUTION INTRAMUSCULAR; INTRAVENOUS ONCE
Status: COMPLETED | OUTPATIENT
Start: 2022-08-16 | End: 2022-08-16

## 2022-08-16 RX ADMIN — FENTANYL CITRATE 50 MCG: 50 INJECTION, SOLUTION INTRAMUSCULAR; INTRAVENOUS at 19:06

## 2022-08-16 RX ADMIN — KETOROLAC TROMETHAMINE 30 MG: 30 INJECTION, SOLUTION INTRAMUSCULAR at 19:05

## 2022-08-16 RX ADMIN — ONDANSETRON 4 MG: 2 INJECTION INTRAMUSCULAR; INTRAVENOUS at 19:04

## 2022-08-16 RX ADMIN — SODIUM CHLORIDE 1000 ML: 9 INJECTION, SOLUTION INTRAVENOUS at 19:01

## 2022-08-16 RX ADMIN — HYDROCODONE BITARTRATE AND ACETAMINOPHEN 2 TABLET: 5; 325 TABLET ORAL at 20:24

## 2022-08-16 ASSESSMENT — PAIN DESCRIPTION - PAIN TYPE: TYPE: ACUTE PAIN

## 2022-08-16 ASSESSMENT — PAIN SCALES - GENERAL
PAINLEVEL_OUTOF10: 5
PAINLEVEL_OUTOF10: 7
PAINLEVEL_OUTOF10: 7
PAINLEVEL_OUTOF10: 5
PAINLEVEL_OUTOF10: 3

## 2022-08-16 ASSESSMENT — ENCOUNTER SYMPTOMS
DIARRHEA: 0
VOMITING: 0
SHORTNESS OF BREATH: 0
SORE THROAT: 0
ABDOMINAL PAIN: 1

## 2022-08-16 ASSESSMENT — PAIN DESCRIPTION - LOCATION: LOCATION: FLANK

## 2022-08-16 ASSESSMENT — PAIN DESCRIPTION - DESCRIPTORS: DESCRIPTORS: SHARP

## 2022-08-16 ASSESSMENT — PAIN - FUNCTIONAL ASSESSMENT: PAIN_FUNCTIONAL_ASSESSMENT: 0-10

## 2022-08-16 NOTE — ED PROVIDER NOTES
[facility] ED  [address]  [place] [address] [place]  Phone: [phone] ED  EMERGENCY DEPARTMENT ENCOUNTER      Pt Name: Sudha Badillo  MRN: [id number]  Alexigfhanny 1965  Date of evaluation: 8/16/2022  Provider: Ludivina Palacios DO    CHIEF COMPLAINT       Chief Complaint   Patient presents with    Flank Pain     Hs of kidnes stones; pain started a couple hours ago         HISTORY OF PRESENT ILLNESS   (Location/Symptom, Timing/Onset,Context/Setting, Quality, Duration, Modifying Factors, Severity)  Note limiting factors. Sudha Badillo is a 65 y.o. female who presents to the emergency department for evaluation of left abdominal/flank pain. Patient states this started a few hours prior to arrival.  She noticed some pain in the left lower/middle quadrant of her abdomen started radiating towards the spleen and her back. She had some nausea but no vomiting. No dysuria or hematuria but she did have a little bit of dribbling. She has history of kidney stones, last 1 being a couple years ago and this does feel similar. Patient took ibuprofen at home without much relief. Nursing Notes were reviewed. REVIEW OF SYSTEMS    (2-9systems for level 4, 10 or more for level 5)     Review of Systems   Constitutional:  Negative for fever. HENT:  Negative for sore throat. Respiratory:  Negative for shortness of breath. Cardiovascular:  Negative for chest pain. Gastrointestinal:  Positive for abdominal pain. Negative for diarrhea and vomiting. Genitourinary:  Positive for flank pain. Negative for dysuria. Skin:  Negative for rash. Neurological:  Negative for weakness. All other systems reviewed and are negative. Except asnoted above the remainder of the review of systems was reviewed and negative.        PAST MEDICAL HISTORY     Past Medical History:   Diagnosis Date    Anxiety     Bipolar disorder ([facility] [address]     Breast cancer ([facility] [address] 2014    L side - HERNIA REPAIR  2019    Dr. Zehra Romero at [facility] - used mesh; had revision in 1/2020    VENTRAL HERNIA REPAIR N/A 10/21/2020    Open VENTRAL Hernia Repair w/ mesh & Component separation technique performed by Andressa Diallo MD at [address] [place] [address]     Previous Medications    ALBUTEROL SULFATE  (90 BASE) MCG/ACT INHALER    Inhale 1-2 puffs into the lungs every 6 hours as needed for Wheezing or Shortness of Breath    ALBUTEROL SULFATE  (90 BASE) MCG/ACT INHALER    Inhale 1-2 puffs into the lungs every 6 hours as needed for Wheezing or Shortness of Breath    AMLODIPINE (NORVASC) 10 MG TABLET    Take 1 tablet by mouth daily    CHOLECALCIFEROL (VITAMIN D) 50 MCG (2000 UT) CAPS CAPSULE    Take 2,000 Units by mouth daily    CYCLOBENZAPRINE (FLEXERIL) 5 MG TABLET    Take 1 tablet by mouth 3 times daily as needed for Muscle spasms    DULOXETINE (CYMBALTA) 30 MG EXTENDED RELEASE CAPSULE    Take 30 mg by mouth daily    DULOXETINE (CYMBALTA) 60 MG EXTENDED RELEASE CAPSULE    Take 60 mg by mouth daily    FEXOFENADINE (ALLEGRA) 180 MG TABLET    Take 1 tablet by mouth daily    FLUTICASONE (FLONASE) 50 MCG/ACT NASAL SPRAY    1-2 sprays by Nasal route in the morning. GABAPENTIN (NEURONTIN) 100 MG CAPSULE    Take 1 capsule by mouth 3 times daily for 90 days. HYDROXYZINE (ATARAX) 25 MG TABLET    Take 50 mg by mouth every 6 hours as needed for Anxiety     LACTOBACILLUS (PROBIOTIC ACIDOPHILUS PO)    Take by mouth    LAMOTRIGINE (LAMICTAL) 200 MG TABLET    Take 150 mg by mouth daily     LEVOTHYROXINE (SYNTHROID) 100 MCG TABLET    Take 1 tablet by mouth daily Mon-Saturday; hold dose on Sunday    LIDOCAINE (LIDODERM) 5 %    Place 1 patch onto the skin daily 12 hours on, 12 hours off.     LISINOPRIL (PRINIVIL;ZESTRIL) 40 MG TABLET    Take 1 tablet by mouth daily    MELATONIN 10 MG CAPS CAPSULE    Take 10 mg by mouth nightly    MULTIPLE VITAMINS-MINERALS (MULTIVITAMIN ADULT PO)    Take 1 tablet by mouth daily OMEPRAZOLE (PRILOSEC) 20 MG DELAYED RELEASE CAPSULE    Take 1 capsule by mouth in the morning and at bedtime    ONDANSETRON (ZOFRAN ODT) 4 MG DISINTEGRATING TABLET    Take 1 tablet by mouth every 8 hours as needed for Nausea    PROPRANOLOL (INDERAL) 10 MG TABLET    Take 10 mg by mouth 3 times daily Indications: patient takes twice a day     REXULTI 1 MG TABS TABLET    take 1 tablet by mouth once daily       ALLERGIES     Flomax [tamsulosin hcl], Morphine, Saphris [asenapine], Seasonal, Bactrim [sulfamethoxazole-trimethoprim], Prednisone, and Zyprexa [olanzapine]    FAMILY HISTORY       Family History   Problem Relation Age of Onset    High Blood Pressure Mother     Lupus Mother          from CHF secondary to cardiomyopathy from her lupus    Heart Failure Mother     Atrial Fibrillation Mother     High Blood Pressure Father     Prostate Cancer Father         age 54;  11 years later of a \"bladder tumor\" that caused bladder rupture (pt unsure if malignancy)    Other Maternal Grandmother         had \"stomach tumor\"    Heart Attack Maternal Grandfather          at age 40    Stroke Paternal Grandfather         in his 42's; multiple          SOCIAL HISTORY       Social History     Socioeconomic History    Marital status:      Spouse name: None    Number of children: 1    Years of education: 14    Highest education level:  Bachelor's degree (e.g., BA, AB, BS)   Occupational History    Occupation:    Tobacco Use    Smoking status: Every Day     Packs/day: 0.50     Years: 37.00     Pack years: 18.50     Types: Cigarettes     Start date:     Smokeless tobacco: Never    Tobacco comments:     Continue decreasing   Vaping Use    Vaping Use: Never used   Substance and Sexual Activity    Alcohol use: No     Comment: Follows with [facility]    Drug use: Not Currently     Comment: Follows with [facility]    Sexual activity: Not Currently     Partners: Male   Social History Narrative 11/13/2020- she follows with [facility], 15 step[employer] member, tobacco abuse- counseled, she has used food pantries in the past, she is applying for [employer], transportation by friends but has used [employer] previously. 5/12/2021- unchanged from above except- she has applied for disability. SCREENINGS             PHYSICAL EXAM    (up to 7 for level 4, 8 or more for level 5)     ED Triage Vitals   BP Temp Temp src Pulse Resp SpO2 Height Weight   -- -- -- -- -- -- -- --       Physical Exam  Vitals and nursing note reviewed. Constitutional:       General: She is not in acute distress. Appearance: Normal appearance. She is not ill-appearing or toxic-appearing. HENT:      Head: Normocephalic and atraumatic. Nose: Nose normal. No congestion. Mouth/Throat:      Mouth: Mucous membranes are moist.   Eyes:      General:         Right eye: No discharge. Left eye: No discharge. Conjunctiva/sclera: Conjunctivae normal.   Cardiovascular:      Rate and Rhythm: Normal rate and regular rhythm. Pulses: Normal pulses. Heart sounds: Normal heart sounds. No murmur heard. Pulmonary:      Effort: Pulmonary effort is normal. No respiratory distress. Breath sounds: Normal breath sounds. No wheezing. Abdominal:      General: Abdomen is flat. There is no distension. Palpations: Abdomen is soft. There is no mass. Tenderness: There is abdominal tenderness. There is left CVA tenderness and guarding. There is no right CVA tenderness or rebound. Hernia: No hernia is present. Comments: Mild tenderness near left CVA and left lower quadrant   Musculoskeletal:         General: No deformity or signs of injury. Normal range of motion. Cervical back: Normal range of motion. Skin:     General: Skin is warm and dry. Capillary Refill: Capillary refill takes less than 2 seconds. Findings: No rash. Neurological:      General: No focal deficit present. Mental Status: She is alert. Mental status is at baseline. Motor: No weakness. Comments: Speaking normally. No facial asymmetry. Moving all 4 extremities. Normal gait. Psychiatric:         Mood and Affect: Mood normal.       EMERGENCY DEPARTMENT COURSE and DIFFERENTIAL DIAGNOSIS/MDM:   Vitals: There were no vitals filed for this visit. Patient presents to the emergency department with the complaint described above. Vital signs were grossly normal, she is nontoxic, resting mildly uncomfortable secondary to pain, no distress. At this time differential diagnosis is broad but I certainly think this does represent some sort of obstructive ureteral issue. Lower suspicion for an abdominal hernia or bowel obstruction, however,  she did recently have a hernia repair with a large mass placement so this is certainly on the differential.  Lower suspicion for other significant abnormality, infectious process or vascular catastrophe. I have ordered some routine blood work, noncontrast CT of the abdomen pelvis, will check a urine, will give some IV pain meds, IV fluids, IV antiemetics and reevaluate      DIAGNOSTIC RESULTS     LABS:  Labs Reviewed   CBC WITH AUTO DIFFERENTIAL   COMPREHENSIVE METABOLIC PANEL   URINALYSIS WITH REFLEX TO CULTURE     Signed out to oncoming physician pending results and re-eval      All other labs were within normal range or not returned as of this dictation.     RADIOLOGY:  CT ABDOMEN PELVIS WO CONTRAST Additional Contrast? None    (Results Pending)         (Please note that portions of this note were completed with a voice recognition program.  Efforts were made to edit the dictations but occasionally words are mis-transcribed.)    Yonathan Ricardo DO,(electronically signed)  Board Certified Emergency Physician         Yonathan Ricrado DO  08/17/22 0715

## 2022-08-17 NOTE — ED PROVIDER NOTES
ADDENDUM:      Care of this patient was assumed from Dr. Yulisa Villanueva. The patient was seen for Flank Pain (Hs of kidnes stones; pain started a couple hours ago)  . The patient's initial evaluation and plan have been discussed with the prior provider who initially evaluated the patient. Nursing Notes, Past Medical Hx, Past Surgical Hx, Social Hx, Family Hx, Medications and Allergies, and  were all reviewed. Diagnostic Results     EKG   EKG timed 20: 27 normal sinus rhythm 83 bpm.  SILVIA 156 ms, QRS duration 82 ms,  ms, normal axis. No acute ST elevations depressions or T wave inversions are potation is a normal twelve-lead EKG. RADIOLOGY:    CT ABDOMEN PELVIS WO CONTRAST Additional Contrast? None    Result Date: 8/16/2022  EXAMINATION: CT OF THE ABDOMEN AND PELVIS WITHOUT CONTRAST 8/16/2022 7:25 pm TECHNIQUE: CT of the abdomen and pelvis was performed without the administration of intravenous contrast. Multiplanar reformatted images are provided for review. Automated exposure control, iterative reconstruction, and/or weight based adjustment of the mA/kV was utilized to reduce the radiation dose to as low as reasonably achievable. COMPARISON: 10/30/2021, 10/06/2021 HISTORY: ORDERING SYSTEM PROVIDED HISTORY: LLQ/L flank pain TECHNOLOGIST PROVIDED HISTORY: LLQ/L flank pain Decision Support Exception - unselect if not a suspected or confirmed emergency medical condition->Emergency Medical Condition (MA) Reason for Exam: Left lower quadrant pain, left flank pain, h/o kidney stones, hysterectomy, recent back surgery FINDINGS: LOWER CHEST: No focal abnormality. KIDNEYS AND URINARY TRACT: Punctate bilateral renal calculi. No stone identified in either ureter or the bladder. There is no evidence for hydronephrosis. The ureters are of normal course and caliber. ORGANS: Lack of intravenous contrast limits evaluation of the solid organs and bowel. Status post cholecystectomy.   The liver, pancreas, spleen, and adrenals reveal no acute abnormality. GI/BOWEL: No bowel dilatation or wall thickening. Diverticulosis. PELVIS: No acute findings. PERITONEUM/RETROPERITONEUM: No lymphadenopathy is noted. No free air or free fluid. The aorta is normal in caliber. BONES/SOFT TISSUES: No acute osseous abnormality is identified. Scar in the infraumbilical abdominal wall. 1.  Punctate bilateral nonobstructing renal calculi. 2.  Diverticulosis without evidence for acute diverticulitis.         LABS:   Results for orders placed or performed during the hospital encounter of 08/16/22   CBC with Auto Differential   Result Value Ref Range    WBC 11.3 3.5 - 11.3 k/uL    RBC 4.58 3.95 - 5.11 m/uL    Hemoglobin 13.0 11.9 - 15.1 g/dL    Hematocrit 38.9 36.3 - 47.1 %    MCV 84.9 82.6 - 102.9 fL    MCH 28.4 25.2 - 33.5 pg    MCHC 33.4 25.2 - 33.5 g/dL    RDW 13.8 11.8 - 14.4 %    Platelets 340 536 - 297 k/uL    MPV 8.5 8.1 - 13.5 fL    NRBC Automated 0.0 0.0 per 100 WBC    Seg Neutrophils 59 36 - 65 %    Lymphocytes 29 24 - 43 %    Monocytes 7 3 - 12 %    Eosinophils % 4 1 - 4 %    Basophils 1 0 - 2 %    Immature Granulocytes 0 0 %    Segs Absolute 6.79 1.50 - 8.10 k/uL    Absolute Lymph # 3.24 1.10 - 3.70 k/uL    Absolute Mono # 0.76 0.10 - 1.20 k/uL    Absolute Eos # 0.41 0.00 - 0.44 k/uL    Basophils Absolute 0.09 0.00 - 0.20 k/uL    Absolute Immature Granulocyte 0.05 0.00 - 0.30 k/uL   Comprehensive Metabolic Panel   Result Value Ref Range    Glucose 116 (H) 70 - 99 mg/dL    BUN 10 6 - 20 mg/dL    Creatinine 0.90 0.50 - 0.90 mg/dL    Bun/Cre Ratio 11 9 - 20    Calcium 10.2 8.6 - 10.4 mg/dL    Sodium 141 135 - 144 mmol/L    Potassium 3.2 (L) 3.7 - 5.3 mmol/L    Chloride 101 98 - 107 mmol/L    CO2 26 20 - 31 mmol/L    Anion Gap 14 9 - 17 mmol/L    Alkaline Phosphatase 92 35 - 104 U/L    ALT 15 5 - 33 U/L    AST 18 <32 U/L    Total Bilirubin 0.18 (L) 0.3 - 1.2 mg/dL    Total Protein 7.4 6.4 - 8.3 g/dL    Albumin 4.5 3.5 - 5.2 g/dL Albumin/Globulin Ratio 1.6 1.0 - 2.5    GFR Non-African American >60 >60 mL/min    GFR African American >60 >60 mL/min    GFR Comment         Urinalysis with Reflex to Culture    Specimen: Urine, clean catch   Result Value Ref Range    Glucose, Ur NEGATIVE NEGATIVE    Bilirubin Urine NEGATIVE NEGATIVE    Ketones, Urine NEGATIVE NEGATIVE    Specific Gravity, UA 1.005 (L) 1.010 - 1.025    Urine Hgb TRACE (A) NEGATIVE    pH, UA 5.5 5.0 - 6.0    Protein, UA NEGATIVE NEGATIVE    Urobilinogen, Urine Normal Normal    Nitrite, Urine NEGATIVE NEGATIVE    Leukocyte Esterase, Urine 1+ (A) NEGATIVE   Microscopic Urinalysis   Result Value Ref Range    WBC, UA 0 TO 4 0 - 4 /HPF    RBC, UA 0 TO 4 0 - 4 /HPF    Epithelial Cells UA 0 TO 4 0 - 5 /HPF    Bacteria, UA None None   Troponin   Result Value Ref Range    Troponin, High Sensitivity 15 (H) 0 - 14 ng/L   Lipase   Result Value Ref Range    Lipase 39 13 - 60 U/L   Troponin   Result Value Ref Range    Troponin, High Sensitivity 15 (H) 0 - 14 ng/L       RECENT VITALS:  BP: (!) 151/91, Temp: 98.6 °F (37 °C), Heart Rate: 90, Resp: 16     ED Course     The patient was given the following medications:  Orders Placed This Encounter   Medications    ketorolac (TORADOL) injection 30 mg    ondansetron (ZOFRAN) injection 4 mg    0.9 % sodium chloride bolus    fentaNYL (SUBLIMAZE) injection 50 mcg    HYDROcodone-acetaminophen (NORCO) 5-325 MG per tablet 2 tablet       Medical Decision Making      Case signed out to me Dr. Franco Del Angel. Patient presenting with a history of left flank and left upper abdominal pain. Patient has previous history of kidney stone but felt that this was a little bit different. Labs including a CBC, CMP, and urinalysis as well as a CT scan of the abdomen pelvis noncontrasted are unremarkable for source of this pain. Patient with history of hypertension, hyperlipidemia, denies history of diabetes, is a smoker.   Also indicates positive family history for coronary artery disease. On my exam the patient is awake alert well-appearing, heart sounds normal S1-S2 no rub murmur or gallop. Lungs with faint and expiratory wheeze throughout. Patient does have a history of COPD. Patient has minimal tenderness in the left upper quadrant. She indicates that she is previously had elevated amylase and lipase but did not meet criteria for acute pancreatitis. Strong symmetric radial pulses no calf tenderness or swelling in the lower extremities. No evidence of limb ischemia. Given patient's negative work-up as well as risk factors we discussed the risks and benefits of EKG and 2-hour set of troponins as well as adding a lipase. Patient is agreeable. Did request something further for pain and will give her p.o. Norco which she states that she tolerates. My index of suspicion for aortic dissection as well as pulmonary embolism are low. Patient's troponin is stable, EKG without acute findings, lipase is normal.  Patient feels improved and is ready for discharge home. I discussed with her the warning signs which return to the emergency department. I discussed with her plan to not prescribe pain medications to mask any developing symptoms and she understands that early on disease process and ER work-up may be falsely reassuring. She is to return if worsening symptoms as outlined in the discharge instructions and otherwise follow-up with her doctor in 2 days. Also discussed potassium rich diet and patient's potassium level today. Disposition     FINAL IMPRESSION      1.  Upper abdominal pain          DISPOSITION/PLAN   DISPOSITION Decision To Discharge 08/16/2022 09:37:08 PM      PATIENT REFERRED TO:  Brian Brian DO  [address] [address]  [phone]    In 2 days      DISCHARGE MEDICATIONS:  Discharge Medication List as of 8/16/2022  9:40 PM                (Please note that portions of this note were completed with a voice recognition program.  Efforts were made to edit the dictations but occasionally words are mis-transcribed.)    Sheeba Morillo MD, [facility]  Attending Emergency Medicine Physician                 Sheeba Morillo MD  08/16/22 1110       Sheeba Morillo MD  08/16/22 [id number]

## 2022-08-17 NOTE — DISCHARGE INSTRUCTIONS
Please follow-up with your PCP within 2 days. Return to the emergency department if you experience any further chest pain, shortness of breath, abdominal pain, nausea vomiting, fevers, inability tolerate fluids by mouth, or any other acute concerns. Please follow-up with your own doctor for elevated blood pressure in the emergency department.

## 2022-08-18 LAB
EKG ATRIAL RATE: 83 BPM
EKG P AXIS: 75 DEGREES
EKG P-R INTERVAL: 156 MS
EKG Q-T INTERVAL: 394 MS
EKG QRS DURATION: 82 MS
EKG QTC CALCULATION (BAZETT): 462 MS
EKG R AXIS: 61 DEGREES
EKG T AXIS: 56 DEGREES
EKG VENTRICULAR RATE: 83 BPM

## 2022-08-29 ENCOUNTER — PATIENT MESSAGE (OUTPATIENT)
Dept: FAMILY MEDICINE CLINIC | Age: 57
End: 2022-08-29

## 2022-08-29 NOTE — TELEPHONE ENCOUNTER
From: Kvng Master  To: Dr. Benavidez Due: 8/29/2022 1:08 PM EDT  Subject: Abdominal pain. Dr Orlin Lindsay I was in the ER I think on the 12th with abdominal pain nothing could be found on a CT without contrast do I need a CT with contrast ultrasound? The pain is pretty much across my abdomen , severe at times accompanied with nausea. I'm worried that my mesh could be messed up or it's pancreas again. I don't know I'm very uncomfortable especially today. I have an appointment with you on the 19th.   Thank you Mary Bledsoe

## 2022-08-30 ENCOUNTER — OFFICE VISIT (OUTPATIENT)
Dept: FAMILY MEDICINE CLINIC | Age: 57
End: 2022-08-30
Payer: MEDICARE

## 2022-08-30 VITALS
SYSTOLIC BLOOD PRESSURE: 132 MMHG | DIASTOLIC BLOOD PRESSURE: 86 MMHG | HEIGHT: 61 IN | HEART RATE: 89 BPM | OXYGEN SATURATION: 96 % | TEMPERATURE: 97.7 F | BODY MASS INDEX: 31.91 KG/M2 | WEIGHT: 169 LBS

## 2022-08-30 DIAGNOSIS — R14.0 ABDOMINAL BLOATING: ICD-10-CM

## 2022-08-30 DIAGNOSIS — R52 BURNING PAIN: ICD-10-CM

## 2022-08-30 DIAGNOSIS — R11.0 NAUSEA: ICD-10-CM

## 2022-08-30 DIAGNOSIS — R10.10 PAIN OF UPPER ABDOMEN: Primary | ICD-10-CM

## 2022-08-30 DIAGNOSIS — I10 ESSENTIAL HYPERTENSION: ICD-10-CM

## 2022-08-30 PROBLEM — M25.78 OSTEOPHYTE, VERTEBRAE: Status: ACTIVE | Noted: 2022-03-16

## 2022-08-30 PROBLEM — M46.96 UNSPECIFIED INFLAMMATORY SPONDYLOPATHY, LUMBAR REGION (HCC): Status: ACTIVE | Noted: 2022-03-16

## 2022-08-30 PROBLEM — E83.51 HYPOCALCEMIA: Status: ACTIVE | Noted: 2022-06-07

## 2022-08-30 PROBLEM — F17.210 NICOTINE DEPENDENCE, CIGARETTES, UNCOMPLICATED: Status: ACTIVE | Noted: 2022-06-07

## 2022-08-30 PROBLEM — N28.9 DISORDER OF KIDNEY AND URETER, UNSPECIFIED: Status: ACTIVE | Noted: 2022-06-07

## 2022-08-30 PROBLEM — Z90.49 ACQUIRED ABSENCE OF OTHER SPECIFIED PARTS OF DIGESTIVE TRACT: Status: ACTIVE | Noted: 2022-06-07

## 2022-08-30 PROBLEM — M48.07 SPINAL STENOSIS, LUMBOSACRAL REGION: Status: ACTIVE | Noted: 2022-06-07

## 2022-08-30 PROBLEM — M51.17 INTERVERTEBRAL DISC DISORDERS WITH RADICULOPATHY, LUMBOSACRAL REGION: Status: ACTIVE | Noted: 2022-06-07

## 2022-08-30 PROBLEM — Z90.710 ACQUIRED ABSENCE OF BOTH CERVIX AND UTERUS: Status: ACTIVE | Noted: 2022-06-07

## 2022-08-30 PROBLEM — Z90.10 ACQUIRED ABSENCE OF UNSPECIFIED BREAST AND NIPPLE: Status: ACTIVE | Noted: 2022-06-07

## 2022-08-30 PROCEDURE — G8417 CALC BMI ABV UP PARAM F/U: HCPCS | Performed by: FAMILY MEDICINE

## 2022-08-30 PROCEDURE — 3017F COLORECTAL CA SCREEN DOC REV: CPT | Performed by: FAMILY MEDICINE

## 2022-08-30 PROCEDURE — 4004F PT TOBACCO SCREEN RCVD TLK: CPT | Performed by: FAMILY MEDICINE

## 2022-08-30 PROCEDURE — 99214 OFFICE O/P EST MOD 30 MIN: CPT | Performed by: FAMILY MEDICINE

## 2022-08-30 PROCEDURE — G8427 DOCREV CUR MEDS BY ELIG CLIN: HCPCS | Performed by: FAMILY MEDICINE

## 2022-08-30 PROCEDURE — 99213 OFFICE O/P EST LOW 20 MIN: CPT | Performed by: FAMILY MEDICINE

## 2022-08-30 RX ORDER — DICYCLOMINE HYDROCHLORIDE 10 MG/1
10 CAPSULE ORAL
Qty: 40 CAPSULE | Refills: 0 | Status: SHIPPED | OUTPATIENT
Start: 2022-08-30 | End: 2022-09-13

## 2022-08-30 RX ORDER — HYDROCODONE BITARTRATE AND ACETAMINOPHEN 5; 325 MG/1; MG/1
1 TABLET ORAL EVERY 8 HOURS PRN
Qty: 15 TABLET | Refills: 0 | Status: SHIPPED | OUTPATIENT
Start: 2022-08-30 | End: 2022-09-13 | Stop reason: SDUPTHER

## 2022-08-30 NOTE — PROGRESS NOTES
SULE[place] [address]  [address], [address] [place] [place]  [phone]      Sonny Curiel is a 65 y.o. female who presents today for her medical conditions/complaints as noted below. Sonny Curiel is c/o of Abdominal Pain (Er f/u 8/16, upper ABD to mid back)      HPI:     Pt here today for ER follow-up for abd pain. Pt had gone to ER on 8/16 - see note. Has had worsening pain since ER visit after it improved for a little while. Worse after she eats or with prolonged activity. Not related to bowel movements. Feels like she bloats and has most of the pain over epigastric area and to both sides; also radiates through to her back. Searing, sharp pain, 7/10. Brings her to tears; hard for her to fall asleep. Denies heartburn/reflux sx's. Has extreme nausea, but has only vomited a few times. Bowels have been normal for her - has some diarrhea already intermittently at baseline. Concerned about her mesh from ventral hernia surgery. Feels best to lie on her back. Tried heating pad yesterday and Tylenol/Motrin. Goes back to work on 9/4 from being off since back surgery 3 months ago with Dr. Maribell Acosta. Last used Percocet 3 weeks ago; using Tylenol as needed right now, and doing well. Past Medical History:   Diagnosis Date    Anxiety     Bipolar disorder ([facility]     Breast cancer ([facility] 2014    L side - lumpectomy and radiation; no chemo (was recommended to take Tamoxifen, but with her smoking, she declined due to family h/o CVA already).  Seeing Dr. Rima Vergara once yearly/    Chronic back pain     Chronic kidney disease     COPD (chronic obstructive pulmonary disease) ([address]     Depression     GERD (gastroesophageal reflux disease)     Headache(784.0)     History of alcoholism ([facility]     sober since 1/16/19    Hyperlipidemia     Hypertension     Hyperthyroidism 1 2022    Hypothyroidism     Kidney stone     Has had lithotripsy x 1; had ureteral stent placement with removal x 1; had seen Dr. Mary Lou Mi    Neuropathy     Obesity     Panic attack 04/15/2021    PTSD (post-traumatic stress disorder)     Sleep apnea 2021      Past Surgical History:   Procedure Laterality Date    APPENDECTOMY  1977    BREAST BIOPSY Right 2015    excisional biopsy - Dr. Jada Wood Left     breast CA - done at [facility]      COLONOSCOPY  2019    polyps, Dr. Manju Reagan at [facility]    COLONOSCOPY N/A 2021    mild diverticulosis, tubular adenoma x1; Dr. Kristen Coates at [facility]      multiple in her 20's    EYE SURGERY      x 2 in her youth - was \"cross-eyed\"    EYE SURGERY      FRACTURE SURGERY Left     hand    HERNIA REPAIR  2020    recurrent incisional hernia repair Dr. Marcell Ag N/A 2020    Laparoscopic Robotic Assisted Ventral Hernia Repair performed by Debbie Helms MD at [address] [facility]      Dr. Lavell Lr Left 2022    Left L4 L5 TRANSFORAMINAL performed by Bryanna Miller MD at [address] [place] [address] ([address] [facility])      Dr. 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 Yobani Steen at [facility] - used mesh; had revision in 2020    VENTRAL HERNIA REPAIR N/A 10/21/2020    Open VENTRAL Hernia Repair w/ mesh & Component separation technique performed by Debbie Helms MD at [facility]     Family History   Problem Relation Age of Onset    High Blood Pressure Mother     Lupus Mother          from CHF secondary to cardiomyopathy from her lupus    Heart Failure Mother     Atrial Fibrillation Mother     High Blood Pressure Father     Prostate Cancer Father         age 54;  11 years later of a \"bladder tumor\" that caused bladder rupture (pt unsure if malignancy)    Stomach Cancer Maternal Grandmother         unsure if it was actually stomach, but it was reported as that    Heart Attack Maternal Grandfather          at age 40    Stroke Paternal Grandfather         in his 42's; multiple     Social History     Tobacco Use    Smoking status: Every Day     Packs/day: 0.50     Years: 37.00     Pack years: 18.50     Types: Cigarettes     Start date:     Smokeless tobacco: Never    Tobacco comments:     Continue decreasing   Substance Use Topics    Alcohol use: No     Comment: Follows with [facility]      Current Outpatient Medications   Medication Sig Dispense Refill    dicyclomine (BENTYL) 10 MG capsule Take 1 capsule by mouth 4 times daily (before meals and nightly) 40 capsule 0    fluticasone (FLONASE) 50 MCG/ACT nasal spray 1-2 sprays by Nasal route in the morning. 48 g 3    omeprazole (PRILOSEC) 20 MG delayed release capsule Take 1 capsule by mouth in the morning and at bedtime 180 capsule 3    gabapentin (NEURONTIN) 100 MG capsule Take 1 capsule by mouth 3 times daily for 90 days.  90 capsule 2    lisinopril (PRINIVIL;ZESTRIL) 40 MG tablet Take 1 tablet by mouth daily 90 tablet 3    amLODIPine (NORVASC) 10 MG tablet Take 1 tablet by mouth daily 90 tablet 3    albuterol sulfate  (90 Base) MCG/ACT inhaler Inhale 1-2 puffs into the lungs every 6 hours as needed for Wheezing or Shortness of Breath 18 g 5    REXULTI 1 MG TABS tablet take 1 tablet by mouth once daily      ondansetron (ZOFRAN ODT) 4 MG disintegrating tablet Take 1 tablet by mouth every 8 hours as needed for Nausea 40 tablet 1    fexofenadine (ALLEGRA) 180 MG tablet Take 1 tablet by mouth daily 90 tablet 3    albuterol sulfate  (90 Base) MCG/ACT inhaler Inhale 1-2 puffs into the lungs every 6 hours as needed for Wheezing or Shortness of Breath 3 Inhaler 1    Multiple Vitamins-Minerals (MULTIVITAMIN ADULT PO) Take 1 tablet by mouth daily      propranolol (INDERAL) 10 MG tablet Take 10 mg by mouth 3 times daily Indications: patient takes twice a day       hydrOXYzine (ATARAX) 25 MG tablet Take 50 mg by mouth every 6 hours as needed for Anxiety       DULoxetine (CYMBALTA) 30 MG extended release capsule Take 30 mg by mouth daily      DULoxetine (CYMBALTA) 60 MG extended release capsule Take 60 mg by mouth daily      lamoTRIgine (LAMICTAL) 200 MG tablet Take 150 mg by mouth daily       levothyroxine (SYNTHROID) 100 MCG tablet Take 1 tablet by mouth daily Mon-Saturday; hold dose on Sunday 90 tablet 0    lidocaine (LIDODERM) 5 % Place 1 patch onto the skin daily 12 hours on, 12 hours off. (Patient not taking: No sig reported) 30 patch 2     No current facility-administered medications for this visit.      Allergies   Allergen Reactions    Flomax [Tamsulosin Hcl] Swelling     Swelling of arms; however, also had rash and fever at the same time and was admitted at the time    Morphine Itching and Rash     Rash, itching    Saphris [Asenapine]     Seasonal     Bactrim [Sulfamethoxazole-Trimethoprim] Diarrhea and Nausea Only    Prednisone Other (See Comments)     Emotional changes, depression    Zyprexa [Olanzapine] Other (See Comments)     Made her feel like she wanted to \"jump out of my skin\"       Health Maintenance   Topic Date Due    Pneumococcal 0-64 years Vaccine (1 - PCV) Never done    Shingles vaccine (1 of 2) Never done    COVID-19 Vaccine (3 - Booster for Moderna series) 09/08/2021    DTaP/Tdap/Td vaccine (2 - Td or Tdap) 11/30/2021    Flu vaccine (1) 09/01/2022    Depression Monitoring  04/09/2023    Breast cancer screen  09/06/2023    A1C test (Diabetic or Prediabetic)  09/08/2023    Colorectal Cancer Screen  05/11/2026    Lipids  09/08/2027    Hepatitis C screen  Completed    HIV screen  Completed    Hepatitis A vaccine  Aged Out    Hepatitis B vaccine  Aged Out    Hib vaccine  Aged Out    Meningococcal (ACWY) vaccine  Aged Out       Subjective:      Review of Systems Gastrointestinal:  Positive for abdominal distention, abdominal pain and nausea. Negative for vomiting. Objective:     Vitals:    08/30/22 0851   BP: 132/86   Site: Right Upper Arm   Position: Sitting   Cuff Size: Large Adult   Pulse: 89   Temp: 97.7 °F (36.5 °C)   TempSrc: Temporal   SpO2: 96%   Weight: 169 lb (76.7 kg)   Height: 5' 1\" (1.549 m)     Physical Exam  Constitutional:       General: She is not in acute distress. Appearance: Normal appearance. HENT:      Head: Normocephalic and atraumatic. Eyes:      Conjunctiva/sclera: Conjunctivae normal.   Cardiovascular:      Rate and Rhythm: Normal rate and regular rhythm. Heart sounds: Normal heart sounds. Pulmonary:      Effort: Pulmonary effort is normal. No respiratory distress. Breath sounds: Normal breath sounds. Abdominal:      General: Bowel sounds are normal. There is no distension. Palpations: Abdomen is soft. Tenderness: There is abdominal tenderness. There is no guarding. Musculoskeletal:      Right lower leg: No edema. Left lower leg: No edema. Skin:     General: Skin is warm and dry. Neurological:      General: No focal deficit present. Mental Status: She is alert and oriented to person, place, and time. Psychiatric:         Mood and Affect: Mood normal.       Assessment:      1. Pain of upper abdomen  -     dicyclomine (BENTYL) 10 MG capsule; Take 1 capsule by mouth 4 times daily (before meals and nightly), Disp-40 capsule, R-0Normal  -     HYDROcodone-acetaminophen (NORCO) 5-325 MG per tablet; Take 1 tablet by mouth every 8 hours as needed for Pain for up to 7 days. , Disp-15 tablet, R-0Print  2. Abdominal bloating  -     dicyclomine (BENTYL) 10 MG capsule; Take 1 capsule by mouth 4 times daily (before meals and nightly), Disp-40 capsule, R-0Normal  3. Nausea  4. Burning pain  5. Essential hypertension  -     Comprehensive Metabolic Panel;  Future         Plan:      Return in about 4 weeks (around 9/27/2022) for f/u abd pain, bloating. Orders Placed This Encounter   Procedures    Comprehensive Metabolic Panel     Standing Status:   Future     Number of Occurrences:   1     Standing Expiration Date:   8/30/2023     Orders Placed This Encounter   Medications    dicyclomine (BENTYL) 10 MG capsule     Sig: Take 1 capsule by mouth 4 times daily (before meals and nightly)     Dispense:  40 capsule     Refill:  0    HYDROcodone-acetaminophen (NORCO) 5-325 MG per tablet     Sig: Take 1 tablet by mouth every 8 hours as needed for Pain for up to 7 days. Dispense:  15 tablet     Refill:  0     Reduce doses taken as pain becomes manageable       Patient given educational materials - see patient instructions. Discussed use, benefit, and side effects of prescribed medications. All patient questions answered. Pt voiced understanding. Reviewed health maintenance.             Electronically signed by Anthony Billings DO, DO on 9/12/2022 at 12:02 AM

## 2022-09-05 DIAGNOSIS — E03.9 HYPOTHYROIDISM, UNSPECIFIED TYPE: ICD-10-CM

## 2022-09-06 NOTE — TELEPHONE ENCOUNTER
Spoke with pt regarding labs that are due, states she will come sometime this week.      Diane Guevara called requesting a refill of the below medication which has been pended for you:     Requested Prescriptions     Pending Prescriptions Disp Refills    levothyroxine (SYNTHROID) 100 MCG tablet [Pharmacy Med Name: LEVOTHYROXINE 100 MCG TABLET] 30 tablet 0     Sig: Take 1 tablet by mouth daily Mon-Saturday; hold dose on Sunday       Last Appointment Date: 8/30/2022  Next Appointment Date: 10/3/2022    Allergies   Allergen Reactions    Flomax [Tamsulosin Hcl] Swelling     Swelling of arms; however, also had rash and fever at the same time and was admitted at the time    Morphine Itching and Rash     Rash, itching    Saphris [Asenapine]     Seasonal     Bactrim [Sulfamethoxazole-Trimethoprim] Diarrhea and Nausea Only    Prednisone Other (See Comments)     Emotional changes, depression    Zyprexa [Olanzapine] Other (See Comments)     Made her feel like she wanted to \"jump out of my skin\"

## 2022-09-08 ENCOUNTER — HOSPITAL ENCOUNTER (OUTPATIENT)
Dept: LAB | Age: 57
Discharge: HOME | End: 2022-09-08
Payer: MEDICARE

## 2022-09-08 DIAGNOSIS — I10 ESSENTIAL HYPERTENSION: ICD-10-CM

## 2022-09-08 DIAGNOSIS — E03.9 HYPOTHYROIDISM, UNSPECIFIED TYPE: ICD-10-CM

## 2022-09-08 DIAGNOSIS — E55.9 VITAMIN D DEFICIENCY: ICD-10-CM

## 2022-09-08 DIAGNOSIS — E78.2 HYPERCHOLESTEROLEMIA WITH HYPERTRIGLYCERIDEMIA: ICD-10-CM

## 2022-09-08 LAB
ALBUMIN SERPL-MCNC: 4.2 G/DL (ref 3.5–5.2)
ALBUMIN SERPL-MCNC: 4.2 G/DL (ref 3.5–5.2)
ALBUMIN/GLOBULIN RATIO: 1.6 (ref 1–2.5)
ALBUMIN/GLOBULIN RATIO: 1.6 (ref 1–2.5)
ALP BLD-CCNC: 84 U/L (ref 35–104)
ALP BLD-CCNC: 84 U/L (ref 35–104)
ALT SERPL-CCNC: 20 U/L (ref 5–33)
ALT SERPL-CCNC: 20 U/L (ref 5–33)
ANION GAP SERPL CALCULATED.3IONS-SCNC: 11 MMOL/L (ref 9–17)
ANION GAP SERPL CALCULATED.3IONS-SCNC: 11 MMOL/L (ref 9–17)
AST SERPL-CCNC: 19 U/L
AST SERPL-CCNC: 19 U/L
BILIRUB SERPL-MCNC: 0.3 MG/DL (ref 0.3–1.2)
BILIRUB SERPL-MCNC: 0.3 MG/DL (ref 0.3–1.2)
BUN BLDV-MCNC: 9 MG/DL (ref 6–20)
BUN BLDV-MCNC: 9 MG/DL (ref 6–20)
BUN/CREAT BLD: 12 (ref 9–20)
BUN/CREAT BLD: 12 (ref 9–20)
CALCIUM SERPL-MCNC: 9.5 MG/DL (ref 8.6–10.4)
CALCIUM SERPL-MCNC: 9.5 MG/DL (ref 8.6–10.4)
CHLORIDE BLD-SCNC: 103 MMOL/L (ref 98–107)
CHLORIDE BLD-SCNC: 103 MMOL/L (ref 98–107)
CHOLESTEROL/HDL RATIO: 5.9
CHOLESTEROL/HDL RATIO: 5.9
CHOLESTEROL: 297 MG/DL
CHOLESTEROL: 297 MG/DL
CO2: 27 MMOL/L (ref 20–31)
CO2: 27 MMOL/L (ref 20–31)
CREAT SERPL-MCNC: 0.75 MG/DL (ref 0.5–0.9)
CREAT SERPL-MCNC: 0.75 MG/DL (ref 0.5–0.9)
ESTIMATED AVERAGE GLUCOSE: 123 MG/DL
GFR AFRICAN AMERICAN: >60 ML/MIN
GFR AFRICAN AMERICAN: >60 ML/MIN
GFR NON-AFRICAN AMERICAN: >60 ML/MIN
GFR NON-AFRICAN AMERICAN: >60 ML/MIN
GFR SERPL CREATININE-BSD FRML MDRD: ABNORMAL ML/MIN/{1.73_M2}
GFR SERPL CREATININE-BSD FRML MDRD: ABNORMAL ML/MIN/{1.73_M2}
GLUCOSE BLD-MCNC: 112 MG/DL (ref 70–99)
GLUCOSE BLD-MCNC: 112 MG/DL (ref 70–99)
HBA1C MFR BLD: 5.9 % (ref 4–6)
HDLC SERPL-MCNC: 50 MG/DL
HDLC SERPL-MCNC: 50 MG/DL
LDL CHOLESTEROL: 200 MG/DL (ref 0–130)
LDL CHOLESTEROL: 200 MG/DL (ref 0–130)
POTASSIUM SERPL-SCNC: 4.1 MMOL/L (ref 3.7–5.3)
POTASSIUM SERPL-SCNC: 4.1 MMOL/L (ref 3.7–5.3)
SODIUM BLD-SCNC: 141 MMOL/L (ref 135–144)
SODIUM BLD-SCNC: 141 MMOL/L (ref 135–144)
THYROXINE, FREE: 1.19 NG/DL (ref 0.93–1.7)
TOTAL PROTEIN: 6.9 G/DL (ref 6.4–8.3)
TOTAL PROTEIN: 6.9 G/DL (ref 6.4–8.3)
TRIGL SERPL-MCNC: 235 MG/DL
TRIGL SERPL-MCNC: 235 MG/DL
TSH SERPL DL<=0.05 MIU/L-ACNC: 1.79 UIU/ML (ref 0.3–5)
VITAMIN D 25-HYDROXY: 28.9 NG/ML

## 2022-09-08 PROCEDURE — 80053 COMPREHEN METABOLIC PANEL: CPT

## 2022-09-08 PROCEDURE — 84443 ASSAY THYROID STIM HORMONE: CPT

## 2022-09-08 PROCEDURE — 84439 ASSAY OF FREE THYROXINE: CPT

## 2022-09-08 PROCEDURE — 82306 VITAMIN D 25 HYDROXY: CPT

## 2022-09-08 PROCEDURE — 80061 LIPID PANEL: CPT

## 2022-09-08 PROCEDURE — 83036 HEMOGLOBIN GLYCOSYLATED A1C: CPT

## 2022-09-08 PROCEDURE — 36415 COLL VENOUS BLD VENIPUNCTURE: CPT

## 2022-09-08 RX ORDER — LEVOTHYROXINE SODIUM 0.1 MG/1
TABLET ORAL
Qty: 90 TABLET | Refills: 0 | Status: SHIPPED | OUTPATIENT
Start: 2022-09-08

## 2022-09-09 DIAGNOSIS — R14.0 ABDOMINAL BLOATING: ICD-10-CM

## 2022-09-09 DIAGNOSIS — R10.10 PAIN OF UPPER ABDOMEN: ICD-10-CM

## 2022-09-10 DIAGNOSIS — R52 BURNING PAIN: ICD-10-CM

## 2022-09-11 ENCOUNTER — PATIENT MESSAGE (OUTPATIENT)
Dept: FAMILY MEDICINE CLINIC | Age: 57
End: 2022-09-11

## 2022-09-12 ASSESSMENT — ENCOUNTER SYMPTOMS
VOMITING: 0
ABDOMINAL DISTENTION: 1
NAUSEA: 1
ABDOMINAL PAIN: 1

## 2022-09-12 NOTE — TELEPHONE ENCOUNTER
Spoke with pt, states the bentyl does help with the bloating and the hydrocodone helps with her pain at night. Would like a refill of both. Also wondering if Dr. Skinny Interiano was going to order the CT scan as they discussed during [address] Per [facility], last fill 8/30, quantity 15 for 5 days. Toña LOUIS called requesting a refill of the below medication which has been pended for you:     Requested Prescriptions     Pending Prescriptions Disp Refills    dicyclomine (BENTYL) 10 MG capsule [Pharmacy Med Name: DICYCLOMINE 10 MG CAPSULE] 40 capsule 0     Sig: take 1 capsule by mouth four times a day before meals and nightly    HYDROcodone-acetaminophen (NORCO) 5-325 MG per tablet 15 tablet 0     Sig: Take 1 tablet by mouth every 8 hours as needed for Pain for up to 7 days.        Last Appointment Date: 8/30/2022  Next Appointment Date: 9/10/2022    Allergies   Allergen Reactions    Flomax [Tamsulosin Hcl] Swelling     Swelling of arms; however, also had rash and fever at the same time and was admitted at the time    Morphine Itching and Rash     Rash, itching    Saphris [Asenapine]     Seasonal     Bactrim [Sulfamethoxazole-Trimethoprim] Diarrhea and Nausea Only    Prednisone Other (See Comments)     Emotional changes, depression    Zyprexa [Olanzapine] Other (See Comments)     Made her feel like she wanted to \"jump out of my skin\"

## 2022-09-12 NOTE — TELEPHONE ENCOUNTER
From: Todd Evans  To: Dr. Littie Najjar: 9/11/2022 2:04 PM EDT  Subject: Abdominal pain    Dr. Dayana Daniel has helped with the bloating although pain is still there. Were you going to order CT scan with dye to check the mesh? I also wanted to get some more Norco, I just usually take at bedtime. It does help pain so I can sleep.   Thanks JOSEFINA Ley

## 2022-09-13 ENCOUNTER — PATIENT MESSAGE (OUTPATIENT)
Dept: FAMILY MEDICINE CLINIC | Age: 57
End: 2022-09-13

## 2022-09-13 DIAGNOSIS — E55.9 VITAMIN D DEFICIENCY: ICD-10-CM

## 2022-09-13 DIAGNOSIS — R73.01 ELEVATED FASTING GLUCOSE: ICD-10-CM

## 2022-09-13 DIAGNOSIS — E03.9 HYPOTHYROIDISM, UNSPECIFIED TYPE: Primary | ICD-10-CM

## 2022-09-13 DIAGNOSIS — E78.2 HYPERCHOLESTEROLEMIA WITH HYPERTRIGLYCERIDEMIA: ICD-10-CM

## 2022-09-13 RX ORDER — HYDROCODONE BITARTRATE AND ACETAMINOPHEN 5; 325 MG/1; MG/1
1 TABLET ORAL 2 TIMES DAILY PRN
Qty: 40 TABLET | Refills: 0 | Status: SHIPPED | OUTPATIENT
Start: 2022-09-13 | End: 2022-10-10

## 2022-09-13 RX ORDER — DICYCLOMINE HYDROCHLORIDE 10 MG/1
10 CAPSULE ORAL 4 TIMES DAILY PRN
Qty: 40 CAPSULE | Refills: 2 | Status: SHIPPED | OUTPATIENT
Start: 2022-09-13 | End: 2022-10-10

## 2022-09-13 NOTE — TELEPHONE ENCOUNTER
Radiology unsure. Call out to surgery office to see what they normally order to evaluate this. Awaiting call back.

## 2022-09-13 NOTE — TELEPHONE ENCOUNTER
From: Kvng Duffy  To: Dr. Benavidez Due: 9/13/2022 3:20 PM EDT  Subject: Abdominal pain into my back on left    Dr Vince Barajas I'm having a lot of pain today with nausea I'm just trying to stay out of the emergency room.  Have you decided on the CT scan and the norco?  Thanks Mary Bledsoe

## 2022-09-13 NOTE — TELEPHONE ENCOUNTER
How is pt currently taking this? We had briefly discussed that she wanted to possibly start trying to wean off - does she still want to do this?

## 2022-09-13 NOTE — TELEPHONE ENCOUNTER
Noted - meds refilled. Please call Radiology to see what abdominal scan would best evaluate prior mesh placement from hernia repair, as she feels this is where her pain is originating. Controlled Substance Monitoring:    Acute and Chronic Pain Monitoring:   RX Monitoring 9/13/2022   Periodic Controlled Substance Monitoring No signs of potential drug abuse or diversion identified. Obtained or confirmed \"Medication Contract\" on file.

## 2022-09-15 DIAGNOSIS — E78.2 HYPERCHOLESTEROLEMIA WITH HYPERTRIGLYCERIDEMIA: Primary | ICD-10-CM

## 2022-09-15 RX ORDER — EZETIMIBE 10 MG/1
10 TABLET ORAL DAILY
Qty: 90 TABLET | Refills: 1 | Status: SHIPPED | OUTPATIENT
Start: 2022-09-15

## 2022-09-15 RX ORDER — GABAPENTIN 100 MG/1
100 CAPSULE ORAL 3 TIMES DAILY
Qty: 90 CAPSULE | Refills: 2 | Status: SHIPPED | OUTPATIENT
Start: 2022-09-15 | End: 2022-12-14

## 2022-09-15 NOTE — TELEPHONE ENCOUNTER
Controlled Substance Monitoring:    Acute and Chronic Pain Monitoring:   RX Monitoring 9/15/2022   Periodic Controlled Substance Monitoring No signs of potential drug abuse or diversion identified. Obtained or confirmed \"Medication Contract\" on file.

## 2022-09-16 NOTE — TELEPHONE ENCOUNTER
From: Manyd Mcduffie  To: Dr. Daniel Gallegos: 9/13/2022 5:16 PM EDT  Subject: Vitamin D3    I just wanted to let you know that the vitamin D3 that I take is 5,000 [place]

## 2022-09-19 NOTE — TELEPHONE ENCOUNTER
Per surgery staff, order CT with contrast. Placed at this time. Left detailed msg on pt VM regarding exam. Instructed to call back with any questions.

## 2022-09-19 NOTE — TELEPHONE ENCOUNTER
Per Dr. Doron Steven, alternate 5000IU and 10,000IU, will recheck in 6 mo. Lab previously ordered. Left detailed msg on VM, instructed to call back with any questions.

## 2022-10-04 ENCOUNTER — HOSPITAL ENCOUNTER (OUTPATIENT)
Dept: CT IMAGING | Age: 57
Discharge: HOME | End: 2022-10-06
Payer: MEDICARE

## 2022-10-04 DIAGNOSIS — R10.10 PAIN OF UPPER ABDOMEN: ICD-10-CM

## 2022-10-04 DIAGNOSIS — R14.0 ABDOMINAL BLOATING: ICD-10-CM

## 2022-10-04 PROCEDURE — 2709999900 CT ABDOMEN PELVIS W IV CONTRAST

## 2022-10-04 PROCEDURE — 6360000004 HC RX CONTRAST MEDICATION: Performed by: FAMILY MEDICINE

## 2022-10-04 RX ADMIN — IOPAMIDOL 100 ML: 755 INJECTION, SOLUTION INTRAVENOUS at 10:16

## 2022-10-10 ENCOUNTER — OFFICE VISIT (OUTPATIENT)
Dept: FAMILY MEDICINE CLINIC | Age: 57
End: 2022-10-10
Payer: MEDICARE

## 2022-10-10 VITALS
HEIGHT: 61 IN | SYSTOLIC BLOOD PRESSURE: 128 MMHG | BODY MASS INDEX: 32.47 KG/M2 | WEIGHT: 172 LBS | OXYGEN SATURATION: 95 % | DIASTOLIC BLOOD PRESSURE: 80 MMHG | HEART RATE: 87 BPM | TEMPERATURE: 97.9 F

## 2022-10-10 DIAGNOSIS — R10.10 PAIN OF UPPER ABDOMEN: ICD-10-CM

## 2022-10-10 DIAGNOSIS — R14.0 ABDOMINAL BLOATING: Primary | ICD-10-CM

## 2022-10-10 PROBLEM — R05.9 COUGH: Status: ACTIVE | Noted: 2019-01-07

## 2022-10-10 PROCEDURE — 99213 OFFICE O/P EST LOW 20 MIN: CPT | Performed by: FAMILY MEDICINE

## 2022-10-10 PROCEDURE — 99214 OFFICE O/P EST MOD 30 MIN: CPT | Performed by: FAMILY MEDICINE

## 2022-10-10 PROCEDURE — G8427 DOCREV CUR MEDS BY ELIG CLIN: HCPCS | Performed by: FAMILY MEDICINE

## 2022-10-10 PROCEDURE — G8417 CALC BMI ABV UP PARAM F/U: HCPCS | Performed by: FAMILY MEDICINE

## 2022-10-10 PROCEDURE — G8484 FLU IMMUNIZE NO ADMIN: HCPCS | Performed by: FAMILY MEDICINE

## 2022-10-10 PROCEDURE — 4004F PT TOBACCO SCREEN RCVD TLK: CPT | Performed by: FAMILY MEDICINE

## 2022-10-10 PROCEDURE — 3017F COLORECTAL CA SCREEN DOC REV: CPT | Performed by: FAMILY MEDICINE

## 2022-10-10 RX ORDER — SERTRALINE HYDROCHLORIDE 100 MG/1
TABLET, FILM COATED ORAL
COMMUNITY
Start: 2022-09-29

## 2022-10-10 RX ORDER — HYDROXYZINE PAMOATE 25 MG/1
CAPSULE ORAL
COMMUNITY
Start: 2022-09-29 | End: 2022-10-10 | Stop reason: SDUPTHER

## 2022-10-10 RX ORDER — LAMOTRIGINE 150 MG/1
TABLET ORAL
COMMUNITY
Start: 2022-09-29

## 2022-10-10 ASSESSMENT — PATIENT HEALTH QUESTIONNAIRE - PHQ9
SUM OF ALL RESPONSES TO PHQ QUESTIONS 1-9: 0
SUM OF ALL RESPONSES TO PHQ QUESTIONS 1-9: 0
1. LITTLE INTEREST OR PLEASURE IN DOING THINGS: 0
SUM OF ALL RESPONSES TO PHQ QUESTIONS 1-9: 0
3. TROUBLE FALLING OR STAYING ASLEEP: 0
SUM OF ALL RESPONSES TO PHQ9 QUESTIONS 1 & 2: 0
2. FEELING DOWN, DEPRESSED OR HOPELESS: 0
SUM OF ALL RESPONSES TO PHQ QUESTIONS 1-9: 0
9. THOUGHTS THAT YOU WOULD BE BETTER OFF DEAD, OR OF HURTING YOURSELF: 0
8. MOVING OR SPEAKING SO SLOWLY THAT OTHER PEOPLE COULD HAVE NOTICED. OR THE OPPOSITE, BEING SO FIGETY OR RESTLESS THAT YOU HAVE BEEN MOVING AROUND A LOT MORE THAN USUAL: 0
4. FEELING TIRED OR HAVING LITTLE ENERGY: 0
10. IF YOU CHECKED OFF ANY PROBLEMS, HOW DIFFICULT HAVE THESE PROBLEMS MADE IT FOR YOU TO DO YOUR WORK, TAKE CARE OF THINGS AT HOME, OR GET ALONG WITH OTHER PEOPLE: 0
7. TROUBLE CONCENTRATING ON THINGS, SUCH AS READING THE NEWSPAPER OR WATCHING TELEVISION: 0
6. FEELING BAD ABOUT YOURSELF - OR THAT YOU ARE A FAILURE OR HAVE LET YOURSELF OR YOUR FAMILY DOWN: 0
5. POOR APPETITE OR OVEREATING: 0

## 2022-10-10 ASSESSMENT — ENCOUNTER SYMPTOMS
CONSTIPATION: 0
BLOOD IN STOOL: 0
NAUSEA: 1
DIARRHEA: 0
VOMITING: 0
ABDOMINAL PAIN: 1

## 2022-10-10 NOTE — PROGRESS NOTES
SULE[place] [address]  [address], [address] [place]  [phone]      Mary Márquez is a 65 y.o. female who presents today for her medical conditions/complaints as noted below. Mary Márquez is c/o of Abdominal Pain (F/u, states pain still comes and goes) and Bloated (F/u, bloating has not changed)      HPI:     Pt here today for follow-up of abd pain and bloating. States her pain has been better recently  Some days she has no pain; other days are worse; no severe pain but can still be tender to touch. Has tried to eat smaller portions  Stopped the Bentyl a few days ago - wasn't sure it was helping much; would only usually take it once daily with food (since she only usually eats once daily) and at bedtime    Currently weaning down on Cymbalta - currently taking 30 mg daily  Also taking Zoloft 100 mg daily  Will [facility] Dr[facility] Jesus Nguyen [facility] the [facility] and will see if she will stop the Cymbalta completely    Will see Dr. Jade Wright on 10/26 for follow-up  Still feels much improved since lumbar surgery    Started taking Zetia 10 mg daily each morning - tolerating well        Past Medical History:   Diagnosis Date    Anxiety     Bipolar disorder ([address]     Breast cancer ([address] 2014    L side - lumpectomy and radiation; no chemo (was recommended to take Tamoxifen, but with her smoking, she declined due to family h/o CVA already).  Seeing Dr. Johanne Messina once yearly/    Chronic back pain     Chronic kidney disease     COPD (chronic obstructive pulmonary disease) ([address]     Depression     GERD (gastroesophageal reflux disease)     Headache(784.0)     History of alcoholism ([address]     sober since 1/16/19    Hyperlipidemia     Hypertension     Hyperthyroidism 1 2022    Hypothyroidism     Kidney stone     Has had lithotripsy x 1; had ureteral stent placement with removal x 1; had seen Dr. Sara Cooper    Neuropathy     Obesity     Panic attack 04/15/2021    PTSD (post-traumatic stress disorder)     Sleep apnea 2021      Past Surgical History:   Procedure Laterality Date    APPENDECTOMY  1977    BREAST BIOPSY Right 2015    excisional biopsy - Dr. Sharifa Blas Left     breast CA - [facility] at [facility]      COLONOSCOPY  2019    polyps, Dr. Izaiah Kimble at [facility]    COLONOSCOPY N/A 2021    mild diverticulosis, tubular adenoma x1; Dr. Shwetha Harding at [facility]      multiple in her 20's    EYE SURGERY      x 2 in her youth - was \"cross-eyed\"    EYE SURGERY      FRACTURE SURGERY Left     hand    HERNIA REPAIR  2020    recurrent incisional hernia repair Dr. Devon Garrett N/A 2020    Laparoscopic Robotic Assisted Ventral Hernia Repair performed by Carisa Bermudez MD at [address] [place] [address], [address] [place] [address]  2006    Dr. Priscila Reed Left 2022    Left L4 L5 TRANSFORAMINAL performed by Yoanna Beaulieu MD at [address] [place] [address] [facility] [place]      Dr. 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 Yue Grimes at [facility] - used mesh; had revision in 2020    VENTRAL HERNIA REPAIR N/A 10/21/2020    Open VENTRAL Hernia Repair w/ mesh & Component separation technique performed by Carisa Bermudez MD at [facility] [place]     Family History   Problem Relation Age of Onset    High Blood Pressure Mother     Lupus Mother          from CHF secondary to cardiomyopathy from her lupus    Heart Failure Mother     Atrial Fibrillation Mother     High Blood Pressure Father     Prostate Cancer Father         age 54;  11 years later of a \"bladder tumor\" that caused bladder rupture (pt unsure if malignancy)    Stomach Cancer Maternal Grandmother         unsure if it was actually stomach, but it was reported as that    Heart Attack Maternal Grandfather          at age 40    Stroke Paternal Grandfather         in his 42's; multiple     Social History     Tobacco Use    Smoking status: Every Day     Packs/day: 0.50     Years: 37.00     Pack years: 18.50     Types: Cigarettes     Start date:     Smokeless tobacco: Never    Tobacco comments:     Continue decreasing   Substance Use Topics    Alcohol use: No     Comment: [facility]      Current Outpatient Medications   Medication Sig Dispense Refill    sertraline (ZOLOFT) 100 MG tablet take 1 tablet by mouth once daily      lamoTRIgine (LAMICTAL) 150 MG tablet take 1 tablet by mouth once daily      vitamin D-3 (CHOLECALCIFEROL) 125 MCG (5000 UT) TABS Take 5,000 Units by mouth daily      gabapentin (NEURONTIN) 100 MG capsule Take 1 capsule by mouth 3 times daily for 90 days. 90 capsule 2    ezetimibe (ZETIA) 10 MG tablet Take 1 tablet by mouth daily 90 tablet 1    levothyroxine (SYNTHROID) 100 MCG tablet Take 1 tablet by mouth daily Mon-Saturday; hold dose on  90 tablet 0    fluticasone (FLONASE) 50 MCG/ACT nasal spray 1-2 sprays by Nasal route in the morning.  48 g 3    omeprazole (PRILOSEC) 20 MG delayed release capsule Take 1 capsule by mouth in the morning and at bedtime 180 capsule 3    lisinopril (PRINIVIL;ZESTRIL) 40 MG tablet Take 1 tablet by mouth daily 90 tablet 3    amLODIPine (NORVASC) 10 MG tablet Take 1 tablet by mouth daily 90 tablet 3    albuterol sulfate  (90 Base) MCG/ACT inhaler Inhale 1-2 puffs into the lungs every 6 hours as needed for Wheezing or Shortness of Breath 18 g 5    REXULTI 1 MG TABS tablet take 1 tablet by mouth once daily      ondansetron (ZOFRAN ODT) 4 MG disintegrating tablet Take 1 tablet by mouth every 8 hours as needed for Nausea 40 tablet 1    albuterol sulfate  (90 Base) MCG/ACT inhaler Inhale 1-2 puffs into the lungs every 6 hours as needed for Wheezing or Shortness of Breath 3 Inhaler 1    Multiple Vitamins-Minerals (MULTIVITAMIN ADULT PO) Take 1 tablet by mouth daily      propranolol (INDERAL) 10 MG tablet Take 10 mg by mouth 3 times daily Indications: patient takes twice a day       hydrOXYzine (ATARAX) 25 MG tablet Take 50 mg by mouth every 6 hours as needed for Anxiety       DULoxetine (CYMBALTA) 30 MG extended release capsule Take 30 mg by mouth daily      lamoTRIgine (LAMICTAL) 200 MG tablet Take 150 mg by mouth daily  (Patient not taking: Reported on 10/10/2022)       No current facility-administered medications for this visit. Allergies   Allergen Reactions    Flomax [Tamsulosin Hcl] Swelling     Swelling of arms; however, also had rash and fever at the same time and was admitted at the time    Morphine Itching and Rash     Rash, itching    Saphris [Asenapine]     Seasonal     Bactrim [Sulfamethoxazole-Trimethoprim] Diarrhea and Nausea Only    Prednisone Other (See Comments)     Emotional changes, depression    Zyprexa [Olanzapine] Other (See Comments)     Made her feel like she wanted to \"jump out of my skin\"       Health Maintenance   Topic Date Due    Pneumococcal 0-64 years Vaccine (1 - PCV) Never done    Shingles vaccine (1 of 2) Never done    COVID-19 Vaccine (3 - Booster for Moderna series) 09/08/2021    DTaP/Tdap/Td vaccine (2 - Td or Tdap) 11/30/2021    Flu vaccine (1) 08/01/2022    Annual Wellness Visit (AWV)  09/28/2022    Breast cancer screen  09/06/2023    A1C test (Diabetic or Prediabetic)  09/08/2023    Depression Monitoring  10/10/2023    Colorectal Cancer Screen  05/11/2026    Lipids  09/08/2027    Hepatitis C screen  Completed    HIV screen  Completed    Hepatitis A vaccine  Aged Out    Hib vaccine  Aged Out    Meningococcal (ACWY) vaccine  Aged Out       Subjective:      Review of Systems   Constitutional:  Negative for unexpected weight change.    Gastrointestinal:  Positive for abdominal pain (improved) and nausea (intermittent, stable). Negative for blood in stool, constipation, diarrhea and vomiting. Objective:     Vitals:    10/10/22 1419   BP: 128/80   Site: Right Upper Arm   Position: Sitting   Cuff Size: Large Adult   Pulse: 87   Temp: 97.9 °F (36.6 °C)   TempSrc: Temporal   SpO2: 95%   Weight: 172 lb (78 kg)   Height: 5' 1\" (1.549 m)     Physical Exam  Constitutional:       General: She is not in acute distress. Appearance: Normal appearance. HENT:      Head: Normocephalic and atraumatic. Eyes:      Conjunctiva/sclera: Conjunctivae normal.   Cardiovascular:      Rate and Rhythm: Normal rate and regular rhythm. Heart sounds: Normal heart sounds. Pulmonary:      Effort: Pulmonary effort is normal. No respiratory distress. Breath sounds: Normal breath sounds. Abdominal:      General: Bowel sounds are normal. There is no distension. Palpations: Abdomen is soft. Tenderness: There is abdominal tenderness (mild, just R of midline in mid-abdomen). There is no guarding. Musculoskeletal:      Right lower leg: No edema. Left lower leg: No edema. Skin:     General: Skin is warm and dry. Neurological:      General: No focal deficit present. Mental Status: She is alert and oriented to person, place, and time. Psychiatric:         Mood and Affect: Mood normal.       Assessment:      1. Abdominal bloating  2. Pain of upper abdomen       Plan:      Declined flu vaccine today. Return in about 23 weeks (around 3/20/2023) for MAW visit and f/u thyroid, HLD, labs. No orders of the defined types were placed in this encounter. No orders of the defined types were placed in this encounter. Patient given educational materials - see patient instructions. Discussed use, benefit, and side effects of prescribed medications. All patient questions answered. Pt voiced understanding. Reviewed health maintenance.             Electronically signed by Santosh Cruz DO, DO on 10/16/2022 at 11:46 PM

## 2022-11-09 PROBLEM — R05.9 COUGH: Status: RESOLVED | Noted: 2019-01-07 | Resolved: 2022-11-09

## 2022-12-07 DIAGNOSIS — E03.9 HYPOTHYROIDISM, UNSPECIFIED TYPE: ICD-10-CM

## 2022-12-07 NOTE — TELEPHONE ENCOUNTER
Left detailed message regarding blood work that needs done.          Inga Isaac called requesting a refill of the below medication which has been pended for you:     Requested Prescriptions     Pending Prescriptions Disp Refills    levothyroxine (SYNTHROID) 100 MCG tablet [Pharmacy Med Name: LEVOTHYROXINE 100 MCG TABLET] 90 tablet 0     Sig: Take 1 tablet by mouth daily Mon-Saturday; hold dose on Sunday       Last Appointment Date: 10/10/2022  Next Appointment Date: 3/20/2023    Allergies   Allergen Reactions    Flomax [Tamsulosin Hcl] Swelling     Swelling of arms; however, also had rash and fever at the same time and was admitted at the time    Morphine Itching and Rash     Rash, itching    Saphris [Asenapine]     Seasonal     Bactrim [Sulfamethoxazole-Trimethoprim] Diarrhea and Nausea Only    Prednisone Other (See Comments)     Emotional changes, depression    Zyprexa [Olanzapine] Other (See Comments)     Made her feel like she wanted to \"jump out of my skin\"

## 2022-12-08 RX ORDER — LEVOTHYROXINE SODIUM 0.1 MG/1
TABLET ORAL
Qty: 90 TABLET | Refills: 0 | Status: SHIPPED | OUTPATIENT
Start: 2022-12-08

## 2022-12-13 ENCOUNTER — TELEMEDICINE (OUTPATIENT)
Dept: FAMILY MEDICINE CLINIC | Age: 57
End: 2022-12-13
Payer: MEDICARE

## 2022-12-13 DIAGNOSIS — B96.89 ACUTE BACTERIAL SINUSITIS: Primary | ICD-10-CM

## 2022-12-13 DIAGNOSIS — J01.90 ACUTE BACTERIAL SINUSITIS: Primary | ICD-10-CM

## 2022-12-13 PROCEDURE — 99212 OFFICE O/P EST SF 10 MIN: CPT | Performed by: NURSE PRACTITIONER

## 2022-12-13 PROCEDURE — 99213 OFFICE O/P EST LOW 20 MIN: CPT | Performed by: NURSE PRACTITIONER

## 2022-12-13 PROCEDURE — G8427 DOCREV CUR MEDS BY ELIG CLIN: HCPCS | Performed by: NURSE PRACTITIONER

## 2022-12-13 PROCEDURE — 3017F COLORECTAL CA SCREEN DOC REV: CPT | Performed by: NURSE PRACTITIONER

## 2022-12-13 RX ORDER — AMOXICILLIN AND CLAVULANATE POTASSIUM 875; 125 MG/1; MG/1
1 TABLET, FILM COATED ORAL 2 TIMES DAILY
Qty: 20 TABLET | Refills: 0 | Status: SHIPPED | OUTPATIENT
Start: 2022-12-13 | End: 2022-12-23

## 2022-12-13 RX ORDER — GABAPENTIN 300 MG/1
CAPSULE ORAL
COMMUNITY
Start: 2022-12-08

## 2022-12-13 RX ORDER — HYDROXYZINE PAMOATE 25 MG/1
CAPSULE ORAL
COMMUNITY
Start: 2022-10-27

## 2022-12-13 RX ORDER — FEXOFENADINE HCL 180 MG/1
1 TABLET ORAL DAILY
COMMUNITY

## 2022-12-13 RX ORDER — MECLIZINE HCL 12.5 MG/1
12.5 TABLET ORAL 3 TIMES DAILY PRN
Qty: 30 TABLET | Refills: 0 | Status: SHIPPED | OUTPATIENT
Start: 2022-12-13

## 2022-12-13 SDOH — ECONOMIC STABILITY: FOOD INSECURITY: WITHIN THE PAST 12 MONTHS, THE FOOD YOU BOUGHT JUST DIDN'T LAST AND YOU DIDN'T HAVE MONEY TO GET MORE.: NEVER TRUE

## 2022-12-13 SDOH — ECONOMIC STABILITY: FOOD INSECURITY: WITHIN THE PAST 12 MONTHS, YOU WORRIED THAT YOUR FOOD WOULD RUN OUT BEFORE YOU GOT MONEY TO BUY MORE.: NEVER TRUE

## 2022-12-13 ASSESSMENT — ENCOUNTER SYMPTOMS
SHORTNESS OF BREATH: 0
SINUS COMPLAINT: 1
SINUS PRESSURE: 1
COUGH: 1
SORE THROAT: 0

## 2022-12-13 ASSESSMENT — SOCIAL DETERMINANTS OF HEALTH (SDOH): HOW HARD IS IT FOR YOU TO PAY FOR THE VERY BASICS LIKE FOOD, HOUSING, MEDICAL CARE, AND HEATING?: NOT HARD AT ALL

## 2022-12-13 NOTE — PROGRESS NOTES
Radha Sarmiento (:  1965) is a Established patient, here for evaluation of the following:    Assessment & Plan   Below is the assessment and plan developed based on review of pertinent history, physical exam, labs, studies, and medications. 1. Acute bacterial sinusitis-augmentin 875-125mg 1 tablet BID for 10 days  Antivert if needed for dizziness  Supportive care  Push fluids  Return if symptoms do not improve or worsen   Return PRN   No follow-ups on file. Subjective   Pt was positive for COVID over Thanksgiving. She has a history of sinus issues. She has a history of sinus surgery. Sinus Problem  This is a new problem. The current episode started 1 to 4 weeks ago. The problem is unchanged. There has been no fever. Associated symptoms include congestion, coughing, headaches and sinus pressure. Pertinent negatives include no ear pain, shortness of breath, sneezing or sore throat. Past treatments include oral decongestants and saline sprays. The treatment provided mild relief. Past Medical History:   Diagnosis Date    Anxiety     Bipolar disorder ([facility]     Breast cancer ([facility]     L side - lumpectomy and radiation; no chemo (was recommended to take Tamoxifen, but with her smoking, she declined due to family h/o CVA already).  Seeing Dr. Tish Moncada once yearly/    Chronic back pain     Chronic kidney disease     COPD (chronic obstructive pulmonary disease) ([address]     Depression     GERD (gastroesophageal reflux disease)     Headache(784.0)     History of alcoholism ([facility]     sober since 20    Hyperlipidemia     Hypertension     Hyperthyroidism 2022    Hypothyroidism     Kidney stone     Has had lithotripsy x 1; had ureteral stent placement with removal x 1; had seen Dr. Ame Garcia    Neuropathy     Obesity     Panic attack 04/15/2021    PTSD (post-traumatic stress disorder)     Sleep apnea 2021       Past Surgical History:   Procedure Laterality Date    APPENDECTOMY      BREAST BIOPSY Right 2015    excisional biopsy - Dr. Husam Shukla Left 2014    [facility]  2014    COLONOSCOPY  2019    [facility]    COLONOSCOPY N/A 5/11/2021    mild diverticulosis, tubular adenoma x1; Dr. Yobani Stokes at [facility]      multiple in her 20's    EYE SURGERY      x 2 in her youth - was \"cross-eyed\"    EYE SURGERY      FRACTURE SURGERY Left     hand    HERNIA REPAIR  01/2020    recurrent incisional hernia repair Dr. Jolie Stallings N/A 7/22/2020    Laparoscopic Robotic Assisted Ventral Hernia Repair performed by Darlina Schlatter, MD at [facility]  2006    Dr. Greyson Garcia Left 1/21/2022    Left L4 L5 TRANSFORAMINAL performed by Erik Santoyo MD at [facility] ([address] [facility] [place])  2015    Dr. 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 - [facility]; had revision in 1/2020    VENTRAL HERNIA REPAIR N/A 10/21/2020    Open VENTRAL Hernia Repair w/ mesh & Component separation technique performed by Darlina Schlatter, MD at [place] [address] History     Socioeconomic History    Marital status:      Spouse name: None    Number of children: 1    Years of education: 14    Highest education level:  Bachelor's degree (e.g., BA, AB, BS)   Occupational History    Occupation:    Tobacco Use    Smoking status: Every Day     Packs/day: 0.50     Years: 37.00     Pack years: 18.50     Types: Cigarettes     Start date: 1982    Smokeless tobacco: Never    Tobacco comments:     Continue decreasing   Vaping Use    Vaping Use: Never used   Substance and Sexual Activity    Alcohol use: No     Comment: [facility] Drug use: Not Currently     Comment: Follows with [facility]    Sexual activity: Not Currently     Partners: Male   Social History Narrative    2020- she follows with [facility], 15 step-[employer] member, tobacco abuse- counseled, she has used food pantries in the past, she is applying for [employer], transportation by friends but has used [employer] previously. 2021- unchanged from above except- she has applied for disability. Social Determinants of Health     Financial Resource Strain: Low Risk     Difficulty of Paying Living Expenses: Not hard at all   Food Insecurity: No Food Insecurity    Worried About Running Out of Food in the Last Year: Never true    Ran Out of Food in the Last Year: Never true       Family History   Problem Relation Age of Onset    High Blood Pressure Mother     Lupus Mother          from CHF secondary to cardiomyopathy from her lupus    Heart Failure Mother     Atrial Fibrillation Mother     High Blood Pressure Father     Prostate Cancer Father         age 54;  11 years later of a \"bladder tumor\" that caused bladder rupture (pt unsure if malignancy)    Stomach Cancer Maternal Grandmother         unsure if it was actually stomach, but it was reported as that    Heart Attack Maternal Grandfather          at age 40    Stroke Paternal Grandfather         in his 42's; multiple       Allergies   Allergen Reactions    Flomax [Tamsulosin Hcl] Swelling     Swelling of arms; however, also had rash and fever at the same time and was admitted at the time    Morphine Itching and Rash     Rash, itching    Quetiapine      Other reaction(s): Other (See Comments)  \"Makes me nuts\"      Saphris [Asenapine]     Seasonal     Trazodone      Other reaction(s):  Other (See Comments)  Sleepwalking      Bactrim [Sulfamethoxazole-Trimethoprim] Diarrhea and Nausea Only    Prednisone Other (See Comments)     Emotional changes, depression    Zyprexa [Olanzapine] Other (See Comments)     Made her feel like she wanted to \"jump out of my skin\"       Current Outpatient Medications   Medication Sig Dispense Refill    amoxicillin-clavulanate (AUGMENTIN) 875-125 MG per tablet Take 1 tablet by mouth 2 times daily for 10 days 20 tablet 0    gabapentin (NEURONTIN) 300 MG capsule take 1 capsule by mouth every morning and AT NOON AND AT BEDTIME      hydrOXYzine pamoate (VISTARIL) 25 MG capsule take 2 capsules by mouth every evening if needed      fexofenadine (ALLEGRA) 180 MG tablet Take 1 tablet by mouth daily      levothyroxine (SYNTHROID) 100 MCG tablet Take 1 tablet by mouth daily Mon-Saturday; hold dose on Sunday 90 tablet 0    sertraline (ZOLOFT) 100 MG tablet take 1 tablet by mouth once daily      lamoTRIgine (LAMICTAL) 150 MG tablet take 1 tablet by mouth once daily      vitamin D-3 (CHOLECALCIFEROL) 125 MCG (5000 UT) TABS Take 5,000 Units by mouth daily      gabapentin (NEURONTIN) 100 MG capsule Take 1 capsule by mouth 3 times daily for 90 days. 90 capsule 2    ezetimibe (ZETIA) 10 MG tablet Take 1 tablet by mouth daily 90 tablet 1    fluticasone (FLONASE) 50 MCG/ACT nasal spray 1-2 sprays by Nasal route in the morning.  48 g 3    omeprazole (PRILOSEC) 20 MG delayed release capsule Take 1 capsule by mouth in the morning and at bedtime 180 capsule 3    lisinopril (PRINIVIL;ZESTRIL) 40 MG tablet Take 1 tablet by mouth daily 90 tablet 3    amLODIPine (NORVASC) 10 MG tablet Take 1 tablet by mouth daily 90 tablet 3    albuterol sulfate  (90 Base) MCG/ACT inhaler Inhale 1-2 puffs into the lungs every 6 hours as needed for Wheezing or Shortness of Breath 18 g 5    REXULTI 1 MG TABS tablet take 1 tablet by mouth once daily      ondansetron (ZOFRAN ODT) 4 MG disintegrating tablet Take 1 tablet by mouth every 8 hours as needed for Nausea 40 tablet 1    albuterol sulfate  (90 Base) MCG/ACT inhaler Inhale 1-2 puffs into the lungs every 6 hours as needed for Wheezing or Shortness of Breath 3 Inhaler 1    Multiple Vitamins-Minerals (MULTIVITAMIN ADULT PO) Take 1 tablet by mouth daily      propranolol (INDERAL) 10 MG tablet Take 10 mg by mouth 3 times daily Indications: patient takes twice a day       hydrOXYzine (ATARAX) 25 MG tablet Take 50 mg by mouth every 6 hours as needed for Anxiety       DULoxetine (CYMBALTA) 30 MG extended release capsule Take 30 mg by mouth daily      lamoTRIgine (LAMICTAL) 200 MG tablet Take 150 mg by mouth daily  (Patient not taking: Reported on 10/10/2022)       No current facility-administered medications for this visit. Review of Systems   Constitutional:  Negative for activity change, appetite change, fatigue and fever. HENT:  Positive for congestion, postnasal drip and sinus pressure. Negative for ear pain, sneezing and sore throat. Respiratory:  Positive for cough. Negative for shortness of breath and wheezing. Cardiovascular:  Negative for chest pain and palpitations. Neurological:  Positive for headaches.         Objective   Patient-Reported Vitals  Patient-Reported Weight: 172lb  Patient-Reported Height: 5ft1       Physical Exam  [INSTRUCTIONS:  \"[x]\" Indicates a positive item  \"[]\" Indicates a negative item  -- DELETE ALL ITEMS NOT EXAMINED]    Constitutional: [x] Appears well-developed and well-nourished [x] No apparent distress      [] Abnormal -     Mental status: [x] Alert and awake  [x] Oriented to person/place/time [x] Able to follow commands    [] Abnormal -     Eyes:   EOM    [x]  Normal    [] Abnormal -   Sclera  [x]  Normal    [] Abnormal -          Discharge [x]  None visible   [] Abnormal -     HENT: [x] Normocephalic, atraumatic  [x] Abnormal - pt complains of congestion and sinus pressure on the left maxillary sinus  [x] Mouth/Throat: Mucous membranes are moist    External Ears [x] Normal  [] Abnormal -    Neck: [x] No visualized mass [] Abnormal -     Pulmonary/Chest: [x] Respiratory effort normal   [x] No visualized signs of difficulty breathing or respiratory distress        [] Abnormal -      Musculoskeletal:   [x] Normal gait with no signs of ataxia         [x] Normal range of motion of neck        [] Abnormal -     Neurological:        [x] No Facial Asymmetry (Cranial nerve 7 motor function) (limited exam due to video visit)          [x] No gaze palsy        [] Abnormal -          Skin:        [x] No significant exanthematous lesions or discoloration noted on facial skin         [] Abnormal -            Psychiatric:       [x] Normal Affect [] Abnormal -        [x] No Hallucinations    Other pertinent observable physical exam findings: Anna Holder, was evaluated through a synchronous (real-time) audio-video encounter. The patient (or guardian if applicable) is aware that this is a billable service, which includes applicable co-pays. This Virtual Visit was conducted with patient's (and/or legal guardian's) consent. The visit was conducted pursuant to the emergency declaration under the [facility], [address] [place] [address] authority and the [employer] and [employer] General Act. Patient identification was verified, and a caregiver was present when appropriate. The patient was located at Home: [address] [place] [address]  [address] Provider was located at [facility] [address] [place], [place] [id number] [facility].         --STAR Boo - CNP

## 2022-12-14 ASSESSMENT — ENCOUNTER SYMPTOMS: WHEEZING: 0

## 2022-12-16 ENCOUNTER — HOSPITAL ENCOUNTER (EMERGENCY)
Age: 57
Discharge: HOME | End: 2022-12-16
Attending: EMERGENCY MEDICINE
Payer: MEDICARE

## 2022-12-16 VITALS
BODY MASS INDEX: 32.1 KG/M2 | DIASTOLIC BLOOD PRESSURE: 80 MMHG | HEART RATE: 99 BPM | OXYGEN SATURATION: 96 % | HEIGHT: 61 IN | TEMPERATURE: 98.2 F | SYSTOLIC BLOOD PRESSURE: 156 MMHG | WEIGHT: 170 LBS | RESPIRATION RATE: 16 BRPM

## 2022-12-16 DIAGNOSIS — R52 BURNING PAIN: ICD-10-CM

## 2022-12-16 DIAGNOSIS — K57.32 DIVERTICULITIS OF COLON: Primary | ICD-10-CM

## 2022-12-16 LAB
ABSOLUTE EOS #: 0.23 K/UL (ref 0–0.44)
ABSOLUTE IMMATURE GRANULOCYTE: 0.04 K/UL (ref 0–0.3)
ABSOLUTE LYMPH #: 1.77 K/UL (ref 1.1–3.7)
ABSOLUTE MONO #: 0.72 K/UL (ref 0.1–1.2)
ANION GAP SERPL CALCULATED.3IONS-SCNC: 10 MMOL/L (ref 9–17)
BACTERIA: ABNORMAL
BASOPHILS # BLD: 1 % (ref 0–2)
BASOPHILS ABSOLUTE: 0.07 K/UL (ref 0–0.2)
BILIRUBIN URINE: NEGATIVE
BUN BLDV-MCNC: 10 MG/DL (ref 6–20)
BUN/CREAT BLD: 13 (ref 9–20)
CALCIUM SERPL-MCNC: 9.4 MG/DL (ref 8.6–10.4)
CHLORIDE BLD-SCNC: 103 MMOL/L (ref 98–107)
CO2: 27 MMOL/L (ref 20–31)
CREAT SERPL-MCNC: 0.75 MG/DL (ref 0.5–0.9)
EOSINOPHILS RELATIVE PERCENT: 2 % (ref 1–4)
EPITHELIAL CELLS UA: ABNORMAL /HPF (ref 0–5)
GFR SERPL CREATININE-BSD FRML MDRD: >60 ML/MIN/1.73M2
GLUCOSE BLD-MCNC: 104 MG/DL (ref 70–99)
GLUCOSE URINE: NEGATIVE
HCT VFR BLD CALC: 37 % (ref 36.3–47.1)
HEMOGLOBIN: 12.4 G/DL (ref 11.9–15.1)
IMMATURE GRANULOCYTES: 0 %
KETONES, URINE: NEGATIVE
LEUKOCYTE ESTERASE, URINE: NEGATIVE
LYMPHOCYTES # BLD: 18 % (ref 24–43)
MCH RBC QN AUTO: 29.2 PG (ref 25.2–33.5)
MCHC RBC AUTO-ENTMCNC: 33.5 G/DL (ref 25.2–33.5)
MCV RBC AUTO: 87.3 FL (ref 82.6–102.9)
MONOCYTES # BLD: 8 % (ref 3–12)
MUCUS: ABNORMAL
NITRITE, URINE: NEGATIVE
NRBC AUTOMATED: 0 PER 100 WBC
PDW BLD-RTO: 14.2 % (ref 11.8–14.4)
PH UA: 5.5 (ref 5–6)
PLATELET # BLD: 245 K/UL (ref 138–453)
PMV BLD AUTO: 8 FL (ref 8.1–13.5)
POTASSIUM SERPL-SCNC: 4.1 MMOL/L (ref 3.7–5.3)
PROTEIN UA: NEGATIVE
RBC # BLD: 4.24 M/UL (ref 3.95–5.11)
RBC UA: ABNORMAL /HPF (ref 0–4)
SEG NEUTROPHILS: 71 % (ref 36–65)
SEGMENTED NEUTROPHILS ABSOLUTE COUNT: 6.82 K/UL (ref 1.5–8.1)
SODIUM BLD-SCNC: 140 MMOL/L (ref 135–144)
SPECIFIC GRAVITY UA: 1.02 (ref 1.01–1.02)
URINE HGB: NEGATIVE
UROBILINOGEN, URINE: NORMAL
WBC # BLD: 9.7 K/UL (ref 3.5–11.3)
WBC UA: ABNORMAL /HPF (ref 0–4)

## 2022-12-16 PROCEDURE — 6360000002 HC RX W HCPCS: Performed by: EMERGENCY MEDICINE

## 2022-12-16 PROCEDURE — 99284 EMERGENCY DEPT VISIT MOD MDM: CPT

## 2022-12-16 PROCEDURE — 96374 THER/PROPH/DIAG INJ IV PUSH: CPT

## 2022-12-16 PROCEDURE — 6370000000 HC RX 637 (ALT 250 FOR IP): Performed by: EMERGENCY MEDICINE

## 2022-12-16 PROCEDURE — 81001 URINALYSIS AUTO W/SCOPE: CPT

## 2022-12-16 PROCEDURE — 80048 BASIC METABOLIC PNL TOTAL CA: CPT

## 2022-12-16 PROCEDURE — 96376 TX/PRO/DX INJ SAME DRUG ADON: CPT

## 2022-12-16 PROCEDURE — 85025 COMPLETE CBC W/AUTO DIFF WBC: CPT

## 2022-12-16 PROCEDURE — 96375 TX/PRO/DX INJ NEW DRUG ADDON: CPT

## 2022-12-16 RX ORDER — CIPROFLOXACIN 250 MG/1
500 TABLET, FILM COATED ORAL ONCE
Status: COMPLETED | OUTPATIENT
Start: 2022-12-16 | End: 2022-12-16

## 2022-12-16 RX ORDER — CIPROFLOXACIN 500 MG/1
500 TABLET, FILM COATED ORAL 2 TIMES DAILY
Qty: 14 TABLET | Refills: 0 | Status: SHIPPED | OUTPATIENT
Start: 2022-12-16 | End: 2022-12-23

## 2022-12-16 RX ORDER — FENTANYL CITRATE 50 UG/ML
25 INJECTION, SOLUTION INTRAMUSCULAR; INTRAVENOUS ONCE
Status: COMPLETED | OUTPATIENT
Start: 2022-12-16 | End: 2022-12-16

## 2022-12-16 RX ORDER — HYDROCODONE BITARTRATE AND ACETAMINOPHEN 5; 325 MG/1; MG/1
1 TABLET ORAL EVERY 6 HOURS PRN
Qty: 10 TABLET | Refills: 0 | Status: SHIPPED | OUTPATIENT
Start: 2022-12-16 | End: 2022-12-20 | Stop reason: SDUPTHER

## 2022-12-16 RX ORDER — METRONIDAZOLE 500 MG/1
500 TABLET ORAL 2 TIMES DAILY
Qty: 14 TABLET | Refills: 0 | Status: SHIPPED | OUTPATIENT
Start: 2022-12-16 | End: 2022-12-23

## 2022-12-16 RX ORDER — METRONIDAZOLE 250 MG/1
500 TABLET ORAL ONCE
Status: COMPLETED | OUTPATIENT
Start: 2022-12-16 | End: 2022-12-16

## 2022-12-16 RX ORDER — ONDANSETRON 2 MG/ML
4 INJECTION INTRAMUSCULAR; INTRAVENOUS ONCE
Status: COMPLETED | OUTPATIENT
Start: 2022-12-16 | End: 2022-12-16

## 2022-12-16 RX ADMIN — METRONIDAZOLE 500 MG: 250 TABLET ORAL at 03:35

## 2022-12-16 RX ADMIN — FENTANYL CITRATE 25 MCG: 50 INJECTION, SOLUTION INTRAMUSCULAR; INTRAVENOUS at 02:31

## 2022-12-16 RX ADMIN — FENTANYL CITRATE 25 MCG: 50 INJECTION, SOLUTION INTRAMUSCULAR; INTRAVENOUS at 03:35

## 2022-12-16 RX ADMIN — ONDANSETRON 4 MG: 2 INJECTION INTRAMUSCULAR; INTRAVENOUS at 02:31

## 2022-12-16 RX ADMIN — CIPROFLOXACIN 500 MG: 250 TABLET, FILM COATED ORAL at 03:35

## 2022-12-16 ASSESSMENT — PAIN DESCRIPTION - ORIENTATION: ORIENTATION: LEFT;LOWER

## 2022-12-16 ASSESSMENT — PAIN SCALES - GENERAL
PAINLEVEL_OUTOF10: 7
PAINLEVEL_OUTOF10: 7
PAINLEVEL_OUTOF10: 9

## 2022-12-16 ASSESSMENT — PAIN DESCRIPTION - LOCATION: LOCATION: ABDOMEN

## 2022-12-16 ASSESSMENT — PAIN DESCRIPTION - PAIN TYPE: TYPE: ACUTE PAIN

## 2022-12-16 ASSESSMENT — PAIN - FUNCTIONAL ASSESSMENT: PAIN_FUNCTIONAL_ASSESSMENT: 0-10

## 2022-12-16 NOTE — ED NOTES
Pt to ED with c/o LLQ pain since 9pm. Pt states she was on augmentin for sinus infection, had 2 episodes if diarrhea and took imodium. Pt states she then developed abdominal  Pain and mild nausea. Pt is alert and oriented x 4 and in NAD.       Tyree Szymanski RN  12/16/22 [id number]

## 2022-12-16 NOTE — ED PROVIDER NOTES
eMERGENCY dEPARTMENT eNCOUnter      Pt Name: Titus Alcantara  MRN: [id number]  [place] 1965  Date of evaluation: 12/16/2022      CHIEF COMPLAINT       Chief Complaint   Patient presents with    Abdominal Pain     LLQ         HISTORY OF PRESENT ILLNESS    Titus Alcantara is a 65 y.o. female who presents for abdominal pain. Patient states she started with left lower quadrant abdominal pain about 9 PM is progressively gotten worse nausea but no vomiting no chest pain or shortness of breath no prior events of pain like this in the past she has had multiple surgeries including appendectomy cholecystectomy and sounds like hysterectomy and surgery for a extensive hernia in her abdomen she denies any fever or chills no exacerbating or relieving factors        REVIEW OF SYSTEMS       Review of systems are all reviewed and negative except stated above in HPI    [address]    has a past medical history of Anxiety, Bipolar disorder ([address] Breast cancer ([address] Chronic back pain, Chronic kidney disease, COPD (chronic obstructive pulmonary disease) ([address] Depression, GERD (gastroesophageal reflux disease), Headache(784.0), History of alcoholism ([address] Hyperlipidemia, Hypertension, Hyperthyroidism, Hypothyroidism, Kidney stone, Neuropathy, Obesity, Panic attack, PTSD (post-traumatic stress disorder), and Sleep apnea. SURGICAL HISTORY      has a past surgical history that includes Eye surgery; tumor removal (1999); Appendectomy (1977); Cholecystectomy (2014); Tubal ligation (2001); lumbar laminectomy (2006); Dilation and curettage of uterus; Breast lumpectomy (Left, 2014); Breast biopsy (Right, 2015); Total abdominal hysterectomy w/ bilateral salpingoophorectomy (2015); ventral hernia repair (2019); hernia repair (01/2020); fracture surgery (Left); hernia repair (N/A, 7/22/2020); ventral hernia repair (N/A, 10/21/2020); Colonoscopy (2019); Colonoscopy (N/A, 5/11/2021);  Pain management procedure (Left, 1/21/2022); eye surgery; Tonsillectomy; Hysterectomy, vaginal; and Upper gastrointestinal endoscopy.     CURRENT MEDICATIONS       Discharge Medication List as of 12/16/2022  3:35 AM        CONTINUE these medications which have NOT CHANGED    Details   gabapentin (NEURONTIN) 300 MG capsule take 1 capsule by mouth every morning and AT NOON AND AT BEDTIMEHistorical Med      hydrOXYzine pamoate (VISTARIL) 25 MG capsule take 2 capsules by mouth every evening if neededHistorical Med      fexofenadine (ALLEGRA) 180 MG tablet Take 1 tablet by mouth dailyHistorical Med      amoxicillin-clavulanate (AUGMENTIN) 875-125 MG per tablet Take 1 tablet by mouth 2 times daily for 10 days, Disp-20 tablet, R-0Normal      meclizine (ANTIVERT) 12.5 MG tablet Take 1 tablet by mouth 3 times daily as needed for Dizziness, Disp-30 tablet, R-0Normal      levothyroxine (SYNTHROID) 100 MCG tablet Take 1 tablet by mouth daily Mon-Saturday; hold dose on Sunday, Disp-90 tablet, R-0Normal      sertraline (ZOLOFT) 100 MG tablet take 1 tablet by mouth once dailyHistorical Med      !! lamoTRIgine (LAMICTAL) 150 MG tablet take 1 tablet by mouth once dailyHistorical Med      vitamin D-3 (CHOLECALCIFEROL) 125 MCG (5000 UT) TABS Take 5,000 Units by mouth dailyHistorical Med      ezetimibe (ZETIA) 10 MG tablet Take 1 tablet by mouth daily, Disp-90 tablet, R-1Normal      fluticasone (FLONASE) 50 MCG/ACT nasal spray 1-2 sprays by Nasal route in the morning., Disp-48 g, R-3Normal      omeprazole (PRILOSEC) 20 MG delayed release capsule Take 1 capsule by mouth in the morning and at bedtime, Disp-180 capsule, R-3Normal      lisinopril (PRINIVIL;ZESTRIL) 40 MG tablet Take 1 tablet by mouth daily, Disp-90 tablet, R-3Normal      amLODIPine (NORVASC) 10 MG tablet Take 1 tablet by mouth daily, Disp-90 tablet, R-3Normal      !! albuterol sulfate  (90 Base) MCG/ACT inhaler Inhale 1-2 puffs into the lungs every 6 hours as needed for Wheezing or Shortness of Breath, Disp-18 g, R-5Normal      REXULTI 1 MG TABS tablet take 1 tablet by mouth once daily, DAWHistorical Med      ondansetron (ZOFRAN ODT) 4 MG disintegrating tablet Take 1 tablet by mouth every 8 hours as needed for Nausea, Disp-40 tablet, R-1Normal      !! albuterol sulfate  (90 Base) MCG/ACT inhaler Inhale 1-2 puffs into the lungs every 6 hours as needed for Wheezing or Shortness of Breath, Disp-3 Inhaler, R-1Normal      Multiple Vitamins-Minerals (MULTIVITAMIN ADULT PO) Take 1 tablet by mouth dailyHistorical Med      propranolol (INDERAL) 10 MG tablet Take 10 mg by mouth 3 times daily Indications: patient takes twice a day Historical Med      hydrOXYzine (ATARAX) 25 MG tablet Take 50 mg by mouth every 6 hours as needed for Anxiety Historical Med      DULoxetine (CYMBALTA) 30 MG extended release capsule Take 30 mg by mouth dailyHistorical Med      !! lamoTRIgine (LAMICTAL) 200 MG tablet Take 150 mg by mouth daily Historical Med       !! - Potential duplicate medications found. Please discuss with provider. ALLERGIES     is allergic to flomax [tamsulosin hcl], morphine, quetiapine, saphris [asenapine], seasonal, trazodone, bactrim [sulfamethoxazole-trimethoprim], prednisone, and zyprexa [olanzapine]. FAMILY HISTORY     She indicated that her mother is . She indicated that her father is . She indicated that her sister is alive. She indicated that her maternal grandmother is . She indicated that her maternal grandfather is . She indicated that her paternal grandmother is . She indicated that her paternal grandfather is . family history includes Atrial Fibrillation in her mother; Heart Attack in her maternal grandfather; Heart Failure in her mother; High Blood Pressure in her father and mother; Lupus in her mother; Prostate Cancer in her father; Joellen Zackery in her maternal grandmother; Stroke in her paternal grandfather.     SOCIAL HISTORY      reports that she has been smoking cigarettes. She started smoking about 40 years ago. She has a 18.50 pack-year smoking history. She has never used smokeless tobacco. She reports that she does not currently use drugs. She reports that she does not drink alcohol. PHYSICAL EXAM     INITIAL VITALS:  height is 5' 1\" (1.549 m) and weight is 170 lb (77.1 kg). Her tympanic temperature is 98.2 °F (36.8 °C). Her blood pressure is 156/80 (abnormal) and her pulse is 99. Her respiration is 16 and oxygen saturation is 96%.       : Patient alert nontoxic-appearing female in no apparent distress  HEENT: Head is atraumatic conjunctive are clear  Respiratory: Lung sounds are clear bilateral  Cardiac: Heart is regular rate and rhythm  GI: Abdomen soft minimally tender left lower quadrant with no rebound guarding pulsatile mass or bruits bowel sounds are normal nonsurgical exam  Neuro: Patient has no gross focal neurological deficits at bedside exam    DIFFERENTIAL DIAGNOSIS/ MDM:     Diverticulitis bowel obstruction UTI kidney stone    DIAGNOSTIC RESULTS     EKG: All EKG's are interpreted by the Emergency Department Physician who either signs or Co-signs this chart in the absence of a cardiologist.        RADIOLOGY:   I directly visualized the following  images and reviewed the radiologist interpretations:  CT ABDOMEN PELVIS WO CONTRAST Additional Contrast? None    (Results Pending)         LABS:  Labs Reviewed   CBC WITH AUTO DIFFERENTIAL - Abnormal; Notable for the following components:       Result Value    MPV 8.0 (*)     Seg Neutrophils 71 (*)     Lymphocytes 18 (*)     All other components within normal limits   BASIC METABOLIC PANEL - Abnormal; Notable for the following components:    Glucose 104 (*)     All other components within normal limits   MICROSCOPIC URINALYSIS - Abnormal; Notable for the following components:    Mucus, UA 1+ (*)     All other components within normal limits   URINALYSIS WITH REFLEX TO CULTURE         EMERGENCY DEPARTMENT COURSE:   Vitals:    Vitals:    12/16/22 0156   BP: (!) 156/80   Pulse: 99   Resp: 16   Temp: 98.2 °F (36.8 °C)   TempSrc: Tympanic   SpO2: 96%   Weight: 170 lb (77.1 kg)   Height: 5' 1\" (1.549 m)     -------------------------  BP: (!) 156/80, Temp: 98.2 °F (36.8 °C), Heart Rate: 99, Resp: 16    Orders Placed This Encounter   Medications    fentaNYL (SUBLIMAZE) injection 25 mcg    ondansetron (ZOFRAN) injection 4 mg    fentaNYL (SUBLIMAZE) injection 25 mcg    ciprofloxacin (CIPRO) tablet 500 mg     Order Specific Question:   Antimicrobial Indications     Answer:   Intra-Abdominal Infection    metroNIDAZOLE (FLAGYL) tablet 500 mg     Order Specific Question:   Antimicrobial Indications     Answer:   Intra-Abdominal Infection    ciprofloxacin (CIPRO) 500 MG tablet     Sig: Take 1 tablet by mouth 2 times daily for 7 days     Dispense:  14 tablet     Refill:  0    metroNIDAZOLE (FLAGYL) 500 MG tablet     Sig: Take 1 tablet by mouth 2 times daily for 7 days     Dispense:  14 tablet     Refill:  0    HYDROcodone-acetaminophen (NORCO) 5-325 MG per tablet     Sig: Take 1 tablet by mouth every 6 hours as needed for Pain for up to 3 days. Intended supply: 3 days. Take lowest dose possible to manage pain     Dispense:  10 tablet     Refill:  0           Re-evaluation Notes    Labs are unremarkable CT shows signs of diverticulitis patient will be placed on antibiotics here she is currently on Augmentin for 2 days for a presumed sinus infection I will have her stop that as the Cipro should cover well enough follow-up as directed return if worse    CRITICAL CARE:   None      CONSULTS:      PROCEDURES:  None    FINAL IMPRESSION      1.  Diverticulitis of colon          DISPOSITION/PLAN   DISPOSITION Decision To Discharge 12/16/2022 03:32:11 AM      Condition on Disposition    Stable    PATIENT REFERRED TO:  Florian Kramer DO  [address] [address]  [phone]    In 2 days        DISCHARGE MEDICATIONS:  Discharge Medication List as of 12/16/2022  3:35 AM        START taking these medications    Details   ciprofloxacin (CIPRO) 500 MG tablet Take 1 tablet by mouth 2 times daily for 7 days, Disp-14 tablet, R-0Normal      metroNIDAZOLE (FLAGYL) 500 MG tablet Take 1 tablet by mouth 2 times daily for 7 days, Disp-14 tablet, R-0Normal      HYDROcodone-acetaminophen (NORCO) 5-325 MG per tablet Take 1 tablet by mouth every 6 hours as needed for Pain for up to 3 days. Intended supply: 3 days. Take lowest dose possible to manage pain, Disp-10 tablet, R-0Print             (Please note that portions of this note were completed with a voice recognition program.  Efforts were made to edit the dictations but occasionally words are mis-transcribed.)    Too Hurley MD,, MD, F.A.C.E.P.   Attending Emergency Physician        Too Hurley MD  12/16/22 6335

## 2022-12-16 NOTE — ED NOTES
Pt returned from ct via [facility], [address] [place] [address]  12/16/22 9865

## 2022-12-19 ENCOUNTER — PATIENT MESSAGE (OUTPATIENT)
Dept: FAMILY MEDICINE CLINIC | Age: 57
End: 2022-12-19

## 2022-12-19 ENCOUNTER — CARE COORDINATION (OUTPATIENT)
Dept: CARE COORDINATION | Age: 57
End: 2022-12-19

## 2022-12-19 NOTE — CARE COORDINATION
Ambulatory Care Coordination  ED Follow up Call    Everardo Mason was seen at [facility] ED 12/16/2022 12/19/2022- per chart review- she has scheduled F/U with PCP 12/21/2022.    12:59 pm Left HIPPA compliant voice message requesting return call @ [phone] re: Initial ER F/U call.      Reason for ED visit:  diverticulitis      Future Appointments   Date Time Provider Rupesh Michaels   12/21/2022 10:20 AM DO IDA GarciaAM [facility]   3/20/2023 10:40 AM Christina England DO [facility]

## 2022-12-19 NOTE — TELEPHONE ENCOUNTER
Per [facility], last fill 11/11, quantity 90 for 30 days. Tj Garcia called requesting a refill of the below medication which has been pended for you:     Requested Prescriptions     Pending Prescriptions Disp Refills    gabapentin (NEURONTIN) 100 MG capsule [Pharmacy Med Name: GABAPENTIN 100 MG CAPSULE] 90 capsule 2     Sig: take 1 capsule by mouth three times a day       Last Appointment Date: 10/10/2022  Next Appointment Date: 3/20/2023    Allergies   Allergen Reactions    Flomax [Tamsulosin Hcl] Swelling     Swelling of arms; however, also had rash and fever at the same time and was admitted at the time    Morphine Itching and Rash     Rash, itching    Quetiapine      Other reaction(s): Other (See Comments)  \"Makes me nuts\"      Saphris [Asenapine]     Seasonal     Trazodone      Other reaction(s):  Other (See Comments)  Sleepwalking      Bactrim [Sulfamethoxazole-Trimethoprim] Diarrhea and Nausea Only    Prednisone Other (See Comments)     Emotional changes, depression    Zyprexa [Olanzapine] Other (See Comments)     Made her feel like she wanted to \"jump out of my skin\"

## 2022-12-20 DIAGNOSIS — K57.32 DIVERTICULITIS OF COLON: ICD-10-CM

## 2022-12-20 RX ORDER — PROMETHAZINE HYDROCHLORIDE 25 MG/1
25 TABLET ORAL EVERY 8 HOURS PRN
Qty: 10 TABLET | Refills: 0 | Status: SHIPPED | OUTPATIENT
Start: 2022-12-20

## 2022-12-20 RX ORDER — HYDROCODONE BITARTRATE AND ACETAMINOPHEN 5; 325 MG/1; MG/1
1 TABLET ORAL EVERY 8 HOURS PRN
Qty: 5 TABLET | Refills: 0 | Status: SHIPPED | OUTPATIENT
Start: 2022-12-20 | End: 2022-12-21 | Stop reason: SDUPTHER

## 2022-12-20 NOTE — TELEPHONE ENCOUNTER
Will discuss med/dose with pt tomorrow at [facility] - it appears her Ortho surgeon added 300 mg dose last month, so unsure how she is taking currently.

## 2022-12-20 NOTE — TELEPHONE ENCOUNTER
Controlled Substance Monitoring:    Acute and Chronic Pain Monitoring:   RX Monitoring 12/20/2022   Periodic Controlled Substance Monitoring No signs of potential drug abuse or diversion identified.     -

## 2022-12-20 NOTE — TELEPHONE ENCOUNTER
Per [facility], last fill 12/16, quantity 10 for 3 days. Patient asking for a refill of Norco. States she got in in the ER for pain d/t diverticulitis. Patient also asking if she could get an rx for phenergan. States she is taking zofran and it is not helping with her symptoms. See [employer] message 12/19/22. Patient does have appt with [facility] 12/21 and wishes to get this prior to appt. Gerber LOUIS called requesting a refill of the below medication which has been pended for you:     Requested Prescriptions     Pending Prescriptions Disp Refills    HYDROcodone-acetaminophen (NORCO) 5-325 MG per tablet 10 tablet 0     Sig: Take 1 tablet by mouth every 6 hours as needed for Pain for up to 3 days. Intended supply: 3 days. Take lowest dose possible to manage pain       Last Appointment Date: 10/10/2022  Next Appointment Date: 12/21/2022    Allergies   Allergen Reactions    Flomax [Tamsulosin Hcl] Swelling     Swelling of arms; however, also had rash and fever at the same time and was admitted at the time    Morphine Itching and Rash     Rash, itching    Quetiapine      Other reaction(s): Other (See Comments)  \"Makes me nuts\"      Saphris [Asenapine]     Seasonal     Trazodone      Other reaction(s):  Other (See Comments)  Sleepwalking      Bactrim [Sulfamethoxazole-Trimethoprim] Diarrhea and Nausea Only    Prednisone Other (See Comments)     Emotional changes, depression    Zyprexa [Olanzapine] Other (See Comments)     Made her feel like she wanted to \"jump out of my skin\"

## 2022-12-21 ENCOUNTER — OFFICE VISIT (OUTPATIENT)
Dept: FAMILY MEDICINE CLINIC | Age: 57
End: 2022-12-21
Payer: MEDICARE

## 2022-12-21 VITALS
WEIGHT: 179 LBS | HEIGHT: 61 IN | TEMPERATURE: 98.4 F | SYSTOLIC BLOOD PRESSURE: 128 MMHG | HEART RATE: 86 BPM | DIASTOLIC BLOOD PRESSURE: 82 MMHG | BODY MASS INDEX: 33.79 KG/M2 | OXYGEN SATURATION: 96 %

## 2022-12-21 DIAGNOSIS — R14.0 ABDOMINAL BLOATING: ICD-10-CM

## 2022-12-21 DIAGNOSIS — C50.912 MALIGNANT NEOPLASM OF LEFT BREAST IN FEMALE, ESTROGEN RECEPTOR NEGATIVE, UNSPECIFIED SITE OF BREAST (HCC): ICD-10-CM

## 2022-12-21 DIAGNOSIS — K57.32 DIVERTICULITIS OF COLON: Primary | ICD-10-CM

## 2022-12-21 DIAGNOSIS — R10.10 PAIN OF UPPER ABDOMEN: ICD-10-CM

## 2022-12-21 DIAGNOSIS — F14.21 COCAINE DEPENDENCE IN REMISSION (HCC): ICD-10-CM

## 2022-12-21 DIAGNOSIS — Z17.1 MALIGNANT NEOPLASM OF LEFT BREAST IN FEMALE, ESTROGEN RECEPTOR NEGATIVE, UNSPECIFIED SITE OF BREAST (HCC): ICD-10-CM

## 2022-12-21 DIAGNOSIS — K14.6 TONGUE PAIN: ICD-10-CM

## 2022-12-21 DIAGNOSIS — M46.96 UNSPECIFIED INFLAMMATORY SPONDYLOPATHY, LUMBAR REGION (HCC): ICD-10-CM

## 2022-12-21 PROCEDURE — G8484 FLU IMMUNIZE NO ADMIN: HCPCS | Performed by: FAMILY MEDICINE

## 2022-12-21 PROCEDURE — G8427 DOCREV CUR MEDS BY ELIG CLIN: HCPCS | Performed by: FAMILY MEDICINE

## 2022-12-21 PROCEDURE — 3074F SYST BP LT 130 MM HG: CPT | Performed by: FAMILY MEDICINE

## 2022-12-21 PROCEDURE — 99213 OFFICE O/P EST LOW 20 MIN: CPT | Performed by: FAMILY MEDICINE

## 2022-12-21 PROCEDURE — 3078F DIAST BP <80 MM HG: CPT | Performed by: FAMILY MEDICINE

## 2022-12-21 PROCEDURE — 99214 OFFICE O/P EST MOD 30 MIN: CPT | Performed by: FAMILY MEDICINE

## 2022-12-21 PROCEDURE — 3017F COLORECTAL CA SCREEN DOC REV: CPT | Performed by: FAMILY MEDICINE

## 2022-12-21 PROCEDURE — 4004F PT TOBACCO SCREEN RCVD TLK: CPT | Performed by: FAMILY MEDICINE

## 2022-12-21 PROCEDURE — G8417 CALC BMI ABV UP PARAM F/U: HCPCS | Performed by: FAMILY MEDICINE

## 2022-12-21 RX ORDER — HYDROCODONE BITARTRATE AND ACETAMINOPHEN 5; 325 MG/1; MG/1
1 TABLET ORAL EVERY 8 HOURS PRN
Qty: 18 TABLET | Refills: 0 | Status: SHIPPED | OUTPATIENT
Start: 2022-12-22 | End: 2022-12-28 | Stop reason: SDUPTHER

## 2022-12-21 RX ORDER — DICYCLOMINE HYDROCHLORIDE 10 MG/1
10 CAPSULE ORAL 2 TIMES DAILY WITH MEALS
Qty: 0.1 CAPSULE | Refills: 0
Start: 2022-12-21

## 2022-12-21 RX ORDER — GABAPENTIN 100 MG/1
CAPSULE ORAL
Qty: 90 CAPSULE | Refills: 2 | OUTPATIENT
Start: 2022-12-21

## 2022-12-21 NOTE — PROGRESS NOTES
SULEMA [place] [address]  [address], [address] Beatris Lynch  [phone]      Mor Francois is a 65 y.o. female who presents today for her medical conditions/complaints as noted below. Mor Francois is c/o of Diverticulitis (ER f/u 12/16, [facility], Diverticulitis )      HPI:     Pt here today for ER follow-up. Pt tested positive 2 days after Thanksgiving - feels like she still has some joint stiffness (b/l ankles) and fatigue. She was then diagnosed with sinusitis at [facility] on 12/13 - was started on Augmentin. However, she then went to ER with diverticulitis on 12/16 and they switched her to Cipro/Flagyl. Feels approx 80% improved with diverticulitis sx's; still has some L-sided abd pain and gurgling with BM's. Stools can be watery or mushy; can be explosive. No black stools or blood. Having some nausea from the Flagyl - Zofran was not helping enough, so she started Phenergan last night, which helps more. Has 2 more days of antibiotics left. Using Norco 5-325 mg every 8 hours as needed     Having some blood in urine and L flank pain since yesterday; not yet wrapping around to abdomen. Thinks she is starting to pass a kidney stone; has had these before. Also thinks she may have thrush - started to notice rough and raw tongue and felt more sensitive. Tongue appears red. Has had thrush in the past.    Started Gabapentin 300 mg to use TID for sciatica on L side - does seem to help. Plan is to take this only temporarily. Will see him next on 2/8/23. Taking Bentyl 1-2x's daily with meals, which does help. Feels she is likely gaining weight from her Rexulti; started it approx 6-8 months ago. Thinks she has gained 15-20 lbs and her clothes/coats are not fitting. Has next appt with Psych next month. Has h/o breast CA - UTD with her mammogram   Still seeing Dr. Hien Copeland once yearly, but missed her last appt    Sober for 10 months from cocaine.       Past Medical History:   Diagnosis Date    Anxiety     Bipolar disorder ([facility]     Breast cancer ([facility] 2014    L side - lumpectomy and radiation; no chemo (was recommended to take Tamoxifen, but with her smoking, she declined due to family h/o CVA already).  Seeing Dr. Keyshawn Varela once yearly/    Chronic back pain     Chronic kidney disease     COPD (chronic obstructive pulmonary disease) ([facility]     Depression     GERD (gastroesophageal reflux disease)     Headache(784.0)     History of alcoholism ([facility]     sober since 1/16/19    Hyperlipidemia     Hypertension     Hyperthyroidism 1 2022    Hypothyroidism     Kidney stone     Has had lithotripsy x 1; had ureteral stent placement with removal x 1; had seen Dr. Chi Garcia    Neuropathy     Obesity     Panic attack 04/15/2021    PTSD (post-traumatic stress disorder)     Sleep apnea 4 2021      Past Surgical History:   Procedure Laterality Date    APPENDECTOMY  1977    BREAST BIOPSY Right 2015    excisional biopsy - Dr. Marquis Victor Left 2014    breast CA - done at [facility]  2014    COLONOSCOPY  2019    polyps, Dr. Kunz Favor at [facility]    COLONOSCOPY N/A 5/11/2021    mild diverticulosis, tubular adenoma x1; Dr. Hector Parekh at [facility]      multiple in her 20's    EYE SURGERY      x 2 in her youth - was \"cross-eyed\"    EYE SURGERY      FRACTURE SURGERY Left     hand    HERNIA REPAIR  01/2020    recurrent incisional hernia repair Dr. Jing Tellez N/A 7/22/2020    Laparoscopic Robotic Assisted Ventral Hernia Repair performed by Madiha Gonzalez MD at [address]  2006    Dr. Nickolas Toney Left 1/21/2022    Left L4 L5 TRANSFORAMINAL performed by Ana Miranda MD at [address] (CERVIX REMOVED)  2015    Dr. 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 Ochoa Valero at [facility] - used mesh; had revision in 2020    VENTRAL HERNIA REPAIR N/A 10/21/2020    Open VENTRAL Hernia Repair w/ mesh & Component separation technique performed by Radha Kennedy MD at [facility] OR     Family History   Problem Relation Age of Onset    High Blood Pressure Mother     Lupus Mother          from CHF secondary to cardiomyopathy from her lupus    Heart Failure Mother     Atrial Fibrillation Mother     High Blood Pressure Father     Prostate Cancer Father         age 54;  11 years later of a \"bladder tumor\" that caused bladder rupture (pt unsure if malignancy)    Stomach Cancer Maternal Grandmother         unsure if it was actually stomach, but it was reported as that    Heart Attack Maternal Grandfather          at age 40    Stroke Paternal Grandfather         in his 42's; multiple     Social History     Tobacco Use    Smoking status: Every Day     Packs/day: 0.50     Years: 37.00     Pack years: 18.50     Types: Cigarettes     Start date:     Smokeless tobacco: Never    Tobacco comments:     Continue decreasing   Substance Use Topics    Alcohol use: No     Comment: Follows with [facility]      Current Outpatient Medications   Medication Sig Dispense Refill    dicyclomine (BENTYL) 10 MG capsule Take 1 capsule by mouth with breakfast and with evening meal 0.1 capsule 0    gabapentin (NEURONTIN) 300 MG capsule take 1 capsule by mouth every morning and AT NOON AND AT BEDTIME      hydrOXYzine pamoate (VISTARIL) 25 MG capsule take 2 capsules by mouth every evening if needed      fexofenadine (ALLEGRA) 180 MG tablet Take 1 tablet by mouth daily      levothyroxine (SYNTHROID) 100 MCG tablet Take 1 tablet by mouth daily Mon-Saturday; hold dose on  90 tablet 0    sertraline (ZOLOFT) 100 MG tablet take 1 tablet by mouth once daily      lamoTRIgine (LAMICTAL) 150 MG tablet take 1 tablet by mouth once daily      vitamin D-3 (CHOLECALCIFEROL) 125 MCG (5000 UT) TABS Take 5,000 Units by mouth daily      ezetimibe (ZETIA) 10 MG tablet Take 1 tablet by mouth daily 90 tablet 1    fluticasone (FLONASE) 50 MCG/ACT nasal spray 1-2 sprays by Nasal route in the morning. 48 g 3    omeprazole (PRILOSEC) 20 MG delayed release capsule Take 1 capsule by mouth in the morning and at bedtime 180 capsule 3    lisinopril (PRINIVIL;ZESTRIL) 40 MG tablet Take 1 tablet by mouth daily 90 tablet 3    amLODIPine (NORVASC) 10 MG tablet Take 1 tablet by mouth daily 90 tablet 3    albuterol sulfate  (90 Base) MCG/ACT inhaler Inhale 1-2 puffs into the lungs every 6 hours as needed for Wheezing or Shortness of Breath 18 g 5    REXULTI 1 MG TABS tablet take 1 tablet by mouth once daily      albuterol sulfate  (90 Base) MCG/ACT inhaler Inhale 1-2 puffs into the lungs every 6 hours as needed for Wheezing or Shortness of Breath 3 Inhaler 1    Multiple Vitamins-Minerals (MULTIVITAMIN ADULT PO) Take 1 tablet by mouth daily      propranolol (INDERAL) 10 MG tablet Take 10 mg by mouth 3 times daily Indications: patient takes twice a day       DULoxetine (CYMBALTA) 30 MG extended release capsule Take 30 mg by mouth daily      promethazine (PHENERGAN) 25 MG tablet Take 1 tablet by mouth every 8 hours as needed for Nausea 15 tablet 0    HYDROcodone-acetaminophen (NORCO) 5-325 MG per tablet Take 1 tablet by mouth every 8 hours as needed for Pain for up to 5 days.  Max Daily Amount: 3 tablets 15 tablet 0    meclizine (ANTIVERT) 12.5 MG tablet Take 1 tablet by mouth 3 times daily as needed for Dizziness (Patient not taking: No sig reported) 30 tablet 0    ondansetron (ZOFRAN ODT) 4 MG disintegrating tablet Take 1 tablet by mouth every 8 hours as needed for Nausea (Patient not taking: Reported on 12/21/2022) 40 tablet 1    lamoTRIgine (LAMICTAL) 200 MG tablet Take 150 mg by mouth daily  (Patient not taking: No sig reported)       No current facility-administered medications for this visit. Allergies   Allergen Reactions    Flomax [Tamsulosin Hcl] Swelling     Swelling of arms; however, also had rash and fever at the same time and was admitted at the time    Morphine Itching and Rash     Rash, itching    Quetiapine      Other reaction(s): Other (See Comments)  \"Makes me nuts\"      Saphris [Asenapine]     Seasonal     Trazodone      Other reaction(s): Other (See Comments)  Sleepwalking      Bactrim [Sulfamethoxazole-Trimethoprim] Diarrhea and Nausea Only    Prednisone Other (See Comments)     Emotional changes, depression    Zyprexa [Olanzapine] Other (See Comments)     Made her feel like she wanted to \"jump out of my skin\"       Health Maintenance   Topic Date Due    Pneumococcal 0-64 years Vaccine (1 - PCV) Never done    Shingles vaccine (1 of 2) Never done    COVID-19 Vaccine (3 - Booster for Moderna series) 06/03/2021    DTaP/Tdap/Td vaccine (2 - Td or Tdap) 11/30/2021    Flu vaccine (1) 08/01/2022    Annual Wellness Visit (AWV)  Never done    Breast cancer screen  09/06/2023    A1C test (Diabetic or Prediabetic)  09/08/2023    Depression Monitoring  10/10/2023    Colorectal Cancer Screen  05/11/2026    Lipids  09/08/2027    Hepatitis C screen  Completed    HIV screen  Completed    Hepatitis A vaccine  Aged Out    Hib vaccine  Aged Out    Meningococcal (ACWY) vaccine  Aged Out       Subjective:      Review of Systems   Gastrointestinal:  Positive for abdominal distention, abdominal pain and nausea. Objective:     Vitals:    12/21/22 1119   BP: 128/82   Site: Right Upper Arm   Position: Sitting   Cuff Size: Large Adult   Pulse: 86   Temp: 98.4 °F (36.9 °C)   TempSrc: Temporal   SpO2: 96%   Weight: 179 lb (81.2 kg)   Height: 5' 1\" (1.549 m)     Physical Exam  Constitutional:       General: She is not in acute distress. Appearance: Normal appearance. HENT:      Head: Normocephalic and atraumatic.    Eyes: Conjunctiva/sclera: Conjunctivae normal.   Cardiovascular:      Rate and Rhythm: Normal rate and regular rhythm. Heart sounds: Normal heart sounds. Pulmonary:      Effort: Pulmonary effort is normal. No respiratory distress. Breath sounds: Normal breath sounds. Abdominal:      General: Bowel sounds are normal. There is no distension. Palpations: Abdomen is soft. Tenderness: There is abdominal tenderness. There is no guarding. Musculoskeletal:      Right lower leg: No edema. Left lower leg: No edema. Skin:     General: Skin is warm and dry. Neurological:      General: No focal deficit present. Mental Status: She is alert and oriented to person, place, and time. Psychiatric:         Mood and Affect: Mood normal.       Assessment:      1. Diverticulitis of colon  2. Tongue pain  -     nystatin (MYCOSTATIN) 702985 UNIT/ML suspension; Take 5 mLs by mouth 4 times daily for 5 days, Oral, 4 TIMES DAILY Starting Wed 12/21/2022, Until Mon 12/26/2022, For 5 days, Disp-100 mL, R-0, Normal  3. Abdominal bloating  -     dicyclomine (BENTYL) 10 MG capsule; Take 1 capsule by mouth with breakfast and with evening meal, Disp-0.1 capsule, R-0Adjust Sig  4. Pain of upper abdomen  -     dicyclomine (BENTYL) 10 MG capsule; Take 1 capsule by mouth with breakfast and with evening meal, Disp-0.1 capsule, R-0Adjust Sig  5. Malignant neoplasm of left breast in female, estrogen receptor negative, unspecified site of breast ([facility]  6. Unspecified inflammatory spondylopathy, lumbar region ([facility] [address] [facility]  7. Cocaine dependence in remission [facility])       Plan:      Return if symptoms worsen or fail to improve. No orders of the defined types were placed in this encounter. Orders Placed This Encounter   Medications    DISCONTD: HYDROcodone-acetaminophen (NORCO) 5-325 MG per tablet     Sig: Take 1 tablet by mouth every 8 hours as needed for Pain for up to 7 days.      Dispense:  18 tablet     Refill:  0     Reduce doses taken as pain becomes manageable    nystatin (MYCOSTATIN) [id number] UNIT/ML suspension     Sig: Take 5 mLs by mouth 4 times daily for 5 days     Dispense:  100 mL     Refill:  0    dicyclomine (BENTYL) 10 MG capsule     Sig: Take 1 capsule by mouth with breakfast and with evening meal     Dispense:  0.1 capsule     Refill:  0       Patient given educational materials - see patient instructions. Discussed use, benefit, and side effects of prescribed medications. All patient questions answered. Pt voiced understanding. Reviewed health maintenance.             Electronically signed by Isaiah Artis DO, DO on 1/1/2023 at 5:26 PM

## 2022-12-21 NOTE — TELEPHONE ENCOUNTER
Pt states that her Ortho surgeon increased dose to 300 mg TID for new L-sided pain - plan is to use this temporarily until next procedure or until sx's resolve. No longer taking the 100 mg dose.

## 2022-12-28 ENCOUNTER — PATIENT MESSAGE (OUTPATIENT)
Dept: FAMILY MEDICINE CLINIC | Age: 57
End: 2022-12-28

## 2022-12-28 DIAGNOSIS — K57.32 DIVERTICULITIS OF COLON: ICD-10-CM

## 2022-12-28 NOTE — TELEPHONE ENCOUNTER
From: Winsome Merida  To: Dr. Nieves Speck: 12/28/2022 11:33 AM EST  Subject: Meds    I requested some more phenergan and norco. I am going away to my sisters it is 3hrs away for holiday. Abdominal pain still present at times with nausea.  If continues I will call for appointment upon return in a week  Thanks Shaq

## 2022-12-29 RX ORDER — PROMETHAZINE HYDROCHLORIDE 25 MG/1
25 TABLET ORAL EVERY 8 HOURS PRN
Qty: 15 TABLET | Refills: 0 | Status: SHIPPED | OUTPATIENT
Start: 2022-12-29

## 2022-12-29 RX ORDER — HYDROCODONE BITARTRATE AND ACETAMINOPHEN 5; 325 MG/1; MG/1
1 TABLET ORAL EVERY 8 HOURS PRN
Qty: 15 TABLET | Refills: 0 | Status: SHIPPED | OUTPATIENT
Start: 2022-12-29 | End: 2023-01-03

## 2022-12-29 NOTE — TELEPHONE ENCOUNTER
Per [facility], last fill 12/22, quantity 18 for 6 days. See [employer] message 12/28/22. Patient still having pain. Advised patient to make appt and writer could get patient in sooner with different provider. Patient denied and will call next week to make appt if not better. Xin LOUIS called requesting a refill of the below medication which has been pended for you:     Requested Prescriptions     Pending Prescriptions Disp Refills    promethazine (PHENERGAN) 25 MG tablet 10 tablet 0     Sig: Take 1 tablet by mouth every 8 hours as needed for Nausea    HYDROcodone-acetaminophen (NORCO) 5-325 MG per tablet 18 tablet 0     Sig: Take 1 tablet by mouth every 8 hours as needed for Pain for up to 7 days. Last Appointment Date: 12/21/2022  Next Appointment Date: 3/20/2023    Allergies   Allergen Reactions    Flomax [Tamsulosin Hcl] Swelling     Swelling of arms; however, also had rash and fever at the same time and was admitted at the time    Morphine Itching and Rash     Rash, itching    Quetiapine      Other reaction(s): Other (See Comments)  \"Makes me nuts\"      Saphris [Asenapine]     Seasonal     Trazodone      Other reaction(s):  Other (See Comments)  Sleepwalking      Bactrim [Sulfamethoxazole-Trimethoprim] Diarrhea and Nausea Only    Prednisone Other (See Comments)     Emotional changes, depression    Zyprexa [Olanzapine] Other (See Comments)     Made her feel like she wanted to \"jump out of my skin\"

## 2022-12-29 NOTE — TELEPHONE ENCOUNTER
Controlled Substance Monitoring:    Acute and Chronic Pain Monitoring:   RX Monitoring 12/29/2022   Periodic Controlled Substance Monitoring No signs of potential drug abuse or diversion identified.     -

## 2023-01-01 ASSESSMENT — ENCOUNTER SYMPTOMS
ABDOMINAL DISTENTION: 1
NAUSEA: 1
ABDOMINAL PAIN: 1

## 2023-03-04 DIAGNOSIS — E03.9 HYPOTHYROIDISM, UNSPECIFIED TYPE: ICD-10-CM

## 2023-03-06 NOTE — TELEPHONE ENCOUNTER
Lucero Wooten called requesting a refill of the below medication which has been pended for you:     Requested Prescriptions     Pending Prescriptions Disp Refills    levothyroxine (SYNTHROID) 100 MCG tablet [Pharmacy Med Name: LEVOTHYROXINE 100 MCG TABLET] 90 tablet 0     Sig: Take 1 tablet by mouth daily Mon-Saturday; hold dose on Sunday       Last Appointment Date: 12/21/2022  Next Appointment Date: 4/11/2023    Allergies   Allergen Reactions    Flomax [Tamsulosin Hcl] Swelling     Swelling of arms; however, also had rash and fever at the same time and was admitted at the time    Morphine Itching and Rash     Rash, itching    Quetiapine      Other reaction(s): Other (See Comments)  \"Makes me nuts\"      Saphris [Asenapine]     Seasonal     Trazodone      Other reaction(s):  Other (See Comments)  Sleepwalking      Bactrim [Sulfamethoxazole-Trimethoprim] Diarrhea and Nausea Only    Prednisone Other (See Comments)     Emotional changes, depression    Zyprexa [Olanzapine] Other (See Comments)     Made her feel like she wanted to \"jump out of my skin\"

## 2023-03-08 DIAGNOSIS — E78.2 HYPERCHOLESTEROLEMIA WITH HYPERTRIGLYCERIDEMIA: ICD-10-CM

## 2023-03-08 NOTE — TELEPHONE ENCOUNTER
Aaron Class called requesting a refill of the below medication which has been pended for you:     Requested Prescriptions     Pending Prescriptions Disp Refills    ezetimibe (ZETIA) 10 MG tablet [Pharmacy Med Name: EZETIMIBE 10 MG TABLET] 90 tablet 1     Sig: take 1 tablet by mouth once daily       Last Appointment Date: 12/21/2022  Next Appointment Date: 4/11/2023    Allergies   Allergen Reactions    Flomax [Tamsulosin Hcl] Swelling     Swelling of arms; however, also had rash and fever at the same time and was admitted at the time    Morphine Itching and Rash     Rash, itching    Quetiapine      Other reaction(s): Other (See Comments)  \"Makes me nuts\"      Saphris [Asenapine]     Seasonal     Trazodone      Other reaction(s):  Other (See Comments)  Sleepwalking      Bactrim [Sulfamethoxazole-Trimethoprim] Diarrhea and Nausea Only    Prednisone Other (See Comments)     Emotional changes, depression    Zyprexa [Olanzapine] Other (See Comments)     Made her feel like she wanted to \"jump out of my skin\"

## 2023-03-09 DIAGNOSIS — E78.2 HYPERCHOLESTEROLEMIA WITH HYPERTRIGLYCERIDEMIA: ICD-10-CM

## 2023-03-09 RX ORDER — EZETIMIBE 10 MG/1
10 TABLET ORAL DAILY
Qty: 90 TABLET | Refills: 3 | Status: SHIPPED | OUTPATIENT
Start: 2023-03-09

## 2023-03-09 RX ORDER — LEVOTHYROXINE SODIUM 0.1 MG/1
TABLET ORAL
Qty: 30 TABLET | Refills: 0 | Status: SHIPPED | OUTPATIENT
Start: 2023-03-09

## 2023-03-09 NOTE — TELEPHONE ENCOUNTER
Pt is overdue for re-check of thyroid levels - she is coming in tomorrow for imaging, can she do labs at same time? Med refilled for 30 days.

## 2023-03-10 ENCOUNTER — HOSPITAL ENCOUNTER (OUTPATIENT)
Dept: CT IMAGING | Age: 58
End: 2023-03-10
Payer: MEDICARE

## 2023-03-10 DIAGNOSIS — M54.16 LUMBAR RADICULOPATHY: ICD-10-CM

## 2023-03-10 PROCEDURE — 72131 CT LUMBAR SPINE W/O DYE: CPT

## 2023-03-10 RX ORDER — EZETIMIBE 10 MG/1
10 TABLET ORAL DAILY
Qty: 90 TABLET | Refills: 3 | OUTPATIENT
Start: 2023-03-10

## 2023-03-14 ENCOUNTER — HOSPITAL ENCOUNTER (OUTPATIENT)
Age: 58
Discharge: HOME | End: 2023-03-14
Payer: MEDICARE

## 2023-03-14 DIAGNOSIS — E78.2 HYPERCHOLESTEROLEMIA WITH HYPERTRIGLYCERIDEMIA: ICD-10-CM

## 2023-03-14 DIAGNOSIS — E03.9 HYPOTHYROIDISM, UNSPECIFIED TYPE: ICD-10-CM

## 2023-03-14 LAB
ALBUMIN SERPL-MCNC: 4.3 G/DL (ref 3.5–5.2)
ALBUMIN/GLOBULIN RATIO: 1.7 (ref 1–2.5)
ALP SERPL-CCNC: 84 U/L (ref 35–104)
ALT SERPL-CCNC: 21 U/L (ref 5–33)
AST SERPL-CCNC: 19 U/L
BILIRUB DIRECT SERPL-MCNC: <0.1 MG/DL
BILIRUB INDIRECT SERPL-MCNC: NORMAL MG/DL (ref 0–1)
BILIRUB SERPL-MCNC: 0.3 MG/DL (ref 0.3–1.2)
CHOLEST SERPL-MCNC: 270 MG/DL
CHOLESTEROL/HDL RATIO: 4.9
GLOBULIN SER-MCNC: 2.5 G/DL (ref 1.5–3.8)
HDLC SERPL-MCNC: 55 MG/DL
LDLC SERPL CALC-MCNC: 170 MG/DL (ref 0–130)
PROT SERPL-MCNC: 6.8 G/DL (ref 6.4–8.3)
T4 FREE SERPL-MCNC: 0.96 NG/DL (ref 0.93–1.7)
TRIGL SERPL-MCNC: 226 MG/DL
TSH SERPL-ACNC: 6.88 UIU/ML (ref 0.3–5)

## 2023-03-14 PROCEDURE — 80061 LIPID PANEL: CPT

## 2023-03-14 PROCEDURE — 36415 COLL VENOUS BLD VENIPUNCTURE: CPT

## 2023-03-14 PROCEDURE — 84443 ASSAY THYROID STIM HORMONE: CPT

## 2023-03-14 PROCEDURE — 84439 ASSAY OF FREE THYROXINE: CPT

## 2023-03-14 PROCEDURE — 80076 HEPATIC FUNCTION PANEL: CPT

## 2023-03-21 ENCOUNTER — PATIENT MESSAGE (OUTPATIENT)
Dept: FAMILY MEDICINE CLINIC | Age: 58
End: 2023-03-21

## 2023-03-21 NOTE — TELEPHONE ENCOUNTER
From: Mandy Mcduffie  To: Dr. Daniel Gallegos: 3/21/2023 12:18 PM EDT  Subject: surgery clearance    Dr. Eliana Camacho  I'm having back surgery again on 4/11. I have a appointment with you 4/4. I was hoping I could have any tests you may want to do before I see you.   Thanks Guillermo Cleveland

## 2023-03-23 NOTE — TELEPHONE ENCOUNTER
[facility] calling in regards to what pre  op labs they order, they order UA w/culture, type and screen, MRSA swab, PT INR, CBC with diff and BMP.

## 2023-03-24 NOTE — TELEPHONE ENCOUNTER
[facility] with Yareli Castro nurse as to if they are going to fax these to us once completed for KB review at pre-op OV or if there is anything else they need us to order.

## 2023-03-27 NOTE — TELEPHONE ENCOUNTER
Damaso Kurtz returned call. Pt as OV with them on 3/31 for labs and surgery consent. Will have all labs and results faxed to our office once completed. Should not need any further orders unless Dr. Martínez Norwood needs to for clearance ex. CXR or EKG. Surgery date is 4/11. OV with KB is 4/4.

## 2023-03-28 ENCOUNTER — HOSPITAL ENCOUNTER (OUTPATIENT)
Dept: MRI IMAGING | Age: 58
Discharge: HOME | End: 2023-03-30
Payer: MEDICARE

## 2023-03-28 DIAGNOSIS — M54.16 LUMBAR RADICULOPATHY: ICD-10-CM

## 2023-03-28 DIAGNOSIS — Z98.1 HISTORY OF LUMBAR FUSION: ICD-10-CM

## 2023-03-28 PROCEDURE — 72148 MRI LUMBAR SPINE W/O DYE: CPT

## 2023-04-04 PROBLEM — T86.831: Status: ACTIVE | Noted: 2023-03-23

## 2023-04-04 PROBLEM — M99.53 INTERVERTEBRAL DISC STENOSIS OF NEURAL CANAL OF LUMBAR REGION: Status: ACTIVE | Noted: 2023-03-23

## 2023-04-24 NOTE — TELEPHONE ENCOUNTER
Pt's TSH was adjusted on 4/4 during OV - increased to 1 tab daily Mon-Sat and 1/2 tab on Sunday's. Will re-check TSH again in 6/2023.

## 2023-04-25 ENCOUNTER — TELEPHONE (OUTPATIENT)
Dept: FAMILY MEDICINE CLINIC | Age: 58
End: 2023-04-25

## 2023-04-25 DIAGNOSIS — G47.30 SLEEP APNEA, UNSPECIFIED TYPE: ICD-10-CM

## 2023-04-25 DIAGNOSIS — G47.9 SLEEP DISORDER, UNSPECIFIED: Primary | ICD-10-CM

## 2023-05-04 DIAGNOSIS — I10 ESSENTIAL HYPERTENSION: ICD-10-CM

## 2023-05-04 NOTE — TELEPHONE ENCOUNTER
Charlie Fam called requesting a refill of the below medication which has been pended for you:     Requested Prescriptions     Pending Prescriptions Disp Refills    amLODIPine (NORVASC) 10 MG tablet [Pharmacy Med Name: AMLODIPINE BESYLATE 10 MG TAB] 90 tablet 3     Sig: Take 1 tablet by mouth daily       Last Appointment Date: 4/4/2023  Next Appointment Date: 7/5/2023    Allergies   Allergen Reactions    Flomax [Tamsulosin Hcl] Swelling     Swelling of arms; however, also had rash and fever at the same time and was admitted at the time    Morphine Itching and Rash     Rash, itching    Quetiapine      Other reaction(s): Other (See Comments)  \"Makes me nuts\"      Saphris [Asenapine]     Seasonal     Trazodone      Other reaction(s):  Other (See Comments)  Sleepwalking      Bactrim [Sulfamethoxazole-Trimethoprim] Diarrhea and Nausea Only    Prednisone Other (See Comments)     Emotional changes, depression    Zyprexa [Olanzapine] Other (See Comments)     Made her feel like she wanted to \"jump out of my skin\"

## 2023-05-05 RX ORDER — AMLODIPINE BESYLATE 10 MG/1
10 TABLET ORAL DAILY
Qty: 90 TABLET | Refills: 3 | Status: SHIPPED | OUTPATIENT
Start: 2023-05-05

## 2023-05-15 ENCOUNTER — TELEPHONE (OUTPATIENT)
Dept: FAMILY MEDICINE CLINIC | Age: 58
End: 2023-05-15

## 2023-05-15 NOTE — TELEPHONE ENCOUNTER
[facility] follow up/dod 5-15/post back surgery, please advise pt of appt when doing [facility] call.

## 2023-05-15 NOTE — TELEPHONE ENCOUNTER
Care Transitions Initial Follow Up Call    Outreach made within 2 business days of discharge: Yes    Patient: Mariann Sandra Patient : 1965   MRN: [id number]  Reason for Admission: There are no discharge diagnoses documented for the most recent discharge. Discharge Date: 22       Spoke with: [facility]    [facility] Interactive Patient Contact:  Was patient able to fill all prescriptions: Yes  Was patient instructed to bring all medications to the follow-up visit: Yes  Is patient taking all medications as directed in the discharge summary?  Yes  Does patient understand their discharge instructions: Yes  Does patient have questions or concerns that need addressed prior to 7-14 day follow up office visit: no    Scheduled appointment with PCP within 7-14 days; SAMIRA  PER PT PREFERENCE    Follow Up  Future Appointments   Date Time Provider Rupesh Michaels   2023  3:40 PM DO ARIC Guzman   2023  7:30 PM [place] [address] Rupesh Corral   2023 11:00 AM DO ARIC GuzmanDPOLIVER Martinez RN

## 2023-05-16 ENCOUNTER — TELEPHONE (OUTPATIENT)
Dept: FAMILY MEDICINE CLINIC | Age: 58
End: 2023-05-16

## 2023-05-16 NOTE — TELEPHONE ENCOUNTER
[address] [facility] is calling to see if pcp will follow patient for home [phone]

## 2023-05-23 ENCOUNTER — OFFICE VISIT (OUTPATIENT)
Dept: FAMILY MEDICINE CLINIC | Age: 58
End: 2023-05-23

## 2023-05-23 VITALS
WEIGHT: 176 LBS | BODY MASS INDEX: 33.23 KG/M2 | HEART RATE: 77 BPM | HEIGHT: 61 IN | SYSTOLIC BLOOD PRESSURE: 130 MMHG | TEMPERATURE: 98.5 F | OXYGEN SATURATION: 94 % | DIASTOLIC BLOOD PRESSURE: 84 MMHG

## 2023-05-23 DIAGNOSIS — M54.42 CHRONIC MIDLINE LOW BACK PAIN WITH BILATERAL SCIATICA: ICD-10-CM

## 2023-05-23 DIAGNOSIS — M54.41 CHRONIC MIDLINE LOW BACK PAIN WITH BILATERAL SCIATICA: ICD-10-CM

## 2023-05-23 DIAGNOSIS — G89.29 CHRONIC MIDLINE LOW BACK PAIN WITH BILATERAL SCIATICA: ICD-10-CM

## 2023-05-23 DIAGNOSIS — G47.9 SLEEP DISORDER, UNSPECIFIED: ICD-10-CM

## 2023-05-23 DIAGNOSIS — M51.36 DEGENERATIVE DISC DISEASE, LUMBAR: Primary | ICD-10-CM

## 2023-05-23 DIAGNOSIS — I10 ESSENTIAL HYPERTENSION: ICD-10-CM

## 2023-05-23 DIAGNOSIS — Z09 HOSPITAL DISCHARGE FOLLOW-UP: ICD-10-CM

## 2023-05-23 RX ORDER — CYCLOBENZAPRINE HCL 10 MG
5-10 TABLET ORAL 3 TIMES DAILY PRN
Qty: 40 TABLET | Refills: 1 | Status: SHIPPED | OUTPATIENT
Start: 2023-05-23

## 2023-05-23 RX ORDER — LISINOPRIL 40 MG/1
40 TABLET ORAL DAILY
Qty: 90 TABLET | Refills: 3 | Status: SHIPPED | OUTPATIENT
Start: 2023-05-23

## 2023-05-23 RX ORDER — TRAMADOL HYDROCHLORIDE 50 MG/1
TABLET ORAL
COMMUNITY
Start: 2023-05-22

## 2023-05-23 RX ORDER — OMEPRAZOLE 20 MG/1
20 CAPSULE, DELAYED RELEASE ORAL 2 TIMES DAILY
Qty: 180 CAPSULE | Refills: 3 | Status: SHIPPED | OUTPATIENT
Start: 2023-05-23

## 2023-05-23 RX ORDER — DULOXETIN HYDROCHLORIDE 20 MG/1
20 CAPSULE, DELAYED RELEASE ORAL DAILY
COMMUNITY
Start: 2023-05-13

## 2023-05-23 RX ORDER — AMLODIPINE BESYLATE 10 MG/1
10 TABLET ORAL DAILY
Qty: 90 TABLET | Refills: 3 | Status: SHIPPED | OUTPATIENT
Start: 2023-05-23

## 2023-05-23 RX ORDER — GABAPENTIN 600 MG/1
600 TABLET ORAL 3 TIMES DAILY
COMMUNITY
Start: 2023-05-15

## 2023-05-23 RX ORDER — LISINOPRIL 20 MG/1
20 TABLET ORAL NIGHTLY
COMMUNITY
Start: 2023-05-15 | End: 2023-05-23 | Stop reason: CLARIF

## 2023-05-23 RX ORDER — FERROUS SULFATE 325(65) MG
325 TABLET ORAL
COMMUNITY

## 2023-06-05 ENCOUNTER — HOSPITAL ENCOUNTER (OUTPATIENT)
Dept: SLEEP CENTER | Age: 58
Discharge: HOME | End: 2023-06-07
Payer: MEDICARE

## 2023-06-05 DIAGNOSIS — G47.30 SLEEP APNEA, UNSPECIFIED TYPE: ICD-10-CM

## 2023-06-05 DIAGNOSIS — G47.9 SLEEP DISORDER, UNSPECIFIED: ICD-10-CM

## 2023-06-05 PROCEDURE — 95810 POLYSOM 6/> YRS 4/> PARAM: CPT

## 2023-06-07 ENCOUNTER — TELEPHONE (OUTPATIENT)
Dept: FAMILY MEDICINE CLINIC | Age: 58
End: 2023-06-07
Payer: MEDICARE

## 2023-06-07 DIAGNOSIS — R26.89 OTHER ABNORMALITIES OF GAIT AND MOBILITY: ICD-10-CM

## 2023-06-07 DIAGNOSIS — F19.11 OTHER PSYCHOACTIVE SUBSTANCE ABUSE, IN REMISSION (HCC): ICD-10-CM

## 2023-06-07 DIAGNOSIS — F31.81 BIPOLAR II DISORDER (HCC): ICD-10-CM

## 2023-06-07 DIAGNOSIS — Z48.89 ENCOUNTER FOR OTHER SPECIFIED SURGICAL AFTERCARE: Primary | ICD-10-CM

## 2023-06-07 DIAGNOSIS — Z98.1 ARTHRODESIS STATUS: ICD-10-CM

## 2023-06-07 DIAGNOSIS — I10 ESSENTIAL (PRIMARY) HYPERTENSION: ICD-10-CM

## 2023-06-07 DIAGNOSIS — F17.210 NICOTINE DEPENDENCE, CIGARETTES, UNCOMPLICATED: ICD-10-CM

## 2023-06-07 DIAGNOSIS — E03.9 HYPOTHYROIDISM, UNSPECIFIED TYPE: ICD-10-CM

## 2023-06-07 DIAGNOSIS — Z85.3 PERSONAL HISTORY OF MALIGNANT NEOPLASM OF BREAST: ICD-10-CM

## 2023-06-07 PROCEDURE — G0180 MD CERTIFICATION HHA PATIENT: HCPCS | Performed by: FAMILY MEDICINE

## 2023-06-27 ASSESSMENT — ENCOUNTER SYMPTOMS: BACK PAIN: 1

## 2023-07-18 DIAGNOSIS — M54.42 CHRONIC MIDLINE LOW BACK PAIN WITH BILATERAL SCIATICA: ICD-10-CM

## 2023-07-18 DIAGNOSIS — G89.29 CHRONIC MIDLINE LOW BACK PAIN WITH BILATERAL SCIATICA: ICD-10-CM

## 2023-07-18 DIAGNOSIS — M54.41 CHRONIC MIDLINE LOW BACK PAIN WITH BILATERAL SCIATICA: ICD-10-CM

## 2023-07-18 NOTE — TELEPHONE ENCOUNTER
Cliff Israel called requesting a refill of the below medication which has been pended for you:     Requested Prescriptions     Pending Prescriptions Disp Refills    cyclobenzaprine (FLEXERIL) 10 MG tablet 40 tablet 1     Sig: Take 0.5-1 tablets by mouth 3 times daily as needed for Muscle spasms       Last Appointment Date: 5/23/2023  Next Appointment Date: 8/2/2023    Allergies   Allergen Reactions    Flomax [Tamsulosin Hcl] Swelling     Swelling of arms; however, also had rash and fever at the same time and was admitted at the time    Morphine Itching and Rash     Rash, itching    Decadron [Dexamethasone]     Quetiapine      Other reaction(s): Other (See Comments)  \"Makes me nuts\"      Saphris [Asenapine]     Seasonal     Trazodone      Other reaction(s):  Other (See Comments)  Sleepwalking      Bactrim [Sulfamethoxazole-Trimethoprim] Diarrhea and Nausea Only    Prednisone Other (See Comments)     Emotional changes, depression    Zyprexa [Olanzapine] Other (See Comments)     Made her feel like she wanted to \"jump out of my skin\"

## 2023-07-19 RX ORDER — CYCLOBENZAPRINE HCL 10 MG
5-10 TABLET ORAL 3 TIMES DAILY PRN
Qty: 60 TABLET | Refills: 2 | Status: SHIPPED | OUTPATIENT
Start: 2023-07-19

## 2023-08-02 ENCOUNTER — HOSPITAL ENCOUNTER (OUTPATIENT)
Dept: SLEEP CENTER | Age: 58
Discharge: HOME | End: 2023-08-04
Payer: MEDICARE

## 2023-08-02 PROCEDURE — 95811 POLYSOM 6/>YRS CPAP 4/> PARM: CPT

## 2023-08-04 LAB — STATUS: NORMAL

## 2023-08-08 ENCOUNTER — OFFICE VISIT (OUTPATIENT)
Dept: FAMILY MEDICINE CLINIC | Age: 58
End: 2023-08-08
Payer: MEDICARE

## 2023-08-08 ENCOUNTER — HOSPITAL ENCOUNTER (OUTPATIENT)
Age: 58
Discharge: HOME | End: 2023-08-08
Payer: MEDICARE

## 2023-08-08 VITALS
HEIGHT: 61 IN | OXYGEN SATURATION: 95 % | RESPIRATION RATE: 18 BRPM | HEART RATE: 92 BPM | DIASTOLIC BLOOD PRESSURE: 70 MMHG | BODY MASS INDEX: 32.98 KG/M2 | SYSTOLIC BLOOD PRESSURE: 110 MMHG | WEIGHT: 174.7 LBS | TEMPERATURE: 97.9 F

## 2023-08-08 DIAGNOSIS — G89.29 CHRONIC MIDLINE LOW BACK PAIN WITH BILATERAL SCIATICA: ICD-10-CM

## 2023-08-08 DIAGNOSIS — E55.9 VITAMIN D DEFICIENCY: ICD-10-CM

## 2023-08-08 DIAGNOSIS — M54.42 CHRONIC MIDLINE LOW BACK PAIN WITH BILATERAL SCIATICA: ICD-10-CM

## 2023-08-08 DIAGNOSIS — F17.210 NICOTINE DEPENDENCE, CIGARETTES, UNCOMPLICATED: Primary | ICD-10-CM

## 2023-08-08 DIAGNOSIS — Z87.891 PERSONAL HISTORY OF TOBACCO USE: ICD-10-CM

## 2023-08-08 DIAGNOSIS — E03.9 HYPOTHYROIDISM, UNSPECIFIED TYPE: ICD-10-CM

## 2023-08-08 DIAGNOSIS — M54.41 CHRONIC MIDLINE LOW BACK PAIN WITH BILATERAL SCIATICA: ICD-10-CM

## 2023-08-08 DIAGNOSIS — R11.0 NAUSEA: ICD-10-CM

## 2023-08-08 LAB — TSH SERPL DL<=0.05 MIU/L-ACNC: 3.48 UIU/ML (ref 0.3–5)

## 2023-08-08 PROCEDURE — G8427 DOCREV CUR MEDS BY ELIG CLIN: HCPCS | Performed by: FAMILY MEDICINE

## 2023-08-08 PROCEDURE — 3074F SYST BP LT 130 MM HG: CPT | Performed by: FAMILY MEDICINE

## 2023-08-08 PROCEDURE — 99213 OFFICE O/P EST LOW 20 MIN: CPT | Performed by: FAMILY MEDICINE

## 2023-08-08 PROCEDURE — 36415 COLL VENOUS BLD VENIPUNCTURE: CPT

## 2023-08-08 PROCEDURE — 3017F COLORECTAL CA SCREEN DOC REV: CPT | Performed by: FAMILY MEDICINE

## 2023-08-08 PROCEDURE — 4004F PT TOBACCO SCREEN RCVD TLK: CPT | Performed by: FAMILY MEDICINE

## 2023-08-08 PROCEDURE — 82306 VITAMIN D 25 HYDROXY: CPT

## 2023-08-08 PROCEDURE — 99406 BEHAV CHNG SMOKING 3-10 MIN: CPT | Performed by: FAMILY MEDICINE

## 2023-08-08 PROCEDURE — G8417 CALC BMI ABV UP PARAM F/U: HCPCS | Performed by: FAMILY MEDICINE

## 2023-08-08 PROCEDURE — 3078F DIAST BP <80 MM HG: CPT | Performed by: FAMILY MEDICINE

## 2023-08-08 PROCEDURE — 99214 OFFICE O/P EST MOD 30 MIN: CPT | Performed by: FAMILY MEDICINE

## 2023-08-08 PROCEDURE — 84443 ASSAY THYROID STIM HORMONE: CPT

## 2023-08-08 RX ORDER — LIDOCAINE 50 MG/G
PATCH TOPICAL
Qty: 30 PATCH | Refills: 3 | Status: SHIPPED | OUTPATIENT
Start: 2023-08-08

## 2023-08-08 RX ORDER — LEVOTHYROXINE SODIUM 0.1 MG/1
TABLET ORAL
COMMUNITY
Start: 2023-06-09

## 2023-08-08 RX ORDER — BACLOFEN 20 MG
TABLET ORAL
COMMUNITY

## 2023-08-08 RX ORDER — LIDOCAINE 50 MG/G
PATCH TOPICAL
COMMUNITY
End: 2023-08-08 | Stop reason: SDUPTHER

## 2023-08-08 RX ORDER — ONDANSETRON 4 MG/1
4 TABLET, ORALLY DISINTEGRATING ORAL EVERY 8 HOURS PRN
Qty: 40 TABLET | Refills: 3 | Status: SHIPPED | OUTPATIENT
Start: 2023-08-08

## 2023-08-08 RX ORDER — VARENICLINE TARTRATE 0.5 MG/1
TABLET, FILM COATED ORAL
Qty: 57 TABLET | Refills: 0 | Status: SHIPPED | OUTPATIENT
Start: 2023-08-08

## 2023-08-08 NOTE — PROGRESS NOTES
[facility]  [address] [place] [address]  [phone]      Jaki Pearl is a 63 y.o. female who presents today for her medical conditions/complaints as noted below. Jaki Pearl is c/o of Other (Review sleep study)      HPI:     Pt here today for follow-up of sleep study. TSH elevated in 3/2023 - due for re-check today  Has been taking Synthroid 100 mcg daily - compliant with this. Hair loss x past 3-4 weeks - coming out in handfuls and from the scalp; no breakage. Feels this is increasing her stress/worsening mood. Had increased her Rexulti from 1 mg to 2 mg daily 4 weeks ago, so unsure if this is side effect. Sleep study with PAP titration on 8/2 showed good response to 12 cm H2O. Currently smoking approx 1 ppd - ready to quit. Has tried Chantix in the past, which was successful - had quit for 4 months at that time. Tolerated well, as long as she took it with food, other than vivid dreams and increased thirst.  Ready to quit in the next couple weeks - has already informed her family/friends. Using Tylenol/Ibuprofen & Flexeril as needed, and Lidoderm patch a couple times per month as needed for back pain - doing better since her recent surgery. Needs refill of patches today. Still has pain with extended walking, but knows she needs to build up her endurance. Also has pain with prolonged standing, like with cooking or dishes. Has next f/u appt with Dr. Faviola Mariano on 10/4/23. Still wearing bone stimulator for 2 hours/day for total of 6 months. Taking Gabapentin 800 mg TID per Dr. Faviola Mariano; states she would like to wean back down in the future. Still has some numbness and tingling in her L great and second toe, but denies any other sx's - states it is slowly improving.         Past Medical History:   Diagnosis Date    Anxiety     Bipolar disorder ([address])     Breast cancer ([address]) 2014    L side - lumpectomy and radiation; no chemo (was recommended

## 2023-08-09 LAB — 25(OH)D3 SERPL-MCNC: 48.2 NG/ML

## 2023-08-12 DIAGNOSIS — R06.02 SOB (SHORTNESS OF BREATH) ON EXERTION: ICD-10-CM

## 2023-08-14 NOTE — TELEPHONE ENCOUNTER
Melanie Wise called requesting a refill of the below medication which has been pended for you:     Requested Prescriptions     Pending Prescriptions Disp Refills    VENTOLIN  (90 Base) MCG/ACT inhaler [Pharmacy Med Name: VENTOLIN HFA 90 MCG INHALER] 18 g 5     Sig: inhale 1 to 2 puffs by mouth and INTO THE LUNGS every 6 hours if needed wheezing or shortness of breath       Last Appointment Date: 8/8/2023  Next Appointment Date: 10/18/2023    Allergies   Allergen Reactions    Flomax [Tamsulosin Hcl] Swelling     Swelling of arms; however, also had rash and fever at the same time and was admitted at the time    Morphine Itching and Rash     Rash, itching    Decadron [Dexamethasone]     Quetiapine      Other reaction(s): Other (See Comments)  \"Makes me nuts\"      Saphris [Asenapine]     Seasonal     Trazodone      Other reaction(s):  Other (See Comments)  Sleepwalking      Bactrim [Sulfamethoxazole-Trimethoprim] Diarrhea and Nausea Only    Prednisone Other (See Comments)     Emotional changes, depression    Zyprexa [Olanzapine] Other (See Comments)     Made her feel like she wanted to \"jump out of my skin\"

## 2023-08-15 ENCOUNTER — HOSPITAL ENCOUNTER (EMERGENCY)
Age: 58
Discharge: HOME | End: 2023-08-16
Attending: EMERGENCY MEDICINE
Payer: MEDICARE

## 2023-08-15 ENCOUNTER — APPOINTMENT (OUTPATIENT)
Dept: CT IMAGING | Age: 58
End: 2023-08-15
Payer: MEDICARE

## 2023-08-15 VITALS
SYSTOLIC BLOOD PRESSURE: 127 MMHG | HEART RATE: 122 BPM | DIASTOLIC BLOOD PRESSURE: 61 MMHG | WEIGHT: 170 LBS | RESPIRATION RATE: 18 BRPM | HEIGHT: 61 IN | BODY MASS INDEX: 32.1 KG/M2 | TEMPERATURE: 98.6 F | OXYGEN SATURATION: 97 %

## 2023-08-15 DIAGNOSIS — N30.00 ACUTE CYSTITIS WITHOUT HEMATURIA: ICD-10-CM

## 2023-08-15 DIAGNOSIS — N20.0 KIDNEY STONE: Primary | ICD-10-CM

## 2023-08-15 LAB
AMORPH SED URNS QL MICRO: ABNORMAL
ANION GAP SERPL CALCULATED.3IONS-SCNC: 12 MMOL/L (ref 9–17)
BASOPHILS # BLD: 0.06 K/UL (ref 0–0.2)
BASOPHILS NFR BLD: 1 % (ref 0–2)
BILIRUB UR QL STRIP: NEGATIVE
BUN SERPL-MCNC: 10 MG/DL (ref 6–20)
BUN/CREAT SERPL: 9 (ref 9–20)
CALCIUM SERPL-MCNC: 9.9 MG/DL (ref 8.6–10.4)
CHARACTER UR: ABNORMAL
CHLORIDE SERPL-SCNC: 102 MMOL/L (ref 98–107)
CLARITY UR: ABNORMAL
CO2 SERPL-SCNC: 27 MMOL/L (ref 20–31)
COLOR UR: YELLOW
CREAT SERPL-MCNC: 1.1 MG/DL (ref 0.5–0.9)
CRYSTALS URNS MICRO: ABNORMAL /HPF
EOSINOPHIL # BLD: 0.24 K/UL (ref 0–0.44)
EOSINOPHILS RELATIVE PERCENT: 2 % (ref 1–4)
EPI CELLS #/AREA URNS HPF: ABNORMAL /HPF (ref 0–5)
ERYTHROCYTE [DISTWIDTH] IN BLOOD BY AUTOMATED COUNT: 13.3 % (ref 11.8–14.4)
GFR SERPL CREATININE-BSD FRML MDRD: 59 ML/MIN/1.73M2
GLUCOSE SERPL-MCNC: 144 MG/DL (ref 70–99)
GLUCOSE UR STRIP-MCNC: NEGATIVE MG/DL
HCT VFR BLD AUTO: 42.8 % (ref 36.3–47.1)
HGB BLD-MCNC: 14.6 G/DL (ref 11.9–15.1)
HGB UR QL STRIP.AUTO: ABNORMAL
IMM GRANULOCYTES # BLD AUTO: 0.05 K/UL (ref 0–0.3)
IMM GRANULOCYTES NFR BLD: 1 %
KETONES UR STRIP-MCNC: NEGATIVE MG/DL
LEUKOCYTE ESTERASE UR QL STRIP: ABNORMAL
LYMPHOCYTES NFR BLD: 2.42 K/UL (ref 1.1–3.7)
LYMPHOCYTES RELATIVE PERCENT: 23 % (ref 24–43)
MCH RBC QN AUTO: 30 PG (ref 25.2–33.5)
MCHC RBC AUTO-ENTMCNC: 34.1 G/DL (ref 25.2–33.5)
MCV RBC AUTO: 87.9 FL (ref 82.6–102.9)
MONOCYTES NFR BLD: 0.64 K/UL (ref 0.1–1.2)
MONOCYTES NFR BLD: 6 % (ref 3–12)
MUCOUS THREADS URNS QL MICRO: ABNORMAL
NEUTROPHILS NFR BLD: 67 % (ref 36–65)
NEUTS SEG NFR BLD: 7.01 K/UL (ref 1.5–8.1)
NITRITE UR QL STRIP: NEGATIVE
NRBC BLD-RTO: 0 PER 100 WBC
PH UR STRIP: 6 [PH] (ref 5–6)
PLATELET # BLD AUTO: ABNORMAL K/UL (ref 138–453)
PLATELET, FLUORESCENCE: 272 K/UL (ref 138–453)
PLATELETS.RETICULATED NFR BLD AUTO: 0.9 % (ref 1.1–10.3)
POTASSIUM SERPL-SCNC: 3.4 MMOL/L (ref 3.7–5.3)
PROT UR STRIP-MCNC: ABNORMAL MG/DL
RBC # BLD AUTO: 4.87 M/UL (ref 3.95–5.11)
RBC #/AREA URNS HPF: ABNORMAL /HPF (ref 0–4)
SODIUM SERPL-SCNC: 141 MMOL/L (ref 135–144)
SP GR UR STRIP: 1.02 (ref 1.01–1.02)
UROBILINOGEN UR STRIP-ACNC: NORMAL EU/DL (ref 0–1)
WBC #/AREA URNS HPF: ABNORMAL /HPF (ref 0–4)
WBC OTHER # BLD: 10.4 K/UL (ref 3.5–11.3)

## 2023-08-15 PROCEDURE — 96375 TX/PRO/DX INJ NEW DRUG ADDON: CPT

## 2023-08-15 PROCEDURE — 6360000002 HC RX W HCPCS: Performed by: EMERGENCY MEDICINE

## 2023-08-15 PROCEDURE — 80048 BASIC METABOLIC PNL TOTAL CA: CPT

## 2023-08-15 PROCEDURE — 74176 CT ABD & PELVIS W/O CONTRAST: CPT

## 2023-08-15 PROCEDURE — 87086 URINE CULTURE/COLONY COUNT: CPT

## 2023-08-15 PROCEDURE — 81001 URINALYSIS AUTO W/SCOPE: CPT

## 2023-08-15 PROCEDURE — 96374 THER/PROPH/DIAG INJ IV PUSH: CPT

## 2023-08-15 PROCEDURE — 85025 COMPLETE CBC W/AUTO DIFF WBC: CPT

## 2023-08-15 PROCEDURE — 99284 EMERGENCY DEPT VISIT MOD MDM: CPT

## 2023-08-15 RX ORDER — FENTANYL CITRATE 50 UG/ML
50 INJECTION, SOLUTION INTRAMUSCULAR; INTRAVENOUS ONCE
Status: COMPLETED | OUTPATIENT
Start: 2023-08-15 | End: 2023-08-15

## 2023-08-15 RX ORDER — CIPROFLOXACIN 250 MG/1
500 TABLET, FILM COATED ORAL ONCE
Status: COMPLETED | OUTPATIENT
Start: 2023-08-16 | End: 2023-08-16

## 2023-08-15 RX ORDER — ONDANSETRON 2 MG/ML
4 INJECTION INTRAMUSCULAR; INTRAVENOUS ONCE
Status: COMPLETED | OUTPATIENT
Start: 2023-08-15 | End: 2023-08-15

## 2023-08-15 RX ORDER — KETOROLAC TROMETHAMINE 30 MG/ML
30 INJECTION, SOLUTION INTRAMUSCULAR; INTRAVENOUS ONCE
Status: COMPLETED | OUTPATIENT
Start: 2023-08-15 | End: 2023-08-15

## 2023-08-15 RX ADMIN — ONDANSETRON 4 MG: 2 INJECTION INTRAMUSCULAR; INTRAVENOUS at 21:49

## 2023-08-15 RX ADMIN — FENTANYL CITRATE 50 MCG: 50 INJECTION, SOLUTION INTRAMUSCULAR; INTRAVENOUS at 22:35

## 2023-08-15 RX ADMIN — KETOROLAC TROMETHAMINE 30 MG: 30 INJECTION, SOLUTION INTRAMUSCULAR at 21:49

## 2023-08-15 ASSESSMENT — PAIN - FUNCTIONAL ASSESSMENT
PAIN_FUNCTIONAL_ASSESSMENT: ACTIVITIES ARE NOT PREVENTED
PAIN_FUNCTIONAL_ASSESSMENT: ACTIVITIES ARE NOT PREVENTED
PAIN_FUNCTIONAL_ASSESSMENT: 0-10

## 2023-08-15 ASSESSMENT — PAIN DESCRIPTION - ORIENTATION
ORIENTATION: LEFT
ORIENTATION: LEFT

## 2023-08-15 ASSESSMENT — PAIN SCALES - GENERAL
PAINLEVEL_OUTOF10: 7
PAINLEVEL_OUTOF10: 8

## 2023-08-15 ASSESSMENT — PAIN DESCRIPTION - LOCATION
LOCATION: FLANK
LOCATION: FLANK

## 2023-08-15 ASSESSMENT — PAIN DESCRIPTION - DESCRIPTORS
DESCRIPTORS: SHARP
DESCRIPTORS: SHARP

## 2023-08-15 ASSESSMENT — PAIN DESCRIPTION - PAIN TYPE
TYPE: ACUTE PAIN
TYPE: ACUTE PAIN

## 2023-08-15 ASSESSMENT — LIFESTYLE VARIABLES
HOW MANY STANDARD DRINKS CONTAINING ALCOHOL DO YOU HAVE ON A TYPICAL DAY: PATIENT DOES NOT DRINK
HOW OFTEN DO YOU HAVE A DRINK CONTAINING ALCOHOL: NEVER

## 2023-08-15 ASSESSMENT — ENCOUNTER SYMPTOMS
BACK PAIN: 1
NAUSEA: 1

## 2023-08-16 PROCEDURE — 6370000000 HC RX 637 (ALT 250 FOR IP): Performed by: EMERGENCY MEDICINE

## 2023-08-16 RX ORDER — CIPROFLOXACIN 500 MG/1
500 TABLET, FILM COATED ORAL 2 TIMES DAILY
Qty: 14 TABLET | Refills: 0 | Status: SHIPPED | OUTPATIENT
Start: 2023-08-16 | End: 2023-08-23

## 2023-08-16 RX ADMIN — CIPROFLOXACIN 500 MG: 250 TABLET, FILM COATED ORAL at 00:10

## 2023-08-16 ASSESSMENT — PAIN DESCRIPTION - ORIENTATION: ORIENTATION: LEFT

## 2023-08-16 ASSESSMENT — PAIN DESCRIPTION - DESCRIPTORS: DESCRIPTORS: ACHING

## 2023-08-16 ASSESSMENT — PAIN DESCRIPTION - LOCATION: LOCATION: FLANK

## 2023-08-16 ASSESSMENT — PAIN - FUNCTIONAL ASSESSMENT: PAIN_FUNCTIONAL_ASSESSMENT: ACTIVITIES ARE NOT PREVENTED

## 2023-08-16 ASSESSMENT — PAIN SCALES - GENERAL: PAINLEVEL_OUTOF10: 4

## 2023-08-16 ASSESSMENT — PAIN DESCRIPTION - PAIN TYPE: TYPE: ACUTE PAIN

## 2023-08-16 NOTE — ED PROVIDER NOTES
of this note were completed with a voice recognition program.  Efforts were made to edit the dictations but occasionally words are mis-transcribed.)    Zane Tyler MD,  Attending Emergency Physician            Zane Tyler MD  08/16/23 0005

## 2023-08-16 NOTE — DISCHARGE INSTRUCTIONS
Make sure you are taking the pain medicine as necessary, take the antibiotics as necessary to the gone for the next 7 days. Follow-up with your doctor in 2 days and return back to the emergency setting if you are worse in any way make sure you are straining at all urines.

## 2023-08-17 LAB
MICROORGANISM SPEC CULT: NO GROWTH
SPECIMEN DESCRIPTION: NORMAL

## 2023-08-18 RX ORDER — ALBUTEROL SULFATE 90 UG/1
1-2 AEROSOL, METERED RESPIRATORY (INHALATION) EVERY 6 HOURS PRN
Qty: 18 G | Refills: 5 | Status: SHIPPED | OUTPATIENT
Start: 2023-08-18

## 2023-08-25 ENCOUNTER — PATIENT MESSAGE (OUTPATIENT)
Dept: FAMILY MEDICINE CLINIC | Age: 58
End: 2023-08-25

## 2023-08-25 DIAGNOSIS — Z12.31 SCREENING MAMMOGRAM, ENCOUNTER FOR: Primary | ICD-10-CM

## 2023-08-25 NOTE — TELEPHONE ENCOUNTER
From: Eusebio Cervantes  To: Dr. Collins Economy: 8/25/2023 2:02 PM EDT  Subject: Lencho Sarabia    Since I have [employer] I can't keep seeing Dr Yoko Golden. She always ordered my mammograms. Therefore, can Dr Chase Miner order a mammo for me since I'm due in September?   Thank you Stevenson Holland

## 2023-09-06 DIAGNOSIS — E55.9 VITAMIN D DEFICIENCY: ICD-10-CM

## 2023-09-06 DIAGNOSIS — E03.9 HYPOTHYROIDISM, UNSPECIFIED TYPE: Primary | ICD-10-CM

## 2023-09-06 NOTE — TELEPHONE ENCOUNTER
Enrique Vela called requesting a refill of the below medication which has been pended for you:     Requested Prescriptions     Pending Prescriptions Disp Refills    levothyroxine (SYNTHROID) 100 MCG tablet [Pharmacy Med Name: LEVOTHYROXINE 100 MCG TABLET] 90 tablet      Sig: take 1 by mouth daily [address] 1/2 TABLET ON SUNDAY       Last Appointment Date: 8/8/2023  Next Appointment Date: 10/18/2023    Allergies   Allergen Reactions    Flomax [Tamsulosin Hcl] Swelling     Swelling of arms; however, also had rash and fever at the same time and was admitted at the time    Morphine Itching and Rash     Rash, itching    Decadron [Dexamethasone]     Quetiapine      Other reaction(s): Other (See Comments)  \"Makes me nuts\"      Saphris [Asenapine]     Seasonal     Trazodone      Other reaction(s):  Other (See Comments)  Sleepwalking      Bactrim [Sulfamethoxazole-Trimethoprim] Diarrhea and Nausea Only    Prednisone Other (See Comments)     Emotional changes, depression    Zyprexa [Olanzapine] Other (See Comments)     Made her feel like she wanted to \"jump out of my skin\"

## 2023-09-09 RX ORDER — LEVOTHYROXINE SODIUM 0.1 MG/1
TABLET ORAL
Qty: 90 TABLET | Refills: 0 | Status: SHIPPED | OUTPATIENT
Start: 2023-09-09

## 2023-09-11 DIAGNOSIS — F17.210 NICOTINE DEPENDENCE, CIGARETTES, UNCOMPLICATED: ICD-10-CM

## 2023-09-12 RX ORDER — VARENICLINE TARTRATE 1 MG/1
1 TABLET, FILM COATED ORAL 2 TIMES DAILY
Qty: 60 TABLET | Refills: 2 | Status: SHIPPED | OUTPATIENT
Start: 2023-09-12

## 2023-09-12 RX ORDER — VARENICLINE TARTRATE 0.5 MG/1
TABLET, FILM COATED ORAL
Qty: 57 TABLET | Refills: 0 | Status: CANCELLED | OUTPATIENT
Start: 2023-09-12

## 2023-09-12 NOTE — TELEPHONE ENCOUNTER
Pt arrived c/o left sided headache - pt still has headache, but states is better than when he first arrived.   Pt has cut way back on smoking, only smokes about 5 per day. Med has been working well and would like to continue on the 1mg dose. Michael Tanner called requesting a refill of the below medication which has been pended for you:     Requested Prescriptions     Pending Prescriptions Disp Refills    varenicline (CHANTIX) 0.5 MG tablet 57 tablet 0     Sig: Take 0.5 mg DAILY for 3 days followed by 0.5 mg TWICE DAILY for 4 days, followed by 1mg TWICE DAILY       Last Appointment Date: 8/8/2023  Next Appointment Date: 10/18/2023    Allergies   Allergen Reactions    Flomax [Tamsulosin Hcl] Swelling     Swelling of arms; however, also had rash and fever at the same time and was admitted at the time    Morphine Itching and Rash     Rash, itching    Decadron [Dexamethasone]     Quetiapine      Other reaction(s): Other (See Comments)  \"Makes me nuts\"      Saphris [Asenapine]     Seasonal     Trazodone      Other reaction(s):  Other (See Comments)  Sleepwalking      Bactrim [Sulfamethoxazole-Trimethoprim] Diarrhea and Nausea Only    Prednisone Other (See Comments)     Emotional changes, depression    Zyprexa [Olanzapine] Other (See Comments)     Made her feel like she wanted to \"jump out of my skin\"

## 2023-10-09 SDOH — HEALTH STABILITY: PHYSICAL HEALTH: ON AVERAGE, HOW MANY DAYS PER WEEK DO YOU ENGAGE IN MODERATE TO STRENUOUS EXERCISE (LIKE A BRISK WALK)?: 0 DAYS

## 2023-10-09 SDOH — HEALTH STABILITY: PHYSICAL HEALTH: ON AVERAGE, HOW MANY MINUTES DO YOU ENGAGE IN EXERCISE AT THIS LEVEL?: 0 MIN

## 2023-10-09 ASSESSMENT — PATIENT HEALTH QUESTIONNAIRE - PHQ9
1. LITTLE INTEREST OR PLEASURE IN DOING THINGS: 1
SUM OF ALL RESPONSES TO PHQ QUESTIONS 1-9: 1
SUM OF ALL RESPONSES TO PHQ QUESTIONS 1-9: 1
2. FEELING DOWN, DEPRESSED OR HOPELESS: 0
SUM OF ALL RESPONSES TO PHQ9 QUESTIONS 1 & 2: 1
SUM OF ALL RESPONSES TO PHQ QUESTIONS 1-9: 1
SUM OF ALL RESPONSES TO PHQ QUESTIONS 1-9: 1

## 2023-10-09 ASSESSMENT — LIFESTYLE VARIABLES
HOW OFTEN DO YOU HAVE A DRINK CONTAINING ALCOHOL: NEVER
HOW OFTEN DO YOU HAVE A DRINK CONTAINING ALCOHOL: 1
HOW OFTEN DO YOU HAVE SIX OR MORE DRINKS ON ONE OCCASION: 1
HOW MANY STANDARD DRINKS CONTAINING ALCOHOL DO YOU HAVE ON A TYPICAL DAY: 0
HOW MANY STANDARD DRINKS CONTAINING ALCOHOL DO YOU HAVE ON A TYPICAL DAY: PATIENT DOES NOT DRINK

## 2023-10-11 ENCOUNTER — OFFICE VISIT (OUTPATIENT)
Dept: PRIMARY CARE CLINIC | Age: 58
End: 2023-10-11
Payer: MEDICARE

## 2023-10-11 ENCOUNTER — HOSPITAL ENCOUNTER (OUTPATIENT)
Age: 58
Discharge: HOME | End: 2023-10-11
Payer: MEDICARE

## 2023-10-11 ENCOUNTER — PATIENT MESSAGE (OUTPATIENT)
Dept: FAMILY MEDICINE CLINIC | Age: 58
End: 2023-10-11

## 2023-10-11 VITALS
HEIGHT: 61 IN | SYSTOLIC BLOOD PRESSURE: 132 MMHG | HEART RATE: 109 BPM | DIASTOLIC BLOOD PRESSURE: 88 MMHG | WEIGHT: 180 LBS | TEMPERATURE: 97.9 F | OXYGEN SATURATION: 97 % | BODY MASS INDEX: 33.99 KG/M2

## 2023-10-11 DIAGNOSIS — M54.42 CHRONIC MIDLINE LOW BACK PAIN WITH BILATERAL SCIATICA: ICD-10-CM

## 2023-10-11 DIAGNOSIS — G89.29 CHRONIC MIDLINE LOW BACK PAIN WITH BILATERAL SCIATICA: ICD-10-CM

## 2023-10-11 DIAGNOSIS — R35.0 FREQUENT URINATION: ICD-10-CM

## 2023-10-11 DIAGNOSIS — M54.41 CHRONIC MIDLINE LOW BACK PAIN WITH BILATERAL SCIATICA: ICD-10-CM

## 2023-10-11 DIAGNOSIS — N20.0 KIDNEY STONE: Primary | ICD-10-CM

## 2023-10-11 LAB
BILIRUB UR QL STRIP: NEGATIVE
CHARACTER UR: ABNORMAL
CLARITY UR: ABNORMAL
COLOR UR: YELLOW
EPI CELLS #/AREA URNS HPF: ABNORMAL /HPF (ref 0–5)
GLUCOSE UR STRIP-MCNC: NEGATIVE MG/DL
HGB UR QL STRIP.AUTO: NEGATIVE
KETONES UR STRIP-MCNC: NEGATIVE MG/DL
LEUKOCYTE ESTERASE UR QL STRIP: NEGATIVE
NITRITE UR QL STRIP: NEGATIVE
PH UR STRIP: 5.5 [PH] (ref 5–6)
PROT UR STRIP-MCNC: NEGATIVE MG/DL
RBC #/AREA URNS HPF: ABNORMAL /HPF (ref 0–4)
SP GR UR STRIP: 1.01 (ref 1.01–1.02)
UROBILINOGEN UR STRIP-ACNC: NORMAL EU/DL (ref 0–1)
WBC #/AREA URNS HPF: ABNORMAL /HPF (ref 0–4)

## 2023-10-11 PROCEDURE — 99212 OFFICE O/P EST SF 10 MIN: CPT | Performed by: FAMILY MEDICINE

## 2023-10-11 PROCEDURE — 3017F COLORECTAL CA SCREEN DOC REV: CPT | Performed by: FAMILY MEDICINE

## 2023-10-11 PROCEDURE — 3074F SYST BP LT 130 MM HG: CPT | Performed by: FAMILY MEDICINE

## 2023-10-11 PROCEDURE — 81001 URINALYSIS AUTO W/SCOPE: CPT

## 2023-10-11 PROCEDURE — 99214 OFFICE O/P EST MOD 30 MIN: CPT | Performed by: FAMILY MEDICINE

## 2023-10-11 PROCEDURE — G8427 DOCREV CUR MEDS BY ELIG CLIN: HCPCS | Performed by: FAMILY MEDICINE

## 2023-10-11 PROCEDURE — 3078F DIAST BP <80 MM HG: CPT | Performed by: FAMILY MEDICINE

## 2023-10-11 PROCEDURE — 4004F PT TOBACCO SCREEN RCVD TLK: CPT | Performed by: FAMILY MEDICINE

## 2023-10-11 PROCEDURE — G8417 CALC BMI ABV UP PARAM F/U: HCPCS | Performed by: FAMILY MEDICINE

## 2023-10-11 PROCEDURE — G8484 FLU IMMUNIZE NO ADMIN: HCPCS | Performed by: FAMILY MEDICINE

## 2023-10-11 RX ORDER — HYDROCODONE BITARTRATE AND ACETAMINOPHEN 5; 325 MG/1; MG/1
1 TABLET ORAL EVERY 6 HOURS PRN
Qty: 12 TABLET | Refills: 0 | Status: SHIPPED | OUTPATIENT
Start: 2023-10-11 | End: 2023-10-14

## 2023-10-11 ASSESSMENT — ENCOUNTER SYMPTOMS
BACK PAIN: 1
VOMITING: 0
GASTROINTESTINAL NEGATIVE: 1
RESPIRATORY NEGATIVE: 1
NAUSEA: 0

## 2023-10-11 NOTE — TELEPHONE ENCOUNTER
Per [facility], last fill 9/12, quantity 60 for 20 days. Royce Goes called requesting a refill of the below medication which has been pended for you:     Requested Prescriptions     Pending Prescriptions Disp Refills    cyclobenzaprine (FLEXERIL) 10 MG tablet 60 tablet 2     Sig: Take 0.5-1 tablets by mouth 3 times daily as needed for Muscle spasms       Last Appointment Date: 8/8/2023  Next Appointment Date: 10/18/2023    Allergies   Allergen Reactions    Flomax [Tamsulosin Hcl] Swelling     Swelling of arms; however, also had rash and fever at the same time and was admitted at the time    Morphine Itching and Rash     Rash, itching    Decadron [Dexamethasone]     Quetiapine      Other reaction(s): Other (See Comments)  \"Makes me nuts\"      Saphris [Asenapine]     Seasonal     Trazodone      Other reaction(s):  Other (See Comments)  Sleepwalking      Bactrim [Sulfamethoxazole-Trimethoprim] Diarrhea and Nausea Only    Prednisone Other (See Comments)     Emotional changes, depression    Zyprexa [Olanzapine] Other (See Comments)     Made her feel like she wanted to \"jump out of my skin\"

## 2023-10-11 NOTE — PROGRESS NOTES
tolerance/dependence & alternative treatments discussed. ;No signs of potential drug abuse or diversion identified. Patient unable to take Flomax due to side effects and not a candidate for NSAIDs for pain due to known chronic kidney disease. Will treat with pain medication as noted above and recommend referral to urology for further opinion due to history of recent recurrent kidney stones. Urinalysis is completed today and reviewed and does not show any evidence of infection. Should hydrate well and strain urine in interim. Tylenol 1-2 tabs po q4h prn for less severe pain. Return  if no improvement in symptoms or if any further symptoms arise. No follow-ups on file. An electronic signature was used to authenticate this note.     --Chidi Guadalupe, DO on 10/11/2023 at 4:44 PM

## 2023-10-11 NOTE — TELEPHONE ENCOUNTER
From: Jenna Singh  To: Dr. Holliday Ka: 10/11/2023 11:06 AM EDT  Subject: Possible kidney stone, UTI    Having urgency for urine an moderate to pain had kidney stones in august. I'm thinking I might be passing some. Possibly uti also. Could I get urinalysis and something for pain to not have to go to ER?  I see  10/18  Thank you  Kasia Henderson

## 2023-10-12 ENCOUNTER — HOSPITAL ENCOUNTER (OUTPATIENT)
Dept: MAMMOGRAPHY | Age: 58
Discharge: HOME | End: 2023-10-14
Attending: FAMILY MEDICINE
Payer: MEDICARE

## 2023-10-12 VITALS — WEIGHT: 180 LBS | HEIGHT: 61 IN | BODY MASS INDEX: 33.99 KG/M2

## 2023-10-12 DIAGNOSIS — Z12.31 SCREENING MAMMOGRAM, ENCOUNTER FOR: ICD-10-CM

## 2023-10-12 PROCEDURE — 77063 BREAST TOMOSYNTHESIS BI: CPT

## 2023-10-13 RX ORDER — CYCLOBENZAPRINE HCL 10 MG
5-10 TABLET ORAL 3 TIMES DAILY PRN
Qty: 60 TABLET | Refills: 5 | Status: SHIPPED | OUTPATIENT
Start: 2023-10-13

## 2023-10-18 ENCOUNTER — OFFICE VISIT (OUTPATIENT)
Dept: FAMILY MEDICINE CLINIC | Age: 58
End: 2023-10-18
Payer: MEDICARE

## 2023-10-18 VITALS
OXYGEN SATURATION: 97 % | DIASTOLIC BLOOD PRESSURE: 82 MMHG | SYSTOLIC BLOOD PRESSURE: 130 MMHG | WEIGHT: 180 LBS | HEIGHT: 61 IN | TEMPERATURE: 98 F | HEART RATE: 92 BPM | BODY MASS INDEX: 33.99 KG/M2 | RESPIRATION RATE: 16 BRPM

## 2023-10-18 DIAGNOSIS — E03.9 HYPOTHYROIDISM, UNSPECIFIED TYPE: ICD-10-CM

## 2023-10-18 DIAGNOSIS — R73.01 IMPAIRED FASTING GLUCOSE: ICD-10-CM

## 2023-10-18 DIAGNOSIS — N20.0 KIDNEY STONE: Primary | ICD-10-CM

## 2023-10-18 DIAGNOSIS — Z00.00 INITIAL MEDICARE ANNUAL WELLNESS VISIT: ICD-10-CM

## 2023-10-18 DIAGNOSIS — F17.210 NICOTINE DEPENDENCE, CIGARETTES, UNCOMPLICATED: ICD-10-CM

## 2023-10-18 PROCEDURE — 3074F SYST BP LT 130 MM HG: CPT | Performed by: FAMILY MEDICINE

## 2023-10-18 PROCEDURE — 99213 OFFICE O/P EST LOW 20 MIN: CPT | Performed by: FAMILY MEDICINE

## 2023-10-18 PROCEDURE — G8427 DOCREV CUR MEDS BY ELIG CLIN: HCPCS | Performed by: FAMILY MEDICINE

## 2023-10-18 PROCEDURE — 3078F DIAST BP <80 MM HG: CPT | Performed by: FAMILY MEDICINE

## 2023-10-18 PROCEDURE — 99213 OFFICE O/P EST LOW 20 MIN: CPT

## 2023-10-18 PROCEDURE — G8484 FLU IMMUNIZE NO ADMIN: HCPCS | Performed by: FAMILY MEDICINE

## 2023-10-18 PROCEDURE — 3017F COLORECTAL CA SCREEN DOC REV: CPT | Performed by: FAMILY MEDICINE

## 2023-10-18 PROCEDURE — 4004F PT TOBACCO SCREEN RCVD TLK: CPT | Performed by: FAMILY MEDICINE

## 2023-10-18 PROCEDURE — G8417 CALC BMI ABV UP PARAM F/U: HCPCS | Performed by: FAMILY MEDICINE

## 2023-10-18 RX ORDER — HYDROCODONE BITARTRATE AND ACETAMINOPHEN 5; 325 MG/1; MG/1
1 TABLET ORAL EVERY 8 HOURS PRN
Qty: 12 TABLET | Refills: 0 | Status: SHIPPED | OUTPATIENT
Start: 2023-10-18 | End: 2023-10-22

## 2023-10-18 RX ORDER — NICOTINE 21 MG/24HR
1 PATCH, TRANSDERMAL 24 HOURS TRANSDERMAL EVERY 24 HOURS
Qty: 30 PATCH | Refills: 1 | Status: SHIPPED | OUTPATIENT
Start: 2023-10-18 | End: 2024-10-17

## 2023-10-18 RX ORDER — ARIPIPRAZOLE 5 MG/1
5 TABLET ORAL DAILY
COMMUNITY
Start: 2023-10-17

## 2023-10-18 RX ORDER — OXYCODONE HYDROCHLORIDE AND ACETAMINOPHEN 5; 325 MG/1; MG/1
1 TABLET ORAL EVERY 6 HOURS PRN
COMMUNITY
Start: 2023-08-16

## 2023-10-18 RX ORDER — PENICILLIN V POTASSIUM 500 MG/1
TABLET ORAL
COMMUNITY
Start: 2023-10-02

## 2023-10-18 RX ORDER — GABAPENTIN 800 MG/1
800 TABLET ORAL 3 TIMES DAILY
COMMUNITY
Start: 2023-10-02

## 2023-10-18 ASSESSMENT — PATIENT HEALTH QUESTIONNAIRE - PHQ9
SUM OF ALL RESPONSES TO PHQ QUESTIONS 1-9: 1
1. LITTLE INTEREST OR PLEASURE IN DOING THINGS: 1
SUM OF ALL RESPONSES TO PHQ QUESTIONS 1-9: 1
SUM OF ALL RESPONSES TO PHQ9 QUESTIONS 1 & 2: 1
2. FEELING DOWN, DEPRESSED OR HOPELESS: 0
SUM OF ALL RESPONSES TO PHQ QUESTIONS 1-9: 1
SUM OF ALL RESPONSES TO PHQ QUESTIONS 1-9: 1

## 2023-10-18 NOTE — PROGRESS NOTES
[facility]  [address] [place] [address]  [phone]      Malena Russo is a 63 y.o. female who presents today for her medical conditions/complaints as noted below. Malena Russo is c/o of Medicare AWV      HPI:     Pt here today for follow-up of UC visit, RAHEEL, and for MAW visit (see other note). L-sided pain since 10/11 - feels like she got \"kicked\"  No UTI sx's  Able to void well  Appt with Urology on 10/23  Taking [place] Polka sparingly as needed - taking 1-2 daily; will need refill to get her through until Urology appt    Still waiting on her CPAP machine - has appt with [facility] to pick this up. Had to reschedule f/u appt with Dr. Hernan Modi - will see him now on 10/25  Wants to discuss starting to wean down on Gabapentin  Still taking 800 mg BID right now    Pt has cut down on her smoking since last OV - down to <1/2 ppd. Chantix did not seem to work this time, and it was making her nauseous, so she stopped taking it. Smokes more in the morning than at night. Past Medical History:   Diagnosis Date    Anxiety     Bipolar disorder ([address])     Breast cancer ([address]) 2014    L side - lumpectomy and radiation; no chemo (was recommended to take Tamoxifen, but with her smoking, she declined due to family h/o CVA already).  Seeing Dr. Lor Carrillo once yearly/    Chronic back pain     Chronic kidney disease     COPD (chronic obstructive pulmonary disease) ([address])     Depression     GERD (gastroesophageal reflux disease)     Headache(784.0)     History of alcoholism ([address])     sober since 1/16/19    History of therapeutic radiation 2014 left breast    Hyperlipidemia     Hypertension     Hyperthyroidism 01/2022    Hypothyroidism     Kidney stone     Has had lithotripsy x 1; had ureteral stent placement with removal x 1; had seen Dr. Valentino Hartman    Neuropathy     Obesity     Panic attack 04/15/2021    PTSD (post-traumatic stress disorder)     Sleep apnea 04/2021

## 2023-10-18 NOTE — PROGRESS NOTES
Medicare Annual Wellness Visit    Esau Antonio is here for Medicare AWV    Assessment & Plan   Kidney stone  -     HYDROcodone-acetaminophen (NORCO) 5-325 MG per tablet; Take 1 tablet by mouth every 8 hours as needed for Pain for up to 4 days. Max Daily Amount: 3 tablets, Disp-12 tablet, R-0Normal  Initial Medicare annual wellness visit  Nicotine dependence, cigarettes, uncomplicated  -     nicotine (NICODERM CQ) 14 MG/24HR; Place 1 patch onto the skin every 24 hours, Disp-30 patch, R-1Normal  Hypothyroidism, unspecified type  Impaired fasting glucose  -     Hemoglobin A1C; Future  Recommendations for Preventive Services Due: see orders and patient instructions/AVS.  Recommended screening schedule for the next 5-10 years is provided to the patient in written form: see Patient Instructions/AVS.     Return in about 3 months (around 1/18/2024) for f/u thyroid, smoking. Subjective     Patient's complete Health Risk Assessment and screening values have been reviewed and are found in Flowsheets. The following problems were reviewed today and where indicated follow up appointments were made and/or referrals ordered. Positive Risk Factor Screenings with Interventions:                Opioid Risk: (High risk score ?55) Opioid risk score: 68    Last PDMP Syed as Reviewed:  Review User Review Instant Review Result   Albert Kang 10/11/2023  4:04 PM @   Reviewed PDMP [1]     Last Controlled Substance Monitoring Documentation      [facility] Office Visit from 10/11/2023 in [address] [place] [address] Road In department of [facility]   Periodic Controlled Substance Monitoring Possible medication side effects, risk of tolerance/dependence & alternative treatments discussed., No signs of potential drug abuse or diversion identified.  filed at 10/11/2023 1600                 General HRA Questions:  Select all that apply: (!) New or Increased Pain (due to the kidneys, has OV with urology)    Pain

## 2023-10-18 NOTE — PATIENT INSTRUCTIONS
Advance Directives: Care Instructions  Overview  An advance directive is a legal way to state your wishes at the end of your life. It tells your family and your doctor what to do if you can't say what you want. There are two main types of advance directives. You can change them any time your wishes change. Living will. This form tells your family and your doctor your wishes about life support and other treatment. The form is also called a declaration. Medical power of . This form lets you name a person to make treatment decisions for you when you can't speak for yourself. This person is called a health care agent (health care proxy, health care surrogate). The form is also called a durable power of  for health care. If you do not have an advance directive, decisions about your medical care may be made by a family member, or by a doctor or a  who doesn't know you. It may help to think of an advance directive as a gift to the people who care for you. If you have one, they won't have to make tough decisions by themselves. For more information, including forms for your state, see the [address] [place] [address] website (www.caringinfo.org/planning/advance-directives/). Follow-up care is a key part of your treatment and safety. Be sure to make and go to all appointments, and call your doctor if you are having problems. It's also a good idea to know your test results and keep a list of the medicines you take. What should you include in an advance directive? Many states have a unique advance directive form. (It may ask you to address specific issues.) Or you might use a universal form that's approved by many states. If your form doesn't tell you what to address, it may be hard to know what to include in your advance directive. Use the questions below to help you get started. Who do you want to make decisions about your medical care if you are not able to?   What life-support measures do you want if you

## 2023-10-23 ENCOUNTER — OFFICE VISIT (OUTPATIENT)
Dept: UROLOGY | Age: 58
End: 2023-10-23
Payer: MEDICARE

## 2023-10-23 VITALS
HEART RATE: 93 BPM | SYSTOLIC BLOOD PRESSURE: 124 MMHG | HEIGHT: 61 IN | BODY MASS INDEX: 33.99 KG/M2 | WEIGHT: 180 LBS | DIASTOLIC BLOOD PRESSURE: 70 MMHG

## 2023-10-23 DIAGNOSIS — N20.1 LEFT URETERAL STONE: Primary | ICD-10-CM

## 2023-10-23 PROCEDURE — 3017F COLORECTAL CA SCREEN DOC REV: CPT | Performed by: UROLOGY

## 2023-10-23 PROCEDURE — 3074F SYST BP LT 130 MM HG: CPT | Performed by: UROLOGY

## 2023-10-23 PROCEDURE — G8427 DOCREV CUR MEDS BY ELIG CLIN: HCPCS | Performed by: UROLOGY

## 2023-10-23 PROCEDURE — G8417 CALC BMI ABV UP PARAM F/U: HCPCS | Performed by: UROLOGY

## 2023-10-23 PROCEDURE — 3078F DIAST BP <80 MM HG: CPT | Performed by: UROLOGY

## 2023-10-23 PROCEDURE — 99204 OFFICE O/P NEW MOD 45 MIN: CPT | Performed by: UROLOGY

## 2023-10-23 PROCEDURE — 4004F PT TOBACCO SCREEN RCVD TLK: CPT | Performed by: UROLOGY

## 2023-10-23 PROCEDURE — G8484 FLU IMMUNIZE NO ADMIN: HCPCS | Performed by: UROLOGY

## 2023-10-23 PROCEDURE — 99214 OFFICE O/P EST MOD 30 MIN: CPT | Performed by: UROLOGY

## 2023-10-23 NOTE — PROGRESS NOTES
Take 1 by mouth daily MONDAY - SATURDAY AND 1/2 TABLET ON SUNDAY 90 tablet 0    albuterol sulfate HFA (VENTOLIN HFA) 108 (90 Base) MCG/ACT inhaler Inhale 1-2 puffs into the lungs every 6 hours as needed for Wheezing or Shortness of Breath 18 g 5    magnesium Oxide 500 MG TABS Take by mouth      ondansetron (ZOFRAN ODT) 4 MG disintegrating tablet Take 1 tablet by mouth every 8 hours as needed for Nausea 40 tablet 3    lidocaine (LIDODERM) 5 % Apply 1 patch TO THE AFFECTED AREA DAILY as needed. LEAVE ON FOR 12 HOURS AND THEN OFF FOR 12 HOURS. 30 patch 3    gabapentin (NEURONTIN) 600 MG tablet Take 1 tablet by mouth 3 times daily. amLODIPine (NORVASC) 10 MG tablet Take 1 tablet by mouth daily 90 tablet 3    lisinopril (PRINIVIL;ZESTRIL) 40 MG tablet Take 1 tablet by mouth daily 90 tablet 3    omeprazole (PRILOSEC) 20 MG delayed release capsule Take 1 capsule by mouth in the morning and at bedtime 180 capsule 3    ezetimibe (ZETIA) 10 MG tablet Take 1 tablet by mouth daily 90 tablet 3    hydrOXYzine pamoate (VISTARIL) 25 MG capsule take 2 capsules by mouth every evening if needed      fexofenadine (ALLEGRA) 180 MG tablet Take 1 tablet by mouth daily      sertraline (ZOLOFT) 100 MG tablet take 1 tablet by mouth once daily      lamoTRIgine (LAMICTAL) 150 MG tablet take 1 tablet by mouth once daily      vitamin D-3 (CHOLECALCIFEROL) 125 MCG (5000 UT) TABS Take 1 tablet by mouth daily      fluticasone (FLONASE) 50 MCG/ACT nasal spray 1-2 sprays by Nasal route in the morning.  48 g 3    Multiple Vitamins-Minerals (MULTIVITAMIN ADULT PO) Take 1 tablet by mouth daily      propranolol (INDERAL) 10 MG tablet Take 1 tablet by mouth 3 times daily Indications: patient takes twice a day         Flomax [tamsulosin hcl], Morphine, Decadron [dexamethasone], Quetiapine, Saphris [asenapine], Seasonal, Trazodone, Bactrim [sulfamethoxazole-trimethoprim], Prednisone, and Zyprexa [olanzapine]  Social History     Tobacco Use   Smoking

## 2023-11-02 ENCOUNTER — TELEPHONE (OUTPATIENT)
Dept: FAMILY MEDICINE CLINIC | Age: 58
End: 2023-11-02

## 2023-11-02 ENCOUNTER — TELEPHONE (OUTPATIENT)
Dept: UROLOGY | Age: 58
End: 2023-11-02

## 2023-11-02 NOTE — TELEPHONE ENCOUNTER
Patient called stating she seen Des Almonte on 10/23/23 for a kidney stone. Stated at the time she was not having pain, but is now having pain. Patient is scheduled for an ultrasound on 11/06/23 and is scheduled with Jerry Meneses on 11/10/23. Patient is asking if she can have something for pain. Asking for it to be sent to [facility]. Please advise. Can contact patient at Ph# [phone].

## 2023-11-02 NOTE — TELEPHONE ENCOUNTER
Spoke with dr Dianne Allen, due to ct scan being done so far out. will not fill medication will need to go to urgent care or er.  Notified patient

## 2023-11-02 NOTE — TELEPHONE ENCOUNTER
Patient called  office to check for pain medication, Shaneka Hopkins from [facility] called our office stating that [address] and her nurse are out of office this week. Asking if someone in the urology team can help patient with her pain symptoms. Can call Shaneka Hopkins in family with any questions.

## 2023-11-03 DIAGNOSIS — Z12.31 SCREENING MAMMOGRAM, ENCOUNTER FOR: Primary | ICD-10-CM

## 2023-11-06 ENCOUNTER — HOSPITAL ENCOUNTER (OUTPATIENT)
Dept: ULTRASOUND IMAGING | Age: 58
Discharge: HOME | End: 2023-11-08
Attending: UROLOGY
Payer: MEDICARE

## 2023-11-06 DIAGNOSIS — N20.1 LEFT URETERAL STONE: ICD-10-CM

## 2023-11-06 PROCEDURE — 76775 US EXAM ABDO BACK WALL LIM: CPT

## 2023-11-13 NOTE — PROGRESS NOTES
[facility] [address]  [address] [place] [address]  Dept: [phone]    Visit Date: 11/14/2023        HPI:     Dwayne Ta is a 63 y.o. female who presents today for:  No chief complaint on file. HPI  Patient presents to urology clinic kidney stones. Kidney stones  Was in UC recently  Had stones treated in the past at [facility]  Left ureteral stones x 2  No pain this AM  Current Outpatient Medications   Medication Sig Dispense Refill    ARIPiprazole (ABILIFY) 5 MG tablet Take 1 tablet by mouth daily      gabapentin (NEURONTIN) 800 MG tablet Take 1 tablet by mouth 3 times daily. oxyCODONE-acetaminophen (PERCOCET) 5-325 MG per tablet Take 1 tablet by mouth every 6 hours as needed. penicillin v potassium (VEETID) 500 MG tablet take 1 tablet by mouth four times a day until finished      nicotine (NICODERM CQ) 14 MG/24HR Place 1 patch onto the skin every 24 hours 30 patch 1    cyclobenzaprine (FLEXERIL) 10 MG tablet Take 0.5-1 tablets by mouth 3 times daily as needed for Muscle spasms 60 tablet 5    levothyroxine (SYNTHROID) 100 MCG tablet Take 1 by mouth daily MONDAY - SATURDAY AND 1/2 TABLET ON SUNDAY 90 tablet 0    albuterol sulfate HFA (VENTOLIN HFA) 108 (90 Base) MCG/ACT inhaler Inhale 1-2 puffs into the lungs every 6 hours as needed for Wheezing or Shortness of Breath 18 g 5    magnesium Oxide 500 MG TABS Take by mouth      ondansetron (ZOFRAN ODT) 4 MG disintegrating tablet Take 1 tablet by mouth every 8 hours as needed for Nausea 40 tablet 3    lidocaine (LIDODERM) 5 % Apply 1 patch TO THE AFFECTED AREA DAILY as needed. LEAVE ON FOR 12 HOURS AND THEN OFF FOR 12 HOURS. 30 patch 3    gabapentin (NEURONTIN) 600 MG tablet Take 1 tablet by mouth 3 times daily.       amLODIPine (NORVASC) 10 MG tablet Take 1 tablet by mouth daily 90 tablet 3    lisinopril (PRINIVIL;ZESTRIL) 40

## 2023-11-14 ENCOUNTER — SCHEDULED TELEPHONE ENCOUNTER (OUTPATIENT)
Dept: UROLOGY | Age: 58
End: 2023-11-14
Payer: MEDICARE

## 2023-11-14 DIAGNOSIS — N20.1 LEFT URETERAL STONE: Primary | ICD-10-CM

## 2023-11-14 PROCEDURE — 99214 OFFICE O/P EST MOD 30 MIN: CPT | Performed by: NURSE PRACTITIONER

## 2023-11-14 PROCEDURE — 99442 PR PHYS/QHP TELEPHONE EVALUATION 11-20 MIN: CPT | Performed by: NURSE PRACTITIONER

## 2023-11-24 ENCOUNTER — APPOINTMENT (OUTPATIENT)
Dept: GENERAL RADIOLOGY | Age: 58
End: 2023-11-24
Payer: MEDICARE

## 2023-11-24 ENCOUNTER — HOSPITAL ENCOUNTER (EMERGENCY)
Age: 58
Discharge: HOME | End: 2023-11-24
Attending: EMERGENCY MEDICINE
Payer: MEDICARE

## 2023-11-24 VITALS
DIASTOLIC BLOOD PRESSURE: 79 MMHG | OXYGEN SATURATION: 93 % | BODY MASS INDEX: 33.04 KG/M2 | SYSTOLIC BLOOD PRESSURE: 121 MMHG | RESPIRATION RATE: 18 BRPM | HEART RATE: 99 BPM | HEIGHT: 61 IN | TEMPERATURE: 97.5 F | WEIGHT: 175 LBS

## 2023-11-24 DIAGNOSIS — J44.1 COPD EXACERBATION (HCC): Primary | ICD-10-CM

## 2023-11-24 PROCEDURE — 99284 EMERGENCY DEPT VISIT MOD MDM: CPT

## 2023-11-24 PROCEDURE — 93005 ELECTROCARDIOGRAM TRACING: CPT | Performed by: EMERGENCY MEDICINE

## 2023-11-24 PROCEDURE — 71046 X-RAY EXAM CHEST 2 VIEWS: CPT

## 2023-11-24 PROCEDURE — 6370000000 HC RX 637 (ALT 250 FOR IP): Performed by: EMERGENCY MEDICINE

## 2023-11-24 PROCEDURE — 94640 AIRWAY INHALATION TREATMENT: CPT

## 2023-11-24 RX ORDER — IPRATROPIUM BROMIDE AND ALBUTEROL SULFATE 2.5; .5 MG/3ML; MG/3ML
1 SOLUTION RESPIRATORY (INHALATION) ONCE
Status: COMPLETED | OUTPATIENT
Start: 2023-11-24 | End: 2023-11-24

## 2023-11-24 RX ORDER — BENZONATATE 100 MG/1
100 CAPSULE ORAL 3 TIMES DAILY PRN
Qty: 15 CAPSULE | Refills: 0 | Status: SHIPPED | OUTPATIENT
Start: 2023-11-24 | End: 2023-11-29

## 2023-11-24 RX ORDER — PREDNISONE 20 MG/1
60 TABLET ORAL ONCE
Status: COMPLETED | OUTPATIENT
Start: 2023-11-24 | End: 2023-11-24

## 2023-11-24 RX ORDER — BENZONATATE 100 MG/1
200 CAPSULE ORAL ONCE
Status: COMPLETED | OUTPATIENT
Start: 2023-11-24 | End: 2023-11-24

## 2023-11-24 RX ORDER — PREDNISONE 10 MG/1
TABLET ORAL
Qty: 20 TABLET | Refills: 0 | Status: SHIPPED | OUTPATIENT
Start: 2023-11-24 | End: 2023-12-04

## 2023-11-24 RX ADMIN — PREDNISONE 60 MG: 20 TABLET ORAL at 11:23

## 2023-11-24 RX ADMIN — IPRATROPIUM BROMIDE AND ALBUTEROL SULFATE 1 DOSE: .5; 2.5 SOLUTION RESPIRATORY (INHALATION) at 11:07

## 2023-11-24 RX ADMIN — IPRATROPIUM BROMIDE AND ALBUTEROL SULFATE 1 DOSE: 2.5; .5 SOLUTION RESPIRATORY (INHALATION) at 12:08

## 2023-11-24 RX ADMIN — BENZONATATE 200 MG: 100 CAPSULE ORAL at 11:23

## 2023-11-24 ASSESSMENT — ENCOUNTER SYMPTOMS
RHINORRHEA: 1
BACK PAIN: 0
COUGH: 1
CHEST TIGHTNESS: 1
SORE THROAT: 0
SHORTNESS OF BREATH: 1

## 2023-11-24 ASSESSMENT — PAIN DESCRIPTION - PAIN TYPE: TYPE: ACUTE PAIN

## 2023-11-24 ASSESSMENT — PAIN DESCRIPTION - LOCATION: LOCATION: CHEST

## 2023-11-24 ASSESSMENT — PAIN - FUNCTIONAL ASSESSMENT
PAIN_FUNCTIONAL_ASSESSMENT: 0-10
PAIN_FUNCTIONAL_ASSESSMENT: ACTIVITIES ARE NOT PREVENTED

## 2023-11-24 ASSESSMENT — PAIN DESCRIPTION - ORIENTATION: ORIENTATION: MID

## 2023-11-24 ASSESSMENT — PAIN SCALES - GENERAL: PAINLEVEL_OUTOF10: 6

## 2023-11-24 ASSESSMENT — PAIN DESCRIPTION - DESCRIPTORS: DESCRIPTORS: ACHING

## 2023-11-25 LAB
EKG ATRIAL RATE: 96 BPM
EKG P AXIS: 76 DEGREES
EKG P-R INTERVAL: 150 MS
EKG Q-T INTERVAL: 390 MS
EKG QRS DURATION: 90 MS
EKG QTC CALCULATION (BAZETT): 492 MS
EKG R AXIS: 67 DEGREES
EKG T AXIS: 65 DEGREES
EKG VENTRICULAR RATE: 96 BPM

## 2023-11-27 DIAGNOSIS — F17.200 CURRENT SMOKER: Primary | ICD-10-CM

## 2023-12-03 ENCOUNTER — OFFICE VISIT (OUTPATIENT)
Dept: PRIMARY CARE CLINIC | Age: 58
End: 2023-12-03

## 2023-12-03 VITALS
BODY MASS INDEX: 35.4 KG/M2 | OXYGEN SATURATION: 96 % | WEIGHT: 187.5 LBS | HEART RATE: 111 BPM | SYSTOLIC BLOOD PRESSURE: 128 MMHG | RESPIRATION RATE: 16 BRPM | DIASTOLIC BLOOD PRESSURE: 88 MMHG | TEMPERATURE: 97.4 F | HEIGHT: 61 IN

## 2023-12-03 DIAGNOSIS — K04.7 DENTAL ABSCESS: Primary | ICD-10-CM

## 2023-12-03 DIAGNOSIS — B37.0 THRUSH: ICD-10-CM

## 2023-12-03 RX ORDER — AMOXICILLIN AND CLAVULANATE POTASSIUM 875; 125 MG/1; MG/1
1 TABLET, FILM COATED ORAL 2 TIMES DAILY
Qty: 20 TABLET | Refills: 0 | Status: SHIPPED | OUTPATIENT
Start: 2023-12-03 | End: 2023-12-13

## 2023-12-03 RX ORDER — HYDROCODONE BITARTRATE AND ACETAMINOPHEN 5; 325 MG/1; MG/1
1 TABLET ORAL EVERY 6 HOURS PRN
Qty: 12 TABLET | Refills: 0 | Status: SHIPPED | OUTPATIENT
Start: 2023-12-03 | End: 2023-12-06

## 2023-12-03 ASSESSMENT — ENCOUNTER SYMPTOMS
GASTROINTESTINAL NEGATIVE: 1
EYES NEGATIVE: 1
SINUS PRESSURE: 1
RESPIRATORY NEGATIVE: 1
ALLERGIC/IMMUNOLOGIC NEGATIVE: 1

## 2023-12-06 DIAGNOSIS — E03.9 HYPOTHYROIDISM, UNSPECIFIED TYPE: ICD-10-CM

## 2023-12-06 NOTE — TELEPHONE ENCOUNTER
Deanna Garcia is requesting a refill on the following medication(s):  Requested Prescriptions     Pending Prescriptions Disp Refills    levothyroxine (SYNTHROID) 100 MCG tablet [Pharmacy Med Name: LEVOTHYROXINE 100 MCG TABLET] 90 tablet 0     Sig: Take 1 by mouth daily [address] 1/2 TABLET ON SUNDAY       Last Visit Date (If Applicable):  73/42/8882    Next Visit Date:    12/29/2023

## 2023-12-08 RX ORDER — LEVOTHYROXINE SODIUM 0.1 MG/1
TABLET ORAL
Qty: 90 TABLET | Refills: 0 | OUTPATIENT
Start: 2023-12-08

## 2023-12-10 ENCOUNTER — OFFICE VISIT (OUTPATIENT)
Dept: PRIMARY CARE CLINIC | Age: 58
End: 2023-12-10

## 2023-12-10 VITALS
TEMPERATURE: 98.3 F | DIASTOLIC BLOOD PRESSURE: 84 MMHG | HEIGHT: 61 IN | OXYGEN SATURATION: 92 % | SYSTOLIC BLOOD PRESSURE: 130 MMHG | RESPIRATION RATE: 20 BRPM | BODY MASS INDEX: 35.16 KG/M2 | HEART RATE: 117 BPM | WEIGHT: 186.2 LBS

## 2023-12-10 DIAGNOSIS — K04.7 DENTAL INFECTION: Primary | ICD-10-CM

## 2023-12-10 DIAGNOSIS — K08.89 PAIN, DENTAL: ICD-10-CM

## 2023-12-10 DIAGNOSIS — B37.0 THRUSH: ICD-10-CM

## 2023-12-10 RX ORDER — HYDROCODONE BITARTRATE AND ACETAMINOPHEN 5; 325 MG/1; MG/1
1 TABLET ORAL EVERY 6 HOURS PRN
Qty: 12 TABLET | Refills: 0 | Status: SHIPPED | OUTPATIENT
Start: 2023-12-10 | End: 2023-12-13

## 2023-12-10 RX ORDER — FLUCONAZOLE 150 MG/1
TABLET ORAL
Qty: 3 TABLET | Refills: 0 | Status: SHIPPED | OUTPATIENT
Start: 2023-12-10

## 2023-12-10 RX ORDER — CLINDAMYCIN HYDROCHLORIDE 300 MG/1
300 CAPSULE ORAL 4 TIMES DAILY
Qty: 40 CAPSULE | Refills: 0 | Status: SHIPPED | OUTPATIENT
Start: 2023-12-10 | End: 2023-12-20

## 2023-12-10 ASSESSMENT — ENCOUNTER SYMPTOMS
SORE THROAT: 0
SHORTNESS OF BREATH: 0
SINUS PRESSURE: 1
SINUS COMPLAINT: 1

## 2023-12-29 ENCOUNTER — HOSPITAL ENCOUNTER (OUTPATIENT)
Age: 58
Discharge: HOME | End: 2023-12-29
Payer: MEDICARE

## 2023-12-29 ENCOUNTER — OFFICE VISIT (OUTPATIENT)
Dept: FAMILY MEDICINE CLINIC | Age: 58
End: 2023-12-29
Payer: MEDICARE

## 2023-12-29 VITALS
OXYGEN SATURATION: 94 % | WEIGHT: 187.9 LBS | HEIGHT: 61 IN | SYSTOLIC BLOOD PRESSURE: 132 MMHG | RESPIRATION RATE: 16 BRPM | HEART RATE: 91 BPM | DIASTOLIC BLOOD PRESSURE: 70 MMHG | TEMPERATURE: 98.4 F | BODY MASS INDEX: 35.48 KG/M2

## 2023-12-29 DIAGNOSIS — R06.02 SOB (SHORTNESS OF BREATH) ON EXERTION: ICD-10-CM

## 2023-12-29 DIAGNOSIS — J01.41 RECURRENT PANSINUSITIS: Primary | ICD-10-CM

## 2023-12-29 DIAGNOSIS — E03.9 HYPOTHYROIDISM, UNSPECIFIED TYPE: ICD-10-CM

## 2023-12-29 DIAGNOSIS — E78.2 HYPERCHOLESTEROLEMIA WITH HYPERTRIGLYCERIDEMIA: ICD-10-CM

## 2023-12-29 DIAGNOSIS — R73.01 ELEVATED FASTING GLUCOSE: ICD-10-CM

## 2023-12-29 DIAGNOSIS — Z87.891 PERSONAL HISTORY OF TOBACCO USE: ICD-10-CM

## 2023-12-29 DIAGNOSIS — R73.01 IMPAIRED FASTING GLUCOSE: ICD-10-CM

## 2023-12-29 LAB
ALBUMIN SERPL-MCNC: 4.3 G/DL (ref 3.5–5.2)
ALBUMIN/GLOB SERPL: 1.9 {RATIO} (ref 1–2.5)
ALP SERPL-CCNC: 89 U/L (ref 35–104)
ALT SERPL-CCNC: 19 U/L (ref 5–33)
ANION GAP SERPL CALCULATED.3IONS-SCNC: 9 MMOL/L (ref 9–17)
AST SERPL-CCNC: 17 U/L
BASOPHILS # BLD: 0.07 K/UL (ref 0–0.2)
BASOPHILS NFR BLD: 1 % (ref 0–2)
BILIRUB SERPL-MCNC: 0.2 MG/DL (ref 0.3–1.2)
BUN SERPL-MCNC: 7 MG/DL (ref 6–20)
BUN/CREAT SERPL: 9 (ref 9–20)
CALCIUM SERPL-MCNC: 9.4 MG/DL (ref 8.6–10.4)
CHLORIDE SERPL-SCNC: 100 MMOL/L (ref 98–107)
CHOLEST SERPL-MCNC: 267 MG/DL
CHOLESTEROL/HDL RATIO: 5.6
CO2 SERPL-SCNC: 31 MMOL/L (ref 20–31)
CREAT SERPL-MCNC: 0.8 MG/DL (ref 0.5–0.9)
EOSINOPHIL # BLD: 0.21 K/UL (ref 0–0.44)
EOSINOPHILS RELATIVE PERCENT: 2 % (ref 1–4)
ERYTHROCYTE [DISTWIDTH] IN BLOOD BY AUTOMATED COUNT: 13.1 % (ref 11.8–14.4)
EST. AVERAGE GLUCOSE BLD GHB EST-MCNC: 111 MG/DL
GFR SERPL CREATININE-BSD FRML MDRD: >60 ML/MIN/1.73M2
GLUCOSE SERPL-MCNC: 86 MG/DL (ref 70–99)
HBA1C MFR BLD: 5.5 % (ref 4–6)
HCT VFR BLD AUTO: 41.6 % (ref 36.3–47.1)
HDLC SERPL-MCNC: 48 MG/DL
HGB BLD-MCNC: 13.6 G/DL (ref 11.9–15.1)
IMM GRANULOCYTES # BLD AUTO: 0.1 K/UL (ref 0–0.3)
IMM GRANULOCYTES NFR BLD: 1 %
LDLC SERPL CALC-MCNC: 165 MG/DL (ref 0–130)
LYMPHOCYTES NFR BLD: 2.29 K/UL (ref 1.1–3.7)
LYMPHOCYTES RELATIVE PERCENT: 23 % (ref 24–43)
MCH RBC QN AUTO: 30.2 PG (ref 25.2–33.5)
MCHC RBC AUTO-ENTMCNC: 32.7 G/DL (ref 25.2–33.5)
MCV RBC AUTO: 92.2 FL (ref 82.6–102.9)
MONOCYTES NFR BLD: 0.63 K/UL (ref 0.1–1.2)
MONOCYTES NFR BLD: 6 % (ref 3–12)
NEUTROPHILS NFR BLD: 67 % (ref 36–65)
NEUTS SEG NFR BLD: 6.58 K/UL (ref 1.5–8.1)
NRBC BLD-RTO: 0 PER 100 WBC
PLATELET # BLD AUTO: 225 K/UL (ref 138–453)
PMV BLD AUTO: 8.3 FL (ref 8.1–13.5)
POTASSIUM SERPL-SCNC: 3.6 MMOL/L (ref 3.7–5.3)
PROT SERPL-MCNC: 6.6 G/DL (ref 6.4–8.3)
RBC # BLD AUTO: 4.51 M/UL (ref 3.95–5.11)
SODIUM SERPL-SCNC: 140 MMOL/L (ref 135–144)
TRIGL SERPL-MCNC: 271 MG/DL
TSH SERPL DL<=0.05 MIU/L-ACNC: 4.19 UIU/ML (ref 0.3–5)
WBC OTHER # BLD: 9.9 K/UL (ref 3.5–11.3)

## 2023-12-29 PROCEDURE — 84443 ASSAY THYROID STIM HORMONE: CPT

## 2023-12-29 PROCEDURE — 83036 HEMOGLOBIN GLYCOSYLATED A1C: CPT

## 2023-12-29 PROCEDURE — 85025 COMPLETE CBC W/AUTO DIFF WBC: CPT

## 2023-12-29 PROCEDURE — G0296 VISIT TO DETERM LDCT ELIG: HCPCS | Performed by: FAMILY MEDICINE

## 2023-12-29 PROCEDURE — 80053 COMPREHEN METABOLIC PANEL: CPT

## 2023-12-29 PROCEDURE — 80061 LIPID PANEL: CPT

## 2023-12-29 PROCEDURE — 36415 COLL VENOUS BLD VENIPUNCTURE: CPT

## 2023-12-29 RX ORDER — DOXYCYCLINE HYCLATE 100 MG
100 TABLET ORAL 2 TIMES DAILY
Qty: 14 TABLET | Refills: 0 | Status: SHIPPED | OUTPATIENT
Start: 2023-12-29 | End: 2024-01-08

## 2023-12-29 RX ORDER — DEXTROMETHORPHAN HYDROBROMIDE AND PROMETHAZINE HYDROCHLORIDE 15; 6.25 MG/5ML; MG/5ML
5 SYRUP ORAL 2 TIMES DAILY PRN
Qty: 100 ML | Refills: 0 | Status: SHIPPED | OUTPATIENT
Start: 2023-12-29

## 2023-12-29 NOTE — PROGRESS NOTES
Constitutional:  Negative for fever.   HENT:  Positive for rhinorrhea (yellow/green, bad odor), sinus pressure and sinus pain. Negative for ear pain and sore throat.    Respiratory:  Positive for cough (productive of clear sputum) and shortness of breath (improved from last month, but still SOB with exertion). Negative for wheezing.    Gastrointestinal:  Positive for diarrhea (from her antibiotics). Negative for nausea and vomiting.   Musculoskeletal:  Positive for myalgias (worse by the end of the day).   Neurological:  Positive for headaches.       Objective:     Vitals:    12/29/23 1332   BP: 132/70   Site: Right Upper Arm   Position: Sitting   Pulse: 91   Resp: 16   Temp: 98.4 °F (36.9 °C)   TempSrc: Temporal   SpO2: 94%   Weight: 85.2 kg (187 lb 14.4 oz)   Height: 1.549 m (5' 1\")     Physical Exam  Vitals and nursing note reviewed.   Constitutional:       General: She is not in acute distress.     Appearance: She is well-developed.   HENT:      Head: Normocephalic and atraumatic.      Right Ear: Tympanic membrane, ear canal and external ear normal.      Left Ear: Tympanic membrane, ear canal and external ear normal.      Nose: Nose normal.      Mouth/Throat:      Pharynx: Posterior oropharyngeal erythema (Mild) present. No oropharyngeal exudate.      Comments: Clear post-nasal drainage noted.  Eyes:      Conjunctiva/sclera: Conjunctivae normal.      Pupils: Pupils are equal, round, and reactive to light.   Cardiovascular:      Rate and Rhythm: Normal rate and regular rhythm.      Heart sounds: Normal heart sounds.   Pulmonary:      Effort: Pulmonary effort is normal. No respiratory distress.      Breath sounds: Normal breath sounds. No wheezing or rales.   Abdominal:      General: Bowel sounds are normal. There is no distension.      Palpations: Abdomen is soft.      Tenderness: There is no abdominal tenderness.   Musculoskeletal:      Cervical back: Neck supple.   Lymphadenopathy:      Cervical: No cervical

## 2024-01-01 DIAGNOSIS — E78.2 HYPERCHOLESTEROLEMIA WITH HYPERTRIGLYCERIDEMIA: Primary | ICD-10-CM

## 2024-01-01 DIAGNOSIS — R73.01 IMPAIRED FASTING GLUCOSE: ICD-10-CM

## 2024-01-01 DIAGNOSIS — E03.9 HYPOTHYROIDISM, UNSPECIFIED TYPE: ICD-10-CM

## 2024-01-02 NOTE — TELEPHONE ENCOUNTER
Labs resulted.    Sarah LOUIS called requesting a refill of the below medication which has been pended for you:     Requested Prescriptions     Pending Prescriptions Disp Refills    levothyroxine (SYNTHROID) 100 MCG tablet 90 tablet 0     Sig: Take 1 by mouth daily MONDAY - SATURDAY AND 1/2 TABLET ON SUNDAY       Last Appointment Date: 12/29/2023  Next Appointment Date: 3/25/2024    Allergies   Allergen Reactions    Flomax [Tamsulosin Hcl] Swelling     Swelling of arms; however, also had rash and fever at the same time and was admitted at the time    Morphine Itching and Rash     Rash, itching    Decadron [Dexamethasone]     Quetiapine      Other reaction(s): Other (See Comments)  \"Makes me nuts\"      Saphris [Asenapine]     Seasonal     Trazodone      Other reaction(s): Other (See Comments)  Sleepwalking      Bactrim [Sulfamethoxazole-Trimethoprim] Diarrhea and Nausea Only    Prednisone Other (See Comments)     Emotional changes, depression    Zyprexa [Olanzapine] Other (See Comments)     Made her feel like she wanted to \"jump out of my skin\"

## 2024-01-05 RX ORDER — LEVOTHYROXINE SODIUM 0.1 MG/1
TABLET ORAL
Qty: 90 TABLET | Refills: 1 | Status: SHIPPED | OUTPATIENT
Start: 2024-01-05

## 2024-01-09 ENCOUNTER — PATIENT MESSAGE (OUTPATIENT)
Dept: FAMILY MEDICINE CLINIC | Age: 59
End: 2024-01-09

## 2024-01-09 NOTE — TELEPHONE ENCOUNTER
From: Sarah Jones  To: Dr. Mora Chris  Sent: 1/9/2024 9:00 AM EST  Subject: Loss of hearing/ clogged ear    Dr Chris   I finished the doxycycline and now my right ear is clogged with decreased hearing. Can you order a CT. Scan and/or get me an appointment to see you?  Sinus drainage is decreased and odor is gone.  Thanks  Sarah Jones

## 2024-01-17 ENCOUNTER — TELEPHONE (OUTPATIENT)
Dept: ONCOLOGY | Age: 59
End: 2024-01-17

## 2024-01-17 ENCOUNTER — OFFICE VISIT (OUTPATIENT)
Dept: PRIMARY CARE CLINIC | Age: 59
End: 2024-01-17
Payer: MEDICARE

## 2024-01-17 VITALS
DIASTOLIC BLOOD PRESSURE: 80 MMHG | HEART RATE: 84 BPM | RESPIRATION RATE: 16 BRPM | WEIGHT: 193 LBS | OXYGEN SATURATION: 94 % | TEMPERATURE: 98.2 F | HEIGHT: 61 IN | SYSTOLIC BLOOD PRESSURE: 122 MMHG | BODY MASS INDEX: 36.44 KG/M2

## 2024-01-17 DIAGNOSIS — H60.501 ACUTE OTITIS EXTERNA OF RIGHT EAR, UNSPECIFIED TYPE: Primary | ICD-10-CM

## 2024-01-17 DIAGNOSIS — H93.11 TINNITUS OF RIGHT EAR: ICD-10-CM

## 2024-01-17 PROCEDURE — G8417 CALC BMI ABV UP PARAM F/U: HCPCS

## 2024-01-17 PROCEDURE — 3074F SYST BP LT 130 MM HG: CPT

## 2024-01-17 PROCEDURE — 3079F DIAST BP 80-89 MM HG: CPT

## 2024-01-17 PROCEDURE — 99212 OFFICE O/P EST SF 10 MIN: CPT

## 2024-01-17 PROCEDURE — G8484 FLU IMMUNIZE NO ADMIN: HCPCS

## 2024-01-17 PROCEDURE — G8427 DOCREV CUR MEDS BY ELIG CLIN: HCPCS

## 2024-01-17 PROCEDURE — 4004F PT TOBACCO SCREEN RCVD TLK: CPT

## 2024-01-17 PROCEDURE — 99213 OFFICE O/P EST LOW 20 MIN: CPT

## 2024-01-17 PROCEDURE — 4130F TOPICAL PREP RX AOE: CPT

## 2024-01-17 PROCEDURE — 3017F COLORECTAL CA SCREEN DOC REV: CPT

## 2024-01-17 RX ORDER — MECLIZINE HYDROCHLORIDE 25 MG/1
25 TABLET ORAL 3 TIMES DAILY PRN
Qty: 30 TABLET | Refills: 0 | Status: SHIPPED | OUTPATIENT
Start: 2024-01-17 | End: 2024-01-27

## 2024-01-17 RX ORDER — OFLOXACIN 3 MG/ML
SOLUTION/ DROPS OPHTHALMIC
Qty: 10 ML | Refills: 0 | Status: SHIPPED | OUTPATIENT
Start: 2024-01-17

## 2024-01-17 RX ORDER — ARIPIPRAZOLE 15 MG/1
TABLET ORAL
COMMUNITY
Start: 2024-01-12

## 2024-01-17 ASSESSMENT — PATIENT HEALTH QUESTIONNAIRE - PHQ9
2. FEELING DOWN, DEPRESSED OR HOPELESS: 0
SUM OF ALL RESPONSES TO PHQ QUESTIONS 1-9: 0
SUM OF ALL RESPONSES TO PHQ9 QUESTIONS 1 & 2: 0
SUM OF ALL RESPONSES TO PHQ QUESTIONS 1-9: 0
SUM OF ALL RESPONSES TO PHQ QUESTIONS 1-9: 0
1. LITTLE INTEREST OR PLEASURE IN DOING THINGS: 0
SUM OF ALL RESPONSES TO PHQ QUESTIONS 1-9: 0

## 2024-01-17 ASSESSMENT — ENCOUNTER SYMPTOMS
GASTROINTESTINAL NEGATIVE: 1
FACIAL SWELLING: 0
RESPIRATORY NEGATIVE: 1
SINUS PRESSURE: 1
SINUS PAIN: 0
RHINORRHEA: 1
ALLERGIC/IMMUNOLOGIC NEGATIVE: 1
EYES NEGATIVE: 1

## 2024-01-17 NOTE — PATIENT INSTRUCTIONS
May use ear drops as prescribed  Avoid placing items in ears  May use tylenol and ibuprofen for pain/ fever  If symptoms worsen follow up with PCP or return to walk in clinic  Patient verbalized understanding and agrees with plan of care

## 2024-01-17 NOTE — PROGRESS NOTES
[facility]  [address]  [place] [id number]  Dept: [phone]  Dept Fax: [phone]    Sarah Jones  is a 58 y.o. female who presents today for her medical conditions/complaints as noted below.  Sarah Jones is c/o of   Chief Complaint   Patient presents with    Ear Fullness     Right ear pressure, decreased hearting, started 1 week ago. Has used debrox with no relief.        HPI:     Ear Fullness   There is pain in the right ear. This is a new problem. The current episode started in the past 7 days. The problem occurs constantly. The problem has been gradually worsening. There has been no fever. The pain is at a severity of 2/10. The pain is mild. Associated symptoms include headaches (sinus pressure), hearing loss and rhinorrhea. Pertinent negatives include no ear discharge. She has tried ear drops and NSAIDs (flonase and claritin) for the symptoms. The treatment provided mild relief.         Past Medical History:   Diagnosis Date    Anxiety     Bipolar disorder ([facility])     Breast cancer ([facility]) 2014    L side - lumpectomy and radiation; no chemo (was recommended to take Tamoxifen, but with her smoking, she declined due to family h/o CVA already). Seeing Dr. Zaidi once yearly/    Chronic back pain     Chronic kidney disease     COPD (chronic obstructive pulmonary disease) ([facility])     Depression     GERD (gastroesophageal reflux disease)     Headache(784.0)     History of alcoholism ([facility])     sober since 1/16/19    History of therapeutic radiation 2014 left breast    Hyperlipidemia     Hypertension     Hyperthyroidism 01/2022    Hypothyroidism     Kidney stone     Has had lithotripsy x 1; had ureteral stent placement with removal x 1; had seen Dr. Dubose    Neuropathy     Obesity     Panic attack 04/15/2021    PTSD (post-traumatic stress disorder)     Sleep apnea 04/2021

## 2024-01-20 ENCOUNTER — OFFICE VISIT (OUTPATIENT)
Dept: PRIMARY CARE CLINIC | Age: 59
End: 2024-01-20
Payer: MEDICARE

## 2024-01-20 VITALS
SYSTOLIC BLOOD PRESSURE: 132 MMHG | DIASTOLIC BLOOD PRESSURE: 74 MMHG | HEART RATE: 80 BPM | OXYGEN SATURATION: 98 % | BODY MASS INDEX: 33.25 KG/M2 | WEIGHT: 176 LBS | TEMPERATURE: 98.2 F

## 2024-01-20 DIAGNOSIS — H66.001 NON-RECURRENT ACUTE SUPPURATIVE OTITIS MEDIA OF RIGHT EAR WITHOUT SPONTANEOUS RUPTURE OF TYMPANIC MEMBRANE: Primary | ICD-10-CM

## 2024-01-20 DIAGNOSIS — H60.501 ACUTE OTITIS EXTERNA OF RIGHT EAR, UNSPECIFIED TYPE: ICD-10-CM

## 2024-01-20 PROCEDURE — 3075F SYST BP GE 130 - 139MM HG: CPT | Performed by: NURSE PRACTITIONER

## 2024-01-20 PROCEDURE — G8417 CALC BMI ABV UP PARAM F/U: HCPCS | Performed by: NURSE PRACTITIONER

## 2024-01-20 PROCEDURE — 99213 OFFICE O/P EST LOW 20 MIN: CPT | Performed by: NURSE PRACTITIONER

## 2024-01-20 PROCEDURE — 3017F COLORECTAL CA SCREEN DOC REV: CPT | Performed by: NURSE PRACTITIONER

## 2024-01-20 PROCEDURE — 3078F DIAST BP <80 MM HG: CPT | Performed by: NURSE PRACTITIONER

## 2024-01-20 PROCEDURE — 4004F PT TOBACCO SCREEN RCVD TLK: CPT | Performed by: NURSE PRACTITIONER

## 2024-01-20 PROCEDURE — G8427 DOCREV CUR MEDS BY ELIG CLIN: HCPCS | Performed by: NURSE PRACTITIONER

## 2024-01-20 PROCEDURE — G8484 FLU IMMUNIZE NO ADMIN: HCPCS | Performed by: NURSE PRACTITIONER

## 2024-01-20 PROCEDURE — 4130F TOPICAL PREP RX AOE: CPT | Performed by: NURSE PRACTITIONER

## 2024-01-20 RX ORDER — AMOXICILLIN AND CLAVULANATE POTASSIUM 875; 125 MG/1; MG/1
1 TABLET, FILM COATED ORAL 2 TIMES DAILY
Qty: 20 TABLET | Refills: 0 | Status: SHIPPED | OUTPATIENT
Start: 2024-01-20 | End: 2024-01-30

## 2024-01-20 ASSESSMENT — PATIENT HEALTH QUESTIONNAIRE - PHQ9
2. FEELING DOWN, DEPRESSED OR HOPELESS: 0
SUM OF ALL RESPONSES TO PHQ QUESTIONS 1-9: 0
SUM OF ALL RESPONSES TO PHQ QUESTIONS 1-9: 0
SUM OF ALL RESPONSES TO PHQ9 QUESTIONS 1 & 2: 0
SUM OF ALL RESPONSES TO PHQ QUESTIONS 1-9: 0
SUM OF ALL RESPONSES TO PHQ QUESTIONS 1-9: 0
1. LITTLE INTEREST OR PLEASURE IN DOING THINGS: 0

## 2024-01-20 ASSESSMENT — ENCOUNTER SYMPTOMS
VOMITING: 0
RESPIRATORY NEGATIVE: 1
SORE THROAT: 0
RHINORRHEA: 0
DIARRHEA: 0
COUGH: 0

## 2024-01-20 NOTE — PROGRESS NOTES
[facility]             [address] [address]                        Telephone [phone]             Fax [phone]     Sarah Jones  1965  MRN:[id number]   Date of visit:  1/20/2024    Subjective:    Sarah Jones is a 58 y.o.  female who presents to [facility] today (1/20/2024) for evaluation of:    Chief Complaint   Patient presents with    Otalgia     Right ear        Otalgia   There is pain in the right ear. This is a new problem. The current episode started in the past 7 days (01/17/24). The problem occurs constantly. The problem has been gradually worsening. There has been no fever. The pain is at a severity of 7/10. Associated symptoms include hearing loss (right ear). Pertinent negatives include no coughing, diarrhea, ear discharge, rash, rhinorrhea, sore throat or vomiting. Associated symptoms comments: Right ear fullness and \"buzzing sound\" X 10 days.. She has tried acetaminophen, NSAIDs and heat packs (ofloxacin) for the symptoms. The treatment provided mild relief.       She has the following problem list:  Patient Active Problem List   Diagnosis    Severe episode of recurrent major depressive disorder, without psychotic features (HCC)    Status post repair of recurrent ventral hernia    Lumbar radiculopathy    Lumbar spondylosis    Post laminectomy syndrome    Chronic midline low back pain with bilateral sciatica    Lumbar degenerative disc disease    Cocaine dependence in remission (HCC)    Generalized anxiety disorder    Hypertension    Major depressive disorder, single episode, mild (HCC)    Mid-back pain, acute    Anxiety    Current smoker    Depressive disorder    Difficulty sleeping    History of suicide attempt    Hypokalemia    Hypomagnesemia    Hypothyroidism    Malignant neoplasm of breast (HCC)    Acquired absence of both cervix and uterus    Acquired absence of other

## 2024-01-23 DIAGNOSIS — E03.9 HYPOTHYROIDISM, UNSPECIFIED TYPE: Primary | ICD-10-CM

## 2024-01-23 DIAGNOSIS — E78.2 HYPERCHOLESTEROLEMIA WITH HYPERTRIGLYCERIDEMIA: ICD-10-CM

## 2024-01-23 DIAGNOSIS — R73.01 IMPAIRED FASTING GLUCOSE: ICD-10-CM

## 2024-01-24 ENCOUNTER — HOSPITAL ENCOUNTER (OUTPATIENT)
Dept: CT IMAGING | Age: 59
Discharge: HOME | End: 2024-01-26
Attending: FAMILY MEDICINE
Payer: MEDICARE

## 2024-01-24 DIAGNOSIS — Z87.891 PERSONAL HISTORY OF TOBACCO USE: ICD-10-CM

## 2024-01-24 PROCEDURE — 71271 CT THORAX LUNG CANCER SCR C-: CPT

## 2024-01-27 ENCOUNTER — PATIENT MESSAGE (OUTPATIENT)
Dept: FAMILY MEDICINE CLINIC | Age: 59
End: 2024-01-27

## 2024-01-29 NOTE — TELEPHONE ENCOUNTER
From: Sarah Jones  To: Dr. Mora Chris  Sent: 1/27/2024 5:37 PM EST  Subject: CT lung    Dr. Chris, I've been looking for the results and they are not on online. Can you please tell me if it's all right?  Thank you, Sarah

## 2024-02-09 ENCOUNTER — OFFICE VISIT (OUTPATIENT)
Dept: FAMILY MEDICINE CLINIC | Age: 59
End: 2024-02-09
Payer: MEDICARE

## 2024-02-09 VITALS
TEMPERATURE: 98 F | BODY MASS INDEX: 36.82 KG/M2 | DIASTOLIC BLOOD PRESSURE: 72 MMHG | HEART RATE: 88 BPM | WEIGHT: 195 LBS | OXYGEN SATURATION: 95 % | HEIGHT: 61 IN | SYSTOLIC BLOOD PRESSURE: 128 MMHG

## 2024-02-09 DIAGNOSIS — E78.00 PURE HYPERCHOLESTEROLEMIA: ICD-10-CM

## 2024-02-09 DIAGNOSIS — I10 PRIMARY HYPERTENSION: ICD-10-CM

## 2024-02-09 DIAGNOSIS — H65.01 NON-RECURRENT ACUTE SEROUS OTITIS MEDIA OF RIGHT EAR: ICD-10-CM

## 2024-02-09 DIAGNOSIS — G47.33 OSA ON CPAP: Primary | ICD-10-CM

## 2024-02-09 PROCEDURE — G8484 FLU IMMUNIZE NO ADMIN: HCPCS | Performed by: FAMILY MEDICINE

## 2024-02-09 PROCEDURE — 3017F COLORECTAL CA SCREEN DOC REV: CPT | Performed by: FAMILY MEDICINE

## 2024-02-09 PROCEDURE — G8427 DOCREV CUR MEDS BY ELIG CLIN: HCPCS | Performed by: FAMILY MEDICINE

## 2024-02-09 PROCEDURE — 4004F PT TOBACCO SCREEN RCVD TLK: CPT | Performed by: FAMILY MEDICINE

## 2024-02-09 PROCEDURE — G8417 CALC BMI ABV UP PARAM F/U: HCPCS | Performed by: FAMILY MEDICINE

## 2024-02-09 PROCEDURE — 3074F SYST BP LT 130 MM HG: CPT | Performed by: FAMILY MEDICINE

## 2024-02-09 PROCEDURE — 99214 OFFICE O/P EST MOD 30 MIN: CPT | Performed by: FAMILY MEDICINE

## 2024-02-09 PROCEDURE — 3078F DIAST BP <80 MM HG: CPT | Performed by: FAMILY MEDICINE

## 2024-02-09 RX ORDER — CETIRIZINE HYDROCHLORIDE 10 MG/1
10 TABLET ORAL DAILY
COMMUNITY

## 2024-02-09 RX ORDER — ATORVASTATIN CALCIUM 10 MG/1
10 TABLET, FILM COATED ORAL DAILY
Qty: 90 TABLET | Refills: 1 | Status: SHIPPED | OUTPATIENT
Start: 2024-02-09

## 2024-02-09 RX ORDER — MECLIZINE HYDROCHLORIDE 25 MG/1
25 TABLET ORAL 3 TIMES DAILY PRN
Qty: 30 TABLET | Refills: 1 | Status: SHIPPED | OUTPATIENT
Start: 2024-02-09

## 2024-02-09 RX ORDER — PSEUDOEPHEDRINE HCL 30 MG
30 TABLET ORAL 2 TIMES DAILY PRN
Qty: 30 TABLET | Refills: 0 | Status: SHIPPED | OUTPATIENT
Start: 2024-02-09

## 2024-02-09 ASSESSMENT — ENCOUNTER SYMPTOMS: SHORTNESS OF BREATH: 1

## 2024-02-09 NOTE — PROGRESS NOTES
[facility]  [address] [facility] [place] [address]  [phone]      Sarah Jones is a 58 y.o. female who presents today for her medical conditions/complaints as noted below.  Sarah Jones is c/o of Otalgia (Muffled hearing,  f/u) and Snoring      HPI:     Pt here today for follow-up of R ear symptoms and snoring.    Pt was seen in  on 1/17 and 1/20 - diagnosed with R otitis media and otitis externa.  Completed courses of Augmentin x 10 days and Ofoxacin ear drops x 7 days.  She now has no further pain, but the ear is \"plugged\" and hearing is muffled; hears \"crackling and popping\".  Had some \"buzzing\", but that is improved.  Denies ear drainage.  Was referred to ENT in [place] (Dr. Barnes) - not scheduled until early April.  Using Zyrtec daily.    Pt got her new CPAP machine in early November - has been wearing this nightly.  Had been doing better with this, but now she notices that she feels less rested/refreshed during the day, yawning more again, and her friend witnessed that she is still snoring, even with it on.  She called [facility] in [place], and they recommended that she speak with us about adjusting her settings.  Current setting is 12 cm H2O.  Will be going there soon to get new supplies soon.    Has upcoming PFT on 3/6 and first appt with Pulm (Dr. Garcia) on 3/12.  Has been still using Albuterol inhaler more often, but less often than at last visit.  Still trying to quit smoking - just re-started Chantix last week, and plans to quit on 2/14 completely.  Today, she is still smoking 8-10 cigarettes daily.    Plans to start working out at [place] with [employer] sometime soon.  Has gained 8 lbs since her last OV on 12/29.  Knows she drinks too much Mt. Dew and plans to cut down.    Taking Norvasc 10 mg daily and Lisinopril 40 mg daily for HTN - stable.  BP well-controlled today - 128/72.        Past Medical History:   Diagnosis Date    Anxiety     Bipolar

## 2024-02-12 ENCOUNTER — PATIENT MESSAGE (OUTPATIENT)
Dept: FAMILY MEDICINE CLINIC | Age: 59
End: 2024-02-12

## 2024-02-23 ENCOUNTER — TELEPHONE (OUTPATIENT)
Dept: FAMILY MEDICINE CLINIC | Age: 59
End: 2024-02-23

## 2024-02-23 DIAGNOSIS — G47.33 OSA ON CPAP: Primary | ICD-10-CM

## 2024-02-23 NOTE — TELEPHONE ENCOUNTER
Patient calling to check on status of new CPAP script to be sent to [facility].    See [employer] message from 2/12/24.    Patient requesting this be completed.     Please advise patient.

## 2024-03-06 ENCOUNTER — HOSPITAL ENCOUNTER (OUTPATIENT)
Dept: PULMONOLOGY | Age: 59
Discharge: HOME | End: 2024-03-06
Payer: MEDICARE

## 2024-03-06 ENCOUNTER — PATIENT MESSAGE (OUTPATIENT)
Dept: FAMILY MEDICINE CLINIC | Age: 59
End: 2024-03-06

## 2024-03-06 DIAGNOSIS — F17.200 CURRENT SMOKER: ICD-10-CM

## 2024-03-06 DIAGNOSIS — M54.42 CHRONIC MIDLINE LOW BACK PAIN WITH BILATERAL SCIATICA: ICD-10-CM

## 2024-03-06 DIAGNOSIS — G89.29 CHRONIC MIDLINE LOW BACK PAIN WITH BILATERAL SCIATICA: ICD-10-CM

## 2024-03-06 DIAGNOSIS — M54.41 CHRONIC MIDLINE LOW BACK PAIN WITH BILATERAL SCIATICA: ICD-10-CM

## 2024-03-06 PROCEDURE — 94640 AIRWAY INHALATION TREATMENT: CPT

## 2024-03-06 PROCEDURE — 94726 PLETHYSMOGRAPHY LUNG VOLUMES: CPT

## 2024-03-06 PROCEDURE — 94060 EVALUATION OF WHEEZING: CPT

## 2024-03-06 PROCEDURE — 94729 DIFFUSING CAPACITY: CPT

## 2024-03-06 PROCEDURE — 6370000000 HC RX 637 (ALT 250 FOR IP): Performed by: INTERNAL MEDICINE

## 2024-03-06 RX ORDER — ALBUTEROL SULFATE 90 UG/1
4 AEROSOL, METERED RESPIRATORY (INHALATION) ONCE
Status: COMPLETED | OUTPATIENT
Start: 2024-03-06 | End: 2024-03-06

## 2024-03-06 RX ADMIN — ALBUTEROL SULFATE 4 PUFF: 90 AEROSOL, METERED RESPIRATORY (INHALATION) at 13:26

## 2024-03-06 NOTE — TELEPHONE ENCOUNTER
Sarah LOUIS called requesting a refill of the below medication which has been pended for you:     Requested Prescriptions     Pending Prescriptions Disp Refills    cyclobenzaprine (FLEXERIL) 10 MG tablet 60 tablet 5     Sig: Take 0.5-1 tablets by mouth 3 times daily as needed for Muscle spasms       Last Appointment Date: 2/9/2024  Next Appointment Date: 4/5/2024    Allergies   Allergen Reactions    Flomax [Tamsulosin Hcl] Swelling     Swelling of arms; however, also had rash and fever at the same time and was admitted at the time    Morphine Itching and Rash     Rash, itching    Decadron [Dexamethasone]     Quetiapine      Other reaction(s): Other (See Comments)  \"Makes me nuts\"      Saphris [Asenapine]     Seasonal     Trazodone      Other reaction(s): Other (See Comments)  Sleepwalking      Bactrim [Sulfamethoxazole-Trimethoprim] Diarrhea and Nausea Only    Prednisone Other (See Comments)     Emotional changes, depression    Zyprexa [Olanzapine] Other (See Comments)     Made her feel like she wanted to \"jump out of my skin\"

## 2024-03-06 NOTE — TELEPHONE ENCOUNTER
From: Sarah Jones  To: Dr. Mora Chris  Sent: 3/6/2024 2:36 PM EST  Subject: Lipitor    Dr Chris I am wanting to stop Lipitor my legs are swelling with pitting edema, my hips an ankle joints are very painful.  Sarah

## 2024-03-07 DIAGNOSIS — Z78.9 STATIN NOT TOLERATED: ICD-10-CM

## 2024-03-07 DIAGNOSIS — E78.00 PURE HYPERCHOLESTEROLEMIA: Primary | ICD-10-CM

## 2024-03-07 LAB
DLCO %PRED: NORMAL
DLCO/VA %PRED: NORMAL
DLCO/VA PRED: NORMAL
DLCO/VA: NORMAL
EXPIRATORY TIME-POST: NORMAL
EXPIRATORY TIME: NORMAL
FEF 25-75 %CHNG: NORMAL
FEF 25-75 POST %PRED: NORMAL
FEF 25-75% PRED: NORMAL
FEF 25-75-POST: NORMAL
FEF 25-75-PRE: NORMAL
FEV1 %PRED-POST: NORMAL
FEV1 PRED: NORMAL
FEV1-POST: NORMAL
FEV1-PRE: NORMAL
FEV1/FVC %PRED-POST: NORMAL
FEV1/FVC %PRED-PRE: NORMAL
FEV1/FVC PRED: NORMAL
FEV1/FVC-POST: NORMAL
FEV1/FVC-PRE: NORMAL
FVC %PRED-POST: NORMAL
FVC %PRED-PRE: NORMAL
FVC-POST: NORMAL
FVC-PRE: NORMAL
GAW %PRED: NORMAL
GAW PRED: NORMAL
GAW: NORMAL
IC PRE %PRED: NORMAL
IC PRED: NORMAL
IC: NORMAL
MEP: NORMAL
MIP: NORMAL
MVV %PRED-PRE: NORMAL
MVV PRED: NORMAL
MVV-PRE: NORMAL
PEF %PRED-POST: NORMAL
PEF PRED: NORMAL
PEF%CHNG: NORMAL
PEF-POST: NORMAL
PEF-PRE: NORMAL
RAW PRED: NORMAL
RV PRE %PRED: NORMAL
RV PRED: NORMAL
RV: NORMAL
SVC PRED: NORMAL
SVC: NORMAL
TLC PRE %PRED: NORMAL
TLC PRED: NORMAL
VA %PRED: NORMAL
VA PRED: NORMAL
VA: NORMAL
VTG %PRED: NORMAL
VTG: NORMAL

## 2024-03-07 RX ORDER — CYCLOBENZAPRINE HCL 10 MG
5-10 TABLET ORAL EVERY 8 HOURS PRN
Qty: 60 TABLET | Refills: 5 | Status: SHIPPED | OUTPATIENT
Start: 2024-03-07

## 2024-03-07 NOTE — TELEPHONE ENCOUNTER
See [employer] [facility] message 3/7/24. Patient agreeable to try injection. Medication pended.       Sarah HERIBERTO called requesting a refill of the below medication which has been pended for you:     Requested Prescriptions     Pending Prescriptions Disp Refills    Evolocumab 140 MG/ML SOAJ       Sig: Inject into the skin every 14 days       Last Appointment Date: 2/9/2024  Next Appointment Date: 4/5/2024    Allergies   Allergen Reactions    Flomax [Tamsulosin Hcl] Swelling     Swelling of arms; however, also had rash and fever at the same time and was admitted at the time    Morphine Itching and Rash     Rash, itching    Decadron [Dexamethasone]     Quetiapine      Other reaction(s): Other (See Comments)  \"Makes me nuts\"      Saphris [Asenapine]     Seasonal     Trazodone      Other reaction(s): Other (See Comments)  Sleepwalking      Bactrim [Sulfamethoxazole-Trimethoprim] Diarrhea and Nausea Only    Prednisone Other (See Comments)     Emotional changes, depression    Zyprexa [Olanzapine] Other (See Comments)     Made her feel like she wanted to \"jump out of my skin\"

## 2024-03-12 ENCOUNTER — OFFICE VISIT (OUTPATIENT)
Dept: PULMONOLOGY | Age: 59
End: 2024-03-12
Payer: MEDICARE

## 2024-03-12 VITALS
OXYGEN SATURATION: 97 % | HEART RATE: 99 BPM | HEIGHT: 61 IN | SYSTOLIC BLOOD PRESSURE: 136 MMHG | DIASTOLIC BLOOD PRESSURE: 78 MMHG | WEIGHT: 200.6 LBS | BODY MASS INDEX: 37.87 KG/M2 | RESPIRATION RATE: 16 BRPM | TEMPERATURE: 97.2 F

## 2024-03-12 DIAGNOSIS — G47.33 OSA ON CPAP: ICD-10-CM

## 2024-03-12 DIAGNOSIS — Z72.0 TOBACCO ABUSE: ICD-10-CM

## 2024-03-12 DIAGNOSIS — J44.9 STAGE 2 MODERATE COPD BY GOLD CLASSIFICATION (HCC): Primary | ICD-10-CM

## 2024-03-12 PROCEDURE — 3017F COLORECTAL CA SCREEN DOC REV: CPT | Performed by: INTERNAL MEDICINE

## 2024-03-12 PROCEDURE — 4004F PT TOBACCO SCREEN RCVD TLK: CPT | Performed by: INTERNAL MEDICINE

## 2024-03-12 PROCEDURE — 3078F DIAST BP <80 MM HG: CPT | Performed by: INTERNAL MEDICINE

## 2024-03-12 PROCEDURE — G8427 DOCREV CUR MEDS BY ELIG CLIN: HCPCS | Performed by: INTERNAL MEDICINE

## 2024-03-12 PROCEDURE — 99204 OFFICE O/P NEW MOD 45 MIN: CPT | Performed by: INTERNAL MEDICINE

## 2024-03-12 PROCEDURE — G8417 CALC BMI ABV UP PARAM F/U: HCPCS | Performed by: INTERNAL MEDICINE

## 2024-03-12 PROCEDURE — 3023F SPIROM DOC REV: CPT | Performed by: INTERNAL MEDICINE

## 2024-03-12 PROCEDURE — 3075F SYST BP GE 130 - 139MM HG: CPT | Performed by: INTERNAL MEDICINE

## 2024-03-12 PROCEDURE — G8484 FLU IMMUNIZE NO ADMIN: HCPCS | Performed by: INTERNAL MEDICINE

## 2024-03-12 RX ORDER — VARENICLINE TARTRATE 1 MG/1
1 TABLET, FILM COATED ORAL 2 TIMES DAILY
COMMUNITY
Start: 2024-02-29 | End: 2024-04-01 | Stop reason: SDUPTHER

## 2024-03-12 NOTE — PROGRESS NOTES
was placed    SLEEP DISORDER MANAGEMENT RECOMMENDATIONS:  Patient denies any excessive daytime sleepiness and reports refreshing and restorative sleep  Patient is using PAP as recommended and is benefiting from the therapy  Compliance data was not reviewed  Continue to use CPAP/BiPAP for at least 4 hours every night  Use heated humidification, if needed  Weight loss recommended  Recommend good sleep hygiene and instructions given  Patient not to drive or operate heavy machinery, if sleepy     IMAGING RECOMMENDATIONS/NEED FOR LUNG CANCER SCREENING RECOMMENDATIONS:  After reviewing the patient's smoking history, age and other clinical data, patient DOES NOT meet the following Low Dose CT (LDCT) Lung Screening criteria:    [] Age: Screening recommended if age range is 55-77(Medicare) or 50-80 (USPSTF)  [] Pack-yr: Screening recommended if pack year smoking >20  [] Duration since quit: Screening recommended if still smoking or less than 15 year since quit  [] Lung cancer: Screening not recommended if there are sign or symptoms of lung cancer   [x] Duration since last CT: Screening recommended if > 11 months since last LDCT      SOCIAL HISTORY/SOCIAL DETERMINANTS OF HEALTH:  Social Determinants of Health with Concerns     Tobacco Use: High Risk (3/12/2024)    Patient History     Smoking Tobacco Use: Every Day     Smokeless Tobacco Use: Never     Passive Exposure: Not on file   Financial Resource Strain: Medium Risk (4/4/2023)    Overall Financial Resource Strain (CARDIA)     Difficulty of Paying Living Expenses: Somewhat hard   Food Insecurity: Not on file (4/4/2023)   Recent Concern: Food Insecurity - Food Insecurity Present (4/4/2023)    Hunger Vital Sign     Worried About Running Out of Food in the Last Year: Sometimes true     Ran Out of Food in the Last Year: Sometimes true   Transportation Needs: Unknown (4/4/2023)    PRAPARE - Transportation     Lack of Transportation (Medical): Not on file     Lack of

## 2024-03-13 DIAGNOSIS — R06.02 SOB (SHORTNESS OF BREATH) ON EXERTION: ICD-10-CM

## 2024-03-13 DIAGNOSIS — J44.9 STAGE 2 MODERATE COPD BY GOLD CLASSIFICATION (HCC): Primary | ICD-10-CM

## 2024-03-13 RX ORDER — ALBUTEROL SULFATE 90 UG/1
1-2 AEROSOL, METERED RESPIRATORY (INHALATION) EVERY 6 HOURS PRN
Qty: 18 G | Refills: 11 | Status: SHIPPED | OUTPATIENT
Start: 2024-03-13

## 2024-03-13 NOTE — TELEPHONE ENCOUNTER
Sarah LOUIS called requesting a refill of the below medication which has been pended for you:     Requested Prescriptions     Pending Prescriptions Disp Refills    albuterol sulfate HFA (VENTOLIN HFA) 108 (90 Base) MCG/ACT inhaler 18 g 5     Sig: Inhale 1-2 puffs into the lungs every 6 hours as needed for Wheezing or Shortness of Breath       Last Appointment Date: 2/9/2024  Next Appointment Date: 4/5/2024    Allergies   Allergen Reactions    Flomax [Tamsulosin Hcl] Swelling     Swelling of arms; however, also had rash and fever at the same time and was admitted at the time    Morphine Itching and Rash     Rash, itching    Decadron [Dexamethasone]     Quetiapine      Other reaction(s): Other (See Comments)  \"Makes me nuts\"      Saphris [Asenapine]     Seasonal     Trazodone      Other reaction(s): Other (See Comments)  Sleepwalking      Bactrim [Sulfamethoxazole-Trimethoprim] Diarrhea and Nausea Only    Prednisone Other (See Comments)     Emotional changes, depression    Zyprexa [Olanzapine] Other (See Comments)     Made her feel like she wanted to \"jump out of my skin\"

## 2024-03-20 DIAGNOSIS — E78.2 HYPERCHOLESTEROLEMIA WITH HYPERTRIGLYCERIDEMIA: ICD-10-CM

## 2024-03-20 NOTE — TELEPHONE ENCOUNTER
Sarah LOUIS called requesting a refill of the below medication which has been pended for you:     Requested Prescriptions     Pending Prescriptions Disp Refills    ezetimibe (ZETIA) 10 MG tablet 90 tablet 3     Sig: Take 1 tablet by mouth daily       Last Appointment Date: 2/9/2024  Next Appointment Date: 4/22/2024    Allergies   Allergen Reactions    Flomax [Tamsulosin Hcl] Swelling     Swelling of arms; however, also had rash and fever at the same time and was admitted at the time    Morphine Itching and Rash     Rash, itching    Decadron [Dexamethasone]     Quetiapine      Other reaction(s): Other (See Comments)  \"Makes me nuts\"      Saphris [Asenapine]     Seasonal     Trazodone      Other reaction(s): Other (See Comments)  Sleepwalking      Bactrim [Sulfamethoxazole-Trimethoprim] Diarrhea and Nausea Only    Prednisone Other (See Comments)     Emotional changes, depression    Zyprexa [Olanzapine] Other (See Comments)     Made her feel like she wanted to \"jump out of my skin\"

## 2024-03-23 RX ORDER — EZETIMIBE 10 MG/1
10 TABLET ORAL DAILY
Qty: 90 TABLET | Refills: 3 | Status: SHIPPED | OUTPATIENT
Start: 2024-03-23

## 2024-03-31 ENCOUNTER — PATIENT MESSAGE (OUTPATIENT)
Dept: FAMILY MEDICINE CLINIC | Age: 59
End: 2024-03-31

## 2024-03-31 DIAGNOSIS — Z72.0 TOBACCO ABUSE: Primary | ICD-10-CM

## 2024-04-01 PROBLEM — G47.33 OSA ON CPAP: Status: ACTIVE | Noted: 2024-04-01

## 2024-04-01 NOTE — TELEPHONE ENCOUNTER
Per [employer] message, pt states it is working and she is not smoking. Would like refill.    Sarah LOUIS called requesting a refill of the below medication which has been pended for you:     Requested Prescriptions     Pending Prescriptions Disp Refills    varenicline (CHANTIX) 1 MG tablet 60 tablet      Sig: Take 1 tablet by mouth 2 times daily       Last Appointment Date: 2/9/2024  Next Appointment Date: 4/22/2024    Allergies   Allergen Reactions    Flomax [Tamsulosin Hcl] Swelling     Swelling of arms; however, also had rash and fever at the same time and was admitted at the time    Morphine Itching and Rash     Rash, itching    Decadron [Dexamethasone]     Quetiapine      Other reaction(s): Other (See Comments)  \"Makes me nuts\"      Saphris [Asenapine]     Seasonal     Trazodone      Other reaction(s): Other (See Comments)  Sleepwalking      Bactrim [Sulfamethoxazole-Trimethoprim] Diarrhea and Nausea Only    Prednisone Other (See Comments)     Emotional changes, depression    Zyprexa [Olanzapine] Other (See Comments)     Made her feel like she wanted to \"jump out of my skin\"

## 2024-04-01 NOTE — TELEPHONE ENCOUNTER
From: Sarah Jones  To: Dr. Mora Chris  Sent: 3/31/2024 6:59 PM EDT  Subject: Chantix    Dr Chris  I need refills on chantix it is working I'm not smoking. Can I get some refills.  Thank you   Sarah

## 2024-04-03 RX ORDER — VARENICLINE TARTRATE 1 MG/1
1 TABLET, FILM COATED ORAL 2 TIMES DAILY
Qty: 60 TABLET | Status: CANCELLED | OUTPATIENT
Start: 2024-04-03

## 2024-04-03 RX ORDER — VARENICLINE TARTRATE 1 MG/1
1 TABLET, FILM COATED ORAL 2 TIMES DAILY
Qty: 60 TABLET | Refills: 5 | Status: SHIPPED | OUTPATIENT
Start: 2024-04-03

## 2024-04-03 NOTE — TELEPHONE ENCOUNTER
Script request already forwarded to Dr Chris on 4/1/24.    Sarah LOUIS called requesting a refill of the below medication which has been pended for you:     Requested Prescriptions     Pending Prescriptions Disp Refills    varenicline (CHANTIX) 1 MG tablet 60 tablet      Sig: Take 1 tablet by mouth 2 times daily       Last Appointment Date: 2/9/2024  Next Appointment Date: 4/22/2024    Allergies   Allergen Reactions    Flomax [Tamsulosin Hcl] Swelling     Swelling of arms; however, also had rash and fever at the same time and was admitted at the time    Morphine Itching and Rash     Rash, itching    Decadron [Dexamethasone]     Quetiapine      Other reaction(s): Other (See Comments)  \"Makes me nuts\"      Saphris [Asenapine]     Seasonal     Trazodone      Other reaction(s): Other (See Comments)  Sleepwalking      Bactrim [Sulfamethoxazole-Trimethoprim] Diarrhea and Nausea Only    Prednisone Other (See Comments)     Emotional changes, depression    Zyprexa [Olanzapine] Other (See Comments)     Made her feel like she wanted to \"jump out of my skin\"

## 2024-04-03 NOTE — TELEPHONE ENCOUNTER
Pt calling as she took her last pill, and she needs a refill, states she's doing well on the medications.

## 2024-04-05 DIAGNOSIS — R11.0 NAUSEA: ICD-10-CM

## 2024-04-05 NOTE — TELEPHONE ENCOUNTER
Sarah LOUIS called requesting a refill of the below medication which has been pended for you:     Requested Prescriptions     Pending Prescriptions Disp Refills    ondansetron (ZOFRAN ODT) 4 MG disintegrating tablet 40 tablet 0     Sig: Take 1 tablet by mouth every 8 hours as needed for Nausea       Last Appointment Date: 2/9/2024  Next Appointment Date: 4/22/2024    Allergies   Allergen Reactions    Flomax [Tamsulosin Hcl] Swelling     Swelling of arms; however, also had rash and fever at the same time and was admitted at the time    Morphine Itching and Rash     Rash, itching    Decadron [Dexamethasone]     Quetiapine      Other reaction(s): Other (See Comments)  \"Makes me nuts\"      Saphris [Asenapine]     Seasonal     Trazodone      Other reaction(s): Other (See Comments)  Sleepwalking      Bactrim [Sulfamethoxazole-Trimethoprim] Diarrhea and Nausea Only    Prednisone Other (See Comments)     Emotional changes, depression    Zyprexa [Olanzapine] Other (See Comments)     Made her feel like she wanted to \"jump out of my skin\"

## 2024-04-06 DIAGNOSIS — J30.9 ALLERGIC RHINITIS, UNSPECIFIED SEASONALITY, UNSPECIFIED TRIGGER: ICD-10-CM

## 2024-04-07 RX ORDER — ONDANSETRON 4 MG/1
4 TABLET, ORALLY DISINTEGRATING ORAL EVERY 8 HOURS PRN
Qty: 40 TABLET | Refills: 5 | Status: SHIPPED | OUTPATIENT
Start: 2024-04-07

## 2024-04-08 DIAGNOSIS — I10 ESSENTIAL HYPERTENSION: ICD-10-CM

## 2024-04-08 NOTE — TELEPHONE ENCOUNTER
Sarah LOUIS called requesting a refill of the below medication which has been pended for you:     Requested Prescriptions     Pending Prescriptions Disp Refills    amLODIPine (NORVASC) 10 MG tablet [Pharmacy Med Name: AMLODIPINE BESYLATE 10 MG TAB] 90 tablet 3     Sig: take 1 tablet by mouth once daily       Last Appointment Date: 2/9/2024  Next Appointment Date: 4/22/2024    Allergies   Allergen Reactions    Flomax [Tamsulosin Hcl] Swelling     Swelling of arms; however, also had rash and fever at the same time and was admitted at the time    Morphine Itching and Rash     Rash, itching    Decadron [Dexamethasone]     Quetiapine      Other reaction(s): Other (See Comments)  \"Makes me nuts\"      Saphris [Asenapine]     Seasonal     Trazodone      Other reaction(s): Other (See Comments)  Sleepwalking      Bactrim [Sulfamethoxazole-Trimethoprim] Diarrhea and Nausea Only    Prednisone Other (See Comments)     Emotional changes, depression    Zyprexa [Olanzapine] Other (See Comments)     Made her feel like she wanted to \"jump out of my skin\"

## 2024-04-08 NOTE — TELEPHONE ENCOUNTER
Sarah LOUIS called requesting a refill of the below medication which has been pended for you:     Requested Prescriptions     Pending Prescriptions Disp Refills    fluticasone (FLONASE) 50 MCG/ACT nasal spray 48 g 3     Si-2 sprays by Nasal route daily       Last Appointment Date: 2024  Next Appointment Date: 2024    Allergies   Allergen Reactions    Flomax [Tamsulosin Hcl] Swelling     Swelling of arms; however, also had rash and fever at the same time and was admitted at the time    Morphine Itching and Rash     Rash, itching    Decadron [Dexamethasone]     Quetiapine      Other reaction(s): Other (See Comments)  \"Makes me nuts\"      Saphris [Asenapine]     Seasonal     Trazodone      Other reaction(s): Other (See Comments)  Sleepwalking      Bactrim [Sulfamethoxazole-Trimethoprim] Diarrhea and Nausea Only    Prednisone Other (See Comments)     Emotional changes, depression    Zyprexa [Olanzapine] Other (See Comments)     Made her feel like she wanted to \"jump out of my skin\"

## 2024-04-11 RX ORDER — FLUTICASONE PROPIONATE 50 MCG
1-2 SPRAY, SUSPENSION (ML) NASAL DAILY PRN
Qty: 48 G | Refills: 3 | Status: SHIPPED | OUTPATIENT
Start: 2024-04-11

## 2024-04-12 RX ORDER — AMLODIPINE BESYLATE 10 MG/1
10 TABLET ORAL DAILY
Qty: 90 TABLET | Refills: 3 | Status: SHIPPED | OUTPATIENT
Start: 2024-04-12

## 2024-04-26 ENCOUNTER — HOSPITAL ENCOUNTER (OUTPATIENT)
Dept: CT IMAGING | Age: 59
End: 2024-04-26
Payer: MEDICARE

## 2024-04-26 DIAGNOSIS — D16.4 OSTEOMA OF PARANASAL SINUS: ICD-10-CM

## 2024-04-26 PROCEDURE — 70486 CT MAXILLOFACIAL W/O DYE: CPT

## 2024-05-15 DIAGNOSIS — I10 ESSENTIAL HYPERTENSION: ICD-10-CM

## 2024-05-15 NOTE — TELEPHONE ENCOUNTER
Sarah LOUIS called requesting a refill of the below medication which has been pended for you:     Requested Prescriptions     Pending Prescriptions Disp Refills    lisinopril (PRINIVIL;ZESTRIL) 40 MG tablet [Pharmacy Med Name: LISINOPRIL 40 MG TABLET] 90 tablet 3     Sig: take 1 tablet by mouth once daily    omeprazole (PRILOSEC) 20 MG delayed release capsule [Pharmacy Med Name: OMEPRAZOLE DR 20 MG CAPSULE] 180 capsule 3     Sig: take 1 capsule by mouth every morning and 1 capsule at bedtime       Last Appointment Date: 2/9/2024  Next Appointment Date: 7/9/2024    Allergies   Allergen Reactions    Flomax [Tamsulosin Hcl] Swelling     Swelling of arms; however, also had rash and fever at the same time and was admitted at the time    Morphine Itching and Rash     Rash, itching    Decadron [Dexamethasone]     Quetiapine      Other reaction(s): Other (See Comments)  \"Makes me nuts\"      Saphris [Asenapine]     Seasonal     Trazodone      Other reaction(s): Other (See Comments)  Sleepwalking      Bactrim [Sulfamethoxazole-Trimethoprim] Diarrhea and Nausea Only    Prednisone Other (See Comments)     Emotional changes, depression    Zyprexa [Olanzapine] Other (See Comments)     Made her feel like she wanted to \"jump out of my skin\"

## 2024-05-17 RX ORDER — OMEPRAZOLE 20 MG/1
20 CAPSULE, DELAYED RELEASE ORAL 2 TIMES DAILY
Qty: 180 CAPSULE | Refills: 3 | Status: SHIPPED | OUTPATIENT
Start: 2024-05-17

## 2024-05-17 RX ORDER — LISINOPRIL 40 MG/1
40 TABLET ORAL DAILY
Qty: 90 TABLET | Refills: 3 | Status: SHIPPED | OUTPATIENT
Start: 2024-05-17

## 2024-05-22 ENCOUNTER — OFFICE VISIT (OUTPATIENT)
Dept: FAMILY MEDICINE CLINIC | Age: 59
End: 2024-05-22
Payer: MEDICARE

## 2024-05-22 VITALS
HEIGHT: 61 IN | TEMPERATURE: 98.9 F | HEART RATE: 92 BPM | BODY MASS INDEX: 38.53 KG/M2 | WEIGHT: 204.1 LBS | OXYGEN SATURATION: 95 % | DIASTOLIC BLOOD PRESSURE: 88 MMHG | SYSTOLIC BLOOD PRESSURE: 138 MMHG | RESPIRATION RATE: 16 BRPM

## 2024-05-22 DIAGNOSIS — Z23 NEED FOR PROPHYLACTIC VACCINATION AGAINST DIPHTHERIA-TETANUS-PERTUSSIS (DTP): ICD-10-CM

## 2024-05-22 DIAGNOSIS — M54.50 LEFT LUMBAR PAIN: Primary | ICD-10-CM

## 2024-05-22 DIAGNOSIS — Z23 NEED FOR ZOSTER VACCINATION: ICD-10-CM

## 2024-05-22 PROCEDURE — 3079F DIAST BP 80-89 MM HG: CPT | Performed by: FAMILY MEDICINE

## 2024-05-22 PROCEDURE — 99214 OFFICE O/P EST MOD 30 MIN: CPT | Performed by: FAMILY MEDICINE

## 2024-05-22 PROCEDURE — 4004F PT TOBACCO SCREEN RCVD TLK: CPT | Performed by: FAMILY MEDICINE

## 2024-05-22 PROCEDURE — 3075F SYST BP GE 130 - 139MM HG: CPT | Performed by: FAMILY MEDICINE

## 2024-05-22 PROCEDURE — G2211 COMPLEX E/M VISIT ADD ON: HCPCS | Performed by: FAMILY MEDICINE

## 2024-05-22 PROCEDURE — G8417 CALC BMI ABV UP PARAM F/U: HCPCS | Performed by: FAMILY MEDICINE

## 2024-05-22 PROCEDURE — G8427 DOCREV CUR MEDS BY ELIG CLIN: HCPCS | Performed by: FAMILY MEDICINE

## 2024-05-22 PROCEDURE — 3017F COLORECTAL CA SCREEN DOC REV: CPT | Performed by: FAMILY MEDICINE

## 2024-05-22 RX ORDER — HYDROCODONE BITARTRATE AND ACETAMINOPHEN 5; 325 MG/1; MG/1
1 TABLET ORAL EVERY 8 HOURS PRN
Qty: 8 TABLET | Refills: 0 | Status: SHIPPED | OUTPATIENT
Start: 2024-05-22 | End: 2024-05-25

## 2024-05-22 RX ORDER — PREDNISONE 20 MG/1
40 TABLET ORAL DAILY
Qty: 8 TABLET | Refills: 0 | Status: SHIPPED | OUTPATIENT
Start: 2024-05-22 | End: 2024-05-26

## 2024-05-22 SDOH — ECONOMIC STABILITY: INCOME INSECURITY: HOW HARD IS IT FOR YOU TO PAY FOR THE VERY BASICS LIKE FOOD, HOUSING, MEDICAL CARE, AND HEATING?: SOMEWHAT HARD

## 2024-05-22 SDOH — ECONOMIC STABILITY: FOOD INSECURITY: WITHIN THE PAST 12 MONTHS, YOU WORRIED THAT YOUR FOOD WOULD RUN OUT BEFORE YOU GOT MONEY TO BUY MORE.: SOMETIMES TRUE

## 2024-05-22 SDOH — ECONOMIC STABILITY: FOOD INSECURITY: WITHIN THE PAST 12 MONTHS, THE FOOD YOU BOUGHT JUST DIDN'T LAST AND YOU DIDN'T HAVE MONEY TO GET MORE.: SOMETIMES TRUE

## 2024-05-22 NOTE — PROGRESS NOTES
[facility]  [address] [place] [address]  [phone]      Sarah Jones is a 59 y.o. female who presents today for her medical conditions/complaints as noted below.  Sarah Jones is c/o of Back Pain (Left lower since this AM)      HPI:     Pt here today for back pain.    Back Pain  This is a new problem. The current episode started today (Rolled over in bed and felt a \"catch\" and spasm in her low back, midline and off to the L). The problem occurs constantly. The problem is unchanged. The pain is present in the lumbar spine. The pain does not radiate. The pain is at a severity of 5/10 (at rest, worse with movement). Exacerbated by: standing upright and extending her back; better with flexion. Pertinent negatives include no numbness. (Occasional spasms with certain movements) Treatments tried: heat, Flexeril and Tylenol.     Leaves for [place] in 2 days for weekend trip.    Does not have to see Dr. Valverde in April; does not have to see him again.  Recommended to wean off her Gabapentin - currently taking 600 mg TID. Just got this refilled last week.        Past Medical History:   Diagnosis Date    Anxiety     Bipolar disorder (HCC)     Breast cancer ([facility]) 2014    L side - lumpectomy and radiation; no chemo (was recommended to take Tamoxifen, but with her smoking, she declined due to family h/o CVA already). Seeing Dr. Zaidi once yearly/    Chronic back pain     Chronic kidney disease     COPD (chronic obstructive pulmonary disease) (HCC)     Depression     GERD (gastroesophageal reflux disease)     Headache(784.0)     History of alcoholism ([facility])     sober since 1/16/19    History of therapeutic radiation 2014 left breast    Hyperlipidemia     Hypertension     Hyperthyroidism 01/2022    Hypothyroidism     Kidney stone     Has had lithotripsy x 1; had ureteral stent placement with removal x 1; had seen Dr. Dubose    Neuropathy     Obesity     Panic attack

## 2024-05-29 ASSESSMENT — ENCOUNTER SYMPTOMS: BACK PAIN: 1

## 2024-05-30 DIAGNOSIS — J44.9 STAGE 2 MODERATE COPD BY GOLD CLASSIFICATION (HCC): ICD-10-CM

## 2024-06-06 ENCOUNTER — PATIENT MESSAGE (OUTPATIENT)
Dept: FAMILY MEDICINE CLINIC | Age: 59
End: 2024-06-06

## 2024-06-06 DIAGNOSIS — M54.42 CHRONIC MIDLINE LOW BACK PAIN WITH BILATERAL SCIATICA: Primary | ICD-10-CM

## 2024-06-06 DIAGNOSIS — G89.29 CHRONIC MIDLINE LOW BACK PAIN WITH BILATERAL SCIATICA: Primary | ICD-10-CM

## 2024-06-06 DIAGNOSIS — M54.41 CHRONIC MIDLINE LOW BACK PAIN WITH BILATERAL SCIATICA: Primary | ICD-10-CM

## 2024-06-07 NOTE — TELEPHONE ENCOUNTER
NOT DUE UNTIL 6/11  Per [facility], last fill 5/12, quantity 90 for 30 days.     Sarah LOUIS called requesting a refill of the below medication which has been pended for you:     Requested Prescriptions     Pending Prescriptions Disp Refills    gabapentin (NEURONTIN) 600 MG tablet 90 tablet      Sig: Take 1 tablet by mouth 3 times daily.       Last Appointment Date: 5/22/2024  Next Appointment Date: 7/9/2024    Allergies   Allergen Reactions    Flomax [Tamsulosin Hcl] Swelling     Swelling of arms; however, also had rash and fever at the same time and was admitted at the time    Codeine Nausea And Vomiting    Decadron [Dexamethasone]      Tachycardia    Morphine Itching and Rash     Rash, itching    Quetiapine      \"Makes me nuts\"      Saphris [Asenapine]     Bactrim [Sulfamethoxazole-Trimethoprim] Diarrhea and Nausea Only    Prednisone Other (See Comments)     Emotional changes, depression    Trazodone      Sleepwalking      Zyprexa [Olanzapine] Other (See Comments)     Made her feel like she wanted to \"jump out of my skin\"

## 2024-06-07 NOTE — TELEPHONE ENCOUNTER
From: Sarah Jones  To: Dr. Mora Chris  Sent: 6/6/2024 12:59 PM EDT  Subject: Gabapentin     Dr Chris can you send order for gabapentin weaning me off as we talked about.   It's not due til June 12   Thank you Sarah Jones

## 2024-06-11 RX ORDER — GABAPENTIN 600 MG/1
600 TABLET ORAL 2 TIMES DAILY
Qty: 60 TABLET | Refills: 1 | Status: SHIPPED | OUTPATIENT
Start: 2024-06-11 | End: 2024-08-10

## 2024-06-11 NOTE — TELEPHONE ENCOUNTER
Controlled Substance Monitoring:    Acute and Chronic Pain Monitoring:   RX Monitoring Periodic Controlled Substance Monitoring   6/11/2024   3:03 PM No signs of potential drug abuse or diversion identified.

## 2024-06-18 DIAGNOSIS — E03.9 HYPOTHYROIDISM, UNSPECIFIED TYPE: ICD-10-CM

## 2024-06-18 NOTE — TELEPHONE ENCOUNTER
Pt has enough to last until labs are completed.     Sarah LOUIS called requesting a refill of the below medication which has been pended for you:     Requested Prescriptions     Pending Prescriptions Disp Refills    levothyroxine (SYNTHROID) 100 MCG tablet [Pharmacy Med Name: LEVOTHYROXINE 100 MCG TABLET] 85 tablet      Sig: take 1 tablet by mouth once daily MONDAY THROUGH SATURDAY, then 1/2 tablet ON SUNDAY       Last Appointment Date: 5/22/2024  Next Appointment Date: 7/9/2024    Allergies   Allergen Reactions    Flomax [Tamsulosin Hcl] Swelling     Swelling of arms; however, also had rash and fever at the same time and was admitted at the time    Codeine Nausea And Vomiting    Decadron [Dexamethasone]      Tachycardia    Morphine Itching and Rash     Rash, itching    Quetiapine      \"Makes me nuts\"      Saphris [Asenapine]     Bactrim [Sulfamethoxazole-Trimethoprim] Diarrhea and Nausea Only    Prednisone Other (See Comments)     Emotional changes, depression    Trazodone      Sleepwalking      Zyprexa [Olanzapine] Other (See Comments)     Made her feel like she wanted to \"jump out of my skin\"

## 2024-06-22 NOTE — ANESTHESIA PRE PROCEDURE
Department of Anesthesiology  Preprocedure Note       Name:  Samy Alvarez   Age:  48 y.o.  :  1965                                          MRN:  [id number]         Date:  10/21/2020      Surgeon: Salvatore Jones):  Ty Castillo MD    Procedure: Procedure(s): Ventral Hernia Repair with removal of existing mesh and replacement with mesh  Medications prior to admission:   Prior to Admission medications    Medication Sig Start Date End Date Taking? Authorizing Provider   HYDROcodone-acetaminophen (NORCO) 5-325 MG per tablet Take 1 tablet by mouth every 8 hours as needed for Pain for up to 7 days.  Take lowest dose possible to manage pain 10/14/20 10/21/20  Fernandez Chris, DO   ondansetron (ZOFRAN) 4 MG tablet Take 1 tablet by mouth every 8 hours as needed for Nausea or Vomiting 10/14/20   Fernandez Chris DO   cyclobenzaprine (FLEXERIL) 5 MG tablet Take 1-2 tablets by mouth 3 times daily as needed for Muscle spasms 20   Fernandez Chris, DO   Cholecalciferol (VITAMIN D) 50 MCG (2000 UT) CAPS capsule Take 2,000 Units by mouth daily    Historical Provider, MD   Multiple Vitamins-Minerals (MULTIVITAMIN ADULT PO) Take 1 tablet by mouth daily    Historical Provider, MD   lidocaine (LIDODERM) 5 % Place 1 patch onto the skin daily 12 hours on, 12 hours off. 20   Jesikavickey Murray, DO   atorvastatin (LIPITOR) 10 MG tablet Take 1 tablet by mouth daily 6/10/20   Fernandez Chris DO   propranolol (INDERAL) 10 MG tablet Take 10 mg by mouth 3 times daily    Historical Provider, MD   hydrOXYzine (ATARAX) 25 MG tablet Take 25-30 mg by mouth every 6 hours as needed for Anxiety    Historical Provider, MD   lisinopril (PRINIVIL;ZESTRIL) 40 MG tablet Take 1 tablet by mouth daily 20   Fernandez Crhis, DO   amLODIPine (NORVASC) 10 MG tablet Take 1 tablet by mouth daily 20   Fernandez Chris, DO   loratadine (CLARITIN) 10 MG capsule Take 1 capsule by mouth daily 20   Fernandez Chris, DO   albuterol sulfate  (90 Base) Pt ate 1/4 of her lunch tray.  D10 qtt started per MD orders.  Pt is now napping again, no complaints.    PLAN:  Awaiting bed placement    MCG/ACT inhaler Inhale 1-2 puffs into the lungs every 6 hours as needed for Wheezing or Shortness of Breath 5/18/20   Rick Chris DO   omeprazole (PRILOSEC) 10 MG delayed release capsule Take 2 capsules by mouth 2 times daily 5/18/20   Rick Chris DO   fluticasone [facility]) 50 MCG/ACT nasal spray 2 sprays by Nasal route daily 4/20/20   Dev Flores DO   DULoxetine (CYMBALTA) 30 MG extended release capsule Take 30 mg by mouth daily    Historical Provider, MD   DULoxetine (CYMBALTA) 60 MG extended release capsule Take 60 mg by mouth daily    Historical Provider, MD   lamoTRIgine (LAMICTAL) 200 MG tablet Take 200 mg by mouth daily     Historical Provider, MD   levothyroxine (SYNTHROID) 100 MCG tablet Take 100 mcg by mouth Daily    Historical Provider, MD       Current medications:    No current facility-administered medications for this visit. No current outpatient medications on file.      Facility-Administered Medications Ordered in Other Visits   Medication Dose Route Frequency Provider Last Rate Last Dose    lactated ringers infusion   Intravenous Continuous Ivania Black  mL/hr at 10/21/20 0725      sodium chloride flush 0.9 % injection 10 mL  10 mL Intravenous 2 times per day Ivania Black MD        sodium chloride flush 0.9 % injection 10 mL  10 mL Intravenous PRN Ivania Black MD        scopolamine (TRANSDERM-SCOP) transdermal patch 1 patch  1 patch Transdermal Once Ivania Black MD   1 patch at 10/21/20 0734    dexamethasone (DECADRON) injection    PRN Yari Brian, APRN - CRNA   10 mg at 10/21/20 0809    ondansetron (ZOFRAN) injection    PRN Yari Brian, APRN - CRNA   4 mg at 10/21/20 0809    diphenhydrAMINE (BENADRYL) injection    PRN Yari Brian, APRN - CRNA   25 mg at 10/21/20 0809    ketamine (KETALAR) injection    PRN Yari Brian, APRN - CRNA   10 mg at 10/21/20 0905    rocuronium (ZEMURON) injection    PRN Yari Brian, APRN - CRNA   20 mg at 10/21/20 1580    lidocaine PF 2 % injection    PRN Willa Fulton, APRN - CRNA   20 mg at 10/21/20 0905    propofol injection    PRN Willa Fulton, APRN - CRNA   200 mg at 10/21/20 0747    fentaNYL (SUBLIMAZE) injection    PRN Willa Fulton, APRN - CRNA   50 mcg at 10/21/20 0747    midazolam (VERSED) injection    PRN Willa Fulton, APRN - CRNA   1 mg at 10/21/20 0747    lactated ringers infusion    Continuous PRN Willa Fulton, APRN - CRNA        ropivacaine (NAROPIN) injection    PRN Willa Fulton, APRN - CRNA   5 mL at 10/21/20 0800    HYDROmorphone (DILAUDID) injection    PRN Willa Fulton, APRN - CRNA   0.5 mg at 10/21/20 1846       Allergies: Allergies   Allergen Reactions    Flomax [Tamsulosin Hcl] Swelling     Swelling of arms; however, also had rash and fever at the same time and was admitted at the time    Morphine Itching and Rash     Rash, itching    Bactrim [Sulfamethoxazole-Trimethoprim] Diarrhea and Nausea Only    Prednisone Other (See Comments)     Emotional changes, depression    Zyprexa [Olanzapine] Other (See Comments)     Made her feel like she wanted to \"jump out of my skin\"       Problem List:    Patient Active Problem List   Diagnosis Code    Severe episode of recurrent major depressive disorder, without psychotic features ([facility] F33.2    Bipolar 2 disorder ([facility] F31.81    Bipolar II disorder ([facility] F31.81    Postoperative pain G89.18       Past Medical History:        Diagnosis Date    Anxiety     Breast cancer ([address] 2014    L side - lumpectomy and radiation; no chemo (was recommended to take Tamoxifen, but with her smoking, she declined due to family h/o CVA already).  Seeing Dr. Nadia Aguilar once yearly/    COPD (chronic obstructive pulmonary disease) ([facility]     Depression     Headache(784.0)     History of alcoholism ([facility]     sober since 1/16/19    Hypertension     Hypothyroidism     Kidney stone     Has had lithotripsy x 1; had ureteral stent placement with removal x 1; had seen Dr. Tammie Mae PTSD (post-traumatic stress disorder)        Past Surgical History:        Procedure Laterality Date    APPENDECTOMY  1977    BREAST BIOPSY Right 2015    excisional biopsy - Dr. Randy Pace Left 2014    breast CA - done at [facility]  2014    [facility]      multiple in her 19's    EYE SURGERY      x 2 in her youth - was \"cross-eyed\"    FRACTURE SURGERY Left     hand    HERNIA REPAIR  01/2020    recurrent incisional hernia repair Dr. Ana Schaffer N/A 7/22/2020    Laparoscopic Robotic Assisted Ventral Hernia Repair performed by Damaris Khan MD at [address]  2006    Dr. Chu Singh  2015    Dr. 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  2019    Dr. Erin P[facility]rez at [facility] - used mesh; had revision in 1/2020       Social History:    Social History     Tobacco Use    Smoking status: Current Every Day Smoker     Packs/day: 0.50     Years: 37.00     Pack years: 18.50     Types: Cigarettes     Start date: 1982    Smokeless tobacco: Never Used    Tobacco comment: Will see PCP when ready to quit. Substance Use Topics    Alcohol use: No                                Ready to quit: Not Answered  Counseling given: Not Answered  Comment: Will see PCP when ready to quit. Vital Signs (Current): There were no vitals filed for this visit.                                            BP Readings from Last 3 Encounters:   10/21/20 127/61   10/21/20 126/61   10/16/20 124/73       NPO Status:                                                                                 BMI:   Wt Readings from Last 3 Encounters:   10/21/20 164 lb 12.8 oz (74.8 kg)   10/16/20 160 lb (72.6 kg)   10/12/20 160 lb (72.6 kg)     There is no height or weight on file to calculate BMI.    CBC:   Lab Results   Component Value Date    WBC 7.9 10/16/2020    RBC 3.90 10/16/2020    HGB 11.6 10/16/2020    HCT 35.2 10/16/2020    MCV 90.3 10/16/2020    RDW 12.8 10/16/2020     10/16/2020       CMP:   Lab Results   Component Value Date     10/16/2020    K 3.7 10/16/2020     10/16/2020    CO2 28 10/16/2020    BUN 16 10/16/2020    CREATININE 1.09 10/16/2020    GFRAA >60 10/16/2020    LABGLOM 52 10/16/2020    GLUCOSE 140 10/16/2020    PROT 6.4 10/16/2020    CALCIUM 9.4 10/16/2020    BILITOT 0.17 10/16/2020    ALKPHOS 143 10/16/2020    AST 77 10/16/2020    ALT 58 10/16/2020       POC Tests: No results for input(s): POCGLU, POCNA, POCK, POCCL, POCBUN, POCHEMO, POCHCT in the last 72 hours. Coags: No results found for: PROTIME, INR, APTT    HCG (If Applicable): No results found for: PREGTESTUR, PREGSERUM, HCG, HCGQUANT     ABGs: No results found for: PHART, PO2ART, DOE7COK, ELC4ASB, BEART, N8NITQTB     Type & Screen (If Applicable):  No results found for: LABABO, LABRH    Drug/Infectious Status (If Applicable):  Lab Results   Component Value Date    HEPCAB NONREACTIVE 04/10/2019       COVID-19 Screening (If Applicable):   Lab Results   Component Value Date    COVID19 Not Detected 10/16/2020    COVID19 Not Detected 07/17/2020         Anesthesia Evaluation  Patient summary reviewed and Nursing notes reviewed   history of anesthetic complications: PONV. Airway: Mallampati: II  TM distance: >3 FB   Neck ROM: full  Mouth opening: > = 3 FB Dental: normal exam         Pulmonary:normal exam    (+) COPD: moderate,  current smoker          Patient did not smoke on day of surgery. Cardiovascular:    (+) hypertension:,       ECG reviewed                        Neuro/Psych:   (+) headaches:, psychiatric history: stable with treatmentdepression/anxiety             GI/Hepatic/Renal:             Endo/Other:    (+) hypothyroidism, hyperthyroidism::., malignancy/cancer. Abdominal:           Vascular:                                          Anesthesia Plan      general and regional     ASA 3     (Discussed possibility of Prolonged ventilation, and TAP blocks. Pt agrees to Proceed.)  Induction: intravenous. MIPS: Postoperative opioids intended and Prophylactic antiemetics administered. Anesthetic plan and risks discussed with patient. Use of blood products discussed with patient whom.    Plan discussed with surgical team.                  STAR Henson - JD   10/21/2020

## 2024-06-23 ENCOUNTER — PATIENT MESSAGE (OUTPATIENT)
Dept: FAMILY MEDICINE CLINIC | Age: 59
End: 2024-06-23

## 2024-06-23 DIAGNOSIS — M54.42 CHRONIC MIDLINE LOW BACK PAIN WITH BILATERAL SCIATICA: ICD-10-CM

## 2024-06-23 DIAGNOSIS — G89.29 CHRONIC MIDLINE LOW BACK PAIN WITH BILATERAL SCIATICA: ICD-10-CM

## 2024-06-23 DIAGNOSIS — M54.41 CHRONIC MIDLINE LOW BACK PAIN WITH BILATERAL SCIATICA: ICD-10-CM

## 2024-06-24 NOTE — TELEPHONE ENCOUNTER
From: Sarah Jones  To: Dr. Mora Chris  Sent: 6/23/2024 12:48 PM EDT  Subject: Gabapentin     Dr Chris I saw Dr Barnes for my nerve issue in my sinus. He advised me to go back to 600mg TID and recommended to stay there. He said he was going to send a copy of instructions to you. I started TID on the 20th so I will need a refill around July 3 if I counted right, not the 12th. Because I will run out and unable to fill till the 12th. I hope this makes sense.  Call for questions [phone]  Thank you Sarah

## 2024-06-25 ENCOUNTER — OFFICE VISIT (OUTPATIENT)
Dept: PULMONOLOGY | Age: 59
End: 2024-06-25
Payer: MEDICARE

## 2024-06-25 VITALS
SYSTOLIC BLOOD PRESSURE: 132 MMHG | RESPIRATION RATE: 12 BRPM | TEMPERATURE: 97.9 F | BODY MASS INDEX: 38.14 KG/M2 | HEART RATE: 95 BPM | WEIGHT: 202 LBS | OXYGEN SATURATION: 95 % | HEIGHT: 61 IN | DIASTOLIC BLOOD PRESSURE: 74 MMHG

## 2024-06-25 DIAGNOSIS — J44.9 STAGE 2 MODERATE COPD BY GOLD CLASSIFICATION (HCC): Primary | ICD-10-CM

## 2024-06-25 DIAGNOSIS — G47.33 OSA ON CPAP: ICD-10-CM

## 2024-06-25 DIAGNOSIS — E66.01 SEVERE OBESITY (BMI 35.0-39.9) WITH COMORBIDITY (HCC): ICD-10-CM

## 2024-06-25 DIAGNOSIS — Z87.891 PERSONAL HISTORY OF TOBACCO USE: ICD-10-CM

## 2024-06-25 PROCEDURE — G0296 VISIT TO DETERM LDCT ELIG: HCPCS | Performed by: INTERNAL MEDICINE

## 2024-06-25 PROCEDURE — 99213 OFFICE O/P EST LOW 20 MIN: CPT | Performed by: INTERNAL MEDICINE

## 2024-06-25 RX ORDER — FEXOFENADINE HCL 180 MG/1
180 TABLET ORAL DAILY
COMMUNITY

## 2024-06-25 NOTE — PROGRESS NOTES
PULMONARY MEDICINE OUTPATIENT NOTE                                                                       PATIENT:  Sarah Jones  YOB: 1965  MRN: [id number]     REFERRED BY: Mora Chris DO   CHIEF COMPLIANT: Follow-up (RAHEEL/COPD 3 mon/)       HISTORY     Sarah Jones is a 59 y.o. years old female is here for follow-up evaluation in the pulmonary clinic    INITIAL VISIT 3/12/2024  DATE OF VISIT:6/25/2024    PULMONARY PROBLEM LIST:  1. Stage 2 moderate COPD by GOLD classification ([facility])    2. RAHEEL on CPAP    3. Severe obesity (BMI 35.0-39.9) with comorbidity (HCC)    4. Personal history of tobacco use         HISTORY OF PRESENT ILLNESS/CURRENT SYMPTOMS:   Patient is a chronic smoker.  She has greater than 20-pack-year smoking history.  She reports chronic cough, sputum and grade 2 exertional dyspnea.  She is doing pulmonary rehab.    PFT (3/6/2024) reported show/obstructive ventilatory impairment with air trapping (%) and normal diffusion capacity.  CT chest (1/20/2024) is negative for any suspicious pulmonary nodule    She also has history of sleep apnea  Sleep study (6/5/2023) showed AHI of 11.2, REM AHI 28.2, O2 karen 79%  Titration study (8/2/2023) recommended CPAP at 12 cmH2O    Patient is using CPAP as recommended and is benefiting from therapy.  Compliance data was reviewed.    CURRENT BRONCHODILATORS/OTHER PULMONARY MEDS:  Spiriva Respimat  As needed albuterol    TOBACCO HISTORY:  She  reports that she has been smoking cigarettes and e-cigarettes. She started smoking about 42 years ago. She has a 21.2 pack-year smoking history. She has never used smokeless tobacco.    AVOCATION/OCCUPATIONAL EXPOSURE:     Yes No   ASBESTOS [] [x]   SILICA DUST [] [x]   COAL [] [x]   FOUNDRY [] [x]   QUARRY [] [x]   COTTON MILL [] [x]   PETS [] [x]   PARAKEETS  []  [x]   TUBERCULOSIS [] [x]   HOT TUB [] [x]   DRUGS [] [x]   OTHER [] [x]     PULMONARY CO-MORBIDITIES/RISK FACTORS:     Yes No

## 2024-06-25 NOTE — PROGRESS NOTES
Discussed with the patient the current USPSTF guidelines released March 9, 2021 for screening for lung cancer.    For adults aged 50 to 80 years who have a 20 pack-year smoking history and currently smoke or have quit within the past 15 years the grade B recommendation is to:  Screen for lung cancer with low-dose computed tomography (LDCT) every year.  Stop screening once a person has not smoked for 15 years or has a health problem that limits life expectancy or the ability to have lung surgery.    The patient  reports that she has been smoking cigarettes and e-cigarettes. She started smoking about 42 years ago. She has a 21.2 pack-year smoking history. She has never used smokeless tobacco.. Discussed with patient the risks and benefits of screening, including over-diagnosis, false positive rate, and total radiation exposure.  The patient currently exhibits no signs or symptoms suggestive of lung cancer.  Discussed with patient the importance of compliance with yearly annual lung cancer screenings and willingness to undergo diagnosis and treatment if screening scan is positive.  In addition, the patient was counseled regarding the importance of remaining smoke free and/or total smoking cessation.    Also reviewed the following if the patient has Medicare that as of February 10, 2022, Medicare only covers LDCT screening in patients aged 50-77 with at least a 20 pack-year smoking history who currently smoke or have quit in the last 15 years

## 2024-06-28 RX ORDER — GABAPENTIN 600 MG/1
600 TABLET ORAL 2 TIMES DAILY
Qty: 60 TABLET | Refills: 1 | OUTPATIENT
Start: 2024-06-28 | End: 2024-08-27

## 2024-06-28 NOTE — TELEPHONE ENCOUNTER
Per Pharmacat message, pt states she was told to increase to TID and will be out around 7/3. Started taking TID on 6/20.   Per [facility], last fill 6/11, quantity 60 for 30 days.     Sarah LOUIS called requesting a refill of the below medication which has been pended for you:     Requested Prescriptions     Pending Prescriptions Disp Refills    gabapentin (NEURONTIN) 600 MG tablet 60 tablet 1     Sig: Take 1 tablet by mouth 2 times daily for 60 days.       Last Appointment Date: 5/22/2024  Next Appointment Date: 7/9/2024    Allergies   Allergen Reactions    Flomax [Tamsulosin Hcl] Swelling     Swelling of arms; however, also had rash and fever at the same time and was admitted at the time    Codeine Nausea And Vomiting    Decadron [Dexamethasone]      Tachycardia    Morphine Itching and Rash     Rash, itching    Quetiapine      \"Makes me nuts\"      Saphris [Asenapine]     Bactrim [Sulfamethoxazole-Trimethoprim] Diarrhea and Nausea Only    Prednisone Other (See Comments)     Emotional changes, depression    Trazodone      Sleepwalking      Zyprexa [Olanzapine] Other (See Comments)     Made her feel like she wanted to \"jump out of my skin\"

## 2024-07-02 ENCOUNTER — PATIENT MESSAGE (OUTPATIENT)
Dept: FAMILY MEDICINE CLINIC | Age: 59
End: 2024-07-02

## 2024-07-03 NOTE — TELEPHONE ENCOUNTER
From: Sarah Jones  To: Dr. Mora Chris  Sent: 7/2/2024 5:39 PM EDT  Subject: Gabapentin     Checked with pharmacy but no new order? Will be out after tomorrow, taking them tid like Dr Barnes said.

## 2024-07-09 ENCOUNTER — OFFICE VISIT (OUTPATIENT)
Dept: FAMILY MEDICINE CLINIC | Age: 59
End: 2024-07-09
Payer: COMMERCIAL

## 2024-07-09 ENCOUNTER — HOSPITAL ENCOUNTER (OUTPATIENT)
Age: 59
Discharge: HOME | End: 2024-07-09
Payer: COMMERCIAL

## 2024-07-09 VITALS
RESPIRATION RATE: 18 BRPM | HEART RATE: 87 BPM | BODY MASS INDEX: 38.51 KG/M2 | DIASTOLIC BLOOD PRESSURE: 72 MMHG | HEIGHT: 61 IN | SYSTOLIC BLOOD PRESSURE: 136 MMHG | TEMPERATURE: 98.1 F | OXYGEN SATURATION: 94 % | WEIGHT: 204 LBS

## 2024-07-09 DIAGNOSIS — E03.9 HYPOTHYROIDISM, UNSPECIFIED TYPE: Primary | ICD-10-CM

## 2024-07-09 DIAGNOSIS — E03.9 HYPOTHYROIDISM, UNSPECIFIED TYPE: ICD-10-CM

## 2024-07-09 DIAGNOSIS — M54.42 CHRONIC MIDLINE LOW BACK PAIN WITH BILATERAL SCIATICA: ICD-10-CM

## 2024-07-09 DIAGNOSIS — G89.29 CHRONIC MIDLINE LOW BACK PAIN WITH BILATERAL SCIATICA: ICD-10-CM

## 2024-07-09 DIAGNOSIS — M54.41 CHRONIC MIDLINE LOW BACK PAIN WITH BILATERAL SCIATICA: ICD-10-CM

## 2024-07-09 DIAGNOSIS — E55.9 VITAMIN D DEFICIENCY: ICD-10-CM

## 2024-07-09 DIAGNOSIS — E78.2 HYPERCHOLESTEROLEMIA WITH HYPERTRIGLYCERIDEMIA: ICD-10-CM

## 2024-07-09 DIAGNOSIS — Z72.0 TOBACCO ABUSE: ICD-10-CM

## 2024-07-09 DIAGNOSIS — R73.01 IMPAIRED FASTING GLUCOSE: ICD-10-CM

## 2024-07-09 LAB
25(OH)D3 SERPL-MCNC: 24.8 NG/ML (ref 30–100)
ALBUMIN SERPL-MCNC: 4 G/DL (ref 3.5–5.2)
ALBUMIN/GLOB SERPL: 1.3 {RATIO} (ref 1–2.5)
ALP SERPL-CCNC: 88 U/L (ref 35–104)
ALT SERPL-CCNC: 27 U/L (ref 5–33)
ANION GAP SERPL CALCULATED.3IONS-SCNC: 13 MMOL/L (ref 9–17)
AST SERPL-CCNC: 22 U/L
BILIRUB SERPL-MCNC: 0.3 MG/DL (ref 0.3–1.2)
BUN SERPL-MCNC: 12 MG/DL (ref 6–20)
BUN/CREAT SERPL: 13 (ref 9–20)
CALCIUM SERPL-MCNC: 9.5 MG/DL (ref 8.6–10.4)
CHLORIDE SERPL-SCNC: 100 MMOL/L (ref 98–107)
CHOLEST SERPL-MCNC: 145 MG/DL (ref 0–199)
CHOLESTEROL/HDL RATIO: 2
CO2 SERPL-SCNC: 27 MMOL/L (ref 20–31)
CREAT SERPL-MCNC: 0.9 MG/DL (ref 0.5–0.9)
GFR, ESTIMATED: 74 ML/MIN/1.73M2
GLUCOSE SERPL-MCNC: 122 MG/DL (ref 70–99)
HDLC SERPL-MCNC: 59 MG/DL
LDLC SERPL CALC-MCNC: 38 MG/DL (ref 0–100)
POTASSIUM SERPL-SCNC: 4.1 MMOL/L (ref 3.7–5.3)
PROT SERPL-MCNC: 7 G/DL (ref 6.4–8.3)
SODIUM SERPL-SCNC: 140 MMOL/L (ref 135–144)
T4 FREE SERPL-MCNC: 1.1 NG/DL (ref 0.92–1.68)
TRIGL SERPL-MCNC: 241 MG/DL
TSH SERPL DL<=0.05 MIU/L-ACNC: 6.35 UIU/ML (ref 0.3–5)
VLDLC SERPL CALC-MCNC: 48 MG/DL

## 2024-07-09 PROCEDURE — 3017F COLORECTAL CA SCREEN DOC REV: CPT | Performed by: FAMILY MEDICINE

## 2024-07-09 PROCEDURE — 84439 ASSAY OF FREE THYROXINE: CPT

## 2024-07-09 PROCEDURE — 80053 COMPREHEN METABOLIC PANEL: CPT

## 2024-07-09 PROCEDURE — 82306 VITAMIN D 25 HYDROXY: CPT

## 2024-07-09 PROCEDURE — G8417 CALC BMI ABV UP PARAM F/U: HCPCS | Performed by: FAMILY MEDICINE

## 2024-07-09 PROCEDURE — 36415 COLL VENOUS BLD VENIPUNCTURE: CPT

## 2024-07-09 PROCEDURE — 3075F SYST BP GE 130 - 139MM HG: CPT | Performed by: FAMILY MEDICINE

## 2024-07-09 PROCEDURE — 3078F DIAST BP <80 MM HG: CPT | Performed by: FAMILY MEDICINE

## 2024-07-09 PROCEDURE — 80061 LIPID PANEL: CPT

## 2024-07-09 PROCEDURE — G8427 DOCREV CUR MEDS BY ELIG CLIN: HCPCS | Performed by: FAMILY MEDICINE

## 2024-07-09 PROCEDURE — 84443 ASSAY THYROID STIM HORMONE: CPT

## 2024-07-09 PROCEDURE — 1036F TOBACCO NON-USER: CPT | Performed by: FAMILY MEDICINE

## 2024-07-09 PROCEDURE — 99214 OFFICE O/P EST MOD 30 MIN: CPT | Performed by: FAMILY MEDICINE

## 2024-07-09 RX ORDER — SEMAGLUTIDE 0.25 MG/.5ML
0.25 INJECTION, SOLUTION SUBCUTANEOUS
Qty: 2 ML | Refills: 0 | Status: SHIPPED | OUTPATIENT
Start: 2024-07-09 | End: 2024-07-31

## 2024-07-09 RX ORDER — VARENICLINE TARTRATE 1 MG/1
1 TABLET, FILM COATED ORAL 2 TIMES DAILY
Qty: 60 TABLET | Refills: 5 | Status: SHIPPED | OUTPATIENT
Start: 2024-07-09

## 2024-07-09 RX ORDER — CYCLOBENZAPRINE HCL 10 MG
10 TABLET ORAL EVERY 8 HOURS PRN
Qty: 90 TABLET | Refills: 5 | Status: SHIPPED | OUTPATIENT
Start: 2024-07-09

## 2024-07-09 RX ORDER — LEVOTHYROXINE SODIUM 0.1 MG/1
100 TABLET ORAL DAILY
Qty: 90 TABLET | Refills: 0 | Status: SHIPPED | OUTPATIENT
Start: 2024-07-09

## 2024-07-09 NOTE — PROGRESS NOTES
Pt has shingles vaccine rx at home, unsure if she wants to have this done. Declines TDAP at this time, will contact us if needing rx.  
alcoholism (HCC)     sober since 1/16/19    History of therapeutic radiation 2014 left breast    Hyperlipidemia     Hypertension     Hyperthyroidism 01/2022    Hypothyroidism     Kidney stone     Has had lithotripsy x 1; had ureteral stent placement with removal x 1; had seen Dr. Dubose    Neuropathy     Obesity     Panic attack 04/15/2021    PTSD (post-traumatic stress disorder)     Sleep apnea 04/2021      Past Surgical History:   Procedure Laterality Date    APPENDECTOMY  1977    BREAST BIOPSY Right 2015    excisional biopsy - Dr. Zaidi; was \"pre-cancer\" per pt    BREAST LUMPECTOMY Left 2014    breast CA - done at [facility]    CHOLECYSTECTOMY  2014    COLONOSCOPY  2019    polyps, Dr. Reynolds at [facility]    COLONOSCOPY N/A 05/11/2021    mild diverticulosis, tubular adenoma x1; Dr. Troncoso at [facility]    DILATION AND CURETTAGE OF UTERUS      multiple in her 20's; done after miscarriages    EYE SURGERY      x 2 in her youth - was \"cross-eyed\"    EYE SURGERY      FRACTURE SURGERY Left 2005    hand    HERNIA REPAIR  01/2020    recurrent incisional hernia repair Dr. Paz    HERNIA REPAIR N/A 07/22/2020    Laparoscopic Robotic Assisted Ventral Hernia Repair performed by Ozzie Troncoso MD at [facility] OR    HYSTERECTOMY (CERVIX STATUS UNKNOWN)  2015    HYSTERECTOMY, VAGINAL  2016    LUMBAR LAMINECTOMY  2006    Dr. Cordoba    PAIN MANAGEMENT PROCEDURE Left 01/21/2022    Left L4 L5 TRANSFORAMINAL performed by Ciro Crawford MD at [facility] OR    PERICO AND BSO (CERVIX REMOVED)  2016    Dr. 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 Paz at [facility] - used mesh; had revision in 1/2020    VENTRAL HERNIA REPAIR N/A 10/21/2020    Open VENTRAL Hernia Repair w/ mesh & Component separation technique performed by

## 2024-07-09 NOTE — TELEPHONE ENCOUNTER
Reviewed TSH result with pt during OV today - elevated to 6.35; will increase Synthroid dose slightly to 100 mcg daily every day and re-check levels again in 3 months.

## 2024-07-15 ENCOUNTER — PATIENT MESSAGE (OUTPATIENT)
Dept: FAMILY MEDICINE CLINIC | Age: 59
End: 2024-07-15

## 2024-07-15 DIAGNOSIS — Z78.9 STATIN NOT TOLERATED: ICD-10-CM

## 2024-07-15 DIAGNOSIS — E78.00 PURE HYPERCHOLESTEROLEMIA: ICD-10-CM

## 2024-07-15 NOTE — TELEPHONE ENCOUNTER
From: Sarah Jones  To: Dr. Mora Chris  Sent: 7/15/2024 9:59 AM EDT  Subject: Wegovy    Dr Chris insurance still is denying Wegovy. Can you please order abound an hopefully they will pay this.  Thank you Sarah

## 2024-07-16 NOTE — TELEPHONE ENCOUNTER
Per pt msg, states rx never crossed over from [facility] to [facility].     Sarah LOUIS called requesting a refill of the below medication which has been pended for you:     Requested Prescriptions     Pending Prescriptions Disp Refills    Evolocumab 140 MG/ML SOAJ 6 mL 1     Sig: Inject 140 mg into the skin every 14 days       Last Appointment Date: 7/9/2024  Next Appointment Date: 10/15/2024    Allergies   Allergen Reactions    Flomax [Tamsulosin Hcl] Swelling     Swelling of arms; however, also had rash and fever at the same time and was admitted at the time    Codeine Nausea And Vomiting    Decadron [Dexamethasone]      Tachycardia    Morphine Itching and Rash     Rash, itching    Quetiapine      \"Makes me nuts\"      Saphris [Asenapine]     Bactrim [Sulfamethoxazole-Trimethoprim] Diarrhea and Nausea Only    Prednisone Other (See Comments)     Emotional changes, depression    Trazodone      Sleepwalking      Zyprexa [Olanzapine] Other (See Comments)     Made her feel like she wanted to \"jump out of my skin\"

## 2024-07-18 DIAGNOSIS — Z78.9 STATIN NOT TOLERATED: ICD-10-CM

## 2024-07-18 DIAGNOSIS — E78.00 PURE HYPERCHOLESTEROLEMIA: ICD-10-CM

## 2024-07-19 ENCOUNTER — PATIENT MESSAGE (OUTPATIENT)
Dept: FAMILY MEDICINE CLINIC | Age: 59
End: 2024-07-19

## 2024-07-19 DIAGNOSIS — R73.03 PREDIABETES: Primary | ICD-10-CM

## 2024-07-23 NOTE — TELEPHONE ENCOUNTER
From: ANGELA DAHL  To: Sarah Jones  Sent: 7/19/2024 2:49 PM EDT  Subject: Galina Smith,  It looks like your Wegovy was denied because your insurance says it's excluded from your plan.

## 2024-07-23 NOTE — TELEPHONE ENCOUNTER
Pt wants to try PA for Mounjaro.    Sarah LOUIS called requesting a refill of the below medication which has been pended for you:     Requested Prescriptions     Pending Prescriptions Disp Refills    Tirzepatide (MOUNJARO) 2.5 MG/0.5ML SOPN SC injection 2 mL 0     Sig: Inject 0.5 mLs into the skin once a week       Last Appointment Date: Visit date not found  Next Appointment Date: Visit date not found    Allergies   Allergen Reactions    Flomax [Tamsulosin Hcl] Swelling     Swelling of arms; however, also had rash and fever at the same time and was admitted at the time    Codeine Nausea And Vomiting    Decadron [Dexamethasone]      Tachycardia    Morphine Itching and Rash     Rash, itching    Quetiapine      \"Makes me nuts\"      Saphris [Asenapine]     Bactrim [Sulfamethoxazole-Trimethoprim] Diarrhea and Nausea Only    Prednisone Other (See Comments)     Emotional changes, depression    Trazodone      Sleepwalking      Zyprexa [Olanzapine] Other (See Comments)     Made her feel like she wanted to \"jump out of my skin\"

## 2024-07-24 DIAGNOSIS — Z78.9 STATIN NOT TOLERATED: ICD-10-CM

## 2024-07-24 DIAGNOSIS — E78.00 PURE HYPERCHOLESTEROLEMIA: ICD-10-CM

## 2024-07-24 NOTE — TELEPHONE ENCOUNTER
Medication never crossed over. Needs refilled.       Sarah LOUIS called requesting a refill of the below medication which has been pended for you:     Requested Prescriptions     Pending Prescriptions Disp Refills    Evolocumab 140 MG/ML SOAJ 6 mL 1     Sig: Inject 140 mg into the skin every 14 days       Last Appointment Date: 7/9/2024  Next Appointment Date: 10/15/2024    Allergies   Allergen Reactions    Flomax [Tamsulosin Hcl] Swelling     Swelling of arms; however, also had rash and fever at the same time and was admitted at the time    Codeine Nausea And Vomiting    Decadron [Dexamethasone]      Tachycardia    Morphine Itching and Rash     Rash, itching    Quetiapine      \"Makes me nuts\"      Saphris [Asenapine]     Bactrim [Sulfamethoxazole-Trimethoprim] Diarrhea and Nausea Only    Prednisone Other (See Comments)     Emotional changes, depression    Trazodone      Sleepwalking      Zyprexa [Olanzapine] Other (See Comments)     Made her feel like she wanted to \"jump out of my skin\"

## 2024-07-25 ENCOUNTER — PATIENT MESSAGE (OUTPATIENT)
Dept: FAMILY MEDICINE CLINIC | Age: 59
End: 2024-07-25

## 2024-07-25 RX ORDER — TIRZEPATIDE 2.5 MG/.5ML
2.5 INJECTION, SOLUTION SUBCUTANEOUS WEEKLY
Qty: 2 ML | Refills: 0 | Status: SHIPPED | OUTPATIENT
Start: 2024-07-25 | End: 2024-08-16

## 2024-07-26 NOTE — TELEPHONE ENCOUNTER
From: Peyton Jones  To: Dr. Mora Chris  Sent: 7/25/2024 10:19 AM EDT  Subject: Repatha    I need refill I don't know if [facility] never transferred it but I don't have any for the 30th  Thanks peyton

## 2024-07-31 ENCOUNTER — PATIENT MESSAGE (OUTPATIENT)
Dept: FAMILY MEDICINE CLINIC | Age: 59
End: 2024-07-31

## 2024-07-31 NOTE — TELEPHONE ENCOUNTER
From: Sarah Jones  To: Dr. Mora Chris  Sent: 7/31/2024 9:08 AM EDT  Subject: Ron    Just wondering if the prior auth went through or was denied?   Thank you

## 2024-08-22 DIAGNOSIS — M54.41 CHRONIC MIDLINE LOW BACK PAIN WITH BILATERAL SCIATICA: ICD-10-CM

## 2024-08-22 DIAGNOSIS — M54.42 CHRONIC MIDLINE LOW BACK PAIN WITH BILATERAL SCIATICA: ICD-10-CM

## 2024-08-22 DIAGNOSIS — G89.29 CHRONIC MIDLINE LOW BACK PAIN WITH BILATERAL SCIATICA: ICD-10-CM

## 2024-08-23 NOTE — TELEPHONE ENCOUNTER
Per [facility], last fill 7/31, quantity 90 for 30 days.     Sarah LOUIS called requesting a refill of the below medication which has been pended for you:     Requested Prescriptions     Pending Prescriptions Disp Refills    gabapentin (NEURONTIN) 600 MG tablet 90 tablet 1     Sig: Take 1 tablet by mouth 3 times daily for 60 days.       Last Appointment Date: 7/9/2024  Next Appointment Date: 10/15/2024    Allergies   Allergen Reactions    Flomax [Tamsulosin Hcl] Swelling     Swelling of arms; however, also had rash and fever at the same time and was admitted at the time    Codeine Nausea And Vomiting    Decadron [Dexamethasone]      Tachycardia    Morphine Itching and Rash     Rash, itching    Quetiapine      \"Makes me nuts\"      Saphris [Asenapine]     Bactrim [Sulfamethoxazole-Trimethoprim] Diarrhea and Nausea Only    Prednisone Other (See Comments)     Emotional changes, depression    Trazodone      Sleepwalking      Zyprexa [Olanzapine] Other (See Comments)     Made her feel like she wanted to \"jump out of my skin\"

## 2024-08-24 DIAGNOSIS — G89.29 CHRONIC MIDLINE LOW BACK PAIN WITH BILATERAL SCIATICA: ICD-10-CM

## 2024-08-24 DIAGNOSIS — M54.42 CHRONIC MIDLINE LOW BACK PAIN WITH BILATERAL SCIATICA: ICD-10-CM

## 2024-08-24 DIAGNOSIS — M54.41 CHRONIC MIDLINE LOW BACK PAIN WITH BILATERAL SCIATICA: ICD-10-CM

## 2024-08-26 ENCOUNTER — PATIENT MESSAGE (OUTPATIENT)
Dept: FAMILY MEDICINE CLINIC | Age: 59
End: 2024-08-26

## 2024-08-26 DIAGNOSIS — G89.29 CHRONIC MIDLINE LOW BACK PAIN WITH BILATERAL SCIATICA: ICD-10-CM

## 2024-08-26 DIAGNOSIS — M54.42 CHRONIC MIDLINE LOW BACK PAIN WITH BILATERAL SCIATICA: ICD-10-CM

## 2024-08-26 DIAGNOSIS — M54.41 CHRONIC MIDLINE LOW BACK PAIN WITH BILATERAL SCIATICA: ICD-10-CM

## 2024-08-26 RX ORDER — GABAPENTIN 600 MG/1
600 TABLET ORAL 3 TIMES DAILY
Qty: 90 TABLET | Refills: 1 | Status: CANCELLED | OUTPATIENT
Start: 2024-08-26 | End: 2024-10-25

## 2024-08-26 RX ORDER — GABAPENTIN 600 MG/1
600 TABLET ORAL 3 TIMES DAILY
Qty: 90 TABLET | Refills: 0 | OUTPATIENT
Start: 2024-08-26

## 2024-08-26 RX ORDER — GABAPENTIN 600 MG/1
600 TABLET ORAL 3 TIMES DAILY
Qty: 90 TABLET | Refills: 1 | Status: SHIPPED | OUTPATIENT
Start: 2024-08-30 | End: 2024-10-29

## 2024-08-26 NOTE — TELEPHONE ENCOUNTER
Controlled Substance Monitoring:    Acute and Chronic Pain Monitoring:   RX Monitoring Periodic Controlled Substance Monitoring   8/26/2024   2:42 PM No signs of potential drug abuse or diversion identified.

## 2024-09-10 DIAGNOSIS — J44.9 STAGE 2 MODERATE COPD BY GOLD CLASSIFICATION (HCC): ICD-10-CM

## 2024-09-25 ENCOUNTER — PATIENT MESSAGE (OUTPATIENT)
Dept: FAMILY MEDICINE CLINIC | Age: 59
End: 2024-09-25

## 2024-09-26 ENCOUNTER — PATIENT MESSAGE (OUTPATIENT)
Dept: FAMILY MEDICINE CLINIC | Age: 59
End: 2024-09-26

## 2024-09-26 DIAGNOSIS — J44.9 STAGE 2 MODERATE COPD BY GOLD CLASSIFICATION (HCC): ICD-10-CM

## 2024-09-27 RX ORDER — ALBUTEROL SULFATE 90 UG/1
1-2 INHALANT RESPIRATORY (INHALATION) EVERY 6 HOURS PRN
Qty: 18 G | Refills: 11 | Status: CANCELLED | OUTPATIENT
Start: 2024-09-27

## 2024-10-14 ENCOUNTER — HOSPITAL ENCOUNTER (OUTPATIENT)
Dept: MAMMOGRAPHY | Age: 59
Discharge: HOME | End: 2024-10-16
Attending: FAMILY MEDICINE
Payer: COMMERCIAL

## 2024-10-14 VITALS — WEIGHT: 200 LBS | HEIGHT: 61 IN | BODY MASS INDEX: 37.76 KG/M2

## 2024-10-14 DIAGNOSIS — Z12.31 SCREENING MAMMOGRAM, ENCOUNTER FOR: ICD-10-CM

## 2024-10-14 PROCEDURE — 77063 BREAST TOMOSYNTHESIS BI: CPT

## 2024-10-15 ENCOUNTER — PATIENT MESSAGE (OUTPATIENT)
Dept: FAMILY MEDICINE CLINIC | Age: 59
End: 2024-10-15

## 2024-10-15 ENCOUNTER — OFFICE VISIT (OUTPATIENT)
Dept: FAMILY MEDICINE CLINIC | Age: 59
End: 2024-10-15
Payer: COMMERCIAL

## 2024-10-15 ENCOUNTER — HOSPITAL ENCOUNTER (OUTPATIENT)
Age: 59
Discharge: HOME | End: 2024-10-15
Payer: COMMERCIAL

## 2024-10-15 VITALS
HEART RATE: 89 BPM | OXYGEN SATURATION: 95 % | WEIGHT: 208.8 LBS | BODY MASS INDEX: 39.42 KG/M2 | DIASTOLIC BLOOD PRESSURE: 82 MMHG | SYSTOLIC BLOOD PRESSURE: 138 MMHG | HEIGHT: 61 IN | TEMPERATURE: 98 F | RESPIRATION RATE: 18 BRPM

## 2024-10-15 DIAGNOSIS — M54.42 CHRONIC MIDLINE LOW BACK PAIN WITH BILATERAL SCIATICA: ICD-10-CM

## 2024-10-15 DIAGNOSIS — G89.29 CHRONIC MIDLINE LOW BACK PAIN WITH BILATERAL SCIATICA: ICD-10-CM

## 2024-10-15 DIAGNOSIS — G89.29 CHRONIC FACIAL PAIN: ICD-10-CM

## 2024-10-15 DIAGNOSIS — R51.9 CHRONIC FACIAL PAIN: ICD-10-CM

## 2024-10-15 DIAGNOSIS — Z12.31 SCREENING MAMMOGRAM FOR BREAST CANCER: ICD-10-CM

## 2024-10-15 DIAGNOSIS — F31.81 BIPOLAR 2 DISORDER (HCC): ICD-10-CM

## 2024-10-15 DIAGNOSIS — M54.41 CHRONIC MIDLINE LOW BACK PAIN WITH BILATERAL SCIATICA: ICD-10-CM

## 2024-10-15 DIAGNOSIS — E03.9 HYPOTHYROIDISM, UNSPECIFIED TYPE: ICD-10-CM

## 2024-10-15 DIAGNOSIS — F33.2 SEVERE EPISODE OF RECURRENT MAJOR DEPRESSIVE DISORDER, WITHOUT PSYCHOTIC FEATURES (HCC): ICD-10-CM

## 2024-10-15 DIAGNOSIS — Z85.3 HISTORY OF LEFT BREAST CANCER: ICD-10-CM

## 2024-10-15 DIAGNOSIS — E55.9 VITAMIN D DEFICIENCY: ICD-10-CM

## 2024-10-15 DIAGNOSIS — E03.9 HYPOTHYROIDISM, UNSPECIFIED TYPE: Primary | ICD-10-CM

## 2024-10-15 PROBLEM — C50.919 MALIGNANT NEOPLASM OF BREAST (HCC): Status: RESOLVED | Noted: 2019-01-23 | Resolved: 2024-10-15

## 2024-10-15 LAB
T4 FREE SERPL-MCNC: 1.1 NG/DL (ref 0.92–1.68)
TSH SERPL DL<=0.05 MIU/L-ACNC: 6.89 UIU/ML (ref 0.3–5)

## 2024-10-15 PROCEDURE — 3075F SYST BP GE 130 - 139MM HG: CPT | Performed by: FAMILY MEDICINE

## 2024-10-15 PROCEDURE — 1036F TOBACCO NON-USER: CPT | Performed by: FAMILY MEDICINE

## 2024-10-15 PROCEDURE — G8484 FLU IMMUNIZE NO ADMIN: HCPCS | Performed by: FAMILY MEDICINE

## 2024-10-15 PROCEDURE — 99214 OFFICE O/P EST MOD 30 MIN: CPT | Performed by: FAMILY MEDICINE

## 2024-10-15 PROCEDURE — 84443 ASSAY THYROID STIM HORMONE: CPT

## 2024-10-15 PROCEDURE — 84439 ASSAY OF FREE THYROXINE: CPT

## 2024-10-15 PROCEDURE — G2211 COMPLEX E/M VISIT ADD ON: HCPCS | Performed by: FAMILY MEDICINE

## 2024-10-15 PROCEDURE — 3017F COLORECTAL CA SCREEN DOC REV: CPT | Performed by: FAMILY MEDICINE

## 2024-10-15 PROCEDURE — 36415 COLL VENOUS BLD VENIPUNCTURE: CPT

## 2024-10-15 PROCEDURE — 3079F DIAST BP 80-89 MM HG: CPT | Performed by: FAMILY MEDICINE

## 2024-10-15 PROCEDURE — G8417 CALC BMI ABV UP PARAM F/U: HCPCS | Performed by: FAMILY MEDICINE

## 2024-10-15 PROCEDURE — G8427 DOCREV CUR MEDS BY ELIG CLIN: HCPCS | Performed by: FAMILY MEDICINE

## 2024-10-15 RX ORDER — ARIPIPRAZOLE 2 MG/1
2 TABLET ORAL DAILY
COMMUNITY

## 2024-10-15 RX ORDER — GABAPENTIN 600 MG/1
600 TABLET ORAL 3 TIMES DAILY
Qty: 90 TABLET | Refills: 1 | Status: SHIPPED | OUTPATIENT
Start: 2024-10-25 | End: 2024-12-24

## 2024-10-15 RX ORDER — PHENTERMINE HYDROCHLORIDE 37.5 MG/1
37.5 TABLET ORAL
Qty: 30 TABLET | Refills: 0 | Status: SHIPPED | OUTPATIENT
Start: 2024-10-15 | End: 2024-11-14

## 2024-10-15 NOTE — PROGRESS NOTES
diverticulosis, tubular adenoma x1; Dr. Troncoso at [facility]    DILATION AND CURETTAGE OF UTERUS      multiple in her 20's; done after miscarriages    EYE SURGERY      x 2 in her youth - was \"cross-eyed\"    EYE SURGERY      FRACTURE SURGERY Left     hand    HERNIA REPAIR  2020    recurrent incisional hernia repair Dr. Paz    HERNIA REPAIR N/A 2020    Laparoscopic Robotic Assisted Ventral Hernia Repair performed by Ozzie Troncoso MD at [facility] OR    HYSTERECTOMY (CERVIX STATUS UNKNOWN)      HYSTERECTOMY, VAGINAL  2016    LUMBAR LAMINECTOMY      Dr. Cordoba    PAIN MANAGEMENT PROCEDURE Left 2022    Left L4 L5 TRANSFORAMINAL performed by Ciro Crawford MD at [facility] OR    PERICO AND BSO (CERVIX REMOVED)      Dr. 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 Paz at [facility] - used mesh; had revision in 2020    VENTRAL HERNIA REPAIR N/A 10/21/2020    Open VENTRAL Hernia Repair w/ mesh & Component separation technique performed by Ozzie Troncoso MD at [facility] OR     Family History   Problem Relation Age of Onset    High Blood Pressure Mother     Lupus Mother          from CHF secondary to cardiomyopathy from her lupus    Heart Failure Mother     Atrial Fibrillation Mother     Arthritis Mother     Arrhythmia Mother     Heart Disease Mother     High Cholesterol Mother     Immune Disorder Mother     Obesity Mother     High Blood Pressure Father     Prostate Cancer Father         age 55;  5 years later of a \"bladder tumor\" that caused bladder rupture (pt unsure if malignancy)    Cancer Father     Right Breast Cancer Sister 56    Stomach Cancer Maternal Grandmother         unsure if it was actually stomach, but it was reported as that    Other Maternal Grandmother     Heart

## 2024-10-18 DIAGNOSIS — E55.9 VITAMIN D DEFICIENCY: Primary | ICD-10-CM

## 2024-10-18 RX ORDER — LEVOTHYROXINE SODIUM 100 UG/1
TABLET ORAL
Qty: 100 TABLET | Refills: 0 | Status: SHIPPED | OUTPATIENT
Start: 2024-10-18

## 2024-10-18 NOTE — TELEPHONE ENCOUNTER
Pt TSH resulted. Needs refilled, wondering if she needs to change her dose.     Sarah HERIBERTO called requesting a refill of the below medication which has been pended for you:     Requested Prescriptions     Pending Prescriptions Disp Refills    levothyroxine (SYNTHROID) 100 MCG tablet 90 tablet 0     Sig: Take 1 tablet by mouth Daily       Last Appointment Date: 10/15/2024  Next Appointment Date: 1/15/2025    Allergies   Allergen Reactions    Flomax [Tamsulosin Hcl] Swelling     Swelling of arms; however, also had rash and fever at the same time and was admitted at the time    Codeine Nausea And Vomiting    Decadron [Dexamethasone]      Tachycardia    Morphine Itching and Rash     Rash, itching    Quetiapine      \"Makes me nuts\"      Saphris [Asenapine]     Bactrim [Sulfamethoxazole-Trimethoprim] Diarrhea and Nausea Only    Prednisone Other (See Comments)     Emotional changes, depression    Trazodone      Sleepwalking      Zyprexa [Olanzapine] Other (See Comments)     Made her feel like she wanted to \"jump out of my skin\"

## 2024-10-21 ENCOUNTER — PATIENT MESSAGE (OUTPATIENT)
Dept: FAMILY MEDICINE CLINIC | Age: 59
End: 2024-10-21

## 2024-10-21 ENCOUNTER — TELEMEDICINE (OUTPATIENT)
Dept: FAMILY MEDICINE CLINIC | Age: 59
End: 2024-10-21
Payer: COMMERCIAL

## 2024-10-21 DIAGNOSIS — Z00.00 MEDICARE ANNUAL WELLNESS VISIT, SUBSEQUENT: Primary | ICD-10-CM

## 2024-10-21 PROCEDURE — 3017F COLORECTAL CA SCREEN DOC REV: CPT | Performed by: FAMILY MEDICINE

## 2024-10-21 PROCEDURE — G0439 PPPS, SUBSEQ VISIT: HCPCS | Performed by: FAMILY MEDICINE

## 2024-10-21 PROCEDURE — G8484 FLU IMMUNIZE NO ADMIN: HCPCS | Performed by: FAMILY MEDICINE

## 2024-10-21 SDOH — HEALTH STABILITY: PHYSICAL HEALTH: ON AVERAGE, HOW MANY DAYS PER WEEK DO YOU ENGAGE IN MODERATE TO STRENUOUS EXERCISE (LIKE A BRISK WALK)?: 3 DAYS

## 2024-10-21 SDOH — HEALTH STABILITY: PHYSICAL HEALTH: ON AVERAGE, HOW MANY MINUTES DO YOU ENGAGE IN EXERCISE AT THIS LEVEL?: 30 MIN

## 2024-10-21 ASSESSMENT — PATIENT HEALTH QUESTIONNAIRE - PHQ9
4. FEELING TIRED OR HAVING LITTLE ENERGY: SEVERAL DAYS
SUM OF ALL RESPONSES TO PHQ QUESTIONS 1-9: 7
SUM OF ALL RESPONSES TO PHQ9 QUESTIONS 1 & 2: 0
SUM OF ALL RESPONSES TO PHQ QUESTIONS 1-9: 7
9. THOUGHTS THAT YOU WOULD BE BETTER OFF DEAD, OR OF HURTING YOURSELF: NOT AT ALL
3. TROUBLE FALLING OR STAYING ASLEEP: NOT AT ALL
8. MOVING OR SPEAKING SO SLOWLY THAT OTHER PEOPLE COULD HAVE NOTICED. OR THE OPPOSITE, BEING SO FIGETY OR RESTLESS THAT YOU HAVE BEEN MOVING AROUND A LOT MORE THAN USUAL: SEVERAL DAYS
6. FEELING BAD ABOUT YOURSELF - OR THAT YOU ARE A FAILURE OR HAVE LET YOURSELF OR YOUR FAMILY DOWN: SEVERAL DAYS
7. TROUBLE CONCENTRATING ON THINGS, SUCH AS READING THE NEWSPAPER OR WATCHING TELEVISION: SEVERAL DAYS
SUM OF ALL RESPONSES TO PHQ QUESTIONS 1-9: 7
2. FEELING DOWN, DEPRESSED OR HOPELESS: NOT AT ALL
1. LITTLE INTEREST OR PLEASURE IN DOING THINGS: NOT AT ALL
10. IF YOU CHECKED OFF ANY PROBLEMS, HOW DIFFICULT HAVE THESE PROBLEMS MADE IT FOR YOU TO DO YOUR WORK, TAKE CARE OF THINGS AT HOME, OR GET ALONG WITH OTHER PEOPLE: NOT DIFFICULT AT ALL
SUM OF ALL RESPONSES TO PHQ QUESTIONS 1-9: 7
5. POOR APPETITE OR OVEREATING: NEARLY EVERY DAY

## 2024-10-21 ASSESSMENT — LIFESTYLE VARIABLES
HOW MANY STANDARD DRINKS CONTAINING ALCOHOL DO YOU HAVE ON A TYPICAL DAY: PATIENT DOES NOT DRINK
HOW OFTEN DO YOU HAVE SIX OR MORE DRINKS ON ONE OCCASION: 1
HOW OFTEN DO YOU HAVE A DRINK CONTAINING ALCOHOL: NEVER
HOW OFTEN DO YOU HAVE A DRINK CONTAINING ALCOHOL: 1
HOW MANY STANDARD DRINKS CONTAINING ALCOHOL DO YOU HAVE ON A TYPICAL DAY: 0

## 2024-10-21 NOTE — PROGRESS NOTES
Medicare Annual Wellness Visit    Sarah Jones is here for Medicare AWV    Assessment & Plan   Medicare annual wellness visit, subsequent    Recommendations for Preventive Services Due: see orders and patient instructions/AVS.  Recommended screening schedule for the next 5-10 years is provided to the patient in written form: see Patient Instructions/AVS.     Return in 1 year (on 10/21/2025) for Medicare AWV.     Subjective     Patient's complete Health Risk Assessment and screening values have been reviewed and are found in Flowsheets. The following problems were reviewed today and where indicated follow up appointments were made and/or referrals ordered.    Positive Risk Factor Screenings with Interventions:        Depression:  PHQ-2 Score: 0  PHQ-9 Total Score: 7  Total Score Interpretation: 5-9 = mild depression  Interventions:  States she has major depressive disorder and bipolar and is currently being managed.              Abnormal BMI (obese):  There is no height or weight on file to calculate BMI. (!) Abnormal  Interventions:  Pt works out for at least 30 min a day 3x weekly, on the treadmill. Is starting eat smaller portions with meals      Advanced Directives:  Do you have a Living Will?: (!) No    Intervention:  has NO advanced directive - information provided          Objective    Patient-Reported Vitals  No data recorded              Allergies   Allergen Reactions    Flomax [Tamsulosin Hcl] Swelling     Swelling of arms; however, also had rash and fever at the same time and was admitted at the time    Codeine Nausea And Vomiting    Decadron [Dexamethasone]      Tachycardia    Morphine Itching and Rash     Rash, itching    Quetiapine      \"Makes me nuts\"      Saphris [Asenapine]     Bactrim [Sulfamethoxazole-Trimethoprim] Diarrhea and Nausea Only    Prednisone Other (See Comments)     Emotional changes, depression    Trazodone      Sleepwalking      Zyprexa [Olanzapine] Other (See Comments)     Made

## 2024-10-22 ENCOUNTER — PATIENT MESSAGE (OUTPATIENT)
Dept: FAMILY MEDICINE CLINIC | Age: 59
End: 2024-10-22

## 2024-10-22 ASSESSMENT — ENCOUNTER SYMPTOMS: BACK PAIN: 1

## 2024-10-27 ENCOUNTER — OFFICE VISIT (OUTPATIENT)
Dept: PRIMARY CARE CLINIC | Age: 59
End: 2024-10-27

## 2024-10-27 VITALS
SYSTOLIC BLOOD PRESSURE: 122 MMHG | BODY MASS INDEX: 38.71 KG/M2 | HEART RATE: 100 BPM | RESPIRATION RATE: 20 BRPM | WEIGHT: 205 LBS | OXYGEN SATURATION: 91 % | HEIGHT: 61 IN | TEMPERATURE: 98.4 F | DIASTOLIC BLOOD PRESSURE: 78 MMHG

## 2024-10-27 DIAGNOSIS — J10.1 INFLUENZA B: ICD-10-CM

## 2024-10-27 DIAGNOSIS — J40 BRONCHITIS: ICD-10-CM

## 2024-10-27 DIAGNOSIS — R05.1 ACUTE COUGH: Primary | ICD-10-CM

## 2024-10-27 LAB
INFLUENZA A ANTIGEN, POC: NEGATIVE
INFLUENZA B ANTIGEN, POC: POSITIVE
LOT EXPIRE DATE: ABNORMAL
LOT KIT NUMBER: ABNORMAL
SARS-COV-2, POC: ABNORMAL
VALID INTERNAL CONTROL: ABNORMAL
VENDOR AND KIT NAME POC: ABNORMAL

## 2024-10-27 RX ORDER — PREDNISONE 10 MG/1
10 TABLET ORAL 2 TIMES DAILY
Qty: 10 TABLET | Refills: 0 | Status: SHIPPED | OUTPATIENT
Start: 2024-10-27 | End: 2024-11-01

## 2024-10-27 RX ORDER — DEXTROMETHORPHAN HYDROBROMIDE AND PROMETHAZINE HYDROCHLORIDE 15; 6.25 MG/5ML; MG/5ML
5 SYRUP ORAL 4 TIMES DAILY PRN
Qty: 240 ML | Refills: 0 | Status: SHIPPED | OUTPATIENT
Start: 2024-10-27 | End: 2024-11-08

## 2024-10-27 RX ORDER — AZITHROMYCIN 250 MG/1
250 TABLET, FILM COATED ORAL SEE ADMIN INSTRUCTIONS
Qty: 6 TABLET | Refills: 0 | Status: SHIPPED | OUTPATIENT
Start: 2024-10-27 | End: 2024-11-01

## 2024-10-27 RX ORDER — DOXYCYCLINE HYCLATE 100 MG
100 TABLET ORAL 2 TIMES DAILY
Qty: 14 TABLET | Refills: 0 | Status: CANCELLED | OUTPATIENT
Start: 2024-10-27 | End: 2024-11-03

## 2024-11-13 DIAGNOSIS — J44.9 STAGE 2 MODERATE COPD BY GOLD CLASSIFICATION (HCC): ICD-10-CM

## 2024-11-14 RX ORDER — ALBUTEROL SULFATE 90 UG/1
2 AEROSOL, METERED RESPIRATORY (INHALATION) EVERY 6 HOURS PRN
Qty: 18 G | Refills: 11 | Status: SHIPPED | OUTPATIENT
Start: 2024-11-14

## 2024-11-14 RX ORDER — PHENTERMINE HYDROCHLORIDE 37.5 MG/1
37.5 TABLET ORAL EVERY MORNING
Qty: 30 TABLET | Refills: 0 | OUTPATIENT
Start: 2024-11-14

## 2024-11-14 NOTE — TELEPHONE ENCOUNTER
Per [facility], last fill 10/15/2024, quantity 30 for 30 days.   Sarah HERIBERTO called requesting a refill of the below medication which has been pended for you: sent message to see how patient is doing with med/side effects/see if she has enough until KB returns     Requested Prescriptions     Pending Prescriptions Disp Refills    phentermine (ADIPEX-P) 37.5 MG tablet 30 tablet 0     Sig: Take 1 tablet by mouth every morning (before breakfast) for 30 days. Max Daily Amount: 37.5 mg       Last Appointment Date: 10/21/2024  Next Appointment Date: 1/15/2025    Allergies   Allergen Reactions    Flomax [Tamsulosin Hcl] Swelling     Swelling of arms; however, also had rash and fever at the same time and was admitted at the time    Codeine Nausea And Vomiting    Decadron [Dexamethasone]      Tachycardia    Morphine Itching and Rash     Rash, itching    Quetiapine      \"Makes me nuts\"      Saphris [Asenapine]     Bactrim [Sulfamethoxazole-Trimethoprim] Diarrhea and Nausea Only    Prednisone Other (See Comments)     Emotional changes, depression    Trazodone      Sleepwalking      Zyprexa [Olanzapine] Other (See Comments)     Made her feel like she wanted to \"jump out of my skin\"

## 2024-11-15 RX ORDER — PHENTERMINE HYDROCHLORIDE 37.5 MG/1
37.5 TABLET ORAL
Qty: 30 TABLET | Refills: 0 | Status: SHIPPED | OUTPATIENT
Start: 2024-11-15 | End: 2024-12-15

## 2024-11-15 NOTE — TELEPHONE ENCOUNTER
Controlled Substance Monitoring:    Acute and Chronic Pain Monitoring:   RX Monitoring Periodic Controlled Substance Monitoring Chronic Pain > 80 MEDD   11/15/2024  12:50 PM No signs of potential drug abuse or diversion identified. Obtained or confirmed \"Medication Contract\" on file.

## 2024-11-20 ENCOUNTER — HOSPITAL ENCOUNTER (OUTPATIENT)
Dept: GENERAL RADIOLOGY | Age: 59
Discharge: HOME | End: 2024-11-22
Payer: COMMERCIAL

## 2024-11-20 DIAGNOSIS — N20.1 LEFT URETERAL STONE: ICD-10-CM

## 2024-11-20 DIAGNOSIS — N20.1 LEFT URETERAL STONE: Primary | ICD-10-CM

## 2024-11-20 PROCEDURE — 74018 RADEX ABDOMEN 1 VIEW: CPT

## 2024-11-25 ENCOUNTER — OFFICE VISIT (OUTPATIENT)
Dept: FAMILY MEDICINE CLINIC | Age: 59
End: 2024-11-25
Payer: COMMERCIAL

## 2024-11-25 VITALS
BODY MASS INDEX: 38.73 KG/M2 | OXYGEN SATURATION: 95 % | RESPIRATION RATE: 16 BRPM | DIASTOLIC BLOOD PRESSURE: 82 MMHG | HEART RATE: 62 BPM | SYSTOLIC BLOOD PRESSURE: 138 MMHG | TEMPERATURE: 98.6 F | HEIGHT: 61 IN

## 2024-11-25 DIAGNOSIS — J01.40 ACUTE NON-RECURRENT PANSINUSITIS: Primary | ICD-10-CM

## 2024-11-25 DIAGNOSIS — E83.42 HYPOMAGNESEMIA: ICD-10-CM

## 2024-11-25 DIAGNOSIS — I10 PRIMARY HYPERTENSION: ICD-10-CM

## 2024-11-25 PROBLEM — F14.21 COCAINE DEPENDENCE IN REMISSION (HCC): Status: RESOLVED | Noted: 2019-01-23 | Resolved: 2024-11-25

## 2024-11-25 PROBLEM — M46.96 UNSPECIFIED INFLAMMATORY SPONDYLOPATHY, LUMBAR REGION (HCC): Status: RESOLVED | Noted: 2022-03-16 | Resolved: 2024-11-25

## 2024-11-25 PROCEDURE — 99213 OFFICE O/P EST LOW 20 MIN: CPT

## 2024-11-25 PROCEDURE — 99213 OFFICE O/P EST LOW 20 MIN: CPT | Performed by: FAMILY MEDICINE

## 2024-11-25 PROCEDURE — 1036F TOBACCO NON-USER: CPT | Performed by: FAMILY MEDICINE

## 2024-11-25 PROCEDURE — G8484 FLU IMMUNIZE NO ADMIN: HCPCS | Performed by: FAMILY MEDICINE

## 2024-11-25 PROCEDURE — G8427 DOCREV CUR MEDS BY ELIG CLIN: HCPCS | Performed by: FAMILY MEDICINE

## 2024-11-25 PROCEDURE — G8417 CALC BMI ABV UP PARAM F/U: HCPCS | Performed by: FAMILY MEDICINE

## 2024-11-25 PROCEDURE — 3075F SYST BP GE 130 - 139MM HG: CPT | Performed by: FAMILY MEDICINE

## 2024-11-25 PROCEDURE — 3079F DIAST BP 80-89 MM HG: CPT | Performed by: FAMILY MEDICINE

## 2024-11-25 PROCEDURE — 3017F COLORECTAL CA SCREEN DOC REV: CPT | Performed by: FAMILY MEDICINE

## 2024-11-25 RX ORDER — DEXTROMETHORPHAN HYDROBROMIDE AND PROMETHAZINE HYDROCHLORIDE 15; 6.25 MG/5ML; MG/5ML
5 SYRUP ORAL 3 TIMES DAILY PRN
Qty: 118 ML | Refills: 0 | Status: SHIPPED | OUTPATIENT
Start: 2024-11-25

## 2024-11-25 RX ORDER — DOXYCYCLINE HYCLATE 100 MG
100 TABLET ORAL 2 TIMES DAILY
Qty: 20 TABLET | Refills: 0 | Status: SHIPPED | OUTPATIENT
Start: 2024-11-25 | End: 2024-12-05

## 2024-11-25 NOTE — PROGRESS NOTES
Review of Systems   Constitutional:  Positive for chills (with sweats) and fatigue. Negative for fever.   HENT:  Positive for congestion, ear pain, postnasal drip, sinus pressure (L > R) and sore throat.    Respiratory:  Positive for cough and wheezing (infrequent, mild). Negative for shortness of breath (only with coughing episodes; has checked pulse ox reguarly, which has been normal).    Gastrointestinal:  Negative for diarrhea, nausea and vomiting.   Musculoskeletal:  Positive for myalgias.   Neurological:  Positive for headaches.        Objective:     Vitals:    11/25/24 1344   BP: 138/82   Site: Right Upper Arm   Position: Sitting   Pulse: 62   Resp: 16   Temp: 98.6 °F (37 °C)   TempSrc: Temporal   SpO2: 95%   Height: 1.549 m (5' 1\")     Physical Exam  Vitals and nursing note reviewed.   Constitutional:       General: She is not in acute distress.     Appearance: Normal appearance. She is well-developed.   HENT:      Head: Normocephalic and atraumatic.      Right Ear: Tympanic membrane, ear canal and external ear normal.      Left Ear: Tympanic membrane, ear canal and external ear normal.      Nose:      Right Sinus: Maxillary sinus tenderness and frontal sinus tenderness (mild) present.      Left Sinus: Maxillary sinus tenderness and frontal sinus tenderness present.      Mouth/Throat:      Mouth: Mucous membranes are moist.      Pharynx: Posterior oropharyngeal erythema (Mild) present. No oropharyngeal exudate.      Comments: Clear post-nasal drainage noted.  Eyes:      Conjunctiva/sclera: Conjunctivae normal.      Pupils: Pupils are equal, round, and reactive to light.   Cardiovascular:      Rate and Rhythm: Normal rate and regular rhythm.      Heart sounds: Normal heart sounds.   Pulmonary:      Effort: Pulmonary effort is normal. No respiratory distress.      Breath sounds: Normal breath sounds. No wheezing or rales.   Abdominal:      General: Bowel sounds are normal. There is no distension.

## 2024-12-04 PROBLEM — D16.4 BENIGN NEOPLASM OF BONES OF SKULL AND FACE: Status: ACTIVE | Noted: 2024-04-15

## 2024-12-04 PROBLEM — F33.41 RECURRENT MAJOR DEPRESSIVE DISORDER, IN PARTIAL REMISSION (HCC): Status: ACTIVE | Noted: 2019-01-23

## 2024-12-04 PROBLEM — M54.9 MID-BACK PAIN, ACUTE: Status: RESOLVED | Noted: 2022-03-16 | Resolved: 2024-12-04

## 2024-12-04 PROBLEM — E83.51 HYPOCALCEMIA: Status: RESOLVED | Noted: 2022-06-07 | Resolved: 2024-12-04

## 2024-12-04 PROBLEM — E87.6 HYPOKALEMIA: Status: RESOLVED | Noted: 2019-01-23 | Resolved: 2024-12-04

## 2024-12-04 PROBLEM — F17.200 CURRENT SMOKER: Status: RESOLVED | Noted: 2019-01-23 | Resolved: 2024-12-04

## 2024-12-04 PROBLEM — G47.9 DIFFICULTY SLEEPING: Status: RESOLVED | Noted: 2019-01-23 | Resolved: 2024-12-04

## 2024-12-04 PROBLEM — N28.9 DISORDER OF KIDNEY AND URETER, UNSPECIFIED: Status: RESOLVED | Noted: 2022-06-07 | Resolved: 2024-12-04

## 2024-12-04 PROBLEM — F17.210 NICOTINE DEPENDENCE, CIGARETTES, UNCOMPLICATED: Status: RESOLVED | Noted: 2022-06-07 | Resolved: 2024-12-04

## 2024-12-04 PROBLEM — K43.2 INCISIONAL HERNIA: Status: ACTIVE | Noted: 2019-08-20

## 2024-12-04 RX ORDER — SERTRALINE HYDROCHLORIDE 100 MG/1
200 TABLET, FILM COATED ORAL NIGHTLY
COMMUNITY

## 2024-12-11 ENCOUNTER — OFFICE VISIT (OUTPATIENT)
Dept: FAMILY MEDICINE CLINIC | Age: 59
End: 2024-12-11
Payer: COMMERCIAL

## 2024-12-11 VITALS
BODY MASS INDEX: 38.73 KG/M2 | RESPIRATION RATE: 16 BRPM | OXYGEN SATURATION: 97 % | HEIGHT: 61 IN | SYSTOLIC BLOOD PRESSURE: 118 MMHG | HEART RATE: 100 BPM | DIASTOLIC BLOOD PRESSURE: 84 MMHG

## 2024-12-11 DIAGNOSIS — G89.29 CHRONIC FACIAL PAIN: ICD-10-CM

## 2024-12-11 DIAGNOSIS — R51.9 CHRONIC FACIAL PAIN: ICD-10-CM

## 2024-12-11 DIAGNOSIS — M54.41 CHRONIC MIDLINE LOW BACK PAIN WITH BILATERAL SCIATICA: ICD-10-CM

## 2024-12-11 DIAGNOSIS — G89.29 CHRONIC MIDLINE LOW BACK PAIN WITH BILATERAL SCIATICA: ICD-10-CM

## 2024-12-11 DIAGNOSIS — M54.42 CHRONIC MIDLINE LOW BACK PAIN WITH BILATERAL SCIATICA: ICD-10-CM

## 2024-12-11 DIAGNOSIS — M47.816 LUMBAR SPONDYLOSIS: Primary | ICD-10-CM

## 2024-12-11 PROCEDURE — 1036F TOBACCO NON-USER: CPT | Performed by: NURSE PRACTITIONER

## 2024-12-11 PROCEDURE — 3074F SYST BP LT 130 MM HG: CPT | Performed by: NURSE PRACTITIONER

## 2024-12-11 PROCEDURE — G8484 FLU IMMUNIZE NO ADMIN: HCPCS | Performed by: NURSE PRACTITIONER

## 2024-12-11 PROCEDURE — 99214 OFFICE O/P EST MOD 30 MIN: CPT | Performed by: NURSE PRACTITIONER

## 2024-12-11 PROCEDURE — G8417 CALC BMI ABV UP PARAM F/U: HCPCS | Performed by: NURSE PRACTITIONER

## 2024-12-11 PROCEDURE — 3017F COLORECTAL CA SCREEN DOC REV: CPT | Performed by: NURSE PRACTITIONER

## 2024-12-11 PROCEDURE — 3079F DIAST BP 80-89 MM HG: CPT | Performed by: NURSE PRACTITIONER

## 2024-12-11 PROCEDURE — G8427 DOCREV CUR MEDS BY ELIG CLIN: HCPCS | Performed by: NURSE PRACTITIONER

## 2024-12-11 RX ORDER — KETOROLAC TROMETHAMINE 10 MG/1
10 TABLET, FILM COATED ORAL 3 TIMES DAILY PRN
Qty: 9 TABLET | Refills: 0 | Status: SHIPPED | OUTPATIENT
Start: 2024-12-11 | End: 2024-12-14

## 2024-12-11 RX ORDER — HYDROCODONE BITARTRATE AND ACETAMINOPHEN 5; 325 MG/1; MG/1
1 TABLET ORAL EVERY 6 HOURS PRN
Qty: 12 TABLET | Refills: 0 | Status: SHIPPED | OUTPATIENT
Start: 2024-12-11 | End: 2024-12-14

## 2024-12-11 ASSESSMENT — PATIENT HEALTH QUESTIONNAIRE - PHQ9
SUM OF ALL RESPONSES TO PHQ QUESTIONS 1-9: 5
1. LITTLE INTEREST OR PLEASURE IN DOING THINGS: NOT AT ALL
2. FEELING DOWN, DEPRESSED OR HOPELESS: NOT AT ALL
10. IF YOU CHECKED OFF ANY PROBLEMS, HOW DIFFICULT HAVE THESE PROBLEMS MADE IT FOR YOU TO DO YOUR WORK, TAKE CARE OF THINGS AT HOME, OR GET ALONG WITH OTHER PEOPLE: NOT DIFFICULT AT ALL
SUM OF ALL RESPONSES TO PHQ9 QUESTIONS 1 & 2: 0
8. MOVING OR SPEAKING SO SLOWLY THAT OTHER PEOPLE COULD HAVE NOTICED. OR THE OPPOSITE, BEING SO FIGETY OR RESTLESS THAT YOU HAVE BEEN MOVING AROUND A LOT MORE THAN USUAL: SEVERAL DAYS
9. THOUGHTS THAT YOU WOULD BE BETTER OFF DEAD, OR OF HURTING YOURSELF: NOT AT ALL
5. POOR APPETITE OR OVEREATING: SEVERAL DAYS
SUM OF ALL RESPONSES TO PHQ QUESTIONS 1-9: 5
SUM OF ALL RESPONSES TO PHQ QUESTIONS 1-9: 5
6. FEELING BAD ABOUT YOURSELF - OR THAT YOU ARE A FAILURE OR HAVE LET YOURSELF OR YOUR FAMILY DOWN: SEVERAL DAYS
3. TROUBLE FALLING OR STAYING ASLEEP: NOT AT ALL
SUM OF ALL RESPONSES TO PHQ QUESTIONS 1-9: 5
4. FEELING TIRED OR HAVING LITTLE ENERGY: SEVERAL DAYS
7. TROUBLE CONCENTRATING ON THINGS, SUCH AS READING THE NEWSPAPER OR WATCHING TELEVISION: SEVERAL DAYS

## 2024-12-11 NOTE — PROGRESS NOTES
tablet Take 1 tablet by mouth daily 90 tablet 3    fluticasone (FLONASE) 50 MCG/ACT nasal spray 1-2 sprays by Nasal route daily as needed for Rhinitis or Allergies 48 g 3    ondansetron (ZOFRAN ODT) 4 MG disintegrating tablet Take 1 tablet by mouth every 8 hours as needed for Nausea 40 tablet 5    ezetimibe (ZETIA) 10 MG tablet Take 1 tablet by mouth daily 90 tablet 3    albuterol sulfate HFA (VENTOLIN HFA) 108 (90 Base) MCG/ACT inhaler Inhale 1-2 puffs into the lungs every 6 hours as needed for Wheezing or Shortness of Breath 18 g 11    Misc. Devices (CPAP MACHINE) MISC by Does not apply route Use mask/supplies with CPAP machine nightly as directed for RAHEEL.  CPAP setting 14 mm hg 1 each 0    cetirizine (ZYRTEC) 10 MG tablet Take 1 tablet by mouth daily      magnesium Oxide 500 MG TABS Take by mouth      lidocaine (LIDODERM) 5 % Apply 1 patch TO THE AFFECTED AREA DAILY as needed. LEAVE ON FOR 12 HOURS AND THEN OFF FOR 12 HOURS. 30 patch 3    hydrOXYzine pamoate (VISTARIL) 25 MG capsule take 2 capsules by mouth every evening if needed      lamoTRIgine (LAMICTAL) 150 MG tablet take 1 tablet by mouth once daily      vitamin D-3 (CHOLECALCIFEROL) 125 MCG (5000 UT) TABS Take 1 tablet by mouth daily      propranolol (INDERAL) 10 MG tablet Take 1 tablet by mouth 3 times daily Indications: patient takes twice a day      [DISCONTINUED] promethazine-dextromethorphan (PROMETHAZINE-DM) 6.25-15 MG/5ML syrup Take 5 mLs by mouth 3 times daily as needed for Cough (Patient not taking: Reported on 12/11/2024) 118 mL 0     No facility-administered encounter medications on file as of 12/11/2024.            Jarocho Castorena, APRN - CNP

## 2024-12-12 ENCOUNTER — PATIENT MESSAGE (OUTPATIENT)
Dept: FAMILY MEDICINE CLINIC | Age: 59
End: 2024-12-12

## 2024-12-12 NOTE — TELEPHONE ENCOUNTER
Sarah LOUIS called requesting a refill of the below medication which has been pended for you:     Requested Prescriptions     Pending Prescriptions Disp Refills    gabapentin (NEURONTIN) 600 MG tablet 90 tablet 1     Sig: Take 1 tablet by mouth 3 times daily for 60 days.    phentermine (ADIPEX-P) 37.5 MG tablet 30 tablet 0     Sig: Take 1 tablet by mouth every morning (before breakfast) for 30 days. Max Daily Amount: 37.5 mg       Last Appointment Date: 11/25/2024  Next Appointment Date: 1/22/2025    Allergies   Allergen Reactions    Flomax [Tamsulosin Hcl] Swelling     Swelling of arms; however, also had rash and fever at the same time and was admitted at the time    Decadron [Dexamethasone]      Tachycardia    Morphine Itching and Rash    Prednisone      Emotional changes, depression    Quetiapine      \"Makes me nuts\"      Saphris [Asenapine]     Bactrim [Sulfamethoxazole-Trimethoprim] Diarrhea and Nausea Only    Codeine Nausea And Vomiting    Trazodone      Sleepwalking      Zyprexa [Olanzapine] Other (See Comments)     Made her feel like she wanted to \"jump out of my skin\"

## 2024-12-13 RX ORDER — PHENTERMINE HYDROCHLORIDE 37.5 MG/1
37.5 TABLET ORAL
Qty: 30 TABLET | Refills: 0 | Status: SHIPPED | OUTPATIENT
Start: 2024-12-15 | End: 2025-01-14

## 2024-12-13 RX ORDER — GABAPENTIN 600 MG/1
600 TABLET ORAL 3 TIMES DAILY
Qty: 90 TABLET | Refills: 1 | Status: SHIPPED | OUTPATIENT
Start: 2024-12-21 | End: 2025-02-19

## 2024-12-13 NOTE — TELEPHONE ENCOUNTER
Controlled Substance Monitoring:    Acute and Chronic Pain Monitoring:   RX Monitoring Periodic Controlled Substance Monitoring Chronic Pain > 80 MEDD   12/13/2024   5:05 PM No signs of potential drug abuse or diversion identified. Obtained or confirmed \"Medication Contract\" on file.

## 2024-12-16 DIAGNOSIS — J44.9 STAGE 2 MODERATE COPD BY GOLD CLASSIFICATION (HCC): ICD-10-CM

## 2024-12-16 RX ORDER — TIOTROPIUM BROMIDE INHALATION SPRAY 3.12 UG/1
SPRAY, METERED RESPIRATORY (INHALATION)
Qty: 4 G | Refills: 0 | Status: SHIPPED | OUTPATIENT
Start: 2024-12-16

## 2025-01-10 DIAGNOSIS — E03.9 HYPOTHYROIDISM, UNSPECIFIED TYPE: ICD-10-CM

## 2025-01-10 DIAGNOSIS — Z72.0 TOBACCO ABUSE: ICD-10-CM

## 2025-01-10 RX ORDER — VARENICLINE TARTRATE 1 MG/1
1 TABLET, FILM COATED ORAL 2 TIMES DAILY
Qty: 60 TABLET | Refills: 5 | Status: CANCELLED | OUTPATIENT
Start: 2025-01-10

## 2025-01-10 RX ORDER — LEVOTHYROXINE SODIUM 100 UG/1
TABLET ORAL
Qty: 100 TABLET | Refills: 0 | Status: CANCELLED | OUTPATIENT
Start: 2025-01-10

## 2025-01-10 RX ORDER — PHENTERMINE HYDROCHLORIDE 37.5 MG/1
37.5 TABLET ORAL
Qty: 30 TABLET | Refills: 0 | Status: CANCELLED | OUTPATIENT
Start: 2025-01-10 | End: 2025-02-09

## 2025-01-10 NOTE — TELEPHONE ENCOUNTER
Sarah LOUIS called requesting a refill of the below medication which has been pended for you:     Requested Prescriptions     Pending Prescriptions Disp Refills    phentermine (ADIPEX-P) 37.5 MG tablet [Pharmacy Med Name: Phentermine HCl 37.5 MG Oral Tablet] 30 tablet 0     Sig: TAKE 1 TABLET BY MOUTH IN THE MORNING. MAX DAILY AMOUNT 37.5MG.    CHANTIX 1 MG tablet [Pharmacy Med Name: Chantix 1 MG Oral Tablet] 60 tablet 0     Sig: Take 1 tablet by mouth twice daily       Last Appointment Date: 11/25/2024  Next Appointment Date: 1/10/2025    Allergies   Allergen Reactions    Flomax [Tamsulosin Hcl] Swelling     Swelling of arms; however, also had rash and fever at the same time and was admitted at the time    Decadron [Dexamethasone]      Tachycardia    Morphine Itching and Rash    Prednisone      Emotional changes, depression    Quetiapine      \"Makes me nuts\"      Saphris [Asenapine]     Bactrim [Sulfamethoxazole-Trimethoprim] Diarrhea and Nausea Only    Codeine Nausea And Vomiting    Trazodone      Sleepwalking      Zyprexa [Olanzapine] Other (See Comments)     Made her feel like she wanted to \"jump out of my skin\"

## 2025-01-11 RX ORDER — VARENICLINE TARTRATE 1 MG/1
1 TABLET, FILM COATED ORAL 2 TIMES DAILY
Qty: 60 TABLET | Refills: 5 | Status: SHIPPED | OUTPATIENT
Start: 2025-01-11

## 2025-01-11 RX ORDER — PHENTERMINE HYDROCHLORIDE 37.5 MG/1
37.5 TABLET ORAL DAILY
Qty: 30 TABLET | Refills: 0 | Status: SHIPPED | OUTPATIENT
Start: 2025-01-14 | End: 2025-02-13

## 2025-01-11 NOTE — TELEPHONE ENCOUNTER
Controlled Substance Monitoring:    Acute and Chronic Pain Monitoring:   RX Monitoring Periodic Controlled Substance Monitoring Chronic Pain > 80 MEDD   1/11/2025   3:27 PM No signs of potential drug abuse or diversion identified. Obtained or confirmed \"Medication Contract\" on file.

## 2025-01-15 DIAGNOSIS — J44.9 STAGE 2 MODERATE COPD BY GOLD CLASSIFICATION (HCC): ICD-10-CM

## 2025-01-15 RX ORDER — TIOTROPIUM BROMIDE INHALATION SPRAY 3.12 UG/1
SPRAY, METERED RESPIRATORY (INHALATION)
Qty: 4 G | Refills: 11 | Status: SHIPPED | OUTPATIENT
Start: 2025-01-15

## 2025-01-17 DIAGNOSIS — J30.9 ALLERGIC RHINITIS, UNSPECIFIED SEASONALITY, UNSPECIFIED TRIGGER: ICD-10-CM

## 2025-01-20 DIAGNOSIS — E03.9 HYPOTHYROIDISM, UNSPECIFIED TYPE: ICD-10-CM

## 2025-01-20 RX ORDER — LEVOTHYROXINE SODIUM 100 UG/1
TABLET ORAL
Qty: 100 TABLET | Refills: 0 | OUTPATIENT
Start: 2025-01-20

## 2025-01-20 SDOH — ECONOMIC STABILITY: FOOD INSECURITY: WITHIN THE PAST 12 MONTHS, THE FOOD YOU BOUGHT JUST DIDN'T LAST AND YOU DIDN'T HAVE MONEY TO GET MORE.: SOMETIMES TRUE

## 2025-01-20 SDOH — ECONOMIC STABILITY: INCOME INSECURITY: IN THE LAST 12 MONTHS, WAS THERE A TIME WHEN YOU WERE NOT ABLE TO PAY THE MORTGAGE OR RENT ON TIME?: NO

## 2025-01-20 SDOH — ECONOMIC STABILITY: FOOD INSECURITY: WITHIN THE PAST 12 MONTHS, YOU WORRIED THAT YOUR FOOD WOULD RUN OUT BEFORE YOU GOT MONEY TO BUY MORE.: SOMETIMES TRUE

## 2025-01-20 ASSESSMENT — PATIENT HEALTH QUESTIONNAIRE - PHQ9
4. FEELING TIRED OR HAVING LITTLE ENERGY: SEVERAL DAYS
6. FEELING BAD ABOUT YOURSELF - OR THAT YOU ARE A FAILURE OR HAVE LET YOURSELF OR YOUR FAMILY DOWN: SEVERAL DAYS
7. TROUBLE CONCENTRATING ON THINGS, SUCH AS READING THE NEWSPAPER OR WATCHING TELEVISION: SEVERAL DAYS
1. LITTLE INTEREST OR PLEASURE IN DOING THINGS: SEVERAL DAYS
2. FEELING DOWN, DEPRESSED OR HOPELESS: NOT AT ALL
SUM OF ALL RESPONSES TO PHQ QUESTIONS 1-9: 6
3. TROUBLE FALLING OR STAYING ASLEEP: NOT AT ALL
3. TROUBLE FALLING OR STAYING ASLEEP: NOT AT ALL
SUM OF ALL RESPONSES TO PHQ QUESTIONS 1-9: 6
SUM OF ALL RESPONSES TO PHQ QUESTIONS 1-9: 6
SUM OF ALL RESPONSES TO PHQ9 QUESTIONS 1 & 2: 1
SUM OF ALL RESPONSES TO PHQ QUESTIONS 1-9: 6
5. POOR APPETITE OR OVEREATING: SEVERAL DAYS
4. FEELING TIRED OR HAVING LITTLE ENERGY: SEVERAL DAYS
9. THOUGHTS THAT YOU WOULD BE BETTER OFF DEAD, OR OF HURTING YOURSELF: NOT AT ALL
9. THOUGHTS THAT YOU WOULD BE BETTER OFF DEAD, OR OF HURTING YOURSELF: NOT AT ALL
1. LITTLE INTEREST OR PLEASURE IN DOING THINGS: SEVERAL DAYS
8. MOVING OR SPEAKING SO SLOWLY THAT OTHER PEOPLE COULD HAVE NOTICED. OR THE OPPOSITE, BEING SO FIGETY OR RESTLESS THAT YOU HAVE BEEN MOVING AROUND A LOT MORE THAN USUAL: SEVERAL DAYS
6. FEELING BAD ABOUT YOURSELF - OR THAT YOU ARE A FAILURE OR HAVE LET YOURSELF OR YOUR FAMILY DOWN: SEVERAL DAYS
2. FEELING DOWN, DEPRESSED OR HOPELESS: NOT AT ALL
5. POOR APPETITE OR OVEREATING: SEVERAL DAYS
SUM OF ALL RESPONSES TO PHQ QUESTIONS 1-9: 6
7. TROUBLE CONCENTRATING ON THINGS, SUCH AS READING THE NEWSPAPER OR WATCHING TELEVISION: SEVERAL DAYS
10. IF YOU CHECKED OFF ANY PROBLEMS, HOW DIFFICULT HAVE THESE PROBLEMS MADE IT FOR YOU TO DO YOUR WORK, TAKE CARE OF THINGS AT HOME, OR GET ALONG WITH OTHER PEOPLE: NOT DIFFICULT AT ALL
8. MOVING OR SPEAKING SO SLOWLY THAT OTHER PEOPLE COULD HAVE NOTICED. OR THE OPPOSITE - BEING SO FIDGETY OR RESTLESS THAT YOU HAVE BEEN MOVING AROUND A LOT MORE THAN USUAL: SEVERAL DAYS
10. IF YOU CHECKED OFF ANY PROBLEMS, HOW DIFFICULT HAVE THESE PROBLEMS MADE IT FOR YOU TO DO YOUR WORK, TAKE CARE OF THINGS AT HOME, OR GET ALONG WITH OTHER PEOPLE: NOT DIFFICULT AT ALL

## 2025-01-20 NOTE — TELEPHONE ENCOUNTER
Sarah LOUIS called requesting a refill of the below medication which has been pended for you:     Requested Prescriptions     Pending Prescriptions Disp Refills    fluticasone (FLONASE) 50 MCG/ACT nasal spray [Pharmacy Med Name: Fluticasone Propionate 50 MCG/ACT Nasal Suspension] 144 g 0     Sig: USE 1 TO 2 SPRAY(S) IN EACH NOSTRIL ONCE DAILY AS NEEDED       Last Appointment Date: 11/25/2024  Next Appointment Date: 1/20/2025    Allergies   Allergen Reactions    Flomax [Tamsulosin Hcl] Swelling     Swelling of arms; however, also had rash and fever at the same time and was admitted at the time    Decadron [Dexamethasone]      Tachycardia    Morphine Itching and Rash    Prednisone      Emotional changes, depression    Quetiapine      \"Makes me nuts\"      Saphris [Asenapine]     Bactrim [Sulfamethoxazole-Trimethoprim] Diarrhea and Nausea Only    Codeine Nausea And Vomiting    Trazodone      Sleepwalking      Zyprexa [Olanzapine] Other (See Comments)     Made her feel like she wanted to \"jump out of my skin\"

## 2025-01-21 RX ORDER — FLUTICASONE PROPIONATE 50 MCG
1-2 SPRAY, SUSPENSION (ML) NASAL DAILY PRN
Qty: 48 G | Refills: 3 | Status: SHIPPED | OUTPATIENT
Start: 2025-01-21

## 2025-01-22 ENCOUNTER — HOSPITAL ENCOUNTER (OUTPATIENT)
Age: 60
Discharge: HOME | End: 2025-01-22
Payer: MEDICARE

## 2025-01-22 ENCOUNTER — OFFICE VISIT (OUTPATIENT)
Dept: FAMILY MEDICINE CLINIC | Age: 60
End: 2025-01-22

## 2025-01-22 ENCOUNTER — PATIENT MESSAGE (OUTPATIENT)
Dept: FAMILY MEDICINE CLINIC | Age: 60
End: 2025-01-22

## 2025-01-22 VITALS
HEART RATE: 93 BPM | HEIGHT: 61 IN | SYSTOLIC BLOOD PRESSURE: 132 MMHG | BODY MASS INDEX: 37.97 KG/M2 | DIASTOLIC BLOOD PRESSURE: 70 MMHG | WEIGHT: 201.1 LBS | TEMPERATURE: 98.1 F | RESPIRATION RATE: 16 BRPM | OXYGEN SATURATION: 97 %

## 2025-01-22 DIAGNOSIS — E78.2 HYPERCHOLESTEROLEMIA WITH HYPERTRIGLYCERIDEMIA: ICD-10-CM

## 2025-01-22 DIAGNOSIS — E03.9 HYPOTHYROIDISM, UNSPECIFIED TYPE: Primary | ICD-10-CM

## 2025-01-22 DIAGNOSIS — M54.42 CHRONIC MIDLINE LOW BACK PAIN WITH BILATERAL SCIATICA: ICD-10-CM

## 2025-01-22 DIAGNOSIS — G89.29 CHRONIC MIDLINE LOW BACK PAIN WITH BILATERAL SCIATICA: ICD-10-CM

## 2025-01-22 DIAGNOSIS — E55.9 VITAMIN D DEFICIENCY: ICD-10-CM

## 2025-01-22 DIAGNOSIS — R73.01 IMPAIRED FASTING GLUCOSE: ICD-10-CM

## 2025-01-22 DIAGNOSIS — M54.41 CHRONIC MIDLINE LOW BACK PAIN WITH BILATERAL SCIATICA: ICD-10-CM

## 2025-01-22 DIAGNOSIS — E03.9 HYPOTHYROIDISM, UNSPECIFIED TYPE: ICD-10-CM

## 2025-01-22 DIAGNOSIS — H92.01 ACUTE OTALGIA, RIGHT: ICD-10-CM

## 2025-01-22 DIAGNOSIS — Z78.9 STATIN NOT TOLERATED: ICD-10-CM

## 2025-01-22 DIAGNOSIS — E83.42 HYPOMAGNESEMIA: ICD-10-CM

## 2025-01-22 DIAGNOSIS — Z72.0 TOBACCO ABUSE: ICD-10-CM

## 2025-01-22 DIAGNOSIS — E78.00 PURE HYPERCHOLESTEROLEMIA: ICD-10-CM

## 2025-01-22 DIAGNOSIS — Z98.1 S/P LUMBAR FUSION: ICD-10-CM

## 2025-01-22 DIAGNOSIS — M54.50 LEFT LUMBAR PAIN: ICD-10-CM

## 2025-01-22 LAB
25(OH)D3 SERPL-MCNC: 29.2 NG/ML (ref 30–100)
ALBUMIN SERPL-MCNC: 4.4 G/DL (ref 3.5–5.2)
ALBUMIN/GLOB SERPL: 1.8 {RATIO} (ref 1–2.5)
ALP SERPL-CCNC: 92 U/L (ref 35–104)
ALT SERPL-CCNC: 21 U/L (ref 5–33)
ANION GAP SERPL CALCULATED.3IONS-SCNC: 13 MMOL/L (ref 9–17)
AST SERPL-CCNC: 19 U/L
BILIRUB SERPL-MCNC: 0.2 MG/DL (ref 0.3–1.2)
BUN SERPL-MCNC: 11 MG/DL (ref 6–20)
BUN/CREAT SERPL: 12 (ref 9–20)
CALCIUM SERPL-MCNC: 9.6 MG/DL (ref 8.6–10.4)
CHLORIDE SERPL-SCNC: 102 MMOL/L (ref 98–107)
CHOLEST SERPL-MCNC: 249 MG/DL (ref 0–199)
CHOLESTEROL/HDL RATIO: 5.5
CO2 SERPL-SCNC: 25 MMOL/L (ref 20–31)
CREAT SERPL-MCNC: 0.9 MG/DL (ref 0.5–0.9)
EST. AVERAGE GLUCOSE BLD GHB EST-MCNC: 114 MG/DL
GFR, ESTIMATED: 74 ML/MIN/1.73M2
GLUCOSE SERPL-MCNC: 101 MG/DL (ref 70–99)
HBA1C MFR BLD: 5.6 % (ref 4–6)
HDLC SERPL-MCNC: 45 MG/DL
LDLC SERPL CALC-MCNC: 139 MG/DL (ref 0–100)
MAGNESIUM SERPL-MCNC: 1.8 MG/DL (ref 1.6–2.6)
POTASSIUM SERPL-SCNC: 4.1 MMOL/L (ref 3.7–5.3)
PROT SERPL-MCNC: 6.8 G/DL (ref 6.4–8.3)
SODIUM SERPL-SCNC: 140 MMOL/L (ref 135–144)
T4 FREE SERPL-MCNC: 1.2 NG/DL (ref 0.92–1.68)
TRIGL SERPL-MCNC: 327 MG/DL
TSH SERPL DL<=0.05 MIU/L-ACNC: 9.64 UIU/ML (ref 0.3–5)
VLDLC SERPL CALC-MCNC: 65 MG/DL (ref 1–30)

## 2025-01-22 PROCEDURE — 83036 HEMOGLOBIN GLYCOSYLATED A1C: CPT

## 2025-01-22 PROCEDURE — 80061 LIPID PANEL: CPT

## 2025-01-22 PROCEDURE — 36415 COLL VENOUS BLD VENIPUNCTURE: CPT

## 2025-01-22 PROCEDURE — 84443 ASSAY THYROID STIM HORMONE: CPT

## 2025-01-22 PROCEDURE — 83735 ASSAY OF MAGNESIUM: CPT

## 2025-01-22 PROCEDURE — 82306 VITAMIN D 25 HYDROXY: CPT

## 2025-01-22 PROCEDURE — 84439 ASSAY OF FREE THYROXINE: CPT

## 2025-01-22 PROCEDURE — 80053 COMPREHEN METABOLIC PANEL: CPT

## 2025-01-22 RX ORDER — EZETIMIBE 10 MG/1
10 TABLET ORAL DAILY
Qty: 90 TABLET | Refills: 3 | Status: SHIPPED | OUTPATIENT
Start: 2025-01-22

## 2025-01-22 RX ORDER — LEVOTHYROXINE SODIUM 125 UG/1
TABLET ORAL
Qty: 90 TABLET | Refills: 0 | Status: SHIPPED | OUTPATIENT
Start: 2025-01-22

## 2025-01-22 RX ORDER — LEVOTHYROXINE SODIUM 100 UG/1
TABLET ORAL
Qty: 100 TABLET | Refills: 0 | Status: CANCELLED | OUTPATIENT
Start: 2025-01-22

## 2025-01-22 RX ORDER — LIDOCAINE 50 MG/G
PATCH TOPICAL
Qty: 30 PATCH | Refills: 5 | Status: SHIPPED | OUTPATIENT
Start: 2025-01-22

## 2025-01-22 RX ORDER — NEOMYCIN SULFATE, POLYMYXIN B SULFATE AND HYDROCORTISONE 10; 3.5; 1 MG/ML; MG/ML; [USP'U]/ML
3 SUSPENSION/ DROPS AURICULAR (OTIC) 3 TIMES DAILY
Qty: 3 ML | Refills: 0 | Status: SHIPPED | OUTPATIENT
Start: 2025-01-22 | End: 2025-01-29

## 2025-01-22 NOTE — PROGRESS NOTES
[facility]  [address] [place] [address]  [phone]      Sarah Jones is a 59 y.o. female who presents today for her medical conditions/complaints as noted below.  Sarah Jones is c/o of f/u meds and Ear Pain (Right side x2 weeks with muffled hearing)      HPI:     Pt here today for follow-up of hypothyroidism, HYPERLIPIDEMIA, and labs.    Pt has been taking Synthroid 100 mcg daily, except 150 mcg on Sunday's consistently and compliantly; takes first thing in the morning on an empty stomach.  Increased her dose after last TSH - see result.    Has been off Repatha since Aug/Sept  Taking Zetia 10 mg daily for HYPERLIPIDEMIA - stable.    Using Lidoderm patches PRN back pain   Had a bad week with pain last week; no longer seeing Dr. Flanagan s/p lumbar fusion surgery  When she is laying on her side or bending, she feels something \"shift\" or the bones \"move\"  Having more pain just L of midline in upper lumbar area, which can radiate down into her L buttock  Flexeril never seemed to help her    Pt has lost 7 lbs since starting Adipex 37.5 mg daily - tolerating well.  Taking every day.  Pt would like to continue on this.  Pt also recently ordered a treadmill/walking pad so she can workout at home, as she sometimes gets anxious with going to the gym.  Portions have been smaller and trying not to eat after dinner or past 7:00 PM.    Still smoking 10 cigarettes daily - recently increased use again.  Still taking Chantix 1 mg BID for smoking cessation - plans to crack down on quitting again soon.  Had gone up to 3 weeks with complete cessation before Christmas but re-started smoking again - has a hard time with her mood in the winter months and has current stress with her son.    Taking Lisinopril 40 mg daily and Norvasc 10 mg daily for HTN - stable.  BP well-controlled today - 132/70.    Scheduled for CT lung on 1/28 and will see Dr. Garcia for f/u on 2/11.    Wearing CPAP

## 2025-01-23 ENCOUNTER — PATIENT MESSAGE (OUTPATIENT)
Dept: FAMILY MEDICINE CLINIC | Age: 60
End: 2025-01-23

## 2025-01-24 NOTE — TELEPHONE ENCOUNTER
Lidocaine patches denied. Please see scanned media regarding denial.   Pt updated via [employer].

## 2025-01-24 NOTE — TELEPHONE ENCOUNTER
Please see pt msg.    Sarah LOUIS called requesting a refill of the below medication which has been pended for you:     Requested Prescriptions     Pending Prescriptions Disp Refills    Evolocumab 140 MG/ML SOAJ 6 mL 1     Sig: Inject 140 mg into the skin every 14 days       Last Appointment Date: 1/22/2025  Next Appointment Date: 1/24/2025    Allergies   Allergen Reactions    Flomax [Tamsulosin Hcl] Swelling     Swelling of arms; however, also had rash and fever at the same time and was admitted at the time    Decadron [Dexamethasone]      Tachycardia    Morphine Itching and Rash    Prednisone      Emotional changes, depression    Quetiapine      \"Makes me nuts\"      Saphris [Asenapine]     Bactrim [Sulfamethoxazole-Trimethoprim] Diarrhea and Nausea Only    Codeine Nausea And Vomiting    Trazodone      Sleepwalking      Zyprexa [Olanzapine] Other (See Comments)     Made her feel like she wanted to \"jump out of my skin\"

## 2025-01-28 ENCOUNTER — HOSPITAL ENCOUNTER (OUTPATIENT)
Dept: CT IMAGING | Age: 60
Discharge: HOME | End: 2025-01-30
Payer: MEDICARE

## 2025-01-28 ENCOUNTER — HOSPITAL ENCOUNTER (OUTPATIENT)
Dept: GENERAL RADIOLOGY | Age: 60
Discharge: HOME | End: 2025-01-30
Payer: MEDICARE

## 2025-01-28 DIAGNOSIS — M54.50 LEFT LUMBAR PAIN: ICD-10-CM

## 2025-01-28 DIAGNOSIS — M54.41 CHRONIC MIDLINE LOW BACK PAIN WITH BILATERAL SCIATICA: ICD-10-CM

## 2025-01-28 DIAGNOSIS — G89.29 CHRONIC MIDLINE LOW BACK PAIN WITH BILATERAL SCIATICA: ICD-10-CM

## 2025-01-28 DIAGNOSIS — Z98.1 S/P LUMBAR FUSION: ICD-10-CM

## 2025-01-28 DIAGNOSIS — Z87.891 PERSONAL HISTORY OF TOBACCO USE: ICD-10-CM

## 2025-01-28 DIAGNOSIS — M54.42 CHRONIC MIDLINE LOW BACK PAIN WITH BILATERAL SCIATICA: ICD-10-CM

## 2025-01-28 PROCEDURE — 71271 CT THORAX LUNG CANCER SCR C-: CPT

## 2025-01-28 PROCEDURE — 72100 X-RAY EXAM L-S SPINE 2/3 VWS: CPT

## 2025-02-08 ENCOUNTER — OFFICE VISIT (OUTPATIENT)
Dept: PRIMARY CARE CLINIC | Age: 60
End: 2025-02-08
Payer: MEDICARE

## 2025-02-08 VITALS
HEART RATE: 110 BPM | HEIGHT: 61 IN | TEMPERATURE: 98.7 F | RESPIRATION RATE: 18 BRPM | SYSTOLIC BLOOD PRESSURE: 138 MMHG | OXYGEN SATURATION: 94 % | BODY MASS INDEX: 37.76 KG/M2 | DIASTOLIC BLOOD PRESSURE: 70 MMHG | WEIGHT: 200 LBS

## 2025-02-08 DIAGNOSIS — R42 VERTIGO: Primary | ICD-10-CM

## 2025-02-08 DIAGNOSIS — J01.40 ACUTE NON-RECURRENT PANSINUSITIS: ICD-10-CM

## 2025-02-08 PROCEDURE — 3075F SYST BP GE 130 - 139MM HG: CPT | Performed by: STUDENT IN AN ORGANIZED HEALTH CARE EDUCATION/TRAINING PROGRAM

## 2025-02-08 PROCEDURE — 99213 OFFICE O/P EST LOW 20 MIN: CPT | Performed by: STUDENT IN AN ORGANIZED HEALTH CARE EDUCATION/TRAINING PROGRAM

## 2025-02-08 PROCEDURE — 99214 OFFICE O/P EST MOD 30 MIN: CPT | Performed by: STUDENT IN AN ORGANIZED HEALTH CARE EDUCATION/TRAINING PROGRAM

## 2025-02-08 PROCEDURE — 3078F DIAST BP <80 MM HG: CPT | Performed by: STUDENT IN AN ORGANIZED HEALTH CARE EDUCATION/TRAINING PROGRAM

## 2025-02-08 RX ORDER — MECLIZINE HYDROCHLORIDE 25 MG/1
25 TABLET ORAL 3 TIMES DAILY PRN
Qty: 15 TABLET | Refills: 0 | Status: SHIPPED | OUTPATIENT
Start: 2025-02-08 | End: 2025-02-18

## 2025-02-08 ASSESSMENT — ENCOUNTER SYMPTOMS
SINUS PAIN: 1
SINUS PRESSURE: 1

## 2025-02-08 NOTE — PROGRESS NOTES
[facility]             [address] [address]                        Telephone [phone]             Fax [phone]       Sarah Jones  :  1965  Age:  59 y.o.   MRN:  [id number]  Date of visit:  2025     Assessment and Plan:    1. Vertigo  - amoxicillin-clavulanate (AUGMENTIN) 875-125 MG per tablet; Take 1 tablet by mouth 2 times daily for 10 days  Dispense: 20 tablet; Refill: 0  - meclizine (ANTIVERT) 25 MG tablet; Take 1 tablet by mouth 3 times daily as needed for Dizziness  Dispense: 15 tablet; Refill: 0    2. Acute non-recurrent pansinusitis  Acute sinusitis we will treat with Augmentin.  Will also give meclizine for vertigo that is likely secondary to her sinusitis.  Recommend return to the urgent care if symptoms worsen or fail to improve.  - amoxicillin-clavulanate (AUGMENTIN) 875-125 MG per tablet; Take 1 tablet by mouth 2 times daily for 10 days  Dispense: 20 tablet; Refill: 0  - meclizine (ANTIVERT) 25 MG tablet; Take 1 tablet by mouth 3 times daily as needed for Dizziness  Dispense: 15 tablet; Refill: 0      Subjective:    Sarah Jones is a 59 y.o. female who presents to [facility] today (2025) for evaluation of:  Dizziness (Vertigo starting today. Sinus pressure bilateral, sinus drainage with cough)    Chief Complaint   Patient presents with    Dizziness     Vertigo starting today. Sinus pressure bilateral, sinus drainage with cough     She has the following problem list:  Patient Active Problem List   Diagnosis    Stage 2 moderate COPD by GOLD classification ([facility])    Lumbar radiculopathy    Lumbar spondylosis    Chronic midline low back pain with bilateral sciatica    Lumbar degenerative disc disease    Generalized anxiety disorder    Hypertension    Recurrent major depressive disorder, in partial remission ([facility])    History of suicide attempt    Hypomagnesemia

## 2025-02-09 DIAGNOSIS — R51.9 CHRONIC FACIAL PAIN: ICD-10-CM

## 2025-02-09 DIAGNOSIS — M54.41 CHRONIC MIDLINE LOW BACK PAIN WITH BILATERAL SCIATICA: ICD-10-CM

## 2025-02-09 DIAGNOSIS — G89.29 CHRONIC MIDLINE LOW BACK PAIN WITH BILATERAL SCIATICA: ICD-10-CM

## 2025-02-09 DIAGNOSIS — G89.29 CHRONIC FACIAL PAIN: ICD-10-CM

## 2025-02-09 DIAGNOSIS — M54.42 CHRONIC MIDLINE LOW BACK PAIN WITH BILATERAL SCIATICA: ICD-10-CM

## 2025-02-10 NOTE — TELEPHONE ENCOUNTER
Per [facility] last refill of Phentermine 01/14 #30 Patient states she is tolerating well and weight is 196  Last refill of Gabapentin is 01/15 #90    Sarah LOUIS called requesting a refill of the below medication which has been pended for you:     Requested Prescriptions     Pending Prescriptions Disp Refills    gabapentin (NEURONTIN) 600 MG tablet 90 tablet 1     Sig: Take 1 tablet by mouth 3 times daily for 60 days.    phentermine (ADIPEX-P) 37.5 MG tablet 30 tablet 0     Sig: Take 1 tablet by mouth daily for 30 days. Max Daily Amount: 37.5 mg       Last Appointment Date: 1/22/2025  Next Appointment Date: 5/23/2025    Allergies   Allergen Reactions    Flomax [Tamsulosin Hcl] Swelling     Swelling of arms; however, also had rash and fever at the same time and was admitted at the time    Decadron [Dexamethasone]      Tachycardia    Morphine Itching and Rash    Prednisone      Emotional changes, depression    Quetiapine      \"Makes me nuts\"      Saphris [Asenapine]     Bactrim [Sulfamethoxazole-Trimethoprim] Diarrhea and Nausea Only    Codeine Nausea And Vomiting    Trazodone      Sleepwalking      Zyprexa [Olanzapine] Other (See Comments)     Made her feel like she wanted to \"jump out of my skin\"

## 2025-02-11 ENCOUNTER — OFFICE VISIT (OUTPATIENT)
Dept: PULMONOLOGY | Age: 60
End: 2025-02-11
Payer: MEDICARE

## 2025-02-11 ENCOUNTER — TRANSCRIBE ORDERS (OUTPATIENT)
Dept: ADMINISTRATIVE | Age: 60
End: 2025-02-11

## 2025-02-11 VITALS
WEIGHT: 200 LBS | BODY MASS INDEX: 37.76 KG/M2 | HEART RATE: 102 BPM | DIASTOLIC BLOOD PRESSURE: 70 MMHG | RESPIRATION RATE: 16 BRPM | HEIGHT: 61 IN | SYSTOLIC BLOOD PRESSURE: 136 MMHG | OXYGEN SATURATION: 95 %

## 2025-02-11 DIAGNOSIS — D16.4 OSTEOMA OF NASAL SINUS: Primary | ICD-10-CM

## 2025-02-11 DIAGNOSIS — G50.0 TRIGEMINAL NEURALGIA: ICD-10-CM

## 2025-02-11 DIAGNOSIS — R51.9 CHRONIC FACIAL PAIN: ICD-10-CM

## 2025-02-11 DIAGNOSIS — G89.29 CHRONIC FACIAL PAIN: ICD-10-CM

## 2025-02-11 DIAGNOSIS — M54.41 CHRONIC MIDLINE LOW BACK PAIN WITH BILATERAL SCIATICA: ICD-10-CM

## 2025-02-11 DIAGNOSIS — G47.33 OSA ON CPAP: ICD-10-CM

## 2025-02-11 DIAGNOSIS — J44.9 STAGE 2 MODERATE COPD BY GOLD CLASSIFICATION (HCC): Primary | ICD-10-CM

## 2025-02-11 DIAGNOSIS — Z72.0 TOBACCO ABUSE: ICD-10-CM

## 2025-02-11 DIAGNOSIS — M54.42 CHRONIC MIDLINE LOW BACK PAIN WITH BILATERAL SCIATICA: ICD-10-CM

## 2025-02-11 DIAGNOSIS — G89.29 CHRONIC MIDLINE LOW BACK PAIN WITH BILATERAL SCIATICA: ICD-10-CM

## 2025-02-11 PROCEDURE — 3075F SYST BP GE 130 - 139MM HG: CPT | Performed by: INTERNAL MEDICINE

## 2025-02-11 PROCEDURE — 3078F DIAST BP <80 MM HG: CPT | Performed by: INTERNAL MEDICINE

## 2025-02-11 PROCEDURE — 99214 OFFICE O/P EST MOD 30 MIN: CPT | Performed by: INTERNAL MEDICINE

## 2025-02-11 PROCEDURE — 99213 OFFICE O/P EST LOW 20 MIN: CPT | Performed by: INTERNAL MEDICINE

## 2025-02-11 NOTE — PROGRESS NOTES
RECOMMENDATIONS/COUNSELING:  Follow healthy behaviors: nutrition, exercise and medication adherence.  Reviewed weight control and importance of maintaining an active lifestyle.    IMMUNIZATION HISTORY/RECOMMENDATIONS:    Immunization History   Administered Date(s) Administered    COVID-19, MODERNA BLUE border, Primary or Immunocompromised, (age 12y+), IM, 100 mcg/0.5mL 03/11/2021, 04/08/2021    COVID-19, [place] Vaccine, Vaccine Unspecified 03/11/2021, 04/08/2021    Influenza Virus Vaccine 09/30/2016    Influenza, FLUBLOK, (age 18 y+), IM, Trivalent PF, 0.5mL 10/15/2024    MMR, PRIORIX, M-M-R II, (age 12m+), SC, 0.5mL 11/30/2011    Pneumococcal, PCV20, PREVNAR 20, (age 6w+), IM, 0.5mL 10/15/2024    TDaP, ADACEL (age 10y-64y), BOOSTRIX (age 10y+), IM, 0.5mL 11/30/2011    Zoster Recombinant (Shingrix) 10/15/2024     Immunization records reviewed.  Recommended influenza vaccination in the fall annually.  Recommendations were given regarding pneumococcal, RSV and COVID-19 vaccination.    All the questions that the patient had were answered to her satisfaction.  Return in about 6 months (around 8/11/2025).  Thank you for having us involved in the care of your patient.  Please call us if you have any questions or concerns.    Brown Garcia MD  Pulmonary and Critical Care Medicine         The patient (or guardian, if applicable) and other individuals in attendance with the patient were advised that Artificial Intelligence will be utilized during this visit to record, process the conversation to generate a clinical note and to support improvement of the AI technology. The patient (or guardian, if applicable) and other individuals in attendance at the appointment consented to the use of AI, including the recording.      An electronic signature was used to authenticate this note.    While intent was to generate a document that actually reflects the content of the visit, the document can still have some errors including those of

## 2025-02-12 RX ORDER — GABAPENTIN 600 MG/1
600 TABLET ORAL 3 TIMES DAILY
Qty: 90 TABLET | Refills: 0 | OUTPATIENT
Start: 2025-02-12

## 2025-02-12 RX ORDER — PHENTERMINE HYDROCHLORIDE 37.5 MG/1
37.5 TABLET ORAL DAILY
Qty: 30 TABLET | Refills: 0 | OUTPATIENT
Start: 2025-02-12

## 2025-02-13 ENCOUNTER — PATIENT MESSAGE (OUTPATIENT)
Dept: FAMILY MEDICINE CLINIC | Age: 60
End: 2025-02-13

## 2025-02-13 RX ORDER — GABAPENTIN 600 MG/1
600 TABLET ORAL 3 TIMES DAILY
Qty: 90 TABLET | Refills: 1 | Status: SHIPPED | OUTPATIENT
Start: 2025-02-13 | End: 2025-04-14

## 2025-02-13 RX ORDER — PHENTERMINE HYDROCHLORIDE 37.5 MG/1
37.5 TABLET ORAL DAILY
Qty: 30 TABLET | Refills: 0 | Status: SHIPPED | OUTPATIENT
Start: 2025-02-13 | End: 2025-03-15

## 2025-02-13 NOTE — TELEPHONE ENCOUNTER
Patient called and stated she was given Tramadol by Dr. Barnes for facial pain. She states the Tramadol gave her extreme nausea. She requested new pain medication from Dr. Barnes but he advise patient to follow up with PCP regarding this.   Soonest available appointment is 3/5.  Patient states her pain is a 8 out 10 some days and would like a note to be sent to Dr. Chris to see if she would send something else in for pain.     Patient aware Dr. Chris is out of the office today.     Please advise.     Last OV 1/22/25  Next OV 5/23/25

## 2025-02-14 NOTE — TELEPHONE ENCOUNTER
Please inform pt that Rx's were sent to pharmacy.      Controlled Substance Monitoring:    Acute and Chronic Pain Monitoring:   RX Monitoring Periodic Controlled Substance Monitoring Chronic Pain > 80 MEDD   2/13/2025  10:02 PM No signs of potential drug abuse or diversion identified. Obtained or confirmed \"Medication Contract\" on file.

## 2025-02-18 ENCOUNTER — OFFICE VISIT (OUTPATIENT)
Dept: FAMILY MEDICINE CLINIC | Age: 60
End: 2025-02-18
Payer: MEDICARE

## 2025-02-18 ENCOUNTER — HOSPITAL ENCOUNTER (OUTPATIENT)
Age: 60
Setting detail: SPECIMEN
Discharge: HOME | End: 2025-02-18
Payer: MEDICARE

## 2025-02-18 VITALS
BODY MASS INDEX: 38.33 KG/M2 | TEMPERATURE: 98.2 F | DIASTOLIC BLOOD PRESSURE: 66 MMHG | HEART RATE: 97 BPM | SYSTOLIC BLOOD PRESSURE: 138 MMHG | HEIGHT: 61 IN | RESPIRATION RATE: 18 BRPM | OXYGEN SATURATION: 96 % | WEIGHT: 203 LBS

## 2025-02-18 DIAGNOSIS — K14.6 TONGUE SORE: ICD-10-CM

## 2025-02-18 DIAGNOSIS — G51.8 FACIAL NEURALGIA: Primary | ICD-10-CM

## 2025-02-18 DIAGNOSIS — R35.0 URINARY FREQUENCY: ICD-10-CM

## 2025-02-18 LAB
BACTERIA URNS QL MICRO: ABNORMAL
BILIRUB UR QL STRIP: NEGATIVE
CHARACTER UR: ABNORMAL
CLARITY UR: CLEAR
COLOR UR: YELLOW
EPI CELLS #/AREA URNS HPF: ABNORMAL /HPF (ref 0–5)
GLUCOSE UR STRIP-MCNC: NEGATIVE MG/DL
HGB UR QL STRIP.AUTO: NEGATIVE
KETONES UR STRIP-MCNC: NEGATIVE MG/DL
LEUKOCYTE ESTERASE UR QL STRIP: ABNORMAL
NITRITE UR QL STRIP: NEGATIVE
PH UR STRIP: 6 [PH] (ref 5–6)
PROT UR STRIP-MCNC: NEGATIVE MG/DL
RBC #/AREA URNS HPF: ABNORMAL /HPF (ref 0–4)
SP GR UR STRIP: 1.01 (ref 1.01–1.02)
UROBILINOGEN UR STRIP-ACNC: NORMAL EU/DL (ref 0–1)
WBC #/AREA URNS HPF: ABNORMAL /HPF (ref 0–4)

## 2025-02-18 PROCEDURE — 3078F DIAST BP <80 MM HG: CPT | Performed by: FAMILY MEDICINE

## 2025-02-18 PROCEDURE — 99214 OFFICE O/P EST MOD 30 MIN: CPT | Performed by: FAMILY MEDICINE

## 2025-02-18 PROCEDURE — 81001 URINALYSIS AUTO W/SCOPE: CPT

## 2025-02-18 PROCEDURE — 3075F SYST BP GE 130 - 139MM HG: CPT | Performed by: FAMILY MEDICINE

## 2025-02-18 RX ORDER — HYDROCODONE BITARTRATE AND ACETAMINOPHEN 5; 325 MG/1; MG/1
1 TABLET ORAL 2 TIMES DAILY PRN
Qty: 10 TABLET | Refills: 0 | Status: SHIPPED | OUTPATIENT
Start: 2025-02-18 | End: 2025-02-28 | Stop reason: SDUPTHER

## 2025-02-18 RX ORDER — CARBAMAZEPINE 100 MG/1
100 CAPSULE, EXTENDED RELEASE ORAL 2 TIMES DAILY
Qty: 60 CAPSULE | Refills: 0 | Status: SHIPPED | OUTPATIENT
Start: 2025-02-18

## 2025-02-18 RX ORDER — TRIAMCINOLONE ACETONIDE 0.1 %
PASTE (GRAM) DENTAL
Qty: 5 G | Refills: 0 | Status: SHIPPED | OUTPATIENT
Start: 2025-02-18 | End: 2025-02-25

## 2025-02-21 ENCOUNTER — PATIENT MESSAGE (OUTPATIENT)
Dept: FAMILY MEDICINE CLINIC | Age: 60
End: 2025-02-21

## 2025-02-24 ENCOUNTER — PATIENT MESSAGE (OUTPATIENT)
Dept: FAMILY MEDICINE CLINIC | Age: 60
End: 2025-02-24

## 2025-02-27 ENCOUNTER — PATIENT MESSAGE (OUTPATIENT)
Dept: FAMILY MEDICINE CLINIC | Age: 60
End: 2025-02-27

## 2025-02-27 DIAGNOSIS — G51.8 FACIAL NEURALGIA: ICD-10-CM

## 2025-02-28 RX ORDER — HYDROCODONE BITARTRATE AND ACETAMINOPHEN 5; 325 MG/1; MG/1
1 TABLET ORAL 2 TIMES DAILY PRN
Qty: 10 TABLET | Refills: 0 | Status: SHIPPED | OUTPATIENT
Start: 2025-02-28 | End: 2025-03-05

## 2025-02-28 NOTE — TELEPHONE ENCOUNTER
Controlled Substance Monitoring:    Acute and Chronic Pain Monitoring:   RX Monitoring Periodic Controlled Substance Monitoring Chronic Pain > 80 MEDD   2/28/2025   5:37 PM No signs of potential drug abuse or diversion identified. Obtained or confirmed \"Medication Contract\" on file.

## 2025-02-28 NOTE — TELEPHONE ENCOUNTER
Please see pt msg in this encounter.  Per [facility], last fill 2/18, quantity 10 for 5 days.     Sarah HERIBERTO called requesting a refill of the below medication which has been pended for you:     Requested Prescriptions     Pending Prescriptions Disp Refills    HYDROcodone-acetaminophen (NORCO) 5-325 MG per tablet 10 tablet 0     Sig: Take 1 tablet by mouth 2 times daily as needed for Pain for up to 5 days. Take lowest dose possible to manage pain Max Daily Amount: 2 tablets       Last Appointment Date: 2/18/2025  Next Appointment Date: 5/23/2025    Allergies   Allergen Reactions    Flomax [Tamsulosin Hcl] Swelling     Swelling of arms; however, also had rash and fever at the same time and was admitted at the time    Decadron [Dexamethasone]      Tachycardia    Morphine Itching and Rash    Prednisone      Emotional changes, depression    Quetiapine      \"Makes me nuts\"      Saphris [Asenapine]     Bactrim [Sulfamethoxazole-Trimethoprim] Diarrhea and Nausea Only    Codeine Nausea And Vomiting    Trazodone      Sleepwalking      Zyprexa [Olanzapine] Other (See Comments)     Made her feel like she wanted to \"jump out of my skin\"

## 2025-03-04 NOTE — PROGRESS NOTES
[facility]  [address] [place] [address]  [phone]      Sarah Jones is a 59 y.o. female who presents today for her medical conditions/complaints as noted below.  Sarah Jones is c/o of Facial Pain (Right side x1 week, ear radiating to jaw)      HPI:     History of Present Illness  The patient is a 59-year-old female who presents today for right-sided facial pain.    She reports experiencing persistent right-sided facial pain, which she describes as severe and stabbing in nature. The pain originates from her ear and radiates downwards, affecting her lower jaw and chin. She also experiences swelling in her right lower jaw towards her chin, extending under her ear and back down to her lower jaw. Her sinuses are sore and tender, and she sounds congested. She was prescribed Augmentin and meclizine for dizziness at an urgent care visit 10 days ago. She has one more dose of Augmentin left. She no longer experiences vertigo or dizziness and has stopped using meclizine. She contacted Dr. Rowe on the 4th, reporting that her pain worsened on Saturday, then improved, but worsened again today. She has mild congestion and pain in her jaw and ear, which is somewhat better with heat. An MRI is scheduled for the 13th because he ordered a brain MRI instead of the head and neck. She saw him last on January 29th and was told to come back in 6 months. She has a history of trigeminal neuralgia post-surgery, which required treatment at the [facility]. She has not been prescribed Tegretol for this condition. She underwent a procedure involving needle insertion into her ear twice at the [facility], which alleviated her symptoms. She has been advised to complete her course of antibiotics. She reports no ear pain with movement. She experiences severe pain a few times a day, which she describes as shooting and nerve-like. She also reports difficulty swallowing,

## 2025-03-05 DIAGNOSIS — J01.40 ACUTE NON-RECURRENT PANSINUSITIS: ICD-10-CM

## 2025-03-05 DIAGNOSIS — R42 VERTIGO: ICD-10-CM

## 2025-03-05 NOTE — TELEPHONE ENCOUNTER
Saarh LOUIS called requesting a refill of the below medication which has been pended for you:     Requested Prescriptions     Pending Prescriptions Disp Refills    meclizine (ANTIVERT) 25 MG tablet [Pharmacy Med Name: Meclizine HCl 25 MG Oral Tablet] 15 tablet 0     Sig: TAKE 1 TABLET BY MOUTH THREE TIMES DAILY AS NEEDED FOR DIZZINESS       Last Appointment Date: 2/8/2025  Next Appointment Date: Visit date not found    Allergies   Allergen Reactions    Flomax [Tamsulosin Hcl] Swelling     Swelling of arms; however, also had rash and fever at the same time and was admitted at the time    Decadron [Dexamethasone]      Tachycardia    Morphine Itching and Rash    Prednisone      Emotional changes, depression    Quetiapine      \"Makes me nuts\"      Saphris [Asenapine]     Bactrim [Sulfamethoxazole-Trimethoprim] Diarrhea and Nausea Only    Codeine Nausea And Vomiting    Trazodone      Sleepwalking      Zyprexa [Olanzapine] Other (See Comments)     Made her feel like she wanted to \"jump out of my skin\"

## 2025-03-06 RX ORDER — MECLIZINE HYDROCHLORIDE 25 MG/1
25 TABLET ORAL 3 TIMES DAILY PRN
Qty: 30 TABLET | Refills: 2 | Status: SHIPPED | OUTPATIENT
Start: 2025-03-06

## 2025-03-10 DIAGNOSIS — I10 ESSENTIAL HYPERTENSION: ICD-10-CM

## 2025-03-10 NOTE — TELEPHONE ENCOUNTER
Sarah LOUIS called requesting a refill of the below medication which has been pended for you:     Requested Prescriptions     Pending Prescriptions Disp Refills    amLODIPine (NORVASC) 10 MG tablet [Pharmacy Med Name: amLODIPine Besylate 10 MG Oral Tablet] 270 tablet 0     Sig: Take 1 tablet by mouth once daily       Last Appointment Date: 2/18/2025  Next Appointment Date: 5/23/2025    Allergies   Allergen Reactions    Flomax [Tamsulosin Hcl] Swelling     Swelling of arms; however, also had rash and fever at the same time and was admitted at the time    Decadron [Dexamethasone]      Tachycardia    Morphine Itching and Rash    Prednisone      Emotional changes, depression    Quetiapine      \"Makes me nuts\"      Saphris [Asenapine]     Bactrim [Sulfamethoxazole-Trimethoprim] Diarrhea and Nausea Only    Codeine Nausea And Vomiting    Trazodone      Sleepwalking      Zyprexa [Olanzapine] Other (See Comments)     Made her feel like she wanted to \"jump out of my skin\"

## 2025-03-12 RX ORDER — AMLODIPINE BESYLATE 10 MG/1
10 TABLET ORAL DAILY
Qty: 90 TABLET | Refills: 3 | Status: SHIPPED | OUTPATIENT
Start: 2025-03-12

## 2025-03-12 NOTE — TELEPHONE ENCOUNTER
Per [facility], last fill 02/14/25, quantity 30 for 30 days.   Sarah LOUIS called requesting a refill of the below medication which has been pended for you: due 3/16/25    Requested Prescriptions     Pending Prescriptions Disp Refills    phentermine (ADIPEX-P) 37.5 MG tablet 30 tablet 0     Sig: Take 1 tablet by mouth daily for 30 days. Max Daily Amount: 37.5 mg       Last Appointment Date: 2/18/2025  Next Appointment Date: 5/23/2025    Allergies   Allergen Reactions    Flomax [Tamsulosin Hcl] Swelling     Swelling of arms; however, also had rash and fever at the same time and was admitted at the time    Decadron [Dexamethasone]      Tachycardia    Morphine Itching and Rash    Prednisone      Emotional changes, depression    Quetiapine      \"Makes me nuts\"      Saphris [Asenapine]     Bactrim [Sulfamethoxazole-Trimethoprim] Diarrhea and Nausea Only    Codeine Nausea And Vomiting    Trazodone      Sleepwalking      Zyprexa [Olanzapine] Other (See Comments)     Made her feel like she wanted to \"jump out of my skin\"

## 2025-03-14 ENCOUNTER — PATIENT MESSAGE (OUTPATIENT)
Dept: FAMILY MEDICINE CLINIC | Age: 60
End: 2025-03-14

## 2025-03-14 DIAGNOSIS — G51.8 FACIAL NEURALGIA: ICD-10-CM

## 2025-03-14 RX ORDER — HYDROCODONE BITARTRATE AND ACETAMINOPHEN 5; 325 MG/1; MG/1
1 TABLET ORAL 2 TIMES DAILY PRN
Qty: 20 TABLET | Refills: 0 | Status: SHIPPED | OUTPATIENT
Start: 2025-03-14 | End: 2025-03-24

## 2025-03-14 NOTE — TELEPHONE ENCOUNTER
Controlled Substance Monitoring:    Acute and Chronic Pain Monitoring:   RX Monitoring Periodic Controlled Substance Monitoring Chronic Pain > 80 MEDD   3/14/2025   6:08 PM No signs of potential drug abuse or diversion identified. Obtained or confirmed \"Medication Contract\" on file.

## 2025-03-14 NOTE — TELEPHONE ENCOUNTER
Per [facility], last fill 3/1, quantity 10 for 5 days.     Sarah HERIBERTO called requesting a refill of the below medication which has been pended for you:     Requested Prescriptions      No prescriptions requested or ordered in this encounter       Last Appointment Date: 2/18/2025  Next Appointment Date: 5/23/2025    Allergies   Allergen Reactions    Flomax [Tamsulosin Hcl] Swelling     Swelling of arms; however, also had rash and fever at the same time and was admitted at the time    Decadron [Dexamethasone]      Tachycardia    Morphine Itching and Rash    Prednisone      Emotional changes, depression    Quetiapine      \"Makes me nuts\"      Saphris [Asenapine]     Bactrim [Sulfamethoxazole-Trimethoprim] Diarrhea and Nausea Only    Codeine Nausea And Vomiting    Trazodone      Sleepwalking      Zyprexa [Olanzapine] Other (See Comments)     Made her feel like she wanted to \"jump out of my skin\"

## 2025-03-15 ENCOUNTER — PATIENT MESSAGE (OUTPATIENT)
Dept: FAMILY MEDICINE CLINIC | Age: 60
End: 2025-03-15

## 2025-03-15 DIAGNOSIS — R11.0 NAUSEA: ICD-10-CM

## 2025-03-16 RX ORDER — PHENTERMINE HYDROCHLORIDE 37.5 MG/1
37.5 TABLET ORAL DAILY
Qty: 30 TABLET | Refills: 0 | Status: SHIPPED | OUTPATIENT
Start: 2025-03-16 | End: 2025-04-15

## 2025-03-17 ENCOUNTER — HOSPITAL ENCOUNTER (OUTPATIENT)
Age: 60
Discharge: HOME | End: 2025-03-17
Payer: MEDICARE

## 2025-03-17 DIAGNOSIS — E03.9 HYPOTHYROIDISM, UNSPECIFIED TYPE: ICD-10-CM

## 2025-03-17 LAB — TSH SERPL DL<=0.05 MIU/L-ACNC: 4.07 UIU/ML (ref 0.3–5)

## 2025-03-17 PROCEDURE — 36415 COLL VENOUS BLD VENIPUNCTURE: CPT

## 2025-03-17 PROCEDURE — 84443 ASSAY THYROID STIM HORMONE: CPT

## 2025-03-17 NOTE — TELEPHONE ENCOUNTER
Sarah LOUIS called requesting a refill of the below medication which has been pended for you:     Requested Prescriptions     Pending Prescriptions Disp Refills    ondansetron (ZOFRAN-ODT) 4 MG disintegrating tablet [Pharmacy Med Name: Ondansetron 4 MG Oral Tablet Disintegrating] 200 tablet 0     Sig: DISSOLVE 1 TABLET IN MOUTH EVERY 8 HOURS AS NEEDED FOR NAUSEA AND VOMITING       Last Appointment Date: 2/18/2025  Next Appointment Date: 5/23/2025    Allergies   Allergen Reactions    Flomax [Tamsulosin Hcl] Swelling     Swelling of arms; however, also had rash and fever at the same time and was admitted at the time    Decadron [Dexamethasone]      Tachycardia    Morphine Itching and Rash    Prednisone      Emotional changes, depression    Quetiapine      \"Makes me nuts\"      Saphris [Asenapine]     Bactrim [Sulfamethoxazole-Trimethoprim] Diarrhea and Nausea Only    Codeine Nausea And Vomiting    Trazodone      Sleepwalking      Zyprexa [Olanzapine] Other (See Comments)     Made her feel like she wanted to \"jump out of my skin\"

## 2025-03-18 DIAGNOSIS — M54.41 CHRONIC MIDLINE LOW BACK PAIN WITH BILATERAL SCIATICA: ICD-10-CM

## 2025-03-18 DIAGNOSIS — G89.29 CHRONIC MIDLINE LOW BACK PAIN WITH BILATERAL SCIATICA: ICD-10-CM

## 2025-03-18 DIAGNOSIS — M54.42 CHRONIC MIDLINE LOW BACK PAIN WITH BILATERAL SCIATICA: ICD-10-CM

## 2025-03-18 DIAGNOSIS — M54.50 LEFT LUMBAR PAIN: ICD-10-CM

## 2025-03-18 NOTE — TELEPHONE ENCOUNTER
Sarah LOUIS called requesting a refill of the below medication which has been pended for you:     Requested Prescriptions     Pending Prescriptions Disp Refills    tiZANidine (ZANAFLEX) 4 MG tablet [Pharmacy Med Name: tiZANidine HCl 4 MG Oral Tablet] 60 tablet 0     Sig: TAKE 1/2 TO 1 (ONE-HALF TO ONE) TABLET BY MOUTH EVERY 8 HOURS AS NEEDED (MUSCLE  PAIN)       Last Appointment Date: 2/18/2025  Next Appointment Date: 5/23/2025    Allergies   Allergen Reactions    Flomax [Tamsulosin Hcl] Swelling     Swelling of arms; however, also had rash and fever at the same time and was admitted at the time    Decadron [Dexamethasone]      Tachycardia    Morphine Itching and Rash    Prednisone      Emotional changes, depression    Quetiapine      \"Makes me nuts\"      Saphris [Asenapine]     Bactrim [Sulfamethoxazole-Trimethoprim] Diarrhea and Nausea Only    Codeine Nausea And Vomiting    Trazodone      Sleepwalking      Zyprexa [Olanzapine] Other (See Comments)     Made her feel like she wanted to \"jump out of my skin\"

## 2025-03-19 ENCOUNTER — PATIENT MESSAGE (OUTPATIENT)
Dept: FAMILY MEDICINE CLINIC | Age: 60
End: 2025-03-19

## 2025-03-19 RX ORDER — ONDANSETRON 4 MG/1
4 TABLET, ORALLY DISINTEGRATING ORAL EVERY 8 HOURS PRN
Qty: 40 TABLET | Refills: 5 | Status: SHIPPED | OUTPATIENT
Start: 2025-03-19

## 2025-03-24 ASSESSMENT — ENCOUNTER SYMPTOMS: FACIAL SWELLING: 1

## 2025-04-06 DIAGNOSIS — M54.42 CHRONIC MIDLINE LOW BACK PAIN WITH BILATERAL SCIATICA: ICD-10-CM

## 2025-04-06 DIAGNOSIS — M54.41 CHRONIC MIDLINE LOW BACK PAIN WITH BILATERAL SCIATICA: ICD-10-CM

## 2025-04-06 DIAGNOSIS — G89.29 CHRONIC MIDLINE LOW BACK PAIN WITH BILATERAL SCIATICA: ICD-10-CM

## 2025-04-07 DIAGNOSIS — I10 ESSENTIAL HYPERTENSION: ICD-10-CM

## 2025-04-07 NOTE — TELEPHONE ENCOUNTER
Sarah LOUIS called requesting a refill of the below medication which has been pended for you:   Per [facility], last refill 03/13 #90 for 30 days   Requested Prescriptions     Pending Prescriptions Disp Refills    gabapentin (NEURONTIN) 600 MG tablet [Pharmacy Med Name: Gabapentin 600 MG Oral Tablet] 90 tablet 0     Sig: Take 1 tablet by mouth 3 times daily for 30 days.       Last Appointment Date: 2/18/2025  Next Appointment Date: 5/23/2025

## 2025-04-08 DIAGNOSIS — R42 VERTIGO: ICD-10-CM

## 2025-04-08 DIAGNOSIS — G89.29 CHRONIC MIDLINE LOW BACK PAIN WITH BILATERAL SCIATICA: ICD-10-CM

## 2025-04-08 DIAGNOSIS — M54.42 CHRONIC MIDLINE LOW BACK PAIN WITH BILATERAL SCIATICA: ICD-10-CM

## 2025-04-08 DIAGNOSIS — I10 ESSENTIAL HYPERTENSION: ICD-10-CM

## 2025-04-08 DIAGNOSIS — M54.41 CHRONIC MIDLINE LOW BACK PAIN WITH BILATERAL SCIATICA: ICD-10-CM

## 2025-04-08 RX ORDER — LISINOPRIL 40 MG/1
40 TABLET ORAL DAILY
Qty: 90 TABLET | Refills: 3 | Status: CANCELLED | OUTPATIENT
Start: 2025-04-08

## 2025-04-08 NOTE — TELEPHONE ENCOUNTER
Sarah LOUIS called requesting a refill of the below medication which has been pended for you:     Requested Prescriptions     Pending Prescriptions Disp Refills    lisinopril (PRINIVIL;ZESTRIL) 40 MG tablet [Pharmacy Med Name: Lisinopril 40 MG Oral Tablet] 270 tablet 0     Sig: Take 1 tablet by mouth once daily       Last Appointment Date: 2/18/2025  Next Appointment Date: 5/23/2025    Allergies   Allergen Reactions    Flomax [Tamsulosin Hcl] Swelling     Swelling of arms; however, also had rash and fever at the same time and was admitted at the time    Decadron [Dexamethasone]      Tachycardia    Morphine Itching and Rash    Prednisone      Emotional changes, depression    Quetiapine      \"Makes me nuts\"      Saphris [Asenapine]     Bactrim [Sulfamethoxazole-Trimethoprim] Diarrhea and Nausea Only    Codeine Nausea And Vomiting    Trazodone      Sleepwalking      Zyprexa [Olanzapine] Other (See Comments)     Made her feel like she wanted to \"jump out of my skin\"

## 2025-04-09 DIAGNOSIS — M54.50 LEFT LUMBAR PAIN: ICD-10-CM

## 2025-04-09 DIAGNOSIS — M54.42 CHRONIC MIDLINE LOW BACK PAIN WITH BILATERAL SCIATICA: ICD-10-CM

## 2025-04-09 DIAGNOSIS — E03.9 HYPOTHYROIDISM, UNSPECIFIED TYPE: ICD-10-CM

## 2025-04-09 DIAGNOSIS — M54.41 CHRONIC MIDLINE LOW BACK PAIN WITH BILATERAL SCIATICA: ICD-10-CM

## 2025-04-09 DIAGNOSIS — G89.29 CHRONIC MIDLINE LOW BACK PAIN WITH BILATERAL SCIATICA: ICD-10-CM

## 2025-04-09 RX ORDER — GABAPENTIN 600 MG/1
600 TABLET ORAL 3 TIMES DAILY
Qty: 90 TABLET | Refills: 0 | OUTPATIENT
Start: 2025-04-09 | End: 2025-05-09

## 2025-04-09 RX ORDER — LIDOCAINE 50 MG/G
PATCH TOPICAL
Qty: 30 PATCH | Refills: 5 | Status: SHIPPED | OUTPATIENT
Start: 2025-04-09

## 2025-04-09 RX ORDER — LISINOPRIL 40 MG/1
40 TABLET ORAL DAILY
Qty: 90 TABLET | Refills: 3 | Status: SHIPPED | OUTPATIENT
Start: 2025-04-09

## 2025-04-09 RX ORDER — LEVOTHYROXINE SODIUM 125 UG/1
TABLET ORAL
Qty: 90 TABLET | Refills: 0 | Status: SHIPPED | OUTPATIENT
Start: 2025-04-09

## 2025-04-09 RX ORDER — MECLIZINE HYDROCHLORIDE 25 MG/1
25 TABLET ORAL 3 TIMES DAILY PRN
Qty: 90 TABLET | Refills: 0 | Status: SHIPPED | OUTPATIENT
Start: 2025-04-09

## 2025-04-09 NOTE — TELEPHONE ENCOUNTER
Pt states she was dx with Meniere's disease.    Sarah LOUIS called requesting a refill of the below medication which has been pended for you:     Requested Prescriptions     Pending Prescriptions Disp Refills    meclizine (ANTIVERT) 25 MG tablet 30 tablet 2     Sig: Take 1 tablet by mouth 3 times daily as needed for Dizziness       Last Appointment Date: 2/8/2025  Next Appointment Date: Visit date not found    Allergies   Allergen Reactions    Flomax [Tamsulosin Hcl] Swelling     Swelling of arms; however, also had rash and fever at the same time and was admitted at the time    Decadron [Dexamethasone]      Tachycardia    Morphine Itching and Rash    Prednisone      Emotional changes, depression    Quetiapine      \"Makes me nuts\"      Saphris [Asenapine]     Bactrim [Sulfamethoxazole-Trimethoprim] Diarrhea and Nausea Only    Codeine Nausea And Vomiting    Trazodone      Sleepwalking      Zyprexa [Olanzapine] Other (See Comments)     Made her feel like she wanted to \"jump out of my skin\"

## 2025-04-09 NOTE — TELEPHONE ENCOUNTER
Per [facility], last fill PHENTERMINE 3/16, quantity 30 for 30 days.   Per [facility], last fill GABAPENTIN 3/13, quantity 90 for 30 days.     aSrah LOUIS called requesting a refill of the below medication which has been pended for you:     Requested Prescriptions     Pending Prescriptions Disp Refills    lisinopril (PRINIVIL;ZESTRIL) 40 MG tablet 90 tablet 3     Sig: Take 1 tablet by mouth daily    gabapentin (NEURONTIN) 600 MG tablet 90 tablet 1     Sig: Take 1 tablet by mouth 3 times daily for 60 days.    phentermine (ADIPEX-P) 37.5 MG tablet 30 tablet 0     Sig: Take 1 tablet by mouth daily for 30 days. Max Daily Amount: 37.5 mg       Last Appointment Date: 2/18/2025  Next Appointment Date: 5/23/2025    Allergies   Allergen Reactions    Flomax [Tamsulosin Hcl] Swelling     Swelling of arms; however, also had rash and fever at the same time and was admitted at the time    Decadron [Dexamethasone]      Tachycardia    Morphine Itching and Rash    Prednisone      Emotional changes, depression    Quetiapine      \"Makes me nuts\"      Saphris [Asenapine]     Bactrim [Sulfamethoxazole-Trimethoprim] Diarrhea and Nausea Only    Codeine Nausea And Vomiting    Trazodone      Sleepwalking      Zyprexa [Olanzapine] Other (See Comments)     Made her feel like she wanted to \"jump out of my skin\"

## 2025-04-10 ENCOUNTER — PATIENT MESSAGE (OUTPATIENT)
Dept: FAMILY MEDICINE CLINIC | Age: 60
End: 2025-04-10

## 2025-04-10 DIAGNOSIS — M54.41 CHRONIC MIDLINE LOW BACK PAIN WITH BILATERAL SCIATICA: ICD-10-CM

## 2025-04-10 DIAGNOSIS — M54.42 CHRONIC MIDLINE LOW BACK PAIN WITH BILATERAL SCIATICA: ICD-10-CM

## 2025-04-10 DIAGNOSIS — G89.29 CHRONIC MIDLINE LOW BACK PAIN WITH BILATERAL SCIATICA: ICD-10-CM

## 2025-04-10 NOTE — TELEPHONE ENCOUNTER
Sarah LOUIS called requesting a refill of the below medication which has been pended for you: patient filled Adipex 3/16/2025 #30 and Gabapentin 3/13/2025 scripts ordered with correct do not refill dates listed on script in two spots.    Requested Prescriptions     Pending Prescriptions Disp Refills    gabapentin (NEURONTIN) 600 MG tablet [Pharmacy Med Name: Gabapentin 600 MG Oral Tablet] 90 tablet 0     Sig: Take 1 tablet by mouth 3 times daily.    phentermine (ADIPEX-P) 37.5 MG tablet [Pharmacy Med Name: Phentermine HCl 37.5 MG Oral Tablet] 30 tablet 0     Sig: Take 1 tablet by mouth daily.       Last Appointment Date: 2/18/2025  Next Appointment Date: 5/23/2025    Allergies   Allergen Reactions    Flomax [Tamsulosin Hcl] Swelling     Swelling of arms; however, also had rash and fever at the same time and was admitted at the time    Decadron [Dexamethasone]      Tachycardia    Morphine Itching and Rash    Prednisone      Emotional changes, depression    Quetiapine      \"Makes me nuts\"      Saphris [Asenapine]     Bactrim [Sulfamethoxazole-Trimethoprim] Diarrhea and Nausea Only    Codeine Nausea And Vomiting    Trazodone      Sleepwalking      Zyprexa [Olanzapine] Other (See Comments)     Made her feel like she wanted to \"jump out of my skin\"

## 2025-04-12 RX ORDER — GABAPENTIN 600 MG/1
600 TABLET ORAL 3 TIMES DAILY
Qty: 90 TABLET | Refills: 2 | OUTPATIENT
Start: 2025-04-15 | End: 2025-07-14

## 2025-04-12 RX ORDER — PHENTERMINE HYDROCHLORIDE 37.5 MG/1
37.5 TABLET ORAL DAILY
Qty: 30 TABLET | Refills: 0 | Status: SHIPPED | OUTPATIENT
Start: 2025-04-15 | End: 2025-05-15

## 2025-04-12 RX ORDER — PHENTERMINE HYDROCHLORIDE 37.5 MG/1
37.5 TABLET ORAL DAILY
Qty: 30 TABLET | Refills: 0 | OUTPATIENT
Start: 2025-04-15 | End: 2025-05-15

## 2025-04-12 RX ORDER — GABAPENTIN 600 MG/1
600 TABLET ORAL 3 TIMES DAILY
Qty: 90 TABLET | Refills: 1 | Status: SHIPPED | OUTPATIENT
Start: 2025-04-12 | End: 2025-06-11

## 2025-04-12 NOTE — TELEPHONE ENCOUNTER
Controlled Substance Monitoring:    Acute and Chronic Pain Monitoring:   RX Monitoring Periodic Controlled Substance Monitoring Chronic Pain > 80 MEDD   4/12/2025  11:21 AM No signs of potential drug abuse or diversion identified. Obtained or confirmed \"Medication Contract\" on file.          Refill sent

## 2025-04-21 ENCOUNTER — APPOINTMENT (OUTPATIENT)
Dept: GENERAL RADIOLOGY | Age: 60
End: 2025-04-21
Payer: MEDICARE

## 2025-04-21 ENCOUNTER — APPOINTMENT (OUTPATIENT)
Dept: CT IMAGING | Age: 60
End: 2025-04-21
Payer: MEDICARE

## 2025-04-21 ENCOUNTER — HOSPITAL ENCOUNTER (EMERGENCY)
Age: 60
Discharge: HOME | End: 2025-04-21
Attending: EMERGENCY MEDICINE
Payer: MEDICARE

## 2025-04-21 VITALS
BODY MASS INDEX: 36.82 KG/M2 | OXYGEN SATURATION: 95 % | DIASTOLIC BLOOD PRESSURE: 78 MMHG | RESPIRATION RATE: 16 BRPM | TEMPERATURE: 98.8 F | HEIGHT: 61 IN | SYSTOLIC BLOOD PRESSURE: 148 MMHG | WEIGHT: 195 LBS | HEART RATE: 92 BPM

## 2025-04-21 DIAGNOSIS — R07.89 ATYPICAL CHEST PAIN: Primary | ICD-10-CM

## 2025-04-21 DIAGNOSIS — R11.0 NAUSEA: ICD-10-CM

## 2025-04-21 LAB
ALBUMIN SERPL-MCNC: 4.5 G/DL (ref 3.5–5.2)
ALBUMIN/GLOB SERPL: 1.6 {RATIO} (ref 1–2.5)
ALP SERPL-CCNC: 86 U/L (ref 35–104)
ALT SERPL-CCNC: 34 U/L (ref 10–35)
ANION GAP SERPL CALCULATED.3IONS-SCNC: 12 MMOL/L (ref 9–16)
AST SERPL-CCNC: 23 U/L (ref 10–35)
BASOPHILS # BLD: 0.08 K/UL (ref 0–0.2)
BASOPHILS NFR BLD: 1 % (ref 0–2)
BILIRUB SERPL-MCNC: 0.2 MG/DL (ref 0–1.2)
BUN SERPL-MCNC: 26 MG/DL (ref 6–20)
BUN/CREAT SERPL: 20 (ref 9–20)
CALCIUM SERPL-MCNC: 10.1 MG/DL (ref 8.6–10.4)
CHLORIDE SERPL-SCNC: 97 MMOL/L (ref 98–107)
CO2 SERPL-SCNC: 27 MMOL/L (ref 20–31)
CREAT SERPL-MCNC: 1.3 MG/DL (ref 0.6–0.9)
D DIMER PPP FEU-MCNC: 0.36 UG/ML FEU (ref 0–0.59)
EKG ATRIAL RATE: 91 BPM
EKG P AXIS: 80 DEGREES
EKG P-R INTERVAL: 168 MS
EKG Q-T INTERVAL: 368 MS
EKG QRS DURATION: 86 MS
EKG QTC CALCULATION (BAZETT): 452 MS
EKG R AXIS: 69 DEGREES
EKG T AXIS: 82 DEGREES
EKG VENTRICULAR RATE: 91 BPM
EOSINOPHIL # BLD: 0.48 K/UL (ref 0–0.44)
EOSINOPHILS RELATIVE PERCENT: 5 % (ref 1–4)
ERYTHROCYTE [DISTWIDTH] IN BLOOD BY AUTOMATED COUNT: 12.5 % (ref 11.8–14.4)
GFR, ESTIMATED: 47 ML/MIN/1.73M2
GLUCOSE SERPL-MCNC: 113 MG/DL (ref 74–99)
HCT VFR BLD AUTO: 37.2 % (ref 36.3–47.1)
HGB BLD-MCNC: 12.8 G/DL (ref 11.9–15.1)
IMM GRANULOCYTES # BLD AUTO: 0.08 K/UL (ref 0–0.3)
IMM GRANULOCYTES NFR BLD: 1 %
LIPASE SERPL-CCNC: 24 U/L (ref 13–60)
LYMPHOCYTES NFR BLD: 1.97 K/UL (ref 1.1–3.7)
LYMPHOCYTES RELATIVE PERCENT: 19 % (ref 24–43)
MAGNESIUM SERPL-MCNC: 2.1 MG/DL (ref 1.6–2.6)
MCH RBC QN AUTO: 29.6 PG (ref 25.2–33.5)
MCHC RBC AUTO-ENTMCNC: 34.4 G/DL (ref 25.2–33.5)
MCV RBC AUTO: 86.1 FL (ref 82.6–102.9)
MONOCYTES NFR BLD: 0.65 K/UL (ref 0.1–1.2)
MONOCYTES NFR BLD: 6 % (ref 3–12)
NEUTROPHILS NFR BLD: 68 % (ref 36–65)
NEUTS SEG NFR BLD: 7.27 K/UL (ref 1.5–8.1)
NRBC BLD-RTO: 0 PER 100 WBC
PLATELET # BLD AUTO: 257 K/UL (ref 138–453)
PMV BLD AUTO: 8.2 FL (ref 8.1–13.5)
POTASSIUM SERPL-SCNC: 4.6 MMOL/L (ref 3.7–5.3)
PROT SERPL-MCNC: 7.3 G/DL (ref 6.6–8.7)
RBC # BLD AUTO: 4.32 M/UL (ref 3.95–5.11)
SODIUM SERPL-SCNC: 136 MMOL/L (ref 136–145)
TROPONIN I SERPL HS-MCNC: 10 NG/L (ref 0–14)
TROPONIN I SERPL HS-MCNC: 9 NG/L (ref 0–14)
WBC OTHER # BLD: 10.5 K/UL (ref 3.5–11.3)

## 2025-04-21 PROCEDURE — 80053 COMPREHEN METABOLIC PANEL: CPT

## 2025-04-21 PROCEDURE — 96375 TX/PRO/DX INJ NEW DRUG ADDON: CPT

## 2025-04-21 PROCEDURE — 83690 ASSAY OF LIPASE: CPT

## 2025-04-21 PROCEDURE — 99285 EMERGENCY DEPT VISIT HI MDM: CPT

## 2025-04-21 PROCEDURE — 71046 X-RAY EXAM CHEST 2 VIEWS: CPT

## 2025-04-21 PROCEDURE — 85379 FIBRIN DEGRADATION QUANT: CPT

## 2025-04-21 PROCEDURE — 93005 ELECTROCARDIOGRAM TRACING: CPT | Performed by: EMERGENCY MEDICINE

## 2025-04-21 PROCEDURE — 36415 COLL VENOUS BLD VENIPUNCTURE: CPT

## 2025-04-21 PROCEDURE — 85025 COMPLETE CBC W/AUTO DIFF WBC: CPT

## 2025-04-21 PROCEDURE — 2580000003 HC RX 258: Performed by: EMERGENCY MEDICINE

## 2025-04-21 PROCEDURE — 83735 ASSAY OF MAGNESIUM: CPT

## 2025-04-21 PROCEDURE — 96374 THER/PROPH/DIAG INJ IV PUSH: CPT

## 2025-04-21 PROCEDURE — 6360000002 HC RX W HCPCS: Performed by: EMERGENCY MEDICINE

## 2025-04-21 PROCEDURE — 74176 CT ABD & PELVIS W/O CONTRAST: CPT

## 2025-04-21 PROCEDURE — 84484 ASSAY OF TROPONIN QUANT: CPT

## 2025-04-21 RX ORDER — ONDANSETRON 4 MG/1
4 TABLET, ORALLY DISINTEGRATING ORAL EVERY 8 HOURS PRN
Qty: 12 TABLET | Refills: 5 | Status: SHIPPED | OUTPATIENT
Start: 2025-04-21

## 2025-04-21 RX ORDER — 0.9 % SODIUM CHLORIDE 0.9 %
1000 INTRAVENOUS SOLUTION INTRAVENOUS ONCE
Status: COMPLETED | OUTPATIENT
Start: 2025-04-21 | End: 2025-04-21

## 2025-04-21 RX ORDER — PANTOPRAZOLE SODIUM 40 MG/1
40 TABLET, DELAYED RELEASE ORAL DAILY
Qty: 30 TABLET | Refills: 0 | Status: SHIPPED | OUTPATIENT
Start: 2025-04-21 | End: 2025-05-21

## 2025-04-21 RX ORDER — TRIAMTERENE AND HYDROCHLOROTHIAZIDE 37.5; 25 MG/1; MG/1
1 TABLET ORAL DAILY
COMMUNITY
Start: 2025-03-27 | End: 2025-06-25

## 2025-04-21 RX ADMIN — HYDROMORPHONE HYDROCHLORIDE 0.5 MG: 1 INJECTION, SOLUTION INTRAMUSCULAR; INTRAVENOUS; SUBCUTANEOUS at 08:54

## 2025-04-21 RX ADMIN — SODIUM CHLORIDE 1000 ML: 0.9 INJECTION, SOLUTION INTRAVENOUS at 08:52

## 2025-04-21 RX ADMIN — SODIUM CHLORIDE 40 MG: 9 INJECTION INTRAMUSCULAR; INTRAVENOUS; SUBCUTANEOUS at 08:53

## 2025-04-21 ASSESSMENT — PAIN DESCRIPTION - DESCRIPTORS
DESCRIPTORS: DISCOMFORT
DESCRIPTORS: DISCOMFORT
DESCRIPTORS: PRESSURE

## 2025-04-21 ASSESSMENT — PAIN DESCRIPTION - ONSET
ONSET: SUDDEN
ONSET: ON-GOING

## 2025-04-21 ASSESSMENT — PAIN DESCRIPTION - FREQUENCY
FREQUENCY: CONTINUOUS
FREQUENCY: CONTINUOUS

## 2025-04-21 ASSESSMENT — PAIN DESCRIPTION - LOCATION
LOCATION: CHEST;ABDOMEN
LOCATION: CHEST
LOCATION: CHEST

## 2025-04-21 ASSESSMENT — PAIN DESCRIPTION - PAIN TYPE
TYPE: ACUTE PAIN
TYPE: ACUTE PAIN

## 2025-04-21 ASSESSMENT — PAIN SCALES - GENERAL
PAINLEVEL_OUTOF10: 3
PAINLEVEL_OUTOF10: 6
PAINLEVEL_OUTOF10: 5

## 2025-04-21 ASSESSMENT — PAIN - FUNCTIONAL ASSESSMENT
PAIN_FUNCTIONAL_ASSESSMENT: ACTIVITIES ARE NOT PREVENTED
PAIN_FUNCTIONAL_ASSESSMENT: 0-10
PAIN_FUNCTIONAL_ASSESSMENT: PREVENTS OR INTERFERES SOME ACTIVE ACTIVITIES AND ADLS
PAIN_FUNCTIONAL_ASSESSMENT: PREVENTS OR INTERFERES SOME ACTIVE ACTIVITIES AND ADLS

## 2025-04-21 ASSESSMENT — PAIN DESCRIPTION - ORIENTATION
ORIENTATION: MID

## 2025-04-21 NOTE — ED PROVIDER NOTES
[facility] EMERGENCY DEPARTMENT  EMERGENCY DEPARTMENT ENCOUNTER      Pt Name: Sarah Jones  MRN: [id number]  Birthdate 1965  Date of evaluation: 4/21/2025  Provider: Clement Gu MD    CHIEF COMPLAINT     Chief Complaint   Patient presents with    Chest Pain     5/10 chest pressure/pain to mid chest x 30min. Started while \"doing nothing.\" States had nausea first. Denies diaphoresis or other complaint. Pt assisted into gown and placed on monitor.         HISTORY OF PRESENT ILLNESS   (Location/Symptom, Timing/Onset, Context/Setting,Quality, Duration, Modifying Factors, Severity)  Note limiting factors.   Sarah Jones is a 60 y.o. female who presents to the emergency department with a chief complaint of pressure-like anterior chest discomfort starting about 30 minutes prior to arrival.  She took 8 mg Zofran before coming in.  She denies diaphoresis.  She states it hurts to take a deep breath.  She does not have any known history of heart disease.  There is a past history of ventral hernia repair.    The history is provided by the patient and medical records.       Nursing Notes werereviewed.    REVIEW OF SYSTEMS    (2-9 systems for level 4, 10 or more for level 5)     Review of Systems   All other systems reviewed and are negative.      Except as noted above the remainder of the review of systems was reviewed and negative.       PAST MEDICAL HISTORY     Past Medical History:   Diagnosis Date    Anxiety     Bipolar disorder (HCC)     Breast cancer (HCC) 2014    L side - lumpectomy and radiation; no chemo (was recommended to take Tamoxifen, but with her smoking, she declined due to family h/o CVA already). Seeing Dr. Zaidi once yearly/    Chronic back pain     Chronic kidney disease     COPD (chronic obstructive pulmonary disease) (HCC)     Depression     GERD (gastroesophageal reflux disease)     Headache(784.0)     History of alcoholism (HCC)     sober since 1/16/19    History of therapeutic radiation

## 2025-05-06 DIAGNOSIS — G89.29 CHRONIC MIDLINE LOW BACK PAIN WITH BILATERAL SCIATICA: ICD-10-CM

## 2025-05-06 DIAGNOSIS — M54.41 CHRONIC MIDLINE LOW BACK PAIN WITH BILATERAL SCIATICA: ICD-10-CM

## 2025-05-06 DIAGNOSIS — M54.50 LEFT LUMBAR PAIN: ICD-10-CM

## 2025-05-06 DIAGNOSIS — M54.42 CHRONIC MIDLINE LOW BACK PAIN WITH BILATERAL SCIATICA: ICD-10-CM

## 2025-05-06 DIAGNOSIS — R42 VERTIGO: Primary | ICD-10-CM

## 2025-05-06 NOTE — TELEPHONE ENCOUNTER
Sarah LOUIS called requesting a refill of the below medication which has been pended for you:     Requested Prescriptions     Pending Prescriptions Disp Refills    meclizine (ANTIVERT) 25 MG tablet [Pharmacy Med Name: Meclizine HCl 25 MG Oral Tablet] 90 tablet 0     Sig: TAKE 1 TABLET BY MOUTH THREE TIMES DAILY AS NEEDED FOR DIZZINESS    phentermine (ADIPEX-P) 37.5 MG tablet [Pharmacy Med Name: Phentermine HCl 37.5 MG Oral Tablet] 30 tablet 0     Sig: Take 1 tablet by mouth daily.    tiZANidine (ZANAFLEX) 4 MG tablet [Pharmacy Med Name: tiZANidine HCl 4 MG Oral Tablet] 90 tablet 0     Sig: TAKE 1/2 TO 1 TABLET BY MOUTH EVERY 8 (EIGHT) HOURS AS NEEDED FOR MUSCLE PAIN.       Last Appointment Date: 2/18/2025  Next Appointment Date: 5/23/2025    Allergies   Allergen Reactions    Flomax [Tamsulosin Hcl] Swelling     Swelling of arms; however, also had rash and fever at the same time and was admitted at the time    Decadron [Dexamethasone]      Tachycardia    Morphine Itching and Rash    Prednisone      Emotional changes, depression    Quetiapine      \"Makes me nuts\"      Saphris [Asenapine]     Bactrim [Sulfamethoxazole-Trimethoprim] Diarrhea and Nausea Only    Codeine Nausea And Vomiting    Trazodone      Sleepwalking      Zyprexa [Olanzapine] Other (See Comments)     Made her feel like she wanted to \"jump out of my skin\"

## 2025-05-08 RX ORDER — PHENTERMINE HYDROCHLORIDE 37.5 MG/1
37.5 TABLET ORAL DAILY
Qty: 30 TABLET | Refills: 0 | Status: SHIPPED | OUTPATIENT
Start: 2025-05-12 | End: 2025-06-11

## 2025-05-08 RX ORDER — MECLIZINE HYDROCHLORIDE 25 MG/1
25 TABLET ORAL 3 TIMES DAILY PRN
Qty: 90 TABLET | Refills: 1 | Status: SHIPPED | OUTPATIENT
Start: 2025-05-08

## 2025-05-08 NOTE — TELEPHONE ENCOUNTER
Controlled Substance Monitoring:    Acute and Chronic Pain Monitoring:   RX Monitoring Periodic Controlled Substance Monitoring Chronic Pain > 80 MEDD   5/8/2025   3:02 PM No signs of potential drug abuse or diversion identified. Obtained or confirmed \"Medication Contract\" on file.

## 2025-05-13 ENCOUNTER — TELEPHONE (OUTPATIENT)
Dept: NEUROLOGY | Age: 60
End: 2025-05-13

## 2025-05-13 NOTE — TELEPHONE ENCOUNTER
Nebulizer will not be covered if pt uses [facility]. Please fax order to [facility] [phone].   
Order faxed.   
Detail Level: Detailed
Summary Of Other Records Reviewed: scalp BX path 7/25/16: CONSISTENT WITH SUBTLE SEBORRHEIC\\nDERMATITIS OR A RESOLVING FOLLICULITIS.
no voice change/no cold intolerance/no heat intolerance

## 2025-05-23 ENCOUNTER — OFFICE VISIT (OUTPATIENT)
Dept: FAMILY MEDICINE CLINIC | Age: 60
End: 2025-05-23
Payer: MEDICARE

## 2025-05-23 VITALS
HEIGHT: 61 IN | OXYGEN SATURATION: 96 % | TEMPERATURE: 98.3 F | DIASTOLIC BLOOD PRESSURE: 72 MMHG | WEIGHT: 199.6 LBS | SYSTOLIC BLOOD PRESSURE: 118 MMHG | HEART RATE: 78 BPM | BODY MASS INDEX: 37.69 KG/M2 | RESPIRATION RATE: 18 BRPM

## 2025-05-23 DIAGNOSIS — K21.9 GASTROESOPHAGEAL REFLUX DISEASE, UNSPECIFIED WHETHER ESOPHAGITIS PRESENT: ICD-10-CM

## 2025-05-23 DIAGNOSIS — M54.42 CHRONIC MIDLINE LOW BACK PAIN WITH BILATERAL SCIATICA: ICD-10-CM

## 2025-05-23 DIAGNOSIS — E03.9 HYPOTHYROIDISM, UNSPECIFIED TYPE: Primary | ICD-10-CM

## 2025-05-23 DIAGNOSIS — J01.01 ACUTE RECURRENT MAXILLARY SINUSITIS: ICD-10-CM

## 2025-05-23 DIAGNOSIS — M47.816 LUMBAR SPONDYLOSIS: ICD-10-CM

## 2025-05-23 DIAGNOSIS — Z78.9 STATIN NOT TOLERATED: ICD-10-CM

## 2025-05-23 DIAGNOSIS — E78.00 PURE HYPERCHOLESTEROLEMIA: ICD-10-CM

## 2025-05-23 DIAGNOSIS — M54.41 CHRONIC MIDLINE LOW BACK PAIN WITH BILATERAL SCIATICA: ICD-10-CM

## 2025-05-23 DIAGNOSIS — G89.29 CHRONIC MIDLINE LOW BACK PAIN WITH BILATERAL SCIATICA: ICD-10-CM

## 2025-05-23 PROCEDURE — 3074F SYST BP LT 130 MM HG: CPT | Performed by: FAMILY MEDICINE

## 2025-05-23 PROCEDURE — G2211 COMPLEX E/M VISIT ADD ON: HCPCS | Performed by: FAMILY MEDICINE

## 2025-05-23 PROCEDURE — 3078F DIAST BP <80 MM HG: CPT | Performed by: FAMILY MEDICINE

## 2025-05-23 PROCEDURE — 99214 OFFICE O/P EST MOD 30 MIN: CPT | Performed by: FAMILY MEDICINE

## 2025-05-23 RX ORDER — PANTOPRAZOLE SODIUM 40 MG/1
40 TABLET, DELAYED RELEASE ORAL DAILY
Qty: 90 TABLET | Refills: 1 | Status: SHIPPED | OUTPATIENT
Start: 2025-05-23

## 2025-05-23 RX ORDER — HYDROCODONE BITARTRATE AND ACETAMINOPHEN 5; 325 MG/1; MG/1
1 TABLET ORAL 2 TIMES DAILY PRN
Qty: 12 TABLET | Refills: 0 | Status: SHIPPED | OUTPATIENT
Start: 2025-05-23 | End: 2025-05-29

## 2025-05-23 RX ORDER — CARIPRAZINE 1.5 MG/1
CAPSULE, GELATIN COATED ORAL
COMMUNITY
Start: 2025-05-22

## 2025-05-23 NOTE — PROGRESS NOTES
[facility]  [address] [place] [address]  [phone]      Sarah Jones is a 60 y.o. female who presents today for her medical conditions/complaints as noted below.  Sarah Jones is c/o of Sinus Problem (Nasal congestion, pressure, pain in face/upper teeth ), Hypothyroidism, and Back Pain (Lower, mostly on the left)      HPI:     History of Present Illness  The patient is a 60-year-old female who presents today for follow-up of thyroid and sinus symptoms.    She has been experiencing illness for the past 10 days, initially presenting with a sore throat that progressed to sinus congestion. Accompanying symptoms include fever, chills, and a productive cough with tan-colored sputum. She also reports postnasal drainage, predominantly on the left side, and nausea due to mucus accumulation in her throat. She has been using a nebulizer once every 1 to 2 days, which has improved her breathing. She has been taking DayQuil and NyQuil pills but has run out of them. She was previously prescribed Augmentin for sinusitis on 02/08/2025.    She reports ear pain and is scheduled for a right ear tube insertion on 06/09/2025 due to fluid accumulation, despite an MRI not confirming this. The MRI did not show trigeminal neuropathy. She has intermittent hearing loss, which is believed to be reversible upon fluid drainage. She continues to experience pain and fullness in her ears, although less severe than during her trigeminal neuropathy episode. She has been using meclizine for vertigo, which she takes as needed, approximately once every 7 to 10 days. She does not experience dizziness while on meclizine.    She was prescribed Protonix for chest discomfort during an ER visit, which she found beneficial. She is not currently taking Prilosec or omeprazole.    She has discontinued Zoloft, Abilify, Latuda, and carbamazepine. She is currently on Vraylar 1.5 mg daily and Lamictal. She has

## 2025-06-02 DIAGNOSIS — G89.29 CHRONIC MIDLINE LOW BACK PAIN WITH BILATERAL SCIATICA: ICD-10-CM

## 2025-06-02 DIAGNOSIS — M54.41 CHRONIC MIDLINE LOW BACK PAIN WITH BILATERAL SCIATICA: ICD-10-CM

## 2025-06-02 DIAGNOSIS — M54.42 CHRONIC MIDLINE LOW BACK PAIN WITH BILATERAL SCIATICA: ICD-10-CM

## 2025-06-02 NOTE — TELEPHONE ENCOUNTER
Last Appt:  5/23/2025  Next Appt:   8/27/2025  Med verified in Epic last filled 05/08/2025 due 6/6/2025

## 2025-06-04 ENCOUNTER — PATIENT MESSAGE (OUTPATIENT)
Dept: FAMILY MEDICINE CLINIC | Age: 60
End: 2025-06-04

## 2025-06-04 DIAGNOSIS — G47.33 OSA (OBSTRUCTIVE SLEEP APNEA): Primary | ICD-10-CM

## 2025-06-04 NOTE — TELEPHONE ENCOUNTER
Per [facility], last fill 5/8, quantity 90 for 30 days.     Sarah LOUIS called requesting a refill of the below medication which has been pended for you:     Requested Prescriptions     Pending Prescriptions Disp Refills    gabapentin (NEURONTIN) 600 MG tablet [Pharmacy Med Name: GABAPENTIN 600MG    TAB] 90 tablet 0     Sig: Take 1 tablet by mouth 3 times daily.       Last Appointment Date: 5/23/2025  Next Appointment Date: 8/27/2025    Allergies   Allergen Reactions    Flomax [Tamsulosin Hcl] Swelling     Swelling of arms; however, also had rash and fever at the same time and was admitted at the time    Decadron [Dexamethasone]      Tachycardia    Morphine Itching and Rash    Prednisone      Emotional changes, depression    Quetiapine      \"Makes me nuts\"      Saphris [Asenapine]     Bactrim [Sulfamethoxazole-Trimethoprim] Diarrhea and Nausea Only    Codeine Nausea And Vomiting    Trazodone      Sleepwalking      Zyprexa [Olanzapine] Other (See Comments)     Made her feel like she wanted to \"jump out of my skin\"

## 2025-06-05 DIAGNOSIS — R73.01 IMPAIRED FASTING GLUCOSE: ICD-10-CM

## 2025-06-05 NOTE — TELEPHONE ENCOUNTER
Please inform pt - unfortunately with her most recent CMP from her ER visit on 4/21, her renal function cannot support her regular dose of 600 mg TID, and she is recommended to decrease to 450 mg dose at most.      Pt is due to fill this on Sat - does she have time today or tomorrow to come in for re-check BUN/Cr to see if it has returned to baseline/normal and we can refill her regular dose?  If so, please order now.

## 2025-06-06 ENCOUNTER — HOSPITAL ENCOUNTER (OUTPATIENT)
Age: 60
Discharge: HOME | End: 2025-06-06
Payer: MEDICARE

## 2025-06-06 DIAGNOSIS — E78.00 PURE HYPERCHOLESTEROLEMIA: ICD-10-CM

## 2025-06-06 DIAGNOSIS — E03.9 HYPOTHYROIDISM, UNSPECIFIED TYPE: ICD-10-CM

## 2025-06-06 DIAGNOSIS — R73.01 IMPAIRED FASTING GLUCOSE: ICD-10-CM

## 2025-06-06 LAB
ALBUMIN SERPL-MCNC: 4.1 G/DL (ref 3.5–5.2)
ALBUMIN/GLOB SERPL: 1.6 {RATIO} (ref 1–2.5)
ALP SERPL-CCNC: 103 U/L (ref 35–104)
ALT SERPL-CCNC: 81 U/L (ref 10–35)
ANION GAP SERPL CALCULATED.3IONS-SCNC: 14 MMOL/L (ref 9–16)
AST SERPL-CCNC: 30 U/L (ref 10–35)
BILIRUB SERPL-MCNC: 0.3 MG/DL (ref 0–1.2)
BUN SERPL-MCNC: 12 MG/DL (ref 6–20)
BUN/CREAT SERPL: 13 (ref 9–20)
CALCIUM SERPL-MCNC: 9.5 MG/DL (ref 8.6–10.4)
CHLORIDE SERPL-SCNC: 103 MMOL/L (ref 98–107)
CHOLEST SERPL-MCNC: 140 MG/DL (ref 0–199)
CHOLESTEROL/HDL RATIO: 3.3
CO2 SERPL-SCNC: 25 MMOL/L (ref 20–31)
CREAT SERPL-MCNC: 0.9 MG/DL (ref 0.6–0.9)
GFR, ESTIMATED: 74 ML/MIN/1.73M2
GLUCOSE SERPL-MCNC: 159 MG/DL (ref 74–99)
HDLC SERPL-MCNC: 42 MG/DL
LDLC SERPL CALC-MCNC: 57 MG/DL (ref 0–100)
POTASSIUM SERPL-SCNC: 3.5 MMOL/L (ref 3.7–5.3)
PROT SERPL-MCNC: 6.7 G/DL (ref 6.6–8.7)
SODIUM SERPL-SCNC: 142 MMOL/L (ref 136–145)
TRIGL SERPL-MCNC: 206 MG/DL
TSH SERPL DL<=0.05 MIU/L-ACNC: 2.03 UIU/ML (ref 0.27–4.2)
VLDLC SERPL CALC-MCNC: 41 MG/DL (ref 1–30)

## 2025-06-06 PROCEDURE — 84443 ASSAY THYROID STIM HORMONE: CPT

## 2025-06-06 PROCEDURE — 80053 COMPREHEN METABOLIC PANEL: CPT

## 2025-06-06 PROCEDURE — 80061 LIPID PANEL: CPT

## 2025-06-06 PROCEDURE — 36415 COLL VENOUS BLD VENIPUNCTURE: CPT

## 2025-06-06 RX ORDER — OMEPRAZOLE 20 MG/1
20 CAPSULE, DELAYED RELEASE ORAL 2 TIMES DAILY
Qty: 180 CAPSULE | Refills: 0 | OUTPATIENT
Start: 2025-06-06

## 2025-06-06 RX ORDER — GABAPENTIN 600 MG/1
600 TABLET ORAL 3 TIMES DAILY
Qty: 90 TABLET | Refills: 0 | Status: SHIPPED | OUTPATIENT
Start: 2025-06-11 | End: 2025-07-11

## 2025-06-06 NOTE — TELEPHONE ENCOUNTER
Cr improved back to 0.9 (her baseline).  Will refill her regular dose of Gabapentin.  She filled her 1 refill 4 days early in May, so not due until 6/11.      Controlled Substance Monitoring:    Acute and Chronic Pain Monitoring:   RX Monitoring Periodic Controlled Substance Monitoring Chronic Pain > 80 MEDD   6/6/2025   3:52 PM No signs of potential drug abuse or diversion identified. Obtained or confirmed \"Medication Contract\" on file.

## 2025-06-11 DIAGNOSIS — G47.33 OSA (OBSTRUCTIVE SLEEP APNEA): Primary | ICD-10-CM

## 2025-06-11 NOTE — PROGRESS NOTES
Mounjaro denied because patient does not have diabetes, can you send zepbound to try for approval

## 2025-06-12 ENCOUNTER — HOSPITAL ENCOUNTER (OUTPATIENT)
Dept: PHYSICAL THERAPY | Age: 60
Setting detail: THERAPIES SERIES
Discharge: HOME | End: 2025-06-12
Attending: FAMILY MEDICINE
Payer: MEDICARE

## 2025-06-12 PROCEDURE — 97161 PT EVAL LOW COMPLEX 20 MIN: CPT | Performed by: PHYSICAL THERAPIST

## 2025-06-12 PROCEDURE — 97110 THERAPEUTIC EXERCISES: CPT | Performed by: PHYSICAL THERAPIST

## 2025-06-12 ASSESSMENT — PAIN DESCRIPTION - ORIENTATION: ORIENTATION: RIGHT;LEFT

## 2025-06-12 ASSESSMENT — PAIN DESCRIPTION - LOCATION: LOCATION: BACK

## 2025-06-12 ASSESSMENT — PAIN DESCRIPTION - PAIN TYPE: TYPE: CHRONIC PAIN

## 2025-06-12 ASSESSMENT — PAIN SCALES - GENERAL: PAINLEVEL_OUTOF10: 3

## 2025-06-12 NOTE — FLOWSHEET NOTE
Physical Therapy Daily Treatment Note    Date:  2025    Patient Name:  Sarah Jones    :  1965  MRN: [id number]  Restrictions/Precautions:     Medical/Treatment Diagnosis Information:   Diagnosis: M54.41, M54.42  chronic midline LBP with bilateral sciatica   M47.816 lumbar spondylosis  Treatment Diagnosis: LBP  Insurance/Certification information:   [facility] MEDICARE / Plan: ANTH DUAL ADVANTAGE   Physician Information:   Mora Chris DO   Plan of care signed (Y/N):  N  Visit# / total visits:    Pain level: 3/10       Time In:0900am    Time Out:0935am    Progress Note: [x]  Yes  []  No  Next due by: Visit #10  Or by    Subjective:   see eval    Objective: see eval  Observation:   Test measurements:      Exercises:   Exercise/Equipment Resistance/Repetitions Other comments   Supine march with abs 10x    Hip add isometric 5 sec 5    Supine clams with abs 10x    SLR with abs     Dead bug     Hip abd     SL clams          Rows/latts                              [x] Provided verbal/tactile cueing for activities related to strengthening, flexibility, endurance, ROM. (88650)  [] Provided verbal/tactile cueing for activities related to improving balance, coordination, kinesthetic sense, posture, motor skill, proprioception. (15411)    Therapeutic Activities:     [] Therapeutic activities, direct (one-on-one) patient contact (use of dynamic activities to improve functional performance). (20298)    Gait:   [] Provided training and instruction to the patient for ambulation re-education. (37879)    Self-Care/ADL's  [] Self-care/home management training and compensatory training, meal preparation, safety procedures, and instructions in use of assistive technology devices/adaptive equipment, direct one-on-one contact. (33237)    Home Exercise Program:     [x] Reviewed/Progressed HEP activities related to strengthening, flexibility, endurance, ROM. (58181)  [] Reviewed/Progressed HEP activities related to

## 2025-06-12 NOTE — FLOWSHEET NOTE
[facility]  Rehabilitation and Sports Medicine    [] [place]  Phone: [phone]  Fax: [phone]      [] [place]  Phone: [phone]  Fax: [phone]        To:        Patient: Sarah Jones  : 1965   MRN: [id number]  Evaluation Date: 2025      Diagnosis Information:  Diagnosis: M54.41, M54.42  chronic midline LBP with bilateral sciatica   M47.816 lumbar spondylosis         Physical Therapy Certification/Re-Certification Form  Dear   The following patient has been evaluated for physical therapy services and for therapy to continue, Medicare requires monthly physician review of the treatment plan. Please review the attached evaluation and/or summary of the patient's plan of care, and verify that you agree therapy should continue by signing the attached document and sending it back to our office.    Plan of Care/Treatment to date:  [] Therapeutic Exercise    [] Modalities:  [] Therapeutic Activity     [] Ultrasound  [] Electrical Stimulation  [] Gait Training      [] Cervical Traction [] Lumbar Traction  [] Neuromuscular Re-education    [] Cold/hotpack [] Iontophoresis   [] Instruction in HEP     Other:  [] Manual Therapy      []             [] Aquatic Therapy      []                 Goals:            Frequency/Duration:  # Days per week: [] 1 day # Weeks: [] 1 week [] 5 weeks     [] 2 days   [] 2 weeks [] 6 weeks     [] 3 days   [] 3 weeks [] 7 weeks     [] 4 days   [] 4 weeks [] 8 weeks    Rehab Potential: [] Excellent [] Good [] Fair  [] Poor     Electronically signed by:  Heather Jiménez PT    If you have any questions or concerns, please don't hesitate to call.  Thank you for your referral.      Physician Signature:________________________________Date:__________________  By signing above, therapist’s plan is approved by physician

## 2025-06-12 NOTE — PLAN OF CARE
[facility]  Rehabilitation and Sports Medicine    [x] [place]  Phone: [phone]  Fax: [phone]      [] [place]  Phone: [phone]  Fax: [phone]        To:   Mora Chris DO      Patient: Sarah Jones  : 1965   MRN: [id number]  Evaluation Date: 2025      Diagnosis Information:  Diagnosis: M54.41, M54.42  chronic midline LBP with bilateral sciatica   M47.816 lumbar spondylosis   Treatment Diagnosis: LBP     Physical Therapy Certification  Dear Mora Martinez,    The following patient has been evaluated for physical therapy services and for therapy to continue, Medicare requires monthly physician review of the treatment plan. Please review the attached evaluation and/or summary of the patient's plan of care, and verify that you agree therapy should continue by signing the attached document and sending it back to our office.    Plan of Care/Treatment to date:  [x] Therapeutic Exercise    [] Modalities:  [x] Therapeutic Activity     [] Ultrasound  [] Electrical Stimulation  [] Gait Training      [] Cervical Traction [] Lumbar Traction  [x] Neuromuscular Re-education    [x] Cold/hotpack [] Iontophoresis   [x] Instruction in HEP     Other:  [] Manual Therapy      []             [] Aquatic Therapy      []                 Goals:  Short Term Goals  Time Frame for Short Term Goals: 1 week  Short Term Goal 1: Initiate HEP    Long Term Goals  Time Frame for Long Term Goals : 4 weeks  Long Term Goal 1: Independent with HEP  Long Term Goal 2: Pt will demo LE strength 5-/5,  for improved standing and walking  Long Term Goal 3: Pt tolerate standing /walking 30+ min for meal prep and home care  Long Term Goal 4: Pt will score 15 or less on Oswestry for improved QOL    Frequency/Duration:25 to   # Days per week: [] 1 day # Weeks: [] 1 week [] 5 weeks     [x] 2 days   [] 2 weeks [] 6 weeks     [] 3 days   [] 3 weeks [] 7 weeks     [] 4 days   [x] 4 weeks [] 8

## 2025-06-12 NOTE — PROGRESS NOTES
Tolerance: Patient tolerated evaluation without incident;Patient limited by pain      Plan:   Physical Therapy Plan  Plan weeks: 4 weeks  Current Treatment Recommendations: Strengthening, ROM, Home exercise program, Modalities    G-Code:   Modified Oswestry score of 20 = 40 % disability        Goals:  Short Term Goals  Time Frame for Short Term Goals: 1 week  Short Term Goal 1: Initiate HEP  Long Term Goals  Time Frame for Long Term Goals : 4 weeks  Long Term Goal 1: Independent with HEP  Long Term Goal 2: Pt will demo LE strength 5-/5,  for improved standing and walking  Long Term Goal 3: Pt tolerate standing /walking 30+ min for meal prep and home care  Long Term Goal 4: Pt will score 15 or less on Oswestry for improved QOL  Patient Goals   Patient Goals : have strengthening program for LB to tolerate increased walking and standing    Therapy Time:   Individual Concurrent Group Co-treatment   Time In 0900         Time Out 0935         Minutes 35         Timed Code Treatment Minutes: 8 Minutes       Heather Jiménez, PT

## 2025-06-16 ENCOUNTER — PATIENT MESSAGE (OUTPATIENT)
Dept: FAMILY MEDICINE CLINIC | Age: 60
End: 2025-06-16

## 2025-06-16 DIAGNOSIS — M54.41 CHRONIC MIDLINE LOW BACK PAIN WITH BILATERAL SCIATICA: ICD-10-CM

## 2025-06-16 DIAGNOSIS — G89.29 CHRONIC MIDLINE LOW BACK PAIN WITH BILATERAL SCIATICA: ICD-10-CM

## 2025-06-16 DIAGNOSIS — M54.42 CHRONIC MIDLINE LOW BACK PAIN WITH BILATERAL SCIATICA: ICD-10-CM

## 2025-06-17 ENCOUNTER — HOSPITAL ENCOUNTER (OUTPATIENT)
Age: 60
Discharge: HOME | End: 2025-06-17
Payer: MEDICARE

## 2025-06-17 ENCOUNTER — HOSPITAL ENCOUNTER (OUTPATIENT)
Dept: PHYSICAL THERAPY | Age: 60
Setting detail: THERAPIES SERIES
Discharge: HOME | End: 2025-06-17
Attending: FAMILY MEDICINE
Payer: MEDICARE

## 2025-06-17 DIAGNOSIS — R73.01 ELEVATED FASTING GLUCOSE: ICD-10-CM

## 2025-06-17 LAB
EST. AVERAGE GLUCOSE BLD GHB EST-MCNC: 117 MG/DL
HBA1C MFR BLD: 5.7 % (ref 4–6)

## 2025-06-17 PROCEDURE — 97110 THERAPEUTIC EXERCISES: CPT | Performed by: PHYSICAL THERAPY ASSISTANT

## 2025-06-17 PROCEDURE — 83036 HEMOGLOBIN GLYCOSYLATED A1C: CPT

## 2025-06-17 PROCEDURE — 36415 COLL VENOUS BLD VENIPUNCTURE: CPT

## 2025-06-17 RX ORDER — HYDROCODONE BITARTRATE AND ACETAMINOPHEN 5; 325 MG/1; MG/1
1 TABLET ORAL 2 TIMES DAILY PRN
Qty: 12 TABLET | Refills: 0 | Status: CANCELLED | OUTPATIENT
Start: 2025-06-17 | End: 2025-06-23

## 2025-06-17 NOTE — FLOWSHEET NOTE
Physical Therapy Daily Treatment Note    Date:  2025    Patient Name:  Sarah Jones    :  1965  MRN: [id number]  Restrictions/Precautions:     Medical/Treatment Diagnosis Information:   Diagnosis: M54.41, M54.42  chronic midline LBP with bilateral sciatica   M47.816 lumbar spondylosis  Treatment Diagnosis: LBP  Insurance/Certification information:   [facility] MEDICARE / Plan: ANTH DUAL ADVANTAGE   Physician Information:   Mora Chris DO   Plan of care signed (Y/N):  N  Visit# / total visits:  28  Pain level: 3/10       Time In: 150   Time Out:220    Progress Note: []  Yes  [x]  No  Next due by: Visit #10  Or by    Subjective:  pain at this time rated 7/10 through bilateral low back and into left LE    Objective: EVELYNE complete per flow chart to facilitate strength, motion and stability to allow ease with daily activities and ambulation. Verbal cuing for progression, technique and exercises. Reviewed HEP and educated patient in use of lumbar roll. Understanding noted.     Observation: Difficulty with left SLR, weakness noted.   Test measurements:      Exercises:   Exercise/Equipment Resistance/Repetitions Other comments   Supine march with abs 10x    Hip add isometric 5 sec 10    Supine clams with abs 10x    SLR with abs 10x    Dead bug     Hip abd     SL clams 10x         Rows/latts          Ext, Mod ext 10x     Hip abd, HS curls, Ext 10x ea              [x] Provided verbal/tactile cueing for activities related to strengthening, flexibility, endurance, ROM. (28017)  [] Provided verbal/tactile cueing for activities related to improving balance, coordination, kinesthetic sense, posture, motor skill, proprioception. (76952)    Therapeutic Activities:     [] Therapeutic activities, direct (one-on-one) patient contact (use of dynamic activities to improve functional performance). (30824)    Gait:   [] Provided training and instruction to the patient for ambulation re-education.

## 2025-06-17 NOTE — TELEPHONE ENCOUNTER
Please see pt msg.    Per [facility], last fill 5/23, quantity 12 for 6 days.     Sarah HERIBERTO called requesting a refill of the below medication which has been pended for you:     Requested Prescriptions     Pending Prescriptions Disp Refills    HYDROcodone-acetaminophen (NORCO) 5-325 MG per tablet 12 tablet 0     Sig: Take 1 tablet by mouth 2 times daily as needed for Pain for up to 6 days. Take lowest dose possible to manage pain Max Daily Amount: 2 tablets       Last Appointment Date: 5/23/2025  Next Appointment Date: 8/27/2025    Allergies   Allergen Reactions    Flomax [Tamsulosin Hcl] Swelling     Swelling of arms; however, also had rash and fever at the same time and was admitted at the time    Decadron [Dexamethasone]      Tachycardia    Morphine Itching and Rash    Prednisone      Emotional changes, depression    Quetiapine      \"Makes me nuts\"      Saphris [Asenapine]     Bactrim [Sulfamethoxazole-Trimethoprim] Diarrhea and Nausea Only    Codeine Nausea And Vomiting    Trazodone      Sleepwalking      Zyprexa [Olanzapine] Other (See Comments)     Made her feel like she wanted to \"jump out of my skin\"

## 2025-06-19 ENCOUNTER — HOSPITAL ENCOUNTER (OUTPATIENT)
Dept: PHYSICAL THERAPY | Age: 60
Setting detail: THERAPIES SERIES
Discharge: HOME | End: 2025-06-19
Attending: FAMILY MEDICINE
Payer: MEDICARE

## 2025-06-19 PROCEDURE — 97110 THERAPEUTIC EXERCISES: CPT | Performed by: PHYSICAL THERAPY ASSISTANT

## 2025-06-19 NOTE — FLOWSHEET NOTE
Physical Therapy Daily Treatment Note    Date:  2025    Patient Name:  Sarah Jones    :  1965  MRN: [id number]  Restrictions/Precautions:     Medical/Treatment Diagnosis Information:   Diagnosis: M54.41, M54.42  chronic midline LBP with bilateral sciatica   M47.816 lumbar spondylosis  Treatment Diagnosis: LBP  Insurance/Certification information:   [facility] MEDICARE / Plan: ANTH DUAL ADVANTAGE   Physician Information:   Mora Chris DO   Plan of care signed (Y/N):  N  Visit# / total visits:  3  Pain level: 3/10 in back    4/10 in left LE       Time In:  1234  Time Out:112    Progress Note: []  Yes  [x]  No  Next due by: Visit #10  Or by    Subjective:  pain at this time rated 7/10 through bilateral low back and into left LE    Objective: EVELYNE complete per flow chart to facilitate strength, motion and stability to allow ease with daily activities and ambulation. Verbal cuing for progression, technique and exercises. Reviewed and updated HEP. Understanding noted. Added and advanced several exercises to improve motion and decrease pain and discomfort.     Observation: Able to decrease and abolish radicular pain in prone.     Test measurements:      Exercises:   Exercise/Equipment Resistance/Repetitions Other comments   Supine march with abs 10x    Hip add isometric 5 sec 10    Supine clams with abs 10x    SLR with abs 10x    Dead bug     Hip abd 15x    SL clams 15x         Prone 4'    PKF 10x    Hip IR,ER 10x Tightness and increased pain with right LE ROM   Rows/latts          Ext, Mod ext 10x     Hip abd, HS curls, Ext 10x ea    HR,TR 10x         [x] Provided verbal/tactile cueing for activities related to strengthening, flexibility, endurance, ROM. (84431)  [] Provided verbal/tactile cueing for activities related to improving balance, coordination, kinesthetic sense, posture, motor skill, proprioception. (61979)    Therapeutic Activities:     [] Therapeutic activities, direct (one-on-one) patient

## 2025-06-24 ENCOUNTER — HOSPITAL ENCOUNTER (OUTPATIENT)
Dept: PHYSICAL THERAPY | Age: 60
Setting detail: THERAPIES SERIES
Discharge: HOME | End: 2025-06-24
Attending: FAMILY MEDICINE
Payer: MEDICARE

## 2025-06-24 NOTE — PROGRESS NOTES
Physical Therapy    Outpatient Physical Therapy    [] [place]  Phone: [phone]  Fax: [phone]      [] [place]  Phone: [phone]  Fax: [phone]    Physical Therapy  Cancellation/No-show Note  Patient Name:  Sarah Jones  :  1965   Date:  2025  Cancelled visits to date: 1  No-shows to date: 0    For today's appointment patient:  [x]  Cancelled  []  Rescheduled appointment  []  No-show     Reason given by patient:  []  Patient ill  []  Conflicting appointment  []  No transportation    []  Conflict with work  []  No reason given  [x]  Other:  back too sore   Comments:      Electronically signed by: Heather Jiménez PT

## 2025-06-26 ENCOUNTER — HOSPITAL ENCOUNTER (OUTPATIENT)
Dept: PHYSICAL THERAPY | Age: 60
Setting detail: THERAPIES SERIES
Discharge: HOME | End: 2025-06-26
Attending: FAMILY MEDICINE
Payer: MEDICARE

## 2025-06-26 PROCEDURE — 97110 THERAPEUTIC EXERCISES: CPT

## 2025-06-26 NOTE — FLOWSHEET NOTE
Physical Therapy Daily Treatment Note    Date:  2025    Patient Name:  Sarah Jones    :  1965  MRN: [id number]  Restrictions/Precautions:     Medical/Treatment Diagnosis Information:   Diagnosis: M54.41, M54.42  chronic midline LBP with bilateral sciatica   M47.816 lumbar spondylosis  Treatment Diagnosis: LBP  Insurance/Certification information:   [facility] MEDICARE / Plan: ANTHEM DUAL ADVANTAGE   Physician Information:   Mora Chris DO   Plan of care signed (Y/N):  N  Visit# / total visits:    Pain level: 8/10 in back and posterior knee       Time In:  1238  Time Out:108    Progress Note: []  Yes  [x]  No  Next due by: Visit #10  Or by 25    Subjective:  Pt reports back pain is horrible today, rated 8/10. Radicular pain into left posterior knee comes and goes.     Objective: EVELYNE complete per flow chart to facilitate strength, motion and stability to allow ease with daily activities and ambulation. Verbal cuing for progression, technique and exercises. Added and advanced several exercises to improve motion and decrease pain and discomfort.     Observation: Able to decrease and abolish radicular pain in prone.     Test measurements:      Exercises:   Exercise/Equipment Resistance/Repetitions Other comments   Supine march with abs 10x    Hip add isometric 5 sec 10    Supine clams with abs 10x    SLR with abs 10x    Dead bug     Hip abd 15x    SL clams 15x         Prone 4'    PKF 10x    Hip IR,ER 10x Tightness and increased pain with right LE ROM   Rows/latts 10x red          Ext, Mod ext 10x     Hip abd, HS curls, Ext 10x ea    HR,TR 10x         [x] Provided verbal/tactile cueing for activities related to strengthening, flexibility, endurance, ROM. (78469)  [] Provided verbal/tactile cueing for activities related to improving balance, coordination, kinesthetic sense, posture, motor skill, proprioception. (07033)    Therapeutic Activities:     [] Therapeutic activities, direct (one-on-one)

## 2025-07-01 DIAGNOSIS — M54.42 CHRONIC MIDLINE LOW BACK PAIN WITH BILATERAL SCIATICA: ICD-10-CM

## 2025-07-01 DIAGNOSIS — M54.41 CHRONIC MIDLINE LOW BACK PAIN WITH BILATERAL SCIATICA: ICD-10-CM

## 2025-07-01 DIAGNOSIS — M54.50 LEFT LUMBAR PAIN: ICD-10-CM

## 2025-07-01 DIAGNOSIS — G89.29 CHRONIC MIDLINE LOW BACK PAIN WITH BILATERAL SCIATICA: ICD-10-CM

## 2025-07-01 NOTE — TELEPHONE ENCOUNTER
Sarah LOUIS called requesting a refill of the below medication which has been pended for you:     Requested Prescriptions     Pending Prescriptions Disp Refills    tiZANidine (ZANAFLEX) 4 MG tablet [Pharmacy Med Name: tiZANidine HCl 4 MG Oral Tablet] 90 tablet 1     Sig: TAKE 1/2 TO 1 (ONE-HALF TO ONE) TABLET BY MOUTH EVERY 8 HOURS AS NEEDED MUSCLE  PAIN       Last Appointment Date: 5/23/2025  Next Appointment Date: 8/27/2025    Allergies   Allergen Reactions    Flomax [Tamsulosin Hcl] Swelling     Swelling of arms; however, also had rash and fever at the same time and was admitted at the time    Decadron [Dexamethasone]      Tachycardia    Morphine Itching and Rash    Prednisone      Emotional changes, depression    Quetiapine      \"Makes me nuts\"      Saphris [Asenapine]     Bactrim [Sulfamethoxazole-Trimethoprim] Diarrhea and Nausea Only    Codeine Nausea And Vomiting    Trazodone      Sleepwalking      Zyprexa [Olanzapine] Other (See Comments)     Made her feel like she wanted to \"jump out of my skin\"

## 2025-07-03 ENCOUNTER — HOSPITAL ENCOUNTER (OUTPATIENT)
Dept: PHYSICAL THERAPY | Age: 60
Setting detail: THERAPIES SERIES
Discharge: HOME | End: 2025-07-03
Attending: FAMILY MEDICINE
Payer: MEDICARE

## 2025-07-03 PROCEDURE — 97110 THERAPEUTIC EXERCISES: CPT | Performed by: PHYSICAL THERAPY ASSISTANT

## 2025-07-03 NOTE — FLOWSHEET NOTE
Physical Therapy Daily Treatment Note    Date:  7/3/2025    Patient Name:  Sarah Jones    :  1965  MRN: [id number]  Restrictions/Precautions:     Medical/Treatment Diagnosis Information:   Diagnosis: M54.41, M54.42  chronic midline LBP with bilateral sciatica   M47.816 lumbar spondylosis  Treatment Diagnosis: LBP  Insurance/Certification information:   [facility] MEDICARE / Plan: ANTH DUAL ADVANTAGE   Physician Information:   Mora Chris DO   Plan of care signed (Y/N):  Y  Visit# / total visits:    Pain level: 0/10 in back and posterior knee       Time In:   1250 Time Out: 123    Progress Note: []  Yes  [x]  No  Next due by: Visit #10  Or by 25    Subjective:  No pain noted at initiation of session. No radicular pain this date. Feels ~75% back to normal. Difficulty with carrying heavier items and shaving legs. Limited walking distance    Objective: EVELYNE complete per flow chart to facilitate strength, motion and stability to allow ease with daily activities and ambulation. Verbal cuing for progression, technique and exercises. Added and advanced several exercises to improve motion and decrease pain and discomfort. Weakness in right knee noted. Shows overall improvement in ease of movement and transfers.     Observation: Able to decrease and abolish radicular pain in prone.     Test measurements:  Hip abd/flex: 5/5     Knee flex: R: 4/5 , L: 4+-5/5  Oswestry: 19/50 = 38°    Exercises:   Exercise/Equipment Resistance/Repetitions Other comments   Supine march with abs 15x    Hip add isometric 5 sec 10    Supine clams with abs 10x    SLR with abs 10x    Dead bug     Hip abd 15x    SL clams 15x    Bridge 10x               Prone Prop 4'    PKF 10x    Hip IR,ER 10x Tightness and increased pain with right LE ROM        HS Curls 10x GR   Rows/latts 10x red     SPO: 10x GR    Ext, Mod ext 10x     Hip abd, HS curls, Ext 10x ea    HR,TR 10x    Squats 10x Initiated   [x] Provided verbal/tactile cueing for

## 2025-07-04 DIAGNOSIS — M54.42 CHRONIC MIDLINE LOW BACK PAIN WITH BILATERAL SCIATICA: ICD-10-CM

## 2025-07-04 DIAGNOSIS — G89.29 CHRONIC MIDLINE LOW BACK PAIN WITH BILATERAL SCIATICA: ICD-10-CM

## 2025-07-04 DIAGNOSIS — M54.41 CHRONIC MIDLINE LOW BACK PAIN WITH BILATERAL SCIATICA: ICD-10-CM

## 2025-07-07 NOTE — TELEPHONE ENCOUNTER
NOT DUE UNTIL 7/11  Per [facility], last fill 6/11, quantity 90 for 30 days.     Sarah LOUIS called requesting a refill of the below medication which has been pended for you:     Requested Prescriptions     Pending Prescriptions Disp Refills    gabapentin (NEURONTIN) 600 MG tablet [Pharmacy Med Name: GABAPENTIN 600MG    TAB] 90 tablet 0     Sig: Take 1 tablet by mouth 3 times daily.       Last Appointment Date: 5/23/2025  Next Appointment Date: 8/27/2025    Allergies   Allergen Reactions    Flomax [Tamsulosin Hcl] Swelling     Swelling of arms; however, also had rash and fever at the same time and was admitted at the time    Decadron [Dexamethasone]      Tachycardia    Morphine Itching and Rash    Prednisone      Emotional changes, depression    Quetiapine      \"Makes me nuts\"      Saphris [Asenapine]     Bactrim [Sulfamethoxazole-Trimethoprim] Diarrhea and Nausea Only    Codeine Nausea And Vomiting    Trazodone      Sleepwalking      Zyprexa [Olanzapine] Other (See Comments)     Made her feel like she wanted to \"jump out of my skin\"

## 2025-07-08 ENCOUNTER — PATIENT MESSAGE (OUTPATIENT)
Dept: FAMILY MEDICINE CLINIC | Age: 60
End: 2025-07-08

## 2025-07-08 DIAGNOSIS — K21.9 GASTROESOPHAGEAL REFLUX DISEASE, UNSPECIFIED WHETHER ESOPHAGITIS PRESENT: Primary | ICD-10-CM

## 2025-07-09 ENCOUNTER — APPOINTMENT (OUTPATIENT)
Dept: PHYSICAL THERAPY | Age: 60
End: 2025-07-09
Attending: FAMILY MEDICINE
Payer: MEDICARE

## 2025-07-09 DIAGNOSIS — E03.9 HYPOTHYROIDISM, UNSPECIFIED TYPE: ICD-10-CM

## 2025-07-09 NOTE — TELEPHONE ENCOUNTER
Sarah LOUIS called requesting a refill of the below medication which has been pended for you:     Requested Prescriptions     Pending Prescriptions Disp Refills    levothyroxine (SYNTHROID) 125 MCG tablet [Pharmacy Med Name: Levothyroxine Sodium 125 MCG Oral Tablet] 90 tablet 0     Sig: TAKE 1 TABLET BY MOUTH ONCE DAILY 6  DAYS  A  WEEK  THEN  1/2  TABLET  ON  7TH  DAY       Last Appointment Date: 5/23/2025  Next Appointment Date: 8/27/2025    Allergies   Allergen Reactions    Flomax [Tamsulosin Hcl] Swelling     Swelling of arms; however, also had rash and fever at the same time and was admitted at the time    Decadron [Dexamethasone]      Tachycardia    Morphine Itching and Rash    Prednisone      Emotional changes, depression    Quetiapine      \"Makes me nuts\"      Saphris [Asenapine]     Bactrim [Sulfamethoxazole-Trimethoprim] Diarrhea and Nausea Only    Codeine Nausea And Vomiting    Trazodone      Sleepwalking      Zyprexa [Olanzapine] Other (See Comments)     Made her feel like she wanted to \"jump out of my skin\"

## 2025-07-09 NOTE — TELEPHONE ENCOUNTER
Per [employer] message, pt wants to switch back to omeprazole.   Sarah LOUIS called requesting a refill of the below medication which has been pended for you:     Requested Prescriptions     Pending Prescriptions Disp Refills    omeprazole (PRILOSEC) 20 MG delayed release capsule 180 capsule 3     Sig: Take 1 capsule by mouth in the morning and at bedtime       Last Appointment Date: 5/23/2025  Next Appointment Date: 7/9/2025    Allergies   Allergen Reactions    Flomax [Tamsulosin Hcl] Swelling     Swelling of arms; however, also had rash and fever at the same time and was admitted at the time    Decadron [Dexamethasone]      Tachycardia    Morphine Itching and Rash    Prednisone      Emotional changes, depression    Quetiapine      \"Makes me nuts\"      Saphris [Asenapine]     Bactrim [Sulfamethoxazole-Trimethoprim] Diarrhea and Nausea Only    Codeine Nausea And Vomiting    Trazodone      Sleepwalking      Zyprexa [Olanzapine] Other (See Comments)     Made her feel like she wanted to \"jump out of my skin\"

## 2025-07-10 ENCOUNTER — TELEPHONE (OUTPATIENT)
Dept: PRIMARY CARE CLINIC | Age: 60
End: 2025-07-10

## 2025-07-10 ENCOUNTER — PATIENT MESSAGE (OUTPATIENT)
Dept: FAMILY MEDICINE CLINIC | Age: 60
End: 2025-07-10

## 2025-07-10 ENCOUNTER — HOSPITAL ENCOUNTER (OUTPATIENT)
Dept: GENERAL RADIOLOGY | Age: 60
Discharge: HOME | End: 2025-07-12
Payer: MEDICARE

## 2025-07-10 ENCOUNTER — OFFICE VISIT (OUTPATIENT)
Dept: PRIMARY CARE CLINIC | Age: 60
End: 2025-07-10
Payer: MEDICARE

## 2025-07-10 VITALS
SYSTOLIC BLOOD PRESSURE: 100 MMHG | OXYGEN SATURATION: 95 % | DIASTOLIC BLOOD PRESSURE: 80 MMHG | TEMPERATURE: 97.3 F | BODY MASS INDEX: 37.76 KG/M2 | WEIGHT: 200 LBS | HEART RATE: 84 BPM | HEIGHT: 61 IN

## 2025-07-10 DIAGNOSIS — M25.461 EFFUSION OF RIGHT KNEE JOINT: Primary | ICD-10-CM

## 2025-07-10 DIAGNOSIS — M25.561 ACUTE PAIN OF RIGHT KNEE: ICD-10-CM

## 2025-07-10 PROCEDURE — 3074F SYST BP LT 130 MM HG: CPT

## 2025-07-10 PROCEDURE — L1812 KO ELASTIC W/JOINTS PRE OTS: HCPCS

## 2025-07-10 PROCEDURE — 99213 OFFICE O/P EST LOW 20 MIN: CPT

## 2025-07-10 PROCEDURE — 99212 OFFICE O/P EST SF 10 MIN: CPT

## 2025-07-10 PROCEDURE — 73562 X-RAY EXAM OF KNEE 3: CPT

## 2025-07-10 PROCEDURE — 3079F DIAST BP 80-89 MM HG: CPT

## 2025-07-10 RX ORDER — GABAPENTIN 600 MG/1
600 TABLET ORAL 3 TIMES DAILY
Qty: 90 TABLET | Refills: 0 | Status: SHIPPED | OUTPATIENT
Start: 2025-07-11 | End: 2025-08-10

## 2025-07-10 ASSESSMENT — ENCOUNTER SYMPTOMS
COUGH: 0
SHORTNESS OF BREATH: 0
COLOR CHANGE: 0

## 2025-07-10 NOTE — TELEPHONE ENCOUNTER
Controlled Substance Monitoring:    Acute and Chronic Pain Monitoring:   RX Monitoring Periodic Controlled Substance Monitoring Chronic Pain > 80 MEDD   7/10/2025   3:32 PM No signs of potential drug abuse or diversion identified. Obtained or confirmed \"Medication Contract\" on file.

## 2025-07-10 NOTE — PATIENT INSTRUCTIONS
Keep right knee wrapped today and tomorrow  Apply ice 20 mins at a time 4-6 times day.   May use ibuprofen/ tylenol as needed for pain  Decrease activity today and tomorrow may increase as tolerated after two days of rest  Referral sent to orthopedics for further evaluation  If symptoms worsen follow up with PCP  Patient verbalized understanding and agrees with plan of care

## 2025-07-10 NOTE — TELEPHONE ENCOUNTER
Patient called, stating that leg pain is worse, swelling is down. I told her to take off brace, prop leg up on pillows and ice leg. Call me in 1 hour so we can determine the net step.

## 2025-07-10 NOTE — PROGRESS NOTES
[facility] department of [facility]  [address] [facility] [address]  Phone: [phone]  Fax: [phone]      Sarah Jones  1965  MRN: [id number]  Date of visit: 7/10/2025    Chief Complaint:     Sarah Jones is here for c/o of Leg Pain (R leg swelling and pain. The longer Pt is up the more swelling she gets. Pt is in PT for her back and was using bands on her legs and now Pt is having swelling and pain. )      HPI:     Sarah Jones is a 60 y.o. female who presents to the [facility] today for her medical conditions/complaints as noted below.    History of Present Illness  The patient is a 60-year-old female who presents today due to right knee pain and swelling. She states that she has been working with PT for her back and using bands with her legs, but now she is having increased swelling and pain. She states the swelling gets worse the longer she stands.    She began experiencing pain and swelling in her knee leg last Thursday, which has progressively worsened. The pain originates from her knee and radiates downwards. She also reports calf pain, which she attributes to an altered gait from the knee pain. She does not sit for extended periods. She recalls an incident where she was using resistance bands during her physical therapy session, but did not feel any immediate discomfort. However, she later noticed pain and swelling in her right knee. She has no history of knee injuries and reports that her knees were previously in good condition. As the swelling increases throughout the day, her mobility becomes limited due to difficulty in bending her knee. She has been elevating her leg and applying ice to manage the swelling. She has not used any knee braces. She has been undergoing physical therapy for her back for about a month, following a surgery two years ago. The therapy involves the use of resistance bands with her legs. She uses a

## 2025-07-11 ENCOUNTER — HOSPITAL ENCOUNTER (OUTPATIENT)
Dept: PHYSICAL THERAPY | Age: 60
Setting detail: THERAPIES SERIES
Discharge: HOME | End: 2025-07-11
Attending: FAMILY MEDICINE
Payer: MEDICARE

## 2025-07-11 RX ORDER — LEVOTHYROXINE SODIUM 125 UG/1
TABLET ORAL
Qty: 90 TABLET | Refills: 1 | Status: SHIPPED | OUTPATIENT
Start: 2025-07-11

## 2025-07-11 NOTE — PROGRESS NOTES
Physical Therapy    Outpatient Physical Therapy    [x] [place]  Phone: [phone]  Fax: [phone]      [] [place]  Phone: [phone]  Fax: [phone]    Physical Therapy  Cancellation/No-show Note  Patient Name:  Sarah Jones  :  1965   Date:  2025  Cancelled visits to date: 2  No-shows to date: 0    For today's appointment patient:  [x]  Cancelled  []  Rescheduled appointment  []  No-show     Reason given by patient:  []  Patient ill  []  Conflicting appointment  []  No transportation    []  Conflict with work  []  No reason given  [x]  Other:  severe knee pain    Comments:      Electronically signed by: CONNIE SWAIN PTA

## 2025-07-13 RX ORDER — OMEPRAZOLE 20 MG/1
20 CAPSULE, DELAYED RELEASE ORAL 2 TIMES DAILY
Qty: 180 CAPSULE | Refills: 1 | Status: SHIPPED | OUTPATIENT
Start: 2025-07-13

## 2025-07-15 ENCOUNTER — HOSPITAL ENCOUNTER (OUTPATIENT)
Dept: PHYSICAL THERAPY | Age: 60
Setting detail: THERAPIES SERIES
Discharge: HOME | End: 2025-07-15
Attending: FAMILY MEDICINE
Payer: MEDICARE

## 2025-07-15 DIAGNOSIS — R42 VERTIGO: ICD-10-CM

## 2025-07-15 PROCEDURE — 97110 THERAPEUTIC EXERCISES: CPT | Performed by: PHYSICAL THERAPIST

## 2025-07-15 NOTE — FLOWSHEET NOTE
Physical Therapy Daily Treatment Note    Date:  7/15/2025    Patient Name:  Sarah Jones    :  1965  MRN: [id number]  Restrictions/Precautions:     Medical/Treatment Diagnosis Information:   Diagnosis: M54.41, M54.42  chronic midline LBP with bilateral sciatica   M47.816 lumbar spondylosis  Treatment Diagnosis: LBP  Insurance/Certification information:   [facility] MEDICARE / Plan: ANTH DUAL ADVANTAGE   Physician Information:   Mora Chris DO   Plan of care signed (Y/N):  Y  Visit# / total visits:    Pain level: 10 in back and  7/10 with Wbing R knee       Time In:   1:07 Time Out:  1:39    Progress Note: []  Yes  [x]  No  Next due by: Visit #10  Or by 25    Subjective:  Pt reports increased pain R knee  and to dept with knee brace and to see ortho end of month.  Pt arrived 7 min late    Objective: EVELYNE complete per flow chart to facilitate strength, motion and stability to allow ease with daily activities and ambulation. Verbal cuing for progression, technique and exercises. Held several exercises due to increased knee pain and pain with Wbing       Observation:   Test measurements:  Hip abd/flex: 5/5     Knee flex: R: 4-/5 , L: 4+-5/5     Trunk flexion fingertips to floor  ext 24 degrees, rotation L 60 degrees, R 45 degrees  Oswestry: 19/50 = 38°  Mild tenderness B lumbar  Exercises:   Exercise/Equipment Resistance/Repetitions Other comments   Supine march with abs 10x    Hip add isometric    Supine clams with abs 10x    SLR with abs 10x    Dead bug 10x    Hip abd 15x    SL clams 15x    Bridge 10x     IASTM B lumbar 2 '    Gait with cane yes    Prone Prop    PKF    Hip IR,ER Tightness and increased pain with right LE ROM        HS Curls GR   Rows/latts    SPO:    Ext, Mod ext    Hip abd, HS curls, Ext    HR,TR    Squats Initiated   [x] Provided verbal/tactile cueing for activities related to strengthening, flexibility, endurance, ROM. (25167)  [] Provided verbal/tactile cueing for activities

## 2025-07-17 ENCOUNTER — APPOINTMENT (OUTPATIENT)
Dept: PHYSICAL THERAPY | Age: 60
End: 2025-07-17
Attending: FAMILY MEDICINE
Payer: MEDICARE

## 2025-07-18 ENCOUNTER — OFFICE VISIT (OUTPATIENT)
Dept: FAMILY MEDICINE CLINIC | Age: 60
End: 2025-07-18
Payer: MEDICARE

## 2025-07-18 VITALS
HEART RATE: 85 BPM | BODY MASS INDEX: 37.79 KG/M2 | HEIGHT: 61 IN | OXYGEN SATURATION: 93 % | SYSTOLIC BLOOD PRESSURE: 134 MMHG | DIASTOLIC BLOOD PRESSURE: 70 MMHG | RESPIRATION RATE: 16 BRPM | TEMPERATURE: 98.7 F

## 2025-07-18 DIAGNOSIS — G89.29 CHRONIC MIDLINE LOW BACK PAIN WITH BILATERAL SCIATICA: ICD-10-CM

## 2025-07-18 DIAGNOSIS — G47.33 OSA (OBSTRUCTIVE SLEEP APNEA): ICD-10-CM

## 2025-07-18 DIAGNOSIS — M54.41 CHRONIC MIDLINE LOW BACK PAIN WITH BILATERAL SCIATICA: ICD-10-CM

## 2025-07-18 DIAGNOSIS — S83.91XA SPRAIN OF RIGHT KNEE, UNSPECIFIED LIGAMENT, INITIAL ENCOUNTER: Primary | ICD-10-CM

## 2025-07-18 DIAGNOSIS — M54.42 CHRONIC MIDLINE LOW BACK PAIN WITH BILATERAL SCIATICA: ICD-10-CM

## 2025-07-18 PROCEDURE — 3075F SYST BP GE 130 - 139MM HG: CPT | Performed by: FAMILY MEDICINE

## 2025-07-18 PROCEDURE — L1812 KO ELASTIC W/JOINTS PRE OTS: HCPCS | Performed by: FAMILY MEDICINE

## 2025-07-18 PROCEDURE — 3078F DIAST BP <80 MM HG: CPT | Performed by: FAMILY MEDICINE

## 2025-07-18 PROCEDURE — G2211 COMPLEX E/M VISIT ADD ON: HCPCS | Performed by: FAMILY MEDICINE

## 2025-07-18 PROCEDURE — 99214 OFFICE O/P EST MOD 30 MIN: CPT | Performed by: FAMILY MEDICINE

## 2025-07-18 RX ORDER — DICLOFENAC SODIUM 75 MG/1
75 TABLET, DELAYED RELEASE ORAL 2 TIMES DAILY
Qty: 20 TABLET | Refills: 0 | Status: SHIPPED | OUTPATIENT
Start: 2025-07-18 | End: 2025-07-24 | Stop reason: SINTOL

## 2025-07-18 RX ORDER — MECLIZINE HYDROCHLORIDE 25 MG/1
25 TABLET ORAL 3 TIMES DAILY PRN
Qty: 90 TABLET | Refills: 2 | Status: SHIPPED | OUTPATIENT
Start: 2025-07-18

## 2025-07-18 NOTE — PROGRESS NOTES
(WALKER) MISC; DAILY Starting Fri 7/18/2025, Disp-1 each, R-0, PrintROLLATOR WALKER  3. RAHEEL (obstructive sleep apnea)  -     tirzepatide-weight management (ZEPBOUND) 2.5 MG/0.5ML SOAJ subCUTAneous auto-injector pen; Inject 2.5 mg into the skin every 7 days, Disp-2 mL, R-0Sample         Plan:      Assessment & Plan  1. Right knee pain.  - The right knee x-ray was negative for any structural changes, suggesting a strain or sprain.  - Reported instability and pain on the side of the knee, possibility of a meniscal tear considered.  - Diclofenac 75 mg prescribed, to be taken twice daily, and sent to pharmacy. Advised to discontinue ibuprofen while on diclofenac. A 10-day supply provided with instructions to take with food.  - Voltaren gel advised for topical use up to 4 times daily. Handicap placard and walker provided. MRI to be ordered if no improvement by Tuesday.    2. Weight management.  - Currently on tirzepatide 2.5 mg, reported decrease in appetite without negative side effects.  - Will continue current dose for an additional 4 weeks before considering an increase to 5 mg.          Return for already scheduled OV on 8/27/25.    Orders Placed This Encounter   Procedures    Ko elastic w/joints pre ots     Patient was prescribed a DJO Hinged Knee brace.  The right knee will require stabilization / immobilization from this semi-rigid / rigid orthosis to improve their function.  The orthosis will assist in protecting the affected area, provide functional support and facilitate healing.    The patient was educated and fit by a healthcare professional with expert knowledge and specialization in brace application while under the direct supervision of the physician.  Verbal and written instructions for the use of and application of this item were provided.   They were instructed to contact the office immediately should the brace result in increased pain, decreased sensation, increased swelling or worsening of the

## 2025-07-20 ENCOUNTER — PATIENT MESSAGE (OUTPATIENT)
Dept: FAMILY MEDICINE CLINIC | Age: 60
End: 2025-07-20

## 2025-07-20 DIAGNOSIS — S83.91XA SPRAIN OF RIGHT KNEE, UNSPECIFIED LIGAMENT, INITIAL ENCOUNTER: ICD-10-CM

## 2025-07-20 DIAGNOSIS — M25.561 MEDIAL KNEE PAIN, RIGHT: Primary | ICD-10-CM

## 2025-07-23 RX ORDER — DICLOFENAC SODIUM 75 MG/1
75 TABLET, DELAYED RELEASE ORAL 2 TIMES DAILY
Qty: 20 TABLET | Refills: 0 | Status: CANCELLED | OUTPATIENT
Start: 2025-07-23

## 2025-07-23 NOTE — TELEPHONE ENCOUNTER
Per pt msg in this encounter,  voltaren is upsetting her stomach. Would like something else for pain.   Asking about ordering an MRI, pt has OV with Dr. Cotto on the 29th. Can she have him order this at OV? If ordered now, it will not be completed prior to his OV as it will need pre-authorization since it is not STAT.

## 2025-07-25 ENCOUNTER — PATIENT MESSAGE (OUTPATIENT)
Dept: FAMILY MEDICINE CLINIC | Age: 60
End: 2025-07-25

## 2025-07-29 ENCOUNTER — OFFICE VISIT (OUTPATIENT)
Dept: ORTHOPEDIC SURGERY | Age: 60
End: 2025-07-29
Payer: MEDICARE

## 2025-07-29 VITALS
HEART RATE: 91 BPM | DIASTOLIC BLOOD PRESSURE: 68 MMHG | HEIGHT: 61 IN | BODY MASS INDEX: 37.76 KG/M2 | WEIGHT: 200 LBS | SYSTOLIC BLOOD PRESSURE: 138 MMHG

## 2025-07-29 DIAGNOSIS — M25.561 CHRONIC PAIN OF RIGHT KNEE: Primary | ICD-10-CM

## 2025-07-29 DIAGNOSIS — G89.29 CHRONIC PAIN OF RIGHT KNEE: Primary | ICD-10-CM

## 2025-07-29 DIAGNOSIS — M17.11 PRIMARY OSTEOARTHRITIS OF RIGHT KNEE: ICD-10-CM

## 2025-07-29 PROCEDURE — 99203 OFFICE O/P NEW LOW 30 MIN: CPT | Performed by: PHYSICIAN ASSISTANT

## 2025-07-29 PROCEDURE — 20610 DRAIN/INJ JOINT/BURSA W/O US: CPT | Performed by: PHYSICIAN ASSISTANT

## 2025-07-29 RX ORDER — MELOXICAM 7.5 MG/1
7.5 TABLET ORAL 2 TIMES DAILY PRN
Qty: 60 TABLET | Refills: 0 | Status: SHIPPED | OUTPATIENT
Start: 2025-07-29 | End: 2025-08-28

## 2025-07-29 RX ORDER — BUPIVACAINE HYDROCHLORIDE 5 MG/ML
5 INJECTION, SOLUTION PERINEURAL ONCE
Status: COMPLETED | OUTPATIENT
Start: 2025-07-29 | End: 2025-07-29

## 2025-07-29 RX ORDER — TRIAMCINOLONE ACETONIDE 40 MG/ML
80 INJECTION, SUSPENSION INTRA-ARTICULAR; INTRAMUSCULAR ONCE
Status: COMPLETED | OUTPATIENT
Start: 2025-07-29 | End: 2025-07-29

## 2025-07-29 RX ADMIN — TRIAMCINOLONE ACETONIDE 80 MG: 400 INJECTION, SUSPENSION INTRA-ARTICULAR; INTRAMUSCULAR at 09:39

## 2025-07-29 RX ADMIN — BUPIVACAINE HYDROCHLORIDE 25 MG: 5 INJECTION, SOLUTION PERINEURAL at 09:39

## 2025-07-29 NOTE — PROGRESS NOTES
Orthopaedic Progress Note      CHIEF COMPLAINT: Right knee pain    HISTORY OF PRESENT ILLNESS:       Ms. Jones  is a 60 y.o. female  who presents with ongoing right knee pain since first part of July 2025.  Patient was actually in physical therapy for her lumbar spine and while using some sort of bands felt an awkward strain to the right knee.  She did not have any significant immediate pain or symptoms but reports that later that evening she started having increasing pain primarily medial aspect of the right knee.  She initially tried use of Motrin, Tylenol, ice, elevation, Voltaren gel with mild relief.  She has been having persistent symptoms in the right knee since then.  Patient reports to our clinic for further evaluation of clinical exam and x-ray imaging.  No significant past medical history involving the right knee or any other previous traumas that could be related to the onset of the symptoms.  She did have x-rays performed on 7/10/2025 that did show some mild decreased joint space medial compartment of the right knee but overall joint spaces well-preserved no acute fractures appreciated.      Past Medical History:    Past Medical History:   Diagnosis Date    Allergic rhinitis     Anxiety     Bipolar disorder ([facility])     Breast cancer ([facility]) 2014    L side - lumpectomy and radiation; no chemo (was recommended to take Tamoxifen, but with her smoking, she declined due to family h/o CVA already). Seeing Dr. Zaidi once yearly/    CAD (coronary artery disease) Feb 2025    Per ct lung    Chronic back pain     Chronic kidney disease     COPD (chronic obstructive pulmonary disease) ([facility])     DCIS (ductal carcinoma in situ) 2013    Depression     Diffuse cystic mastopathy     GERD (gastroesophageal reflux disease)     Headache(784.0)     History of alcoholism ([facility])     sober since 1/16/19    History of therapeutic radiation 2014 left breast    Hyperlipidemia     Hypertension     Hyperthyroidism

## 2025-08-01 ENCOUNTER — TELEPHONE (OUTPATIENT)
Dept: FAMILY MEDICINE CLINIC | Age: 60
End: 2025-08-01

## 2025-08-01 NOTE — TELEPHONE ENCOUNTER
See attached media. Writer called to number listed to answer clinical questions. Received approval for MRI from July 2025- Octover 22 2025. Pt updated via [employer].

## 2025-08-06 DIAGNOSIS — M54.41 CHRONIC MIDLINE LOW BACK PAIN WITH BILATERAL SCIATICA: ICD-10-CM

## 2025-08-06 DIAGNOSIS — G89.29 CHRONIC MIDLINE LOW BACK PAIN WITH BILATERAL SCIATICA: ICD-10-CM

## 2025-08-06 DIAGNOSIS — M54.42 CHRONIC MIDLINE LOW BACK PAIN WITH BILATERAL SCIATICA: ICD-10-CM

## 2025-08-09 ENCOUNTER — PATIENT MESSAGE (OUTPATIENT)
Dept: FAMILY MEDICINE CLINIC | Age: 60
End: 2025-08-09

## 2025-08-10 RX ORDER — GABAPENTIN 600 MG/1
600 TABLET ORAL 3 TIMES DAILY
Qty: 90 TABLET | Refills: 0 | Status: SHIPPED | OUTPATIENT
Start: 2025-08-10 | End: 2025-09-09

## 2025-08-14 ENCOUNTER — HOSPITAL ENCOUNTER (OUTPATIENT)
Dept: MRI IMAGING | Age: 60
Discharge: HOME | End: 2025-08-16
Attending: FAMILY MEDICINE
Payer: MEDICARE

## 2025-08-14 DIAGNOSIS — M25.561 MEDIAL KNEE PAIN, RIGHT: ICD-10-CM

## 2025-08-14 PROCEDURE — 73721 MRI JNT OF LWR EXTRE W/O DYE: CPT

## 2025-08-19 ENCOUNTER — PATIENT MESSAGE (OUTPATIENT)
Dept: FAMILY MEDICINE CLINIC | Age: 60
End: 2025-08-19

## 2025-08-26 ENCOUNTER — TELEPHONE (OUTPATIENT)
Dept: ORTHOPEDIC SURGERY | Age: 60
End: 2025-08-26

## 2025-08-26 SDOH — HEALTH STABILITY: PHYSICAL HEALTH: ON AVERAGE, HOW MANY DAYS PER WEEK DO YOU ENGAGE IN MODERATE TO STRENUOUS EXERCISE (LIKE A BRISK WALK)?: 0 DAYS

## 2025-08-26 ASSESSMENT — PATIENT HEALTH QUESTIONNAIRE - PHQ9
SUM OF ALL RESPONSES TO PHQ QUESTIONS 1-9: 1
2. FEELING DOWN, DEPRESSED OR HOPELESS: NOT AT ALL
1. LITTLE INTEREST OR PLEASURE IN DOING THINGS: SEVERAL DAYS
SUM OF ALL RESPONSES TO PHQ QUESTIONS 1-9: 1

## 2025-08-26 ASSESSMENT — LIFESTYLE VARIABLES
HOW MANY STANDARD DRINKS CONTAINING ALCOHOL DO YOU HAVE ON A TYPICAL DAY: PATIENT DOES NOT DRINK
HOW OFTEN DO YOU HAVE A DRINK CONTAINING ALCOHOL: NEVER
HOW MANY STANDARD DRINKS CONTAINING ALCOHOL DO YOU HAVE ON A TYPICAL DAY: 0
HOW OFTEN DO YOU HAVE A DRINK CONTAINING ALCOHOL: 1

## 2025-08-27 ENCOUNTER — OFFICE VISIT (OUTPATIENT)
Dept: FAMILY MEDICINE CLINIC | Age: 60
End: 2025-08-27
Payer: MEDICARE

## 2025-08-27 VITALS
HEART RATE: 84 BPM | OXYGEN SATURATION: 95 % | HEIGHT: 61 IN | WEIGHT: 184.5 LBS | SYSTOLIC BLOOD PRESSURE: 130 MMHG | DIASTOLIC BLOOD PRESSURE: 80 MMHG | TEMPERATURE: 97.8 F | BODY MASS INDEX: 34.83 KG/M2 | RESPIRATION RATE: 16 BRPM

## 2025-08-27 DIAGNOSIS — E78.00 PURE HYPERCHOLESTEROLEMIA: ICD-10-CM

## 2025-08-27 DIAGNOSIS — E87.6 HYPOKALEMIA: ICD-10-CM

## 2025-08-27 DIAGNOSIS — R74.01 ELEVATED ALT MEASUREMENT: ICD-10-CM

## 2025-08-27 DIAGNOSIS — R73.01 ELEVATED FASTING GLUCOSE: ICD-10-CM

## 2025-08-27 DIAGNOSIS — Z72.0 TOBACCO ABUSE: ICD-10-CM

## 2025-08-27 DIAGNOSIS — E03.9 HYPOTHYROIDISM, UNSPECIFIED TYPE: ICD-10-CM

## 2025-08-27 DIAGNOSIS — S83.241A ACUTE MEDIAL MENISCUS TEAR OF RIGHT KNEE, INITIAL ENCOUNTER: Primary | ICD-10-CM

## 2025-08-27 DIAGNOSIS — Z00.00 MEDICARE ANNUAL WELLNESS VISIT, SUBSEQUENT: ICD-10-CM

## 2025-08-27 DIAGNOSIS — G47.33 OSA (OBSTRUCTIVE SLEEP APNEA): ICD-10-CM

## 2025-08-27 PROCEDURE — 99213 OFFICE O/P EST LOW 20 MIN: CPT | Performed by: FAMILY MEDICINE

## 2025-08-27 PROCEDURE — 3079F DIAST BP 80-89 MM HG: CPT | Performed by: FAMILY MEDICINE

## 2025-08-27 PROCEDURE — G0439 PPPS, SUBSEQ VISIT: HCPCS | Performed by: FAMILY MEDICINE

## 2025-08-27 PROCEDURE — 3075F SYST BP GE 130 - 139MM HG: CPT | Performed by: FAMILY MEDICINE

## 2025-08-27 RX ORDER — NICOTINE 21 MG/24HR
1 PATCH, TRANSDERMAL 24 HOURS TRANSDERMAL EVERY 24 HOURS
Qty: 30 PATCH | Refills: 2 | Status: SHIPPED | OUTPATIENT
Start: 2025-08-27

## 2025-08-27 ASSESSMENT — PATIENT HEALTH QUESTIONNAIRE - PHQ9
4. FEELING TIRED OR HAVING LITTLE ENERGY: MORE THAN HALF THE DAYS
SUM OF ALL RESPONSES TO PHQ QUESTIONS 1-9: 7
3. TROUBLE FALLING OR STAYING ASLEEP: MORE THAN HALF THE DAYS
SUM OF ALL RESPONSES TO PHQ QUESTIONS 1-9: 7
SUM OF ALL RESPONSES TO PHQ QUESTIONS 1-9: 7
9. THOUGHTS THAT YOU WOULD BE BETTER OFF DEAD, OR OF HURTING YOURSELF: NOT AT ALL
10. IF YOU CHECKED OFF ANY PROBLEMS, HOW DIFFICULT HAVE THESE PROBLEMS MADE IT FOR YOU TO DO YOUR WORK, TAKE CARE OF THINGS AT HOME, OR GET ALONG WITH OTHER PEOPLE: NOT DIFFICULT AT ALL
5. POOR APPETITE OR OVEREATING: NOT AT ALL
SUM OF ALL RESPONSES TO PHQ QUESTIONS 1-9: 7
1. LITTLE INTEREST OR PLEASURE IN DOING THINGS: NOT AT ALL
8. MOVING OR SPEAKING SO SLOWLY THAT OTHER PEOPLE COULD HAVE NOTICED. OR THE OPPOSITE, BEING SO FIGETY OR RESTLESS THAT YOU HAVE BEEN MOVING AROUND A LOT MORE THAN USUAL: NEARLY EVERY DAY
2. FEELING DOWN, DEPRESSED OR HOPELESS: NOT AT ALL
7. TROUBLE CONCENTRATING ON THINGS, SUCH AS READING THE NEWSPAPER OR WATCHING TELEVISION: NOT AT ALL
6. FEELING BAD ABOUT YOURSELF - OR THAT YOU ARE A FAILURE OR HAVE LET YOURSELF OR YOUR FAMILY DOWN: NOT AT ALL

## 2025-08-27 ASSESSMENT — LIFESTYLE VARIABLES
HOW OFTEN DO YOU HAVE A DRINK CONTAINING ALCOHOL: NEVER
HOW MANY STANDARD DRINKS CONTAINING ALCOHOL DO YOU HAVE ON A TYPICAL DAY: PATIENT DOES NOT DRINK

## 2025-09-03 ENCOUNTER — PATIENT MESSAGE (OUTPATIENT)
Dept: FAMILY MEDICINE CLINIC | Age: 60
End: 2025-09-03

## 2025-09-03 DIAGNOSIS — G89.29 CHRONIC MIDLINE LOW BACK PAIN WITH BILATERAL SCIATICA: ICD-10-CM

## 2025-09-03 DIAGNOSIS — M54.41 CHRONIC MIDLINE LOW BACK PAIN WITH BILATERAL SCIATICA: ICD-10-CM

## 2025-09-03 DIAGNOSIS — M54.42 CHRONIC MIDLINE LOW BACK PAIN WITH BILATERAL SCIATICA: ICD-10-CM

## 2025-09-03 RX ORDER — GABAPENTIN 600 MG/1
600 TABLET ORAL 3 TIMES DAILY
Qty: 90 TABLET | Refills: 0 | Status: CANCELLED | OUTPATIENT
Start: 2025-09-03 | End: 2025-10-03

## (undated) DEVICE — SOLUTION IV IRRIG POUR BRL 0.9% SODIUM CHL 2F7124

## (undated) DEVICE — PACK SURG PROC REMINDER N WVN DISPOSABLE BEAC TIME OUT

## (undated) DEVICE — STRIP,CLOSURE,WOUND,MEDI-STRIP,1/2X4: Brand: MEDLINE

## (undated) DEVICE — Z DISCONTINUED USE 2624853 GLOVE SURG SZ 75 L12IN THK91MIL BRN LTX FREE

## (undated) DEVICE — DRESSING TRNSPAR W2XL2.75IN FLM SHT SEMIPERMEABLE WIND

## (undated) DEVICE — GLOVE SURG SZ 65 THK91MIL LTX FREE SYN POLYISOPRENE

## (undated) DEVICE — GAUZE,SPONGE,2"X2",8PLY,STERILE,LF,2'S: Brand: MEDLINE

## (undated) DEVICE — GARMENT,MEDLINE,DVT,INT,CALF,MED, GEN2: Brand: MEDLINE

## (undated) DEVICE — GOWN,SIRUS,POLYRNF,BRTHSLV,LG,30/CS: Brand: MEDLINE

## (undated) DEVICE — SUTURE VCRL SZ 3-0 L27IN ABSRB UD L26MM SH 1/2 CIR J416H

## (undated) DEVICE — GENERAL ROBOTIC PACK: Brand: MEDLINE INDUSTRIES, INC.

## (undated) DEVICE — SUTURE VCRL SZ 1 L36IN ABSRB VLT L36MM CT-1 1/2 CIR J347H

## (undated) DEVICE — CANNULA NSL AD L2IN ETCO2 SAMP SFT CRUSH RESIST FEM AIRLFE

## (undated) DEVICE — COVER LT HNDL BLU PLAS

## (undated) DEVICE — PAD,ABDOMINAL,5"X9",ST,LF,25/BX: Brand: MEDLINE INDUSTRIES, INC.

## (undated) DEVICE — 3M™ WARMING BLANKET, UPPER BODY, 10 PER CASE, 42268: Brand: BAIR HUGGER™

## (undated) DEVICE — ARM DRAPE

## (undated) DEVICE — SUTURE DEV SZ 0 L6IN N ABSORBABLE

## (undated) DEVICE — GLOVE SURG SZ 6 THK91MIL LTX FREE SYN POLYISOPRENE ANTI

## (undated) DEVICE — PROCEDURE PACK TRENGUARD 450

## (undated) DEVICE — FORCEPS BX L240CM JAW DIA2.2MM RAD JAW 4 HOT DISP

## (undated) DEVICE — GAUZE,SPONGE,4"X4",12PLY,STERILE,LF,2'S: Brand: MEDLINE

## (undated) DEVICE — SUTURE VCRL SZ 4-0 L18IN ABSRB UD L19MM PS-2 3/8 CIR PRIM J496H

## (undated) DEVICE — SUTURE VCRL SZ 0 L27IN ABSRB VLT L36MM CT-1 1/2 CIR J340H

## (undated) DEVICE — SUTURE PROL SZ 1 L30IN NONABSORBABLE BLU L36MM CT-1 1/2 CIR 8425H

## (undated) DEVICE — PACK,LAPAROTOMY,PK IV,AURORA: Brand: MEDLINE

## (undated) DEVICE — CONNECTOR TBNG AUX H2O JET DISP FOR OLY 160/180 SER

## (undated) DEVICE — SUTURE PROL SZ 1 L60IN NONABSORBABLE BLU L65MM TP-1 3/8 CIR 8824G

## (undated) DEVICE — SUTURE VCRL SZ 2-0 L27IN ABSRB VLT L36MM CT-1 1/2 CIR J339H

## (undated) DEVICE — GOWN,AURORA,NONRNF,XL,30/CS: Brand: MEDLINE

## (undated) DEVICE — TOWEL SURG W16XL26IN GRN NONFENESTRATED RADPQ ST

## (undated) DEVICE — COLUMN DRAPE

## (undated) DEVICE — DRESSING TRNSPAR W4XL10IN FLM MIC POR SURESITE 123

## (undated) DEVICE — MASTISOL ADHESIVE LIQ 2/3ML

## (undated) DEVICE — TUBING, SUCTION, 3/16" X 20', STRAIGHT: Brand: MEDLINE

## (undated) DEVICE — BLANKET WRM W29.9XL79.1IN UP BODY FORC AIR MISTRAL-AIR

## (undated) DEVICE — CANNULA SEAL

## (undated) DEVICE — 60 ML SYRINGE REGULAR TIP: Brand: MONOJECT

## (undated) DEVICE — MERCY DEFIANCE ENDO KIT: Brand: MEDLINE INDUSTRIES, INC.

## (undated) DEVICE — CHLORAPREP 26ML CLEAR

## (undated) DEVICE — 1200CC GUARDIAN II: Brand: GUARDIAN

## (undated) DEVICE — SUTURE PDS II SZ 1 L36IN ABSRB VLT CT L40MM 1/2 CIR TAPR Z359T

## (undated) DEVICE — NEEDLE, QUINCKE, 22GX5": Brand: MEDLINE

## (undated) DEVICE — CHLORAPREP 26ML ORANGE

## (undated) DEVICE — KIT DRN FLAT W/ 100CC EVAC 10MM FULL PERF

## (undated) DEVICE — SPONGE LAP W18XL18IN WHT COT 4 PLY FLD STRUNG RADPQ DISP ST

## (undated) DEVICE — SUTURE VCRL SZ 2-0 L12X18IN ABSRB UD POLYGLACTIN 910 BRAID J911T

## (undated) DEVICE — BASIN SET: Brand: MEDLINE INDUSTRIES, INC.

## (undated) DEVICE — ELECTROSURGERY PENCIL ADAPTOR: Brand: PENADAPT

## (undated) DEVICE — GLOVE ORANGE PI 7   MSG9070

## (undated) DEVICE — TRAY URIN CATH 16FR DRNGE BG STATLOK STBL DEV F SURSTP

## (undated) DEVICE — TIP COVER ACCESSORY

## (undated) DEVICE — 9165 UNIVERSAL PATIENT PLATE: Brand: 3M™

## (undated) DEVICE — SYRINGE IRRIG 60ML SFT PLIABLE BLB EZ TO GRP 1 HND USE W/

## (undated) DEVICE — SUTURE VCRL SZ 3-0 L27IN ABSRB UD L26MM CT-2 1/2 CIR J232H

## (undated) DEVICE — INSUFFLATION TUBING SET, ENDOFLATOR 50: Brand: N.A.

## (undated) DEVICE — GAUZE,SPONGE,FLUFF,6"X6.75",STRL,5/TRAY: Brand: MEDLINE

## (undated) DEVICE — TOWEL,OR,DSP,ST,BLUE,STD,4/PK,20PK/CS: Brand: MEDLINE

## (undated) DEVICE — TRAY PAIN CUST

## (undated) DEVICE — SUTURE VCRL SZ 3-0 L18IN ABSRB UD POLYGLACTIN 910 BRAID TIE J910T

## (undated) DEVICE — BLADELESS OBTURATOR: Brand: WECK VISTA

## (undated) DEVICE — SUTURE ETHLN SZ 3-0 L18IN NONABSORBABLE BLK L24MM PS-1 3/8 1663G

## (undated) DEVICE — TOTAL TRAY, DB, 100% SILI FOLEY, 16FR 10: Brand: MEDLINE

## (undated) DEVICE — LARGE VISCERA RETAINER: Brand: VISCERA RETAINER, FISH

## (undated) DEVICE — GLOVE ORANGE PI 7 1/2   MSG9075

## (undated) DEVICE — SYRINGE BLB 2 PC STER 50

## (undated) DEVICE — TRAP SURG QUAD PARABOLA SLOT DSGN SFTY SCRN TRAPEASE

## (undated) DEVICE — 4-PORT MANIFOLD: Brand: NEPTUNE 2

## (undated) DEVICE — LINE SAMP O2 6.5FT W/FEMALE CONN F/ADULT CAPNOLINE PLUS

## (undated) DEVICE — INTENDED FOR TISSUE SEPARATION, AND OTHER PROCEDURES THAT REQUIRE A SHARP SURGICAL BLADE TO PUNCTURE OR CUT.: Brand: BARD-PARKER ® CARBON RIB-BACK BLADES

## (undated) DEVICE — BLADE CLIPPER GEN PURP NS

## (undated) DEVICE — SOLUTION ANTIFOG VIS SYS CLEARIFY LAPSCP

## (undated) DEVICE — MARKER W/PRE PRINTED CUSTOM LABEL

## (undated) DEVICE — BANDAGE ADH DIA7/8IN NAT FLEX-FABRIC WVN FOR WND PROTCT

## (undated) DEVICE — SUTURE VCRL SZ 2-0 L27IN ABSRB VLT L26MM SH 1/2 CIR J317H

## (undated) DEVICE — GLOVE SURG SZ 8 L12IN THK91MIL BRN LTX FREE POLYCHLOROPRENE

## (undated) DEVICE — Device

## (undated) DEVICE — BINDER ABD SM M W9IN FOR 30 45IN 3 PNL E CNTCT CLSR POSTOP

## (undated) DEVICE — PMI DISPOSABLE PUNCTURE CLOSURE DEVICE / SUTURE GRASPER: Brand: PMI

## (undated) DEVICE — GLOVE ORANGE PI 8   MSG9080